# Patient Record
Sex: FEMALE | Race: WHITE | Employment: OTHER | URBAN - METROPOLITAN AREA
[De-identification: names, ages, dates, MRNs, and addresses within clinical notes are randomized per-mention and may not be internally consistent; named-entity substitution may affect disease eponyms.]

---

## 2016-03-04 LAB
ALT SERPL-CCNC: 32 U/L (ref 8–45)
AST SERPL-CCNC: 25 U/L (ref 5–41)
CHOLEST SERPL-MCNC: 167 MG/DL (ref 0–200)
CREAT SERPL-MCNC: 0.8 MG/DL (ref 0.57–1.11)
GLUCOSE SERPL-MCNC: 143 MG/DL (ref 70–100)
HBA1C MFR BLD: 7 % (ref 4–6)
HDLC SERPL-MCNC: 49 MG/DL
LDLC SERPL CALC-MCNC: 90 MG/DL (ref 0–159)
POTASSIUM SERPL-SCNC: 4.5 MMOL/L (ref 3.5–5.1)
TRIGL SERPL-MCNC: 142 MG/DL (ref 30–150)

## 2016-07-12 LAB
ALT SERPL-CCNC: 21 U/L (ref 8–45)
AST SERPL-CCNC: 22 U/L (ref 5–41)
CREAT SERPL-MCNC: 0.84 MG/DL (ref 0.57–1.11)
GLUCOSE SERPL-MCNC: 105 MG/DL (ref 70–100)
HBA1C MFR BLD: 6.2 % (ref 4–6)
POTASSIUM SERPL-SCNC: 4 MMOL/L (ref 3.5–5.1)
TSH SERPL-ACNC: 1.55 UIU/ML (ref 0.47–5)

## 2017-01-12 ENCOUNTER — OFFICE VISIT (OUTPATIENT)
Dept: INTERNAL MEDICINE | Facility: CLINIC | Age: 67
End: 2017-01-12
Payer: MEDICARE

## 2017-01-12 VITALS
WEIGHT: 208.2 LBS | RESPIRATION RATE: 16 BRPM | SYSTOLIC BLOOD PRESSURE: 128 MMHG | OXYGEN SATURATION: 98 % | HEART RATE: 96 BPM | TEMPERATURE: 98.1 F | DIASTOLIC BLOOD PRESSURE: 62 MMHG

## 2017-01-12 DIAGNOSIS — I10 BENIGN ESSENTIAL HYPERTENSION: ICD-10-CM

## 2017-01-12 DIAGNOSIS — G89.29 CHRONIC BILATERAL LOW BACK PAIN WITHOUT SCIATICA: Primary | ICD-10-CM

## 2017-01-12 DIAGNOSIS — M54.50 CHRONIC BILATERAL LOW BACK PAIN WITHOUT SCIATICA: Primary | ICD-10-CM

## 2017-01-12 DIAGNOSIS — E11.42 DIABETIC POLYNEUROPATHY ASSOCIATED WITH TYPE 2 DIABETES MELLITUS (H): ICD-10-CM

## 2017-01-12 DIAGNOSIS — G89.4 CHRONIC PAIN SYNDROME: ICD-10-CM

## 2017-01-12 DIAGNOSIS — E11.40 TYPE 2 DIABETES MELLITUS WITH DIABETIC NEUROPATHY, WITHOUT LONG-TERM CURRENT USE OF INSULIN (H): ICD-10-CM

## 2017-01-12 DIAGNOSIS — M79.7 FIBROMYALGIA: ICD-10-CM

## 2017-01-12 DIAGNOSIS — Z79.899 CONTROLLED SUBSTANCE AGREEMENT SIGNED: ICD-10-CM

## 2017-01-12 PROCEDURE — 99204 OFFICE O/P NEW MOD 45 MIN: CPT | Performed by: INTERNAL MEDICINE

## 2017-01-12 RX ORDER — TRAZODONE HYDROCHLORIDE 100 MG/1
100 TABLET ORAL 2 TIMES DAILY
COMMUNITY
End: 2017-05-09

## 2017-01-12 RX ORDER — TRAMADOL HYDROCHLORIDE 50 MG/1
50 TABLET ORAL 2 TIMES DAILY
Qty: 90 TABLET | Refills: 1 | Status: SHIPPED | OUTPATIENT
Start: 2017-01-12 | End: 2017-01-12

## 2017-01-12 RX ORDER — DULOXETIN HYDROCHLORIDE 60 MG/1
60 CAPSULE, DELAYED RELEASE ORAL DAILY
COMMUNITY
End: 2017-07-10

## 2017-01-12 RX ORDER — VERAPAMIL HYDROCHLORIDE 180 MG/1
180 TABLET, EXTENDED RELEASE ORAL AT BEDTIME
COMMUNITY
End: 2017-05-04

## 2017-01-12 RX ORDER — LISINOPRIL 40 MG/1
40 TABLET ORAL DAILY
COMMUNITY
End: 2017-05-04

## 2017-01-12 RX ORDER — CYCLOBENZAPRINE HCL 10 MG
10 TABLET ORAL 2 TIMES DAILY PRN
COMMUNITY
End: 2017-07-11

## 2017-01-12 RX ORDER — TRAMADOL HYDROCHLORIDE 50 MG/1
50 TABLET ORAL 3 TIMES DAILY
Qty: 90 TABLET | Refills: 1 | Status: SHIPPED | OUTPATIENT
Start: 2017-01-12 | End: 2017-03-08

## 2017-01-12 RX ORDER — PREGABALIN 150 MG/1
150 CAPSULE ORAL 2 TIMES DAILY
COMMUNITY
End: 2017-05-04

## 2017-01-12 RX ORDER — CETIRIZINE HYDROCHLORIDE 10 MG/1
10 TABLET ORAL DAILY
COMMUNITY
End: 2017-05-04

## 2017-01-12 RX ORDER — TRAMADOL HYDROCHLORIDE 50 MG/1
50 TABLET ORAL 2 TIMES DAILY
COMMUNITY
End: 2017-01-12

## 2017-01-12 RX ORDER — ATORVASTATIN CALCIUM 40 MG/1
40 TABLET, FILM COATED ORAL DAILY
COMMUNITY
End: 2017-05-04

## 2017-01-12 NOTE — Clinical Note
Please abstract the following data from this visit with this patient into the appropriate field in Epic:  Colonoscopy done on this date: 2015 (approximately), by this group: [facility], results were Negative.  Mammogram done on this date: 09/2016 (approximately), by this group: [facility], results were Negative.   SOUMYA signed at this OV- pending records.

## 2017-01-12 NOTE — MR AVS SNAPSHOT
"              After Visit Summary   2017    Vijaya Manjarrez    MRN: [id number]           Patient Information     Date Of Birth          1950        Visit Information        Provider Department      2017 4:40 PM Disha Van MD [facility]        Today's Diagnoses     HCD (health care directive)    -  1     Chronic bilateral low back pain without sciatica           Care Instructions    Clotrimazole (lotrimin) cream for the rash.         Follow-ups after your visit        Who to contact     If you have questions or need follow up information about today's clinic visit or your schedule please contact [facility] directly at [phone].  Normal or non-critical lab and imaging results will be communicated to you by MyChart, letter or phone within 4 business days after the clinic has received the results. If you do not hear from us within 7 days, please contact the clinic through MyChart or phone. If you have a critical or abnormal lab result, we will notify you by phone as soon as possible.  Submit refill requests through [employer] or call your pharmacy and they will forward the refill request to us. Please allow 3 business days for your refill to be completed.          Additional Information About Your Visit        MyChart Information     [employer] lets you send messages to your doctor, view your test results, renew your prescriptions, schedule appointments and more. To sign up, go to [email or web address] . Click on \"Log in\" on the left side of the screen, which will take you to the Welcome page. Then click on \"Sign up Now\" on the right side of the page.     You will be asked to enter the access code listed below, as well as some personal information. Please follow the directions to create your username and password.     Your access code is: [email or web address]  Expires: 2017  5:40 PM     Your access code will  in 90 days. If you need help or a new code, please " call your [facility] or [phone].        Care EveryWhere ID     This is your Care EveryWhere ID. This could be used by other organizations to access your [facility] medical records  [id number]        Your Vitals Were     Pulse Temperature Respirations Pulse Oximetry          96 98.1  F (36.7  C) (Oral) 16 98%         Blood Pressure from Last 3 Encounters:   01/12/17 128/62    Weight from Last 3 Encounters:   01/12/17 208 lb 3.2 oz (94.439 kg)              Today, you had the following     No orders found for display         Today's Medication Changes          These changes are accurate as of: 1/12/17  5:40 PM.  If you have any questions, ask your nurse or doctor.               Start taking these medicines.        Dose/Directions    traMADol 50 MG tablet   Commonly known as:  ULTRAM   Used for:  Chronic bilateral low back pain without sciatica   Started by:  Disha Van MD        Dose:  50 mg   Take 1 tablet (50 mg) by mouth 3 times daily   Quantity:  90 tablet   Refills:  1            Where to get your medicines      Some of these will need a paper prescription and others can be bought over the counter.  Ask your nurse if you have questions.     Bring a paper prescription for each of these medications    - traMADol 50 MG tablet             Primary Care Provider Office Phone # Fax #    Disha Van [phone]       [facility] [address]  [facility] [address]        Thank you!     Thank you for choosing [facility]  for your care. Our goal is always to provide you with excellent care. Hearing back from our patients is one way we can continue to improve our services. Please take a few minutes to complete the written survey that you may receive in the mail after your visit with us. Thank you!             Your Updated Medication List - Protect others around you: Learn how to safely use, store and throw away your medicines at www.disposemymeds.org.           This list is accurate as of: 1/12/17  5:40 PM.  Always use your most recent med list.                   Brand Name Dispense Instructions for use    atorvastatin 40 MG tablet    LIPITOR     Take 40 mg by mouth daily       cetirizine 10 MG tablet    zyrTEC     Take 10 mg by mouth daily       cyclobenzaprine 10 MG tablet    FLEXERIL     Take 10 mg by mouth 2 times daily as needed for muscle spasms       DULoxetine 60 MG EC capsule    CYMBALTA     Take 60 mg by mouth daily       HYDROCHLOROTHIAZIDE PO      Take 25 mg by mouth daily       lisinopril 40 MG tablet    PRINIVIL/ZESTRIL     Take 40 mg by mouth daily       LYRICA 150 MG capsule   Generic drug:  pregabalin      Take 150 mg by mouth 2 times daily       metFORMIN 500 MG tablet    GLUCOPHAGE     Take 500 mg by mouth 2 times daily (with meals)       omeprazole 20 MG CR capsule    priLOSEC     Take 20 mg by mouth daily       traMADol 50 MG tablet    ULTRAM    90 tablet    Take 1 tablet (50 mg) by mouth 3 times daily       traZODone 100 MG tablet    DESYREL     Take 100 mg by mouth 2 times daily       verapamil 180 MG CR tablet    CALAN-SR     Take 180 mg by mouth At Bedtime

## 2017-01-12 NOTE — Clinical Note
[facility]    01/12/2017    Patient: Vijaya Manjarrez  YOB: 1950  Medical Record Number: [id number]    Controlled Substance Agreement  I understand that my care provider has prescribed controlled substances (narcotics, tranquilizers, and/or stimulants) to help manage my condition(s).  I am taking this medicine to help me function or work.  I know that this is strong medicine.  It could have serious side effects and even cause a dependency on the drug.  If I stop these medicines suddenly, I could have severe withdrawal symptoms.    The risks, benefits, and side effects of these medication(s) were explained to me.  I agree that:  1. I will take part in other treatments as advised by my provider.  This may be psychiatry or counseling, physical therapy, behavioral therapy, group treatment, or a referral to a pain clinic.  I will reduce or stop my medicine when my provider tells me to do so.   2. I will take my medicines as prescribed.  I will not change the dose or schedule unless my provider tells me to.  There will be no refills if I  run out early.   I may be contacted at any time without warning and asked to complete a drug test or pill count.   3. I will keep all my appointments at the clinic.  If I miss appointments or fail to follow instructions, my provider may stop my medicine.  4. I will not ask other providers to prescribe controlled substances. And I will not accept controlled substances from other people. If I need another prescribed controlled substance for a new reason, I will notify my provider within one business day.  5. If I enroll in the [place] Medical Marijuana program, I will tell my provider.  I will also sign an agreement to share my medical records with my provider.  6. I will use one pharmacy to fill all of my controlled substance prescriptions.  If my prescription is mailed to my pharmacy, it may take 5 to 7 days for my medicine to be ready.  7. I  understand that my provider, clinic care team, and pharmacy can track controlled substance prescriptions from other providers through a central database (prescription monitoring program).  8. I will bring in my list of medications (or my medicine bottles) each time I come to the clinic.  Page 1 of 2      [facility]    01/12/2017    Patient: Vijaya Manjarrez  YOB: 1950  Medical Record Number: [id number]    9. Refills of controlled substances will be made only during office hours.  It is up to me to make sure that I do not run out of my medicines on weekends or holidays.    10. I am responsible for my prescriptions.  If the medicine is lost or stolen, it will not be replaced.   I also agree not to share these medicines with anyone.  11. I agree to not use ANY illegal or recreational drugs.  This includes marijuana, cocaine, bath salts or other drugs.  I agree not to use alcohol unless my provider says I may.  I agree to give urine samples whenever asked.  If I fail to give a urine sample, the provider may stop my medicine.     12. I will tell my nurse or provider right away if I become pregnant or have a new medical problem treated outside of [facility].  13. I understand that this medicine can affect my thinking and judgment.  It may be unsafe for me to drive, use machinery and do dangerous tasks.  I will not do any of these things until I know how the medicine affects me.  If my dose changes, I will wait to see how it affects me.  I will contact my provider if I have concerns about medicine side effects.  I understand that if I do not follow any of the conditions above, my prescriptions or treatment may be stopped.    I agree that my provider, clinic care team, and pharmacy may work with any city, state or federal law enforcement agency that investigates the misuse, sale, or other diversion of my controlled medicine. I will allow my provider to discuss my care with or share a  copy of this agreement with any other treating provider, pharmacy or emergency room where I receive care.  I agree to give up (waive) any right of privacy or confidentiality with respect to these authorizations.   I have read this agreement and have asked questions about anything I did not understand.   ___________________________________    ___________________________  Patient Signature                                                             Date and Time  ___________________________________     ____________________________  Witness                                                                              Date and Time  ___________________________________  Disha Van MD  Page 2 of 2  Opioid Pain Medicines (also known as Narcotics)  What You Need to Know      What are opioids?   Opioids are pain medicines that must be prescribed by a doctor. Examples are:     morphine (MS Contin, Tere)    oxycodone (Oxycontin)    oxycodone and acetaminophen (Percocet)    hydrocodone and acetaminophen (Vicodin, Norco)     fentanyl patch (Duragesic)     hydromorphone (Dilaudid)     methadone     What do opioids do well?   Opioids are best for short-term pain after a surgery or injury. They also work well for cancer pain. Unlike other pain medicines, they do not cause liver or kidney failure or ulcers. They may help some people with long-lasting (chronic) pain.     What do opioids NOT do well?   Opioids never get rid of pain entirely, and they do not work well for most patients with chronic pain. Opioids do not reduce swelling, one of the causes of pain. They also don t work well for nerve pain.     Side effects  Talk to your doctor before you start or decide to keep taking one of these medicines. Side effects include:    Lowers your breathing rate enough that it could cause death    Death due to taking more than the prescribed dose    Serious lifelong opioid use      Dependence is not the same as addiction. Addiction is  when people keep using a substance that harms their body, their mind or their relations with others. If you have a history of drug or alcohol abuse, taking opioids can cause a relapse.  Over time, opioids don t work as well. Most people will need higher and higher doses. The higher the dose, the more serious the side effects. We don t know the long-term effects of opioids.   People who have used opioids for a long time have a lower quality of life, worse depression, higher levels of pain and more visits to doctors.  Overdose from prescription drugs is the second leading cause of death in the [place] The risk of overdose rises when opioids are taken with other drugs such as:    Medicines used for anxiety and panic attacks (such as lorazepam, alprazolam, clonazepam    Other sedatives    Alcohol    Illegal drugs such as heroin  Never share your opioids with others. Be sure to store opioids in a secure place, locked if possible.Young children can easily swallow them and overdose.     Are there other ways to manage pain?  Ways to help reduce pain:    Exercise every day.    Treat health problems that may be causing pain.    Treat mental health problems like depression and anxiety.     Worse depression symptoms; Less pleasure in things you usually enjoy    Feeling tired or sluggish    Slower thoughts or cloudy thinking    Being more sensitive to pain over time; Pain is harder to control.    Trouble sleeping or restless sleep    Changes in hormone levels (for example, less testosterone).     Changes in sex drive or ability to have sex    Long lasting nausea and constipation    Trouble breathing while asleep; This is worse with lung problems like COPD or sleep apnea.    Unsafe driving    Getting sick more often    Itching    Feeling dizzy    Dry mouth    Sweating    Trouble emptying the bladder (peeing). This is worse if you have an enlarged prostate or get urinary tract infections (UTIs).    What else should I know about  opioids?  When someone takes opioids for too long or too often, they become dependent. This means that if you stop or reduce the medicine too quickly, you will have withdrawal symptoms.          Practice good sleep habits.  Try to go to bed and get up at the same time every day.    Stop smoking.  Tobacco use can make pain worse.    Do things that you enjoy.    Find a way to work through pain without drugs.  Try deep breathing, meditation, visual imagery and aromatherapy.    Ask your doctor to help you create a plan to manage your pain.

## 2017-01-12 NOTE — NURSING NOTE
Chief Complaint   Patient presents with     Establish Care     Transfer care from outside clinic in [facility].      Musculoskeletal Problem     Pt has hx of fibromylagia, chronic fatigue, and bad back-        Initial /62 mmHg  Pulse 96  Temp(Src) 98.1  F (36.7  C) (Oral)  Resp 16  Wt 208 lb 3.2 oz (94.439 kg)  SpO2 98% There is no height on file to calculate BMI.  BP completed using cuff size: kajal Shen CMA    Discussed Health Maintenance:  Patient agreed to schedule DEXA. Order have been place and information given to patient.     Please abstract the following data from this visit with this patient into the appropriate field in Epic:    Colonoscopy done on this date: 2015 (approximately[facility]   Mammogram done on this date: 09/2016 (approximately), [facility] were [facility].     SOUMYA signed at this OV- pending records.
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
## 2017-01-12 NOTE — Clinical Note
My Migraine Action Plan      Date: 1/20/2017     My Name: Vijaya Manjarrez   YOB: 1950  My Pharmacy: [facility] [address] [place] [address] [address] [place] [address]       My (Preventative) Control Medicine:         My Rescue Medicine:    My Doctor: Disha Van     My Clinic: [place] [address]  [facility] [address]  [phone]        GREEN ZONE = Good Control    My headache plan is working.   I can do what I need to do.           I WILL:     ? Keep managing my triggers.  ? Write in my migraine diary each time I have a headache.  ? Keep taking my preventive (controller) medicine daily.  ? Take my relief and rescue medicine as needed.             YELLOW ZONE = Not Enough Control    My headache plan isn t always working.   My headaches keep me from doing   some of the things I need to do.       I WILL:     ? Set goals to control my triggers and act on them.  ? Write in my migraine diary each time I have a headache and review it for                      patterns or new triggers.  ? Keep taking my preventive (controller) medicine daily.  ? Take my relief and rescue medicine as needed.  ? Call my doctor or clinic at if I stay in the Yellow Zone.             RED ZONE = Poor or No Control    My headache plan has  failed. I can t do anything  when I have one. My  medicines aren t working.           I WILL:   ? Set goals to control my triggers and act on them.  ? Write in my migraine diary each time I have a headache and review it for                      patterns or new triggers.  ? Keep taking my preventive (controller) medicine daily.  ? Take my relief and rescue medicine as needed.  ? Call my doctor or clinic or go to urgent care or an ER if I m having the worst                  headache of my life.  ? Call my doctor or clinic or go to urgent care or an ER if my medicine doesn t work.  ? Let my doctor or clinic know within 2 weeks if I have gone to an urgent care  or             emergency department.          Provider specific instructions:

## 2017-01-20 PROBLEM — G89.4 CHRONIC PAIN SYNDROME: Status: ACTIVE | Noted: 2017-01-20

## 2017-01-20 PROBLEM — E78.5 HYPERLIPIDEMIA LDL GOAL <100: Status: ACTIVE | Noted: 2017-01-20

## 2017-01-20 PROBLEM — Z79.899 CONTROLLED SUBSTANCE AGREEMENT SIGNED: Status: ACTIVE | Noted: 2017-01-12

## 2017-01-20 PROBLEM — M79.7 FIBROMYALGIA: Status: ACTIVE | Noted: 2017-01-20

## 2017-01-20 PROBLEM — Z79.899 CONTROLLED SUBSTANCE AGREEMENT SIGNED: Status: ACTIVE | Noted: 2017-01-20

## 2017-01-20 PROBLEM — I10 BENIGN ESSENTIAL HYPERTENSION: Status: ACTIVE | Noted: 2017-01-20

## 2017-02-13 ENCOUNTER — OFFICE VISIT (OUTPATIENT)
Dept: INTERNAL MEDICINE | Facility: CLINIC | Age: 67
End: 2017-02-13
Payer: MEDICARE

## 2017-02-13 VITALS
SYSTOLIC BLOOD PRESSURE: 108 MMHG | OXYGEN SATURATION: 95 % | HEART RATE: 68 BPM | TEMPERATURE: 98.3 F | WEIGHT: 209.1 LBS | RESPIRATION RATE: 16 BRPM | DIASTOLIC BLOOD PRESSURE: 68 MMHG

## 2017-02-13 DIAGNOSIS — R10.30 LOWER ABDOMINAL PAIN: Primary | ICD-10-CM

## 2017-02-13 PROCEDURE — 99214 OFFICE O/P EST MOD 30 MIN: CPT | Performed by: INTERNAL MEDICINE

## 2017-02-13 ASSESSMENT — ANXIETY QUESTIONNAIRES
7. FEELING AFRAID AS IF SOMETHING AWFUL MIGHT HAPPEN: NOT AT ALL
GAD7 TOTAL SCORE: 3
6. BECOMING EASILY ANNOYED OR IRRITABLE: NOT AT ALL
2. NOT BEING ABLE TO STOP OR CONTROL WORRYING: SEVERAL DAYS
3. WORRYING TOO MUCH ABOUT DIFFERENT THINGS: NOT AT ALL
5. BEING SO RESTLESS THAT IT IS HARD TO SIT STILL: NOT AT ALL
1. FEELING NERVOUS, ANXIOUS, OR ON EDGE: SEVERAL DAYS
IF YOU CHECKED OFF ANY PROBLEMS ON THIS QUESTIONNAIRE, HOW DIFFICULT HAVE THESE PROBLEMS MADE IT FOR YOU TO DO YOUR WORK, TAKE CARE OF THINGS AT HOME, OR GET ALONG WITH OTHER PEOPLE: SOMEWHAT DIFFICULT

## 2017-02-13 ASSESSMENT — PATIENT HEALTH QUESTIONNAIRE - PHQ9: 5. POOR APPETITE OR OVEREATING: SEVERAL DAYS

## 2017-02-13 NOTE — NURSING NOTE
Chief Complaint   Patient presents with     Back Pain     Low pelvic pain and low back pain sx for 10 days. No urinary sx-        Initial /68 (BP Location: Right arm, Patient Position: Chair, Cuff Size: Adult Large)  Pulse 68  Temp 98.3  F (36.8  C) (Oral)  Resp 16  Wt 209 lb 1.6 oz (94.8 kg)  SpO2 95% There is no height or weight on file to calculate BMI.  Medication Reconciliation: complete      Mary Shen, CMA

## 2017-02-13 NOTE — MR AVS SNAPSHOT
After Visit Summary   2/13/2017    Vijaya Manjarrez    MRN: [id number]           Patient Information     Date Of Birth          1950        Visit Information        Provider Department      2/13/2017 1:20 PM Disha Van MD [facility]        Today's Diagnoses     Lower abdominal pain    -  1       Follow-ups after your visit        Your next 10 appointments already scheduled     Feb 20, 2017  2:00 PM CST   LAB with [facility] LAB   [facility] ([facility])    [address]  [facility] [address]   [phone]           Patient must bring picture ID.  Patient should be prepared to give a urine specimen  Please do not eat 10-12 hours before your appointment if you are coming in fasting for labs on lipids, cholesterol, or glucose (sugar).  Pregnant women should follow their Care Team instructions. Water with medications is okay. Do not drink coffee or other fluids.   If you have concerns about taking  your medications, please ask at office or if scheduling via [employer], send a message by clicking on Secure Messaging, Message Your Care Team.              Who to contact     If you have questions or need follow up information about today's clinic visit or your schedule please contact [facility] directly at [phone].  Normal or non-critical lab and imaging results will be communicated to you by MyChart, letter or phone within 4 business days after the clinic has received the results. If you do not hear from us within 7 days, please contact the clinic through "[employer]hart or phone. If you have a critical or abnormal lab result, we will notify you by phone as soon as possible.  Submit refill requests through [employer] or call your pharmacy and they will forward the refill request to us. Please allow 3 business days for your refill to be completed.          Additional Information About Your Visit        MyChart Information     [employer]  "lets you send messages to your doctor, view your test results, renew your prescriptions, schedule appointments and more. To sign up, go to [email or web address] . Click on \"Log in\" on the left side of the screen, which will take you to the Welcome page. Then click on \"Sign up Now\" on the right side of the page.     You will be asked to enter the access code listed below, as well as some personal information. Please follow the directions to create your username and password.     Your access code is: [email or web address]  Expires: 2017  5:40 PM     Your access code will  in 90 days. If you need help or a new code, please call your [place] clinic or [phone].        Care EveryWhere ID     This is your Care EveryWhere ID. This could be used by other organizations to access your [place] medical records  [id number]        Your Vitals Were     Pulse Temperature Respirations Pulse Oximetry          68 98.3  F (36.8  C) (Oral) 16 95%         Blood Pressure from Last 3 Encounters:   17 108/68   17 128/62    Weight from Last 3 Encounters:   17 209 lb 1.6 oz (94.8 kg)   17 208 lb 3.2 oz (94.4 kg)               Primary Care Provider Office Phone # Fax #    Disha Van [phone]       [facility] [address]  [facility] [address]        Thank you!     Thank you for choosing [facility]  for your care. Our goal is always to provide you with excellent care. Hearing back from our patients is one way we can continue to improve our services. Please take a few minutes to complete the written survey that you may receive in the mail after your visit with us. Thank you!             Your Updated Medication List - Protect others around you: Learn how to safely use, store and throw away your medicines at www.disposemymeds.org.          This list is accurate as of: 17 11:59 PM.  Always use your most recent med list.                   Brand Name " Dispense Instructions for use    atorvastatin 40 MG tablet    LIPITOR     Take 40 mg by mouth daily       cetirizine 10 MG tablet    zyrTEC     Take 10 mg by mouth daily       cyclobenzaprine 10 MG tablet    FLEXERIL     Take 10 mg by mouth 2 times daily as needed for muscle spasms       DULoxetine 60 MG EC capsule    CYMBALTA     Take 60 mg by mouth daily       HYDROCHLOROTHIAZIDE PO      Take 25 mg by mouth daily       lisinopril 40 MG tablet    PRINIVIL/ZESTRIL     Take 40 mg by mouth daily       LYRICA 150 MG capsule   Generic drug:  pregabalin      Take 150 mg by mouth 2 times daily       metFORMIN 500 MG tablet    GLUCOPHAGE     Take 500 mg by mouth 2 times daily (with meals)       omeprazole 20 MG CR capsule    priLOSEC     Take 20 mg by mouth daily       traMADol 50 MG tablet    ULTRAM    90 tablet    Take 1 tablet (50 mg) by mouth 3 times daily       traZODone 100 MG tablet    DESYREL     Take 100 mg by mouth 2 times daily       verapamil 180 MG CR tablet    CALAN-SR     Take 180 mg by mouth At Bedtime

## 2017-02-13 NOTE — PROGRESS NOTES
SUBJECTIVE:                                                    Vijaya Manjarrez is a 66 year old female who presents to clinic today for the following health issues:    Back and abdominal pain: Her symptoms began about 10 days ago with a constant pain across the mid to lower abdomen. It is fairly sharp, worse with movement. It is not related to urination, moving bowels, eating. She's been having some soreness in her flanks and lumbar back. This is also fairly constant. This is different from other back pain. She's had a fever up to 100 on and off. She's had mild nausea. She has tried her tramadol but that hasn't helped. She has not taken any other medication for it.      Patient Active Problem List   Diagnosis     HCD (health care directive)     Type 2 diabetes mellitus with diabetic neuropathy, without long-term current use of insulin (H)     COPD (chronic obstructive pulmonary disease) (H)     Cervical spine degeneration     Facet arthropathy, lumbar (H)     Lumbar degenerative disc disease     Migraine headache     Diabetic neuropathy (H)     Anxiety     GERD (gastroesophageal reflux disease)     Hyperlipidemia LDL goal <100     Fibromyalgia     Benign essential hypertension     Controlled substance agreement signed     Chronic pain syndrome     Current Outpatient Prescriptions   Medication Sig Dispense Refill     pregabalin (LYRICA) 150 MG capsule Take 150 mg by mouth 2 times daily       metFORMIN (GLUCOPHAGE) 500 MG tablet Take 500 mg by mouth 2 times daily (with meals)       omeprazole (PRILOSEC) 20 MG CR capsule Take 20 mg by mouth daily       DULoxetine (CYMBALTA) 60 MG EC capsule Take 60 mg by mouth daily       cyclobenzaprine (FLEXERIL) 10 MG tablet Take 10 mg by mouth 2 times daily as needed for muscle spasms       verapamil (CALAN-SR) 180 MG CR tablet Take 180 mg by mouth At Bedtime       atorvastatin (LIPITOR) 40 MG tablet Take 40 mg by mouth daily       traZODone (DESYREL) 100 MG tablet Take 100  mg by mouth 2 times daily       lisinopril (PRINIVIL/ZESTRIL) 40 MG tablet Take 40 mg by mouth daily       HYDROCHLOROTHIAZIDE PO Take 25 mg by mouth daily       cetirizine (ZYRTEC) 10 MG tablet Take 10 mg by mouth daily       traMADol (ULTRAM) 50 MG tablet Take 1 tablet (50 mg) by mouth 3 times daily 90 tablet 1      Social History   Substance Use Topics     Smoking status: Current Every Day Smoker     Types: Cigarettes     Smokeless tobacco: Not on file     Alcohol use No      Past Surgical History   Procedure Laterality Date     Cholecystectomy, laporoscopic       Cholecystectomy, Laparoscopic      ROS:  Positive episodic fever, no chills, no vomiting, no diarrhea, constipation, she has a small amount of blood per rectum she thinks is related to hemorrhoids. No dysuria, frequency, urgency, isolated flank pain, hematuria    OBJECTIVE:                                                    /68 (BP Location: Right arm, Patient Position: Chair, Cuff Size: Adult Large)  Pulse 68  Temp 98.3  F (36.8  C) (Oral)  Resp 16  Wt 209 lb 1.6 oz (94.8 kg)  SpO2 95%  There is no height or weight on file to calculate BMI.    Abdomen: Bowel sounds present, soft, there is moderate tenderness left abdomen, mostly left lwer quadrant, some across the lower abdomen towards the right. No rebound or guarding, no masses  Back: There is tenderness of the far lateral lumbar muscles.       ASSESSMENT/PLAN:                                                            1. Lower abdominal pain  Etiologies unclear, given the fever and tenderness will perform CT scan to evaluate for possible diverticulitis and other options could be kidney stone, she is very worried to rule out uterine cancer but advised that probably is not due to that. It is possibly muscular.  - CT Abdomen Pelvis w Contrast; Future  We will call her after the CT, advised ER for severe pain.      Disha Van MD  [facility]

## 2017-02-14 ENCOUNTER — TELEPHONE (OUTPATIENT)
Dept: INTERNAL MEDICINE | Facility: CLINIC | Age: 67
End: 2017-02-14

## 2017-02-14 ENCOUNTER — HOSPITAL ENCOUNTER (OUTPATIENT)
Dept: CT IMAGING | Facility: CLINIC | Age: 67
Discharge: HOME | End: 2017-02-14
Attending: INTERNAL MEDICINE | Admitting: INTERNAL MEDICINE
Payer: MEDICARE

## 2017-02-14 DIAGNOSIS — R10.30 LOWER ABDOMINAL PAIN: ICD-10-CM

## 2017-02-14 LAB
CREAT BLD-MCNC: 0.8 MG/DL (ref 0.52–1.04)
GFR SERPL CREATININE-BSD FRML MDRD: 72 ML/MIN/1.7M2

## 2017-02-14 PROCEDURE — 25000128 H RX IP 250 OP 636: Performed by: RADIOLOGY

## 2017-02-14 PROCEDURE — 74177 CT ABD & PELVIS W/CONTRAST: CPT

## 2017-02-14 PROCEDURE — 82565 ASSAY OF CREATININE: CPT

## 2017-02-14 PROCEDURE — 25500064 ZZH RX 255 OP 636: Performed by: RADIOLOGY

## 2017-02-14 RX ORDER — IOPAMIDOL 755 MG/ML
500 INJECTION, SOLUTION INTRAVASCULAR ONCE
Status: COMPLETED | OUTPATIENT
Start: 2017-02-14 | End: 2017-02-14

## 2017-02-14 RX ADMIN — IOPAMIDOL 94 ML: 755 INJECTION, SOLUTION INTRAVENOUS at 07:20

## 2017-02-14 RX ADMIN — SODIUM CHLORIDE 65 ML: 9 INJECTION, SOLUTION INTRAVENOUS at 07:20

## 2017-02-14 ASSESSMENT — PATIENT HEALTH QUESTIONNAIRE - PHQ9: SUM OF ALL RESPONSES TO PHQ QUESTIONS 1-9: 10

## 2017-02-14 ASSESSMENT — ANXIETY QUESTIONNAIRES: GAD7 TOTAL SCORE: 3

## 2017-02-14 NOTE — LETTER
[facility]  [address], [address]  [place], [place]  [address]                                            TEL:[phone]  FAX:[phone]      Dear Vijaya    These are the directions that Dr Van wrote in your chart    Some of her pain may be some irritable bowel, recommend start stool softener and fiber. Some may be muscular and the back pain is probably mostly muscular. She can use heat and should start some back stretches by bending back until feels pull and hold count of 15, then pelvic tilt; like trying to flatten the curve of the back a count of 15. Repeat both and try to do 4-5 times a day. She can use some aspercream with lidocaine to the area. Could refer for PT if not improving.       Romana Reveles RN

## 2017-02-14 NOTE — TELEPHONE ENCOUNTER
Preliminary CT report shows no evidence of diverticulitis or any acute findings in abdomen/pelvis.  Pl advise pt.

## 2017-02-15 NOTE — TELEPHONE ENCOUNTER
Notified pt of results, she is still having pain in stomach and low back-she wants to know what she should do next. She's ok waiting for PCP if need be. Please Advise

## 2017-02-16 ENCOUNTER — TELEPHONE (OUTPATIENT)
Dept: INTERNAL MEDICINE | Facility: CLINIC | Age: 67
End: 2017-02-16

## 2017-02-16 DIAGNOSIS — K62.5 RECTAL BLEEDING: Primary | ICD-10-CM

## 2017-02-16 NOTE — TELEPHONE ENCOUNTER
I ordered a hgb, she needs all her diabetic labs so she should do fasting lab and do all at the same time.

## 2017-02-16 NOTE — TELEPHONE ENCOUNTER
1. Sx include tiredness, barely able to get up and move around. Hardly able to use any muscle strength for daily activities. Sx ongoing for 2 weeks now.    2. No black stool, no palpitations, no dyspnea.    3. Pt noticed bright red blood in stools in the last few weeks. Pt stated that she talked to Dr Van about it during OV on Monday.

## 2017-02-16 NOTE — TELEPHONE ENCOUNTER
Get more information. What does she mean by weak? Fatigue, faintness, dizziness, loss of muscle strength? When did that start, she did not really complain of this on Monday. Any other symptoms like black stool, palpitations, dyspnea? What blood loss is she talking about?  It is hard to order tests without understanding symptoms more.

## 2017-02-16 NOTE — TELEPHONE ENCOUNTER
Some of her pain may be some irritable bowel, recommend start stool softener and fiber. Some may be muscular and the back pain is probably mostly muscular. She can use heat and should start some back stretches by bending back until feels pull and hold count of 15, then pelvic tilt; like trying to flatten the curve of the back a count of 15. Repeat both and try to do 4-5 times a day. She can use some aspercream with lidocaine to the area. Could refer for PT if not improving.

## 2017-02-16 NOTE — TELEPHONE ENCOUNTER
Pt calling in and requesting a blood test because she so weak. She is unsure if she is anemic or something else with all the blood that she lost. Pt states ok to leave detailed message.    Provider please review and advise.

## 2017-02-20 DIAGNOSIS — E11.40 TYPE 2 DIABETES MELLITUS WITH DIABETIC NEUROPATHY, WITHOUT LONG-TERM CURRENT USE OF INSULIN (H): ICD-10-CM

## 2017-02-20 DIAGNOSIS — G89.29 CHRONIC BILATERAL LOW BACK PAIN WITHOUT SCIATICA: ICD-10-CM

## 2017-02-20 DIAGNOSIS — K62.5 RECTAL BLEEDING: ICD-10-CM

## 2017-02-20 DIAGNOSIS — G89.4 CHRONIC PAIN SYNDROME: ICD-10-CM

## 2017-02-20 DIAGNOSIS — M54.50 CHRONIC BILATERAL LOW BACK PAIN WITHOUT SCIATICA: ICD-10-CM

## 2017-02-20 LAB
AMPHETAMINES UR QL: ABNORMAL
BARBITURATES UR QL: ABNORMAL
BENZODIAZ UR QL: ABNORMAL
CANNABINOIDS UR QL: ABNORMAL
COCAINE UR QL: ABNORMAL
HBA1C MFR BLD: 6.7 % (ref 4.3–6)
HGB BLD-MCNC: 13.4 G/DL (ref 11.7–15.7)
MDA UR QL SCN: ABNORMAL
METHADONE UR QL SCN: ABNORMAL
METHAMPHET UR QL SCN: ABNORMAL
OPIATES UR QL SCN: ABNORMAL
OXYCODONE UR QL: ABNORMAL
PCP UR QL SCN: ABNORMAL
TRICYCLICS UR QL SCN: ABNORMAL

## 2017-02-20 PROCEDURE — 82043 UR ALBUMIN QUANTITATIVE: CPT | Performed by: INTERNAL MEDICINE

## 2017-02-20 PROCEDURE — 83036 HEMOGLOBIN GLYCOSYLATED A1C: CPT | Performed by: INTERNAL MEDICINE

## 2017-02-20 PROCEDURE — 36415 COLL VENOUS BLD VENIPUNCTURE: CPT | Performed by: INTERNAL MEDICINE

## 2017-02-20 PROCEDURE — 85018 HEMOGLOBIN: CPT | Performed by: INTERNAL MEDICINE

## 2017-02-20 PROCEDURE — 80053 COMPREHEN METABOLIC PANEL: CPT | Performed by: INTERNAL MEDICINE

## 2017-02-20 PROCEDURE — 80061 LIPID PANEL: CPT | Performed by: INTERNAL MEDICINE

## 2017-02-20 PROCEDURE — 80305 DRUG TEST PRSMV DIR OPT OBS: CPT | Performed by: INTERNAL MEDICINE

## 2017-02-20 PROCEDURE — 84443 ASSAY THYROID STIM HORMONE: CPT | Performed by: INTERNAL MEDICINE

## 2017-02-21 LAB
ALBUMIN SERPL-MCNC: 3.9 G/DL (ref 3.4–5)
ALP SERPL-CCNC: 82 U/L (ref 40–150)
ALT SERPL W P-5'-P-CCNC: 26 U/L (ref 0–50)
ANION GAP SERPL CALCULATED.3IONS-SCNC: 12 MMOL/L (ref 3–14)
AST SERPL W P-5'-P-CCNC: 18 U/L (ref 0–45)
BILIRUB SERPL-MCNC: 0.4 MG/DL (ref 0.2–1.3)
BUN SERPL-MCNC: 17 MG/DL (ref 7–30)
CALCIUM SERPL-MCNC: 9.1 MG/DL (ref 8.5–10.1)
CHLORIDE SERPL-SCNC: 106 MMOL/L (ref 94–109)
CHOLEST SERPL-MCNC: 147 MG/DL
CO2 SERPL-SCNC: 26 MMOL/L (ref 20–32)
CREAT SERPL-MCNC: 0.73 MG/DL (ref 0.52–1.04)
CREAT UR-MCNC: 252 MG/DL
GFR SERPL CREATININE-BSD FRML MDRD: 79 ML/MIN/1.7M2
GLUCOSE SERPL-MCNC: 118 MG/DL (ref 70–99)
HDLC SERPL-MCNC: 36 MG/DL
LDLC SERPL CALC-MCNC: 58 MG/DL
MICROALBUMIN UR-MCNC: 42 MG/L
MICROALBUMIN/CREAT UR: 16.67 MG/G CR (ref 0–25)
NONHDLC SERPL-MCNC: 111 MG/DL
POTASSIUM SERPL-SCNC: 4.4 MMOL/L (ref 3.4–5.3)
PROT SERPL-MCNC: 7.4 G/DL (ref 6.8–8.8)
SODIUM SERPL-SCNC: 144 MMOL/L (ref 133–144)
TRIGL SERPL-MCNC: 267 MG/DL
TSH SERPL DL<=0.005 MIU/L-ACNC: 1.05 MU/L (ref 0.4–4)

## 2017-03-08 DIAGNOSIS — G89.29 CHRONIC BILATERAL LOW BACK PAIN WITHOUT SCIATICA: ICD-10-CM

## 2017-03-08 DIAGNOSIS — M54.50 CHRONIC BILATERAL LOW BACK PAIN WITHOUT SCIATICA: ICD-10-CM

## 2017-03-08 NOTE — TELEPHONE ENCOUNTER
Pt is calling for refill on Tramadol.    Pt states that last appt with Dr. Van at 1/12/17, that  Told her to increase the amount of Tramadol by taking 5 times daily (and not the 3 times daily on the script for that same day.    Tramadol      Last Written Prescription Date:  1/12/2017  Last Fill Quantity: 90,   # refills: 1  Last Office Visit with [facility], [facility] or [facility] prescribing provider: 2/13/17  Future Office visit:       Routing refill request to provider for review/approval because:  Drug not on the [facility], [facility] or  [facility] refill protocol or controlled substance    Please advise  Mazin ROQUE RN

## 2017-03-09 ENCOUNTER — TELEPHONE (OUTPATIENT)
Dept: PALLIATIVE MEDICINE | Facility: CLINIC | Age: 67
End: 2017-03-09

## 2017-03-09 RX ORDER — TRAMADOL HYDROCHLORIDE 50 MG/1
50-100 TABLET ORAL 3 TIMES DAILY
Qty: 150 TABLET | Refills: 1 | Status: SHIPPED | OUTPATIENT
Start: 2017-03-09 | End: 2017-05-17

## 2017-03-09 NOTE — TELEPHONE ENCOUNTER
Part of the CSA includes cooperating with treatment plans in order to continue receiving pain medication. I want her to see pain clinic for assessment about causes and any other recommendations since I have no records. If they feel there is no other recommendations she does not need to continue seeing them.

## 2017-03-09 NOTE — TELEPHONE ENCOUNTER
There was not very much helpful information in her previous records from [facility]  about her back. If she has seen any specialist, she should get those records sent but I would recommend referral to pain clinic. Refill done.

## 2017-03-09 NOTE — TELEPHONE ENCOUNTER
Spoke to patient- pt had been to a pain clinic in the past, did not recall when and who, stated it did not help. Pt decline a referral to pain clinic. Pt had seen ortho specialist in the past also does not recall who and when either.    Rx brought down to [facility]- pt advised to call pharmacy to make sure it has been process and ready for  before coming in.

## 2017-03-09 NOTE — TELEPHONE ENCOUNTER
Left voicemail for patient to schedule new evaluation.     Marcia FELDMAN    [facility]

## 2017-03-11 ENCOUNTER — TELEPHONE (OUTPATIENT)
Dept: INTERNAL MEDICINE | Facility: CLINIC | Age: 67
End: 2017-03-11

## 2017-03-12 NOTE — TELEPHONE ENCOUNTER
Please call and advise her urine drug test showed positive for tricyclic antidepressant. We don't have any such medication on her list. Some examples are doxepin, elavil or amitriptyline, nortriptyline. Ask is she is taking any of these, if so, what is the dose, what is she taking it for and who prescribed it.

## 2017-03-13 NOTE — TELEPHONE ENCOUNTER
Pt advised of PCP's message below.    States she is concerned however, about paying for pain clinic but given phone number to pain clinic.

## 2017-03-13 NOTE — TELEPHONE ENCOUNTER
Pt calling back.  States the meds she has on her current med list are the only meds she is taking (verified the med list with her.)    Next step?    Please advise, thanks.

## 2017-03-17 DIAGNOSIS — E11.40 TYPE 2 DIABETES MELLITUS WITH DIABETIC NEUROPATHY, WITHOUT LONG-TERM CURRENT USE OF INSULIN (H): Primary | ICD-10-CM

## 2017-03-17 NOTE — TELEPHONE ENCOUNTER
Metformin         Last Written Prescription Date: unknown  Last Fill Quantity: unknown, # refills: unknown  Last Office Visit with [facility], [facility] or [facility] prescribing provider:  02/13/17        BP Readings from Last 3 Encounters:   02/13/17 108/68   01/12/17 128/62     Lab Results   Component Value Date    MICROL 42 02/20/2017     No results found for: MICROALBUMIN  Creatinine   Date Value Ref Range Status   02/20/2017 0.73 0.52 - 1.04 mg/dL Final   ]  GFR Estimate   Date Value Ref Range Status   02/20/2017 79 >60 mL/min/1.7m2 Final     Comment:     Non  GFR Calc   02/14/2017 72 >60 mL/min/1.7m2 Final     GFR Estimate If Black   Date Value Ref Range Status   02/20/2017 >90   GFR Calc   >60 mL/min/1.7m2 Final   02/14/2017 87 >60 mL/min/1.7m2 Final     Lab Results   Component Value Date    CHOL 147 02/20/2017     Lab Results   Component Value Date    HDL 36 02/20/2017     Lab Results   Component Value Date    LDL 58 02/20/2017     Lab Results   Component Value Date    TRIG 267 02/20/2017     No results found for: CHOLHDLRATIO  Lab Results   Component Value Date    AST 18 02/20/2017     Lab Results   Component Value Date    ALT 26 02/20/2017     Lab Results   Component Value Date    A1C 6.7 02/20/2017    A1C 6.2 07/12/2016    A1C 7.0 03/04/2016     Potassium   Date Value Ref Range Status   02/20/2017 4.4 3.4 - 5.3 mmol/L Final       Labs showing if normal/abnormal  Lab Results   Component Value Date    UCRR 252 02/20/2017    MICROL 42 02/20/2017     Lab Results   Component Value Date    CHOL 147 02/20/2017    TRIG 267 (H) 02/20/2017    HDL 36 (L) 02/20/2017    LDL 58 02/20/2017     Lab Results   Component Value Date    A1C 6.7 (H) 02/20/2017    A1C 6.2 (A) 07/12/2016    A1C 7.0 (A) 03/04/2016

## 2017-03-20 NOTE — TELEPHONE ENCOUNTER
Rehan has not filled rx yet-Pt new to [facility]      Lab Results   Component Value Date    A1C 6.7 02/20/2017    A1C 6.2 07/12/2016    A1C 7.0 03/04/2016

## 2017-04-27 ENCOUNTER — TELEPHONE (OUTPATIENT)
Dept: INTERNAL MEDICINE | Facility: CLINIC | Age: 67
End: 2017-04-27

## 2017-04-27 NOTE — TELEPHONE ENCOUNTER
Fax received from [facility] for review and signature.  Put in Dr. Van's in basket.

## 2017-05-04 DIAGNOSIS — M47.812 SPONDYLOSIS OF CERVICAL REGION WITHOUT MYELOPATHY OR RADICULOPATHY: ICD-10-CM

## 2017-05-04 DIAGNOSIS — I10 BENIGN ESSENTIAL HYPERTENSION: ICD-10-CM

## 2017-05-04 DIAGNOSIS — M47.816 FACET ARTHROPATHY, LUMBAR: ICD-10-CM

## 2017-05-04 DIAGNOSIS — E78.5 HYPERLIPIDEMIA LDL GOAL <100: Primary | ICD-10-CM

## 2017-05-04 DIAGNOSIS — J30.9 ALLERGIC RHINITIS, UNSPECIFIED ALLERGIC RHINITIS TRIGGER, UNSPECIFIED RHINITIS SEASONALITY: ICD-10-CM

## 2017-05-05 RX ORDER — VERAPAMIL HYDROCHLORIDE 180 MG/1
180 TABLET, EXTENDED RELEASE ORAL AT BEDTIME
Qty: 90 TABLET | Refills: 1 | Status: ON HOLD | OUTPATIENT
Start: 2017-05-05 | End: 2017-07-03

## 2017-05-05 RX ORDER — LISINOPRIL 40 MG/1
40 TABLET ORAL DAILY
Qty: 90 TABLET | Refills: 1 | Status: SHIPPED | OUTPATIENT
Start: 2017-05-05 | End: 2017-06-08

## 2017-05-05 RX ORDER — ATORVASTATIN CALCIUM 40 MG/1
40 TABLET, FILM COATED ORAL DAILY
Qty: 90 TABLET | Refills: 1 | Status: SHIPPED | OUTPATIENT
Start: 2017-05-05 | End: 2017-11-03

## 2017-05-05 RX ORDER — CETIRIZINE HYDROCHLORIDE 10 MG/1
10 TABLET ORAL DAILY
Qty: 90 TABLET | Refills: 3 | Status: SHIPPED | OUTPATIENT
Start: 2017-05-05 | End: 2018-04-20

## 2017-05-05 RX ORDER — PREGABALIN 150 MG/1
150 CAPSULE ORAL 2 TIMES DAILY
Qty: 180 CAPSULE | Refills: 1 | Status: SHIPPED | OUTPATIENT
Start: 2017-05-05 | End: 2017-06-08

## 2017-05-05 RX ORDER — HYDROCHLOROTHIAZIDE 25 MG/1
25 TABLET ORAL DAILY
Qty: 90 TABLET | Refills: 1 | Status: SHIPPED | OUTPATIENT
Start: 2017-05-05 | End: 2017-05-24 | Stop reason: ALTCHOICE

## 2017-05-05 NOTE — TELEPHONE ENCOUNTER
Provider approval needed.       Potassium   Date Value Ref Range Status   02/20/2017 4.4 3.4 - 5.3 mmol/L Final   ]  Creatinine   Date Value Ref Range Status   02/20/2017 0.73 0.52 - 1.04 mg/dL Final   ]    Recent Labs   Lab Test  02/20/17   1347 03/04/16   CHOL  147  167   HDL  36*  49   LDL  58  90   TRIG  267*  142

## 2017-05-09 DIAGNOSIS — G47.00 INSOMNIA, UNSPECIFIED TYPE: Primary | ICD-10-CM

## 2017-05-09 RX ORDER — TRAZODONE HYDROCHLORIDE 100 MG/1
200 TABLET ORAL AT BEDTIME
Qty: 60 TABLET | Refills: 5 | Status: SHIPPED | OUTPATIENT
Start: 2017-05-09 | End: 2017-09-14

## 2017-05-09 NOTE — TELEPHONE ENCOUNTER
Refill request from pharmacy for Trazodone 200 mg at bedtime. This is pt reported and exceeds recommended dose.     Last OV 2/13/17.

## 2017-05-17 DIAGNOSIS — M54.50 CHRONIC BILATERAL LOW BACK PAIN WITHOUT SCIATICA: ICD-10-CM

## 2017-05-17 DIAGNOSIS — G89.29 CHRONIC BILATERAL LOW BACK PAIN WITHOUT SCIATICA: ICD-10-CM

## 2017-05-17 RX ORDER — TRAMADOL HYDROCHLORIDE 50 MG/1
50-100 TABLET ORAL 3 TIMES DAILY
Qty: 150 TABLET | Refills: 1 | Status: SHIPPED | OUTPATIENT
Start: 2017-05-17 | End: 2017-06-08

## 2017-05-17 NOTE — TELEPHONE ENCOUNTER
Tramadol 50mg      Last Written Prescription Date:  3/9/17  Last Fill Quantity: 150,   # refills: 1  Last Office Visit with [facility], [facility] or [facility] prescribing provider: 2/13/17  Future Office visit:       Routing refill request to provider for review/approval because:  Drug not on the [facility], [facility] or [facility] refill protocol or controlled substance      Giovanni Carreon CPhT  [facility]    On behalf of [facility]

## 2017-05-18 ENCOUNTER — TELEPHONE (OUTPATIENT)
Dept: INTERNAL MEDICINE | Facility: CLINIC | Age: 67
End: 2017-05-18

## 2017-05-18 ENCOUNTER — OFFICE VISIT (OUTPATIENT)
Dept: INTERNAL MEDICINE | Facility: CLINIC | Age: 67
End: 2017-05-18
Payer: MEDICARE

## 2017-05-18 VITALS
DIASTOLIC BLOOD PRESSURE: 60 MMHG | TEMPERATURE: 98.3 F | WEIGHT: 210 LBS | RESPIRATION RATE: 16 BRPM | SYSTOLIC BLOOD PRESSURE: 100 MMHG | HEART RATE: 72 BPM | BODY MASS INDEX: 30.06 KG/M2 | HEIGHT: 70 IN

## 2017-05-18 DIAGNOSIS — J40 BRONCHITIS: Primary | ICD-10-CM

## 2017-05-18 DIAGNOSIS — J44.0 CHRONIC OBSTRUCTIVE PULMONARY DISEASE WITH ACUTE LOWER RESPIRATORY INFECTION (H): ICD-10-CM

## 2017-05-18 DIAGNOSIS — R05.9 COUGH: Primary | ICD-10-CM

## 2017-05-18 PROCEDURE — 99213 OFFICE O/P EST LOW 20 MIN: CPT | Performed by: NURSE PRACTITIONER

## 2017-05-18 RX ORDER — ALBUTEROL SULFATE 90 UG/1
2 AEROSOL, METERED RESPIRATORY (INHALATION) EVERY 6 HOURS PRN
Qty: 1 INHALER | Refills: 0 | Status: ON HOLD | OUTPATIENT
Start: 2017-05-18 | End: 2017-06-18

## 2017-05-18 RX ORDER — METHYLPREDNISOLONE 4 MG
TABLET, DOSE PACK ORAL
Qty: 21 TABLET | Refills: 0 | Status: SHIPPED | OUTPATIENT
Start: 2017-05-18 | End: 2017-05-24

## 2017-05-18 RX ORDER — AZITHROMYCIN 250 MG/1
TABLET, FILM COATED ORAL
Qty: 6 TABLET | Refills: 0 | Status: SHIPPED | OUTPATIENT
Start: 2017-05-18 | End: 2017-05-24

## 2017-05-18 RX ORDER — CODEINE PHOSPHATE AND GUAIFENESIN 10; 100 MG/5ML; MG/5ML
1 SOLUTION ORAL EVERY 4 HOURS PRN
Qty: 240 ML | Refills: 1 | Status: SHIPPED | OUTPATIENT
Start: 2017-05-18 | End: 2017-06-08

## 2017-05-18 NOTE — MR AVS SNAPSHOT
"              After Visit Summary   2017    Vijaya Manjarrez    MRN: [id number]           Patient Information     Date Of Birth          1950        Visit Information        Provider Department      2017 2:00 PM Jenny Barker NP [facility]        Today's Diagnoses     Bronchitis    -  1    Chronic obstructive pulmonary disease with acute lower respiratory infection (H)           Follow-ups after your visit        Who to contact     If you have questions or need follow up information about today's clinic visit or your schedule please contact [facility] directly at [phone].  Normal or non-critical lab and imaging results will be communicated to you by Acronishart, letter or phone within 4 business days after the clinic has received the results. If you do not hear from us within 7 days, please contact the clinic through Acronishart or phone. If you have a critical or abnormal lab result, we will notify you by phone as soon as possible.  Submit refill requests through ALOSKO or call your pharmacy and they will forward the refill request to us. Please allow 3 business days for your refill to be completed.          Additional Information About Your Visit        MyChart Information     ALOSKO lets you send messages to your doctor, view your test results, renew your prescriptions, schedule appointments and more. To sign up, go to [email or web address] . Click on \"Log in\" on the left side of the screen, which will take you to the Welcome page. Then click on \"Sign up Now\" on the right side of the page.     You will be asked to enter the access code listed below, as well as some personal information. Please follow the directions to create your username and password.     Your access code is: [email or web address]  Expires: 2017  2:59 PM     Your access code will  in 90 days. If you need help or a new code, please call your [facility] or [phone].      " "  Care EveryWhere ID     This is your Care EveryWhere ID. This could be used by other organizations to access your [place] [facility] medical records  [id number]        Your Vitals Were     Pulse Temperature Respirations Height BMI (Body Mass Index)       72 98.3  F (36.8  C) (Oral) 16 5' 9.5\" (1.765 m) 30.57 kg/m2        Blood Pressure from Last 3 Encounters:   05/18/17 100/60   02/13/17 108/68   01/12/17 128/62    Weight from Last 3 Encounters:   05/18/17 210 lb (95.3 kg)   02/13/17 209 lb 1.6 oz (94.8 kg)   01/12/17 208 lb 3.2 oz (94.4 kg)              Today, you had the following     No orders found for display         Today's Medication Changes          These changes are accurate as of: 5/18/17  2:59 PM.  If you have any questions, ask your nurse or doctor.               Start taking these medicines.        Dose/Directions    albuterol 108 (90 BASE) MCG/ACT Inhaler   Commonly known as:  PROAIR HFA/PROVENTIL HFA/VENTOLIN HFA   Used for:  Bronchitis   Started by:  Jenny Barker NP        Dose:  2 puff   Inhale 2 puffs into the lungs every 6 hours as needed for shortness of breath / dyspnea or wheezing   Quantity:  1 Inhaler   Refills:  0       azithromycin 250 MG tablet   Commonly known as:  ZITHROMAX   Used for:  Bronchitis, Chronic obstructive pulmonary disease with acute lower respiratory infection (H)   Started by:  Jenny Barker NP        Two tablets first day, then one tablet daily for four days.   Quantity:  6 tablet   Refills:  0       methylPREDNISolone 4 MG tablet   Commonly known as:  MEDROL DOSEPAK   Used for:  Bronchitis   Started by:  Jenny Barker NP        Follow package instructions   Quantity:  21 tablet   Refills:  0            Where to get your medicines      These medications were sent to [facility] [place], [place] - [facility] [address]  [address] [facility] [address]     Phone:  [phone]     albuterol 108 (90 BASE) MCG/ACT Inhaler    " azithromycin 250 MG tablet    methylPREDNISolone 4 MG tablet                Primary Care Provider Office Phone # Fax #    Disha Van [phone]       [facility] [address] [facility] [address]  [facility] [address]        Thank you!     Thank you for choosing [facility]  for your care. Our goal is always to provide you with excellent care. Hearing back from our patients is one way we can continue to improve our services. Please take a few minutes to complete the written survey that you may receive in the mail after your visit with us. Thank you!             Your Updated Medication List - Protect others around you: Learn how to safely use, store and throw away your medicines at www.disposemymeds.org.          This list is accurate as of: 5/18/17  2:59 PM.  Always use your most recent med list.                   Brand Name Dispense Instructions for use    albuterol 108 (90 BASE) MCG/ACT Inhaler    PROAIR HFA/PROVENTIL HFA/VENTOLIN HFA    1 Inhaler    Inhale 2 puffs into the lungs every 6 hours as needed for shortness of breath / dyspnea or wheezing       atorvastatin 40 MG tablet    LIPITOR    90 tablet    Take 1 tablet (40 mg) by mouth daily       azithromycin 250 MG tablet    ZITHROMAX    6 tablet    Two tablets first day, then one tablet daily for four days.       cetirizine 10 MG tablet    zyrTEC    90 tablet    Take 1 tablet (10 mg) by mouth daily       cyclobenzaprine 10 MG tablet    FLEXERIL     Take 10 mg by mouth 2 times daily as needed for muscle spasms       DULoxetine 60 MG EC capsule    CYMBALTA     Take 60 mg by mouth daily       hydrochlorothiazide 25 MG tablet    HYDRODIURIL    90 tablet    Take 1 tablet (25 mg) by mouth daily       lisinopril 40 MG tablet    PRINIVIL/ZESTRIL    90 tablet    Take 1 tablet (40 mg) by mouth daily       metFORMIN 500 MG tablet    GLUCOPHAGE    180 tablet    Take 1 tablet (500 mg) by mouth 2 times daily (with meals)        methylPREDNISolone 4 MG tablet    MEDROL DOSEPAK    21 tablet    Follow package instructions       omeprazole 20 MG CR capsule    priLOSEC     Take 20 mg by mouth daily       pregabalin 150 MG capsule    LYRICA    180 capsule    Take 1 capsule (150 mg) by mouth 2 times daily       traMADol 50 MG tablet    ULTRAM    150 tablet    Take 1-2 tablets ( mg) by mouth 3 times daily May take 5 per day       traZODone 100 MG tablet    DESYREL    60 tablet    Take 2 tablets (200 mg) by mouth At Bedtime       verapamil 180 MG CR tablet    CALAN-SR    90 tablet    Take 1 tablet (180 mg) by mouth At Bedtime

## 2017-05-18 NOTE — PROGRESS NOTES
SUBJECTIVE:                                                    Vijaya Manjarrez is a 67 year old female who presents to clinic today for the following health issues:    Chief Complaint   Patient presents with     Cough     Cough and SOB. Sx started 10 days ago but getting worse.      RESPIRATORY SYMPTOMS      Duration: 10 days    Description  nasal congestion, rhinorrhea, sore throat, cough and hoarse voice    Severity: moderate    Accompanying signs and symptoms: None    History (predisposing factors):  tobacco abuse, reports quit smoking 3 weeks ago    Precipitating or alleviating factors: None    Therapies tried and outcome:  none         Patient Active Problem List   Diagnosis     HCD (health care directive)     Type 2 diabetes mellitus with diabetic neuropathy, without long-term current use of insulin (H)     COPD (chronic obstructive pulmonary disease) (H)     Cervical spine degeneration     Facet arthropathy, lumbar (H)     Lumbar degenerative disc disease     Migraine headache     Diabetic neuropathy (H)     Anxiety     GERD (gastroesophageal reflux disease)     Hyperlipidemia LDL goal <100     Fibromyalgia     Benign essential hypertension     Controlled substance agreement signed     Chronic pain syndrome     Past Surgical History:   Procedure Laterality Date     CHOLECYSTECTOMY, LAPOROSCOPIC      Cholecystectomy, Laparoscopic       Social History   Substance Use Topics     Smoking status: Current Every Day Smoker     Types: Cigarettes     Smokeless tobacco: Not on file     Alcohol use No     Family History   Problem Relation Age of Onset     CANCER Mother      CANCER Father      Lung cancer     CANCER Maternal Grandmother      Breast cancer         Current Outpatient Prescriptions   Medication Sig Dispense Refill     traMADol (ULTRAM) 50 MG tablet Take 1-2 tablets ( mg) by mouth 3 times daily May take 5 per day 150 tablet 1     traZODone (DESYREL) 100 MG tablet Take 2 tablets (200 mg) by mouth  "At Bedtime 60 tablet 5     cetirizine (ZYRTEC) 10 MG tablet Take 1 tablet (10 mg) by mouth daily 90 tablet 3     pregabalin (LYRICA) 150 MG capsule Take 1 capsule (150 mg) by mouth 2 times daily 180 capsule 1     verapamil (CALAN-SR) 180 MG CR tablet Take 1 tablet (180 mg) by mouth At Bedtime 90 tablet 1     lisinopril (PRINIVIL/ZESTRIL) 40 MG tablet Take 1 tablet (40 mg) by mouth daily 90 tablet 1     atorvastatin (LIPITOR) 40 MG tablet Take 1 tablet (40 mg) by mouth daily 90 tablet 1     hydrochlorothiazide (HYDRODIURIL) 25 MG tablet Take 1 tablet (25 mg) by mouth daily 90 tablet 1     metFORMIN (GLUCOPHAGE) 500 MG tablet Take 1 tablet (500 mg) by mouth 2 times daily (with meals) 180 tablet 1     omeprazole (PRILOSEC) 20 MG CR capsule Take 20 mg by mouth daily       DULoxetine (CYMBALTA) 60 MG EC capsule Take 60 mg by mouth daily       cyclobenzaprine (FLEXERIL) 10 MG tablet Take 10 mg by mouth 2 times daily as needed for muscle spasms       BP Readings from Last 3 Encounters:   05/18/17 100/60   02/13/17 108/68   01/12/17 128/62    Wt Readings from Last 3 Encounters:   05/18/17 210 lb (95.3 kg)   02/13/17 209 lb 1.6 oz (94.8 kg)   01/12/17 208 lb 3.2 oz (94.4 kg)                    Reviewed and updated as needed this visit by clinical staff  Tobacco  Allergies  Meds  Soc Hx      Reviewed and updated as needed this visit by Provider         ROS:  C: NEGATIVE for fever, chills, change in weight  RESP:NEGATIVE for SOB/dyspnea and wheezing  CV: NEGATIVE for chest pain, palpitations or peripheral edema    OBJECTIVE:                                                    /60  Pulse 72  Temp 98.3  F (36.8  C) (Oral)  Resp 16  Ht 5' 9.5\" (1.765 m)  Wt 210 lb (95.3 kg)  BMI 30.57 kg/m2  Body mass index is 30.57 kg/(m^2).  GENERAL: alert, no distress and obese  HENT: ear canals and TM's normal, nose and mouth without ulcers or lesions  NECK: no adenopathy, no asymmetry, masses, or scars and thyroid normal to " palpation  RESP: rhonchi scattered  CV: regular rate and rhythm, normal S1 S2, no S3 or S4, no murmur, click or rub, no peripheral edema and peripheral pulses strong         ASSESSMENT/PLAN:                                                              ICD-10-CM    1. Bronchitis J40 azithromycin (ZITHROMAX) 250 MG tablet     albuterol (PROAIR HFA/PROVENTIL HFA/VENTOLIN HFA) 108 (90 BASE) MCG/ACT Inhaler     methylPREDNISolone (MEDROL DOSEPAK) 4 MG tablet   2. Chronic obstructive pulmonary disease with acute lower respiratory infection (H) J44.0 azithromycin (ZITHROMAX) 250 MG tablet       meds as directed      Jenny Barker, RASHID  [facility]

## 2017-05-18 NOTE — NURSING NOTE
"Chief Complaint   Patient presents with     Cough     Cough and SOB. Sx started 10 days ago but getting worse.        Initial /60  Pulse 72  Temp 98.3  F (36.8  C) (Oral)  Resp 16  Ht 5' 9.5\" (1.765 m)  Wt 210 lb (95.3 kg)  BMI 30.57 kg/m2 Estimated body mass index is 30.57 kg/(m^2) as calculated from the following:    Height as of this encounter: 5' 9.5\" (1.765 m).    Weight as of this encounter: 210 lb (95.3 kg).  Medication Reconciliation: complete     JAKY Magana      "

## 2017-05-24 ENCOUNTER — OFFICE VISIT (OUTPATIENT)
Dept: INTERNAL MEDICINE | Facility: CLINIC | Age: 67
End: 2017-05-24
Payer: MEDICARE

## 2017-05-24 VITALS
WEIGHT: 206 LBS | HEIGHT: 70 IN | HEART RATE: 94 BPM | SYSTOLIC BLOOD PRESSURE: 88 MMHG | OXYGEN SATURATION: 97 % | BODY MASS INDEX: 29.49 KG/M2 | TEMPERATURE: 98.5 F | DIASTOLIC BLOOD PRESSURE: 56 MMHG

## 2017-05-24 DIAGNOSIS — J40 BRONCHITIS: Primary | ICD-10-CM
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
## 2017-05-24 DIAGNOSIS — I95.2 HYPOTENSION DUE TO DRUGS: ICD-10-CM

## 2017-05-24 DIAGNOSIS — J44.9 CHRONIC OBSTRUCTIVE PULMONARY DISEASE, UNSPECIFIED COPD TYPE (H): ICD-10-CM

## 2017-05-24 DIAGNOSIS — I10 BENIGN ESSENTIAL HYPERTENSION: ICD-10-CM

## 2017-05-24 PROCEDURE — 93000 ELECTROCARDIOGRAM COMPLETE: CPT | Performed by: FAMILY MEDICINE

## 2017-05-24 PROCEDURE — 99214 OFFICE O/P EST MOD 30 MIN: CPT | Performed by: FAMILY MEDICINE

## 2017-05-24 RX ORDER — LISINOPRIL 40 MG/1
20 TABLET ORAL DAILY
Qty: 90 TABLET | Refills: 1 | COMMUNITY
Start: 2017-05-24 | End: 2017-06-08 | Stop reason: DRUGHIGH

## 2017-05-24 RX ORDER — CEFUROXIME AXETIL 500 MG/1
500 TABLET ORAL 2 TIMES DAILY
Qty: 20 TABLET | Refills: 0 | Status: SHIPPED | OUTPATIENT
Start: 2017-05-24 | End: 2017-06-08

## 2017-05-24 NOTE — PROGRESS NOTES
.  SUBJECTIVE:                                                    Vijaya Manjarrez is a 67 year old female who presents to clinic today for the following health issues:    Cough  Falls      HISTORY    She was seen for bronchitis 5-18. She received Z Adolfo and Medrol Dosepak. She has persistent dry cough. She has some soreness in throat and feels hoarse.    She says that she has started having falls about 3 days ago. She basically falls without warning. Spells are not associated with cough.    She stopped smoking 2 weeks ago.    Patient Active Problem List   Diagnosis     HCD (health care directive)     Type 2 diabetes mellitus with diabetic neuropathy, without long-term current use of insulin (H)     COPD (chronic obstructive pulmonary disease) (H)     Cervical spine degeneration     Facet arthropathy, lumbar (H)     Lumbar degenerative disc disease     Migraine headache     Diabetic neuropathy (H)     Anxiety     GERD (gastroesophageal reflux disease)     Hyperlipidemia LDL goal <100     Fibromyalgia     Benign essential hypertension     Controlled substance agreement signed     Chronic pain syndrome     Current Outpatient Prescriptions   Medication Sig Dispense Refill     albuterol (PROAIR HFA/PROVENTIL HFA/VENTOLIN HFA) 108 (90 BASE) MCG/ACT Inhaler Inhale 2 puffs into the lungs every 6 hours as needed for shortness of breath / dyspnea or wheezing 1 Inhaler 0     guaiFENesin-codeine (ROBITUSSIN AC) 100-10 MG/5ML SOLN solution Take 5 mLs by mouth every 4 hours as needed for cough 240 mL 1     traMADol (ULTRAM) 50 MG tablet Take 1-2 tablets ( mg) by mouth 3 times daily May take 5 per day 150 tablet 1     traZODone (DESYREL) 100 MG tablet Take 2 tablets (200 mg) by mouth At Bedtime 60 tablet 5     cetirizine (ZYRTEC) 10 MG tablet Take 1 tablet (10 mg) by mouth daily 90 tablet 3     pregabalin (LYRICA) 150 MG capsule Take 1 capsule (150 mg) by mouth 2 times daily 180 capsule 1     verapamil (CALAN-SR) 180 MG  "CR tablet Take 1 tablet (180 mg) by mouth At Bedtime 90 tablet 1     lisinopril (PRINIVIL/ZESTRIL) 40 MG tablet Take 1 tablet (40 mg) by mouth daily 90 tablet 1     atorvastatin (LIPITOR) 40 MG tablet Take 1 tablet (40 mg) by mouth daily 90 tablet 1     hydrochlorothiazide (HYDRODIURIL) 25 MG tablet Take 1 tablet (25 mg) by mouth daily 90 tablet 1     metFORMIN (GLUCOPHAGE) 500 MG tablet Take 1 tablet (500 mg) by mouth 2 times daily (with meals) 180 tablet 1     omeprazole (PRILOSEC) 20 MG CR capsule Take 20 mg by mouth daily       DULoxetine (CYMBALTA) 60 MG EC capsule Take 60 mg by mouth daily       cyclobenzaprine (FLEXERIL) 10 MG tablet Take 10 mg by mouth 2 times daily as needed for muscle spasms         REVIEW OF SYSTEMS    No fever  No CP  No palpitations  No V or D  No abd pain  No edema          Past Medical History:   Diagnosis Date     Anxiety      Cervical spine degeneration 2016    spinal stenosis,      COPD (chronic obstructive pulmonary disease) (H) 2012    mild     Diabetes mellitus, type 2 (H)      Diabetic neuropathy (H)      Facet arthropathy, lumbar (H)      GERD (gastroesophageal reflux disease)      HTN (hypertension)      Hyperlipidemia      Lumbar degenerative disc disease      Migraine headache        EXAM  BP (!) 88/56 (BP Location: Left arm, Patient Position: Chair, Cuff Size: Adult Large)  Pulse 94  Temp 98.5  F (36.9  C) (Oral)  Ht 5' 9.5\" (1.765 m)  Wt 206 lb (93.4 kg)  SpO2 97%  BMI 29.98 kg/m2    BP:  Lying     103/70  Sitting     95/64  Standing 90/53    EKG: NSR, rate 75, axis normal, ST segments normal, T waves normal, no ectopy, no scar. Tracing WNL.    Alert, NAD  Normocephalic  Neck: no mass or thyromegaly  Chest: clear  CV: RSR, no murmur  Motor and gait normal      (J40) Bronchitis  (primary encounter diagnosis)  Comment:   Plan: cefuroxime (CEFTIN) 500 MG tablet            (J44.9) Chronic obstructive pulmonary disease, unspecified COPD type (H)  Comment:   Plan: "     (W19.XXXA) Fall, initial encounter  Comment:   Plan: EKG 12-lead complete w/read - Clinics            (I95.2) Hypotension due to drugs  Comment:   Plan:     (I10) Benign essential hypertension  Comment:   Plan: lisinopril (PRINIVIL/ZESTRIL) 40 MG tablet            Stop Hydrochlorothiazide.    Change Lisinopril 40 mg to 1/2 tab daily.    Take antibiotic for cough.     Continue the inhaler.    I you have another blackout spell, go to the E R.    Re check in clinic in 1 week.

## 2017-05-24 NOTE — PATIENT INSTRUCTIONS
Stop Hydrochlorothiazide.    Change Lisinopril 40 mg to 1/2 tab daily.    Take antibiotic for cough.     Continue the inhaler.    I you have another blackout spell, go to the E R.    Re check in clinic in 1 week.

## 2017-05-24 NOTE — MR AVS SNAPSHOT
"              After Visit Summary   5/24/2017    Vijaya Manjarrez    MRN: [id number]           Patient Information     Date Of Birth          1950        Visit Information        Provider Department      5/24/2017 1:40 PM Seymour Garcia MD [facility]        Today's Diagnoses     Bronchitis    -  1    Chronic obstructive pulmonary disease, unspecified COPD type (H)        Fall, initial encounter        Hypotension due to drugs        Benign essential hypertension          Care Instructions      Stop Hydrochlorothiazide.    Change Lisinopril 40 mg to 1/2 tab daily.    Take antibiotic for cough.     Continue the inhaler.    I you have another blackout spell, go to the E R.    Re check in clinic in 1 week.          Follow-ups after your visit        Who to contact     If you have questions or need follow up information about today's clinic visit or your schedule please contact [facility] directly at [phone].  Normal or non-critical lab and imaging results will be communicated to you by MyChart, letter or phone within 4 business days after the clinic has received the results. If you do not hear from us within 7 days, please contact the clinic through [email or web address] or phone. If you have a critical or abnormal lab result, we will notify you by phone as soon as possible.  Submit refill requests through Incentivyze or call your pharmacy and they will forward the refill request to us. Please allow 3 business days for your refill to be completed.          Additional Information About Your Visit        MyChart Information     Incentivyze lets you send messages to your doctor, view your test results, renew your prescriptions, schedule appointments and more. To sign up, go to [email or web address] . Click on \"Log in\" on the left side of the screen, which will take you to the Welcome page. Then click on \"Sign up Now\" on the right side of the page.     You will be asked to enter the access " "code listed below, as well as some personal information. Please follow the directions to create your username and password.     Your access code is: [email or web address]  Expires: 2017  2:59 PM     Your access code will  in 90 days. If you need help or a new code, please call your [place] clinic or [phone].        Care EveryWhere ID     This is your Care EveryWhere ID. This could be used by other organizations to access your [place] medical records  [id number]        Your Vitals Were     Pulse Temperature Height Pulse Oximetry BMI (Body Mass Index)       94 98.5  F (36.9  C) (Oral) 5' 9.5\" (1.765 m) 97% 29.98 kg/m2        Blood Pressure from Last 3 Encounters:   17 (!) 88/56   17 100/60   17 108/68    Weight from Last 3 Encounters:   17 206 lb (93.4 kg)   17 210 lb (95.3 kg)   17 209 lb 1.6 oz (94.8 kg)              We Performed the Following     EKG 12-lead complete w/read - Clinics          Today's Medication Changes          These changes are accurate as of: 17  2:43 PM.  If you have any questions, ask your nurse or doctor.               Start taking these medicines.        Dose/Directions    cefuroxime 500 MG tablet   Commonly known as:  CEFTIN   Used for:  Bronchitis   Started by:  Seymour Garcia MD        Dose:  500 mg   Take 1 tablet (500 mg) by mouth 2 times daily   Quantity:  20 tablet   Refills:  0         These medicines have changed or have updated prescriptions.        Dose/Directions    lisinopril 40 MG tablet   Commonly known as:  PRINIVIL/ZESTRIL   This may have changed:  how much to take   Used for:  Benign essential hypertension   Changed by:  Seymour Garcia MD        Dose:  20 mg   Take 0.5 tablets (20 mg) by mouth daily   Quantity:  90 tablet   Refills:  1         Stop taking these medicines if you haven't already. Please contact your care team if you have questions.     hydrochlorothiazide 25 MG tablet   Commonly known as:  HYDRODIURIL "   Stopped by:  Seymour Garcia MD                Where to get your medicines      These medications were sent to [facility] [place], [place] - [address]  [address] [facility] [address]     Phone:  [phone]     cefuroxime 500 MG tablet                Primary Care Provider Office Phone # Fax #    Disha Van [phone]       [facility] [address]  [facility] [address]        Thank you!     Thank you for choosing [facility]  for your care. Our goal is always to provide you with excellent care. Hearing back from our patients is one way we can continue to improve our services. Please take a few minutes to complete the written survey that you may receive in the mail after your visit with us. Thank you!             Your Updated Medication List - Protect others around you: Learn how to safely use, store and throw away your medicines at www.disposemymeds.org.          This list is accurate as of: 5/24/17  2:43 PM.  Always use your most recent med list.                   Brand Name Dispense Instructions for use    albuterol 108 (90 BASE) MCG/ACT Inhaler    PROAIR HFA/PROVENTIL HFA/VENTOLIN HFA    1 Inhaler    Inhale 2 puffs into the lungs every 6 hours as needed for shortness of breath / dyspnea or wheezing       atorvastatin 40 MG tablet    LIPITOR    90 tablet    Take 1 tablet (40 mg) by mouth daily       cefuroxime 500 MG tablet    CEFTIN    20 tablet    Take 1 tablet (500 mg) by mouth 2 times daily       cetirizine 10 MG tablet    zyrTEC    90 tablet    Take 1 tablet (10 mg) by mouth daily       cyclobenzaprine 10 MG tablet    FLEXERIL     Take 10 mg by mouth 2 times daily as needed for muscle spasms       DULoxetine 60 MG EC capsule    CYMBALTA     Take 60 mg by mouth daily       guaiFENesin-codeine 100-10 MG/5ML Soln solution    ROBITUSSIN AC    240 mL    Take 5 mLs by mouth every 4 hours as needed for cough        lisinopril 40 MG tablet    PRINIVIL/ZESTRIL    90 tablet    Take 0.5 tablets (20 mg) by mouth daily       metFORMIN 500 MG tablet    GLUCOPHAGE    180 tablet    Take 1 tablet (500 mg) by mouth 2 times daily (with meals)       omeprazole 20 MG CR capsule    priLOSEC     Take 20 mg by mouth daily       pregabalin 150 MG capsule    LYRICA    180 capsule    Take 1 capsule (150 mg) by mouth 2 times daily       traMADol 50 MG tablet    ULTRAM    150 tablet    Take 1-2 tablets ( mg) by mouth 3 times daily May take 5 per day       traZODone 100 MG tablet    DESYREL    60 tablet    Take 2 tablets (200 mg) by mouth At Bedtime       verapamil 180 MG CR tablet    CALAN-SR    90 tablet    Take 1 tablet (180 mg) by mouth At Bedtime

## 2017-05-24 NOTE — NURSING NOTE
"Chief Complaint   Patient presents with     Fall     pt states has had 5 falls in the last 3 days. pt states she did not have any injuries.     RECHECK     pt was diagnosed with Bronchitis and has finished antibiotics. pt states still coughing and has a fever at noc.       Initial BP (!) 88/56 (BP Location: Left arm, Patient Position: Chair, Cuff Size: Adult Large)  Pulse 94  Temp 98.5  F (36.9  C) (Oral)  Ht 5' 9.5\" (1.765 m)  Wt 206 lb (93.4 kg)  SpO2 97%  BMI 29.98 kg/m2 Estimated body mass index is 29.98 kg/(m^2) as calculated from the following:    Height as of this encounter: 5' 9.5\" (1.765 m).    Weight as of this encounter: 206 lb (93.4 kg).  Medication Reconciliation: complete    "

## 2017-06-08 ENCOUNTER — OFFICE VISIT (OUTPATIENT)
Dept: INTERNAL MEDICINE | Facility: CLINIC | Age: 67
End: 2017-06-08
Payer: MEDICARE

## 2017-06-08 VITALS
TEMPERATURE: 99.1 F | HEIGHT: 69 IN | OXYGEN SATURATION: 93 % | HEART RATE: 87 BPM | SYSTOLIC BLOOD PRESSURE: 138 MMHG | WEIGHT: 211.8 LBS | BODY MASS INDEX: 31.37 KG/M2 | DIASTOLIC BLOOD PRESSURE: 76 MMHG

## 2017-06-08 DIAGNOSIS — G89.29 CHRONIC BILATERAL LOW BACK PAIN WITHOUT SCIATICA: ICD-10-CM

## 2017-06-08 DIAGNOSIS — M47.812 FACET ARTHROPATHY, CERVICAL: Primary | ICD-10-CM

## 2017-06-08 DIAGNOSIS — M25.512 BILATERAL SHOULDER PAIN, UNSPECIFIED CHRONICITY: ICD-10-CM

## 2017-06-08 DIAGNOSIS — J44.9 CHRONIC OBSTRUCTIVE PULMONARY DISEASE, UNSPECIFIED COPD TYPE (H): ICD-10-CM

## 2017-06-08 DIAGNOSIS — M79.7 FIBROMYALGIA: ICD-10-CM

## 2017-06-08 DIAGNOSIS — M47.816 FACET ARTHROPATHY, LUMBAR: ICD-10-CM

## 2017-06-08 DIAGNOSIS — M25.511 BILATERAL SHOULDER PAIN, UNSPECIFIED CHRONICITY: ICD-10-CM

## 2017-06-08 DIAGNOSIS — M54.50 CHRONIC BILATERAL LOW BACK PAIN WITHOUT SCIATICA: ICD-10-CM

## 2017-06-08 PROCEDURE — 99214 OFFICE O/P EST MOD 30 MIN: CPT | Performed by: INTERNAL MEDICINE

## 2017-06-08 RX ORDER — PREGABALIN 150 MG/1
150 CAPSULE ORAL 3 TIMES DAILY
Qty: 270 CAPSULE | Refills: 1 | Status: SHIPPED | OUTPATIENT
Start: 2017-06-08 | End: 2017-06-08

## 2017-06-08 RX ORDER — TRAMADOL HYDROCHLORIDE 50 MG/1
100 TABLET ORAL 3 TIMES DAILY
Qty: 180 TABLET | Refills: 1 | Status: SHIPPED | OUTPATIENT
Start: 2017-06-08 | End: 2017-08-23

## 2017-06-08 RX ORDER — PREGABALIN 150 MG/1
150 CAPSULE ORAL 3 TIMES DAILY
Qty: 270 CAPSULE | Refills: 1 | Status: SHIPPED | OUTPATIENT
Start: 2017-06-08 | End: 2018-02-05

## 2017-06-08 RX ORDER — LISINOPRIL 40 MG/1
40 TABLET ORAL DAILY
Qty: 90 TABLET | Refills: 1 | Status: ON HOLD | COMMUNITY
Start: 2017-06-08 | End: 2017-07-03

## 2017-06-08 RX ORDER — TRAMADOL HYDROCHLORIDE 50 MG/1
100 TABLET ORAL 3 TIMES DAILY
Qty: 180 TABLET | Refills: 1 | Status: SHIPPED | OUTPATIENT
Start: 2017-06-08 | End: 2017-06-08

## 2017-06-08 NOTE — MR AVS SNAPSHOT
After Visit Summary   6/8/2017    Vijaya Manjarrez    MRN: [id number]           Patient Information     Date Of Birth          1950        Visit Information        Provider Department      6/8/2017 2:40 PM Disha Van MD [facility]        Today's Diagnoses     Chronic bilateral low back pain without sciatica        Facet arthropathy, lumbar (H)        Bilateral shoulder pain, unspecified chronicity        Facet arthropathy, cervical (H)        Fibromyalgia          Care Instructions    Increase lyrica to 1 three times a day.   Increase tramadol to 2 three times a day          Follow-ups after your visit        Additional Services     ORTHO  REFERRAL       [facility] is referring you to the Orthopedic  Services at [facility].       The  Representative will assist you in the coordination of your Orthopedic and Musculoskeletal Care as prescribed by your physician.    The  Representative will call you within 1 business day to help schedule your appointment, or you may contact the  Representative at:    All areas ~ [phone]     Type of Referral : Non Surgical       Timeframe requested: 3 - 5 days    Coverage of these services is subject to the terms and limitations of your health insurance plan.  Please call member services at your health plan with any benefit or coverage questions.      If X-rays, CT or MRI's have been performed, please contact the facility where they were done to arrange for , prior to your scheduled appointment.  Please bring this referral request to your appointment and present it to your specialist.            PAIN [facility]NAGEMENT CENTER ([place]) REFERRAL       Your provider has referred you to the [facility].    Reason for Referral: Comprehensive Evaluation and Management    Please complete the following questions:    What is your diagnosis for  the patient's pain? Fibromyalgia, degenerative disease cervical and lumbar spine    Do you have any specific questions for the pain specialist? No    Are there any red flags that may impact the assessment or management of the patient? None    **ANY DIAGNOSTIC TESTS THAT ARE NOT IN EPIC SHOULD BE SENT TO THE PAIN CENTER**    Please note the Pre-Op Pain Consult must be scheduled 2-3 weeks prior to the patient's surgery.  Patient's trying to schedule within 2 weeks of surgery may not be accommodated.     Pre-Op Pain Consults are only good for 30 days.    REGARDING OPIOID MEDICATIONS:  We will always address appropriateness of opioid pain medications, but we generally will not automatically take on a prescribing role. When we do take on prescribing of opioids for chronic pain, it is in collaboration with the referring physician for an intermediate period of time (months), with an expectation that the primary physician or provider will assume the prescribing role if medications are effective at stable doses with demonstrated compliance.  Therefore, please do not assume that your prescribing responsibilities end on the day of pain clinic consultation.  Is this agreeable to you? YES    For any questions, contact the [facility] at [phone].    Please be aware that coverage of these services is subject to the terms and limitations of your health insurance plan.  Call member services at your health plan with any benefit or coverage questions.      Please bring the following with you to your appointment:    (1) Any X-Rays, CTs or MRIs which have been performed.  Contact the facility where they were done to arrange for  prior to your scheduled appointment.    (2) List of current medications   (3) This referral request   (4) Any documents/labs given to you for this referral                  Who to contact     If you have questions or need follow up information about today's clinic visit or your  "schedule please contact [facility] directly at [phone].  Normal or non-critical lab and imaging results will be communicated to you by MyChart, letter or phone within 4 business days after the clinic has received the results. If you do not hear from us within 7 days, please contact the clinic through MyChart or phone. If you have a critical or abnormal lab result, we will notify you by phone as soon as possible.  Submit refill requests through [employer] or call your pharmacy and they will forward the refill request to us. Please allow 3 business days for your refill to be completed.          Additional Information About Your Visit        [employer]in[employer]inny Information     [employer] lets you send messages to your doctor, view your test results, renew your prescriptions, schedule appointments and more. To sign up, go to [email or web address] . Click on \"Log in\" on the left side of the screen, which will take you to the Welcome page. Then click on \"Sign up Now\" on the right side of the page.     You will be asked to enter the access code listed below, as well as some personal information. Please follow the directions to create your username and password.     Your access code is: [email or web address]  Expires: 2017  2:59 PM     Your access code will  in 90 days. If you need help or a new code, please call your [facility] clinic or [phone].        Care EveryWhere ID     This is your Care EveryWhere ID. This could be used by other organizations to access your [facility] medical records  [id number]        Your Vitals Were     Pulse Temperature Height Pulse Oximetry BMI (Body Mass Index)       87 99.1  F (37.3  C) (Oral) 5' 9\" (1.753 m) 93% 31.28 kg/m2        Blood Pressure from Last 3 Encounters:   17 138/76   17 (!) 88/56   17 100/60    Weight from Last 3 Encounters:   17 211 lb 12.8 oz (96.1 kg)   17 206 lb (93.4 kg)   17 210 lb (95.3 kg)              We Performed the " Following     ORTHO [facility]     PAIN MANAGEMENT CENTER ([place]) REFERRAL          Today's Medication Changes          These changes are accurate as of: 6/8/17  3:19 PM.  If you have any questions, ask your nurse or doctor.               These medicines have changed or have updated prescriptions.        Dose/Directions    lisinopril 40 MG tablet   Commonly known as:  PRINIVIL/ZESTRIL   This may have changed:  Another medication with the same name was removed. Continue taking this medication, and follow the directions you see here.   Changed by:  Disha Van MD        Dose:  40 mg   Take 1 tablet (40 mg) by mouth daily   Quantity:  90 tablet   Refills:  1         Stop taking these medicines if you haven't already. Please contact your care team if you have questions.     cefuroxime 500 MG tablet   Commonly known as:  CEFTIN   Stopped by:  Disha Van MD           guaiFENesin-codeine 100-10 MG/5ML Soln solution   Commonly known as:  ROBITUSSIN AC   Stopped by:  Disha Van MD                    Primary Care Provider Office Phone # Fax #    Disha Van [phone]       [facility] [address] [place] [address]        Thank you!     Thank you for choosing [facility]  for your care. Our goal is always to provide you with excellent care. Hearing back from our patients is one way we can continue to improve our services. Please take a few minutes to complete the written survey that you may receive in the mail after your visit with us. Thank you!             Your Updated Medication List - Protect others around you: Learn how to safely use, store and throw away your medicines at www.disposemymeds.org.          This list is accurate as of: 6/8/17  3:19 PM.  Always use your most recent med list.                   Brand Name Dispense Instructions for use    albuterol 108 (90 BASE) MCG/ACT Inhaler    PROAIR HFA/PROVENTIL HFA/VENTOLIN HFA    1 Inhaler     Inhale 2 puffs into the lungs every 6 hours as needed for shortness of breath / dyspnea or wheezing       atorvastatin 40 MG tablet    LIPITOR    90 tablet    Take 1 tablet (40 mg) by mouth daily       cetirizine 10 MG tablet    zyrTEC    90 tablet    Take 1 tablet (10 mg) by mouth daily       cyclobenzaprine 10 MG tablet    FLEXERIL     Take 10 mg by mouth 2 times daily as needed for muscle spasms       DULoxetine 60 MG EC capsule    CYMBALTA     Take 60 mg by mouth daily       lisinopril 40 MG tablet    PRINIVIL/ZESTRIL    90 tablet    Take 1 tablet (40 mg) by mouth daily       metFORMIN 500 MG tablet    GLUCOPHAGE    180 tablet    Take 1 tablet (500 mg) by mouth 2 times daily (with meals)       omeprazole 20 MG CR capsule    priLOSEC     Take 20 mg by mouth daily       pregabalin 150 MG capsule    LYRICA    180 capsule    Take 1 capsule (150 mg) by mouth 2 times daily       traMADol 50 MG tablet    ULTRAM    150 tablet    Take 1-2 tablets ( mg) by mouth 3 times daily May take 5 per day       traZODone 100 MG tablet    DESYREL    60 tablet    Take 2 tablets (200 mg) by mouth At Bedtime       verapamil 180 MG CR tablet    CALAN-SR    90 tablet    Take 1 tablet (180 mg) by mouth At Bedtime

## 2017-06-08 NOTE — PROGRESS NOTES
SUBJECTIVE:                                                    Vijaya Manjarrez is a 67 year old female who presents to clinic today for the following health issues:      Follow up COPD exacerbation: she is improving, feels about at baseline. Cough is clearing.    Pain issues:   She complains of worsening pain, at first says it is fibromyalgia but with questioning she has some various local pains that are bothering her.     1.  Shoulder pain: both shoulders hurt a lot with movements especially lifting them. This has bothered her for years. She has report of previous left shoulder xrays showing minimal OA, calcific tendonitis from 3/16. She does not think she had injections. She has associated pain in her arms, can shoot down to the lower arms.     2.. Upper back pain: she has a lot of pain across her upper back, neck. PT has not helped in past. She had CT of her neck last year showing: There is straightening of the cervical spine, with 3 mm of anterolisthesis of C4 on C5. There are no acute cervical spine fractures. There is multilevel degenerative disc disease, severe at the C5-6 and C6-7 levels. There is also marked multilevel facet arthropathy, with fusion of the left C2-3 facets and right C2-4 facets. There is also multilevel uncovertebral hypertrophy, causing multilevel neural foraminal narrowing, most severe at the right C3-4 and bilateral C4-5 and C5-6 levels. There is also at least moderate spinal canal stenosis at the C5-6 level.    3. Chronic low back pain: not really changed. Her previous MRI shows: 1. Multilevel degenerative disc disease and spondylosis without high-grade spinal stenosis.   2. Moderate-to-severe neural foraminal narrowing is seen at L5-S1. Lesser degrees of neural foraminal narrowing are seen at other levels. No definite exiting nerve root compression.   3. Multilevel facet arthropathy and hypertrophy, seen throughout the lumbar spine.   4. No evidence of focal disc extrusion,  conus compression, or acute osseous injury.       She also has more diffuse pain, hurts everywhere consistent with her fibromyalgia. She is asking if rheumatology referral would be appropriate.     She has been taking tramadol 2 in am, 1 midday and 2 at night, not helping much.     Patient Active Problem List   Diagnosis     HCD (health care directive)     Type 2 diabetes mellitus with diabetic neuropathy, without long-term current use of insulin (H)     COPD (chronic obstructive pulmonary disease) (H)     Cervical spine degeneration     Facet arthropathy, lumbar (H)     Lumbar degenerative disc disease     Migraine headache     Diabetic neuropathy (H)     Anxiety     GERD (gastroesophageal reflux disease)     Hyperlipidemia LDL goal <100     Fibromyalgia     Benign essential hypertension     Controlled substance agreement signed     Chronic pain syndrome     Current Outpatient Prescriptions   Medication Sig Dispense Refill     lisinopril (PRINIVIL/ZESTRIL) 40 MG tablet Take 1 tablet (40 mg) by mouth daily 90 tablet 1     albuterol (PROAIR HFA/PROVENTIL HFA/VENTOLIN HFA) 108 (90 BASE) MCG/ACT Inhaler Inhale 2 puffs into the lungs every 6 hours as needed for shortness of breath / dyspnea or wheezing 1 Inhaler 0     traMADol (ULTRAM) 50 MG tablet Take 1-2 tablets ( mg) by mouth 3 times daily May take 5 per day 150 tablet 1     traZODone (DESYREL) 100 MG tablet Take 2 tablets (200 mg) by mouth At Bedtime 60 tablet 5     cetirizine (ZYRTEC) 10 MG tablet Take 1 tablet (10 mg) by mouth daily 90 tablet 3     pregabalin (LYRICA) 150 MG capsule Take 1 capsule (150 mg) by mouth 2 times daily 180 capsule 1     verapamil (CALAN-SR) 180 MG CR tablet Take 1 tablet (180 mg) by mouth At Bedtime 90 tablet 1     atorvastatin (LIPITOR) 40 MG tablet Take 1 tablet (40 mg) by mouth daily 90 tablet 1     metFORMIN (GLUCOPHAGE) 500 MG tablet Take 1 tablet (500 mg) by mouth 2 times daily (with meals) 180 tablet 1     omeprazole  "(PRILOSEC) 20 MG CR capsule Take 20 mg by mouth daily       DULoxetine (CYMBALTA) 60 MG EC capsule Take 60 mg by mouth daily       cyclobenzaprine (FLEXERIL) 10 MG tablet Take 10 mg by mouth 2 times daily as needed for muscle spasms        Social History   Substance Use Topics     Smoking status: Former Smoker     Types: Cigarettes     Smokeless tobacco: Not on file     Alcohol use No      Reviewed and updated as needed this visit by clinical staff  Tobacco  Allergies  Meds  Med Hx  Surg Hx  Fam Hx  Soc Hx      Reviewed and updated as needed this visit by Provider         ROS:  No fever, chills, weakness, no loss of control of bowel or bladder, no gait changes, no abdominal pain, no hematuria.  Some episodic tingling of hands, told CTS.     OBJECTIVE:                                                    /76  Pulse 87  Temp 99.1  F (37.3  C) (Oral)  Ht 5' 9\" (1.753 m)  Wt 211 lb 12.8 oz (96.1 kg)  SpO2 93%  BMI 31.28 kg/m2  Body mass index is 31.28 kg/(m^2).    Lungs: moderate decreased breath sounds, soft wheezes  Neck; tender at spine.   Marked tenderness and knots of the trapezius muscles, scapular muscles, paraspinal muscles  Shoulders are tender diffusely. Pain with ROM all directions.   Tender biceps tendons   Tender lumbar spine       ASSESSMENT/PLAN:                                                            1. Facet arthropathy, cervical (H)  Refer pain management to consider injections, tehrapy  - [facility] ([place]) REFERRAL    2. Facet arthropathy, lumbar (H)  Refer as above  - [facility] ([place]) REFERRAL    3. Chronic obstructive pulmonary disease, unspecified COPD type (H)  Improved, probably at baseline, continue inhalers    4. Fibromyalgia  Increase Lyrica to tid, will probably need to do a PA, consider increase cymbalta later  - [facility] ([place]) REFERRAL    5. Bilateral shoulder pain, unspecified chronicity  Chronic tendinitis, refer " ortho  - ORTHO  REFERRAL      6. Chronic bilateral low back pain without sciatica    - [facility]NA[facility]EMENT [facility] ([place]) REFERRAL  - traMADol (ULTRAM) 50 MG tablet; Take 2 tablets (100 mg) by mouth 3 times daily May take 5 per day  Dispense: 180 tablet; Refill: 1        Disha Van MD  [facility]

## 2017-06-08 NOTE — NURSING NOTE
"Chief Complaint   Patient presents with     RECHECK     pt would like to talk about her fibromyalgia        Initial /76  Pulse 87  Temp 99.1  F (37.3  C) (Oral)  Ht 5' 9\" (1.753 m)  Wt 211 lb 12.8 oz (96.1 kg)  SpO2 93%  BMI 31.28 kg/m2 Estimated body mass index is 31.28 kg/(m^2) as calculated from the following:    Height as of this encounter: 5' 9\" (1.753 m).    Weight as of this encounter: 211 lb 12.8 oz (96.1 kg).  Medication Reconciliation: complete   Salo Vazquez MA      "

## 2017-06-13 ENCOUNTER — RADIANT APPOINTMENT (OUTPATIENT)
Dept: GENERAL RADIOLOGY | Facility: CLINIC | Age: 67
End: 2017-06-13
Attending: FAMILY MEDICINE
Payer: MEDICARE

## 2017-06-13 ENCOUNTER — OFFICE VISIT (OUTPATIENT)
Dept: ORTHOPEDICS | Facility: CLINIC | Age: 67
End: 2017-06-13
Payer: MEDICARE

## 2017-06-13 VITALS
DIASTOLIC BLOOD PRESSURE: 78 MMHG | SYSTOLIC BLOOD PRESSURE: 126 MMHG | HEIGHT: 69 IN | BODY MASS INDEX: 31.25 KG/M2 | WEIGHT: 211 LBS

## 2017-06-13 DIAGNOSIS — M54.2 CERVICALGIA: ICD-10-CM

## 2017-06-13 DIAGNOSIS — M54.2 CERVICALGIA: Primary | ICD-10-CM

## 2017-06-13 PROCEDURE — 72040 X-RAY EXAM NECK SPINE 2-3 VW: CPT

## 2017-06-13 PROCEDURE — 99204 OFFICE O/P NEW MOD 45 MIN: CPT | Performed by: FAMILY MEDICINE

## 2017-06-13 NOTE — PATIENT INSTRUCTIONS
Thank you for allowing us to participate in your care today.  Please find below your visit diagnosis and the plan going forward.    1. Cervicalgia      Activity modification as discussed  MRI of your neck has been ordered. Schedule with [facility] ([phone]).   Other specific instructions:  - referral to pain management for comprehensive Evaluation and Management  - no medications prescribed today  Follow up with Pain Management. Call direct clinic number [[phone]] at any time with questions or concerns.    Erika Dominguez DO CAQSM  [facility]  Website: [email or web address]  Twitter: [email or web address]
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
## 2017-06-13 NOTE — MR AVS SNAPSHOT
After Visit Summary   6/13/2017    Vijaya Manjarrez    MRN: [id number]           Patient Information     Date Of Birth          1950        Visit Information        Provider Department      6/13/2017 3:20 PM Erika Dominguez DO [facility]        Today's Diagnoses     Cervicalgia    -  1      Care Instructions    Thank you for allowing us to participate in your care today.  Please find below your visit diagnosis and the plan going forward.    1. Cervicalgia      Activity modification as discussed  MRI of your neck has been ordered. Schedule with [facility] ([phone]).   Other specific instructions:  - referral to pain management for comprehensive Evaluation and Management  - no medications prescribed today  Follow up with Pain Management. Call direct clinic number [[phone]] at any time with questions or concerns.    Erika Dominguez DO CAQSM  [facility]  Website: [email or web address]  Twitter: [email or web address]            Follow-ups after your visit        Who to contact     If you have questions or need follow up information about today's clinic visit or your schedule please contact [facility] directly at [phone].  Normal or non-critical lab and imaging results will be communicated to you by MyChart, letter or phone within 4 business days after the clinic has received the results. If you do not hear from us within 7 days, please contact the clinic through MyChart or phone. If you have a critical or abnormal lab result, we will notify you by phone as soon as possible.  Submit refill requests through [employer] or call your pharmacy and they will forward the refill request to us. Please allow 3 business days for your refill to be completed.          Additional Information About Your Visit        MyChart Information     [facility] lets you send messages to your doctor, view your test results, renew your prescriptions,  "schedule appointments and more. To sign up, go to [email or web address] . Click on \"Log in\" on the left side of the screen, which will take you to the Welcome page. Then click on \"Sign up Now\" on the right side of the page.     You will be asked to enter the access code listed below, as well as some personal information. Please follow the directions to create your username and password.     Your access code is: [email or web address]  Expires: 2017  2:59 PM     Your access code will  in 90 days. If you need help or a new code, please call your [place] clinic or [phone].        Care EveryWhere ID     This is your Care EveryWhere ID. This could be used by other organizations to access your [place] medical records  [id number]        Your Vitals Were     Height BMI (Body Mass Index)                5' 9\" (1.753 m) 31.16 kg/m2           Blood Pressure from Last 3 Encounters:   17 126/78   17 138/76   17 (!) 88/56    Weight from Last 3 Encounters:   17 211 lb (95.7 kg)   17 211 lb 12.8 oz (96.1 kg)   17 206 lb (93.4 kg)               Primary Care Provider Office Phone # Fax #    Disha Van [phone]       [facility] [address]  [facility] [address]        Thank you!     Thank you for choosing [facility]  for your care. Our goal is always to provide you with excellent care. Hearing back from our patients is one way we can continue to improve our services. Please take a few minutes to complete the written survey that you may receive in the mail after your visit with us. Thank you!             Your Updated Medication List - Protect others around you: Learn how to safely use, store and throw away your medicines at www.disposemymeds.org.          This list is accurate as of: 17  4:17 PM.  Always use your most recent med list.                   Brand Name Dispense Instructions for use    albuterol 108 (90 BASE) " MCG/ACT Inhaler    PROAIR HFA/PROVENTIL HFA/VENTOLIN HFA    1 Inhaler    Inhale 2 puffs into the lungs every 6 hours as needed for shortness of breath / dyspnea or wheezing       atorvastatin 40 MG tablet    LIPITOR    90 tablet    Take 1 tablet (40 mg) by mouth daily       cetirizine 10 MG tablet    zyrTEC    90 tablet    Take 1 tablet (10 mg) by mouth daily       cyclobenzaprine 10 MG tablet    FLEXERIL     Take 10 mg by mouth 2 times daily as needed for muscle spasms       DULoxetine 60 MG EC capsule    CYMBALTA     Take 60 mg by mouth daily       lisinopril 40 MG tablet    PRINIVIL/ZESTRIL    90 tablet    Take 1 tablet (40 mg) by mouth daily       metFORMIN 500 MG tablet    GLUCOPHAGE    180 tablet    Take 1 tablet (500 mg) by mouth 2 times daily (with meals)       omeprazole 20 MG CR capsule    priLOSEC     Take 20 mg by mouth daily       pregabalin 150 MG capsule    LYRICA    270 capsule    Take 1 capsule (150 mg) by mouth 3 times daily       traMADol 50 MG tablet    ULTRAM    180 tablet    Take 2 tablets (100 mg) by mouth 3 times daily May take 5 per day       traZODone 100 MG tablet    DESYREL    60 tablet    Take 2 tablets (200 mg) by mouth At Bedtime       verapamil 180 MG CR tablet    CALAN-SR    90 tablet    Take 1 tablet (180 mg) by mouth At Bedtime

## 2017-06-13 NOTE — NURSING NOTE
"Chief Complaint   Patient presents with     Musculoskeletal Problem       Initial /78  Ht 5' 9\" (1.753 m)  Wt 211 lb (95.7 kg)  BMI 31.16 kg/m2 Estimated body mass index is 31.16 kg/(m^2) as calculated from the following:    Height as of this encounter: 5' 9\" (1.753 m).    Weight as of this encounter: 211 lb (95.7 kg).  Medication Reconciliation: complete     Lexx Feng ATC    "

## 2017-06-13 NOTE — Clinical Note
Vijaya Ricketts Dr. saw me at [facility] on Jun 13, 2017.  Please refer to the visit note at your convenience and feel free to contact me should you have any questions.  Thank you for the consult. Sincerely,  Erika Dominguez DO, [facility]

## 2017-06-16 ENCOUNTER — TELEPHONE (OUTPATIENT)
Dept: INTERNAL MEDICINE | Facility: CLINIC | Age: 67
End: 2017-06-16

## 2017-06-16 ENCOUNTER — APPOINTMENT (OUTPATIENT)
Dept: CT IMAGING | Facility: CLINIC | Age: 67
DRG: 190 | End: 2017-06-16
Attending: EMERGENCY MEDICINE
Payer: MEDICARE

## 2017-06-16 ENCOUNTER — HOSPITAL ENCOUNTER (INPATIENT)
Facility: CLINIC | Age: 67
LOS: 2 days | Discharge: HOME HEALTH | DRG: 190 | End: 2017-06-18
Attending: EMERGENCY MEDICINE | Admitting: INTERNAL MEDICINE
Payer: MEDICARE

## 2017-06-16 ENCOUNTER — APPOINTMENT (OUTPATIENT)
Dept: CT IMAGING | Facility: CLINIC | Age: 67
DRG: 190 | End: 2017-06-16
Attending: INTERNAL MEDICINE
Payer: MEDICARE

## 2017-06-16 DIAGNOSIS — F17.200 TOBACCO USE DISORDER: ICD-10-CM

## 2017-06-16 DIAGNOSIS — J44.1 COPD EXACERBATION (H): ICD-10-CM

## 2017-06-16 DIAGNOSIS — R29.6 FALLS FREQUENTLY: Primary | ICD-10-CM

## 2017-06-16 DIAGNOSIS — R09.02 HYPOXIA: ICD-10-CM

## 2017-06-16 DIAGNOSIS — E87.6 HYPOKALEMIA: ICD-10-CM

## 2017-06-16 DIAGNOSIS — R07.9 CHEST PAIN, UNSPECIFIED TYPE: ICD-10-CM

## 2017-06-16 DIAGNOSIS — R55 SYNCOPE, UNSPECIFIED SYNCOPE TYPE: ICD-10-CM

## 2017-06-16 DIAGNOSIS — J40 BRONCHITIS: ICD-10-CM

## 2017-06-16 LAB
ALBUMIN SERPL-MCNC: 3.8 G/DL (ref 3.4–5)
ALBUMIN UR-MCNC: NEGATIVE MG/DL
ALP SERPL-CCNC: 101 U/L (ref 40–150)
ALT SERPL W P-5'-P-CCNC: 31 U/L (ref 0–50)
ANION GAP SERPL CALCULATED.3IONS-SCNC: 4 MMOL/L (ref 3–14)
APPEARANCE UR: CLEAR
AST SERPL W P-5'-P-CCNC: 19 U/L (ref 0–45)
BASOPHILS # BLD AUTO: 0.1 10E9/L (ref 0–0.2)
BASOPHILS NFR BLD AUTO: 1 %
BILIRUB SERPL-MCNC: 0.4 MG/DL (ref 0.2–1.3)
BILIRUB UR QL STRIP: NEGATIVE
BUN SERPL-MCNC: 23 MG/DL (ref 7–30)
CALCIUM SERPL-MCNC: 9 MG/DL (ref 8.5–10.1)
CHLORIDE SERPL-SCNC: 102 MMOL/L (ref 94–109)
CK SERPL-CCNC: 56 U/L (ref 30–225)
CO2 SERPL-SCNC: 32 MMOL/L (ref 20–32)
COLOR UR AUTO: YELLOW
CREAT SERPL-MCNC: 0.76 MG/DL (ref 0.52–1.04)
D DIMER PPP FEU-MCNC: 0.6 UG/ML FEU (ref 0–0.5)
DIFFERENTIAL METHOD BLD: NORMAL
EOSINOPHIL # BLD AUTO: 0.2 10E9/L (ref 0–0.7)
EOSINOPHIL NFR BLD AUTO: 2.1 %
ERYTHROCYTE [DISTWIDTH] IN BLOOD BY AUTOMATED COUNT: 13.1 % (ref 10–15)
ETHANOL SERPL-MCNC: <0.01 G/DL
GFR SERPL CREATININE-BSD FRML MDRD: 76 ML/MIN/1.7M2
GLUCOSE BLDC GLUCOMTR-MCNC: 292 MG/DL (ref 70–99)
GLUCOSE SERPL-MCNC: 122 MG/DL (ref 70–99)
GLUCOSE UR STRIP-MCNC: NEGATIVE MG/DL
HCT VFR BLD AUTO: 41.6 % (ref 35–47)
HGB BLD-MCNC: 13.4 G/DL (ref 11.7–15.7)
HGB UR QL STRIP: NEGATIVE
IMM GRANULOCYTES # BLD: 0 10E9/L (ref 0–0.4)
IMM GRANULOCYTES NFR BLD: 0.3 %
INTERPRETATION ECG - MUSE: NORMAL
KETONES UR STRIP-MCNC: NEGATIVE MG/DL
LACTATE BLD-SCNC: 2 MMOL/L (ref 0.7–2.1)
LEUKOCYTE ESTERASE UR QL STRIP: ABNORMAL
LYMPHOCYTES # BLD AUTO: 2.8 10E9/L (ref 0.8–5.3)
LYMPHOCYTES NFR BLD AUTO: 38.2 %
MCH RBC QN AUTO: 29.3 PG (ref 26.5–33)
MCHC RBC AUTO-ENTMCNC: 32.2 G/DL (ref 31.5–36.5)
MCV RBC AUTO: 91 FL (ref 78–100)
MONOCYTES # BLD AUTO: 0.6 10E9/L (ref 0–1.3)
MONOCYTES NFR BLD AUTO: 7.6 %
NEUTROPHILS # BLD AUTO: 3.7 10E9/L (ref 1.6–8.3)
NEUTROPHILS NFR BLD AUTO: 50.8 %
NITRATE UR QL: NEGATIVE
NRBC # BLD AUTO: 0 10*3/UL
NRBC BLD AUTO-RTO: 0 /100
PH UR STRIP: 6 PH (ref 5–7)
PLATELET # BLD AUTO: 280 10E9/L (ref 150–450)
POTASSIUM SERPL-SCNC: 3.3 MMOL/L (ref 3.4–5.3)
PROT SERPL-MCNC: 7.7 G/DL (ref 6.8–8.8)
RBC # BLD AUTO: 4.58 10E12/L (ref 3.8–5.2)
RBC #/AREA URNS AUTO: 1 /HPF (ref 0–2)
SODIUM SERPL-SCNC: 138 MMOL/L (ref 133–144)
SP GR UR STRIP: 1.01 (ref 1–1.03)
SQUAMOUS #/AREA URNS AUTO: 1 /HPF (ref 0–1)
TROPONIN I SERPL-MCNC: NORMAL UG/L (ref 0–0.04)
TROPONIN I SERPL-MCNC: NORMAL UG/L (ref 0–0.04)
URN SPEC COLLECT METH UR: ABNORMAL
UROBILINOGEN UR STRIP-MCNC: 0 MG/DL (ref 0–2)
WBC # BLD AUTO: 7.3 10E9/L (ref 4–11)
WBC #/AREA URNS AUTO: 2 /HPF (ref 0–2)

## 2017-06-16 PROCEDURE — 87040 BLOOD CULTURE FOR BACTERIA: CPT | Performed by: EMERGENCY MEDICINE

## 2017-06-16 PROCEDURE — 25000131 ZZH RX MED GY IP 250 OP 636 PS 637: Mod: GY | Performed by: INTERNAL MEDICINE

## 2017-06-16 PROCEDURE — 12000007 ZZH R&B INTERMEDIATE

## 2017-06-16 PROCEDURE — 82550 ASSAY OF CK (CPK): CPT | Performed by: EMERGENCY MEDICINE

## 2017-06-16 PROCEDURE — 85379 FIBRIN DEGRADATION QUANT: CPT | Performed by: EMERGENCY MEDICINE

## 2017-06-16 PROCEDURE — 71260 CT THORAX DX C+: CPT

## 2017-06-16 PROCEDURE — 25000132 ZZH RX MED GY IP 250 OP 250 PS 637: Mod: GY | Performed by: INTERNAL MEDICINE

## 2017-06-16 PROCEDURE — 94640 AIRWAY INHALATION TREATMENT: CPT

## 2017-06-16 PROCEDURE — 25000125 ZZHC RX 250: Performed by: INTERNAL MEDICINE

## 2017-06-16 PROCEDURE — 25000128 H RX IP 250 OP 636: Performed by: EMERGENCY MEDICINE

## 2017-06-16 PROCEDURE — 96374 THER/PROPH/DIAG INJ IV PUSH: CPT

## 2017-06-16 PROCEDURE — 80053 COMPREHEN METABOLIC PANEL: CPT | Performed by: EMERGENCY MEDICINE

## 2017-06-16 PROCEDURE — 83605 ASSAY OF LACTIC ACID: CPT | Performed by: EMERGENCY MEDICINE

## 2017-06-16 PROCEDURE — 40000274 ZZH STATISTIC RCP CONSULT EA 30 MIN

## 2017-06-16 PROCEDURE — 99285 EMERGENCY DEPT VISIT HI MDM: CPT | Mod: 25

## 2017-06-16 PROCEDURE — 80320 DRUG SCREEN QUANTALCOHOLS: CPT | Performed by: EMERGENCY MEDICINE

## 2017-06-16 PROCEDURE — 84484 ASSAY OF TROPONIN QUANT: CPT | Performed by: EMERGENCY MEDICINE

## 2017-06-16 PROCEDURE — 84484 ASSAY OF TROPONIN QUANT: CPT | Performed by: INTERNAL MEDICINE

## 2017-06-16 PROCEDURE — 70450 CT HEAD/BRAIN W/O DYE: CPT

## 2017-06-16 PROCEDURE — 93005 ELECTROCARDIOGRAM TRACING: CPT

## 2017-06-16 PROCEDURE — A9270 NON-COVERED ITEM OR SERVICE: HCPCS | Mod: GY | Performed by: EMERGENCY MEDICINE

## 2017-06-16 PROCEDURE — 81001 URINALYSIS AUTO W/SCOPE: CPT | Performed by: EMERGENCY MEDICINE

## 2017-06-16 PROCEDURE — 25000125 ZZHC RX 250: Performed by: EMERGENCY MEDICINE

## 2017-06-16 PROCEDURE — 36415 COLL VENOUS BLD VENIPUNCTURE: CPT | Performed by: INTERNAL MEDICINE

## 2017-06-16 PROCEDURE — 40000275 ZZH STATISTIC RCP TIME EA 10 MIN

## 2017-06-16 PROCEDURE — 96361 HYDRATE IV INFUSION ADD-ON: CPT

## 2017-06-16 PROCEDURE — 25000132 ZZH RX MED GY IP 250 OP 250 PS 637: Mod: GY | Performed by: EMERGENCY MEDICINE

## 2017-06-16 PROCEDURE — 00000146 ZZHCL STATISTIC GLUCOSE BY METER IP

## 2017-06-16 PROCEDURE — 94640 AIRWAY INHALATION TREATMENT: CPT | Mod: 76

## 2017-06-16 PROCEDURE — 25000128 H RX IP 250 OP 636: Performed by: INTERNAL MEDICINE

## 2017-06-16 PROCEDURE — 36415 COLL VENOUS BLD VENIPUNCTURE: CPT | Performed by: EMERGENCY MEDICINE

## 2017-06-16 PROCEDURE — A9270 NON-COVERED ITEM OR SERVICE: HCPCS | Mod: GY | Performed by: INTERNAL MEDICINE

## 2017-06-16 PROCEDURE — 85025 COMPLETE CBC W/AUTO DIFF WBC: CPT | Performed by: EMERGENCY MEDICINE

## 2017-06-16 PROCEDURE — 99223 1ST HOSP IP/OBS HIGH 75: CPT | Mod: AI | Performed by: INTERNAL MEDICINE

## 2017-06-16 RX ORDER — LIDOCAINE 40 MG/G
CREAM TOPICAL
Status: DISCONTINUED | OUTPATIENT
Start: 2017-06-16 | End: 2017-06-16

## 2017-06-16 RX ORDER — ONDANSETRON 4 MG/1
4 TABLET, ORALLY DISINTEGRATING ORAL EVERY 6 HOURS PRN
Status: DISCONTINUED | OUTPATIENT
Start: 2017-06-16 | End: 2017-06-18 | Stop reason: HOSPADM

## 2017-06-16 RX ORDER — VERAPAMIL HYDROCHLORIDE 180 MG/1
180 TABLET, EXTENDED RELEASE ORAL EVERY MORNING
Status: DISCONTINUED | OUTPATIENT
Start: 2017-06-17 | End: 2017-06-18 | Stop reason: HOSPADM

## 2017-06-16 RX ORDER — LISINOPRIL 40 MG/1
40 TABLET ORAL DAILY
Status: DISCONTINUED | OUTPATIENT
Start: 2017-06-17 | End: 2017-06-18 | Stop reason: HOSPADM

## 2017-06-16 RX ORDER — SODIUM CHLORIDE 9 MG/ML
1000 INJECTION, SOLUTION INTRAVENOUS CONTINUOUS
Status: DISCONTINUED | OUTPATIENT
Start: 2017-06-16 | End: 2017-06-16

## 2017-06-16 RX ORDER — IPRATROPIUM BROMIDE AND ALBUTEROL SULFATE 2.5; .5 MG/3ML; MG/3ML
3 SOLUTION RESPIRATORY (INHALATION)
Status: DISCONTINUED | OUTPATIENT
Start: 2017-06-16 | End: 2017-06-18 | Stop reason: HOSPADM

## 2017-06-16 RX ORDER — NALOXONE HYDROCHLORIDE 0.4 MG/ML
.1-.4 INJECTION, SOLUTION INTRAMUSCULAR; INTRAVENOUS; SUBCUTANEOUS
Status: DISCONTINUED | OUTPATIENT
Start: 2017-06-16 | End: 2017-06-18 | Stop reason: HOSPADM

## 2017-06-16 RX ORDER — METHYLPREDNISOLONE SODIUM SUCCINATE 125 MG/2ML
125 INJECTION, POWDER, LYOPHILIZED, FOR SOLUTION INTRAMUSCULAR; INTRAVENOUS ONCE
Status: COMPLETED | OUTPATIENT
Start: 2017-06-16 | End: 2017-06-16

## 2017-06-16 RX ORDER — POTASSIUM CHLORIDE 29.8 MG/ML
20 INJECTION INTRAVENOUS
Status: DISCONTINUED | OUTPATIENT
Start: 2017-06-16 | End: 2017-06-18 | Stop reason: HOSPADM

## 2017-06-16 RX ORDER — POTASSIUM CHLORIDE 1.5 G/1.58G
20-40 POWDER, FOR SOLUTION ORAL
Status: DISCONTINUED | OUTPATIENT
Start: 2017-06-16 | End: 2017-06-18 | Stop reason: HOSPADM

## 2017-06-16 RX ORDER — PREGABALIN 75 MG/1
150 CAPSULE ORAL 3 TIMES DAILY
Status: DISCONTINUED | OUTPATIENT
Start: 2017-06-16 | End: 2017-06-18 | Stop reason: HOSPADM

## 2017-06-16 RX ORDER — POTASSIUM CHLORIDE 1500 MG/1
20-40 TABLET, EXTENDED RELEASE ORAL
Status: DISCONTINUED | OUTPATIENT
Start: 2017-06-16 | End: 2017-06-18 | Stop reason: HOSPADM

## 2017-06-16 RX ORDER — POTASSIUM CHLORIDE 7.45 MG/ML
10 INJECTION INTRAVENOUS
Status: DISCONTINUED | OUTPATIENT
Start: 2017-06-16 | End: 2017-06-18 | Stop reason: HOSPADM

## 2017-06-16 RX ORDER — POLYETHYLENE GLYCOL 3350 17 G/17G
17 POWDER, FOR SOLUTION ORAL DAILY PRN
Status: DISCONTINUED | OUTPATIENT
Start: 2017-06-16 | End: 2017-06-18 | Stop reason: HOSPADM

## 2017-06-16 RX ORDER — TRAMADOL HYDROCHLORIDE 50 MG/1
50-100 TABLET ORAL EVERY 6 HOURS PRN
Status: DISCONTINUED | OUTPATIENT
Start: 2017-06-16 | End: 2017-06-18 | Stop reason: HOSPADM

## 2017-06-16 RX ORDER — ALBUTEROL SULFATE 0.83 MG/ML
2.5 SOLUTION RESPIRATORY (INHALATION)
Status: DISCONTINUED | OUTPATIENT
Start: 2017-06-16 | End: 2017-06-16

## 2017-06-16 RX ORDER — POTASSIUM CHLORIDE 1500 MG/1
20 TABLET, EXTENDED RELEASE ORAL ONCE
Status: COMPLETED | OUTPATIENT
Start: 2017-06-16 | End: 2017-06-16

## 2017-06-16 RX ORDER — ATORVASTATIN CALCIUM 40 MG/1
40 TABLET, FILM COATED ORAL DAILY
Status: DISCONTINUED | OUTPATIENT
Start: 2017-06-17 | End: 2017-06-18 | Stop reason: HOSPADM

## 2017-06-16 RX ORDER — IPRATROPIUM BROMIDE AND ALBUTEROL SULFATE 2.5; .5 MG/3ML; MG/3ML
6 SOLUTION RESPIRATORY (INHALATION) ONCE
Status: COMPLETED | OUTPATIENT
Start: 2017-06-16 | End: 2017-06-16

## 2017-06-16 RX ORDER — METHYLPREDNISOLONE SODIUM SUCCINATE 40 MG/ML
40 INJECTION, POWDER, LYOPHILIZED, FOR SOLUTION INTRAMUSCULAR; INTRAVENOUS 2 TIMES DAILY
Status: DISCONTINUED | OUTPATIENT
Start: 2017-06-17 | End: 2017-06-18 | Stop reason: HOSPADM

## 2017-06-16 RX ORDER — POTASSIUM CL/LIDO/0.9 % NACL 10MEQ/0.1L
10 INTRAVENOUS SOLUTION, PIGGYBACK (ML) INTRAVENOUS
Status: DISCONTINUED | OUTPATIENT
Start: 2017-06-16 | End: 2017-06-18 | Stop reason: HOSPADM

## 2017-06-16 RX ORDER — TRAZODONE HYDROCHLORIDE 100 MG/1
100 TABLET ORAL AT BEDTIME
Status: DISCONTINUED | OUTPATIENT
Start: 2017-06-16 | End: 2017-06-18 | Stop reason: HOSPADM

## 2017-06-16 RX ORDER — ONDANSETRON 2 MG/ML
4 INJECTION INTRAMUSCULAR; INTRAVENOUS EVERY 6 HOURS PRN
Status: DISCONTINUED | OUTPATIENT
Start: 2017-06-16 | End: 2017-06-18 | Stop reason: HOSPADM

## 2017-06-16 RX ORDER — DEXTROSE MONOHYDRATE 25 G/50ML
25-50 INJECTION, SOLUTION INTRAVENOUS
Status: DISCONTINUED | OUTPATIENT
Start: 2017-06-16 | End: 2017-06-18 | Stop reason: HOSPADM

## 2017-06-16 RX ORDER — ALBUTEROL SULFATE 0.83 MG/ML
2.5 SOLUTION RESPIRATORY (INHALATION)
Status: DISCONTINUED | OUTPATIENT
Start: 2017-06-16 | End: 2017-06-18 | Stop reason: HOSPADM

## 2017-06-16 RX ORDER — LANOLIN ALCOHOL/MO/W.PET/CERES
3 CREAM (GRAM) TOPICAL
Status: DISCONTINUED | OUTPATIENT
Start: 2017-06-16 | End: 2017-06-18 | Stop reason: HOSPADM

## 2017-06-16 RX ORDER — ASPIRIN 81 MG/1
324 TABLET, CHEWABLE ORAL ONCE
Status: COMPLETED | OUTPATIENT
Start: 2017-06-16 | End: 2017-06-16

## 2017-06-16 RX ORDER — AMOXICILLIN 250 MG
1-2 CAPSULE ORAL 2 TIMES DAILY PRN
Status: DISCONTINUED | OUTPATIENT
Start: 2017-06-16 | End: 2017-06-18 | Stop reason: HOSPADM

## 2017-06-16 RX ORDER — IOPAMIDOL 755 MG/ML
500 INJECTION, SOLUTION INTRAVASCULAR ONCE
Status: COMPLETED | OUTPATIENT
Start: 2017-06-16 | End: 2017-06-16

## 2017-06-16 RX ORDER — NICOTINE POLACRILEX 4 MG
15-30 LOZENGE BUCCAL
Status: DISCONTINUED | OUTPATIENT
Start: 2017-06-16 | End: 2017-06-18 | Stop reason: HOSPADM

## 2017-06-16 RX ORDER — SODIUM CHLORIDE 9 MG/ML
INJECTION, SOLUTION INTRAVENOUS CONTINUOUS
Status: ACTIVE | OUTPATIENT
Start: 2017-06-16 | End: 2017-06-17

## 2017-06-16 RX ORDER — DULOXETIN HYDROCHLORIDE 30 MG/1
60 CAPSULE, DELAYED RELEASE ORAL DAILY
Status: DISCONTINUED | OUTPATIENT
Start: 2017-06-16 | End: 2017-06-18 | Stop reason: HOSPADM

## 2017-06-16 RX ORDER — LIDOCAINE 40 MG/G
CREAM TOPICAL
Status: DISCONTINUED | OUTPATIENT
Start: 2017-06-16 | End: 2017-06-18 | Stop reason: HOSPADM

## 2017-06-16 RX ADMIN — POTASSIUM CHLORIDE 40 MEQ: 1500 TABLET, EXTENDED RELEASE ORAL at 23:11

## 2017-06-16 RX ADMIN — SODIUM CHLORIDE: 9 INJECTION, SOLUTION INTRAVENOUS at 21:35

## 2017-06-16 RX ADMIN — ASPIRIN 81 MG 324 MG: 81 TABLET ORAL at 16:38

## 2017-06-16 RX ADMIN — PREGABALIN 150 MG: 75 CAPSULE ORAL at 21:35

## 2017-06-16 RX ADMIN — SODIUM CHLORIDE 1000 ML: 9 INJECTION, SOLUTION INTRAVENOUS at 15:43

## 2017-06-16 RX ADMIN — IPRATROPIUM BROMIDE AND ALBUTEROL SULFATE 6 ML: .5; 3 SOLUTION RESPIRATORY (INHALATION) at 14:40

## 2017-06-16 RX ADMIN — ALBUTEROL SULFATE 2.5 MG: 2.5 SOLUTION RESPIRATORY (INHALATION) at 16:33

## 2017-06-16 RX ADMIN — IOPAMIDOL 81 ML: 755 INJECTION, SOLUTION INTRAVENOUS at 17:09

## 2017-06-16 RX ADMIN — OMEPRAZOLE 20 MG: 20 CAPSULE, DELAYED RELEASE ORAL at 21:36

## 2017-06-16 RX ADMIN — METHYLPREDNISOLONE SODIUM SUCCINATE 125 MG: 125 INJECTION, POWDER, FOR SOLUTION INTRAMUSCULAR; INTRAVENOUS at 14:41

## 2017-06-16 RX ADMIN — SODIUM CHLORIDE 98 ML: 9 INJECTION, SOLUTION INTRAVENOUS at 17:10

## 2017-06-16 RX ADMIN — SODIUM CHLORIDE 1000 ML: 9 INJECTION, SOLUTION INTRAVENOUS at 15:42

## 2017-06-16 RX ADMIN — SODIUM CHLORIDE 1000 ML: 9 INJECTION, SOLUTION INTRAVENOUS at 14:40

## 2017-06-16 RX ADMIN — DULOXETINE 60 MG: 30 CAPSULE, DELAYED RELEASE ORAL at 21:35

## 2017-06-16 RX ADMIN — TRAZODONE HYDROCHLORIDE 100 MG: 100 TABLET ORAL at 21:36

## 2017-06-16 RX ADMIN — IPRATROPIUM BROMIDE AND ALBUTEROL SULFATE 3 ML: .5; 3 SOLUTION RESPIRATORY (INHALATION) at 20:13

## 2017-06-16 RX ADMIN — INSULIN ASPART 2 UNITS: 100 INJECTION, SOLUTION INTRAVENOUS; SUBCUTANEOUS at 22:35

## 2017-06-16 RX ADMIN — POTASSIUM CHLORIDE 20 MEQ: 1500 TABLET, EXTENDED RELEASE ORAL at 18:10

## 2017-06-16 ASSESSMENT — ENCOUNTER SYMPTOMS
FATIGUE: 1
FEVER: 1
BLOOD IN STOOL: 0
DIAPHORESIS: 1
COUGH: 0

## 2017-06-16 NOTE — LETTER
Transition Communication Hand-off for Care Transitions to Next Level of Care Provider    Name: Vijaya Manjarrez  MRN #: [id number]  Primary Care Provider: Disha Van  Primary Care MD Name: Disha Van   Primary Clinic: [facility] [address] [facility] [address]  [facility] [address]  Primary Care Clinic Name: [facility] [phone]  Reason for Hospitalization:  Hypokalemia [E87.6]  Hypoxia [R09.02]  COPD exacerbation (H) [J44.1]  Syncope, unspecified syncope type [R55]  Chest pain, unspecified type [R07.9]  Admit Date/Time: 6/16/2017  1:26 PM  Discharge Date: ***  Payor Source: Payor: MEDICARE / Plan: MEDICARE / Product Type: Medicare /            Reason for Communication Hand-off Referral: Admission diagnoses: COPD    Discharge Plan: Home with a quit plan and resources       Concern for non-adherence with plan of care:    NONE  Discharge Needs Assessment:  Needs       Most Recent Value    Equipment Currently Used at Home bath bench, grab bar, raised toilet, walker, rolling    Transportation Available family or friend will provide    # of Referrals Placed by CTS Scheduled Follow-up appointments          Already enrolled in Tele-monitoring program and name of program:  NO  Follow-up specialty is recommended: No    Follow-up plan:  Future Appointments  Date Time Provider Department Center   6/26/2017 1:20 PM Disha Van MD [facility]       Any outstanding tests or procedures:        Referrals     Future Labs/Procedures    Home Care PT Referral for Hospital Discharge     Comments:    PT to eval and treat    Your provider has ordered home care - physical therapy. If you have not been contacted within 2 days of your discharge please call the department phone number listed on the top of this document.            Key Recommendations:      Emily Wharton    AVS/Discharge Summary is the source of truth; this is a helpful guide for improved communication of patient story

## 2017-06-16 NOTE — TELEPHONE ENCOUNTER
Vijaya Manjarrez is a 67 year old female who calls with c/o passing out and states she doesn't know how long she passed. Patient states a friend came to her appartent and found her unconscious last night. Patient denies seeking emergency care. Patient unsure how many time this has happened or when this started. Advised patient to seek emergency care immediately or call 911.    Last exam/Treatment:  6/8/17  Allergies:   Allergies   Allergen Reactions     Penicillins Hives       NURSING PLAN: Nursing advice to patient seek emergency care immediately.    RECOMMENDED DISPOSITION:  Call 911 - patient states she will find someone to drive her to the emergency department at [facility].  Will comply with recommendation: No- Barriers to comply with plan of care patient states she will call someone to drive her to emergency department..  If further questions/concerns or if symptoms do not improve, worsen or new symptoms develop, call your PCP or [facility] as soon as possible.      Guideline used:  Telephone Triage Protocols for Nurses, Fourth Edition, Reyna Anthony RN

## 2017-06-16 NOTE — ED NOTES
[facility]  ED Nurse Handoff Report    Vijaya Manjarrez is a 67 year old female   ED Chief complaint: Loss of Consciousness  . ED Diagnosis:   Final diagnoses:   COPD exacerbation (H)   Hypoxia   Chest pain, unspecified type   Syncope, unspecified syncope type   Hypokalemia     Allergies:   Allergies   Allergen Reactions     Penicillins Hives       Code Status: Full Code  Activity level - Baseline/Home:  Independent. Activity Level - Current:   Independent. Lift room needed: No. Bariatric: No   Needed: No   Isolation: No. Infection: Not Applicable.     Vital Signs:   Vitals:    06/16/17 1730 06/16/17 1745 06/16/17 1800 06/16/17 1815   BP: 111/72 123/72 121/72 127/69   Pulse:       Resp:       Temp:       TempSrc:       SpO2: 99% 98% 95% 97%   Weight:       Height:           Cardiac Rhythm:  ,   Cardiac  Cardiac Rhythm: Normal sinus rhythm  Pain level: 0-10 Pain Scale: 0  Patient confused: No. Patient Falls Risk: Yes.   Elimination Status: Has voided   Patient Report - Initial Complaint: Patient arrived complaining of multiple near syncopal and syncopal episodes in the past month.  States she also has been having generalized weakness and in the past week has complained of weakness off and on in the left hand. Focused Assessment: bilateral expiratory wheezing, no weakness noted, no bruising   Tests Performed: EKG, labs, xray, CT. Abnormal Results: D-dimer 0.6, potassium 3.3.   Treatments provided: albuterol nebs x 2, solumedrol 125mg IVP, potassium 20meq PO, aspirin 324 PO, 1L NS Bolus  Family Comments: no family present  OBS brochure/video discussed/provided to patient:  N/A  ED Medications:   Medications   lidocaine 1 % 1 mL (not administered)   lidocaine (LMX4) kit (not administered)   sodium chloride (PF) 0.9% PF flush 3 mL (not administered)   sodium chloride (PF) 0.9% PF flush 3 mL (not administered)   albuterol neb solution 2.5 mg (2.5 mg Nebulization Given 6/16/17 2843)   0.9%  sodium chloride BOLUS (0 mLs Intravenous Stopped 6/16/17 1541)   methylPREDNISolone sodium succinate (solu-MEDROL) injection 125 mg (125 mg Intravenous Given 6/16/17 1441)   ipratropium - albuterol 0.5 mg/2.5 mg/3 mL (DUONEB) neb solution 6 mL (6 mLs Nebulization Given 6/16/17 1440)   aspirin chewable tablet 324 mg (324 mg Oral Given 6/16/17 1638)   iopamidol (ISOVUE-370) solution 500 mL (81 mLs Intravenous Given 6/16/17 1709)   0.9% sodium chloride BOLUS (0 mLs Intravenous Stopped 6/16/17 1711)   potassium chloride SA (K-DUR/KLOR-CON M) CR tablet 20 mEq (20 mEq Oral Given 6/16/17 1810)     Drips infusing:  No         ED Nurse Name/Phone Number: May Fleming,   6:26 PM    RECEIVING UNIT ED HANDOFF REVIEW    Above ED Nurse Handoff Report was reviewed: Yes  Reviewed by: Sonia Francisco on June 16, 2017 at 6:41 PM

## 2017-06-16 NOTE — IP AVS SNAPSHOT
[place] [address] [facility]    [address]    [facility] [address]    Phone:  [phone]    Fax:  [phone]                                       After Visit Summary   6/16/2017    Vijaya Manjarrez    MRN: [id number]           After Visit Summary Signature Page     I have received my discharge instructions, and my questions have been answered. I have discussed any challenges I see with this plan with the nurse or doctor.    ..........................................................................................................................................  Patient/Patient Representative Signature      ..........................................................................................................................................  Patient Representative Print Name and Relationship to Patient    ..................................................               ................................................  Date                                            Time    ..........................................................................................................................................  Reviewed by Signature/Title    ...................................................              ..............................................  Date                                                            Time

## 2017-06-16 NOTE — LETTER
Transition Communication Hand-off for Care Transitions to Next Level of Care Provider    Name: Vijaya Manjarrez  MRN #: [id number]  Primary Care Provider: Disha Van  Primary Care MD Name: Disha Van   Primary Clinic: [facility] [address] [facility] [address]  [facility] [address]  Primary Care Clinic Name: [facility] [phone]  Reason for Hospitalization:  Hypokalemia [E87.6]  Hypoxia [R09.02]  COPD exacerbation (H) [J44.1]  Syncope, unspecified syncope type [R55]  Chest pain, unspecified type [R07.9]  Admit Date/Time: 6/16/2017  1:26 PM  Discharge Date: 6/18/2017  Payor Source: Payor: MEDICARE / Plan: MEDICARE / Product Type: Medicare /     Readmission Assessment Measure (KWAKU) Risk Score/category: Average           Reason for Communication Hand-off Referral: Admission diagnoses: COPD    Discharge Plan: Home with Home care, and new medications.    Review of your medicines         START taking         Dose / Directions     nicotine 10 MG Inhaler   Commonly known as: NICOTROL   Used for: Tobacco use disorder        Dose: 2 cartridge   Inhale 2 cartridge into the lungs daily as needed for smoking cessation   Quantity: 30 each   Refills: 0        predniSONE 20 MG tablet   Commonly known as: DELTASONE   Used for: COPD exacerbation (H)        Take 1.5 tabs (30 mg) daily x 3 days, 1 tab (20 mg) daily x 3 days, then 1/2 tab (10 mg) x 3 days.   Quantity: 10 tablet   Refills: 0           Concern for non-adherence with plan of care:   Y/N-she is new to a quit plan and management of her new dx of COPD, this may be a hard dx to digest support with quit plan and encouragement with education on how to living with COPD could be helpful.    Discharge Needs Assessment:  Needs       Most Recent Value    Equipment Currently Used at Home bath bench, grab bar, raised toilet, walker, rolling    Transportation Available car, family or friend will provide    # of Referrals Placed by CTS Scheduled Follow-up  appointments          Follow-up specialty is recommended: Yes    Follow-up plan:  Future Appointments  Date Time Provider Department Center   6/18/2017 6:00 AM Cynthia Adan, PT [facility]   6/26/2017 1:20 PM Disha Van MD [facility]       Any outstanding tests or procedures:  NO            Key Recommendations:  Pt states she has never been told that she has COPD until this hospital stay. She was just started on an inhaler within that last couple months. Pt does receive meals through [employer] and was working on getting some housekeeping services through [facility]. COPD action plan was reviewed and given to the pt. PT was recommending home care PT vs OP PT.      Chani Murphy    AVS/Discharge Summary is the source of truth; this is a helpful guide for improved communication of patient story

## 2017-06-16 NOTE — ED PROVIDER NOTES
History     Chief Complaint:  Loss of Consciousness      HPI   Vijaya Manjarrez is a 67 year old female with a history of COPD, diabetes, hypertension, hyperlipidemia, and fibromyalgia who presents with syncope. The patient reports that she fainted yesterday afternoon and woke up some time between 6 pm and 3 am during which time she walked to her chair. The last thing the patient remembers is getting ready to go to a neighbor's house. She had missed calls during the 5 o'clock hour, so she thinks she passed out slightly before this time. Upon waking, she noted a pain in her chest that continued to today, but is not as severe upon presentation. The patient reports that she had collapsed two times previously. The most recent collapse was approximately three weeks ago where she was walking and suddenly her legs gave out. She has memory of these collapses, unlike her episode yesterday, and recalls staying on the floor until she regained enough strength to walk to a chair. Her recent symptoms include pain in her shoulders, neck, and back. The patient reports that she had a slight fever. She states that her stools are a darker brown than normal, but there has not been any bright red blood in her stools. Additionally, she has felt generally weak the last few days. The patient denies any cough.     Cardiac Risk Factors   Sex: Female   Tobacco: Positive   Hypertension: Positive  Diabetes: Positive  Hyperlipidemia: Positive  Family History: Negative     Allergies:  Penicillins     Medications:    Lisinopril  Tramadol  Lyrica  Albuterol  Trazodone  Zyrtec  Verapamil  Lipitor  Metformin  Prilosec  Cymbalta  Flexeril    Past Medical History:    Anxiety  Cervical spine degeneration  COPD  Diabetes  Diabetic neuropathy  Facet anthropathy  Fibromyalgia  GERD  Hypertension  Hyperlipidemia  Lumbar degenerative disc disease  Migraine    Past Surgical History:    Cholecystectomy    Family History:    Cancer--Mother  Lung  "cancer--father    Social History:  Marital Status: single  Presents to the ED alone  Tobacco Use: Current daily smoker  Alcohol Use: no  PCP: Disha Van     Review of Systems   Constitutional: Positive for diaphoresis, fatigue and fever.        Positive for generalized weakness.    Respiratory: Negative for cough.    Cardiovascular: Positive for chest pain.   Gastrointestinal: Negative for blood in stool.   Neurological: Positive for syncope.   All other systems reviewed and are negative.    Physical Exam   First Vitals:  BP: (!) 131/94  Pulse: 87  Heart Rate: 83  Temp: 97.7  F (36.5  C)  Resp: 20  Height: 175.3 cm (5' 9\")  Weight: 95.3 kg (210 lb)  SpO2: 95 %    Physical Exam  Constitutional:  Appears well-developed and well-nourished. Alert. Conversant. Non toxic.  HENT:   Head: Atraumatic. No depressed skull fracture, Racoon Eyes, Estrada's sign, or hemotympanum. Face normal.  Nose: Nose normal.  Mouth/Throat: Oral mucosa is clear and moist. no trismus. Pharynx normal. Tonsils symmetric. No tonsillar enlargement, erythema, or exudate.  Eyes: Conjunctivae normal. EOM normal. Pupils equal, round, and reactive to light. No scleral icterus.   Neck: Normal range of motion. Neck supple. No tracheal deviation present. No JVD  Cardiovascular: Normal rate, regular rhythm. No gallop. No friction rub. No murmur heard. Symmetric radial artery pulses   Pulmonary/Chest: Effort normal. No stridor. No respiratory distress. Diffuse bilateral wheezes. No rales. No rhonchi . No tenderness.   Abdominal: Soft. Bowel sounds normal. No distension. No mass. No tenderness. No rebound. No guarding.   Musculoskeletal: Normal range of motion. No edema. No tenderness. No deformity   Lymph: No cervical adenopathy.   Neurological: Alert and oriented to person, place, and time. Normal strength. CN II-VII intact. No sensory deficit. GCS eye subscore is 4. GCS verbal subscore is 5. GCS motor subscore is 6. Normal coordination   Skin: Skin is " warm and dry. No rash noted. No pallor. Normal capillary refill.  Psychiatric:  Normal mood. Normal affect.     Emergency Department Course   ECG:  @ 1330  Indication: loss of consciousness  Vent. Rate 88 bpm. TN interval 172 ms. QRS duration 90 ms. QT/QTc 364/440 ms. P-R-T axis 61 64 49.   Normal sinus rhythm. Normal ECG.  No significant change when compared to previous ECG from 05/24/2017   Read @ 1409 by Dr. Chang.    Imaging:  CT Chest Pulmonary Embolism with contrast;   No evidence of pulmonary embolus or other findings to  suggest an etiology of the patient's symptoms.  Report per radiology.   Radiographic findings were communicated with the patient who voiced understanding of the findings.    Laboratory:  UA: Clear yellow urine, Leukocyte esterase: small, otherwise WNL    Lactic acid: 2.0  CBC:  WBC 7.3, HGB 13.4, , otherwise WNL  CMP: Potassium 3.3 (L), glucose 122 (H),  otherwise WNL (Creatinine 0.76)  1335 Troponin <0.015  1335 D dimer 0.6 (L)  Alcohol <0.01  Blood Culture: pending x 2    Interventions:  (1440) Normal Saline, 1 liter, IV bolus   (1440) DuoNeb 6 mL nebulizer   (1441) solu-medrol  mg  Rechecked.  Lung sounds improved, but still wheezy.  Feels perhaps slightly better.  (1543) sodium chloride, 1000 mLs, IV infusion   (1633) albuterol neb solution, 2.5 mg, nebulization  (1638) aspirin, 324 mg, PO  (1810) potassium chloride SA, 20 mEq,  PO     Emergency Department Course:  Nursing notes and vitals reviewed.  I performed an exam of the patient as documented above. GCS 15  A peripheral IV was established. Blood was drawn from the patient. This was sent for laboratory testing, findings above.   EKG was done, interpretation as above.  The patient was sent for a chest CT  while in the emergency department, findings above.  (1622) I rechecked with the patient and went over results.    Findings and plan explained to the patient who consents to admission.   (1823) I discussed the patient  with Dr. Hamilton of the hospitalist service, who will admit the patient to a cardiac telemetry bed for further monitoring, evaluation, and treatment.    Impression & Plan    Medical Decision Making:  Vijaya Mnajarrez is a 67 year old female with a complex presentation. She reports three episodes of syncope over the last few weeks with vague description of each event. The most recent episode was yesterday evening, or possibly last night, with an unknown down time. Unknown whether this was a syncope from hypoxia due to COPD (favored at this time) vs. seizure vs. other cardiac dysrhythmia.     She also has been endorsing chest pain that started some time last night or this morning. In terms of that her ECG is non-ischemic and troponin is negative so far.     The patient's lung sounds were very tight and wheezy upon presentation. She was also hypoxic, requiring oxygen via nasal cannula to keep her saturation in the 90s. After corticosteroids and serial bronchodilators she is feeling somewhat better, although still some wheezing on exam.     CT PA looking for PE is negative. Also neg for pneumonia, pulmonary edema, pleural effusion, rib fracture, other cause for difficulty breathing or chest tightness. Other lab workup is reassuring save for mild hypokalemia (supplemented in the ER).    We will admit to the hospitalist service for ongoing treatment of her COPD, cardiac monitoring with regard to her syncope, and oxygen supplementation.     Diagnosis:    ICD-10-CM    1. COPD exacerbation (H) J44.1    2. Hypoxia R09.02    3. Chest pain, unspecified type R07.9    4. Syncope, unspecified syncope type R55    5. Hypokalemia E87.6        Disposition:  Admitted to the hospitalist         I, Jules Rolle, am serving as a scribe on 6/16/2017 at 2:07 PM to personally document services performed by Dr. Chang based on my observations and the provider's statements to me.     6/16/2017   [facility] EMERGENCY  DEPARTMENT       Cheo Chang MD  06/17/17 0014

## 2017-06-16 NOTE — ED NOTES
Patient states she has been feeling weak and having syncopal episodes at home for the past month.  PMD told her she needed to come in.  Has not been seen for this.      ABCs intact.  Alert and oriented x 3.

## 2017-06-16 NOTE — PHARMACY-ADMISSION MEDICATION HISTORY
Admission medication history interview status for this patient is complete. See [facility] admission navigator for allergy information, prior to admission medications and immunization status.     Medication history interview source(s):Patient  Medication history resources (including written lists, pill bottles, clinic record):[facility]  Primary pharmacy:[facility]    Changes made to [facility] medication list:  Added: none  Deleted: none  Changed: none    Actions taken by pharmacist (provider contacted, etc):None     Additional medication history information:None    Medication reconciliation/reorder completed by provider prior to medication history? No    Do you take OTC medications (eg tylenol, ibuprofen, fish oil, eye/ear drops, etc)? no    For patients on insulin therapy: NO   Lantus/levemir/NPH/Mix 70/30 dose:   (Y/N) (see below)   Sliding scale Novolog Y/N  If Yes, do you have a baseline novolog pre-meal dose:  units with meals   Patients eat three meals a day:   Y/N     Any Barriers to therapy:  cost of medications/comfortable with giving injections (if applicable)/ comfortable and confident with current diabetes regimen:       Prior to Admission medications    Medication Sig Last Dose Taking? Auth Provider   lisinopril (PRINIVIL/ZESTRIL) 40 MG tablet Take 1 tablet (40 mg) by mouth daily 6/16/2017 at am Yes Disha Van MD   traMADol (ULTRAM) 50 MG tablet Take 2 tablets (100 mg) by mouth 3 times daily May take 5 per day 6/16/2017 at 1 dose Yes Disha Van MD   pregabalin (LYRICA) 150 MG capsule Take 1 capsule (150 mg) by mouth 3 times daily 6/16/2017 at 1 dose Yes Disha Van MD   traZODone (DESYREL) 100 MG tablet Take 2 tablets (200 mg) by mouth At Bedtime 6/15/2017 at hs Yes Disha Van MD   cetirizine (ZYRTEC) 10 MG tablet Take 1 tablet (10 mg) by mouth daily 6/15/2017 at pm Yes Disha Van MD   verapamil (CALAN-SR) 180 MG CR tablet Take 1 tablet (180 mg) by mouth At Bedtime 6/16/2017 at am Yes Disha Van  MD   atorvastatin (LIPITOR) 40 MG tablet Take 1 tablet (40 mg) by mouth daily 6/15/2017 at Unknown time Yes Disha Van MD   metFORMIN (GLUCOPHAGE) 500 MG tablet Take 1 tablet (500 mg) by mouth 2 times daily (with meals) 6/15/2017 at am Yes Disha Van MD   omeprazole (PRILOSEC) 20 MG CR capsule Take 20 mg by mouth daily 6/15/2017 at pm Yes Reported, Patient   DULoxetine (CYMBALTA) 60 MG EC capsule Take 60 mg by mouth daily 6/15/2017 at pm Yes Reported, Patient   cyclobenzaprine (FLEXERIL) 10 MG tablet Take 10 mg by mouth 2 times daily as needed for muscle spasms 6/15/2017 at am Yes Reported, Patient   albuterol (PROAIR HFA/PROVENTIL HFA/VENTOLIN HFA) 108 (90 BASE) MCG/ACT Inhaler Inhale 2 puffs into the lungs every 6 hours as needed for shortness of breath / dyspnea or wheezing 6/14/2017  Jenny Barker, NP

## 2017-06-16 NOTE — IP AVS SNAPSHOT
MRN:[id number]                      After Visit Summary   6/16/2017    Vijaya Manjarrez    MRN: [id number]           Thank you!     Thank you for choosing [facility] for your care. Our goal is always to provide you with excellent care. Hearing back from our patients is one way we can continue to improve our services. Please take a few minutes to complete the written survey that you may receive in the mail after you visit. If you would like to speak to someone directly about your visit please contact Patient Relations at [phone]. Thank you!          Patient Information     Date Of Birth          1950        Designated Caregiver       Most Recent Value    Caregiver    Will someone help with your care after discharge? no      About your hospital stay     You were admitted on:  June 16, 2017 You last received care in the:  [place] [address] [facility]    You were discharged on:  June 18, 2017        Reason for your hospital stay       COPD exacerbation                  Who to Call     For medical emergencies, please call 911.  For non-urgent questions about your medical care, please call your primary care provider or clinic, [phone]          Attending Provider     Provider Specialty    Cheo Chang MD Emergency Medicine    Alfonso, Soledad Bingham MD Internal Medicine       Primary Care Provider Office Phone # Fax #    Disha Van [phone]      After Care Instructions     Activity       Your activity upon discharge: activity as tolerated            Diet       Follow this diet upon discharge: Orders Placed This Encounter      Combination Diet Regular Diet Adult; 6599-9177 Calories: Moderate Consistent CHO (4-6 CHO units/meal)                  Follow-up Appointments     Follow-up and recommended labs and tests        Follow up with primary care provider, Disha Van, within 7 days                  Your next 10 appointments already  "scheduled     Jun 26, 2017  1:20 PM CDT   [facility] Visit [facility] D[facility] MD   [facility]    [address]  [facility] [id number] [phone]           Bring a current list of meds and any records pertaining to this visit.  For Physicals, please bring immunization records and any forms needing to be filled out.  Please arrive 10 minutes early to complete paperwork.              Additional Services     Home Care PT Referral for Hospital Discharge       PT to eval and treat    Your provider has ordered home care - physical therapy. If you have not been contacted within 2 days of your discharge please call the department phone number listed on the top of this document.                  Further instructions from your care team       Your hospital follow up appointment has been scheduled for you at [facility] with Dr. Van for Monday 6/26/17 at 1:20pm. Please call the clinic at [phone] if you need to reschedule.     Pending Results     Date and Time Order Name Status Description    6/16/2017 1430 Blood culture Preliminary     6/16/2017 1427 Blood culture Preliminary             Statement of Approval     Ordered          06/18/17 0847  I have reviewed and agree with all the recommendations and orders detailed in this document.  EFFECTIVE NOW     Approved and electronically signed by:  Gonzalo Rojo MD             Admission Information     Date & Time Provider Department Dept. Phone    6/16/2017 Soledad Hamilton MD [place] [address] [facility] [phone]      Your Vitals Were     Blood Pressure Pulse Temperature Respirations Height Weight    148/77 92 98  F (36.7  C) (Oral) 18 1.753 m (5' 9\") 92.6 kg (204 lb 3.2 oz)    Pulse Oximetry BMI (Body Mass Index)                91% 30.16 kg/m2          MyChart Information     [facility] lets you send messages to your doctor, view your test results, renew your prescriptions, " "schedule appointments and more. To sign up, go to [email or web address] . Click on \"Log in\" on the left side of the screen, which will take you to the Welcome page. Then click on \"Sign up Now\" on the right side of the page.     You will be asked to enter the access code listed below, as well as some personal information. Please follow the directions to create your username and password.     Your access code is: [email or web address]  Expires: 2017  2:59 PM     Your access code will  in 90 days. If you need help or a new code, please call your [place] clinic or [phone].        Care EveryWhere ID     This is your Care EveryWhere ID. This could be used by other organizations to access your [place] medical records  [id number]           Review of your medicines      START taking        Dose / Directions    nicotine 10 MG Inhaler   Commonly known as:  NICOTROL   Used for:  Tobacco use disorder        Dose:  2 cartridge   Inhale 2 cartridge into the lungs daily as needed for smoking cessation   Quantity:  30 each   Refills:  0       predniSONE 20 MG tablet   Commonly known as:  DELTASONE        Take 1.5 tabs (30 mg) daily x 3 days, 1 tab (20 mg) daily x 3 days, then 1/2 tab (10 mg) x 3 days.   Quantity:  10 tablet   Refills:  0         CONTINUE these medicines which may have CHANGED, or have new prescriptions. If we are uncertain of the size of tablets/capsules you have at home, strength may be listed as something that might have changed.        Dose / Directions    albuterol 108 (90 BASE) MCG/ACT Inhaler   Commonly known as:  PROAIR HFA/PROVENTIL HFA/VENTOLIN HFA   This may have changed:  when to take this   Used for:  Bronchitis        Dose:  2 puff   Inhale 2 puffs into the lungs every 4 hours as needed for shortness of breath / dyspnea or wheezing   Quantity:  1 Inhaler   Refills:  0       verapamil 180 MG CR tablet   Commonly known as:  CALAN-SR   This may have changed:  when to take this   Used for:  " Benign essential hypertension        Dose:  180 mg   Take 1 tablet (180 mg) by mouth At Bedtime   Quantity:  90 tablet   Refills:  1         CONTINUE these medicines which have NOT CHANGED        Dose / Directions    atorvastatin 40 MG tablet   Commonly known as:  LIPITOR   Used for:  Hyperlipidemia LDL goal <100        Dose:  40 mg   Take 1 tablet (40 mg) by mouth daily   Quantity:  90 tablet   Refills:  1       cetirizine 10 MG tablet   Commonly known as:  zyrTEC   Used for:  Allergic rhinitis, unspecified allergic rhinitis trigger, unspecified rhinitis seasonality        Dose:  10 mg   Take 1 tablet (10 mg) by mouth daily   Quantity:  90 tablet   Refills:  3       cyclobenzaprine 10 MG tablet   Commonly known as:  FLEXERIL        Dose:  10 mg   Take 10 mg by mouth 2 times daily as needed for muscle spasms   Refills:  0       DULoxetine 60 MG EC capsule   Commonly known as:  CYMBALTA        Dose:  60 mg   Take 60 mg by mouth daily   Refills:  0       lisinopril 40 MG tablet   Commonly known as:  PRINIVIL/ZESTRIL        Dose:  40 mg   Take 1 tablet (40 mg) by mouth daily   Quantity:  90 tablet   Refills:  1       metFORMIN 500 MG tablet   Commonly known as:  GLUCOPHAGE   Used for:  Type 2 diabetes mellitus with diabetic neuropathy, without long-term current use of insulin (H)        Dose:  500 mg   Take 1 tablet (500 mg) by mouth 2 times daily (with meals)   Quantity:  180 tablet   Refills:  1       omeprazole 20 MG CR capsule   Commonly known as:  priLOSEC        Dose:  20 mg   Take 20 mg by mouth daily   Refills:  0       pregabalin 150 MG capsule   Commonly known as:  LYRICA   Used for:  Fibromyalgia, Chronic bilateral low back pain without sciatica        Dose:  150 mg   Take 1 capsule (150 mg) by mouth 3 times daily   Quantity:  270 capsule   Refills:  1       traMADol 50 MG tablet   Commonly known as:  ULTRAM   Used for:  Facet arthropathy, cervical (H), Facet arthropathy, lumbar (H), Fibromyalgia, Bilateral  shoulder pain, unspecified chronicity        Dose:  100 mg   Take 2 tablets (100 mg) by mouth 3 times daily May take 5 per day   Quantity:  180 tablet   Refills:  1       traZODone 100 MG tablet   Commonly known as:  DESYREL   Used for:  Insomnia, unspecified type        Dose:  200 mg   Take 2 tablets (200 mg) by mouth At Bedtime   Quantity:  60 tablet   Refills:  5            Where to get your medicines      These medications were sent to [place], [place] - [address] [facility]  [address] [facility] [address]     Phone:  [phone]     albuterol 108 (90 BASE) MCG/ACT Inhaler    nicotine 10 MG Inhaler    predniSONE 20 MG tablet                Protect others around you: Learn how to safely use, store and throw away your medicines at www.disposemymeds.org.             Medication List: This is a list of all your medications and when to take them. Check marks below indicate your daily home schedule. Keep this list as a reference.      Medications           Morning Afternoon Evening Bedtime As Needed    albuterol 108 (90 BASE) MCG/ACT Inhaler   Commonly known as:  PROAIR HFA/PROVENTIL HFA/VENTOLIN HFA   Inhale 2 puffs into the lungs every 4 hours as needed for shortness of breath / dyspnea or wheezing                                atorvastatin 40 MG tablet   Commonly known as:  LIPITOR   Take 1 tablet (40 mg) by mouth daily   Last time this was given:  40 mg on 6/18/2017  8:21 AM                                cetirizine 10 MG tablet   Commonly known as:  zyrTEC   Take 1 tablet (10 mg) by mouth daily                                cyclobenzaprine 10 MG tablet   Commonly known as:  FLEXERIL   Take 10 mg by mouth 2 times daily as needed for muscle spasms                                DULoxetine 60 MG EC capsule   Commonly known as:  CYMBALTA   Take 60 mg by mouth daily   Last time this was given:  60 mg on 6/17/2017  8:12 PM                                lisinopril 40 MG  tablet   Commonly known as:  PRINIVIL/ZESTRIL   Take 1 tablet (40 mg) by mouth daily   Last time this was given:  40 mg on 6/18/2017  8:21 AM                                metFORMIN 500 MG tablet   Commonly known as:  GLUCOPHAGE   Take 1 tablet (500 mg) by mouth 2 times daily (with meals)                                nicotine 10 MG Inhaler   Commonly known as:  NICOTROL   Inhale 2 cartridge into the lungs daily as needed for smoking cessation                                omeprazole 20 MG CR capsule   Commonly known as:  priLOSEC   Take 20 mg by mouth daily   Last time this was given:  20 mg on 6/17/2017  8:11 PM                                predniSONE 20 MG tablet   Commonly known as:  DELTASONE   Take 1.5 tabs (30 mg) daily x 3 days, 1 tab (20 mg) daily x 3 days, then 1/2 tab (10 mg) x 3 days.                                pregabalin 150 MG capsule   Commonly known as:  LYRICA   Take 1 capsule (150 mg) by mouth 3 times daily   Last time this was given:  150 mg on 6/18/2017  8:21 AM                                traMADol 50 MG tablet   Commonly known as:  ULTRAM   Take 2 tablets (100 mg) by mouth 3 times daily May take 5 per day   Last time this was given:  100 mg on 6/18/2017  8:21 AM                                traZODone 100 MG tablet   Commonly known as:  DESYREL   Take 2 tablets (200 mg) by mouth At Bedtime   Last time this was given:  100 mg on 6/17/2017  8:12 PM                                verapamil 180 MG CR tablet   Commonly known as:  CALAN-SR   Take 1 tablet (180 mg) by mouth At Bedtime   Last time this was given:  180 mg on 6/18/2017  8:21 AM

## 2017-06-17 ENCOUNTER — APPOINTMENT (OUTPATIENT)
Dept: PHYSICAL THERAPY | Facility: CLINIC | Age: 67
DRG: 190 | End: 2017-06-17
Attending: INTERNAL MEDICINE
Payer: MEDICARE

## 2017-06-17 ENCOUNTER — APPOINTMENT (OUTPATIENT)
Dept: CARDIOLOGY | Facility: CLINIC | Age: 67
DRG: 190 | End: 2017-06-17
Attending: INTERNAL MEDICINE
Payer: MEDICARE

## 2017-06-17 LAB
ANION GAP SERPL CALCULATED.3IONS-SCNC: 4 MMOL/L (ref 3–14)
BASOPHILS # BLD AUTO: 0 10E9/L (ref 0–0.2)
BASOPHILS NFR BLD AUTO: 0.1 %
BUN SERPL-MCNC: 13 MG/DL (ref 7–30)
CALCIUM SERPL-MCNC: 9.2 MG/DL (ref 8.5–10.1)
CHLORIDE SERPL-SCNC: 109 MMOL/L (ref 94–109)
CO2 SERPL-SCNC: 28 MMOL/L (ref 20–32)
CREAT SERPL-MCNC: 0.5 MG/DL (ref 0.52–1.04)
DIFFERENTIAL METHOD BLD: NORMAL
EOSINOPHIL # BLD AUTO: 0 10E9/L (ref 0–0.7)
EOSINOPHIL NFR BLD AUTO: 0 %
ERYTHROCYTE [DISTWIDTH] IN BLOOD BY AUTOMATED COUNT: 12.8 % (ref 10–15)
GFR SERPL CREATININE-BSD FRML MDRD: ABNORMAL ML/MIN/1.7M2
GLUCOSE BLDC GLUCOMTR-MCNC: 165 MG/DL (ref 70–99)
GLUCOSE BLDC GLUCOMTR-MCNC: 187 MG/DL (ref 70–99)
GLUCOSE BLDC GLUCOMTR-MCNC: 244 MG/DL (ref 70–99)
GLUCOSE BLDC GLUCOMTR-MCNC: 257 MG/DL (ref 70–99)
GLUCOSE BLDC GLUCOMTR-MCNC: 288 MG/DL (ref 70–99)
GLUCOSE BLDC GLUCOMTR-MCNC: 296 MG/DL (ref 70–99)
GLUCOSE SERPL-MCNC: 162 MG/DL (ref 70–99)
HBA1C MFR BLD: 6.8 % (ref 4.3–6)
HCT VFR BLD AUTO: 38.1 % (ref 35–47)
HGB BLD-MCNC: 12.6 G/DL (ref 11.7–15.7)
IMM GRANULOCYTES # BLD: 0.1 10E9/L (ref 0–0.4)
IMM GRANULOCYTES NFR BLD: 0.7 %
LYMPHOCYTES # BLD AUTO: 1.7 10E9/L (ref 0.8–5.3)
LYMPHOCYTES NFR BLD AUTO: 17 %
MAGNESIUM SERPL-MCNC: 1.8 MG/DL (ref 1.6–2.3)
MCH RBC QN AUTO: 29.3 PG (ref 26.5–33)
MCHC RBC AUTO-ENTMCNC: 33.1 G/DL (ref 31.5–36.5)
MCV RBC AUTO: 89 FL (ref 78–100)
MONOCYTES # BLD AUTO: 0.4 10E9/L (ref 0–1.3)
MONOCYTES NFR BLD AUTO: 4.5 %
NEUTROPHILS # BLD AUTO: 7.6 10E9/L (ref 1.6–8.3)
NEUTROPHILS NFR BLD AUTO: 77.7 %
NRBC # BLD AUTO: 0 10*3/UL
NRBC BLD AUTO-RTO: 0 /100
PLATELET # BLD AUTO: 243 10E9/L (ref 150–450)
POTASSIUM SERPL-SCNC: 4.9 MMOL/L (ref 3.4–5.3)
RBC # BLD AUTO: 4.3 10E12/L (ref 3.8–5.2)
SODIUM SERPL-SCNC: 141 MMOL/L (ref 133–144)
TROPONIN I SERPL-MCNC: NORMAL UG/L (ref 0–0.04)
WBC # BLD AUTO: 9.8 10E9/L (ref 4–11)

## 2017-06-17 PROCEDURE — 25000132 ZZH RX MED GY IP 250 OP 250 PS 637: Mod: GY | Performed by: INTERNAL MEDICINE

## 2017-06-17 PROCEDURE — 12000007 ZZH R&B INTERMEDIATE

## 2017-06-17 PROCEDURE — 97110 THERAPEUTIC EXERCISES: CPT | Mod: GP

## 2017-06-17 PROCEDURE — 93306 TTE W/DOPPLER COMPLETE: CPT

## 2017-06-17 PROCEDURE — 85025 COMPLETE CBC W/AUTO DIFF WBC: CPT | Performed by: INTERNAL MEDICINE

## 2017-06-17 PROCEDURE — 80048 BASIC METABOLIC PNL TOTAL CA: CPT | Performed by: INTERNAL MEDICINE

## 2017-06-17 PROCEDURE — 97161 PT EVAL LOW COMPLEX 20 MIN: CPT | Mod: GP

## 2017-06-17 PROCEDURE — 25000125 ZZHC RX 250: Performed by: INTERNAL MEDICINE

## 2017-06-17 PROCEDURE — 40000193 ZZH STATISTIC PT WARD VISIT

## 2017-06-17 PROCEDURE — 25000128 H RX IP 250 OP 636: Performed by: INTERNAL MEDICINE

## 2017-06-17 PROCEDURE — 99233 SBSQ HOSP IP/OBS HIGH 50: CPT | Performed by: INTERNAL MEDICINE

## 2017-06-17 PROCEDURE — A9270 NON-COVERED ITEM OR SERVICE: HCPCS | Mod: GY | Performed by: INTERNAL MEDICINE

## 2017-06-17 PROCEDURE — 83036 HEMOGLOBIN GLYCOSYLATED A1C: CPT | Performed by: INTERNAL MEDICINE

## 2017-06-17 PROCEDURE — 40000275 ZZH STATISTIC RCP TIME EA 10 MIN

## 2017-06-17 PROCEDURE — 94640 AIRWAY INHALATION TREATMENT: CPT | Mod: 76

## 2017-06-17 PROCEDURE — 94640 AIRWAY INHALATION TREATMENT: CPT

## 2017-06-17 PROCEDURE — 93306 TTE W/DOPPLER COMPLETE: CPT | Mod: 26 | Performed by: INTERNAL MEDICINE

## 2017-06-17 PROCEDURE — 97116 GAIT TRAINING THERAPY: CPT | Mod: GP

## 2017-06-17 PROCEDURE — 00000146 ZZHCL STATISTIC GLUCOSE BY METER IP

## 2017-06-17 PROCEDURE — 36415 COLL VENOUS BLD VENIPUNCTURE: CPT | Performed by: INTERNAL MEDICINE

## 2017-06-17 PROCEDURE — 83735 ASSAY OF MAGNESIUM: CPT | Performed by: INTERNAL MEDICINE

## 2017-06-17 RX ORDER — MICONAZOLE NITRATE 20 MG/G
CREAM TOPICAL 2 TIMES DAILY
Status: DISCONTINUED | OUTPATIENT
Start: 2017-06-17 | End: 2017-06-18 | Stop reason: HOSPADM

## 2017-06-17 RX ORDER — CALCIUM CARBONATE 500 MG/1
500 TABLET, CHEWABLE ORAL 3 TIMES DAILY PRN
Status: DISCONTINUED | OUTPATIENT
Start: 2017-06-17 | End: 2017-06-18 | Stop reason: HOSPADM

## 2017-06-17 RX ADMIN — IPRATROPIUM BROMIDE AND ALBUTEROL SULFATE 3 ML: .5; 3 SOLUTION RESPIRATORY (INHALATION) at 11:23

## 2017-06-17 RX ADMIN — IPRATROPIUM BROMIDE AND ALBUTEROL SULFATE 3 ML: .5; 3 SOLUTION RESPIRATORY (INHALATION) at 19:48

## 2017-06-17 RX ADMIN — TRAMADOL HYDROCHLORIDE 100 MG: 50 TABLET, COATED ORAL at 20:21

## 2017-06-17 RX ADMIN — DULOXETINE 60 MG: 30 CAPSULE, DELAYED RELEASE ORAL at 20:12

## 2017-06-17 RX ADMIN — VERAPAMIL HYDROCHLORIDE 180 MG: 180 TABLET, FILM COATED, EXTENDED RELEASE ORAL at 08:09

## 2017-06-17 RX ADMIN — TRAMADOL HYDROCHLORIDE 100 MG: 50 TABLET, COATED ORAL at 15:37

## 2017-06-17 RX ADMIN — METHYLPREDNISOLONE SODIUM SUCCINATE 40 MG: 40 INJECTION, POWDER, FOR SOLUTION INTRAMUSCULAR; INTRAVENOUS at 20:11

## 2017-06-17 RX ADMIN — INSULIN ASPART 2 UNITS: 100 INJECTION, SOLUTION INTRAVENOUS; SUBCUTANEOUS at 21:20

## 2017-06-17 RX ADMIN — OMEPRAZOLE 20 MG: 20 CAPSULE, DELAYED RELEASE ORAL at 20:11

## 2017-06-17 RX ADMIN — IPRATROPIUM BROMIDE AND ALBUTEROL SULFATE 3 ML: .5; 3 SOLUTION RESPIRATORY (INHALATION) at 15:18

## 2017-06-17 RX ADMIN — ATORVASTATIN CALCIUM 40 MG: 40 TABLET, FILM COATED ORAL at 08:09

## 2017-06-17 RX ADMIN — METHYLPREDNISOLONE SODIUM SUCCINATE 40 MG: 40 INJECTION, POWDER, FOR SOLUTION INTRAMUSCULAR; INTRAVENOUS at 08:09

## 2017-06-17 RX ADMIN — CALCIUM CARBONATE (ANTACID) CHEW TAB 500 MG 500 MG: 500 CHEW TAB at 02:56

## 2017-06-17 RX ADMIN — IPRATROPIUM BROMIDE AND ALBUTEROL SULFATE 3 ML: .5; 3 SOLUTION RESPIRATORY (INHALATION) at 07:12

## 2017-06-17 RX ADMIN — TRAMADOL HYDROCHLORIDE 50 MG: 50 TABLET, COATED ORAL at 08:09

## 2017-06-17 RX ADMIN — PREGABALIN 150 MG: 75 CAPSULE ORAL at 20:11

## 2017-06-17 RX ADMIN — PREGABALIN 150 MG: 75 CAPSULE ORAL at 15:37

## 2017-06-17 RX ADMIN — POTASSIUM CHLORIDE 20 MEQ: 1500 TABLET, EXTENDED RELEASE ORAL at 01:57

## 2017-06-17 RX ADMIN — TRAZODONE HYDROCHLORIDE 100 MG: 100 TABLET ORAL at 20:12

## 2017-06-17 RX ADMIN — PREGABALIN 150 MG: 75 CAPSULE ORAL at 08:09

## 2017-06-17 RX ADMIN — LISINOPRIL 40 MG: 40 TABLET ORAL at 08:09

## 2017-06-17 NOTE — DISCHARGE INSTRUCTIONS
Your hospital follow up appointment has been scheduled for you at [facility] with Dr. Van for Monday 6/26/17 at 1:20pm. Please call the clinic at [phone] if you need to reschedule.

## 2017-06-17 NOTE — CONSULTS
Care Transition Initial Assessment -   Reason For Consult: discharge planning  Met with: PATIENT and her cousin  Active Problems:    COPD exacerbation (H)         DATA  Lives With: alone  Living Arrangements: apartment     Who is your support system?: Other (specify): Cousin   Support Assessment: Adequate family and caregiver support.   Identified issues/concerns regarding health management: Pt will need PT at d/c.      Quality Of Family Relationships: supportive, helpful, involved  Transportation Available: car, family or friend will provide      ASSESSMENT  Pt has alert and oriented. Pr had a female cousin guest, Dai. There was also a young child sitting in the bed reading. SW explained the d/c plan. Pt prefers to have in home PT rather than outpatient PT, as she was unsteady during the past PT session.  Pt's PCP is through [place] and pt is agreeable to [facility]. Pt has a walker and a shower chair at home.   Pt will arrange her transportation at d/c.      PLAN  Patient anticipates discharging to:  Home with [facility].   Discharge Planner        Entered by: Eden Padgett 06/17/2017 2:08 PM

## 2017-06-17 NOTE — PLAN OF CARE
Problem: Individualization  Goal: Patient Preferences  Outcome: Improving  Denies shortness of breath. Lungs diminished. Up with stand by assist. Patient states after working with PT her legs felt weak. Tele SR with 1st degree AVB.

## 2017-06-17 NOTE — PROGRESS NOTES
"[facility] RCAT     Date:6/16/2017  Admission Dx:COPD Exac  Pulmonary History:COPD  Home Nebulizer/MDI Use:Alb mdi prn  Home Oxygen:No  Acuity Level (RCAT flow sheet):3  Aerosol Therapy initiated:Douneb QID, Alb Q2 prn      Pulmonary Hygiene initiated:Deep breath and coughing techniques      Volume Expansion initiated:NA      Current Oxygen Requirements:RA currently, Goes on/off 1L  Current SpO2:97%    Re-evaluation date:6/19/2017    Patient Education:Education performed with patient in regards to indications/benefits of bronchodilators. Will continue to educate with patient as needed.         See \"RT Assessments\" flow sheet for patient assessment scoring and Acuity Level Details.             "

## 2017-06-17 NOTE — PLAN OF CARE
Problem: Goal Outcome Summary  Goal: Goal Outcome Summary  Outcome: No Change  Pt. A&O. VSS, afebrile, O2 on at 1 lpm. Transfers with SBA, denied dizziness during transfer. . Complaint of heartburn, PRN tums administered x1 with effective results. Skin assessment complete; moist, reddened area under bilateral breast noted, Micantin BID ordered. Receiving IV solu-medrol. NS infusing at 75 ml/hr. Potassium replaced, re-check in AM with labs. Plan for ECHO today.

## 2017-06-17 NOTE — H&P
[facility]    History and Physical  Hospitalist    Name: Vijaya Manjarrez    MRN: [id number]  YOB: 1950    Age: 67 year old  Primary care provider: Disha Van       Date of Admission:  6/16/2017  Date of Service (when I saw the patient): 06/16/17    Assessment & Plan   Vijaya Manjarrez is a 67 year old female who presents with episode of syncope. She reports similar episodes of passing out in recent past. Also found to be in COPD exacerbation on arrival to ER and being admitted for further evaluation.     1. Acute COPD exacerbation - patient was noted to be wheezy and hypoxic on arrival to the ER.  She was recently treated for COPD flare up as an outpatient.  At this time I will continue her on IV Solu-Medrol 40 mg b.i.d., scheduled DuoNeb nebulizers.  No sign of infection at this time.  Her lungs sound better on my exam and anticipate the steroids can be quickly tapered down in coming days.    2.  Syncope.  Patient is a poor historian and somewhat unclear as to exact sequence of events.  Obvious concern would be for hypoxemia due to COPD causing her to pass out.  Extensive evaluation otherwise has been negative so far.  CT chest is negative for PE.  EKG does not show any acute ischemic changes.  Initial troponin is negative.  No signs of hypoglycemia.  She was not orthostatic in the ER.    -Monitor on telemetry to evaluate for arrhythmia.  She is on verapamil and we will watch her for bradycardia.  -Obtain CT head without contrast  -Serial troponin.  Echocardiogram tomorrow.   -One other concern would be for polypharmacy contributing to this.  She is on a number of pain medications including Lyrica, Flexeril, tramadol, Cymbalta.  However she states that she has tolerated his medications fine for a long time.  She doesn't appear to be oversedated.  For now I will resume most of her medications at baseline dose and watch her mentation, hold the Flexeril and antihistamine for now.     -check CK levels.  Gentle hydration    3.  Recurrent falls.  The one episode from yesterday sounds like syncope although the previous episodes sound more like mechanical falls when her knee give out.  Obtain PT evaluation    4.  Chronic pain and fibromyalgia.  Management as discussed above.  She is following with sports medicine.    5.  Hypertension.  Resume her lisinopril and verapamil with hold parameters.    6.  Type 2 diabetes mellitus.  Recent A1c of below 7.  Hold metformin due to contrast exposure.  Sliding-scale insulin.    7.  Hypokalemia.  Replace potassium per protocol.    DVT Prophylaxis: Pneumatic Compression Devices  Code Status: Full Code    Disposition: Expected discharge in 2 days once breathing improved, no more hypoxemia, workup of syncope completed.  PT evaluation done.    Plan of care was discussed with the patient in great detail. All of the questions were answered extensively. Patient is comfortable with the plan and agrees with it.     Soledad Hamilton    Chief Complaint   Episode of passing out    History is obtained from the patient. Case also discussed with ER provider Dr. Chang.     History of Present Illness   Vijaya Manjarrez is a 67 year old female who presents with concerns for syncope.  The patient is a very vague historian and unable to give accurate timeline of events.  From what she describes this happened at some point yesterday afternoon, when she passed out.  She does not member this very well but doesn't recall having any trauma or hitting her head or having any chest pain associated with that.  She states that she woke up in her chair at about 3 AM today morning.  She is not sure if she crawled onto the chair some point in the evening or exactly how she got there.  She denies taking any extra medications.  She has been feeling some tightness in her chest since last night.  No active chest pain at this time.  Similar episodes have happened in the recent past,  however in those episodes she would remember when she collapses.  This seems to be her third episode of fall.  She has pain in her back, shoulders consistent with her chronic pain.  No reports of any nausea, vomiting. No cough, she thinks she might have had a fever. She has just been weak overall recently.    On arrival to the ER patient was noticed to be hypoxic per ER provider's report.  She was placed on supplemental oxygen.  She was also wheezy with tight lung sounds and was given nebs and steroids.  On my exam she is doing better now.  No other complaints are voiced.    Past Medical History    I have reviewed this patient's medical history and updated it with pertinent information if needed.   Past Medical History:   Diagnosis Date     Anxiety      Cervical spine degeneration 2016    spinal stenosis,      COPD (chronic obstructive pulmonary disease) (H) 2012    mild     Diabetes mellitus, type 2 (H)      Diabetic neuropathy (H)      Facet arthropathy, lumbar (H)      GERD (gastroesophageal reflux disease)      HTN (hypertension)      Hyperlipidemia      Lumbar degenerative disc disease      Migraine headache        Past Surgical History   I have reviewed this patient's surgical history and updated it with pertinent information if needed.  Past Surgical History:   Procedure Laterality Date     CHOLECYSTECTOMY, LAPOROSCOPIC      Cholecystectomy, Laparoscopic       Prior to Admission Medications   Prior to Admission Medications   Prescriptions Last Dose Informant Patient Reported? Taking?   DULoxetine (CYMBALTA) 60 MG EC capsule 6/15/2017 at pm  Yes Yes   Sig: Take 60 mg by mouth daily   albuterol (PROAIR HFA/PROVENTIL HFA/VENTOLIN HFA) 108 (90 BASE) MCG/ACT Inhaler 6/14/2017  No No   Sig: Inhale 2 puffs into the lungs every 6 hours as needed for shortness of breath / dyspnea or wheezing   atorvastatin (LIPITOR) 40 MG tablet 6/15/2017 at Unknown time  No Yes   Sig: Take 1 tablet (40 mg) by mouth daily   cetirizine  (ZYRTEC) 10 MG tablet 6/15/2017 at pm  No Yes   Sig: Take 1 tablet (10 mg) by mouth daily   cyclobenzaprine (FLEXERIL) 10 MG tablet 6/15/2017 at am  Yes Yes   Sig: Take 10 mg by mouth 2 times daily as needed for muscle spasms   lisinopril (PRINIVIL/ZESTRIL) 40 MG tablet 6/16/2017 at am  Yes Yes   Sig: Take 1 tablet (40 mg) by mouth daily   metFORMIN (GLUCOPHAGE) 500 MG tablet 6/15/2017 at am  No Yes   Sig: Take 1 tablet (500 mg) by mouth 2 times daily (with meals)   omeprazole (PRILOSEC) 20 MG CR capsule 6/15/2017 at pm  Yes Yes   Sig: Take 20 mg by mouth daily   pregabalin (LYRICA) 150 MG capsule 6/16/2017 at 1 dose  No Yes   Sig: Take 1 capsule (150 mg) by mouth 3 times daily   traMADol (ULTRAM) 50 MG tablet 6/16/2017 at 1 dose  No Yes   Sig: Take 2 tablets (100 mg) by mouth 3 times daily May take 5 per day   traZODone (DESYREL) 100 MG tablet 6/15/2017 at hs  No Yes   Sig: Take 2 tablets (200 mg) by mouth At Bedtime   verapamil (CALAN-SR) 180 MG CR tablet 6/16/2017 at am  No Yes   Sig: Take 1 tablet (180 mg) by mouth At Bedtime      Facility-Administered Medications: None     Allergies   Allergies   Allergen Reactions     Penicillins Hives       Social History   I have reviewed this patient's social history and updated it with pertinent information if needed. Vijaya CARLY Manjarrez  reports that she has quit smoking. Her smoking use included Cigarettes. She does not have any smokeless tobacco history on file. She reports that she does not drink alcohol or use illicit drugs.    Family History   I have reviewed this patient's family history and updated it with pertinent information if needed.   Family History   Problem Relation Age of Onset     CANCER Mother      CANCER Father      Lung cancer     CANCER Maternal Grandmother      Breast cancer       Review of Systems   The 10 point Review of Systems is negative other than noted in the HPI or here.     Physical Exam   Temp: 97.7  F (36.5  C) Temp src: Temporal BP:  117/83 Pulse: 90 Heart Rate: 78 Resp: 20 SpO2: 98 % O2 Device: None (Room air)    Vital Signs with Ranges  Temp:  [97.7  F (36.5  C)] 97.7  F (36.5  C)  Pulse:  [87-90] 90  Heart Rate:  [72-83] 78  Resp:  [20] 20  BP: (106-137)/(62-94) 117/83  SpO2:  [91 %-100 %] 98 %  210 lbs 0 oz    GENERAL:  Awake and alert, Comfortable appearing, No acute distress.  PSYCH: Pleasant, Cooperative, no hallucinations   EYES: EOMI, conjunctiva clear  HEENT:  Head is normocephalic, atraumatic, Neck is Supple, trachea is midline   CARDIOVASCULAR: Regular rate and rhythm, Normal S1, S2, no loud murmurs, no rubs or gallops.   PULMONARY:  Decreased air entry bilaterally, no marked wheezing or crackles noted   CHEST: Good inspiratory effort bilaterally   GI: Abdomen is soft, non tender, non-distended, no masses palpated, normal bowel sounds. No rebound or guarding   SKIN:  Dry, warm to touch. No obvious exanthems on exposed areas  EXTREMITIES:  Good capillary refill with signs of adequate peripheral perfusion. No peripheral edema   Neuro: Awake and oriented x 3. No focal weakness or numbness is noted. Moving all extremities with good strength, Grossly non-focal.   MSK: Good range of motion in all major joints of upper and lower extremity, No acute joint synovitis of the major joints is noted.     Data   Data reviewed today:  I personally reviewed the EKG tracing showing Normal sinus rythm, no acute ischemic changes.    All lab data and imaging results from today have been reviewed.       Recent Labs  Lab 06/16/17  1335   WBC 7.3   HGB 13.4   MCV 91         POTASSIUM 3.3*   CHLORIDE 102   CO2 32   BUN 23   CR 0.76   ANIONGAP 4   RADHA 9.0   *   ALBUMIN 3.8   PROTTOTAL 7.7   BILITOTAL 0.4   ALKPHOS 101   ALT 31   AST 19   TROPI <0.015The 99th percentile for upper reference range is 0.045 ug/L.  Troponin values in the range of 0.045 - 0.120 ug/L may be associated with risks of adverse clinical events.       Recent Results  (from the past 24 hour(s))   CT Chest Pulmonary Embolism w Contrast    Narrative    Exam: CT CHEST PULMONARY EMBOLISM W CONTRAST 6/16/2017 5:20 PM    Comparison: None     Clinical History: Chest pain, dyspnea and syncope.    Technique: Volumetric helical acquisition of the chest were obtained  following pulmonary embolism protocol. Three-dimensional (3D)  post-processed angiographic images were reconstructed, archived to  PACS and used in interpretation of this study. Radiation dose for this  scan was reduced using automated exposure control, adjustment of the  mA and/or kV according to patient size, or iterative reconstruction  technique.    Contrast: 81mL Isovue-370    Findings:  Contrast bolus timing is adequate for evaluation for pulmonary emboli.  There is no evidence of pulmonary emboli.  There is no evidence of  right heart strain.     The visualized portion of the lungs are clear.  No evidence of pleural  effusion is noted.    The heart size and great vessels are normal in caliber. No evidence of  axillary, mediastinal or hilar lymphadenopathy is seen.     Upper abdomen: Evaluation of the upper abdomen is limited by contrast  bolus timing and coverage. Gallbladder is surgically absent.    Bones: No concerning lytic or sclerotic lesions.      Impression    Impression: No evidence of pulmonary embolus or other findings to  suggest an etiology of the patient's symptoms.         KATINA SANTOS

## 2017-06-17 NOTE — PLAN OF CARE
Problem: Goal Outcome Summary  Goal: Goal Outcome Summary  PT orders received and eval complete. Pt presented to ED after sycnope event resulting in a fall and admitted with COPD exacerbation for further work-up.      Discharge Planner PT   Patient plan for discharge: home  Current status: SBA for bed mobility and transfers, CGA with FWW for gait d/t mild instability - limited to ~100ft d/t fatigue  Barriers to return to prior living situation: none  Recommendations for discharge: home with home PT vs outpatient PT  Rationale for recommendations: Pt scoring at falls risk on TUG - recommend use of 4WW for all mobility; pt in agreement. Pt is safe for return home with use 4WW; recommend home vs OP PT for strength, gait and balance training to optimize safety and independence with mobility.        Entered by: Cynthia Adan 06/17/2017 12:08 PM

## 2017-06-17 NOTE — PROGRESS NOTES
[facility]    Hospitalist Progress Note    Date of Service (when I saw the patient): 06/17/2017    Assessment & Plan   Vijaya Manjarrez is a 67 year old female who presents with episode of syncope. She reports similar episodes of passing out in recent past. Also found to be in COPD exacerbation on arrival to ER and being admitted for further evaluation.      1. Acute COPD exacerbation - symptoms improving.   --Will continue  IV Solu-Medrol 40 mg b.i.d., scheduled DuoNeb nebulizers.  No sign of infection at this time.     2.  Syncope. Patient is a poor historian and somewhat unclear as to exact sequence of events. Possible hypoxia versus medications. Denies recent medication change.  --CT head without contrast negative for acute abnormality. Telemetry didn't show evidence of arrhythmias.   -Echo showed hyperdynamic left ventricular function. The visual ejection fraction is estimated at >70%.  --Will continue to monitor on telemetry     3.  Recurrent falls.  Obtain PT evaluation     4.  Chronic pain and fibromyalgia. She is following with sports medicine.     5.  Hypertension. Will continue  lisinopril and verapamil with hold parameters.     6.  Type 2 diabetes mellitus.  Recent A1c of below 7.  Hold metformin due to contrast exposure.  Sliding-scale insulin.     7.  Hypokalemia. Replace potassium per protocol.     DVT Prophylaxis: Pneumatic Compression Devices  Code Status: Full Code     Disposition:anticipate d/c in 1-2 days if her breathing improves and vitals stable.    Code Status: Full Code      Gonzalo Rojo MD    Interval History   Patient claims that her breathing is getting better. She has no nausea or vomiting. She has no fever    -Data reviewed today: I reviewed all new labs and imaging results over the last 24 hours. I personally reviewed the head CT image(s) showing no acute abnormality.    Physical Exam   Temp: 97.7  F (36.5  C) Temp src: Oral BP: 148/78 Pulse: 90 Heart Rate: 95  Resp: 18 SpO2: 94 % O2 Device: None (Room air) Oxygen Delivery: 1 LPM  Vitals:    06/16/17 1332 06/16/17 1911   Weight: 95.3 kg (210 lb) 93 kg (205 lb)     Vital Signs with Ranges  Temp:  [97.4  F (36.3  C)-98.9  F (37.2  C)] 97.7  F (36.5  C)  Pulse:  [87-90] 90  Heart Rate:  [64-95] 95  Resp:  [16-20] 18  BP: (106-166)/(62-94) 148/78  SpO2:  [91 %-100 %] 94 %     GEN:  Alert, oriented x 3, appears comfortable, NAD.  HEENT:  Normocephalic/atraumatic, no scleral icterus, no nasal discharge, mouth moist.  CV:  Regular rate and rhythm, no murmur or JVD.  S1 + S2 noted, no S3 or S4.  LUNGS:  Clear to auscultation bilaterally without rales/rhonchi/wheezing/retractions.  Symmetric chest rise on inhalation noted.  ABD:  Active bowel sounds, soft, non-tender/non-distended.  No rebound/guarding/rigidity.  EXT:  No edema or cyanosis.  Hands/feet warm to touch with good signs of peripheral perfusion.  No joint synovitis noted.  SKIN:  Dry to touch, no exanthems noted in the visualized areas.  NEURO:  Symmetric muscle strength, sensation to touch grossly intact.  No new focal deficits appreciated.    Medications        miconazole   Topical BID     sodium chloride (PF)  3 mL Intracatheter Q8H     ipratropium - albuterol 0.5 mg/2.5 mg/3 mL  3 mL Nebulization 4x daily     methylPREDNISolone  40 mg Intravenous BID     insulin aspart  1-7 Units Subcutaneous TID AC     insulin aspart  1-5 Units Subcutaneous At Bedtime     atorvastatin  40 mg Oral Daily     DULoxetine  60 mg Oral Daily     lisinopril  40 mg Oral Daily     omeprazole  20 mg Oral Daily     pregabalin  150 mg Oral TID     traZODone  100 mg Oral At Bedtime     verapamil  180 mg Oral QAM       Data     Recent Labs  Lab 06/17/17  0657 06/16/17  2340 06/16/17  2125 06/16/17  1335   WBC 9.8  --   --  7.3   HGB 12.6  --   --  13.4   MCV 89  --   --  91     --   --  280     --   --  138   POTASSIUM 4.9  --   --  3.3*   CHLORIDE 109  --   --  102   CO2 28  --   --   32   BUN 13  --   --  23   CR 0.50*  --   --  0.76   ANIONGAP 4  --   --  4   RADHA 9.2  --   --  9.0   *  --   --  122*   ALBUMIN  --   --   --  3.8   PROTTOTAL  --   --   --  7.7   BILITOTAL  --   --   --  0.4   ALKPHOS  --   --   --  101   ALT  --   --   --  31   AST  --   --   --  19   TROPI  --  <0.015The 99th percentile for upper reference range is 0.045 ug/L.  Troponin values in the range of 0.045 - 0.120 ug/L may be associated with risks of adverse clinical events. <0.015The 99th percentile for upper reference range is 0.045 ug/L.  Troponin values in the range of 0.045 - 0.120 ug/L may be associated with risks of adverse clinical events. <0.015The 99th percentile for upper reference range is 0.045 ug/L.  Troponin values in the range of 0.045 - 0.120 ug/L may be associated with risks of adverse clinical events.       Recent Results (from the past 24 hour(s))   CT Chest Pulmonary Embolism w Contrast    Narrative    Exam: CT CHEST PULMONARY EMBOLISM W CONTRAST 6/16/2017 5:20 PM    Comparison: None     Clinical History: Chest pain, dyspnea and syncope.    Technique: Volumetric helical acquisition of the chest were obtained  following pulmonary embolism protocol. Three-dimensional (3D)  post-processed angiographic images were reconstructed, archived to  PACS and used in interpretation of this study. Radiation dose for this  scan was reduced using automated exposure control, adjustment of the  mA and/or kV according to patient size, or iterative reconstruction  technique.    Contrast: 81mL Isovue-370    Findings:  Contrast bolus timing is adequate for evaluation for pulmonary emboli.  There is no evidence of pulmonary emboli.  There is no evidence of  right heart strain.     The visualized portion of the lungs are clear.  No evidence of pleural  effusion is noted.    The heart size and great vessels are normal in caliber. No evidence of  axillary, mediastinal or hilar lymphadenopathy is seen.     Upper  abdomen: Evaluation of the upper abdomen is limited by contrast  bolus timing and coverage. Gallbladder is surgically absent.    Bones: No concerning lytic or sclerotic lesions.      Impression    Impression: No evidence of pulmonary embolus or other findings to  suggest an etiology of the patient's symptoms.         KATINA TOM   CT Head w/o Contrast    Narrative    CT OF THE HEAD WITHOUT CONTRAST  6/16/2017  7:47 PM     COMPARISON: None.    HISTORY: Syncope.    TECHNIQUE: Axial CT images of the head from the skull base to the  vertex were acquired without IV contrast.    FINDINGS: The ventricles and basal cisterns are within normal limits  in configuration. There is no midline shift. There are no extra-axial  fluid collections. Gray-white differentiation is well maintained.     No intracranial hemorrhage, mass or recent infarct.    The visualized paranasal sinuses are well-aerated. There is no  mastoiditis. There are no fractures of the visualized bones.       Impression    IMPRESSION: Normal head CT.       Radiation dose for this scan was reduced using automated exposure  control, adjustment of the mA and/or kV according to patient size, or  iterative reconstruction technique    MONE JACOBS MD

## 2017-06-17 NOTE — PLAN OF CARE
Problem: COPD, Chronic Bronchitis/Emphysema (Adult)  Goal: Signs and Symptoms of Listed Potential Problems Will be Absent or Manageable (COPD, Chronic Bronchitis/Emphysema)  Signs and symptoms of listed potential problems will be absent or manageable by discharge/transition of care (reference COPD, Chronic Bronchitis/Emphysema (Adult) CPG).  Outcome: No Change    06/16/17 2319   COPD, Chronic Bronchitis/Emphysema   Problems Assessed (COPD, Chronic Bronchitis/Emphysema) all   Problems Present (COPD, Chronic Bronchitis/Emphysema) hypoxia/hypoxemia;functional decline/self-care deficit   [id number]: ED to inpatient hospital admission. Room orientation completed, profile done, CT done, results pending, pt has been on phone frequently since admission to floor. Continue w/current POC.  Agatha Greenberg, RN, BSN  Medical/Telemetry - 3

## 2017-06-18 VITALS
SYSTOLIC BLOOD PRESSURE: 148 MMHG | HEIGHT: 69 IN | RESPIRATION RATE: 18 BRPM | HEART RATE: 92 BPM | TEMPERATURE: 98 F | WEIGHT: 204.2 LBS | BODY MASS INDEX: 30.24 KG/M2 | DIASTOLIC BLOOD PRESSURE: 77 MMHG | OXYGEN SATURATION: 91 %

## 2017-06-18 LAB
ANION GAP SERPL CALCULATED.3IONS-SCNC: 5 MMOL/L (ref 3–14)
BASOPHILS # BLD AUTO: 0 10E9/L (ref 0–0.2)
BASOPHILS NFR BLD AUTO: 0.1 %
BUN SERPL-MCNC: 15 MG/DL (ref 7–30)
CALCIUM SERPL-MCNC: 9 MG/DL (ref 8.5–10.1)
CHLORIDE SERPL-SCNC: 109 MMOL/L (ref 94–109)
CO2 SERPL-SCNC: 27 MMOL/L (ref 20–32)
CREAT SERPL-MCNC: 0.54 MG/DL (ref 0.52–1.04)
DIFFERENTIAL METHOD BLD: ABNORMAL
EOSINOPHIL # BLD AUTO: 0 10E9/L (ref 0–0.7)
EOSINOPHIL NFR BLD AUTO: 0 %
ERYTHROCYTE [DISTWIDTH] IN BLOOD BY AUTOMATED COUNT: 13.2 % (ref 10–15)
GFR SERPL CREATININE-BSD FRML MDRD: ABNORMAL ML/MIN/1.7M2
GLUCOSE BLDC GLUCOMTR-MCNC: 222 MG/DL (ref 70–99)
GLUCOSE BLDC GLUCOMTR-MCNC: 263 MG/DL (ref 70–99)
GLUCOSE SERPL-MCNC: 176 MG/DL (ref 70–99)
HCT VFR BLD AUTO: 36.3 % (ref 35–47)
HGB BLD-MCNC: 12.3 G/DL (ref 11.7–15.7)
IMM GRANULOCYTES # BLD: 0.1 10E9/L (ref 0–0.4)
IMM GRANULOCYTES NFR BLD: 0.7 %
LYMPHOCYTES # BLD AUTO: 2.3 10E9/L (ref 0.8–5.3)
LYMPHOCYTES NFR BLD AUTO: 14.4 %
MCH RBC QN AUTO: 29.9 PG (ref 26.5–33)
MCHC RBC AUTO-ENTMCNC: 33.9 G/DL (ref 31.5–36.5)
MCV RBC AUTO: 88 FL (ref 78–100)
MONOCYTES # BLD AUTO: 0.8 10E9/L (ref 0–1.3)
MONOCYTES NFR BLD AUTO: 5 %
NEUTROPHILS # BLD AUTO: 12.6 10E9/L (ref 1.6–8.3)
NEUTROPHILS NFR BLD AUTO: 79.8 %
NRBC # BLD AUTO: 0 10*3/UL
NRBC BLD AUTO-RTO: 0 /100
PLATELET # BLD AUTO: 251 10E9/L (ref 150–450)
POTASSIUM SERPL-SCNC: 4.3 MMOL/L (ref 3.4–5.3)
RBC # BLD AUTO: 4.12 10E12/L (ref 3.8–5.2)
SODIUM SERPL-SCNC: 141 MMOL/L (ref 133–144)
WBC # BLD AUTO: 15.8 10E9/L (ref 4–11)

## 2017-06-18 PROCEDURE — 94640 AIRWAY INHALATION TREATMENT: CPT

## 2017-06-18 PROCEDURE — 25000125 ZZHC RX 250: Performed by: INTERNAL MEDICINE

## 2017-06-18 PROCEDURE — 25000128 H RX IP 250 OP 636: Performed by: INTERNAL MEDICINE

## 2017-06-18 PROCEDURE — 40000275 ZZH STATISTIC RCP TIME EA 10 MIN

## 2017-06-18 PROCEDURE — 00000146 ZZHCL STATISTIC GLUCOSE BY METER IP

## 2017-06-18 PROCEDURE — 36415 COLL VENOUS BLD VENIPUNCTURE: CPT | Performed by: INTERNAL MEDICINE

## 2017-06-18 PROCEDURE — 99239 HOSP IP/OBS DSCHRG MGMT >30: CPT | Performed by: INTERNAL MEDICINE

## 2017-06-18 PROCEDURE — A9270 NON-COVERED ITEM OR SERVICE: HCPCS | Mod: GY | Performed by: INTERNAL MEDICINE

## 2017-06-18 PROCEDURE — 25000132 ZZH RX MED GY IP 250 OP 250 PS 637: Mod: GY | Performed by: INTERNAL MEDICINE

## 2017-06-18 PROCEDURE — 85025 COMPLETE CBC W/AUTO DIFF WBC: CPT | Performed by: INTERNAL MEDICINE

## 2017-06-18 PROCEDURE — 80048 BASIC METABOLIC PNL TOTAL CA: CPT | Performed by: INTERNAL MEDICINE

## 2017-06-18 RX ORDER — ALBUTEROL SULFATE 90 UG/1
2 AEROSOL, METERED RESPIRATORY (INHALATION) EVERY 4 HOURS PRN
Qty: 1 INHALER | Refills: 0 | Status: SHIPPED | OUTPATIENT
Start: 2017-06-18 | End: 2019-11-13 | Stop reason: DRUGHIGH

## 2017-06-18 RX ORDER — PREDNISONE 20 MG/1
TABLET ORAL
Qty: 10 TABLET | Refills: 0 | Status: ON HOLD | OUTPATIENT
Start: 2017-06-18 | End: 2017-07-03

## 2017-06-18 RX ADMIN — METHYLPREDNISOLONE SODIUM SUCCINATE 40 MG: 40 INJECTION, POWDER, FOR SOLUTION INTRAMUSCULAR; INTRAVENOUS at 08:21

## 2017-06-18 RX ADMIN — LISINOPRIL 40 MG: 40 TABLET ORAL at 08:21

## 2017-06-18 RX ADMIN — PREGABALIN 150 MG: 75 CAPSULE ORAL at 08:21

## 2017-06-18 RX ADMIN — VERAPAMIL HYDROCHLORIDE 180 MG: 180 TABLET, FILM COATED, EXTENDED RELEASE ORAL at 08:21

## 2017-06-18 RX ADMIN — IPRATROPIUM BROMIDE AND ALBUTEROL SULFATE 3 ML: .5; 3 SOLUTION RESPIRATORY (INHALATION) at 07:18

## 2017-06-18 RX ADMIN — ATORVASTATIN CALCIUM 40 MG: 40 TABLET, FILM COATED ORAL at 08:21

## 2017-06-18 RX ADMIN — TRAMADOL HYDROCHLORIDE 100 MG: 50 TABLET, COATED ORAL at 08:21

## 2017-06-18 NOTE — PLAN OF CARE
Problem: Goal Outcome Summary  Goal: Goal Outcome Summary     PT:  Noted plan for d/c home today with home PT orders.     Physical Therapy Discharge Summary     Reason for therapy discharge:    Discharged to home with home therapy.     Progress towards therapy goal(s). See goals on Care Plan in [facility] electronic health record for goal details.  Goals partially met.  Barriers to achieving goals:   discharge from facility.  Per PT notes on 6/17:  Pt scoring at falls risk on TUG - recommend use of 4WW for all mobility; pt in agreement. Pt is safe for return home with use 4WW.     Therapy recommendation(s):    Continued therapy is recommended.  Rationale/Recommendations:  Home vs OP PT to improve strength, gait stability, and balance training.  Per notes, pt prefers home PT..     **Pt not seen by discharging therapist on this date, note written based on previous treating therapist's notes and recommendations.

## 2017-06-18 NOTE — PLAN OF CARE
Problem: COPD, Chronic Bronchitis/Emphysema (Adult)  Goal: Signs and Symptoms of Listed Potential Problems Will be Absent or Manageable (COPD, Chronic Bronchitis/Emphysema)  Signs and symptoms of listed potential problems will be absent or manageable by discharge/transition of care (reference COPD, Chronic Bronchitis/Emphysema (Adult) CPG).   Outcome: Improving  VSS, afeb., denies pain. LS are dim, 95% on RA. Pt up w/ sba x1 in room. Ambulated w/ PT in halls, and c/o sob.  Cont on IV Solumedrol and nebs. BG's were 244 and 296. Tele is SR.

## 2017-06-18 NOTE — PROGRESS NOTES
Reviewed discharge instructions with patient, patient verbalized understanding. Patient understands discharge plan of home PT. Medications filled and with patient at discharge. Demonstrated to patient how to set up nictrol inhaler. Patient is being wheeled down to lobby for discharge. Belongings of clothes, cell phone and cell phone charge are with patient.

## 2017-06-18 NOTE — PLAN OF CARE
Problem: Goal Outcome Summary  Goal: Goal Outcome Summary  Outcome: No Change  Pt. A&O. VSS, afebrile, remains on RA. Transfers with SBA, denied dizziness during transfer. SOB during activity improving. . No complaint of heartburn. Moist, reddened area under bilateral breast noted, Micantin BID. Receiving IV solu-medrol. Slept well throughout night. Plan for d/c 1-2 days.

## 2017-06-18 NOTE — PROGRESS NOTES
D: SWS has been following to coordinate d/c. Pt has a d/c order.   A: SW sent the HC referral to [facility].   P: Pt will d/c home today with [facility].     LISANDRA Davidson  Casual DOLORES [phone]

## 2017-06-18 NOTE — DISCHARGE SUMMARY
[facility]    Discharge Summary  Hospitalist    Date of Admission:  6/16/2017  Date of Discharge:  6/18/2017  1:08 PM  Discharging Provider: Gonzalo Rojo MD  Date of Service (when I saw the patient): 06/18/17    Discharge Diagnoses       1. Acute COPD exacerbation       2.  Syncope. Possible hypoxia versus medications.       3.  Recurrent falls      4.  Chronic pain and fibromyalgia      5.  Hypertension      6.  Type 2 diabetes mellitus      7.  Hypokalemia    History of Present Illness   Vijaya Manjarrez is an 67 year old female who presented with syncopal episode, shortness of breath.     Hospital Course   Vijaya Manjarrez is a 67 year old female who presents with episode of syncope. She reports similar episodes of passing out in recent past. Also found to be in COPD exacerbation on arrival to ER and being admitted for further evaluation.       1. Acute COPD exacerbation : She was put on  IV Solu-Medrol 40 mg b.i.d., scheduled DuoNeb nebulizers.  No sign of infection duri ng this admission.       2.  Syncope. Patient is a poor historian and somewhat unclear as to exact sequence of events. Cause of syncope is likely due to Acute hypoxic Respiratory Failure due to COPD exacerbation vs possible Acute Toxic Encephalopathy due to medications. Denies recent medication change. CT head without contrast was done and it was negative for acute abnormality. Telemetry didn't show evidence of arrhythmias. Echo showed hyperdynamic left ventricular function. The visual ejection fraction is estimated at >70%.      3.  Recurrent falls. She had  PT evaluation during this admission. She was recommended home       4.  Chronic pain and fibromyalgia. She is following with sports medicine.      5.  Hypertension. She was continued on  lisinopril and verapamil with hold parameters.      6.  Type 2 diabetes mellitus.  Recent A1c of below 7.  metformin was initially held due to contrast exposure.  Sliding-scale insulin. Metformin was resumed on discharge. She was advised to follow up with PCP as outpatient.       7.  Hypokalemia. Replaced potassium per protocol.    Patient remained stable during hospital course. She was discharged in a stbable condition.     Significant Results and Procedures   Results for orders placed or performed during the hospital encounter of 06/16/17   CT Chest Pulmonary Embolism w Contrast    Narrative    Exam: CT CHEST PULMONARY EMBOLISM W CONTRAST 6/16/2017 5:20 PM    Comparison: None     Clinical History: Chest pain, dyspnea and syncope.    Technique: Volumetric helical acquisition of the chest were obtained  following pulmonary embolism protocol. Three-dimensional (3D)  post-processed angiographic images were reconstructed, archived to  PACS and used in interpretation of this study. Radiation dose for this  scan was reduced using automated exposure control, adjustment of the  mA and/or kV according to patient size, or iterative reconstruction  technique.    Contrast: 81mL Isovue-370    Findings:  Contrast bolus timing is adequate for evaluation for pulmonary emboli.  There is no evidence of pulmonary emboli.  There is no evidence of  right heart strain.     The visualized portion of the lungs are clear.  No evidence of pleural  effusion is noted.    The heart size and great vessels are normal in caliber. No evidence of  axillary, mediastinal or hilar lymphadenopathy is seen.     Upper abdomen: Evaluation of the upper abdomen is limited by contrast  bolus timing and coverage. Gallbladder is surgically absent.    Bones: No concerning lytic or sclerotic lesions.      Impression    Impression: No evidence of pulmonary embolus or other findings to  suggest an etiology of the patient's symptoms.         KATINA SANTOS   CT Head w/o Contrast    Narrative    CT OF THE HEAD WITHOUT CONTRAST  6/16/2017  7:47 PM     COMPARISON: None.    HISTORY: Syncope.    TECHNIQUE: Axial CT images of the  head from the skull base to the  vertex were acquired without IV contrast.    FINDINGS: The ventricles and basal cisterns are within normal limits  in configuration. There is no midline shift. There are no extra-axial  fluid collections. Gray-white differentiation is well maintained.     No intracranial hemorrhage, mass or recent infarct.    The visualized paranasal sinuses are well-aerated. There is no  mastoiditis. There are no fractures of the visualized bones.       Impression    IMPRESSION: Normal head CT.       Radiation dose for this scan was reduced using automated exposure  control, adjustment of the mA and/or kV according to patient size, or  iterative reconstruction technique    MONE JACOBS MD         Pending Results   None    Code Status   Full Code       Primary Care Physician   Disha Van          Discharge Disposition   Discharged to home  Condition at discharge: Stable    Consultations This Hospital Stay   SOCIAL WORK IP CONSULT  PHYSICAL THERAPY ADULT IP CONSULT  CARE COORDINATOR IP CONSULT    Time Spent on this Encounter   IGonzalo MD, personally saw the patient today and spent greater than 30 minutes discharging this patient.    Discharge Orders     Home Care PT Referral for Hospital Discharge     Reason for your hospital stay   COPD exacerbation     Follow-up and recommended labs and tests    Follow up with primary care provider, Disha Van, within 7 days     Activity   Your activity upon discharge: activity as tolerated     MD face to face encounter   Documentation of Face to Face and Certification for Home Health Services    I certify that patient: Vijaya Manjarrez is under my care and that I, or a nurse practitioner or physician's assistant working with me, had a face-to-face encounter that meets the physician face-to-face encounter requirements with this patient on: 6/18/2017.    This encounter with the patient was in whole, or in part, for the following medical  condition, which is the primary reason for home health care: frequent fall.    I certify that, based on my findings, the following services are medically necessary home health services: Physical Therapy.    My clinical findings support the need for the above services because: Physical Therapy Services are needed to assess and treat the following functional impairments: Frequent falls.    Further, I certify that my clinical findings support that this patient is homebound (i.e. absences from home require considerable and taxing effort and are for medical reasons or Yazdan[employer] services or infrequently or of short duration when for other reasons) because: Patient has frequent falls and needs ongoing evauation.    Based on the above findings. I certify that this patient is confined to the home and needs intermittent skilled nursing care, physical therapy and/or speech therapy.  The patient is under my care, and I have initiated the establishment of the plan of care.  This patient will be followed by a physician who will periodically review the plan of care.  Physician/Provider to provide follow up care: Disha Van    Attending hospital physician (the Medicare certified [place] provider): Gonzalo Rojo MD  Physician Signature: See electronic signature associated with these discharge orders.  Date: 6/18/2017     Diet   Follow this diet upon discharge: Orders Placed This Encounter     Combination Diet Regular Diet Adult; 6112-8598 Calories: Moderate Consistent CHO (4-6 CHO units/meal)       Discharge Medications   Discharge Medication List as of 6/18/2017 12:31 PM      START taking these medications    Details   predniSONE (DELTASONE) 20 MG tablet Take 1.5 tabs (30 mg) daily x 3 days, 1 tab (20 mg) daily x 3 days, then 1/2 tab (10 mg) x 3 days., Disp-10 tablet, R-0, E-Prescribe      nicotine (NICOTROL) 10 MG Inhaler Inhale 2 cartridge into the lungs daily as needed for smoking cessation, Disp-30 each, R-0,  E-Prescribe         CONTINUE these medications which have CHANGED    Details   albuterol (PROAIR HFA/PROVENTIL HFA/VENTOLIN HFA) 108 (90 BASE) MCG/ACT Inhaler Inhale 2 puffs into the lungs every 4 hours as needed for shortness of breath / dyspnea or wheezing, Disp-1 Inhaler, R-0, E-Prescribe         CONTINUE these medications which have NOT CHANGED    Details   lisinopril (PRINIVIL/ZESTRIL) 40 MG tablet Take 1 tablet (40 mg) by mouth daily, Disp-90 tablet, R-1, Historical      traMADol (ULTRAM) 50 MG tablet Take 2 tablets (100 mg) by mouth 3 times daily May take 5 per day, Disp-180 tablet, R-1, Local Print      pregabalin (LYRICA) 150 MG capsule Take 1 capsule (150 mg) by mouth 3 times daily, Disp-270 capsule, R-1, Local Print      traZODone (DESYREL) 100 MG tablet Take 2 tablets (200 mg) by mouth At Bedtime, Disp-60 tablet, R-5, E-Prescribe      cetirizine (ZYRTEC) 10 MG tablet Take 1 tablet (10 mg) by mouth daily, Disp-90 tablet, R-3, E-Prescribe      verapamil (CALAN-SR) 180 MG CR tablet Take 1 tablet (180 mg) by mouth At Bedtime, Disp-90 tablet, R-1, E-Prescribe      atorvastatin (LIPITOR) 40 MG tablet Take 1 tablet (40 mg) by mouth daily, Disp-90 tablet, R-1, E-Prescribe      metFORMIN (GLUCOPHAGE) 500 MG tablet Take 1 tablet (500 mg) by mouth 2 times daily (with meals), Disp-180 tablet, R-1, E-Prescribe      omeprazole (PRILOSEC) 20 MG CR capsule Take 20 mg by mouth daily, Historical      DULoxetine (CYMBALTA) 60 MG EC capsule Take 60 mg by mouth daily, Historical      cyclobenzaprine (FLEXERIL) 10 MG tablet Take 10 mg by mouth 2 times daily as needed for muscle spasms, Historical           Allergies   Allergies   Allergen Reactions     Penicillins Hives     Data   Most Recent 3 CBC's:  Recent Labs   Lab Test  06/18/17   0708  06/17/17   0657  06/16/17   1335   WBC  15.8*  9.8  7.3   HGB  12.3  12.6  13.4   MCV  88  89  91   PLT  251  243  280      Most Recent 3 BMP's:  Recent Labs   Lab Test  06/18/17   0708   06/17/17   0657  06/16/17   1335   NA  141  141  138   POTASSIUM  4.3  4.9  3.3*   CHLORIDE  109  109  102   CO2  27  28  32   BUN  15  13  23   CR  0.54  0.50*  0.76   ANIONGAP  5  4  4   RADHA  9.0  9.2  9.0   GLC  176*  162*  122*     Most Recent 2 LFT's:  Recent Labs   Lab Test  06/16/17   1335  02/20/17   1347   AST  19  18   ALT  31  26   ALKPHOS  101  82   BILITOTAL  0.4  0.4     Most Recent INR's and Anticoagulation Dosing History:  Anticoagulation Dose History     There is no flowsheet data to display.        Most Recent 3 Troponin's:  Recent Labs   Lab Test  06/16/17   2340  06/16/17   2125  06/16/17   1335   TROPI  <0.015  The 99th percentile for upper reference range is 0.045 ug/L.  Troponin values in   the range of 0.045 - 0.120 ug/L may be associated with risks of adverse   clinical events.    <0.015  The 99th percentile for upper reference range is 0.045 ug/L.  Troponin values in   the range of 0.045 - 0.120 ug/L may be associated with risks of adverse   clinical events.    <0.015  The 99th percentile for upper reference range is 0.045 ug/L.  Troponin values in   the range of 0.045 - 0.120 ug/L may be associated with risks of adverse   clinical events.       Most Recent Cholesterol Panel:  Recent Labs   Lab Test  02/20/17   1347   CHOL  147   LDL  58   HDL  36*   TRIG  267*     Most Recent 6 Bacteria Isolates From Any Culture (See EPIC Reports for Culture Details):  Recent Labs   Lab Test  06/16/17   1509  06/16/17   1500   CULT  No growth after 2 days  No growth after 2 days     Most Recent TSH, T4 and A1c Labs:  Recent Labs   Lab Test  06/17/17   0657  02/20/17   1347   TSH   --   1.05   A1C  6.8*  6.7*

## 2017-06-18 NOTE — PROGRESS NOTES
Transition Communication Hand-off for Care Transitions to Next Level of Care Provider    Name: Vijaya Manjarrez  MRN #: [id number]  Primary Care Provider: Disha Van  Primary Care MD Name: Disha Van   Primary Clinic: [facility] [address] [facility] [address]  [facility] [address]  Primary Care Clinic Name: [facility] [phone]  Reason for Hospitalization:  Hypokalemia [E87.6]  Hypoxia [R09.02]  COPD exacerbation (H) [J44.1]  Syncope, unspecified syncope type [R55]  Chest pain, unspecified type [R07.9]  Admit Date/Time: 6/16/2017  1:26 PM  Discharge Date: 6/18/2017  Payor Source: Payor: MEDICARE / Plan: MEDICARE / Product Type: Medicare /     Readmission Assessment Measure (KWAKU) Risk Score/category: Average           Reason for Communication Hand-off Referral: Admission diagnoses: COPD    Discharge Plan: Home with Home care, and new medications.    Review of your medicines         START taking         Dose / Directions     nicotine 10 MG Inhaler   Commonly known as: NICOTROL   Used for: Tobacco use disorder        Dose: 2 cartridge   Inhale 2 cartridge into the lungs daily as needed for smoking cessation   Quantity: 30 each   Refills: 0        predniSONE 20 MG tablet   Commonly known as: DELTASONE   Used for: COPD exacerbation (H)        Take 1.5 tabs (30 mg) daily x 3 days, 1 tab (20 mg) daily x 3 days, then 1/2 tab (10 mg) x 3 days.   Quantity: 10 tablet   Refills: 0           Concern for non-adherence with plan of care:   Y/N-she is new to a quit plan and management of her new dx of COPD, this may be a hard dx to digest support with quit plan and encouragement with education on how to living with COPD could be helpful.    Discharge Needs Assessment:  Needs       Most Recent Value    Equipment Currently Used at Home bath bench, grab bar, raised toilet, walker, rolling    Transportation Available car, family or friend will provide    # of Referrals Placed by CTS Scheduled Follow-up  appointments          Follow-up specialty is recommended: Yes    Follow-up plan:  Future Appointments  Date Time Provider Department Center   6/18/2017 6:00 AM Cynthia Adan, PT [facility]   6/26/2017 1:20 PM Disha Van MD [facility]       Any outstanding tests or procedures:  NO            Key Recommendations:  Pt states she has never been told that she has COPD until this hospital stay. She was just started on an inhaler within that last couple months. Pt does receive meals through [employer] and was working on getting some housekeeping services through [facility]. COPD action plan was reviewed and given to the pt. PT was recommending home care PT vs OP PT.      Chani Murphy    AVS/Discharge Summary is the source of truth; this is a helpful guide for improved communication of patient story

## 2017-06-19 ENCOUNTER — CARE COORDINATION (OUTPATIENT)
Dept: CARE COORDINATION | Facility: CLINIC | Age: 67
End: 2017-06-19

## 2017-06-19 ENCOUNTER — TELEPHONE (OUTPATIENT)
Dept: INTERNAL MEDICINE | Facility: CLINIC | Age: 67
End: 2017-06-19

## 2017-06-19 NOTE — TELEPHONE ENCOUNTER
IP F/U    Date: 06/18/17  Diagnosis: Copd Exacerbation, Falls Frequently  Is patient active in care coordination? No  Was patient in TCU? No    Next 5 appointments (look out 90 days)     Jun 26, 2017  1:20 PM CDT   [facility] with Disha Van MD   [facility]    [address]  [facility] [address]   [phone]

## 2017-06-19 NOTE — LETTER
[facility]  Complex Care Plan  About Me  Patient Name:  Vijaya Braga    YOB: 1950  Age:   67 year old   [place] MRN: [id number] Telephone Information:     Home Phone [phone]   Mobile [phone]       Address:    [address]   [facility] [place] [address] Email address:  [email or web address]      Emergency Contact(s)  Name Relationship Lgl Grd Work Phone Home Phone Mobile Phone   1. RACHEL BRAGA Son  none [phone]           Primary language:  English     needed? No   [place] [employer]:  [phone] op. 1  Other communication barriers: No  Preferred Method of Communication:  Phone  Current living arrangement: I live alone  Mobility Status/ Medical Equipment: Independent  Other information to know about me:    Health Maintenance  Health Maintenance Reviewed: Not assessed    My Access Plan  Medical Emergency 911   Primary Clinic Line [facility]- [phone]   24 Hour Appointment Line [phone] or  [phone] ([phone]) (toll-free)   24 Hour Nurse Line [phone] (toll-free)   Preferred Urgent Care     Preferred Hospital [facility]  [phone]   Preferred Pharmacy [id number] - [place], [place] - [address] [facility] Crisis Line [facility], [phone] or 911     My Care Team Members  Patient Care Team       Relationship Specialty Notifications Start End    Disha Van MD PCP - General Internal Medicine  1/12/17     Phone: [phone] Fax: [phone]         [facility] [address]  [facility] [address]         My Care Plans  Self Management and Treatment Plan  Goals and (Comments)  Goal #1: I am ready to quit smking      20% of goal reached    Goal #2: I should check my blood sugars      0% of goal reached    Action Plans on File: Migraine  Advance Care Plans/Directives Type:        My Medical and Care  Information  Problem List   Patient Active Problem List   Diagnosis     HCD (health care directive)     Type 2 diabetes mellitus with diabetic neuropathy, without long-term current use of insulin (H)     COPD (chronic obstructive pulmonary disease) (H)     Cervical spine degeneration     Facet arthropathy, lumbar (H)     Lumbar degenerative disc disease     Migraine headache     Diabetic neuropathy (H)     Anxiety     GERD (gastroesophageal reflux disease)     Hyperlipidemia LDL goal <100     Fibromyalgia     Benign essential hypertension     Controlled substance agreement signed     Chronic pain syndrome     COPD exacerbation (H)      Current Medications and Allergies:  See printed Medication Report.    Care Coordination Start Date: 06/20/17   Frequency of Care Coordination: Monthly   Form Last Updated: 06/20/2017

## 2017-06-19 NOTE — PROGRESS NOTES
Clinic Care Coordination Contact  [facility]/Voicemail    Referral Source: CTS    Clinical Data: Care Coordinator Outreach    Patient presented to [facility] ED on 6/16/17 with an episode of syncope.  Patient was found to have COPD exacerbation. CT of head was negative. Telemetry showed no evidence of arrhythmias. Echo showed hyperdynamic left ventricular function with an ejection fraction > 70%.    Patient was started on new medications and had a home care referral for PT on discharge.      Outreach to patient attempted x 1.  Left message on voicemail with call back information and requested return call.    Plan:  Care Coordinator will try to reach patient again in 1-2 business days. Patient has a PCP appointment scheduled for 6/26/17.    Jackeline Summers RN, CCM - Care Coordinator     6/19/2017    9:57 AM  [phone]

## 2017-06-20 ENCOUNTER — TELEPHONE (OUTPATIENT)
Dept: INTERNAL MEDICINE | Facility: CLINIC | Age: 67
End: 2017-06-20

## 2017-06-20 NOTE — PROGRESS NOTES
"Clinic Care Coordination Contact  OUTREACH    Referral Information:  Referral Source: CTS  Reason for Contact: Post hospital follow up  Care Conference: No     Universal Utilization:   ED Visits in last year: 1  Hospital visits in last year: 1  Last PCP appointment: 06/08/17  Missed Appointments: 0          Clinical Concerns:    Current Medical Concerns: Patient states she is doing better. She states she is still feeling a little \"tired and worn out.\"  She is looking forward to having some PT. The home care PT is coming this morning.      Denies any episodes of feeling faint or syncope.     Current Behavioral Concerns: Denies concerns      Clinical Pathway Name: COPD    Clinical Pathway: Clinic Care Coordination COPD Assessment    Discharge:      Day of hospital discharge: 6/18/17  What recommendations were made for follow up after your recent hospitalization? Follow up with PCP and home care for PT.  Have the follow up appointments been scheduled? Yes  If not, can I help you set up theses appointments? N/A  Transportation concerns? No  Is there a referral for Pulmonary Rehab? No.   New diagnosis for COPD    Symptom Review:    How have you been feeling now that you are home? Better  Are you having any increased shortness of breath? No  When you cough, are you coughing up anything? Not coughing much and it is not productive at this time.    Do you have a COPD Action Plan?  Yes **send pt a copy  Is the COPD Action Plan on refrigerator or available: No  It is in her discharge information  What COPD Zone currently in:Green  What number would you call if you were in the YELLOW zones: [phone]      Medications:    Were you started on any new medications?  Yes, prednisone and nicotine inhaler   Were any of your previous medications changed? Yes, Proair inhaler  Do you have all of your medications? Yes,   Have you had trouble filling your prescriptions? No  Are you medications effective in controlling your symptoms? " Yes,   Are you currently on Prednisone (* does pt understand the tapering instructions)?  Yes    For patients with DM:     Are you monitoring your blood sugars, if so how are your blood sugars? No. Patient does not have a glucometer  Did you start on insulin in the hospital or has your Insulin dose changed? Patient had insulin in the hospital but did not discharge with insulin.  Medication reconciliation completed? Yes  Was MTM or Diabetic Education referral placed (*Make sure to put transitions as reason for referral)? No    Oxygen/DME: N/A    Review with patient how to use/maintain nebulizers and inhalers: yes    Activity:    How much activity can you do before you are SOB? My normal activities  Have you had to reduce your activities because of dyspnea or other symptoms? No     Diet:    Do you weigh yourself daily? No    Has there been a recent change in your weight? No  Are there any current diet restrictions or changes per discharge instructions? No    Emotions/Lifestyle:    Do you smoke? Yes  If so, how many cigarettes a day are you smoking? I was smoking a pack a day  Would you like to try to quit smoking? I am working on it. I have tried several times before, but feel I can do it this time.       Medication Management:  Patient is independent in medication administration     Functional Status:  Mobility Status: Independent  Equipment Currently Used at Home: bath bench, grab bar, raised toilet, walker, rolling  Transportation: Drives      Psychosocial:  Current living arrangement:: I live alone       Resources and Interventions:  Current Resources: Home Care;          Advanced Care Plans/Directives on file:: No        Goals:   Goal 1 Statement: I am ready to quit smking  Goal 1 Progression Percent: 20%  Goal 1 Progression Date: 06/20/17    Goal 2 Statement: I should check my blood sugars  Goal 2 Progression Percent: 0%  Goal 2 Progression Date: 06/20/17      Barriers: Life style  Strengths: Patient appears  motivated    Patient/Caregiver understanding: Expresses understanding    Frequency of Care Coordination: Monthly    Upcoming appointment: 06/26/17     Plan: Clinic care coordinator will continue to follow.    Jackeline Summers RN, CCM - Care Coordinator     6/20/2017    11:11 AM  [phone]

## 2017-06-20 NOTE — TELEPHONE ENCOUNTER
FYI: [facility] calls for orders and also that she is going to fax over pts current med list for Dr Van. She states pt takes some supplements and also aspirin, potassium, advil, aleve, that aren't on her current med list.     Orders for:   Home care PT   2 x a week for 2 weeks  Skilled nurse visits for med assessment  SW visit for adv directive and community resources.     Verbal order given to approve visits until certification received for MD signature.     Ph #[phone]

## 2017-06-22 ENCOUNTER — TELEPHONE (OUTPATIENT)
Dept: INTERNAL MEDICINE | Facility: CLINIC | Age: 67
End: 2017-06-22

## 2017-06-22 LAB
BACTERIA SPEC CULT: NO GROWTH
BACTERIA SPEC CULT: NO GROWTH
Lab: NORMAL
Lab: NORMAL
MICRO REPORT STATUS: NORMAL
MICRO REPORT STATUS: NORMAL
SPECIMEN SOURCE: NORMAL
SPECIMEN SOURCE: NORMAL

## 2017-06-22 NOTE — TELEPHONE ENCOUNTER
[facility] ( homecare) called. Requesting orders      Skilled nursing-Med management and diabetes teaching   1x a week x1wk  2x a week x3wks  1x a week x3wks      Gave verbal ok

## 2017-06-27 ENCOUNTER — TELEPHONE (OUTPATIENT)
Dept: INTERNAL MEDICINE | Facility: CLINIC | Age: 67
End: 2017-06-27

## 2017-06-27 ENCOUNTER — HOSPITAL ENCOUNTER (OUTPATIENT)
Dept: MRI IMAGING | Facility: CLINIC | Age: 67
Discharge: HOME | End: 2017-06-27
Attending: FAMILY MEDICINE | Admitting: FAMILY MEDICINE
Payer: MEDICARE

## 2017-06-27 DIAGNOSIS — M54.2 CERVICALGIA: ICD-10-CM

## 2017-06-27 PROCEDURE — 72141 MRI NECK SPINE W/O DYE: CPT

## 2017-06-27 NOTE — TELEPHONE ENCOUNTER
Fax received from [facility] for review and signature.  Put in Dr. Van's in basket.

## 2017-06-28 ENCOUNTER — CARE COORDINATION (OUTPATIENT)
Dept: CARE COORDINATION | Facility: CLINIC | Age: 67
End: 2017-06-28

## 2017-06-29 ENCOUNTER — TELEPHONE (OUTPATIENT)
Dept: INTERNAL MEDICINE | Facility: CLINIC | Age: 67
End: 2017-06-29

## 2017-06-29 ENCOUNTER — DOCUMENTATION ONLY (OUTPATIENT)
Dept: CARE COORDINATION | Facility: CLINIC | Age: 67
End: 2017-06-29

## 2017-06-29 NOTE — TELEPHONE ENCOUNTER
Call to Juli and advised. She agrees.     Also needs BP cuff Rx and Glucometer Rx. She has appt on Monday.

## 2017-06-29 NOTE — PROGRESS NOTES
[facility] now requests orders and shares plan of care/discharge summaries for some patients through [employer].  Please REPLY TO THIS MESSAGE in order to give authorization for orders when needed.  This is considered a verbal order, you will still receive a faxed copy of orders for signature.  Thank you for your assistance in improving collaboration for our patients.    ORDER  PT 2M1    MD SUMMARY/PLAN OF CARE  Patient has been seen for 4 PT visits for strength/flexibility, gait, activity tolerance and balance.  She is progressing towards goals but has not met yet.  Patient would benefit from 2 additional PT visits in July for continued progression

## 2017-06-29 NOTE — TELEPHONE ENCOUNTER
Juli, HCN calls stating starting yesterday pt is lightheaded all the time, not just with activity and she is also fatigued.     No other symptoms, feels fine otherwise.     Yesterday BP 90/55 with Physical Therapy and 92/60 with RN today.     Working on quitting smoking.  Using Nicotrol inhaler     #[phone]     Pt takes Lisinopril and Verapamil.     BP Readings from Last 3 Encounters:   06/18/17 148/77   06/13/17 126/78   06/08/17 138/76

## 2017-07-02 ENCOUNTER — APPOINTMENT (OUTPATIENT)
Dept: CT IMAGING | Facility: CLINIC | Age: 67
End: 2017-07-02
Attending: EMERGENCY MEDICINE
Payer: COMMERCIAL

## 2017-07-02 ENCOUNTER — HOSPITAL ENCOUNTER (OUTPATIENT)
Facility: CLINIC | Age: 67
Setting detail: OBSERVATION
Discharge: HOME | End: 2017-07-03
Attending: EMERGENCY MEDICINE | Admitting: INTERNAL MEDICINE
Payer: COMMERCIAL

## 2017-07-02 ENCOUNTER — APPOINTMENT (OUTPATIENT)
Dept: GENERAL RADIOLOGY | Facility: CLINIC | Age: 67
End: 2017-07-02
Attending: EMERGENCY MEDICINE
Payer: COMMERCIAL

## 2017-07-02 DIAGNOSIS — I10 BENIGN ESSENTIAL HYPERTENSION: ICD-10-CM

## 2017-07-02 DIAGNOSIS — M62.81 GENERALIZED MUSCLE WEAKNESS: ICD-10-CM

## 2017-07-02 DIAGNOSIS — I95.1 ORTHOSTASIS: ICD-10-CM

## 2017-07-02 DIAGNOSIS — E11.40 TYPE 2 DIABETES MELLITUS WITH DIABETIC NEUROPATHY, WITHOUT LONG-TERM CURRENT USE OF INSULIN (H): ICD-10-CM

## 2017-07-02 DIAGNOSIS — I95.9 HYPOTENSION, UNSPECIFIED HYPOTENSION TYPE: Primary | ICD-10-CM

## 2017-07-02 DIAGNOSIS — N17.9 ACUTE KIDNEY INJURY (H): ICD-10-CM
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
## 2017-07-02 PROBLEM — E86.0 DEHYDRATION: Status: ACTIVE | Noted: 2017-07-02

## 2017-07-02 LAB
ALBUMIN SERPL-MCNC: 3.4 G/DL (ref 3.4–5)
ALBUMIN UR-MCNC: 30 MG/DL
ALP SERPL-CCNC: 83 U/L (ref 40–150)
ALT SERPL W P-5'-P-CCNC: 22 U/L (ref 0–50)
ANION GAP SERPL CALCULATED.3IONS-SCNC: 6 MMOL/L (ref 3–14)
APPEARANCE UR: CLEAR
AST SERPL W P-5'-P-CCNC: 16 U/L (ref 0–45)
BASOPHILS # BLD AUTO: 0.1 10E9/L (ref 0–0.2)
BASOPHILS NFR BLD AUTO: 0.6 %
BILIRUB DIRECT SERPL-MCNC: <0.1 MG/DL (ref 0–0.2)
BILIRUB SERPL-MCNC: 0.3 MG/DL (ref 0.2–1.3)
BILIRUB UR QL STRIP: NEGATIVE
BUN SERPL-MCNC: 21 MG/DL (ref 7–30)
CALCIUM SERPL-MCNC: 8.3 MG/DL (ref 8.5–10.1)
CHLORIDE SERPL-SCNC: 106 MMOL/L (ref 94–109)
CO2 SERPL-SCNC: 30 MMOL/L (ref 20–32)
COLOR UR AUTO: YELLOW
CREAT SERPL-MCNC: 1.67 MG/DL (ref 0.52–1.04)
DIFFERENTIAL METHOD BLD: ABNORMAL
EOSINOPHIL # BLD AUTO: 0.2 10E9/L (ref 0–0.7)
EOSINOPHIL NFR BLD AUTO: 1.3 %
ERYTHROCYTE [DISTWIDTH] IN BLOOD BY AUTOMATED COUNT: 13.5 % (ref 10–15)
GFR SERPL CREATININE-BSD FRML MDRD: 31 ML/MIN/1.7M2
GLUCOSE SERPL-MCNC: 142 MG/DL (ref 70–99)
GLUCOSE UR STRIP-MCNC: NEGATIVE MG/DL
HCT VFR BLD AUTO: 37.8 % (ref 35–47)
HGB BLD-MCNC: 12.3 G/DL (ref 11.7–15.7)
HGB UR QL STRIP: NEGATIVE
HYALINE CASTS #/AREA URNS LPF: 4 /LPF (ref 0–2)
IMM GRANULOCYTES # BLD: 0.1 10E9/L (ref 0–0.4)
IMM GRANULOCYTES NFR BLD: 0.4 %
KETONES UR STRIP-MCNC: NEGATIVE MG/DL
LACTATE BLD-SCNC: 1.5 MMOL/L (ref 0.7–2.1)
LACTATE BLD-SCNC: 2.2 MMOL/L (ref 0.7–2.1)
LEUKOCYTE ESTERASE UR QL STRIP: NEGATIVE
LYMPHOCYTES # BLD AUTO: 4.2 10E9/L (ref 0.8–5.3)
LYMPHOCYTES NFR BLD AUTO: 34.8 %
MCH RBC QN AUTO: 29.9 PG (ref 26.5–33)
MCHC RBC AUTO-ENTMCNC: 32.5 G/DL (ref 31.5–36.5)
MCV RBC AUTO: 92 FL (ref 78–100)
MONOCYTES # BLD AUTO: 0.8 10E9/L (ref 0–1.3)
MONOCYTES NFR BLD AUTO: 6.8 %
MUCOUS THREADS #/AREA URNS LPF: PRESENT /LPF
NEUTROPHILS # BLD AUTO: 6.8 10E9/L (ref 1.6–8.3)
NEUTROPHILS NFR BLD AUTO: 56.1 %
NITRATE UR QL: NEGATIVE
NRBC # BLD AUTO: 0 10*3/UL
NRBC BLD AUTO-RTO: 0 /100
PH UR STRIP: 5 PH (ref 5–7)
PLATELET # BLD AUTO: 265 10E9/L (ref 150–450)
POTASSIUM SERPL-SCNC: 3.6 MMOL/L (ref 3.4–5.3)
PROT SERPL-MCNC: 6.9 G/DL (ref 6.8–8.8)
RBC # BLD AUTO: 4.11 10E12/L (ref 3.8–5.2)
RBC #/AREA URNS AUTO: 1 /HPF (ref 0–2)
SODIUM SERPL-SCNC: 142 MMOL/L (ref 133–144)
SP GR UR STRIP: 1.01 (ref 1–1.03)
SQUAMOUS #/AREA URNS AUTO: <1 /HPF (ref 0–1)
TROPONIN I SERPL-MCNC: NORMAL UG/L (ref 0–0.04)
URN SPEC COLLECT METH UR: ABNORMAL
UROBILINOGEN UR STRIP-MCNC: 0 MG/DL (ref 0–2)
WBC # BLD AUTO: 12.2 10E9/L (ref 4–11)
WBC #/AREA URNS AUTO: 1 /HPF (ref 0–2)

## 2017-07-02 PROCEDURE — 25000128 H RX IP 250 OP 636: Performed by: EMERGENCY MEDICINE

## 2017-07-02 PROCEDURE — 71020 XR CHEST 2 VW: CPT

## 2017-07-02 PROCEDURE — 99220 ZZC INITIAL OBSERVATION CARE,LEVL III: CPT | Performed by: INTERNAL MEDICINE

## 2017-07-02 PROCEDURE — 25000125 ZZHC RX 250: Performed by: EMERGENCY MEDICINE

## 2017-07-02 PROCEDURE — 93005 ELECTROCARDIOGRAM TRACING: CPT

## 2017-07-02 PROCEDURE — 81001 URINALYSIS AUTO W/SCOPE: CPT | Performed by: EMERGENCY MEDICINE

## 2017-07-02 PROCEDURE — 84484 ASSAY OF TROPONIN QUANT: CPT | Performed by: EMERGENCY MEDICINE

## 2017-07-02 PROCEDURE — 96374 THER/PROPH/DIAG INJ IV PUSH: CPT

## 2017-07-02 PROCEDURE — 80076 HEPATIC FUNCTION PANEL: CPT | Performed by: EMERGENCY MEDICINE

## 2017-07-02 PROCEDURE — 40000275 ZZH STATISTIC RCP TIME EA 10 MIN

## 2017-07-02 PROCEDURE — 80048 BASIC METABOLIC PNL TOTAL CA: CPT | Performed by: EMERGENCY MEDICINE

## 2017-07-02 PROCEDURE — 85025 COMPLETE CBC W/AUTO DIFF WBC: CPT | Performed by: EMERGENCY MEDICINE

## 2017-07-02 PROCEDURE — 94640 AIRWAY INHALATION TREATMENT: CPT

## 2017-07-02 PROCEDURE — 96361 HYDRATE IV INFUSION ADD-ON: CPT

## 2017-07-02 PROCEDURE — 83605 ASSAY OF LACTIC ACID: CPT | Mod: 91 | Performed by: EMERGENCY MEDICINE

## 2017-07-02 PROCEDURE — 87040 BLOOD CULTURE FOR BACTERIA: CPT | Performed by: EMERGENCY MEDICINE

## 2017-07-02 PROCEDURE — 99285 EMERGENCY DEPT VISIT HI MDM: CPT | Mod: 25

## 2017-07-02 PROCEDURE — 70450 CT HEAD/BRAIN W/O DYE: CPT

## 2017-07-02 PROCEDURE — 36415 COLL VENOUS BLD VENIPUNCTURE: CPT | Performed by: EMERGENCY MEDICINE

## 2017-07-02 RX ORDER — METHYLPREDNISOLONE SODIUM SUCCINATE 125 MG/2ML
125 INJECTION, POWDER, LYOPHILIZED, FOR SOLUTION INTRAMUSCULAR; INTRAVENOUS ONCE
Status: COMPLETED | OUTPATIENT
Start: 2017-07-02 | End: 2017-07-02

## 2017-07-02 RX ORDER — AMOXICILLIN 250 MG
1-2 CAPSULE ORAL 2 TIMES DAILY PRN
Status: DISCONTINUED | OUTPATIENT
Start: 2017-07-02 | End: 2017-07-03 | Stop reason: HOSPADM

## 2017-07-02 RX ORDER — PROCHLORPERAZINE 25 MG
12.5 SUPPOSITORY, RECTAL RECTAL EVERY 12 HOURS PRN
Status: DISCONTINUED | OUTPATIENT
Start: 2017-07-02 | End: 2017-07-03 | Stop reason: HOSPADM

## 2017-07-02 RX ORDER — ACETAMINOPHEN 325 MG/1
650 TABLET ORAL EVERY 4 HOURS PRN
Status: DISCONTINUED | OUTPATIENT
Start: 2017-07-02 | End: 2017-07-03 | Stop reason: HOSPADM

## 2017-07-02 RX ORDER — DEXTROSE MONOHYDRATE 25 G/50ML
25-50 INJECTION, SOLUTION INTRAVENOUS
Status: DISCONTINUED | OUTPATIENT
Start: 2017-07-02 | End: 2017-07-03 | Stop reason: HOSPADM

## 2017-07-02 RX ORDER — TRAZODONE HYDROCHLORIDE 100 MG/1
200 TABLET ORAL AT BEDTIME
Status: DISCONTINUED | OUTPATIENT
Start: 2017-07-02 | End: 2017-07-03 | Stop reason: HOSPADM

## 2017-07-02 RX ORDER — ONDANSETRON 4 MG/1
4 TABLET, ORALLY DISINTEGRATING ORAL EVERY 6 HOURS PRN
Status: DISCONTINUED | OUTPATIENT
Start: 2017-07-02 | End: 2017-07-03 | Stop reason: HOSPADM

## 2017-07-02 RX ORDER — ONDANSETRON 2 MG/ML
4 INJECTION INTRAMUSCULAR; INTRAVENOUS EVERY 6 HOURS PRN
Status: DISCONTINUED | OUTPATIENT
Start: 2017-07-02 | End: 2017-07-03 | Stop reason: HOSPADM

## 2017-07-02 RX ORDER — NALOXONE HYDROCHLORIDE 0.4 MG/ML
.1-.4 INJECTION, SOLUTION INTRAMUSCULAR; INTRAVENOUS; SUBCUTANEOUS
Status: DISCONTINUED | OUTPATIENT
Start: 2017-07-02 | End: 2017-07-03 | Stop reason: HOSPADM

## 2017-07-02 RX ORDER — BISACODYL 10 MG
10 SUPPOSITORY, RECTAL RECTAL DAILY PRN
Status: DISCONTINUED | OUTPATIENT
Start: 2017-07-02 | End: 2017-07-03 | Stop reason: HOSPADM

## 2017-07-02 RX ORDER — IPRATROPIUM BROMIDE AND ALBUTEROL SULFATE 2.5; .5 MG/3ML; MG/3ML
3 SOLUTION RESPIRATORY (INHALATION) ONCE
Status: COMPLETED | OUTPATIENT
Start: 2017-07-02 | End: 2017-07-02

## 2017-07-02 RX ORDER — ATORVASTATIN CALCIUM 40 MG/1
40 TABLET, FILM COATED ORAL DAILY
Status: DISCONTINUED | OUTPATIENT
Start: 2017-07-03 | End: 2017-07-03 | Stop reason: HOSPADM

## 2017-07-02 RX ORDER — NICOTINE POLACRILEX 4 MG
15-30 LOZENGE BUCCAL
Status: DISCONTINUED | OUTPATIENT
Start: 2017-07-02 | End: 2017-07-03 | Stop reason: HOSPADM

## 2017-07-02 RX ORDER — TRAMADOL HYDROCHLORIDE 50 MG/1
100 TABLET ORAL 3 TIMES DAILY
Status: DISCONTINUED | OUTPATIENT
Start: 2017-07-03 | End: 2017-07-03 | Stop reason: HOSPADM

## 2017-07-02 RX ORDER — PROCHLORPERAZINE MALEATE 5 MG
5 TABLET ORAL EVERY 6 HOURS PRN
Status: DISCONTINUED | OUTPATIENT
Start: 2017-07-02 | End: 2017-07-03 | Stop reason: HOSPADM

## 2017-07-02 RX ORDER — PREGABALIN 75 MG/1
150 CAPSULE ORAL 3 TIMES DAILY
Status: DISCONTINUED | OUTPATIENT
Start: 2017-07-03 | End: 2017-07-03 | Stop reason: HOSPADM

## 2017-07-02 RX ORDER — CETIRIZINE HYDROCHLORIDE 10 MG/1
10 TABLET ORAL DAILY
Status: DISCONTINUED | OUTPATIENT
Start: 2017-07-03 | End: 2017-07-03 | Stop reason: HOSPADM

## 2017-07-02 RX ORDER — ALBUTEROL SULFATE 90 UG/1
2 AEROSOL, METERED RESPIRATORY (INHALATION) EVERY 4 HOURS PRN
Status: DISCONTINUED | OUTPATIENT
Start: 2017-07-02 | End: 2017-07-03 | Stop reason: HOSPADM

## 2017-07-02 RX ORDER — DULOXETIN HYDROCHLORIDE 60 MG/1
60 CAPSULE, DELAYED RELEASE ORAL DAILY
Status: DISCONTINUED | OUTPATIENT
Start: 2017-07-03 | End: 2017-07-03 | Stop reason: HOSPADM

## 2017-07-02 RX ORDER — SODIUM CHLORIDE AND POTASSIUM CHLORIDE 150; 900 MG/100ML; MG/100ML
INJECTION, SOLUTION INTRAVENOUS CONTINUOUS
Status: DISCONTINUED | OUTPATIENT
Start: 2017-07-02 | End: 2017-07-03

## 2017-07-02 RX ADMIN — SODIUM CHLORIDE 500 ML: 9 INJECTION, SOLUTION INTRAVENOUS at 20:37

## 2017-07-02 RX ADMIN — IPRATROPIUM BROMIDE AND ALBUTEROL SULFATE 3 ML: .5; 3 SOLUTION RESPIRATORY (INHALATION) at 20:40

## 2017-07-02 RX ADMIN — METHYLPREDNISOLONE SODIUM SUCCINATE 125 MG: 125 INJECTION, POWDER, FOR SOLUTION INTRAMUSCULAR; INTRAVENOUS at 20:52

## 2017-07-02 RX ADMIN — SODIUM CHLORIDE 1000 ML: 9 INJECTION, SOLUTION INTRAVENOUS at 21:59

## 2017-07-02 ASSESSMENT — ENCOUNTER SYMPTOMS
DYSURIA: 0
BACK PAIN: 0
WEAKNESS: 0
NAUSEA: 0
HEADACHES: 1
DIARRHEA: 0
NECK PAIN: 0
VOMITING: 0
ABDOMINAL PAIN: 0
NUMBNESS: 0
COUGH: 1

## 2017-07-02 NOTE — IP AVS SNAPSHOT
[facility] Observation Department    [address]    [facility] [address]    Phone:  [phone]                                       After Visit Summary   7/2/2017    Vijaya Manjarrez    MRN: [id number]           After Visit Summary Signature Page     I have received my discharge instructions, and my questions have been answered. I have discussed any challenges I see with this plan with the nurse or doctor.    ..........................................................................................................................................  Patient/Patient Representative Signature      ..........................................................................................................................................  Patient Representative Print Name and Relationship to Patient    ..................................................               ................................................  Date                                            Time    ..........................................................................................................................................  Reviewed by Signature/Title    ...................................................              ..............................................  Date                                                            Time

## 2017-07-02 NOTE — IP AVS SNAPSHOT
MRN:[id number]                      After Visit Summary   7/2/2017    Vijaya Manjarrez    MRN: [id number]           Thank you!     Thank you for choosing [facility] for your care. Our goal is always to provide you with excellent care. Hearing back from our patients is one way we can continue to improve our services. Please take a few minutes to complete the written survey that you may receive in the mail after you visit. If you would like to speak to someone directly about your visit please contact Patient Relations at [phone]. Thank you!          Patient Information     Date Of Birth          1950        About your hospital stay     You were admitted on:  July 2, 2017 You last received care in the:  [facility] Observation Department    You were discharged on:  July 3, 2017        Reason for your hospital stay       Recurrent falls and hypotension. Work up in ED was consistent with dehydration as well. You were rehydrated with IV fluids and labs improved. Blood pressure medications were held overnight and lisinopril was restarted in the morning at a reduced dose. You ambulated in the hallway safely and blood pressures were stable. Continue home care services at home.                  Who to Call     For medical emergencies, please call 911.  For non-urgent questions about your medical care, please call your primary care provider or clinic, [phone]          Attending Provider     Provider Specialty    Amelia Gomez MD Emergency Medicine    RewSalo farris MD Internal Medicine       Primary Care Provider Office Phone # Fax #    Disha Van [phone]       When to contact your care team       Call your primary doctor if you have any of the following: temperature greater than 101,  increased shortness of breath, continued recurrent falls, intolerance to oral intake or persistent low blood pressure, top number <100.                   After Care Instructions     Activity       Your activity upon discharge: activity as tolerated            Diet       Follow this diet upon discharge: Regular diet. Encourage oral hydration                  Follow-up Appointments     Follow-up and recommended labs and tests        Follow up with primary care provider, Disha Van, within 7 days for hospital follow- up.  The following labs/tests are recommended: BMP.  Recommend checking blood pressure once daily and again if feeling lightheaded or if you fall.   Continue home care services                  Your next 10 appointments already scheduled     Jul 10, 2017  3:00 PM CDT   [facility] MD   [facility]    [address]  [facility] [address] [phone]              Additional Services     Diabetes Educator Referral       DIABETES SELF MANAGEMENT TRAINING (DSMT)      Your provider has referred you to Diabetes Education: [facility]: Diabetes Education - All [facility] [phone]   [email or web address]    Type of training and number of hours: Previous Diagnosis: Follow-up DSMT - 2 hours.    Medicare covers: 10 hours of initial DSMT in 12 month period from the time of first visit, plus 2 hours of follow-up DSMT annually, and additional hours as requested for insulin training.    Diabetes Type: Type 2 - On Insulin             Diabetes Co-Morbidities: none               A1C Goal:  6.0       A1C is: Lab Results       Component                Value               Date                       A1C                      6.8                 06/17/2017              If an urgent visit is needed or A1C is above 12, Care Team to call the Diabetes Education Team at [phone] or send an In Basket message to the Diabetes Education Pool (P DIAB ED-PATIENT CARE).    Diabetes Education Topics: Comprehensive Knowledge Assessment and Instruction    Special  Educational Needs Requiring Individual DSMT: Additional Insulin Training       MEDICAL NUTRITION THERAPY (MNT) for Diabetes    Medical Nutrition Therapy with a Registered Dietitian can be provided in coordination with Diabetes Self-Management Training to assist in achieving optimal diabetes management.    MNT Type and Hours: Previous diagnosis: Annual follow-up MNT - 2 hours                       Medicare will cover: 3 hours initial MNT in 12 month period after first visit, plus 2 hours of follow-up MNT annually    Please be aware that coverage of these services is subject to the terms and limitations of your health insurance plan.  Call member services at your health plan to determine Diabetes Self-Management Training benefits and ask which blood glucose monitor brands are covered by your plan.      Please bring the following with you to your appointment:    (1)  List of current medications   (2)  List of Blood Glucose Monitor brands that are covered by your insurance plan  (3)  Blood Glucose Monitor and log book  (4)   Food records for the 3 days prior to your visit    The Certified Diabetes Educator may make diabetes medication adjustments per the CDE Protocol and Collaborative Practice Agreement.            Home care nursing referral       RN skilled nursing visit. RN to assess vital signs and weight, patients ability to take and record daily blood pressure, temp and weight, hydration, nutrition and bowel status and home safety.  RN to teach blood pressure and glucose checks.     Please see on Wednesday 7/5/17    Your provider has ordered home care nursing services. If you have not been contacted within 2 days of your discharge please call the inpatient department phone number at [phone] .                             Pending Results     Date and Time Order Name Status Description    7/2/2017 2034 Blood culture Preliminary     7/2/2017 2034 Blood culture Preliminary             Statement of Approval      "Ordered          17 1202  I have reviewed and agree with all the recommendations and orders detailed in this document.  EFFECTIVE NOW     Approved and electronically signed by:  Amelia Laguerre PA-C             Admission Information     Date & Time Provider Department Dept. Phone    2017 Salo Candelaria MD [facility] Observation Department [phone]      Your Vitals Were     Blood Pressure Pulse Temperature Respirations Height Weight    131/66 88 97.9  F (36.6  C) (Oral) 16 1.765 m (5' 9.5\") 96.8 kg (213 lb 8 oz)    Pulse Oximetry BMI (Body Mass Index)                91% 31.08 kg/m2          MyChart Information     [employer] lets you send messages to your doctor, view your test results, renew your prescriptions, schedule appointments and more. To sign up, go to [email or web address] . Click on \"Log in\" on the left side of the screen, which will take you to the Welcome page. Then click on \"Sign up Now\" on the right side of the page.     You will be asked to enter the access code listed below, as well as some personal information. Please follow the directions to create your username and password.     Your access code is: [email or web address]  Expires: 2017  2:59 PM     Your access code will  in 90 days. If you need help or a new code, please call your [place] clinic or [phone].        Care EveryWhere ID     This is your Care EveryWhere ID. This could be used by other organizations to access your [place] medical records  [id number]        Equal Access to Services     [facility]: Hadii joceline tony Solisandra, waaxda luqadaha, qaybta kaalmaerinn michaud, delia hein . So [facility] [phone].    ATENCIÓN: Si habla español, tiene a carrion disposición servicios gratuitos de asistencia lingüística. Llame al [phone].    We comply with applicable federal civil rights laws and [place] laws. We do not discriminate on the basis of race, color, " national origin, age, disability sex, sexual orientation or gender identity.               Review of your medicines      CONTINUE these medicines which may have CHANGED, or have new prescriptions. If we are uncertain of the size of tablets/capsules you have at home, strength may be listed as something that might have changed.        Dose / Directions    lisinopril 40 MG tablet   Commonly known as:  PRINIVIL/ZESTRIL   This may have changed:  how much to take   Used for:  Benign essential hypertension        Dose:  20 mg   Take 0.5 tablets (20 mg) by mouth daily   Quantity:  90 tablet   Refills:  1         CONTINUE these medicines which have NOT CHANGED        Dose / Directions    albuterol 108 (90 BASE) MCG/ACT Inhaler   Commonly known as:  PROAIR HFA/PROVENTIL HFA/VENTOLIN HFA   Used for:  Bronchitis        Dose:  2 puff   Inhale 2 puffs into the lungs every 4 hours as needed for shortness of breath / dyspnea or wheezing   Quantity:  1 Inhaler   Refills:  0       atorvastatin 40 MG tablet   Commonly known as:  LIPITOR   Used for:  Hyperlipidemia LDL goal <100        Dose:  40 mg   Take 1 tablet (40 mg) by mouth daily   Quantity:  90 tablet   Refills:  1       cetirizine 10 MG tablet   Commonly known as:  zyrTEC   Used for:  Allergic rhinitis, unspecified allergic rhinitis trigger, unspecified rhinitis seasonality        Dose:  10 mg   Take 1 tablet (10 mg) by mouth daily   Quantity:  90 tablet   Refills:  3       cyclobenzaprine 10 MG tablet   Commonly known as:  FLEXERIL        Dose:  10 mg   Take 10 mg by mouth 2 times daily as needed for muscle spasms   Refills:  0       DULoxetine 60 MG EC capsule   Commonly known as:  CYMBALTA        Dose:  60 mg   Take 60 mg by mouth daily   Refills:  0       metFORMIN 500 MG tablet   Commonly known as:  GLUCOPHAGE   Used for:  Type 2 diabetes mellitus with diabetic neuropathy, without long-term current use of insulin (H)        Dose:  500 mg   Take 1 tablet (500 mg) by mouth  2 times daily (with meals)   Quantity:  180 tablet   Refills:  1       nicotine 10 MG Inhaler   Commonly known as:  NICOTROL   Used for:  Tobacco use disorder        Dose:  2 cartridge   Inhale 2 cartridge into the lungs daily as needed for smoking cessation   Quantity:  30 each   Refills:  0       omeprazole 20 MG CR capsule   Commonly known as:  priLOSEC        Dose:  20 mg   Take 20 mg by mouth daily   Refills:  0       pregabalin 150 MG capsule   Commonly known as:  LYRICA   Used for:  Fibromyalgia, Chronic bilateral low back pain without sciatica        Dose:  150 mg   Take 1 capsule (150 mg) by mouth 3 times daily   Quantity:  270 capsule   Refills:  1       traMADol 50 MG tablet   Commonly known as:  ULTRAM   Used for:  Facet arthropathy, cervical, Facet arthropathy, lumbar, Fibromyalgia, Bilateral shoulder pain, unspecified chronicity        Dose:  100 mg   Take 2 tablets (100 mg) by mouth 3 times daily May take 5 per day   Quantity:  180 tablet   Refills:  1       traZODone 100 MG tablet   Commonly known as:  DESYREL   Used for:  Insomnia, unspecified type        Dose:  200 mg   Take 2 tablets (200 mg) by mouth At Bedtime   Quantity:  60 tablet   Refills:  5         STOP taking     verapamil 180 MG CR tablet   Commonly known as:  CALAN-SR                    Protect others around you: Learn how to safely use, store and throw away your medicines at www.disposemymeds.org.             Medication List: This is a list of all your medications and when to take them. Check marks below indicate your daily home schedule. Keep this list as a reference.      Medications           Morning Afternoon Evening Bedtime As Needed    albuterol 108 (90 BASE) MCG/ACT Inhaler   Commonly known as:  PROAIR HFA/PROVENTIL HFA/VENTOLIN HFA   Inhale 2 puffs into the lungs every 4 hours as needed for shortness of breath / dyspnea or wheezing                                atorvastatin 40 MG tablet   Commonly known as:  LIPITOR   Take 1  tablet (40 mg) by mouth daily   Last time this was given:  40 mg on 7/3/2017  9:39 AM                                cetirizine 10 MG tablet   Commonly known as:  zyrTEC   Take 1 tablet (10 mg) by mouth daily   Last time this was given:  10 mg on 7/3/2017 10:57 AM                                cyclobenzaprine 10 MG tablet   Commonly known as:  FLEXERIL   Take 10 mg by mouth 2 times daily as needed for muscle spasms                                DULoxetine 60 MG EC capsule   Commonly known as:  CYMBALTA   Take 60 mg by mouth daily   Last time this was given:  60 mg on 7/3/2017 10:57 AM                                lisinopril 40 MG tablet   Commonly known as:  PRINIVIL/ZESTRIL   Take 0.5 tablets (20 mg) by mouth daily   Last time this was given:  20 mg on 7/3/2017  9:39 AM                                metFORMIN 500 MG tablet   Commonly known as:  GLUCOPHAGE   Take 1 tablet (500 mg) by mouth 2 times daily (with meals)   Last time this was given:  500 mg on 7/3/2017  9:39 AM                                nicotine 10 MG Inhaler   Commonly known as:  NICOTROL   Inhale 2 cartridge into the lungs daily as needed for smoking cessation                                omeprazole 20 MG CR capsule   Commonly known as:  priLOSEC   Take 20 mg by mouth daily   Last time this was given:  20 mg on 7/3/2017  9:39 AM                                pregabalin 150 MG capsule   Commonly known as:  LYRICA   Take 1 capsule (150 mg) by mouth 3 times daily   Last time this was given:  150 mg on 7/3/2017  9:39 AM                                traMADol 50 MG tablet   Commonly known as:  ULTRAM   Take 2 tablets (100 mg) by mouth 3 times daily May take 5 per day   Last time this was given:  100 mg on 7/3/2017  9:40 AM                                traZODone 100 MG tablet   Commonly known as:  DESYREL   Take 2 tablets (200 mg) by mouth At Bedtime

## 2017-07-03 VITALS
SYSTOLIC BLOOD PRESSURE: 131 MMHG | HEIGHT: 70 IN | TEMPERATURE: 97.9 F | HEART RATE: 88 BPM | BODY MASS INDEX: 30.56 KG/M2 | WEIGHT: 213.5 LBS | DIASTOLIC BLOOD PRESSURE: 66 MMHG | RESPIRATION RATE: 16 BRPM | OXYGEN SATURATION: 91 %

## 2017-07-03 LAB
ANION GAP SERPL CALCULATED.3IONS-SCNC: 9 MMOL/L (ref 3–14)
BUN SERPL-MCNC: 14 MG/DL (ref 7–30)
CALCIUM SERPL-MCNC: 8.3 MG/DL (ref 8.5–10.1)
CHLORIDE SERPL-SCNC: 108 MMOL/L (ref 94–109)
CO2 SERPL-SCNC: 25 MMOL/L (ref 20–32)
CREAT SERPL-MCNC: 0.8 MG/DL (ref 0.52–1.04)
GFR SERPL CREATININE-BSD FRML MDRD: 71 ML/MIN/1.7M2
GLUCOSE BLDC GLUCOMTR-MCNC: 201 MG/DL (ref 70–99)
GLUCOSE BLDC GLUCOMTR-MCNC: 253 MG/DL (ref 70–99)
GLUCOSE BLDC GLUCOMTR-MCNC: 255 MG/DL (ref 70–99)
GLUCOSE BLDC GLUCOMTR-MCNC: 290 MG/DL (ref 70–99)
GLUCOSE SERPL-MCNC: 214 MG/DL (ref 70–99)
INTERPRETATION ECG - MUSE: NORMAL
POTASSIUM SERPL-SCNC: 4.2 MMOL/L (ref 3.4–5.3)
SODIUM SERPL-SCNC: 142 MMOL/L (ref 133–144)

## 2017-07-03 PROCEDURE — 99217 ZZC OBSERVATION CARE DISCHARGE: CPT | Performed by: PHYSICIAN ASSISTANT

## 2017-07-03 PROCEDURE — 25000125 ZZHC RX 250: Performed by: INTERNAL MEDICINE

## 2017-07-03 PROCEDURE — A9270 NON-COVERED ITEM OR SERVICE: HCPCS | Mod: GY | Performed by: PHYSICIAN ASSISTANT

## 2017-07-03 PROCEDURE — A9270 NON-COVERED ITEM OR SERVICE: HCPCS | Mod: GY | Performed by: INTERNAL MEDICINE

## 2017-07-03 PROCEDURE — G0378 HOSPITAL OBSERVATION PER HR: HCPCS

## 2017-07-03 PROCEDURE — 80048 BASIC METABOLIC PNL TOTAL CA: CPT | Performed by: INTERNAL MEDICINE

## 2017-07-03 PROCEDURE — 00000146 ZZHCL STATISTIC GLUCOSE BY METER IP

## 2017-07-03 PROCEDURE — 25000132 ZZH RX MED GY IP 250 OP 250 PS 637: Mod: GY | Performed by: INTERNAL MEDICINE

## 2017-07-03 PROCEDURE — 96361 HYDRATE IV INFUSION ADD-ON: CPT

## 2017-07-03 PROCEDURE — 25000132 ZZH RX MED GY IP 250 OP 250 PS 637: Mod: GY | Performed by: PHYSICIAN ASSISTANT

## 2017-07-03 PROCEDURE — 36415 COLL VENOUS BLD VENIPUNCTURE: CPT | Performed by: INTERNAL MEDICINE

## 2017-07-03 RX ORDER — LISINOPRIL 20 MG/1
20 TABLET ORAL DAILY
Status: DISCONTINUED | OUTPATIENT
Start: 2017-07-03 | End: 2017-07-03 | Stop reason: HOSPADM

## 2017-07-03 RX ORDER — LISINOPRIL 40 MG/1
20 TABLET ORAL DAILY
Qty: 90 TABLET | Refills: 1 | COMMUNITY
Start: 2017-07-03 | End: 2017-12-04 | Stop reason: DRUGHIGH

## 2017-07-03 RX ADMIN — TRAMADOL HYDROCHLORIDE 100 MG: 50 TABLET, COATED ORAL at 09:40

## 2017-07-03 RX ADMIN — LISINOPRIL 20 MG: 20 TABLET ORAL at 09:39

## 2017-07-03 RX ADMIN — OMEPRAZOLE 20 MG: 20 CAPSULE, DELAYED RELEASE ORAL at 09:39

## 2017-07-03 RX ADMIN — ACETAMINOPHEN 650 MG: 325 TABLET, FILM COATED ORAL at 05:37

## 2017-07-03 RX ADMIN — METFORMIN HYDROCHLORIDE 500 MG: 500 TABLET ORAL at 09:39

## 2017-07-03 RX ADMIN — ATORVASTATIN CALCIUM 40 MG: 40 TABLET, FILM COATED ORAL at 09:39

## 2017-07-03 RX ADMIN — PREGABALIN 150 MG: 75 CAPSULE ORAL at 09:39

## 2017-07-03 RX ADMIN — CETIRIZINE HYDROCHLORIDE 10 MG: 10 TABLET, FILM COATED ORAL at 10:57

## 2017-07-03 RX ADMIN — POTASSIUM CHLORIDE AND SODIUM CHLORIDE: 900; 150 INJECTION, SOLUTION INTRAVENOUS at 00:59

## 2017-07-03 RX ADMIN — DULOXETINE HYDROCHLORIDE 60 MG: 60 CAPSULE, DELAYED RELEASE ORAL at 10:57

## 2017-07-03 NOTE — ED NOTES
"[facility] [place]  ED Nurse Handoff Report    Vijaya Manjarrez is a 67 year old female   ED Chief complaint: Fall and Hypotension  . ED Diagnosis:   Final diagnoses:   Orthostasis   Acute kidney injury (H)   Generalized muscle weakness     Allergies:   Allergies   Allergen Reactions     Penicillins Hives       Code Status: Full Code  Activity level - Baseline/Home:  INDEPENDENT. Activity Level - Current:   Stand with Assist of 2. Lift room needed: Yes. Bariatric: No   Needed: No   Isolation: No. Infection: Not Applicable.     Vital Signs:   Vitals:    07/02/17 2026 07/02/17 2030 07/02/17 2040 07/02/17 2149   BP:  98/50  92/59   Pulse:       Resp:       Temp:       TempSrc:       SpO2: 93% 91% 93% 93%   Weight:       Height:           Cardiac Rhythm:  ,      Pain level: 0-10 Pain Scale: 8  Patient confused: No. Patient Falls Risk: Yes.   Elimination Status: Unable to void, QUICK IN AND OUT CATH FOR SPECIMEN.   Patient Report - Initial Complaint:Vijaya Manjarrez is a 67 year old female with a history of hypotension and COPD who presents to the emergency department today via EMS for evaluation of injuries sustained in a fall. The patient was recently admitted from 6/16 to 6/18 for evaluation of an acute COPD exacerbation, syncope, and recurrent falls and states she was told to return if she continued to fall. The patient reports that over the past few days she has fallen multiple times and that it has gotten more frequent recently. She reports that she is not tripping over anything and that it feels like \"her feet give out on her\". She states that in the last two days she has fallen five times. The patient reports that she hit her head last night and currently has a headache but did not lose consciousness. She also states that her chronic cough has become productive in the last three or four days, and she says she has been coughing up what looks like green mucous. The patient reports a " subjective fever of 99.7. The patient also reports that she has been shaky recently. She denies numbness, tingling, weakness, nausea, vomiting, neck pain, back pain, abdominal pain, diarrhea, or pain with urination. Of note, the patient lives by herself. Focused Assessment: Pt appears weak. +orthostatics. LS diminished, requiring 1-2L Oxygen.   Tests Performed: Orthostatics, blood and urine analysis, Head CT, chest XR Abnormal Results: Lactic 2.2, WBC 12.2. Head CT and chest XR negative.   Treatments provided: 2L NS bolus, 1 duoneb, IV solumedrol, supplemental oxygen.   Family Comments: Pt lives alone, neighbors/friends in the apartment complex at the bedside until 2200.   OBS brochure/video discussed/provided to patient:  N/A  ED Medications:   Medications   0.9% sodium chloride BOLUS (1,000 mLs Intravenous New Bag 7/2/17 2159)   ipratropium - albuterol 0.5 mg/2.5 mg/3 mL (DUONEB) neb solution 3 mL (3 mLs Nebulization Given 7/2/17 2040)   methylPREDNISolone sodium succinate (solu-MEDROL) injection 125 mg (125 mg Intravenous Given 7/2/17 2052)   0.9% sodium chloride BOLUS (0 mLs Intravenous Stopped 7/2/17 2159)     Drips infusing:  Yes         ED Nurse Name/Phone Number: Kenisha Dunaway,   10:33 PM    RECEIVING UNIT ED HANDOFF REVIEW    Above ED Nurse Handoff Report was reviewed: Yes  Reviewed by: Nereida Marcelo on July 2, 2017 at 11:14 PM

## 2017-07-03 NOTE — ED NOTES
Observation Brochure and Video   Patient informed of observation status based on provider's order. Observation Brochure was given and video watched.  Kenisha Dunaway RN

## 2017-07-03 NOTE — PROGRESS NOTES
ROOM # 221    Living Situation (if not independent, order SW consult): ind in apt  Facility name: n/a  : Luis (son) [phone]    Activity level at baseline: ind, lately has been using walker  Activity level on admit: Ax1-2 with walker      Patient registered to observation; given Patient Bill of Rights; given the opportunity to ask questions about observation status and their plan of care.  Patient has been oriented to the observation room, bathroom and call light is in place.    Discussed discharge goals and expectations with patient/family.

## 2017-07-03 NOTE — PROGRESS NOTES
"Pt up for walk in hallway with staff, SBA w/walker. Tolerated well but Pt did report feeling weak and \"not myself\"  "

## 2017-07-03 NOTE — PROGRESS NOTES
Patient ambulated in ochoa with stand by assist of staff and use of a walker. Patient reports that she feels weakness on her left side and this is how she feels before she has fallen.  Patient denies pain or dizziness while walking. PATONEY in room and aware.

## 2017-07-03 NOTE — CONSULTS
.Care Transition Initial Assessment - SW     Met with: Patient    Active Problems:    Hypotension, unspecified hypotension type    Fall    Acute kidney failure (H)    Dehydration    Hypotension         DATA  Lives With: alone  Living Arrangements: apartment  Description of Support System: Involved  Who is your support system?: Children, [facility]-RN services    Identified issues/concerns regarding health management: will need to monitor her blood pressure at home       Resources List: Home Care, DME     Quality Of Family Relationships: involved  Transportation Available: family or friend will provide      ASSESSMENT  Cognitive Status:  alert and oriented  Concerns to be addressed: Contact was made by SW to [facility] and [facility] for arranging of the monitor and cuff, no staff available after multiple contacts. [facility] was contacted, per agency representative pt's Medicare does not cover blood pressure machine, pt's MA would cover a manual blood pressure machine.    Met with pt, discussed above.. she informs that she does not want to accept a manual monitor at this time. SW informed her that she could obtain the manual monitor based on the coverage her insurance would have on this, and then the home care RN could provide additional support for her regarding the education on the use of the machine. It was agreed that SW would make the referral to [facility] ( machine would be available after 1400 today) and that if pt decided that's he would want to accept this machine she could pick it up; or, the other option ( as per SW discussion and support of MD/Amelia AYALA) to have the home care RN discuss and assist pt with obtaining machine. Pt indicated that's he was comfortable with plan to have home RN assist with obtaining the machine, DOLORES has contacted [facility] which system pt is currently in and asked for home RN visit tomorrow ( July 4th or July 5th) with assist for pt to obtain machine  as noted. Pt was provided with contact information to [facility].        PLAN  Financial costs for the patient includes: blood pressure machine if pt wishes an automatic.   Patient given options and choices for discharge: Yes  Patient/family is agreeable to the plan?  Yes  Patient Goals and Preferences: return home   Patient anticipates discharging to: home

## 2017-07-03 NOTE — ED NOTES
BIB ems for frequent falls. Pt states she has had 5 falls in the last 2 days. Hst of hypotension.   Pt states one of her blood pressure medications was discontinued 2 weeks ago but can not remember which one. ABCD's intact.

## 2017-07-03 NOTE — PROGRESS NOTES
DOLORES asked CM to reschedule follow up appointment with PCP and initiate DM education. F/U appt make for next Monday, 3pm with Dr. Van. DM Education referral entered. SW to f/u on BP cuff for home. No other needs at this time.     CM will continue to follow patient until discharge for any additional needs.     Betsy Wood RN, BSN, CTS  [facility]  [phone]

## 2017-07-03 NOTE — DISCHARGE SUMMARY
Critical access hospital Outpatient / Observation Unit  Discharge Summary        Vijaya Manjarrez MRN# [id number]   YOB: 1950 Age: 67 year old     Date of Admission:  7/2/2017  Date of Discharge:  7/3/2017  Admitting Physician:  Salo Candelaria MD  Discharge Physician: Amelia Laguerre PA-C  Discharging Service: Hospitalist      Primary Provider: Disha Van  Primary Care Physician Phone Number: [phone]         Primary Discharge Diagnoses:    Vijaya Manjarrez was admitted on 7/2/2017 for recurrent falls and hypotension.     1. Hypotension - likely due to dehydration from poor PO intake and possibly over medicated with antihypertensives. Pt had recent hospitalization for COPD exacerbation, HCTZ was discontinued at that time due to soft BPs. Low BPs noted with home care services. Verapamil and Lisinopril held initially due to this and GAVIN. Will discontinue Verapamil and restart Lisinopril at reduced dose. BPs have been stable. Pt will need to check blood pressures at home and have recommended that she get a home BP monitor. Pt was instructed about this and understands.   2. AGVIN - due to dehydration. Cr 1.67 on arrival, returned to normal after IVFs. Lisinopril and metformin initially held but can now resume. Encourage oral hydration at home,  3. Recurrent falls - likely due to low blood pressures. Ambulated safely while here. Pt has home care services, will need to continue this to ensure stability improves with improvement of BP.  4. DM - resume metformin. Recheck BMP with PCP        Secondary Discharge Diagnoses:     Past Medical History:   Diagnosis Date     Anxiety      Cervical spine degeneration 2016    spinal stenosis,      COPD (chronic obstructive pulmonary disease) (H) 2012    mild     Diabetes mellitus, type 2 (H)      Diabetic neuropathy (H)      Facet arthropathy, lumbar      GERD (gastroesophageal reflux disease)      HTN (hypertension)      Hyperlipidemia      Lumbar degenerative disc  disease      Migraine headache                 Code Status:      Full Code        Brief Hospital Summary:        Reason for your hospital stay       Recurrent falls and hypotension. Work up in ED was consistent with   dehydration as well. You were rehydrated with IV fluids and labs improved.   Blood pressure medications were held overnight and lisinopril was   restarted in the morning at a reduced dose. You ambulated in the hallway   safely and blood pressures were stable. Continue home care services at   home.                    Please refer to initial admission history and physical for further details.   Briefly, Vijaya Manjarrez was admitted on 7/2/2017 for recurrent falls and hypotension.  Initial work up in the ED did not reveal evidence of significant cardiac arrhthymias or neurologic abnormalities. Labs were unremarkable for acute infectious process. Pt was registered to the Observation Unit for further evaluation.      Pt was started on IVF hydration and blood pressure meds were held. Labs reviewed and significant results addressed. On the day of discharge, blood pressure was stable. Adjustments to home meds were made and GAVIN resolved. Vitals were stable and pt was deemed safe for discharge. Medications were reviewed and adjustments made as necessary. Pt is instructed to follow up as below.           Significant Lab During Hospitalization:        Recent Labs  Lab 07/02/17 2023   WBC 12.2*   HGB 12.3   HCT 37.8   MCV 92          Recent Labs  Lab 07/03/17  0635 07/02/17 2023    142   POTASSIUM 4.2 3.6   CHLORIDE 108 106   CO2 25 30   ANIONGAP 9 6   * 142*   BUN 14 21   CR 0.80 1.67*   GFRESTIMATED 71 31*   GFRESTBLACK 86 37*   RADHA 8.3* 8.3*   PROTTOTAL  --  6.9   ALBUMIN  --  3.4   BILITOTAL  --  0.3   ALKPHOS  --  83   AST  --  16   ALT  --  22       Recent Labs  Lab 07/02/17  2248 07/02/17 2023   LACT 1.5 2.2*       Recent Labs  Lab 07/02/17 2023   TROPI <0.015The 99th percentile  for upper reference range is 0.045 ug/L.  Troponin values in the range of 0.045 - 0.120 ug/L may be associated with risks of adverse clinical events.       Recent Labs  Lab 07/02/17  2242   COLOR Yellow   APPEARANCE Clear   URINEGLC Negative   URINEBILI Negative   URINEKETONE Negative   SG 1.014   UBLD Negative   URINEPH 5.0   PROTEIN 30*   NITRITE Negative   LEUKEST Negative   RBCU 1   WBCU 1                Significant Imaging During Hospitalization:      Recent Results (from the past 48 hour(s))   Head CT w/o contrast    Narrative    CT SCAN OF THE HEAD WITHOUT CONTRAST   7/2/2017 9:13 PM     HISTORY: Trauma    TECHNIQUE:  Axial images of the head and coronal reformations without  IV contrast material.  Radiation dose for this scan was reduced using  automated exposure control, adjustment of the mA and/or kV according  to patient size, or iterative reconstruction technique.    COMPARISON: 6/16/2017    FINDINGS:  The ventricles are normal in size, shape and configuration.   The brain parenchyma and subarachnoid spaces are normal. There is no  evidence of intracranial hemorrhage, mass, acute infarct or anomaly.     The visualized portions of the sinuses and mastoids appear normal.  There is no evidence of trauma.      Impression    IMPRESSION: Normal CT scan of the head.      ELIJAH SORTO MD   Chest XR,  PA & LAT    Narrative    CHEST TWO VIEW   7/2/2017 9:24 PM     HISTORY: Cough, wheezing.    COMPARISON: None.      Impression    IMPRESSION: Negative exam.    ELIJAH SORTO MD              Pending Results:        Unresulted Labs Ordered in the Past 30 Days of this Admission     Date and Time Order Name Status Description    7/2/2017 2034 Blood culture Preliminary     7/2/2017 2034 Blood culture Preliminary               Consultations This Hospital Stay:      No consultations were requested during this admission         Discharge Instructions and Follow-Up:      Follow-up Appointments     Follow-up and recommended  labs and tests        Follow up with primary care provider, Disha Van, within 7 days for   hospital follow- up.  The following labs/tests are recommended: BMP.  Recommend checking blood pressure once daily and again if feeling   lightheaded or if you fall.   Continue home care services                  Pt instructed to follow up with PCP in 7 days  Follow-up Labs BMP        Discharge Disposition:      Discharged to home         Discharge Medications:        Current Discharge Medication List      CONTINUE these medications which have CHANGED    Details   lisinopril (PRINIVIL/ZESTRIL) 40 MG tablet Take 0.5 tablets (20 mg) by mouth daily  Qty: 90 tablet, Refills: 1    Associated Diagnoses: Hypotension, unspecified hypotension type; Benign essential hypertension         CONTINUE these medications which have NOT CHANGED    Details   albuterol (PROAIR HFA/PROVENTIL HFA/VENTOLIN HFA) 108 (90 BASE) MCG/ACT Inhaler Inhale 2 puffs into the lungs every 4 hours as needed for shortness of breath / dyspnea or wheezing  Qty: 1 Inhaler, Refills: 0    Associated Diagnoses: Bronchitis      nicotine (NICOTROL) 10 MG Inhaler Inhale 2 cartridge into the lungs daily as needed for smoking cessation  Qty: 30 each, Refills: 0    Associated Diagnoses: Tobacco use disorder      traMADol (ULTRAM) 50 MG tablet Take 2 tablets (100 mg) by mouth 3 times daily May take 5 per day  Qty: 180 tablet, Refills: 1    Associated Diagnoses: Facet arthropathy, cervical; Facet arthropathy, lumbar; Fibromyalgia; Bilateral shoulder pain, unspecified chronicity      pregabalin (LYRICA) 150 MG capsule Take 1 capsule (150 mg) by mouth 3 times daily  Qty: 270 capsule, Refills: 1    Associated Diagnoses: Fibromyalgia; Chronic bilateral low back pain without sciatica      traZODone (DESYREL) 100 MG tablet Take 2 tablets (200 mg) by mouth At Bedtime  Qty: 60 tablet, Refills: 5    Associated Diagnoses: Insomnia, unspecified type      cetirizine (ZYRTEC) 10 MG tablet  "Take 1 tablet (10 mg) by mouth daily  Qty: 90 tablet, Refills: 3    Associated Diagnoses: Allergic rhinitis, unspecified allergic rhinitis trigger, unspecified rhinitis seasonality      atorvastatin (LIPITOR) 40 MG tablet Take 1 tablet (40 mg) by mouth daily  Qty: 90 tablet, Refills: 1    Associated Diagnoses: Hyperlipidemia LDL goal <100      metFORMIN (GLUCOPHAGE) 500 MG tablet Take 1 tablet (500 mg) by mouth 2 times daily (with meals)  Qty: 180 tablet, Refills: 1    Associated Diagnoses: Type 2 diabetes mellitus with diabetic neuropathy, without long-term current use of insulin (H)      omeprazole (PRILOSEC) 20 MG CR capsule Take 20 mg by mouth daily      DULoxetine (CYMBALTA) 60 MG EC capsule Take 60 mg by mouth daily      cyclobenzaprine (FLEXERIL) 10 MG tablet Take 10 mg by mouth 2 times daily as needed for muscle spasms         STOP taking these medications       verapamil (CALAN-SR) 180 MG CR tablet Comments:   Reason for Stopping:                   Allergies:         Allergies   Allergen Reactions     Penicillins Hives           Condition and Physical on Discharge:      Discharge condition: Stable   Vitals: Blood pressure 131/66, pulse 88, temperature 97.9  F (36.6  C), temperature source Oral, resp. rate 16, height 1.765 m (5' 9.5\"), weight 96.8 kg (213 lb 8 oz), SpO2 91 %.  213 lbs 8 oz      GENERAL:  Comfortable.  PSYCH: pleasant, oriented, No acute distress.  HEART:  Normal S1, S2 with no murmur, no pericardial rub, gallops or S3 or S4.  LUNGS:  Clear to auscultation, normal Respiratory effort. No wheezing, rales or ronchi.   EXTREMITIES:  Able to ambulate independently with walker  SKIN:  Dry to touch, No rash, wound or ulcerations.  NEUROLOGIC:  Grossly intact    Amelia Laguerre PA-C  "

## 2017-07-03 NOTE — PROGRESS NOTES
OBSERVATION patient END time: 2209    Administratively closed by Health Information Management, 10/5/2017, marty

## 2017-07-03 NOTE — PLAN OF CARE
Problem: Discharge Planning  Goal: Discharge Planning (Adult, OB, Behavioral, Peds)  PRIMARY DIAGNOSIS: SYNCOPE/TIA  OUTPATIENT/OBSERVATION GOALS TO BE MET BEFORE DISCHARGE:  1. Orthostatic performed: Yes:  Done in ER        Lying Orthostatic BP: 91/47         Sitting Orthostatic BP: 56/35                2. Diagnostic testing complete & at baseline neurologic testing: Yes     3. Cleared by consultants (if involved): No-Pt consult      4. Interpretation of cardiac rhythm per telemetry tech: N/A     5. Tolerating adequate PO diet and medications: Yes-regular diet     6. Return to near baseline physical activity or neurologic status: Yes- SBA. No dizziness reported     Discharge Planner Nurse   Safe discharge environment identified: Yes  Barriers to discharge: No       Entered by: Miya Viera 07/03/2017 1:53 AM     Please review provider order for any additional goals.   Nurse to notify provider when observation goals have been met and patient is ready for discharge.     Orthos done in ER. Will recheck next patient gets up. Moved as SBA to bathroom- no dizziness reported. PT to see. Fluids started. Patient on 4 times a day blood sugar, does not check at home. Insulin ordered- does not take at home. On metformin. Blood sugar checked 255 at 0100. Drank apple juice and OJ with pudding prior. Not going to crack expensive insulin pen. On regular diet. On strict I and O. Patient is smoker-nicotine inhaler given.

## 2017-07-03 NOTE — PLAN OF CARE
Problem: Discharge Planning  Goal: Discharge Planning (Adult, OB, Behavioral, Peds)  PRIMARY DIAGNOSIS: SYNCOPE/TIA  OUTPATIENT/OBSERVATION GOALS TO BE MET BEFORE DISCHARGE:  1. Orthostatic performed: Yes:  Done in ER        Lying Orthostatic BP: 91/47         Sitting Orthostatic BP: 56/35           2. Diagnostic testing complete & at baseline neurologic testing: Yes      3. Cleared by consultants (if involved): No-Pt consult       4. Interpretation of cardiac rhythm per telemetry tech: N/A      5. Tolerating adequate PO diet and medications: Yes-regular diet      6. Return to near baseline physical activity or neurologic status: Yes- SBA. No dizziness reported      Discharge Planner Nurse   Safe discharge environment identified: Yes  Barriers to discharge: No       Entered by: Miya Viera 07/03/2017 1:53 AM     Please review provider order for any additional goals.   Nurse to notify provider when observation goals have been met and patient is ready for discharge.      Orthos redone. See chart. Patient ambulating as SBA to bathroom. Reports a little dizziness when ambulating. PT to see. Regular diet. Fluids running.

## 2017-07-03 NOTE — ED PROVIDER NOTES
"  History     Chief Complaint:  Fall and Hypotension    HPI   Vijaya Manjarrez is a 67 year old female with a history of fibromyalgia, HTN, HLD, chronic pain, and COPD who presents to the emergency department today via EMS for evaluation of multiple falls. The patient was recently admitted from 6/16 to 6/18 for evaluation of an acute COPD exacerbation, syncope, and recurrent falls and states she was told to return if she continued to fall. The patient reports that over the past few days she has fallen multiple times and that it has gotten more frequent recently. She reports that she is not tripping over anything and that it feels like \"her feet give out on her\". She states that in the last two days she has fallen five times. The patient reports that she hit her head last night and currently has a headache but did not lose consciousness. She also states that her chronic cough has become productive in the last three or four days, and she says she has been coughing up what looks like green mucous. The patient denies objective fever. The patient also reports that she has been shaky recently. She denies numbness, tingling, focal weakness, nausea, vomiting, neck pain, back pain, abdominal pain, diarrhea, or pain with urination. Of note, the patient lives by herself.    Allergies:  Penicillins    Medications:    Albuterol  Deltasone  Nicotrol  Lisinopril  Ultram  Lyrica  Desyrel  Zyrtec  Verapamil  Lipitor  Glucophage  Prilosec  Cymbalta  Flexeril    Past Medical History:    Anxiety   Cervical spine degeneration   COPD (chronic obstructive pulmonary disease)   Diabetes mellitus, type 2   Diabetic neuropathy   Facet arthropathy, lumbar   GERD (gastroesophageal reflux disease)   HTN (hypertension)   Hyperlipidemia   Lumbar degenerative disc disease   Migraine headache     Past Surgical History:    Laparoscopic cholecystectomy     Family History:    Mother: Cancer  Father: Lung Cancer  Maternal Grandmother: Breast Cancer " "    Social History:  Smoking Status: Former Smoker -- Cigarettes  Alcohol Use: Negative   Marital Status:  Single [1]     Review of Systems   Respiratory: Positive for cough (productive).    Gastrointestinal: Negative for abdominal pain, diarrhea, nausea and vomiting.   Genitourinary: Negative for dysuria.   Musculoskeletal: Negative for back pain and neck pain.   Neurological: Positive for headaches. Negative for weakness and numbness.        Positive for shakiness.  Negative for loss of consciousness.   All other systems reviewed and are negative.    Physical Exam   Vitals:  Patient Vitals for the past 24 hrs:   BP Temp Temp src Pulse Heart Rate Resp SpO2 Height Weight   07/02/17 2239 131/65 - - - 85 - - - -   07/02/17 2236 119/66 - - - 79 - - - -   07/02/17 2233 111/64 - - - 79 - - - -   07/02/17 2149 92/59 - - - 73 - 93 % - -   07/02/17 2040 - - - - - - 93 % - -   07/02/17 2030 98/50 - - - 89 - 91 % - -   07/02/17 2026 - - - - 86 - 93 % - -   07/02/17 2025 101/50 - - - 86 - - - -   07/02/17 2013 99/52 99.7  F (37.6  C) Oral 89 89 16 92 % 1.753 m (5' 9\") 93 kg (205 lb)      Physical Exam  Constitutional: The patient is oriented to person, place, and time. Alert and cooperative.  HENT:   Head: atraumatic.   Right Ear: External ear normal. TM normal appearing.   Left Ear: External ear normal. TM normal appearing.   Nose: Nose normal.   Mouth/Throat: Uvula is midline, oropharynx is clear and moist and mucous membranes are normal. No posterior oropharyngeal edema or erythema.   Eyes: Conjunctivae, EOM and lids are normal. Pupils are equal, round, and reactive to light.   Neck: Trachea normal. Normal range of motion. Neck supple.   Cardiovascular: Normal rate, regular rhythm, normal heart sounds, and intact distal pulses.    Pulmonary/Chest: Decreased breath sounds bilaterally. No wheezes, rales, or rhonchi.  Abdominal: Soft. No tenderness. No rebound and no guarding.   Musculoskeletal: Normal range of motion.  No " extremity tenderness or edema. No midline tenderness, step-offs, or deformities in the C/T/L spine.  Neuro : Alert and oriented x 3. Cranial nerves 2-12 grossly intact. No facial asymmetry, dysarthria, or aphasia. Strength 5/5 in upper and lower extremities bilaterally. Sensation intact to light touch throughout.   Skin: Skin is dry. No rash noted.          Emergency Department Course     ECG:  ECG taken at 2012, ECG read at 2040  Sinus rhythm with occasional premature ventricular complexes  Low voltage QRS  Inferior infarct, age undetermined  Abnormal ECG  Rate 86 bpm. NY interval 172 ms. QRS duration 92 ms. QT/QTc 368/440 ms. P-R-T axes 60 51 51.    Imaging:  Radiology findings were communicated with the patient who voiced understanding of the findings.    Chest XR,  PA & LAT  Negative exam.  ELIJAH SORTO MD  Reading per radiology    Head CT w/o contrast  Normal CT scan of the head.  ELIJAH SORTO MD  Reading per radiology    Laboratory:  Laboratory findings were communicated with the patient who voiced understanding of the findings.    Lactic Acid Whole Blood: 1.5  UA: Protein Albumin: 30 (A), Mucous: Present (A), Hyaline Casts: 4 (H)  Blood Culture: Pending   CBC: WBC 12.2 (H), HGB 12.3,   BMP: Glucose 142 (H), Creatinine 1.67 (H), GFR Estimate 31 (L), Calcium 8.3 (L)   Troponin I (Collected 2023): <0.015  Lactic Acid Whole Blood: 2.2 (H)  Blood Culture: Pending   Hepatic Panel: Normal    Interventions:  2040 Duoneb 3 ml Nebulization  2052 Methylprednisolone 125 mg IV  2159 NS 1000 ml IV   2037  ml IV    Emergency Department Course:  Nursing notes and vitals reviewed.  I performed an exam of the patient as documented above.   The patient was sent for a Head CT w/o contrast and a Chest XR,  PA & LAT while in the emergency department, results above.   IV was inserted and blood was drawn for laboratory testing, results above.  The patient provided a urine sample here in the emergency department. This  was sent for laboratory testing, findings above.  I discussed the treatment plan with the patient. They expressed understanding of this plan and consented to admission. I discussed the patient with Dr. Salo Candelaria, who will admit the patient to a monitored bed for further evaluation and treatment.  I personally reviewed the laboratory and imaging results with the patient and answered all related questions prior to admission.  Impression & Plan      Medical Decision Making:  Vijaya Manjarrez is a 67 year old female with a history of diabetes, hyperlipidemia, hypertension, COPD, fibromyalgia, and chronic pain who presents to the emergency department today for evaluation of generalized weakness and frequent falls. Upon presentation in the emergency department, the patient is non toxic appearing. Vitals are within normal limits and stable. On exam she is well appearing. She is alert, oriented, and neurologic exam is non focal. Cardiopulmonary exam is unremarkable. Abdomen is soft and non tender throughout. Head is atraumatic and without signs of basilar skull fracture. She has no midline tenderness, step offs, or deformities in the C/T/L-spine. The rest of her exam is as mentioned above. EKG was obtained and demonstrates sinus rhythm. There are no concerning acute ischemic changes. Orthostatics were obtained and upon sitting the patient's systolic blood pressure did decrease into the 50's and she endorsed feeling very lightheaded. The patient was then laid down. Given these findings, I am concerned for orthostasis/dehydration. Therefore 2 liters of IV fluid was given. Labs were obtained and are as mentioned above. Notably, she does have a mild leukocytosis of 12.2 and lactate is very mildly elevated at 2.2. She does have evidence of an acute kidney injury with a creatinine of 1.67. Blood cultures are in process. Given the patient's history and presentation, I am suspicious for orthostasis and dehydration. She  denies any true syncopal events. CXR demonstrates no evidence of infiltrate, thus I feel that pneumonia is less likely. Urinalysis is not concerning for an infectious process. She denies any abdominal pain, thus an acute intra-abdominal process is unlikely. Although she has a mild leukocytosis and lactate is mildly elevated, she is afebrile and there is no clear source of an infectious process, thus I do not feel that antibiotics are indicated at this time. I do feel that her elevated lactate is likely secondary to her dehydration. Given that the patient does endorse generalized weakness with evidence of orthostasis and an acute kidney injury, I do feel that admission is indicated at this time. She was discussed with the hospitalist and he agreed to accept this patient. She was stable/improved at the time of admission.     Diagnosis:    ICD-10-CM    1. Orthostasis I95.1 UA with Microscopic     Lactic acid whole blood   2. Acute kidney injury (H) N17.9    3. Generalized muscle weakness M62.81      Disposition:   The patient is admitted into the care of Dr. Salo Candelaria.    Scribe Disclosure:  Alfredo GODDARD, am serving as a scribe at 8:45 PM on 7/2/2017 to document services personally performed by Amelia Gomez MD based on my observations and the provider's statements to me.    [facility] EMERGENCY DEPARTMENT       Amelia Gomez MD  07/03/17 0117

## 2017-07-03 NOTE — PLAN OF CARE
Problem: Discharge Planning  Goal: Discharge Planning (Adult, OB, Behavioral, Peds)  Outcome: Adequate for Discharge Date Met:  07/03/17  PRIMARY DIAGNOSIS: SYNCOPE  OUTPATIENT/OBSERVATION GOALS TO BE MET BEFORE DISCHARGE:  1. Orthostatic performed: Yes-negative this am.        Lying Orthostatic BP: 126/65, pulse 85        Sitting Orthostatic BP: 139/64, pulse 89        Standing Orthostatic BP:  134/71, pulse 98          2. Diagnostic testing complete & at baseline neurologic testing: Yes      3. Cleared by consultants (if involved): NA-PT consult canceled, pt up with SBA and walker in [place]      4. Interpretation of cardiac rhythm per telemetry tech: N/A      5. Tolerating adequate PO diet and medications: Yes-regular diet      6. Return to near baseline physical activity or neurologic status: Yes      Discharge Planner Nurse   Safe discharge environment identified: Yes  Barriers to discharge: No             Please review provider order for any additional goals.   Nurse to notify provider when observation goals have been met and patient is ready for discharge.      A & 0 x 4, orthos negative this morning. Denies nausea, does have chronic pain from fibromyalgia-given Tramadol.   Up to bathroom/in [place] with SBA and walker.  Reporting mild weakness but denied dizziness with last ambulation in [place].  Tolerating regular diet, IVFs d/c'd.  DOLORES re-scheduled MD appt pt missed today, made diabetes referral and now working on home blood pressure cuff.  Continue to monitor and offer supportive cares, plan is to d/c later this afternoon.

## 2017-07-03 NOTE — PHARMACY-ADMISSION MEDICATION HISTORY
Admission medication history interview status for this patient is complete. See [facility] admission navigator for allergy information, prior to admission medications and immunization status.     Medication history interview source(s):Patient  Medication history resources (including written lists, pill bottles, clinic record): Discharge summary from 6.18.17.  Primary [facility]    Changes made to PTA medication list:  Added:  none  Deleted: prednisone (completed the course)  Changed: none    Actions taken by pharmacist (provider contacted, etc):None     Additional medication history information:None    Medication reconciliation/reorder completed by provider prior to medication history? Yes    For patients on insulin therapy: No        Prior to Admission medications    Medication Sig Last Dose Taking? Auth Provider   albuterol (PROAIR HFA/PROVENTIL HFA/VENTOLIN HFA) 108 (90 BASE) MCG/ACT Inhaler Inhale 2 puffs into the lungs every 4 hours as needed for shortness of breath / dyspnea or wheezing prn Yes Gonzalo Rojo MD   nicotine (NICOTROL) 10 MG Inhaler Inhale 2 cartridge into the lungs daily as needed for smoking cessation prn Yes Gonzalo Rojo MD   lisinopril (PRINIVIL/ZESTRIL) 40 MG tablet Take 1 tablet (40 mg) by mouth daily 7/2/2017 at am Yes Disha Van MD   traMADol (ULTRAM) 50 MG tablet Take 2 tablets (100 mg) by mouth 3 times daily May take 5 per day 7/2/2017 at Unknown time Yes Disha Van MD   pregabalin (LYRICA) 150 MG capsule Take 1 capsule (150 mg) by mouth 3 times daily 7/2/2017 at Unknown time Yes Disha Van MD   traZODone (DESYREL) 100 MG tablet Take 2 tablets (200 mg) by mouth At Bedtime 7/1/2017 at hs Yes Disha Van MD   cetirizine (ZYRTEC) 10 MG tablet Take 1 tablet (10 mg) by mouth daily 7/2/2017 at Unknown time Yes Disha Van MD   verapamil (CALAN-SR) 180 MG CR tablet Take 1 tablet (180 mg) by mouth At Bedtime  Patient taking differently: Take 180 mg by  mouth every morning  7/1/2017 at pm Yes Disha Van MD   atorvastatin (LIPITOR) 40 MG tablet Take 1 tablet (40 mg) by mouth daily 7/2/2017 at Unknown time Yes Disha Van MD   metFORMIN (GLUCOPHAGE) 500 MG tablet Take 1 tablet (500 mg) by mouth 2 times daily (with meals) 7/2/2017 at am Yes Disha Van MD   omeprazole (PRILOSEC) 20 MG CR capsule Take 20 mg by mouth daily 7/2/2017 at Unknown time Yes Reported, Patient   DULoxetine (CYMBALTA) 60 MG EC capsule Take 60 mg by mouth daily 7/2/2017 at Unknown time Yes Reported, Patient   cyclobenzaprine (FLEXERIL) 10 MG tablet Take 10 mg by mouth 2 times daily as needed for muscle spasms 7/2/2017 at am Yes Reported, Patient

## 2017-07-03 NOTE — PROGRESS NOTES
Patient's After Visit Summary was reviewed with patient   Patient verbalized understanding of After Visit Summary, recommended follow up and was given an opportunity to ask questions.   Discharge medications sent home with patient/family: n/a   Discharged with self-left unit via wheelchair, friend picking up at main entrance

## 2017-07-03 NOTE — H&P
[facility]  History and Physical   Hospitalist Service    Salo Candelaria MD    Vijaya Manjarrez MRN# [id number]   YOB: 1950 Age: 67 year old      Date of Admission:  7/2/2017           Assessment and Plan:   Patient is a 67-year-old female with history of hypertension, hypercholesterolemia, migraines, lumbar spine disease, GERD, type 2 diabetes, COPD, spinal stenosis, cervical spine disease, anxiety, and laparoscopic  cholecystectomy.  She was recently hospitalized here last month with acute COPD exacerbation. During that admission blood pressure was low so hydrochlorothiazide was stopped. Lisinopril and Verapamil were continued. She presented to the emergency department today for evaluation of lightheadedness, 5 falls in the last 2 days, and cough productive of green sputum.  Emergency department workup suggested dehydration and showed hypotension, acute kidney insufficiency, and mild lactic acidosis.    Problem list:    1.  Falls recently, likely due to dehydration and hypotension.  Vijaya received 2 L of normal saline in the emergency department.  Continue normal saline at 100 cc per hour.  Hold lisinopril and verapamil.  Consult physical therapy.    2. Hypotension.  Suspect in part due to dehydration.  I suspect also that she might not need so much blood pressure medicine.  I will hold lisinopril and verapamil.  If renal function normalizes I would prescribe lisinopril 20 mg daily at discharge but no verapamil.    3.  Acute kidney insufficiency.  This is likely due to dehydration and exacerbated by lisinopril.  Hold lisinopril and Glucophage.  Hydrate.  Repeat basic metabolic panel in the morning.    4.  Type 2 diabetes.  NovoLog insulins sliding scale will be used while Glucophage is on hold.    5.  Mild lactic acidosis, due to dehydration, resolved.    6.  Possible viral syndrome with productive cough and low-grade temperature.  This may have played some role in  dehydration.    7.  COPD without acute exacerbation.    Full code  Ambulate for DVT prophylaxis  Disposition: Admit under observation status           Code Status:   Full Code         Primary Care Physician:   Disha Van [phone]         Chief Complaint:   Fall, lightheadedness, and hypotension    History is obtained from Dr. Jason Lilly, and the medical record         History of Present Illness:   Vijaya Manjarrez is a 67-year-old female with history of hypertension, hypercholesterolemia, migraines, lumbar spine disease, GERD, type 2 diabetes, COPD, spinal stenosis, cervical spine disease, anxiety, and laparoscopic  cholecystectomy.  She was recently hospitalized here last month with acute COPD exacerbation. During that admission blood pressure was low so hydrochlorothiazide was stopped. Lisinopril and Verapamil were continued. She presented to the emergency department today for evaluation of lightheadedness, 5 falls in the last 2 days, and cough productive of green sputum.  Emergency department workup showed systolic blood pressure in the 90s.  She had orthostatic hypotension.  Physical exam suggested dehydration.  Labs showed acute kidney insufficiency, and mild lactic acidosis.           Past Medical History:     Patient Active Problem List   Diagnosis     HCD (health care directive)     Type 2 diabetes mellitus with diabetic neuropathy, without long-term current use of insulin (H)     COPD (chronic obstructive pulmonary disease) (H)     Cervical spine degeneration     Facet arthropathy, lumbar     Lumbar degenerative disc disease     Migraine headache     Diabetic neuropathy (H)     Anxiety     GERD (gastroesophageal reflux disease)     Hyperlipidemia LDL goal <100     Fibromyalgia     Benign essential hypertension     Controlled substance agreement signed     Chronic pain syndrome     COPD exacerbation (H)     Hypotension, unspecified hypotension type     Fall     Acute kidney failure (H)      Dehydration     Hypotension      Past Medical History:   Diagnosis Date     Anxiety      Cervical spine degeneration 2016    spinal stenosis,      COPD (chronic obstructive pulmonary disease) (H) 2012    mild     Diabetes mellitus, type 2 (H)      Diabetic neuropathy (H)      Facet arthropathy, lumbar      GERD (gastroesophageal reflux disease)      HTN (hypertension)      Hyperlipidemia      Lumbar degenerative disc disease      Migraine headache              Past Surgical History:     Past Surgical History:   Procedure Laterality Date     CHOLECYSTECTOMY, LAPOROSCOPIC      Cholecystectomy, Laparoscopic            Home Medications:     Prior to Admission medications    Medication Sig Last Dose Taking? Auth Provider   albuterol (PROAIR HFA/PROVENTIL HFA/VENTOLIN HFA) 108 (90 BASE) MCG/ACT Inhaler Inhale 2 puffs into the lungs every 4 hours as needed for shortness of breath / dyspnea or wheezing   Gonzalo Rojo MD   predniSONE (DELTASONE) 20 MG tablet Take 1.5 tabs (30 mg) daily x 3 days, 1 tab (20 mg) daily x 3 days, then 1/2 tab (10 mg) x 3 days.   Gonzalo Rojo MD   nicotine (NICOTROL) 10 MG Inhaler Inhale 2 cartridge into the lungs daily as needed for smoking cessation   Gonzalo Rojo MD   lisinopril (PRINIVIL/ZESTRIL) 40 MG tablet Take 1 tablet (40 mg) by mouth daily   Disha Van MD   traMADol (ULTRAM) 50 MG tablet Take 2 tablets (100 mg) by mouth 3 times daily May take 5 per day   Disha Van MD   pregabalin (LYRICA) 150 MG capsule Take 1 capsule (150 mg) by mouth 3 times daily   Disha Van MD   traZODone (DESYREL) 100 MG tablet Take 2 tablets (200 mg) by mouth At Bedtime   Disha Van MD   cetirizine (ZYRTEC) 10 MG tablet Take 1 tablet (10 mg) by mouth daily   Disha Van MD   verapamil (CALAN-SR) 180 MG CR tablet Take 1 tablet (180 mg) by mouth At Bedtime  Patient taking differently: Take 180 mg by mouth every morning    Disha Van MD   atorvastatin (LIPITOR) 40  "MG tablet Take 1 tablet (40 mg) by mouth daily   Disha Van MD   metFORMIN (GLUCOPHAGE) 500 MG tablet Take 1 tablet (500 mg) by mouth 2 times daily (with meals)   Disha Van MD   omeprazole (PRILOSEC) 20 MG CR capsule Take 20 mg by mouth daily   Reported, Patient   DULoxetine (CYMBALTA) 60 MG EC capsule Take 60 mg by mouth daily   Reported, Patient   cyclobenzaprine (FLEXERIL) 10 MG tablet Take 10 mg by mouth 2 times daily as needed for muscle spasms   Reported, Patient            Allergies:     Allergies   Allergen Reactions     Penicillins Hives            Social History:     Social History   Substance Use Topics     Smoking status: Former Smoker     Types: Cigarettes     Smokeless tobacco: Not on file     Alcohol use No             Family History:     Family History   Problem Relation Age of Onset     CANCER Mother      CANCER Father      Lung cancer     CANCER Maternal Grandmother      Breast cancer              Review of Systems:   The 10 point Review of Systems is negative other than as noted in the HPI.           Physical Exam:   Blood pressure 128/65, pulse 89, temperature 97.8  F (36.6  C), temperature source Oral, resp. rate 20, height 1.765 m (5' 9.5\"), weight 96.8 kg (213 lb 8 oz), SpO2 94 %.  213 lbs 8 oz      GENERAL: Pleasant and cooperative. No acute distress.  EYES: Pupils equal and round. No scleral erythema or icterus.  ENT: External ears are normal without deformity. Posterior oropharynx is without erythem, swelling, or exudate.  NECK: Supple. No masses or swelling. No tenderness. Thyroid is normal without mass or tenderness.  CHEST: Clear to auscultation. Normal breath sounds. No retractions.   CV: Regular rate and rhythm. No JVD. Pulses normal.  ABDOMEN: Bowel sounds present. No tenderness. No masses or hernia.  EXTREMETIES: No clubbing, cyanosis, or ischemia.  SKIN: Warm and dry to touch. No wounds or rashes.  NEUROLOGIC: Strength and sensation are normal. Deep tendon reflexes are " normal. Cranial nerves are normal.             Data:   All new lab and imaging data was reviewed.     Results for orders placed or performed during the hospital encounter of 07/02/17 (from the past 24 hour(s))   EKG 12 lead   Result Value Ref Range    Interpretation ECG Click View Image link to view waveform and result    CBC + differential   Result Value Ref Range    WBC 12.2 (H) 4.0 - 11.0 10e9/L    RBC Count 4.11 3.8 - 5.2 10e12/L    Hemoglobin 12.3 11.7 - 15.7 g/dL    Hematocrit 37.8 35.0 - 47.0 %    MCV 92 78 - 100 fl    MCH 29.9 26.5 - 33.0 pg    MCHC 32.5 31.5 - 36.5 g/dL    RDW 13.5 10.0 - 15.0 %    Platelet Count 265 150 - 450 10e9/L    Diff Method Automated Method     % Neutrophils 56.1 %    % Lymphocytes 34.8 %    % Monocytes 6.8 %    % Eosinophils 1.3 %    % Basophils 0.6 %    % Immature Granulocytes 0.4 %    Nucleated RBCs 0 0 /100    Absolute Neutrophil 6.8 1.6 - 8.3 10e9/L    Absolute Lymphocytes 4.2 0.8 - 5.3 10e9/L    Absolute Monocytes 0.8 0.0 - 1.3 10e9/L    Absolute Eosinophils 0.2 0.0 - 0.7 10e9/L    Absolute Basophils 0.1 0.0 - 0.2 10e9/L    Abs Immature Granulocytes 0.1 0 - 0.4 10e9/L    Absolute Nucleated RBC 0.0    Basic metabolic panel (BMP)   Result Value Ref Range    Sodium 142 133 - 144 mmol/L    Potassium 3.6 3.4 - 5.3 mmol/L    Chloride 106 94 - 109 mmol/L    Carbon Dioxide 30 20 - 32 mmol/L    Anion Gap 6 3 - 14 mmol/L    Glucose 142 (H) 70 - 99 mg/dL    Urea Nitrogen 21 7 - 30 mg/dL    Creatinine 1.67 (H) 0.52 - 1.04 mg/dL    GFR Estimate 31 (L) >60 mL/min/1.7m2    GFR Estimate If Black 37 (L) >60 mL/min/1.7m2    Calcium 8.3 (L) 8.5 - 10.1 mg/dL   Troponin I   Result Value Ref Range    Troponin I ES  0.000 - 0.045 ug/L     <0.015  The 99th percentile for upper reference range is 0.045 ug/L.  Troponin values in   the range of 0.045 - 0.120 ug/L may be associated with risks of adverse   clinical events.     Lactic acid whole blood   Result Value Ref Range    Lactic Acid 2.2 (H) 0.7 -  2.1 mmol/L   Hepatic panel   Result Value Ref Range    Bilirubin Direct <0.1 0.0 - 0.2 mg/dL    Bilirubin Total 0.3 0.2 - 1.3 mg/dL    Albumin 3.4 3.4 - 5.0 g/dL    Protein Total 6.9 6.8 - 8.8 g/dL    Alkaline Phosphatase 83 40 - 150 U/L    ALT 22 0 - 50 U/L    AST 16 0 - 45 U/L   Blood culture   Result Value Ref Range    Specimen Description Blood Left Arm     Special Requests Aerobic and anaerobic bottles received     Culture Micro Pending     Micro Report Status Pending    Head CT w/o contrast    Narrative    CT SCAN OF THE HEAD WITHOUT CONTRAST   7/2/2017 9:13 PM     HISTORY: Trauma    TECHNIQUE:  Axial images of the head and coronal reformations without  IV contrast material.  Radiation dose for this scan was reduced using  automated exposure control, adjustment of the mA and/or kV according  to patient size, or iterative reconstruction technique.    COMPARISON: 6/16/2017    FINDINGS:  The ventricles are normal in size, shape and configuration.   The brain parenchyma and subarachnoid spaces are normal. There is no  evidence of intracranial hemorrhage, mass, acute infarct or anomaly.     The visualized portions of the sinuses and mastoids appear normal.  There is no evidence of trauma.      Impression    IMPRESSION: Normal CT scan of the head.      ELIJAH SORTO MD   Chest XR,  PA & LAT    Narrative    CHEST TWO VIEW   7/2/2017 9:24 PM     HISTORY: Cough, wheezing.    COMPARISON: None.      Impression    IMPRESSION: Negative exam.    ELIJAH SORTO MD   UA with Microscopic   Result Value Ref Range    Color Urine Yellow     Appearance Urine Clear     Glucose Urine Negative NEG mg/dL    Bilirubin Urine Negative NEG    Ketones Urine Negative NEG mg/dL    Specific Gravity Urine 1.014 1.003 - 1.035    Blood Urine Negative NEG    pH Urine 5.0 5.0 - 7.0 pH    Protein Albumin Urine 30 (A) NEG mg/dL    Urobilinogen mg/dL 0.0 0.0 - 2.0 mg/dL    Nitrite Urine Negative NEG    Leukocyte Esterase Urine Negative NEG    Source  Midstream Urine     WBC Urine 1 0 - 2 /HPF    RBC Urine 1 0 - 2 /HPF    Squamous Epithelial /HPF Urine <1 0 - 1 /HPF    Mucous Urine Present (A) NEG /LPF    Hyaline Casts 4 (H) 0 - 2 /LPF   Lactic acid whole blood   Result Value Ref Range    Lactic Acid 1.5 0.7 - 2.1 mmol/L

## 2017-07-03 NOTE — PROGRESS NOTES
I saw Vijaya Manjarrez on 7/3/17 due to recurrent falls due to hypotension. Her blood pressure medications have been adjusted and she will need to continue to monitor her blood pressures at home. The patient will need a blood pressure cuff and monitor to check her blood pressure daily and record. I have discussed these recommendations with the patient and she understands the instructions.     Amelia Laguerre PA-C

## 2017-07-03 NOTE — PLAN OF CARE
Problem: Discharge Planning  Goal: Discharge Planning (Adult, OB, Behavioral, Peds)  Outcome: Improving  PRIMARY DIAGNOSIS: SYNCOPE  OUTPATIENT/OBSERVATION GOALS TO BE MET BEFORE DISCHARGE:  1. Orthostatic performed: Yes-negative this am.        Lying Orthostatic BP: 126/65, pulse 85        Sitting Orthostatic BP: 139/64, pulse 89        Standing Orthostatic BP:  134/71, pulse 98          2. Diagnostic testing complete & at baseline neurologic testing: Yes      3. Cleared by consultants (if involved): NA-PT consult canceled, pt up with SBA and walker in ochoa      4. Interpretation of cardiac rhythm per telemetry tech: N/A      5. Tolerating adequate PO diet and medications: Yes-regular diet      6. Return to near baseline physical activity or neurologic status: No-mild dizziness and weakness reported with ambulation      Discharge Planner Nurse   Safe discharge environment identified: Yes  Barriers to discharge: No       Entered by: Miya Viera 07/03/2017 1:53 AM      Please review provider order for any additional goals.   Nurse to notify provider when observation goals have been met and patient is ready for discharge.      A & 0 x 4, orthos negative this morning. Denies nausea, does have chronic pain from fibromyalgia-awaiting verification of home meds.  Up to bathroom/in ochoa with SBA and walker, reports dizziness and gen weakness with ambulation- PT consult cancelled.  Tolerating regular diet, IVFs d/c'd.  Continue to monitor and offer supportive cares.

## 2017-07-05 ENCOUNTER — TELEPHONE (OUTPATIENT)
Dept: INTERNAL MEDICINE | Facility: CLINIC | Age: 67
End: 2017-07-05

## 2017-07-05 ENCOUNTER — NURSE TRIAGE (OUTPATIENT)
Dept: NURSING | Facility: CLINIC | Age: 67
End: 2017-07-05

## 2017-07-05 ENCOUNTER — CARE COORDINATION (OUTPATIENT)
Dept: CARE COORDINATION | Facility: CLINIC | Age: 67
End: 2017-07-05

## 2017-07-05 NOTE — PROGRESS NOTES
"Clinic Care Coordination Contact  [facility]/Voicemail    Referral Source: ED Follow-Up    Clinical Data: Care Coordinator Outreach    Patient was in observation at [facility] from 7/2/17 to 7/3/17 for recurrent falls and hypotension. Verapamil was discontinued. Lisinopril was decreased in dosage. Patient is to check her blood pressures at home.      Outreach attempted.  Left message on voicemail with call back information and requested return call.    Plan: Care Coordinator will try to reach patient again in 3-5 business days.    Jackeline Summers RN, CCM - Care Coordinator     7/5/2017    2:20 PM  [phone]      ___________________________________________________________    Clinic Care Coordination Contact  OUTREACH    Referral Information:  Referral Source: ED Follow-Up  Reason for Contact: Post hospital follow up / patient returned call   Care Conference: No     Universal Utilization:   ED Visits in last year: 2  Hospital visits in last year: 2  Last PCP appointment: 06/26/17  Missed Appointments: 0          Clinical Concerns:    Current Medical Concerns: Patient was seen in [facility] observation unit from 7/2/17 to 7/3/17 due to a fall and hypotension.    Patient states she \"is tired weak and sore.\" She states she feels bruised from her frequent falls. She denies any falls since returning to home. She states she is tired. She is checking her blood pressure using a friends monitor. She states yesterday it was 142/74 and today it is 169/91. She has not felt light headed.     Patient states she is drinking well. She drinks 5-6 16 oz bottles of water a day. Patient does not have a glucometer or her own blood pressure monitor. We discussed to needs to ask for these when she sees PCP on 7/10/17. She agrees.    Patient lives alone but she states she had people coming and going all the time, there is always someone checking on her.        Current Behavioral Concerns: Denies concerns      Education Provided to patient: " Glucometer and blood pressure records     Clinical Pathway Name: COPD - denies short of breath and she does not sound short of breath during our conversation.     Medication Management:  Home care is assisting, but she is able to manage meds. She is aware of discontinuing Verapamil and the dosage change in her lisinopril.      Functional Status:  Mobility Status: Independent  Equipment Currently Used at Home: bath bench, grab bar, raised toilet, walker, rolling  Transportation: Friends      Psychosocial:  Current living arrangement:: I live alone    Patient states she has many friends in her building     Resources and Interventions:  Current Resources: Home Care;          Advanced Care Plans/Directives on file:: No        Goals:   Goal 1 Statement: I am ready to quit smking    Goal 2 Statement: I should check my blood sugars  Goal 2 Progression Percent: 10%  Goal 2 Progression Date: 07/05/17     Barriers: Needs necessary tools and education  Strengths: Support system    Patient/Caregiver understanding: Expresses understanding  Frequency of Care Coordination: Monthly  Upcoming appointment: 07/10/17     Plan: Clinic care coordinator will continue to follow.    Jackeline Summers RN, CCM - Care Coordinator     7/5/2017    3:02 PM  [phone]

## 2017-07-05 NOTE — TELEPHONE ENCOUNTER
Fax received from [facility] for review and signature.  Put in Dr. Van's in basket.

## 2017-07-05 NOTE — TELEPHONE ENCOUNTER
IP F/U    Date: 7/4/17  Diagnosis: Orthostasis, Hypotension, Unspecified Hypotension Type  Is patient active in care coordination? Yes - Route to Care Coordination (P [id number])  Was patient in TCU? No    Next 5 appointments (look out 90 days)     Jul 10, 2017  3:00 PM CDT   SHORT with Disha Van MD   [facility] ([facility]    [address]  [facility] [id number] [phone]

## 2017-07-05 NOTE — TELEPHONE ENCOUNTER
Fax received from [facility] for review and signature.  Put in Dr. Van's in basket.

## 2017-07-05 NOTE — TELEPHONE ENCOUNTER
Fax received from [facility] for review and signature.  Put in Dr. Van's in basket.

## 2017-07-06 ENCOUNTER — TELEPHONE (OUTPATIENT)
Dept: INTERNAL MEDICINE | Facility: CLINIC | Age: 67
End: 2017-07-06

## 2017-07-06 DIAGNOSIS — S49.90XD SHOULDER INJURY, UNSPECIFIED LATERALITY, SUBSEQUENT ENCOUNTER: Primary | ICD-10-CM

## 2017-07-06 PROCEDURE — G0180 MD CERTIFICATION HHA PATIENT: HCPCS | Performed by: INTERNAL MEDICINE

## 2017-07-06 NOTE — TELEPHONE ENCOUNTER
"Home care nurse TREVON Bustos ( [phone]) called to update that patient is still having pain to right shoulder post ED discharge. Had a fall on 7/2/17 which required the ED visit. Patient states she is having pain \"11\"/10. Stated that Tramadol is not helping with pain management. Please advise. Patient has a hospital follow up  appointment on 7/10/17.     TREVON Franco     "

## 2017-07-07 ENCOUNTER — TELEPHONE (OUTPATIENT)
Dept: ORTHOPEDICS | Facility: CLINIC | Age: 67
End: 2017-07-07

## 2017-07-07 NOTE — TELEPHONE ENCOUNTER
Per Dr. Dominguez, patient to follow up with pain management for MRI results and treatment or may return in the office to go over MRI. However, patient would still be referred to pain management for treatment.   She is with the physical therapist right now. She states she will call back to schedule.     JOHN Weiner RN

## 2017-07-07 NOTE — TELEPHONE ENCOUNTER
Patient left voicemail stating she saw Dr. Dominguez last month and and MRI was done. She has not heard back what the results were.     Last office visit 6/13/17 plan:   History, exam and xray suggestive of cervical radic - acute/chronic  MRI of your neck has been ordered. Schedule with [facility] ([phone]).   Given chronicity and degree of degenerative change on xray referral to pain management for comprehensive  No medications prescribed today     Follow up with Pain Management. Call direct clinic number [[phone]] at any time with questions or concerns.     In reviewing chart, referral was placed to Pain Management, but no call has been made to patient from them to schedule. MRI done 6/27/17. Please advise if patient is to have the pain clinic go over MRI results. Otherwise, patient has appointment 7/10/17 with PCP.    JOHN Weiner RN

## 2017-07-10 ENCOUNTER — OFFICE VISIT (OUTPATIENT)
Dept: INTERNAL MEDICINE | Facility: CLINIC | Age: 67
End: 2017-07-10
Payer: COMMERCIAL

## 2017-07-10 VITALS
HEART RATE: 80 BPM | OXYGEN SATURATION: 96 % | HEIGHT: 70 IN | RESPIRATION RATE: 16 BRPM | DIASTOLIC BLOOD PRESSURE: 52 MMHG | WEIGHT: 203.3 LBS | SYSTOLIC BLOOD PRESSURE: 76 MMHG | BODY MASS INDEX: 29.11 KG/M2 | TEMPERATURE: 99.1 F

## 2017-07-10 DIAGNOSIS — K21.9 GASTROESOPHAGEAL REFLUX DISEASE WITHOUT ESOPHAGITIS: ICD-10-CM

## 2017-07-10 DIAGNOSIS — R09.89 LABILE HYPERTENSION: Primary | ICD-10-CM

## 2017-07-10 DIAGNOSIS — J30.1 SEASONAL ALLERGIC RHINITIS DUE TO POLLEN, UNSPECIFIED CHRONICITY: ICD-10-CM

## 2017-07-10 DIAGNOSIS — E11.40 TYPE 2 DIABETES MELLITUS WITH DIABETIC NEUROPATHY, WITHOUT LONG-TERM CURRENT USE OF INSULIN (H): ICD-10-CM

## 2017-07-10 DIAGNOSIS — M51.369 LUMBAR DEGENERATIVE DISC DISEASE: ICD-10-CM

## 2017-07-10 PROCEDURE — 99496 TRANSJ CARE MGMT HIGH F2F 7D: CPT | Performed by: INTERNAL MEDICINE

## 2017-07-10 RX ORDER — DULOXETIN HYDROCHLORIDE 60 MG/1
60 CAPSULE, DELAYED RELEASE ORAL DAILY
Qty: 90 CAPSULE | Refills: 3 | Status: SHIPPED | OUTPATIENT
Start: 2017-07-10 | End: 2018-07-20

## 2017-07-10 RX ORDER — FLUTICASONE PROPIONATE 50 MCG
2 SPRAY, SUSPENSION (ML) NASAL DAILY
Qty: 1 BOTTLE | Refills: 11 | Status: SHIPPED | OUTPATIENT
Start: 2017-07-10 | End: 2019-06-25

## 2017-07-10 RX ORDER — MONTELUKAST SODIUM 10 MG/1
TABLET ORAL
Qty: 30 TABLET | COMMUNITY
Start: 2017-07-10 | End: 2017-08-08

## 2017-07-10 RX ORDER — HYDROCHLOROTHIAZIDE 25 MG/1
TABLET ORAL
COMMUNITY
Start: 2016-05-04 | End: 2017-08-08

## 2017-07-10 RX ORDER — MONTELUKAST SODIUM 10 MG/1
TABLET ORAL
COMMUNITY
Start: 2016-05-04 | End: 2017-07-10

## 2017-07-10 NOTE — NURSING NOTE
"Chief Complaint   Patient presents with     Hospital F/U     discuss bp pressure medications- allergy meds not effective. Need refills on cymbalta and omeprazole       Initial BP (!) 76/52  Pulse 80  Temp 99.1  F (37.3  C) (Oral)  Resp 16  Ht 5' 9.5\" (1.765 m)  Wt 203 lb 4.8 oz (92.2 kg)  SpO2 96%  BMI 29.59 kg/m2 Estimated body mass index is 29.59 kg/(m^2) as calculated from the following:    Height as of this encounter: 5' 9.5\" (1.765 m).    Weight as of this encounter: 203 lb 4.8 oz (92.2 kg).  Medication Reconciliation: complete      Mary Shen CMA    '  "

## 2017-07-10 NOTE — MR AVS SNAPSHOT
After Visit Summary   7/10/2017    Vijaya Manjarrez    MRN: [id number]           Patient Information     Date Of Birth          1950        Visit Information        Provider Department      7/10/2017 3:00 PM Disha Van MD [facility]        Today's Diagnoses     Gastroesophageal reflux disease without esophagitis    -  1    Lumbar degenerative disc disease        Seasonal allergic rhinitis due to pollen, unspecified chronicity        Type 2 diabetes mellitus with diabetic neuropathy, without long-term current use of insulin (H)        Labile hypertension          Care Instructions    Stop the Lisinopril and take the HCTZ in am.   The nurse will call you on Thursday.           Follow-ups after your visit        Your next 10 appointments already scheduled     Jul 11, 2017  4:00 PM CDT   New Visit with Erika Dominguez DO   [facility] ([facility] [place])    [address] [facility]  [address] [place] [address]   [phone]              Who to contact     If you have questions or need follow up information about today's clinic visit or your schedule please contact [facility] directly at [phone].  Normal or non-critical lab and imaging results will be communicated to you by MIOTtechhart, letter or phone within 4 business days after the clinic has received the results. If you do not hear from us within 7 days, please contact the clinic through MIOTtechhart or phone. If you have a critical or abnormal lab result, we will notify you by phone as soon as possible.  Submit refill requests through Infochimps or call your pharmacy and they will forward the refill request to us. Please allow 3 business days for your refill to be completed.          Additional Information About Your Visit        MIOTtechhart Information     Infochimps lets you send messages to your doctor, view your test results, renew your prescriptions, schedule appointments and  "more. To sign up, go to [email or web address] . Click on \"Log in\" on the left side of the screen, which will take you to the Welcome page. Then click on \"Sign up Now\" on the right side of the page.     You will be asked to enter the access code listed below, as well as some personal information. Please follow the directions to create your username and password.     Your access code is: [email or web address]  Expires: 2017  2:59 PM     Your access code will  in 90 days. If you need help or a new code, please call your [place] clinic or [phone].        Care EveryWhere ID     This is your Care EveryWhere ID. This could be used by other organizations to access your [place] medical records  [id number]        Your Vitals Were     Pulse Temperature Respirations Height Pulse Oximetry BMI (Body Mass Index)    80 99.1  F (37.3  C) (Oral) 16 5' 9.5\" (1.765 m) 96% 29.59 kg/m2       Blood Pressure from Last 3 Encounters:   07/10/17 (!) 76/52   17 131/66   17 148/77    Weight from Last 3 Encounters:   07/10/17 203 lb 4.8 oz (92.2 kg)   17 213 lb 8 oz (96.8 kg)   17 204 lb 3.2 oz (92.6 kg)              Today, you had the following     No orders found for display         Today's Medication Changes          These changes are accurate as of: 7/10/17  4:03 PM.  If you have any questions, ask your nurse or doctor.               Start taking these medicines.        Dose/Directions    blood glucose monitoring meter device kit   Commonly known as:  no brand specified   Used for:  Type 2 diabetes mellitus with diabetic neuropathy, without long-term current use of insulin (H)   Started by:  Disha Van MD        Use to test blood sugar 1 times daily or as directed. Given kit, lancets and strips with refills on supplies for a year.   Quantity:  1 kit   Refills:  0       fluticasone 50 MCG/ACT spray   Commonly known as:  FLONASE   Used for:  Seasonal allergic rhinitis due to pollen, unspecified " chronicity   Started by:  Disha Van MD        Dose:  2 spray   Spray 2 sprays into both nostrils daily   Quantity:  1 Bottle   Refills:  11       order for DME   Used for:  Labile hypertension   Started by:  Disha Van MD        Home BP cuff   Quantity:  1 Device   Refills:  0            Where to get your medicines      These medications were sent to [facility] [place], [place] - [address] [facility].  [facility] [address] [facility] [facility] [address]     Phone:  [phone]     blood glucose monitoring meter device kit    DULoxetine 60 MG EC capsule    fluticasone 50 MCG/ACT spray    omeprazole 20 MG CR capsule         Some of these will need a paper prescription and others can be bought over the counter.  Ask your nurse if you have questions.     Bring a paper prescription for each of these medications     order for DME                Primary Care Provider Office Phone # Fax #    Disha Van [phone]       [facility] [address]   [facility] [address]        Equal Access to Services     ISIDRO GO AH: Hadii aad ku hadasho Soomaali, waaxda luqadaha, qaybta kaalmada adeegyada, waxay idiin hayaan stephane hein . So [facility] [phone].    ATENCIÓN: Si habla español, tiene a carrion disposición servicios gratuitos de asistencia lingüística. [facility] [phone].    We comply with applicable federal civil rights laws and [place] laws. We do not discriminate on the basis of race, color, national origin, age, disability sex, sexual orientation or gender identity.            Thank you!     Thank you for choosing [facility]  for your care. Our goal is always to provide you with excellent care. Hearing back from our patients is one way we can continue to improve our services. Please take a few minutes to complete the written survey that you may receive in the mail after your visit with us. Thank you!             Your Updated Medication List -  Protect others around you: Learn how to safely use, store and throw away your medicines at www.disposemymeds.org.          This list is accurate as of: 7/10/17  4:03 PM.  Always use your most recent med list.                   Brand Name Dispense Instructions for use Diagnosis    albuterol 108 (90 BASE) MCG/ACT Inhaler    PROAIR HFA/PROVENTIL HFA/VENTOLIN HFA    1 Inhaler    Inhale 2 puffs into the lungs every 4 hours as needed for shortness of breath / dyspnea or wheezing    Bronchitis       atorvastatin 40 MG tablet    LIPITOR    90 tablet    Take 1 tablet (40 mg) by mouth daily    Hyperlipidemia LDL goal <100       blood glucose monitoring meter device kit    no brand specified    1 kit    Use to test blood sugar 1 times daily or as directed. Given kit, lancets and strips with refills on supplies for a year.    Type 2 diabetes mellitus with diabetic neuropathy, without long-term current use of insulin (H)       cetirizine 10 MG tablet    zyrTEC    90 tablet    Take 1 tablet (10 mg) by mouth daily    Allergic rhinitis, unspecified allergic rhinitis trigger, unspecified rhinitis seasonality       cyclobenzaprine 10 MG tablet    FLEXERIL     Take 10 mg by mouth 2 times daily as needed for muscle spasms        DULoxetine 60 MG EC capsule    CYMBALTA    90 capsule    Take 1 capsule (60 mg) by mouth daily    Lumbar degenerative disc disease       fluticasone 50 MCG/ACT spray    FLONASE    1 Bottle    Spray 2 sprays into both nostrils daily    Seasonal allergic rhinitis due to pollen, unspecified chronicity       hydrochlorothiazide 25 MG tablet    HYDRODIURIL          lisinopril 40 MG tablet    PRINIVIL/ZESTRIL    90 tablet    Take 0.5 tablets (20 mg) by mouth daily    Hypotension, unspecified hypotension type, Benign essential hypertension       metFORMIN 500 MG tablet    GLUCOPHAGE    180 tablet    Take 1 tablet (500 mg) by mouth 2 times daily (with meals)    Type 2 diabetes mellitus with diabetic neuropathy, without  long-term current use of insulin (H)       montelukast 10 MG tablet    SINGULAIR    30 tablet         nicotine 10 MG Inhaler    NICOTROL    30 each    Inhale 2 cartridge into the lungs daily as needed for smoking cessation    Tobacco use disorder       omeprazole 20 MG CR capsule    priLOSEC    90 capsule    Take 1 capsule (20 mg) by mouth daily    Gastroesophageal reflux disease without esophagitis       order for DME     1 Device    Home BP cuff    Labile hypertension       pregabalin 150 MG capsule    LYRICA    270 capsule    Take 1 capsule (150 mg) by mouth 3 times daily    Fibromyalgia, Chronic bilateral low back pain without sciatica       traMADol 50 MG tablet    ULTRAM    180 tablet    Take 2 tablets (100 mg) by mouth 3 times daily May take 5 per day    Facet arthropathy, cervical, Facet arthropathy, lumbar, Fibromyalgia, Bilateral shoulder pain, unspecified chronicity       traZODone 100 MG tablet    DESYREL    60 tablet    Take 2 tablets (200 mg) by mouth At Bedtime    Insomnia, unspecified type

## 2017-07-10 NOTE — PROGRESS NOTES
SUBJECTIVE:                                                    Vijaya Manjarrez is a 67 year old female who presents to clinic today for the following health issues:          Hospital Follow-up Visit:    Hospital/Nursing Home/IP Rehab Facility: [facility]  Date of Admission: 07/02/2017  Date of Discharge: 07/03/2017  Reason(s) for Admission: Hypotension, increased creatinine            Problems taking medications regularly:  None       Medication changes since discharge: none       Problems adhering to non-medication therapy:  None    Summary of hospitalization:  [facility] discharge summary reviewed  Diagnostic Tests/Treatments reviewed.  Follow up needed: none  Other Healthcare Providers Involved in Patient s Care:         None  Update since discharge: improved.   Her blood pressure is varying a lot though, it is higher in am and lower at night, sometimes still down below 100 but am can be up to 150 systolic. She has some lightheadedness at times.   She is still taking lisinopril and is taking her HCTZ at home though discharge showed it has been stopped. She takes lisinopril in am and has been taking Verapamil at night.   She has not had any edema.     She complains of some allergy like symptoms, sneezing, some rhinorrhea, dry throat.     She is on her metformin.       Patient Active Problem List   Diagnosis     HCD (health care directive)     Type 2 diabetes mellitus with diabetic neuropathy, without long-term current use of insulin (H)     COPD (chronic obstructive pulmonary disease) (H)     Cervical spine degeneration     Facet arthropathy, lumbar     Lumbar degenerative disc disease     Migraine headache     Diabetic neuropathy (H)     Anxiety     GERD (gastroesophageal reflux disease)     Hyperlipidemia LDL goal <100     Fibromyalgia     Benign essential hypertension     Controlled substance agreement signed     Chronic pain syndrome     COPD exacerbation (H)     Hypotension,  unspecified hypotension type     Fall     Acute kidney failure (H)     Dehydration     Hypotension     Health Care Home     Current Outpatient Prescriptions   Medication Sig Dispense Refill     hydrochlorothiazide (HYDRODIURIL) 25 MG tablet        DULoxetine (CYMBALTA) 60 MG EC capsule Take 1 capsule (60 mg) by mouth daily 90 capsule 3     omeprazole (PRILOSEC) 20 MG CR capsule Take 1 capsule (20 mg) by mouth daily 90 capsule 3     montelukast (SINGULAIR) 10 MG tablet  30 tablet      lisinopril (PRINIVIL/ZESTRIL) 40 MG tablet Take 0.5 tablets (20 mg) by mouth daily 90 tablet 1     albuterol (PROAIR HFA/PROVENTIL HFA/VENTOLIN HFA) 108 (90 BASE) MCG/ACT Inhaler Inhale 2 puffs into the lungs every 4 hours as needed for shortness of breath / dyspnea or wheezing 1 Inhaler 0     nicotine (NICOTROL) 10 MG Inhaler Inhale 2 cartridge into the lungs daily as needed for smoking cessation 30 each 0     traMADol (ULTRAM) 50 MG tablet Take 2 tablets (100 mg) by mouth 3 times daily May take 5 per day 180 tablet 1     pregabalin (LYRICA) 150 MG capsule Take 1 capsule (150 mg) by mouth 3 times daily 270 capsule 1     traZODone (DESYREL) 100 MG tablet Take 2 tablets (200 mg) by mouth At Bedtime 60 tablet 5     cetirizine (ZYRTEC) 10 MG tablet Take 1 tablet (10 mg) by mouth daily 90 tablet 3     atorvastatin (LIPITOR) 40 MG tablet Take 1 tablet (40 mg) by mouth daily 90 tablet 1     metFORMIN (GLUCOPHAGE) 500 MG tablet Take 1 tablet (500 mg) by mouth 2 times daily (with meals) 180 tablet 1     cyclobenzaprine (FLEXERIL) 10 MG tablet Take 1 tablet (10 mg) by mouth 2 times daily 180 tablet 3      Social History   Substance Use Topics     Smoking status: Former Smoker     Types: Cigarettes     Smokeless tobacco: Not on file     Alcohol use No      ROS:   No fever, chills, no falls, still lightheaded at times, no syncope, no chest pain or palpitations, stable dyspnea    Objective:  Patient alert in NAD  BP (!) 76/52  Pulse 80  Temp 99.1  " F (37.3  C) (Oral)  Resp 16  Ht 5' 9.5\" (1.765 m)  Wt 203 lb 4.8 oz (92.2 kg)  SpO2 96%  BMI 29.59 kg/m2       Not examined.     Lab Results   Component Value Date    CR 0.80 07/03/2017    CR 1.67 07/02/2017    CR 0.54 06/18/2017    CR 0.50 06/17/2017     Lab Results   Component Value Date    A1C 6.8 06/17/2017    A1C 6.7 02/20/2017    A1C 6.2 07/12/2016    A1C 7.0 03/04/2016          ASSESSMENT:   (I10) Labile hypertension  (primary encounter diagnosis)  Comment: recommend she stop the lisinopril but continue HCTZ in the am, will try to get BP cuff for her  Plan: order for DME        We will call her in 3 days to recheck how she is feeling, consider restarting medication  No need to repeat labs.     (J30.1) Seasonal allergic rhinitis due to pollen, unspecified chronicity  Comment: recommend add flonase  Plan: fluticasone (FLONASE) 50 MCG/ACT spray            (M51.36) Lumbar degenerative disc disease  Comment:   Plan: DULoxetine (CYMBALTA) 60 MG EC capsule            (E11.40) Type 2 diabetes mellitus with diabetic neuropathy, without long-term current use of insulin (H)  Comment:   Plan: blood glucose monitoring (NO BRAND SPECIFIED)         meter device kit            (K21.9) Gastroesophageal reflux disease without esophagitis  Comment:   Plan: omeprazole (PRILOSEC) 20 MG CR capsule               Post Discharge Medication Reconciliation: discharge medications reconciled and changed, per note/orders (see AVS).  Plan of care communicated with patient     Coding guidelines for this visit:  Type of Medical   Decision Making Face-to-Face Visit       within 7 Days of discharge Face-to-Face Visit        within 14 days of discharge   Moderate Complexity [id number]   High Complexity 26162 [id number]          Disha Van MD  [facility]   "

## 2017-07-11 ENCOUNTER — RADIANT APPOINTMENT (OUTPATIENT)
Dept: GENERAL RADIOLOGY | Facility: CLINIC | Age: 67
End: 2017-07-11
Attending: FAMILY MEDICINE
Payer: COMMERCIAL

## 2017-07-11 ENCOUNTER — OFFICE VISIT (OUTPATIENT)
Dept: ORTHOPEDICS | Facility: CLINIC | Age: 67
End: 2017-07-11
Payer: COMMERCIAL

## 2017-07-11 VITALS
HEIGHT: 70 IN | DIASTOLIC BLOOD PRESSURE: 66 MMHG | SYSTOLIC BLOOD PRESSURE: 110 MMHG | WEIGHT: 203 LBS | BODY MASS INDEX: 29.06 KG/M2

## 2017-07-11 DIAGNOSIS — M43.12 SPONDYLOLISTHESIS OF CERVICAL REGION: ICD-10-CM

## 2017-07-11 DIAGNOSIS — M48.02 FORAMINAL STENOSIS OF CERVICAL REGION: ICD-10-CM

## 2017-07-11 DIAGNOSIS — M62.838 MUSCLE SPASM: Primary | ICD-10-CM

## 2017-07-11 DIAGNOSIS — M25.511 RIGHT SHOULDER PAIN, UNSPECIFIED CHRONICITY: ICD-10-CM

## 2017-07-11 DIAGNOSIS — M50.20 CERVICAL DISC HERNIATION: ICD-10-CM

## 2017-07-11 DIAGNOSIS — M54.2 CERVICALGIA: Primary | ICD-10-CM

## 2017-07-11 DIAGNOSIS — M48.00 CENTRAL SPINAL STENOSIS: ICD-10-CM

## 2017-07-11 PROCEDURE — 73030 X-RAY EXAM OF SHOULDER: CPT | Mod: RT

## 2017-07-11 PROCEDURE — 99214 OFFICE O/P EST MOD 30 MIN: CPT | Performed by: FAMILY MEDICINE

## 2017-07-11 NOTE — PROGRESS NOTES
ASSESSMENT & PLAN    ICD-10-CM    1. Cervicalgia M54.2    2. Right shoulder pain, unspecified chronicity M25.511 XR Shoulder Right G/E 3 Views   3. Spondylolisthesis of cervical region M43.12    4. Cervical disc herniation M50.20    5. Central spinal stenosis M48.00    6. Foraminal stenosis of cervical region M99.81    Review MRI - multi-level herniated disc with central / foraminal stenosis  Given difficulty with blood pressure would not recommend surgical interventions at this time  Referral to Pain Management - [facility] given that [facility] is no longer taking new patients in [place]  Referral for cervical injection  Reviewed shoulder xrays - no fracture. Recommend addressing the neck first as some of this shoulder pain could be more related to your neck.  You're already using Tramadol therefore would not recommend any other medications/narcotics.  Not hypotensive with BP check today    Follow up as needed. Call direct clinic number [[phone]] at any time with questions or concerns.  Instructed to call the office if the condition evolves or worsens.    -----    SUBJECTIVE:  Vijaya Manjarrez is a 67 year old female who is seen in follow-up for cervicalgia. They were last seen 6/13/2017.     Since their last visit reports worsening pain. She has more pain travelling down the arms. They indicate that their pain level is 10/10. They have tried ice, heat and physical therapy (home therapy).  She is here for review of her MRI. She is also here for right shoulder pain. She reports that she has been falling over the last month or so secondary to blood pressure issues. She reports posterior shoulder pain with pain moving down the arm. She has increased pain in the shoulder with most motions. She has been icing and taking tylenol. She has also had some in-home PT to help address her shoulder pain.     Patient's past medical, surgical, social, and family histories were reviewed today and no changes  "are noted.    REVIEW OF SYSTEMS:  Constitutional: NEGATIVE for fever, chills, change in weight  Skin: NEGATIVE for worrisome rashes, moles or lesions  GI/: NEGATIVE for bowel or bladder changes  Neuro: NEGATIVE for weakness, dizziness or paresthesias    OBJECTIVE:  /66  Ht 5' 10\" (1.778 m)  Wt 203 lb (92.1 kg)  BMI 29.13 kg/m2   General: healthy, alert and in no distress  HEENT: no scleral icterus or conjunctival erythema  Skin: no suspicious lesions or rash. No jaundice.  CV: regular rhythm by palpation  Resp: normal respiratory effort without conversational dyspnea   Psych: normal mood and affect  Gait: normal steady gait with appropriate coordination and balance  Neuro: normal light touch sensory exam of the extremities.    MSK:  RIGHT SHOULDER  Inspection:    no atrophy  Palpation:    Tender about the AC joint, anterior capsule, proximal biceps tendon, supraspinatus insertion and infraspinatus insertion. Remainder of bony and tendinous landmarks are nontender.    Crepitus is Absent  Active Range of Motion:     Abduction 1050 / FF 1050 /  / IR very limited.   Strength:    Global cuff weakness   Special Tests:    Positive: [place]'    Independent visualization of the below image:  MR CERVICAL SPINE WITHOUT CONTRAST June 27, 2017 1:27 PM      HISTORY: History of bilateral cervical radiculopathy. Cervicalgia.    IMPRESSION: Multilevel degenerative disc and facet disease with  moderate central canal stenoses and cord deformity at C4-C5, C5-C6,  C6-C7.     ELIJAH SORTO MD    Patient's conditions were thoroughly discussed during today's visit with greater than 50% of the visit spent counseling the patient with total time spent face-to-face with the patient being 20 minutes.    Erika Dominguez, DO [facility]  "

## 2017-07-11 NOTE — NURSING NOTE
"Chief Complaint   Patient presents with     RECHECK       Initial /66  Ht 5' 10\" (1.778 m)  Wt 203 lb (92.1 kg)  BMI 29.13 kg/m2 Estimated body mass index is 29.13 kg/(m^2) as calculated from the following:    Height as of this encounter: 5' 10\" (1.778 m).    Weight as of this encounter: 203 lb (92.1 kg).  Medication Reconciliation: complete     Lexx Feng ATC    "

## 2017-07-11 NOTE — MR AVS SNAPSHOT
After Visit Summary   7/11/2017    Vijaya Manjarrez    MRN: [id number]           Patient Information     Date Of Birth          1950        Visit Information        Provider Department      7/11/2017 4:00 PM Erika Dominguez DO [facility]        Today's Diagnoses     Cervicalgia    -  1    Right shoulder pain, unspecified chronicity        Spondylolisthesis of cervical region        Cervical disc herniation        Central spinal stenosis        Foraminal stenosis of cervical region          Care Instructions    Thank you for allowing us to participate in your care today.  Please find below your visit diagnosis and the plan going forward.    1. Cervicalgia    2. Right shoulder pain, unspecified chronicity    3. Spondylolisthesis of cervical region    4. Cervical disc herniation    5. Central spinal stenosis    6. Foraminal stenosis of cervical region      Review cervical MRI - multi-level herniated disc with central stenosis  Given difficulty with blood pressure would not recommend surgical interventions at this time  Referral to Pain Management - [facility] given that [facility] is no longer taking new patients in [place]  Referral for cervical injection to see if that helps with some of your pain  Review shoulder xrays - no fracture. Recommend addressing the neck first as some of this shoulder pain could be more related to your neck.  You're already using Tramadol therefore would not recommend any other medications/narcotics.    Follow up as needed. Call direct clinic number [[phone]] at any time with questions or concerns.    Erika Dominguez DO CAQSM  [facility]  Website: [email or web address]  Twitter: [email or web address]            Follow-ups after your visit        Additional Services     PAIN MANAGEMENT CENTER ([place]) REFERRAL       [facility] Referral    Please be aware that coverage of these  services is subject to the terms and limitations of your health insurance plan.  Call member services at your health plan with any benefit or coverage questions.      Please bring the following to your appointment:  Any x-rays, CTs or MRIs which have been performed.  Contact the facility where they were done to arrange for  prior to your scheduled appointment.  Any new CT, MRI or other procedures ordered by your specialist must be performed at a [place] facility or coordinated by your clinic's referral office.    List of current medications   This referral request  Any documents/labs given to you for this referral      Reason for Consult:  Procedure or injection only - patient will be contacted within 24 hrs to schedule: Epidural Steroid (interlaminar approach): Cervical C7-T1    Please answer the following questions:    What is your diagnosis for this patient's pain?  B/l radic, R > L, moderate - severe central stenosis    Do you have any specific questions for the pain specialist? No    Are there any red flags that may impact the assessment or management of this patient?    None    ANY DIAGNOSTIC TESTS THAT ARE NOT IN EPIC SHOULD BE SENT TO THE PAIN CENTER    Please note the pre op pain consult must be scheduled 2-3 weeks prior to the patient's surgery. Patient's trying to schedule within 2 weeks of surgery may not be accommodated.    Pre Op Pain Consults are only good for 30 days.    REGARDING OPIOID MEDICATIONS:  We will always address appropriateness of opioid pain medications, but we generally will not automatically take on a prescribing role. When we do take on prescribing of opioids for chronic pain, it is in collaboration with the referring physician for an intermediate period of time (months), with an expectation that the primary physician or provider will assume the prescribing role if medications are effective at stable doses with demonstrated compliance.  Therefore, please do not assume that  your prescribing responsibilities end on the day of pain clinic consultation.  Is this agreeable to you? YES    For any questions, contact the [facility] at [phone]            PAIN MANAGEMENT EXTERNAL REFERRAL       Your provider has referred you to: [facility]: [facility] Ted Parisa [phone]   [email or web address]  Prefer Dr. Mcnamara    Please be aware that coverage of these services is subject to the terms and limitations of your health insurance plan.  Call member services at your health plan with any benefit or coverage questions.      Please bring the following with you to your appointment:    (1) Any X-Rays, CTs or MRIs which have been performed.  Contact the facility where they were done to arrange for  prior to your scheduled appointment.  Any new CT, MRI or other procedures ordered by your specialist must be performed at a [facility] facility or coordinated by your clinic's referral office.    (2) List of current medications   (3) This referral request   (4) Any documents/labs given to you for this referral                  Who to contact     If you have questions or need follow up information about today's clinic visit or your schedule please contact [facility] directly at [phone].  Normal or non-critical lab and imaging results will be communicated to you by MyChart, letter or phone within 4 business days after the clinic has received the results. If you do not hear from us within 7 days, please contact the clinic through [employer] or phone. If you have a critical or abnormal lab result, we will notify you by phone as soon as possible.  Submit refill requests through [employer] or call your pharmacy and they will forward the refill request to us. Please allow 3 business days for your refill to be completed.          Additional Information About Your Visit        [employer] Information     [employer] lets you send messages to your doctor,  "view your test results, renew your prescriptions, schedule appointments and more. To sign up, go to [email or web address] . Click on \"Log in\" on the left side of the screen, which will take you to the Welcome page. Then click on \"Sign up Now\" on the right side of the page.     You will be asked to enter the access code listed below, as well as some personal information. Please follow the directions to create your username and password.     Your access code is: [email or web address]  Expires: 2017  2:59 PM     Your access code will  in 90 days. If you need help or a new code, please call your [place] clinic or [phone].        Care EveryWhere ID     This is your Care EveryWhere ID. This could be used by other organizations to access your [place] medical records  [id number]        Your Vitals Were     Height BMI (Body Mass Index)                5' 10\" (1.778 m) 29.13 kg/m2           Blood Pressure from Last 3 Encounters:   17 110/66   07/10/17 (!) 76/52   17 131/66    Weight from Last 3 Encounters:   17 203 lb (92.1 kg)   07/10/17 203 lb 4.8 oz (92.2 kg)   17 213 lb 8 oz (96.8 kg)              We Performed the Following     PAIN MANAGEMENT CENTER ([place]) REFERRAL     PAIN MANAGEMENT EXTERNAL REFERRAL        Primary Care Provider Office Phone # Fax #    Disha Van [phone]       [facility] [address]  [facility] [address]        Equal Access to Services     [facility] HospitalALFONSO : Hadii joceline mayo hadashquentin Solisandra, waaxda luqadaha, qaybta kaalmada jaswant, delia lentz. So [facility] [phone].    ATENCIÓN: Si habla español, tiene a carrion disposición servicios gratuitos de asistencia lingüística. Llame al [phone].    We comply with applicable federal civil rights laws and [place] laws. We do not discriminate on the basis of race, color, national origin, age, disability sex, sexual orientation or gender " identity.            Thank you!     Thank you for choosing [facility]  for your care. Our goal is always to provide you with excellent care. Hearing back from our patients is one way we can continue to improve our services. Please take a few minutes to complete the written survey that you may receive in the mail after your visit with us. Thank you!             Your Updated Medication List - Protect others around you: Learn how to safely use, store and throw away your medicines at www.disposemymeds.org.          This list is accurate as of: 7/11/17  4:38 PM.  Always use your most recent med list.                   Brand Name Dispense Instructions for use Diagnosis    albuterol 108 (90 BASE) MCG/ACT Inhaler    PROAIR HFA/PROVENTIL HFA/VENTOLIN HFA    1 Inhaler    Inhale 2 puffs into the lungs every 4 hours as needed for shortness of breath / dyspnea or wheezing    Bronchitis       atorvastatin 40 MG tablet    LIPITOR    90 tablet    Take 1 tablet (40 mg) by mouth daily    Hyperlipidemia LDL goal <100       blood glucose monitoring meter device kit    no brand specified    1 kit    Use to test blood sugar 1 times daily or as directed. Given kit, lancets and strips with refills on supplies for a year.    Type 2 diabetes mellitus with diabetic neuropathy, without long-term current use of insulin (H)       cetirizine 10 MG tablet    zyrTEC    90 tablet    Take 1 tablet (10 mg) by mouth daily    Allergic rhinitis, unspecified allergic rhinitis trigger, unspecified rhinitis seasonality       cyclobenzaprine 10 MG tablet    FLEXERIL     Take 10 mg by mouth 2 times daily as needed for muscle spasms        DULoxetine 60 MG EC capsule    CYMBALTA    90 capsule    Take 1 capsule (60 mg) by mouth daily    Lumbar degenerative disc disease       fluticasone 50 MCG/ACT spray    FLONASE    1 Bottle    Spray 2 sprays into both nostrils daily    Seasonal allergic rhinitis due to pollen, unspecified chronicity        hydrochlorothiazide 25 MG tablet    HYDRODIURIL          lisinopril 40 MG tablet    PRINIVIL/ZESTRIL    90 tablet    Take 0.5 tablets (20 mg) by mouth daily    Hypotension, unspecified hypotension type, Benign essential hypertension       metFORMIN 500 MG tablet    GLUCOPHAGE    180 tablet    Take 1 tablet (500 mg) by mouth 2 times daily (with meals)    Type 2 diabetes mellitus with diabetic neuropathy, without long-term current use of insulin (H)       montelukast 10 MG tablet    SINGULAIR    30 tablet         nicotine 10 MG Inhaler    NICOTROL    30 each    Inhale 2 cartridge into the lungs daily as needed for smoking cessation    Tobacco use disorder       omeprazole 20 MG CR capsule    priLOSEC    90 capsule    Take 1 capsule (20 mg) by mouth daily    Gastroesophageal reflux disease without esophagitis       order for DME     1 Device    Home BP cuff    Labile hypertension       pregabalin 150 MG capsule    LYRICA    270 capsule    Take 1 capsule (150 mg) by mouth 3 times daily    Fibromyalgia, Chronic bilateral low back pain without sciatica       traMADol 50 MG tablet    ULTRAM    180 tablet    Take 2 tablets (100 mg) by mouth 3 times daily May take 5 per day    Facet arthropathy, cervical, Facet arthropathy, lumbar, Fibromyalgia, Bilateral shoulder pain, unspecified chronicity       traZODone 100 MG tablet    DESYREL    60 tablet    Take 2 tablets (200 mg) by mouth At Bedtime    Insomnia, unspecified type

## 2017-07-11 NOTE — PATIENT INSTRUCTIONS
Thank you for allowing us to participate in your care today.  Please find below your visit diagnosis and the plan going forward.    1. Cervicalgia    2. Right shoulder pain, unspecified chronicity    3. Spondylolisthesis of cervical region    4. Cervical disc herniation    5. Central spinal stenosis    6. Foraminal stenosis of cervical region      Review cervical MRI - multi-level herniated disc with central stenosis  Given difficulty with blood pressure would not recommend surgical interventions at this time  Referral to Pain Management - [facility] given that [facility] is no longer taking new patients in [place]  Referral for cervical injection to see if that helps with some of your pain  Review shoulder xrays - no fracture. Recommend addressing the neck first as some of this shoulder pain could be more related to your neck.  You're already using Tramadol therefore would not recommend any other medications/narcotics.    Follow up as needed. Call direct clinic number [[phone]] at any time with questions or concerns.    Erika Dominguez DO CAQSM  [facility]  Website: [email or web address]  Twitter: [email or web address]

## 2017-07-12 NOTE — TELEPHONE ENCOUNTER
Flexeril. This is pt reported.       Last Office Visit with [facility] or [email or web address] prescribing provider: 7/10/17    Future Office visit:       Routing refill request to provider for review/approval because:  Drug not on the [facility] or [facility] refill protocol or controlled substance

## 2017-07-13 ENCOUNTER — TELEPHONE (OUTPATIENT)
Dept: INTERNAL MEDICINE | Facility: CLINIC | Age: 67
End: 2017-07-13

## 2017-07-13 RX ORDER — CYCLOBENZAPRINE HCL 10 MG
10 TABLET ORAL 2 TIMES DAILY
Qty: 180 TABLET | Refills: 3 | Status: SHIPPED | OUTPATIENT
Start: 2017-07-13 | End: 2018-07-20

## 2017-07-13 NOTE — TELEPHONE ENCOUNTER
I was going to have the nurse call today to see if she was feeling better with holding BP meds. I don't want lab yet until I decide what I am doing with her meds. I think her low BP and these other issues are causing her fatigue. The thyroid was normal in February so not likely to be abnormal now.

## 2017-07-13 NOTE — TELEPHONE ENCOUNTER
Call to pt and advised.   She states she is more fatigued than not. It is up and down. Not sleeping well due to pain in back.  Tramadol not touching the pain.   She states the fatigue is probably not due to the low BP.     Can she go up in Tramadol MG? 5 tablets total a day now.

## 2017-07-13 NOTE — TELEPHONE ENCOUNTER
Pt calling.  States PCP wanted her to come in today for labs.    No orders in chart.    Also, would like to have thyroid checked.  Forgot to discuss @ OV 7-10-17--c/o being fatigued x 2 wks.    Please place orders.    Thanks.

## 2017-07-13 NOTE — TELEPHONE ENCOUNTER
If she is not feeling any improvement in weakness, lightheadedness, I would like her to get a BP check done; it could be at pharmacy or a home machine or drugstore machine if that is easier. She can take the tramadol 2 three times a day for now. If that is not helping the pain, I may refer her to our pain clinic.

## 2017-07-14 ENCOUNTER — TELEPHONE (OUTPATIENT)
Dept: INTERNAL MEDICINE | Facility: CLINIC | Age: 67
End: 2017-07-14

## 2017-07-14 NOTE — TELEPHONE ENCOUNTER
LATONYA, Nurse from [facility] left voice mail on [facility] triage VM at 1424: reports patient is having lots of pain despite Tramadol 100 mg TID.; reports not sleeping due to pain. Nurse states it is evident she is not sleeping.  FYI to provider.  Request call back at [phone].  Attempted to contact patient, no answer, left voice message to call back.

## 2017-07-14 NOTE — TELEPHONE ENCOUNTER
Called  LEONILA Kemp, spine mid to upper back. Informed nurse that she is now getting Flexeril 10 mg PO 2 times daily. Home Care nurse did not know patient was prescribed Flexeril and states the patient probably has not picked this up from the pharmacy yet. Viridiana states she will follow up next week with patient and call with update if pain is still increased.  Forwarded to provider.
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
## 2017-07-15 PROBLEM — E86.0 DEHYDRATION: Status: RESOLVED | Noted: 2017-07-02 | Resolved: 2017-07-15

## 2017-07-15 PROBLEM — N17.9 ACUTE KIDNEY FAILURE (H): Status: RESOLVED | Noted: 2017-07-02 | Resolved: 2017-07-15

## 2017-07-15 PROBLEM — I95.9 HYPOTENSION, UNSPECIFIED HYPOTENSION TYPE: Status: RESOLVED | Noted: 2017-07-02 | Resolved: 2017-07-15

## 2017-07-15 PROBLEM — J44.1 COPD EXACERBATION (H): Status: RESOLVED | Noted: 2017-06-16 | Resolved: 2017-07-15

## 2017-07-15 PROBLEM — I95.9 HYPOTENSION: Status: RESOLVED | Noted: 2017-07-02 | Resolved: 2017-07-15

## 2017-07-17 ENCOUNTER — CARE COORDINATION (OUTPATIENT)
Dept: CARE COORDINATION | Facility: CLINIC | Age: 67
End: 2017-07-17

## 2017-07-17 NOTE — PROGRESS NOTES
Clinic Care Coordination Contact  [facility] Care Coordination Outreach    Patient was enrolled in [facility] upon  Discharge from the hospital.      Clinical Data:   Patient has only taken the initial survey with Ivan.     Action:  Patient is currently stable with COPD. She has chronic pain. Has been seen by Ortho and PCP. PCP plans to refer to pain clinic in the future.                 Plan: Patient has completed 30 transition program with no participation. Patient is followed by [facility]  for ongoing case management.     Jackeline Summers RN, CCM - Care Coordinator     7/17/2017    3:58 PM  [phone]

## 2017-07-20 ENCOUNTER — TELEPHONE (OUTPATIENT)
Dept: INTERNAL MEDICINE | Facility: CLINIC | Age: 67
End: 2017-07-20

## 2017-07-20 NOTE — TELEPHONE ENCOUNTER
ELIZABET Bustos, HC nurse calls with update. /68.     Her pain level is 6/10. This is improved.     Pain is in her neck and shoulders.     She wakes up every 2-3 hours, uses Aspercreme then falls back to sleep. This is better than it was.     They are still working on getting her into pain clinic.

## 2017-07-21 ENCOUNTER — CARE COORDINATION (OUTPATIENT)
Dept: GERIATRIC MEDICINE | Facility: CLINIC | Age: 67
End: 2017-07-21

## 2017-07-21 NOTE — PROGRESS NOTES
Client is new enrollee to [facility] effective 7/1/17 with [facility]. Client transferred from [facility]. Called client to introduce self and schedule a home visit. VM was left requesting a return call.     Vanesa Gtz RN  [facility]  [phone]  Fax: [phone]

## 2017-07-24 ENCOUNTER — TELEPHONE (OUTPATIENT)
Dept: INTERNAL MEDICINE | Facility: CLINIC | Age: 67
End: 2017-07-24

## 2017-07-24 NOTE — TELEPHONE ENCOUNTER
Jessica calling from [facility] needing clarification on BP monitor, need chart notes supporting the order. Need documentation stating patient needs BP cuff due to some reason. Advised [facility] found documentation from 7/10/17 office visit for labile HTN, will send office visit notes as requested.   Fax [phone] attn: mona.   Sent as requested.

## 2017-07-25 ENCOUNTER — CARE COORDINATION (OUTPATIENT)
Dept: GERIATRIC MEDICINE | Facility: CLINIC | Age: 67
End: 2017-07-25

## 2017-07-25 NOTE — PROGRESS NOTES
Second call to client to introduce self and schedule home visit.  was left requesting a return call.     Vanesa Gtz RN  [facility]  [phone]  Fax: [phone]

## 2017-07-26 ENCOUNTER — CARE COORDINATION (OUTPATIENT)
Dept: GERIATRIC MEDICINE | Facility: CLINIC | Age: 67
End: 2017-07-26

## 2017-07-26 NOTE — PROGRESS NOTES
Return call from client. Home visit has been scheduled for 7/27 at 2PM.    Vanesa Gtz RN  [facility]  [phone]  Fax: [phone]

## 2017-07-28 ENCOUNTER — CARE COORDINATION (OUTPATIENT)
Dept: GERIATRIC MEDICINE | Facility: CLINIC | Age: 67
End: 2017-07-28

## 2017-07-28 PROBLEM — Z71.89 ADVANCED DIRECTIVES, COUNSELING/DISCUSSION: Status: ACTIVE | Noted: 2017-07-28

## 2017-07-28 RX ORDER — SENNOSIDES 8.6 MG
650 CAPSULE ORAL 2 TIMES DAILY PRN
COMMUNITY

## 2017-07-28 NOTE — PROGRESS NOTES
Home visit/Leon[place] Risk Assessment/EW screening completed on: 7/27/17 with client and this CM.   Member resides: Client lives in a one bedroom apartment in a [place]. Client states that she loves where she lives. Client moved from [place] to be closer to her cousin. She had been on the [facility] wait list for several years and was able to move in last December. Apartment is cluttered and has papers stacked throughout the living room. CM assisted to go through some papers from [facility] and [facility] pertaining to her insurance change.   Member currently receiving the following services: Client is currently receiving [employer] 7 meals/week and weekly homemaking. Client states that she hasn't had a homemaker in several weeks and she isn't sure why. Client unsure if she would like to continue with any services due to her waiver obligation.   See EMR for a list of client's diagnoses and medications.   Medication management: Client sets up weekly med boxes for herself. She has a good understanding of what the medications are for. Client reports that they have been changing her BP medications recently and this had been a little confusing for her.   Falls: Client has had several falls recently and she believes they have all been related to her BP. Medication changes have been made and she has not had a fall since.  ADL's/IADL's:  Client is independent with all ADLs. Client requires assistance for homemaking and meal prep. Client would benefit from assistance with shopping as she says it takes her days to recover.  Member Mood/behavior-PHQ9 score:  PHQ 9 score of 11. Client shares that she is bothered by anxiety at times and hasn't found anything that helps relive it. CM encouraged participation in the activities offered in the building and to socialize with others. Client stated she has been interested in taking an art class, but hasn't done so yet.   [facility] Plan sponsored benefits: Shared information re: Silver  Sneakers/gym memberships, ASA, Calcium +D. CM recommended Silver Sneakers and will send information.   Plan of Care Is: Client is unsure if she would like to continue with formal services due to her waiver obligation. CM to call [facility] financial worker and ask if she would have a spend down if not open to EW. CM would recommend weekly homemaking, [employer], MOW, and Lif[employer]. Client declines a Lifeline as she always has her cell phone with her. Client would like to see a dentist and get dentures- CM provided number for [facility]. Client also shares she would like an eye exam and needs new glasses. CM to send info for [facility] covered providers and eyewear.   Release of Information: Reviewed and release signed.   Follow-Up Plan: Member informed of future contact, plan to f/u with member with a 6 month telephone assessment.  Contact information shared with member and family, encouraged member to call with any questions or concerns prior to this. CM to call with an update from the [facility] financial worker to determine if she wants to continue with waiver services.     See [facility] for further detailed information    Vanesa Gtz RN  [facility]  [phone]  Fax: [phone]

## 2017-07-29 ASSESSMENT — PATIENT HEALTH QUESTIONNAIRE - PHQ9: SUM OF ALL RESPONSES TO PHQ QUESTIONS 1-9: 11

## 2017-07-31 ENCOUNTER — CARE COORDINATION (OUTPATIENT)
Dept: GERIATRIC MEDICINE | Facility: CLINIC | Age: 67
End: 2017-07-31

## 2017-07-31 NOTE — PROGRESS NOTES
Client is concerned with obligation amount of $213/month- CM shared that she may still have a spend down amount if not open to the waiver. CM called county worker [place] to discuss. Rubén shared that if client was not open to the waiver she would have a spend down amount of $399/month. Rubén stated remaining open to the waiver with the $213 obligation is client's best option financially.     Call to client to discuss above information. Client would like to continue with EW and her current services- homemaking 3 hours/week and MOW 7 meals a week. Client would like to try a new homemaking agency. CM will start making calls. Discussed [employer] and [employer] and client declined these services at this time.     Vanesa Gtz RN  [facility]  [phone]  Fax: [phone]

## 2017-07-31 NOTE — PROGRESS NOTES
Received after visit chart from care coordinator.  Completed following tasks: Mailed copy of care plan to client  Emailed CPS to Ivon for [employer]  Entered MMIS  Chart was returned to CC.       Karla Wharton  Case Management Specialist  [facility]   [phone]

## 2017-08-01 ENCOUNTER — TELEPHONE (OUTPATIENT)
Dept: INTERNAL MEDICINE | Facility: CLINIC | Age: 67
End: 2017-08-01

## 2017-08-01 NOTE — TELEPHONE ENCOUNTER
Form faxed from [facility] needs to be filled out by provider for BP cuff and also needs chart notes supporting this order.     Fax put in Dr Van's basket. See page 3 on the form.    Copy of last OV notes attached.

## 2017-08-03 ENCOUNTER — CARE COORDINATION (OUTPATIENT)
Dept: GERIATRIC MEDICINE | Facility: CLINIC | Age: 67
End: 2017-08-03

## 2017-08-08 ENCOUNTER — OFFICE VISIT (OUTPATIENT)
Dept: INTERNAL MEDICINE | Facility: CLINIC | Age: 67
End: 2017-08-08
Payer: COMMERCIAL

## 2017-08-08 VITALS
OXYGEN SATURATION: 94 % | DIASTOLIC BLOOD PRESSURE: 72 MMHG | WEIGHT: 204 LBS | HEART RATE: 68 BPM | BODY MASS INDEX: 29.2 KG/M2 | HEIGHT: 70 IN | SYSTOLIC BLOOD PRESSURE: 116 MMHG | TEMPERATURE: 99.2 F

## 2017-08-08 DIAGNOSIS — J44.9 CHRONIC OBSTRUCTIVE PULMONARY DISEASE, UNSPECIFIED COPD TYPE (H): ICD-10-CM

## 2017-08-08 DIAGNOSIS — I10 BENIGN ESSENTIAL HYPERTENSION: Primary | ICD-10-CM

## 2017-08-08 PROCEDURE — 99213 OFFICE O/P EST LOW 20 MIN: CPT | Performed by: INTERNAL MEDICINE

## 2017-08-08 PROCEDURE — 36415 COLL VENOUS BLD VENIPUNCTURE: CPT | Performed by: INTERNAL MEDICINE

## 2017-08-08 PROCEDURE — 82565 ASSAY OF CREATININE: CPT | Performed by: INTERNAL MEDICINE

## 2017-08-08 RX ORDER — MONTELUKAST SODIUM 10 MG/1
10 TABLET ORAL AT BEDTIME
Qty: 90 TABLET | Refills: 1 | Status: SHIPPED | OUTPATIENT
Start: 2017-08-08 | End: 2018-01-30

## 2017-08-08 RX ORDER — HYDROCHLOROTHIAZIDE 25 MG/1
25 TABLET ORAL DAILY
Qty: 90 TABLET | Refills: 1 | Status: SHIPPED | OUTPATIENT
Start: 2017-08-08 | End: 2018-01-30

## 2017-08-08 NOTE — MR AVS SNAPSHOT
"              After Visit Summary   8/8/2017    Vijaya Manjarrez    MRN: [id number]           Patient Information     Date Of Birth          1950        Visit Information        Provider Department      8/8/2017 3:00 PM Disha Van MD [facility]        Today's Diagnoses     Benign essential hypertension    -  1    Chronic obstructive pulmonary disease, unspecified COPD type (H)          Care Instructions    If you do not hear from [facility] about the BP monitor in 7-10 days, call us.   If you do receive it, start to check the BP at different times of day 3-4 per week. Do a nurse BP check here in 2 weeks and bring your home machine with you.           Follow-ups after your visit        Who to contact     If you have questions or need follow up information about today's clinic visit or your schedule please contact [facility] directly at [phone].  Normal or non-critical lab and imaging results will be communicated to you by Endrahart, letter or phone within 4 business days after the clinic has received the results. If you do not hear from us within 7 days, please contact the clinic through Endrahart or phone. If you have a critical or abnormal lab result, we will notify you by phone as soon as possible.  Submit refill requests through WTFast or call your pharmacy and they will forward the refill request to us. Please allow 3 business days for your refill to be completed.          Additional Information About Your Visit        MyChart Information     WTFast lets you send messages to your doctor, view your test results, renew your prescriptions, schedule appointments and more. To sign up, go to [email or web address] . Click on \"Log in\" on the left side of the screen, which will take you to the Welcome page. Then click on \"Sign up Now\" on the right side of the page.     You will be asked to enter the access code listed below, as well as some personal information. " "Please follow the directions to create your username and password.     Your access code is: [email or web address]  Expires: 2017  2:59 PM     Your access code will  in 90 days. If you need help or a new code, please call your [facility] clinic or [phone].        Care EveryWhere ID     This is your Care EveryWhere ID. This could be used by other organizations to access your [facility] medical records  [id number]        Your Vitals Were     Pulse Temperature Height Pulse Oximetry BMI (Body Mass Index)       68 99.2  F (37.3  C) (Oral) 5' 9.5\" (1.765 m) 94% 29.69 kg/m2        Blood Pressure from Last 3 Encounters:   17 116/72   17 110/66   07/10/17 (!) 76/52    Weight from Last 3 Encounters:   17 204 lb (92.5 kg)   17 203 lb (92.1 kg)   07/10/17 203 lb 4.8 oz (92.2 kg)              Today, you had the following     No orders found for display         Today's Medication Changes          These changes are accurate as of: 17  3:33 PM.  If you have any questions, ask your nurse or doctor.               These medicines have changed or have updated prescriptions.        Dose/Directions    hydrochlorothiazide 25 MG tablet   Commonly known as:  HYDRODIURIL   This may have changed:    - how much to take  - when to take this   Used for:  Benign essential hypertension   Changed by:  Disha Van MD        Dose:  25 mg   Take 1 tablet (25 mg) by mouth daily   Quantity:  90 tablet   Refills:  1       montelukast 10 MG tablet   Commonly known as:  SINGULAIR   This may have changed:    - how much to take  - when to take this   Used for:  Chronic obstructive pulmonary disease, unspecified COPD type (H)   Changed by:  Disha Van MD        Dose:  10 mg   Take 1 tablet (10 mg) by mouth At Bedtime   Quantity:  90 tablet   Refills:  1            Where to get your medicines      These medications were sent to [facility] [place], [place] - [address]  [address] " [facility] [address]     Phone:  [phone]     hydrochlorothiazide 25 MG tablet    montelukast 10 MG tablet                Primary Care Provider Office Phone # Fax #    Disha Van [phone]       [facility] [address]  [facility] [address]        Equal Access to Services     ISIDRO GO : Hadii aad ku hadasho Soomaali, waaxda luqadaha, qaybta kaalmada adeegyada, waxay idiin hayaan adeeg kharash la'aan . So [facility] [phone].    ATENCIÓN: Si habla español, tiene a carrion disposición servicios gratuitos de asistencia lingüística. Mily al [phone].    We comply with applicable federal civil rights laws and [place] laws. We do not discriminate on the basis of race, color, national origin, age, disability sex, sexual orientation or gender identity.            Thank you!     Thank you for choosing [facility]  for your care. Our goal is always to provide you with excellent care. Hearing back from our patients is one way we can continue to improve our services. Please take a few minutes to complete the written survey that you may receive in the mail after your visit with us. Thank you!             Your Updated Medication List - Protect others around you: Learn how to safely use, store and throw away your medicines at www.disposemymeds.org.          This list is accurate as of: 8/8/17  3:33 PM.  Always use your most recent med list.                   Brand Name Dispense Instructions for use Diagnosis    albuterol 108 (90 BASE) MCG/ACT Inhaler    PROAIR HFA/PROVENTIL HFA/VENTOLIN HFA    1 Inhaler    Inhale 2 puffs into the lungs every 4 hours as needed for shortness of breath / dyspnea or wheezing    Bronchitis       atorvastatin 40 MG tablet    LIPITOR    90 tablet    Take 1 tablet (40 mg) by mouth daily    Hyperlipidemia LDL goal <100       blood glucose monitoring meter device kit    no brand specified    1 kit    Use to test blood sugar 1 times daily or  as directed. Given kit, lancets and strips with refills on supplies for a year.    Type 2 diabetes mellitus with diabetic neuropathy, without long-term current use of insulin (H)       cetirizine 10 MG tablet    zyrTEC    90 tablet    Take 1 tablet (10 mg) by mouth daily    Allergic rhinitis, unspecified allergic rhinitis trigger, unspecified rhinitis seasonality       cyclobenzaprine 10 MG tablet    FLEXERIL    180 tablet    Take 1 tablet (10 mg) by mouth 2 times daily    Muscle spasm       DULoxetine 60 MG EC capsule    CYMBALTA    90 capsule    Take 1 capsule (60 mg) by mouth daily    Lumbar degenerative disc disease       fluticasone 50 MCG/ACT spray    FLONASE    1 Bottle    Spray 2 sprays into both nostrils daily    Seasonal allergic rhinitis due to pollen, unspecified chronicity       hydrochlorothiazide 25 MG tablet    HYDRODIURIL    90 tablet    Take 1 tablet (25 mg) by mouth daily    Benign essential hypertension       lisinopril 40 MG tablet    PRINIVIL/ZESTRIL    90 tablet    Take 0.5 tablets (20 mg) by mouth daily    Hypotension, unspecified hypotension type, Benign essential hypertension       metFORMIN 500 MG tablet    GLUCOPHAGE    180 tablet    Take 1 tablet (500 mg) by mouth 2 times daily (with meals)    Type 2 diabetes mellitus with diabetic neuropathy, without long-term current use of insulin (H)       montelukast 10 MG tablet    SINGULAIR    90 tablet    Take 1 tablet (10 mg) by mouth At Bedtime    Chronic obstructive pulmonary disease, unspecified COPD type (H)       nicotine 10 MG Inhaler    NICOTROL    30 each    Inhale 2 cartridge into the lungs daily as needed for smoking cessation    Tobacco use disorder       omeprazole 20 MG CR capsule    priLOSEC    90 capsule    Take 1 capsule (20 mg) by mouth daily    Gastroesophageal reflux disease without esophagitis       order for DME     1 Device    Home BP cuff    Labile hypertension       pregabalin 150 MG capsule    LYRICA    270 capsule     Take 1 capsule (150 mg) by mouth 3 times daily    Fibromyalgia, Chronic bilateral low back pain without sciatica       traMADol 50 MG tablet    ULTRAM    180 tablet    Take 2 tablets (100 mg) by mouth 3 times daily May take 5 per day    Facet arthropathy, cervical, Facet arthropathy, lumbar, Fibromyalgia, Bilateral shoulder pain, unspecified chronicity       traZODone 100 MG tablet    DESYREL    60 tablet    Take 2 tablets (200 mg) by mouth At Bedtime    Insomnia, unspecified type       TYLENOL ARTHRITIS PAIN 650 MG CR tablet   Generic drug:  acetaminophen      Take 650 mg by mouth 2 times daily

## 2017-08-08 NOTE — NURSING NOTE
"Chief Complaint   Patient presents with     Hypertension       Initial /72  Pulse 68  Temp 99.2  F (37.3  C) (Oral)  Ht 5' 9.5\" (1.765 m)  Wt 204 lb (92.5 kg)  SpO2 94%  BMI 29.69 kg/m2 Estimated body mass index is 29.69 kg/(m^2) as calculated from the following:    Height as of this encounter: 5' 9.5\" (1.765 m).    Weight as of this encounter: 204 lb (92.5 kg).  Medication Reconciliation: complete    "

## 2017-08-08 NOTE — PROGRESS NOTES
SUBJECTIVE:                                                    Vijaya Manjarrez is a 67 year old female who presents to clinic today for the following health issues:    She had been having labile HTN. She had a BP reading at home of 160/120 earlier this week. Other readings have been 120-130/70-80. She was having very low readings last month. She is still waiting to hear if she can get the home blood pressure monitor, hopes to hear within a few days.      Hypertension Follow-up      Outpatient blood pressures sometimes  Low Salt Diet: occasionally       Amount of exercise or physical activity: 6-7 days/week for an average of 15-30 minutes    Problems taking medications regularly: No    Medication side effects: none  Diet: regular (no restrictions)      Patient Active Problem List   Diagnosis     HCD (health care directive)     Type 2 diabetes mellitus with diabetic neuropathy, without long-term current use of insulin (H)     COPD (chronic obstructive pulmonary disease) (H)     Cervical spine degeneration     Facet arthropathy, lumbar     Lumbar degenerative disc disease     Migraine headache     Diabetic neuropathy (H)     Anxiety     GERD (gastroesophageal reflux disease)     Hyperlipidemia LDL goal <100     Fibromyalgia     Benign essential hypertension     Controlled substance agreement signed     Chronic pain syndrome     Health Care Home     Advanced directives, counseling/discussion     Current Outpatient Prescriptions   Medication Sig Dispense Refill     acetaminophen (TYLENOL ARTHRITIS PAIN) 650 MG CR tablet Take 650 mg by mouth 2 times daily       cyclobenzaprine (FLEXERIL) 10 MG tablet Take 1 tablet (10 mg) by mouth 2 times daily 180 tablet 3     hydrochlorothiazide (HYDRODIURIL) 25 MG tablet        DULoxetine (CYMBALTA) 60 MG EC capsule Take 1 capsule (60 mg) by mouth daily 90 capsule 3     omeprazole (PRILOSEC) 20 MG CR capsule Take 1 capsule (20 mg) by mouth daily 90 capsule 3     montelukast  "(SINGULAIR) 10 MG tablet  30 tablet      fluticasone (FLONASE) 50 MCG/ACT spray Spray 2 sprays into both nostrils daily 1 Bottle 11     traMADol (ULTRAM) 50 MG tablet Take 2 tablets (100 mg) by mouth 3 times daily May take 5 per day 180 tablet 1     pregabalin (LYRICA) 150 MG capsule Take 1 capsule (150 mg) by mouth 3 times daily 270 capsule 1     atorvastatin (LIPITOR) 40 MG tablet Take 1 tablet (40 mg) by mouth daily 90 tablet 1     metFORMIN (GLUCOPHAGE) 500 MG tablet Take 1 tablet (500 mg) by mouth 2 times daily (with meals) 180 tablet 1     blood glucose monitoring (NO BRAND SPECIFIED) meter device kit Use to test blood sugar 1 times daily or as directed. Given kit, lancets and strips with refills on supplies for a year. 1 kit 0     order for DME Home BP cuff 1 Device 0     lisinopril (PRINIVIL/ZESTRIL) 40 MG tablet Take 0.5 tablets (20 mg) by mouth daily 90 tablet 1     albuterol (PROAIR HFA/PROVENTIL HFA/VENTOLIN HFA) 108 (90 BASE) MCG/ACT Inhaler Inhale 2 puffs into the lungs every 4 hours as needed for shortness of breath / dyspnea or wheezing 1 Inhaler 0     nicotine (NICOTROL) 10 MG Inhaler Inhale 2 cartridge into the lungs daily as needed for smoking cessation 30 each 0     traZODone (DESYREL) 100 MG tablet Take 2 tablets (200 mg) by mouth At Bedtime 60 tablet 5     cetirizine (ZYRTEC) 10 MG tablet Take 1 tablet (10 mg) by mouth daily 90 tablet 3        Reviewed and updated as needed this visit by clinical staffTobacco  Allergies  Meds  Problems  Med Hx  Surg Hx  Fam Hx  Soc Hx        Reviewed and updated as needed this visit by Provider             OBJECTIVE:     /72  Pulse 68  Temp 99.2  F (37.3  C) (Oral)  Ht 5' 9.5\" (1.765 m)  Wt 204 lb (92.5 kg)  SpO2 94%  BMI 29.69 kg/m2  Body mass index is 29.69 kg/(m^2).      BP Readings from Last 6 Encounters:   08/08/17 116/72   07/11/17 110/66   07/10/17 (!) 76/52   07/03/17 131/66   06/18/17 148/77   06/13/17 126/78       ASSESSMENT/PLAN: "             1. Benign essential hypertension  Advised reading today is very good. Recommend continue the HCTZ, would not restart lisinopril at this time. Recheck creatinine today.  Recommend she call [facility] about her home blood pressure monitor. If she does receive it start to check 3-4 per week and come in 2 weeks for nurse Blood pressure check and bring in her home monitor to compare for accuracy.  She does not receive the monitor, she may want to come in in 10-14 days.    - hydrochlorothiazide (HYDRODIURIL) 25 MG tablet; Take 1 tablet (25 mg) by mouth daily  Dispense: 90 tablet; Refill: 1  - Creatinine    2. Chronic obstructive pulmonary disease, unspecified COPD type (H)  Stable, refill med  - montelukast (SINGULAIR) 10 MG tablet; Take 1 tablet (10 mg) by mouth At Bedtime  Dispense: 90 tablet; Refill: 1        Disha Van MD  [facility]

## 2017-08-08 NOTE — PATIENT INSTRUCTIONS
If you do not hear from [facility] about the BP monitor in 7-10 days, call us.   If you do receive it, start to check the BP at different times of day 3-4 per week. Do a nurse BP check here in 2 weeks and bring your home machine with you.

## 2017-08-09 LAB
CREAT SERPL-MCNC: 0.73 MG/DL (ref 0.52–1.04)
GFR SERPL CREATININE-BSD FRML MDRD: 80 ML/MIN/1.7M2

## 2017-08-14 ENCOUNTER — CARE COORDINATION (OUTPATIENT)
Dept: GERIATRIC MEDICINE | Facility: CLINIC | Age: 67
End: 2017-08-14

## 2017-08-14 NOTE — PROGRESS NOTES
VM received from client stating that a homemaker she previously had is returning to [facility]. Client is unsure if she can stay with them, or if she should switch to [facility]. Return call and VM was left sharing that the decision was hers to make. Client is welcome to stay with current provider if she has the opportunity to work with a previous homemaker, or we can continue moving forward with [facility]. Encouraged client to think about it and let CM know how she would like to proceed.     8/16 VM received from client who shares that she would like to stay with [facility] as she was happy with the previous homemaker who will be returning. CM sent email to [facility] informing them of this decision.     Vanesa Gtz RN  [facility]  [phone]  Fax: [phone]

## 2017-08-23 DIAGNOSIS — M47.812 FACET ARTHROPATHY, CERVICAL: ICD-10-CM

## 2017-08-23 DIAGNOSIS — M79.7 FIBROMYALGIA: ICD-10-CM

## 2017-08-23 DIAGNOSIS — M47.816 FACET ARTHROPATHY, LUMBAR: ICD-10-CM

## 2017-08-23 DIAGNOSIS — M25.512 BILATERAL SHOULDER PAIN, UNSPECIFIED CHRONICITY: ICD-10-CM

## 2017-08-23 DIAGNOSIS — M25.511 BILATERAL SHOULDER PAIN, UNSPECIFIED CHRONICITY: ICD-10-CM

## 2017-08-23 RX ORDER — TRAMADOL HYDROCHLORIDE 50 MG/1
100 TABLET ORAL 3 TIMES DAILY
Qty: 180 TABLET | Refills: 1 | Status: SHIPPED | OUTPATIENT
Start: 2017-08-23 | End: 2017-11-08

## 2017-08-23 NOTE — TELEPHONE ENCOUNTER
Tramadol 50mg      Last Written Prescription Date: 06/08/2017  Last Fill Quantity: 180,  # refills: 1  Last Office Visit with [facility], [facility] or [facility] prescribing provider: 08/08/2017    Alia Pranke, PharmacyTechnician   [facility]

## 2017-08-29 ENCOUNTER — CARE COORDINATION (OUTPATIENT)
Dept: GERIATRIC MEDICINE | Facility: CLINIC | Age: 67
End: 2017-08-29

## 2017-08-29 NOTE — PROGRESS NOTES
Call from client who shares that she cannot afford her waiver obligation amount and is asking if she can stop her waiver services. CM reminded client that if we close her waiver she will have a spend down amount that is actually higher than the obligation. Client was encouraged to call her financial worker and review all income/deductions with him to ensure he has all the correct information. Client was told that we could decrease her waiver services and then she would owe less each month. Contact information was provider for the [facility] financial worker. Client will call back after she talks to the financial worker if she wishes to make any changes.     Vanesa Gtz RN  [facility]  [phone]  Fax: [phone]

## 2017-09-13 ENCOUNTER — TELEPHONE (OUTPATIENT)
Dept: INTERNAL MEDICINE | Facility: CLINIC | Age: 67
End: 2017-09-13

## 2017-09-13 NOTE — TELEPHONE ENCOUNTER
"Patient schedule to see Dr Van on 09/14 for a BP check. Per Dr Van's instructions from last OV 08/008/2017-  \"Do a nurse BP check here in 2 weeks and bring your home machine with you\".    Call pt and left message for her to call back. Reschedule appt as nurse only unless pt has some other concerns to address.   "

## 2017-09-14 ENCOUNTER — OFFICE VISIT (OUTPATIENT)
Dept: INTERNAL MEDICINE | Facility: CLINIC | Age: 67
End: 2017-09-14
Payer: COMMERCIAL

## 2017-09-14 VITALS
HEIGHT: 70 IN | DIASTOLIC BLOOD PRESSURE: 70 MMHG | HEART RATE: 78 BPM | TEMPERATURE: 99.2 F | OXYGEN SATURATION: 96 % | SYSTOLIC BLOOD PRESSURE: 110 MMHG | WEIGHT: 199.3 LBS | BODY MASS INDEX: 28.53 KG/M2

## 2017-09-14 DIAGNOSIS — G47.00 INSOMNIA, UNSPECIFIED TYPE: ICD-10-CM

## 2017-09-14 DIAGNOSIS — R09.89 LABILE BLOOD PRESSURE: Primary | ICD-10-CM

## 2017-09-14 DIAGNOSIS — I10 BENIGN ESSENTIAL HYPERTENSION: ICD-10-CM

## 2017-09-14 DIAGNOSIS — E11.40 TYPE 2 DIABETES MELLITUS WITH DIABETIC NEUROPATHY, WITHOUT LONG-TERM CURRENT USE OF INSULIN (H): ICD-10-CM

## 2017-09-14 PROCEDURE — 99213 OFFICE O/P EST LOW 20 MIN: CPT | Performed by: INTERNAL MEDICINE

## 2017-09-14 RX ORDER — TRAZODONE HYDROCHLORIDE 100 MG/1
200 TABLET ORAL AT BEDTIME
Qty: 180 TABLET | Refills: 3 | Status: SHIPPED | OUTPATIENT
Start: 2017-09-14 | End: 2018-09-05

## 2017-09-14 RX ORDER — LISINOPRIL 20 MG/1
20 TABLET ORAL DAILY
Qty: 90 TABLET | Refills: 1 | Status: SHIPPED | OUTPATIENT
Start: 2017-09-14 | End: 2018-03-15

## 2017-09-14 NOTE — MR AVS SNAPSHOT
"              After Visit Summary   9/14/2017    Vijaya Manjarrez    MRN: [id number]           Patient Information     Date Of Birth          1950        Visit Information        Provider Department      9/14/2017 4:00 PM Disha Van MD [facility]        Today's Diagnoses     Benign essential hypertension    -  1    Type 2 diabetes mellitus with diabetic neuropathy, without long-term current use of insulin (H)        Insomnia, unspecified type          Care Instructions    Take the lisinopril in evening or bedtime. Call some blood pressures in 2 weeks.           Follow-ups after your visit        Who to contact     If you have questions or need follow up information about today's clinic visit or your schedule please contact [facility] directly at [phone].  Normal or non-critical lab and imaging results will be communicated to you by [employer], letter or phone within 4 business days after the clinic has received the results. If you do not hear from us within 7 days, please contact the clinic through [employer] or phone. If you have a critical or abnormal lab result, we will notify you by phone as soon as possible.  Submit refill requests through [employer]) or call your pharmacy and they will forward the refill request to us. Please allow 3 business days for your refill to be completed.          Additional Information About Your Visit        [employer]Lattice[employer]MediaPhy Information     [employer] lets you send messages to your doctor, view your test results, renew your prescriptions, schedule appointments and more. To sign up, go to [email or web address]) . Click on \"Log in\" on the left side of the screen, which will take you to the Welcome page. Then click on \"Sign up Now\" on the right side of the page.     You will be asked to enter the access code listed below, as well as some personal information. Please follow the directions to create your username and password.     Your access code is: " "[email or web address]  Expires: 2017  4:38 PM     Your access code will  in 90 days. If you need help or a new code, please call your [facility] clinic or [phone].        Care EveryWhere ID     This is your Care EveryWhere ID. This could be used by other organizations to access your [facility] medical records  [id number]        Your Vitals Were     Pulse Temperature Height Pulse Oximetry Breastfeeding? BMI (Body Mass Index)    78 99.2  F (37.3  C) (Oral) 5' 9.5\" (1.765 m) 96% No 29.01 kg/m2       Blood Pressure from Last 3 Encounters:   17 110/70   17 116/72   17 110/66    Weight from Last 3 Encounters:   17 199 lb 4.8 oz (90.4 kg)   17 204 lb (92.5 kg)   17 203 lb (92.1 kg)              Today, you had the following     No orders found for display         Today's Medication Changes          These changes are accurate as of: 17  4:38 PM.  If you have any questions, ask your nurse or doctor.               Start taking these medicines.        Dose/Directions    blood glucose monitoring test strip   Commonly known as:  no brand specified   Used for:  Type 2 diabetes mellitus with diabetic neuropathy, without long-term current use of insulin (H)   Started by:  Disha Van MD        Use to test blood sugars 1 times daily or as directed, use brand same as previous   Quantity:  100 strip   Refills:  3         These medicines have changed or have updated prescriptions.        Dose/Directions    * lisinopril 40 MG tablet   Commonly known as:  PRINIVIL/ZESTRIL   This may have changed:  Another medication with the same name was added. Make sure you understand how and when to take each.   Used for:  Hypotension, unspecified hypotension type, Benign essential hypertension        Dose:  20 mg   Take 0.5 tablets (20 mg) by mouth daily   Quantity:  90 tablet   Refills:  1       * lisinopril 20 MG tablet   Commonly known as:  PRINIVIL/ZESTRIL   This may have changed:  You were already " taking a medication with the same name, and this prescription was added. Make sure you understand how and when to take each.   Used for:  Benign essential hypertension   Changed by:  Disha Van MD        Dose:  20 mg   Take 1 tablet (20 mg) by mouth daily   Quantity:  90 tablet   Refills:  1       * Notice:  This list has 2 medication(s) that are the same as other medications prescribed for you. Read the directions carefully, and ask your doctor or other care provider to review them with you.         Where to get your medicines      These medications were sent to [facility] [place], [place] - [address]  [address] [facility] [address]     Phone:  [phone]     blood glucose monitoring test strip    lisinopril 20 MG tablet    traZODone 100 MG tablet                Primary Care Provider Office Phone # Fax #    Disha Van [phone]       [address]  [facility] [address]        Equal Access to Services     [facility]SO : Hadii joceline ku hadasho Soomaali, waaxda luqadaha, qaybta kaalmada adeegyada, waxay idiin haytangn stephane hein . So [facility] [phone].    ATENCIÓN: Si habla español, tiene a carrion disposición servicios gratuitos de asistencia lingüística. Llame al [phone].    We comply with applicable federal civil rights laws and [place] laws. We do not discriminate on the basis of race, color, national origin, age, disability sex, sexual orientation or gender identity.            Thank you!     Thank you for choosing [facility]  for your care. Our goal is always to provide you with excellent care. Hearing back from our patients is one way we can continue to improve our services. Please take a few minutes to complete the written survey that you may receive in the mail after your visit with us. Thank you!             Your Updated Medication List - Protect others around you: Learn how to safely use, store and throw  away your medicines at www.disposemymeds.org.          This list is accurate as of: 9/14/17  4:38 PM.  Always use your most recent med list.                   Brand Name Dispense Instructions for use Diagnosis    albuterol 108 (90 BASE) MCG/ACT Inhaler    PROAIR HFA/PROVENTIL HFA/VENTOLIN HFA    1 Inhaler    Inhale 2 puffs into the lungs every 4 hours as needed for shortness of breath / dyspnea or wheezing    Bronchitis       atorvastatin 40 MG tablet    LIPITOR    90 tablet    Take 1 tablet (40 mg) by mouth daily    Hyperlipidemia LDL goal <100       blood glucose monitoring meter device kit    no brand specified    1 kit    Use to test blood sugar 1 times daily or as directed. Given kit, lancets and strips with refills on supplies for a year.    Type 2 diabetes mellitus with diabetic neuropathy, without long-term current use of insulin (H)       blood glucose monitoring test strip    no brand specified    100 strip    Use to test blood sugars 1 times daily or as directed, use brand same as previous    Type 2 diabetes mellitus with diabetic neuropathy, without long-term current use of insulin (H)       cetirizine 10 MG tablet    zyrTEC    90 tablet    Take 1 tablet (10 mg) by mouth daily    Allergic rhinitis, unspecified allergic rhinitis trigger, unspecified rhinitis seasonality       cyclobenzaprine 10 MG tablet    FLEXERIL    180 tablet    Take 1 tablet (10 mg) by mouth 2 times daily    Muscle spasm       DULoxetine 60 MG EC capsule    CYMBALTA    90 capsule    Take 1 capsule (60 mg) by mouth daily    Lumbar degenerative disc disease       fluticasone 50 MCG/ACT spray    FLONASE    1 Bottle    Spray 2 sprays into both nostrils daily    Seasonal allergic rhinitis due to pollen, unspecified chronicity       hydrochlorothiazide 25 MG tablet    HYDRODIURIL    90 tablet    Take 1 tablet (25 mg) by mouth daily    Benign essential hypertension       * lisinopril 40 MG tablet    PRINIVIL/ZESTRIL    90 tablet    Take 0.5  tablets (20 mg) by mouth daily    Hypotension, unspecified hypotension type, Benign essential hypertension       * lisinopril 20 MG tablet    PRINIVIL/ZESTRIL    90 tablet    Take 1 tablet (20 mg) by mouth daily    Benign essential hypertension       metFORMIN 500 MG tablet    GLUCOPHAGE    180 tablet    Take 1 tablet (500 mg) by mouth 2 times daily (with meals)    Type 2 diabetes mellitus with diabetic neuropathy, without long-term current use of insulin (H)       montelukast 10 MG tablet    SINGULAIR    90 tablet    Take 1 tablet (10 mg) by mouth At Bedtime    Chronic obstructive pulmonary disease, unspecified COPD type (H)       nicotine 10 MG Inhaler    NICOTROL    30 each    Inhale 2 cartridge into the lungs daily as needed for smoking cessation    Tobacco use disorder       omeprazole 20 MG CR capsule    priLOSEC    90 capsule    Take 1 capsule (20 mg) by mouth daily    Gastroesophageal reflux disease without esophagitis       order for DME     1 Device    Home BP cuff    Labile hypertension       pregabalin 150 MG capsule    LYRICA    270 capsule    Take 1 capsule (150 mg) by mouth 3 times daily    Fibromyalgia, Chronic bilateral low back pain without sciatica       traMADol 50 MG tablet    ULTRAM    180 tablet    Take 2 tablets (100 mg) by mouth 3 times daily May take 5 per day    Facet arthropathy, cervical, Facet arthropathy, lumbar, Fibromyalgia, Bilateral shoulder pain, unspecified chronicity       traZODone 100 MG tablet    DESYREL    180 tablet    Take 2 tablets (200 mg) by mouth At Bedtime    Insomnia, unspecified type       TYLENOL ARTHRITIS PAIN 650 MG CR tablet   Generic drug:  acetaminophen      Take 650 mg by mouth 2 times daily        * Notice:  This list has 2 medication(s) that are the same as other medications prescribed for you. Read the directions carefully, and ask your doctor or other care provider to review them with you.

## 2017-10-10 DIAGNOSIS — E11.40 TYPE 2 DIABETES MELLITUS WITH DIABETIC NEUROPATHY, WITHOUT LONG-TERM CURRENT USE OF INSULIN (H): ICD-10-CM

## 2017-10-10 NOTE — TELEPHONE ENCOUNTER
metFORMIN (GLUCOPHAGE) 500 MG tablet        PATIENT IS OUT OF MEDS AS OF TODAY, PLS FILL TODAY IF POSSIBLE    Last Written Prescription Date: 03/20/17  Last Fill Quantity: 180, # refills: 1  Last Office Visit with [facility], P or [facility] prescribing provider:  09/14/17        BP Readings from Last 3 Encounters:   09/14/17 110/70   08/08/17 116/72   07/11/17 110/66     Lab Results   Component Value Date    MICROL 42 02/20/2017     Lab Results   Component Value Date    UMALCR 16.67 02/20/2017     Creatinine   Date Value Ref Range Status   08/08/2017 0.73 0.52 - 1.04 mg/dL Final   ]  GFR Estimate   Date Value Ref Range Status   08/08/2017 80 >60 mL/min/1.7m2 Final     Comment:     Non  GFR Calc   07/03/2017 71 >60 mL/min/1.7m2 Final     Comment:     Non  GFR Calc   07/02/2017 31 (L) >60 mL/min/1.7m2 Final     Comment:     Non  GFR Calc     GFR Estimate If Black   Date Value Ref Range Status   08/08/2017 >90   GFR Calc   >60 mL/min/1.7m2 Final   07/03/2017 86 >60 mL/min/1.7m2 Final     Comment:      GFR Calc   07/02/2017 37 (L) >60 mL/min/1.7m2 Final     Comment:      GFR Calc     Lab Results   Component Value Date    CHOL 147 02/20/2017     Lab Results   Component Value Date    HDL 36 02/20/2017     Lab Results   Component Value Date    LDL 58 02/20/2017     Lab Results   Component Value Date    TRIG 267 02/20/2017     No results found for: CHOLHDLRATIO  Lab Results   Component Value Date    AST 16 07/02/2017     Lab Results   Component Value Date    ALT 22 07/02/2017     Lab Results   Component Value Date    A1C 6.8 06/17/2017    A1C 6.7 02/20/2017    A1C 6.2 07/12/2016    A1C 7.0 03/04/2016     Potassium   Date Value Ref Range Status   07/03/2017 4.2 3.4 - 5.3 mmol/L Final

## 2017-10-11 ENCOUNTER — TELEPHONE (OUTPATIENT)
Dept: INTERNAL MEDICINE | Facility: CLINIC | Age: 67
End: 2017-10-11

## 2017-10-11 DIAGNOSIS — M79.7 FIBROMYALGIA: ICD-10-CM

## 2017-10-11 DIAGNOSIS — G89.29 CHRONIC BILATERAL LOW BACK PAIN WITHOUT SCIATICA: ICD-10-CM

## 2017-10-11 DIAGNOSIS — M54.50 CHRONIC BILATERAL LOW BACK PAIN WITHOUT SCIATICA: ICD-10-CM

## 2017-10-11 RX ORDER — PREGABALIN 150 MG/1
150 CAPSULE ORAL 3 TIMES DAILY
Qty: 270 CAPSULE | Refills: 1 | Status: CANCELLED | OUTPATIENT
Start: 2017-10-11

## 2017-10-11 NOTE — TELEPHONE ENCOUNTER
Her new Insurance plan requires a prior auth for this medication. Insurance ph:[phone] id:[id number] Deyvi.. Thanks

## 2017-10-12 NOTE — TELEPHONE ENCOUNTER
Need to call to find out if she has taken gabapentin before. If so, what issues did she have? Has she ever taken amitriptyline (elavil), nortriptyline, doxepin? The insurance may require trials of these meds. She can call insurance to find out what is covered if she has not tried any of these.

## 2017-10-26 NOTE — TELEPHONE ENCOUNTER
Pt called back. Relayed below. Pt stated she has tried both Gabapentin and Amitriptyline, but it was years ago and she cant remember what exactly why she stopped the meds, but she thinks it was due to them not working. Cant remember having any side effects.

## 2017-11-03 DIAGNOSIS — E78.5 HYPERLIPIDEMIA LDL GOAL <100: ICD-10-CM

## 2017-11-03 NOTE — TELEPHONE ENCOUNTER
lipitor 40mg       Last Written Prescription Date:05/05/17  Last Fill Quantity: 90, # refills: 1    Last Office Visit with G, P or [facility] prescribing provider:  09/14/17   Future Office Visit:      Cholesterol   Date Value Ref Range Status   02/20/2017 147 <200 mg/dL Final     HDL Cholesterol   Date Value Ref Range Status   02/20/2017 36 (L) >49 mg/dL Final     LDL Cholesterol Calculated   Date Value Ref Range Status   02/20/2017 58 <100 mg/dL Final     Comment:     Desirable:       <100 mg/dl     Triglycerides   Date Value Ref Range Status   02/20/2017 267 (H) <150 mg/dL Final     Comment:     Borderline high:  150-199 mg/dl   High:             200-499 mg/dl   Very high:       >499 mg/dl   Fasting specimen       ALT   Date Value Ref Range Status   07/02/2017 22 0 - 50 U/L Final

## 2017-11-06 RX ORDER — ATORVASTATIN CALCIUM 40 MG/1
40 TABLET, FILM COATED ORAL DAILY
Qty: 90 TABLET | Refills: 0 | Status: SHIPPED | OUTPATIENT
Start: 2017-11-06 | End: 2018-01-30

## 2017-11-08 DIAGNOSIS — M47.812 FACET ARTHROPATHY, CERVICAL: ICD-10-CM

## 2017-11-08 DIAGNOSIS — M25.512 BILATERAL SHOULDER PAIN, UNSPECIFIED CHRONICITY: ICD-10-CM

## 2017-11-08 DIAGNOSIS — M25.511 BILATERAL SHOULDER PAIN, UNSPECIFIED CHRONICITY: ICD-10-CM

## 2017-11-08 DIAGNOSIS — M79.7 FIBROMYALGIA: ICD-10-CM

## 2017-11-08 DIAGNOSIS — M47.816 FACET ARTHROPATHY, LUMBAR: ICD-10-CM

## 2017-11-08 RX ORDER — TRAMADOL HYDROCHLORIDE 50 MG/1
100 TABLET ORAL 3 TIMES DAILY
Qty: 180 TABLET | Refills: 1 | Status: SHIPPED | OUTPATIENT
Start: 2017-11-08 | End: 2018-01-19

## 2017-11-20 ENCOUNTER — CARE COORDINATION (OUTPATIENT)
Dept: GERIATRIC MEDICINE | Facility: CLINIC | Age: 67
End: 2017-11-20

## 2017-11-20 NOTE — PROGRESS NOTES
VM received from client who is looking for a document showing her waiver obligation amount for her rent re-certification. Client would also like dental and eye providers she can see. Return call to client and provided the number for [facility]. Requested that CMS send a list of [facility] eye providers in client's area. JOSEFINA left for [facility] financial worker, Rubén, requesting that he send client documentation showing her obligation amount.     Vanesa Gtz RN  [facility]  [phone]  Fax: [phone]

## 2017-11-22 ENCOUNTER — CARE COORDINATION (OUTPATIENT)
Dept: GERIATRIC MEDICINE | Facility: CLINIC | Age: 67
End: 2017-11-22

## 2017-11-22 NOTE — PROGRESS NOTES
VM received from Dieog with [employer] stating that client has not been paying her waiver obligation amount and has an outstanding balance of almost $600. Diego spoke with client last week who shared she would be sending a money order for $200, but Diego has not received this.     KAUSHIK called client and she didn't answer. JOSEFINA was left reminding client of payment due. Asked client to call CM back if she would like to discuss any changes to her current services. Return call to Diego and JOSEFINA was left with update.     Vanesa Gtz RN  [facility]  [phone]  Fax: [phone]

## 2017-12-04 ENCOUNTER — OFFICE VISIT (OUTPATIENT)
Dept: INTERNAL MEDICINE | Facility: CLINIC | Age: 67
End: 2017-12-04
Payer: COMMERCIAL

## 2017-12-04 VITALS
WEIGHT: 200 LBS | HEIGHT: 70 IN | BODY MASS INDEX: 28.63 KG/M2 | OXYGEN SATURATION: 95 % | SYSTOLIC BLOOD PRESSURE: 98 MMHG | DIASTOLIC BLOOD PRESSURE: 62 MMHG | HEART RATE: 60 BPM | TEMPERATURE: 98.5 F

## 2017-12-04 DIAGNOSIS — J44.9 CHRONIC OBSTRUCTIVE PULMONARY DISEASE, UNSPECIFIED COPD TYPE (H): Primary | ICD-10-CM

## 2017-12-04 DIAGNOSIS — B37.2 YEAST INFECTION OF THE SKIN: ICD-10-CM

## 2017-12-04 DIAGNOSIS — I10 BENIGN ESSENTIAL HYPERTENSION: ICD-10-CM

## 2017-12-04 DIAGNOSIS — E53.8 VITAMIN B12 DEFICIENCY (NON ANEMIC): ICD-10-CM

## 2017-12-04 DIAGNOSIS — Z78.0 ASYMPTOMATIC POSTMENOPAUSAL STATUS: ICD-10-CM

## 2017-12-04 DIAGNOSIS — E55.9 VITAMIN D DEFICIENCY: ICD-10-CM

## 2017-12-04 LAB
FEF 25/75: 1.6
FEV-1: 2.06
FEV1/FVC: NORMAL
FVC: 2.94

## 2017-12-04 PROCEDURE — 82306 VITAMIN D 25 HYDROXY: CPT | Performed by: INTERNAL MEDICINE

## 2017-12-04 PROCEDURE — 36415 COLL VENOUS BLD VENIPUNCTURE: CPT | Performed by: INTERNAL MEDICINE

## 2017-12-04 PROCEDURE — 94010 BREATHING CAPACITY TEST: CPT | Performed by: INTERNAL MEDICINE

## 2017-12-04 PROCEDURE — 82607 VITAMIN B-12: CPT | Performed by: INTERNAL MEDICINE

## 2017-12-04 PROCEDURE — 99214 OFFICE O/P EST MOD 30 MIN: CPT | Mod: 25 | Performed by: INTERNAL MEDICINE

## 2017-12-04 RX ORDER — MICONAZOLE NITRATE 20 MG/G
CREAM TOPICAL 2 TIMES DAILY
Qty: 57 G | Refills: 5 | Status: SHIPPED | OUTPATIENT
Start: 2017-12-04 | End: 2020-08-08

## 2017-12-04 NOTE — PROGRESS NOTES
SUBJECTIVE:   Vijaya Manjarrez is a 67 year old female who presents to clinic today for the following health issues:    1. COPD: overall stable, she needs a letter showing she is disabled, this is related to her subsidized housing. She has been on SSI since 1992 for her COPD, fibromyalgia and disk disease.     2. HTN:   her blood pressures have been extremely variable.  They mostly run 140-150 systolic but it can range up to 170, also can drop to 86.  She has fallen a couple times in the past few weeks because of lightheadedness she thinks is due to the low blood pressure.    3.  She complains of some slowing of her bladder emptying.  This is not new.  She is not having any times where her bladder stops completely.  No associated dysuria, frequency or urgency.    4.  Ongoing yeast infection under the breasts, groin questions about    5.  Vitamin D and B12 levels that have been low in the past    Patient Active Problem List   Diagnosis     HCD (health care directive)     Type 2 diabetes mellitus with diabetic neuropathy, without long-term current use of insulin (H)     COPD (chronic obstructive pulmonary disease) (H)     Cervical spine degeneration     Facet arthropathy, lumbar     Lumbar degenerative disc disease     Migraine headache     Diabetic neuropathy (H)     Anxiety     GERD (gastroesophageal reflux disease)     Hyperlipidemia LDL goal <100     Fibromyalgia     Benign essential hypertension     Controlled substance agreement signed     Chronic pain syndrome     Health Care Home     Advanced directives, counseling/discussion     Current Outpatient Prescriptions   Medication Sig Dispense Refill     miconazole (MICATIN) 2 % cream Apply topically 2 times daily 57 g 5     fluticasone-salmeterol (ADVAIR) 250-50 MCG/DOSE diskus inhaler Inhale 1 puff into the lungs 2 times daily 1 Inhaler 5     traMADol (ULTRAM) 50 MG tablet Take 2 tablets (100 mg) by mouth 3 times daily May take 5 per day 180 tablet 1      atorvastatin (LIPITOR) 40 MG tablet Take 1 tablet (40 mg) by mouth daily 90 tablet 0     metFORMIN (GLUCOPHAGE) 500 MG tablet Take 1 tablet (500 mg) by mouth 2 times daily (with meals) 180 tablet 0     lisinopril (PRINIVIL/ZESTRIL) 20 MG tablet Take 1 tablet (20 mg) by mouth daily 90 tablet 1     blood glucose monitoring (NO BRAND SPECIFIED) test strip Use to test blood sugars 1 times daily or as directed, use brand same as previous 100 strip 3     traZODone (DESYREL) 100 MG tablet Take 2 tablets (200 mg) by mouth At Bedtime 180 tablet 3     montelukast (SINGULAIR) 10 MG tablet Take 1 tablet (10 mg) by mouth At Bedtime 90 tablet 1     hydrochlorothiazide (HYDRODIURIL) 25 MG tablet Take 1 tablet (25 mg) by mouth daily 90 tablet 1     acetaminophen (TYLENOL ARTHRITIS PAIN) 650 MG CR tablet Take 650 mg by mouth 2 times daily       cyclobenzaprine (FLEXERIL) 10 MG tablet Take 1 tablet (10 mg) by mouth 2 times daily 180 tablet 3     DULoxetine (CYMBALTA) 60 MG EC capsule Take 1 capsule (60 mg) by mouth daily 90 capsule 3     omeprazole (PRILOSEC) 20 MG CR capsule Take 1 capsule (20 mg) by mouth daily 90 capsule 3     fluticasone (FLONASE) 50 MCG/ACT spray Spray 2 sprays into both nostrils daily 1 Bottle 11     albuterol (PROAIR HFA/PROVENTIL HFA/VENTOLIN HFA) 108 (90 BASE) MCG/ACT Inhaler Inhale 2 puffs into the lungs every 4 hours as needed for shortness of breath / dyspnea or wheezing 1 Inhaler 0     pregabalin (LYRICA) 150 MG capsule Take 1 capsule (150 mg) by mouth 3 times daily 270 capsule 1     cetirizine (ZYRTEC) 10 MG tablet Take 1 tablet (10 mg) by mouth daily 90 tablet 3      Social History   Substance Use Topics     Smoking status: Former Smoker     Packs/day: 1.00     Years: 40.00     Types: Cigarettes     Quit date: 6/1/2017     Smokeless tobacco: Never Used     Alcohol use No      Reviewed and updated as needed this visit by clinical staff  Tobacco  Allergies  Problems  Med Hx  Surg Hx  Fam Hx  Soc Hx  "     Reviewed and updated as needed this visit by Provider         ROS:  No fever, chills, mild cough, stable dyspnea on exertion, no chest pain, no palpitations, no significant headache, no vertigo, imbalance, ataxia    OBJECTIVE:     BP 98/62 (BP Location: Left arm, Patient Position: Sitting, Cuff Size: Adult Large)  Pulse 60  Temp 98.5  F (36.9  C) (Oral)  Ht 5' 9.5\" (1.765 m)  Wt 200 lb (90.7 kg)  SpO2 95%  BMI 29.11 kg/m2  Body mass index is 29.11 kg/(m^2).    Lungs: Decreased breath sounds throughout  CV: normal S1, S2 without murmur, S3 or S4.     Spirometry: 1.6/2.39    ASSESSMENT/PLAN:       1. Chronic obstructive pulmonary disease, unspecified COPD type (H)  Overall stable, refill meds, letter done  - fluticasone-salmeterol (ADVAIR) 250-50 MCG/DOSE diskus inhaler; Inhale 1 puff into the lungs 2 times daily  Dispense: 1 Inhaler; Refill: 5  - Spirometry, Breathing Capacity: Normal Order, Clinic Performed    2. Benign essential hypertension  Variable blood pressures, continue lisinopril at night that may reduce the dizziness.  If continued episodes of falling, dizziness , may need to decrease the dose    3. Vitamin D deficiency  Check lab  - Vitamin D Deficiency    4. Vitamin B12 deficiency (non anemic)  Check lab  - Vitamin B12    5. Yeast infection of the skin  Ordered cream  - miconazole (MICATIN) 2 % cream; Apply topically 2 times daily  Dispense: 57 g; Refill: 5    6. Asymptomatic postmenopausal status  due  - DX Hip/Pelvis/Spine; Future        Disha Van MD  [facility]    "

## 2017-12-04 NOTE — MR AVS SNAPSHOT
After Visit Summary   12/4/2017    Vijaya Manjarrez    MRN: [id number]           Patient Information     Date Of Birth          1950        Visit Information        Provider Department      12/4/2017 4:40 PM Disha Van MD [facility]        Today's Diagnoses     Chronic obstructive pulmonary disease, unspecified COPD type (H)    -  1    Benign essential hypertension        Vitamin D deficiency        Vitamin B12 deficiency (non anemic)        Yeast infection of the skin        Asymptomatic postmenopausal status           Follow-ups after your visit        Your next 10 appointments already scheduled     Dec 29, 2017  3:00 PM CST   DX HIP/PELVIS/SPINE with RIDX1   [facility] ([facility]    [address]  [address]  [facility] [address]              Please do not take any of the following 24 hours prior to the day of your exam: vitamins, calcium tablets, antacids.  If possible, please wear clothes without metal (snaps, zippers). A sweatsuit works well.              Who to contact     If you have questions or need follow up information about today's clinic visit or your schedule please contact [facility] directly at [phone].  Normal or non-critical lab and imaging results will be communicated to you by MyChart, letter or phone within 4 business days after the clinic has received the results. If you do not hear from us within 7 days, please contact the clinic through [employer] or phone. If you have a critical or abnormal lab result, we will notify you by phone as soon as possible.  Submit refill requests through [email or web address] or call your pharmacy and they will forward the refill request to us. Please allow 3 business days for your refill to be completed.          Additional Information About Your Visit        MyChart Information     [email or web address] lets you send messages to your doctor, view your test results, renew your  "prescriptions, schedule appointments and more. To sign up, go to [email or web address] . Click on \"Log in\" on the left side of the screen, which will take you to the Welcome page. Then click on \"Sign up Now\" on the right side of the page.     You will be asked to enter the access code listed below, as well as some personal information. Please follow the directions to create your username and password.     Your access code is: [email or web address]  Expires: 2017  3:38 PM     Your access code will  in 90 days. If you need help or a new code, please call your [place] clinic or [phone].        Care EveryWhere ID     This is your Care EveryWhere ID. This could be used by other organizations to access your [place] medical records  [id number]        Your Vitals Were     Pulse Temperature Height Pulse Oximetry BMI (Body Mass Index)       60 98.5  F (36.9  C) (Oral) 5' 9.5\" (1.765 m) 95% 29.11 kg/m2        Blood Pressure from Last 3 Encounters:   17 98/62   17 110/70   17 116/72    Weight from Last 3 Encounters:   17 200 lb (90.7 kg)   17 199 lb 4.8 oz (90.4 kg)   17 204 lb (92.5 kg)              We Performed the Following     Spirometry, Breathing Capacity: Normal Order, Clinic Performed     Vitamin B12     Vitamin D Deficiency          Today's Medication Changes          These changes are accurate as of: 17 11:59 PM.  If you have any questions, ask your nurse or doctor.               Start taking these medicines.        Dose/Directions    fluticasone-salmeterol 250-50 MCG/DOSE diskus inhaler   Commonly known as:  ADVAIR   Used for:  Chronic obstructive pulmonary disease, unspecified COPD type (H)   Started by:  Disha Van MD        Dose:  1 puff   Inhale 1 puff into the lungs 2 times daily   Quantity:  1 Inhaler   Refills:  5       miconazole 2 % cream   Commonly known as:  MICATIN   Used for:  Yeast infection of the skin   Started by:  Disha Van MD        " Apply topically 2 times daily   Quantity:  57 g   Refills:  5         These medicines have changed or have updated prescriptions.        Dose/Directions    lisinopril 20 MG tablet   Commonly known as:  PRINIVIL/ZESTRIL   This may have changed:  Another medication with the same name was removed. Continue taking this medication, and follow the directions you see here.   Used for:  Benign essential hypertension   Changed by:  Disha Van MD        Dose:  20 mg   Take 1 tablet (20 mg) by mouth daily   Quantity:  90 tablet   Refills:  1         Stop taking these medicines if you haven't already. Please contact your care team if you have questions.     blood glucose monitoring meter device kit   Commonly known as:  no brand specified   Stopped by:  Disha Van MD           order for DME   Stopped by:  Disha Van MD                Where to get your medicines      These medications were sent to [facility] [place], [place] - [address]  [address] [facility] [address]     Phone:  [phone]     fluticasone-salmeterol 250-50 MCG/DOSE diskus inhaler    miconazole 2 % cream                Primary Care Provider Office Phone # Fax #    Disha Van [phone]       [address]  [facility] [address]        Equal Access to Services     ISIDRO GO AH: Hadii jocelien ku hadasho Soomaali, waaxda luqadaha, qaybta kaalmada adeegyada, waxay idiin haytangn stephane lentz. So [facility] [phone].    ATENCIÓN: Si habla español, tiene a carrion disposición servicios gratuitos de asistencia lingüística. Llame al [phone].    We comply with applicable federal civil rights laws and [place] laws. We do not discriminate on the basis of race, color, national origin, age, disability, sex, sexual orientation, or gender identity.            Thank you!     Thank you for choosing [facility]  for your care. Our goal is always to provide you with excellent care.  Hearing back from our patients is one way we can continue to improve our services. Please take a few minutes to complete the written survey that you may receive in the mail after your visit with us. Thank you!             Your Updated Medication List - Protect others around you: Learn how to safely use, store and throw away your medicines at www.disposemymeds.org.          This list is accurate as of: 12/4/17 11:59 PM.  Always use your most recent med list.                   Brand Name Dispense Instructions for use Diagnosis    albuterol 108 (90 BASE) MCG/ACT Inhaler    PROAIR HFA/PROVENTIL HFA/VENTOLIN HFA    1 Inhaler    Inhale 2 puffs into the lungs every 4 hours as needed for shortness of breath / dyspnea or wheezing    Bronchitis       atorvastatin 40 MG tablet    LIPITOR    90 tablet    Take 1 tablet (40 mg) by mouth daily    Hyperlipidemia LDL goal <100       blood glucose monitoring test strip    no brand specified    100 strip    Use to test blood sugars 1 times daily or as directed, use brand same as previous    Type 2 diabetes mellitus with diabetic neuropathy, without long-term current use of insulin (H)       cetirizine 10 MG tablet    zyrTEC    90 tablet    Take 1 tablet (10 mg) by mouth daily    Allergic rhinitis, unspecified allergic rhinitis trigger, unspecified rhinitis seasonality       cyclobenzaprine 10 MG tablet    FLEXERIL    180 tablet    Take 1 tablet (10 mg) by mouth 2 times daily    Muscle spasm       DULoxetine 60 MG EC capsule    CYMBALTA    90 capsule    Take 1 capsule (60 mg) by mouth daily    Lumbar degenerative disc disease       fluticasone 50 MCG/ACT spray    FLONASE    1 Bottle    Spray 2 sprays into both nostrils daily    Seasonal allergic rhinitis due to pollen, unspecified chronicity       fluticasone-salmeterol 250-50 MCG/DOSE diskus inhaler    ADVAIR    1 Inhaler    Inhale 1 puff into the lungs 2 times daily    Chronic obstructive pulmonary disease, unspecified COPD type (H)        hydrochlorothiazide 25 MG tablet    HYDRODIURIL    90 tablet    Take 1 tablet (25 mg) by mouth daily    Benign essential hypertension       lisinopril 20 MG tablet    PRINIVIL/ZESTRIL    90 tablet    Take 1 tablet (20 mg) by mouth daily    Benign essential hypertension       metFORMIN 500 MG tablet    GLUCOPHAGE    180 tablet    Take 1 tablet (500 mg) by mouth 2 times daily (with meals)    Type 2 diabetes mellitus with diabetic neuropathy, without long-term current use of insulin (H)       miconazole 2 % cream    MICATIN    57 g    Apply topically 2 times daily    Yeast infection of the skin       montelukast 10 MG tablet    SINGULAIR    90 tablet    Take 1 tablet (10 mg) by mouth At Bedtime    Chronic obstructive pulmonary disease, unspecified COPD type (H)       omeprazole 20 MG CR capsule    priLOSEC    90 capsule    Take 1 capsule (20 mg) by mouth daily    Gastroesophageal reflux disease without esophagitis       pregabalin 150 MG capsule    LYRICA    270 capsule    Take 1 capsule (150 mg) by mouth 3 times daily    Fibromyalgia, Chronic bilateral low back pain without sciatica       traMADol 50 MG tablet    ULTRAM    180 tablet    Take 2 tablets (100 mg) by mouth 3 times daily May take 5 per day    Facet arthropathy, cervical, Facet arthropathy, lumbar, Fibromyalgia, Bilateral shoulder pain, unspecified chronicity       traZODone 100 MG tablet    DESYREL    180 tablet    Take 2 tablets (200 mg) by mouth At Bedtime    Insomnia, unspecified type       TYLENOL ARTHRITIS PAIN 650 MG CR tablet   Generic drug:  acetaminophen      Take 650 mg by mouth 2 times daily

## 2017-12-04 NOTE — LETTER
[facility]  [address], [address]  [place], [place]  [address]                                            TEL:[phone]  FAX:[phone]    12/4/2017     Vijaya Manjarrez  1950       To Whom it May Concern:     Ms. Manjarrez is under my care and is receiving treatment for her medical conditions. She is considered medically disabled because of Fibromyalgia, Degenerative disk disease of the spine, neuropathy, chronic pain syndrome.           Sincerely,         Disha Van MD

## 2017-12-04 NOTE — NURSING NOTE
"Chief Complaint   Patient presents with     Letter Request     proof of disability/she been receiving treatment       Initial BP 98/62 (BP Location: Left arm, Patient Position: Sitting, Cuff Size: Adult Large)  Pulse 60  Temp 98.5  F (36.9  C) (Oral)  Ht 5' 9.5\" (1.765 m)  Wt 200 lb (90.7 kg)  SpO2 95%  BMI 29.11 kg/m2 Estimated body mass index is 29.11 kg/(m^2) as calculated from the following:    Height as of this encounter: 5' 9.5\" (1.765 m).    Weight as of this encounter: 200 lb (90.7 kg).  Medication Reconciliation: complete    "

## 2017-12-05 LAB
DEPRECATED CALCIDIOL+CALCIFEROL SERPL-MC: 58 UG/L (ref 20–75)
VIT B12 SERPL-MCNC: 802 PG/ML (ref 193–986)

## 2017-12-13 ENCOUNTER — HOSPITAL ENCOUNTER (EMERGENCY)
Facility: CLINIC | Age: 67
Discharge: HOME | End: 2017-12-13
Attending: EMERGENCY MEDICINE | Admitting: EMERGENCY MEDICINE
Payer: COMMERCIAL

## 2017-12-13 ENCOUNTER — APPOINTMENT (OUTPATIENT)
Dept: CT IMAGING | Facility: CLINIC | Age: 67
End: 2017-12-13
Attending: EMERGENCY MEDICINE
Payer: COMMERCIAL

## 2017-12-13 ENCOUNTER — TELEPHONE (OUTPATIENT)
Dept: INTERNAL MEDICINE | Facility: CLINIC | Age: 67
End: 2017-12-13

## 2017-12-13 ENCOUNTER — APPOINTMENT (OUTPATIENT)
Dept: GENERAL RADIOLOGY | Facility: CLINIC | Age: 67
End: 2017-12-13
Attending: EMERGENCY MEDICINE
Payer: COMMERCIAL

## 2017-12-13 VITALS
RESPIRATION RATE: 20 BRPM | SYSTOLIC BLOOD PRESSURE: 144 MMHG | DIASTOLIC BLOOD PRESSURE: 78 MMHG | TEMPERATURE: 97.7 F | BODY MASS INDEX: 29.62 KG/M2 | WEIGHT: 200 LBS | OXYGEN SATURATION: 93 % | HEIGHT: 69 IN

## 2017-12-13 DIAGNOSIS — M47.812 SPONDYLOSIS OF CERVICAL REGION WITHOUT MYELOPATHY OR RADICULOPATHY: Primary | ICD-10-CM

## 2017-12-13 DIAGNOSIS — R10.32 ABDOMINAL PAIN, LEFT LOWER QUADRANT: ICD-10-CM

## 2017-12-13 LAB
ALBUMIN SERPL-MCNC: 3.4 G/DL (ref 3.4–5)
ALBUMIN UR-MCNC: NEGATIVE MG/DL
ALP SERPL-CCNC: 95 U/L (ref 40–150)
ALT SERPL W P-5'-P-CCNC: 22 U/L (ref 0–50)
ANION GAP SERPL CALCULATED.3IONS-SCNC: 9 MMOL/L (ref 3–14)
APPEARANCE UR: CLEAR
AST SERPL W P-5'-P-CCNC: 28 U/L (ref 0–45)
BASOPHILS # BLD AUTO: 0.1 10E9/L (ref 0–0.2)
BASOPHILS NFR BLD AUTO: 1.1 %
BILIRUB SERPL-MCNC: 0.3 MG/DL (ref 0.2–1.3)
BILIRUB UR QL STRIP: NEGATIVE
BUN SERPL-MCNC: 15 MG/DL (ref 7–30)
CALCIUM SERPL-MCNC: 8.6 MG/DL (ref 8.5–10.1)
CHLORIDE SERPL-SCNC: 104 MMOL/L (ref 94–109)
CO2 SERPL-SCNC: 26 MMOL/L (ref 20–32)
COLOR UR AUTO: YELLOW
CREAT SERPL-MCNC: 0.66 MG/DL (ref 0.52–1.04)
DIFFERENTIAL METHOD BLD: NORMAL
EOSINOPHIL # BLD AUTO: 0.2 10E9/L (ref 0–0.7)
EOSINOPHIL NFR BLD AUTO: 2.3 %
ERYTHROCYTE [DISTWIDTH] IN BLOOD BY AUTOMATED COUNT: 13.3 % (ref 10–15)
GFR SERPL CREATININE-BSD FRML MDRD: >90 ML/MIN/1.7M2
GLUCOSE SERPL-MCNC: 136 MG/DL (ref 70–99)
GLUCOSE UR STRIP-MCNC: NEGATIVE MG/DL
HCT VFR BLD AUTO: 37.5 % (ref 35–47)
HGB BLD-MCNC: 12.6 G/DL (ref 11.7–15.7)
HGB UR QL STRIP: NEGATIVE
IMM GRANULOCYTES # BLD: 0 10E9/L (ref 0–0.4)
IMM GRANULOCYTES NFR BLD: 0.3 %
KETONES UR STRIP-MCNC: NEGATIVE MG/DL
LEUKOCYTE ESTERASE UR QL STRIP: ABNORMAL
LIPASE SERPL-CCNC: 248 U/L (ref 73–393)
LYMPHOCYTES # BLD AUTO: 3.3 10E9/L (ref 0.8–5.3)
LYMPHOCYTES NFR BLD AUTO: 44.5 %
MCH RBC QN AUTO: 29.9 PG (ref 26.5–33)
MCHC RBC AUTO-ENTMCNC: 33.6 G/DL (ref 31.5–36.5)
MCV RBC AUTO: 89 FL (ref 78–100)
MONOCYTES # BLD AUTO: 0.4 10E9/L (ref 0–1.3)
MONOCYTES NFR BLD AUTO: 5.9 %
MUCOUS THREADS #/AREA URNS LPF: PRESENT /LPF
NEUTROPHILS # BLD AUTO: 3.4 10E9/L (ref 1.6–8.3)
NEUTROPHILS NFR BLD AUTO: 45.9 %
NITRATE UR QL: NEGATIVE
NRBC # BLD AUTO: 0 10*3/UL
NRBC BLD AUTO-RTO: 0 /100
PH UR STRIP: 5 PH (ref 5–7)
PLATELET # BLD AUTO: 276 10E9/L (ref 150–450)
POTASSIUM SERPL-SCNC: 4.2 MMOL/L (ref 3.4–5.3)
PROT SERPL-MCNC: 7.2 G/DL (ref 6.8–8.8)
RBC # BLD AUTO: 4.21 10E12/L (ref 3.8–5.2)
RBC #/AREA URNS AUTO: 3 /HPF (ref 0–2)
SODIUM SERPL-SCNC: 139 MMOL/L (ref 133–144)
SOURCE: ABNORMAL
SP GR UR STRIP: 1.03 (ref 1–1.03)
SQUAMOUS #/AREA URNS AUTO: 2 /HPF (ref 0–1)
TROPONIN I SERPL-MCNC: <0.015 UG/L (ref 0–0.04)
UROBILINOGEN UR STRIP-MCNC: 2 MG/DL (ref 0–2)
WBC # BLD AUTO: 7.3 10E9/L (ref 4–11)
WBC #/AREA URNS AUTO: 4 /HPF (ref 0–2)

## 2017-12-13 PROCEDURE — 25000132 ZZH RX MED GY IP 250 OP 250 PS 637: Performed by: EMERGENCY MEDICINE

## 2017-12-13 PROCEDURE — 93005 ELECTROCARDIOGRAM TRACING: CPT

## 2017-12-13 PROCEDURE — 99285 EMERGENCY DEPT VISIT HI MDM: CPT | Mod: 25

## 2017-12-13 PROCEDURE — 71020 XR CHEST 2 VW: CPT

## 2017-12-13 PROCEDURE — 25000128 H RX IP 250 OP 636: Performed by: EMERGENCY MEDICINE

## 2017-12-13 PROCEDURE — 85025 COMPLETE CBC W/AUTO DIFF WBC: CPT | Performed by: EMERGENCY MEDICINE

## 2017-12-13 PROCEDURE — 74177 CT ABD & PELVIS W/CONTRAST: CPT

## 2017-12-13 PROCEDURE — 84484 ASSAY OF TROPONIN QUANT: CPT | Performed by: EMERGENCY MEDICINE

## 2017-12-13 PROCEDURE — 25000125 ZZHC RX 250: Performed by: EMERGENCY MEDICINE

## 2017-12-13 PROCEDURE — 83690 ASSAY OF LIPASE: CPT | Performed by: EMERGENCY MEDICINE

## 2017-12-13 PROCEDURE — 81001 URINALYSIS AUTO W/SCOPE: CPT | Performed by: EMERGENCY MEDICINE

## 2017-12-13 PROCEDURE — 80053 COMPREHEN METABOLIC PANEL: CPT | Performed by: EMERGENCY MEDICINE

## 2017-12-13 RX ORDER — OXYCODONE AND ACETAMINOPHEN 5; 325 MG/1; MG/1
1 TABLET ORAL ONCE
Status: COMPLETED | OUTPATIENT
Start: 2017-12-13 | End: 2017-12-13

## 2017-12-13 RX ORDER — IOPAMIDOL 755 MG/ML
500 INJECTION, SOLUTION INTRAVASCULAR ONCE
Status: COMPLETED | OUTPATIENT
Start: 2017-12-13 | End: 2017-12-13

## 2017-12-13 RX ORDER — ONDANSETRON 4 MG/1
4 TABLET, ORALLY DISINTEGRATING ORAL ONCE
Status: COMPLETED | OUTPATIENT
Start: 2017-12-13 | End: 2017-12-13

## 2017-12-13 RX ORDER — ONDANSETRON 4 MG/1
4 TABLET, ORALLY DISINTEGRATING ORAL EVERY 8 HOURS PRN
Qty: 10 TABLET | Refills: 0 | Status: SHIPPED | OUTPATIENT
Start: 2017-12-13 | End: 2018-03-06

## 2017-12-13 RX ADMIN — SODIUM CHLORIDE 65 ML: 9 INJECTION, SOLUTION INTRAVENOUS at 20:39

## 2017-12-13 RX ADMIN — OXYCODONE HYDROCHLORIDE AND ACETAMINOPHEN 1 TABLET: 5; 325 TABLET ORAL at 22:20

## 2017-12-13 RX ADMIN — ONDANSETRON 4 MG: 4 TABLET, ORALLY DISINTEGRATING ORAL at 22:20

## 2017-12-13 RX ADMIN — IOPAMIDOL 100 ML: 755 INJECTION, SOLUTION INTRAVENOUS at 20:39

## 2017-12-13 ASSESSMENT — ENCOUNTER SYMPTOMS
NAUSEA: 1
CONSTIPATION: 0
DIARRHEA: 0
VOMITING: 1
DYSURIA: 0
FEVER: 1
ABDOMINAL PAIN: 1

## 2017-12-13 NOTE — TELEPHONE ENCOUNTER
Pt states she had OV with Dr Van on 12/4/17. She was supposed to get Prevnar and Flu shot, but this was forgotten due to everything else that was discussed.     Please advise if OK.     Also she was asking for [facility] referral for Bilateral shoulder pain and low back pain.     Moreover, she states her Tramadol directions state she can take 6 a day, but then it states no more than 5 a day. So this is confusing and limited. Please advise.

## 2017-12-13 NOTE — ED AVS SNAPSHOT
[facility]    [address]    [facility] [address]    Phone:  [phone]    Fax:  [phone]                                       Vijaya Manjarrez   MRN: [id number]    Department:  [facility]   Date of Visit:  12/13/2017           After Visit Summary Signature Page     I have received my discharge instructions, and my questions have been answered. I have discussed any challenges I see with this plan with the nurse or doctor.    ..........................................................................................................................................  Patient/Patient Representative Signature      ..........................................................................................................................................  Patient Representative Print Name and Relationship to Patient    ..................................................               ................................................  Date                                            Time    ..........................................................................................................................................  Reviewed by Signature/Title    ...................................................              ..............................................  Date                                                            Time

## 2017-12-13 NOTE — ED AVS SNAPSHOT
[facility] Emergency Department    [address]    [facility] [address]    Phone:  [phone]    Fax:  [phone]                                       Vijaya Manjarrez   MRN: [id number]    Department:  [facility] Emergency Department   Date of Visit:  12/13/2017           Patient Information     Date Of Birth          1950        Your diagnoses for this visit were:     Abdominal pain, left lower quadrant        You were seen by Femi Treviño MD.      Follow-up Information     Follow up with [facility] Emergency Department.    Specialty:  EMERGENCY MEDICINE    Why:  If symptoms worsen    Contact information:    [address]  [facility] [address] [phone]        Schedule an appointment as soon as possible for a visit with Disha Van MD.    Specialty:  Internal Medicine    Contact information:    [address]  [facility] [address]  [phone]          Discharge Instructions       Discharge Instructions  Abdominal Pain    Abdominal pain (belly pain) can be caused by many things. Your evaluation today does not show the exact cause for your pain. Your provider today has decided that it is unlikely your pain is due to a life threatening problem, or a problem requiring surgery or hospital admission. Sometimes those problems cannot be found right away, so it is very important that you follow up as directed.  Sometimes only the changes which occur over time allow the cause of your pain to be found.    Generally, every Emergency Department visit should have a follow-up clinic visit with either a primary or a specialty clinic/provider. Please follow-up as instructed by your emergency provider today. With abdominal pain, we often recommend very close follow-up, such as the following day.    ADULTS:  Return to the Emergency Department right away if:      You get an oral temperature above 102oF or as directed by your  provider.    You have blood in your stools. This may be bright red or appear as black, tarry stools.      You keep vomiting (throwing up) or cannot drink liquids.    You see blood when you vomit.     You cannot have a bowel movement or you cannot pass gas.    Your stomach gets bloated or bigger.    Your skin or the whites of your eyes look yellow.    You faint.    You have bloody, frequent or painful urination (peeing).    You have new symptoms or anything that worries you.    CHILDREN:  Return to the Emergency Department right away if your child has any of the above-listed symptoms or the following:      Pushes your hand away or screams/cries when his/her belly is touched.    You notice your child is very fussy or weak.    Your child is very tired and is too tired to eat or drink.    Your child is dehydrated.  Signs of dehydration can be:  o Significant change in the amount of wet diapers/urine.  o Your infant or child starts to have dry mouth and lips, or no saliva (spit) or tears.    PREGNANT WOMEN:  Return to the Emergency Department right away if you have any of the above-listed symptoms or the following:      You have bleeding, leaking fluid or passing tissue from the vagina.    You have worse pain or cramping, or pain in your shoulder or back.    You have vomiting that will not stop.    You have a temperature of 100oF or more.    Your baby is not moving as much as usual.    You faint.    You get a bad headache with or without eye problems and abdominal pain.    You have a seizure.    You have unusual discharge from your vagina and abdominal pain.    Abdominal pain is pretty common during pregnancy.  Your pain may or may not be related to your pregnancy. You should follow-up closely with your OB provider so they can evaluate you and your baby.  Until you follow-up with your regular provider, do the following:       Avoid sex and do not put anything in your vagina.    Drink clear fluids.    Only take  "medications approved by your provider.    MORE INFORMATION:    Appendicitis:  A possible cause of abdominal pain in any person who still has their appendix is acute appendicitis. Appendicitis is often hard to diagnose.  Testing does not always rule out early appendicitis or other causes of abdominal pain. Close follow-up with your provider and re-evaluations may be needed to figure out the reason for your abdominal pain.    Follow-up:  It is very important that you make an appointment with your clinic and go to the appointment.  If you do not follow-up with your primary provider, it may result in missing an important development which could result in permanent injury or disability and/or lasting pain.  If there is any problem keeping your appointment, call your provider or return to the Emergency Department.    Medications:  Take your medications as directed by your provider today.  Before using over-the-counter medications, ask your provider and make sure to take the medications as directed.  If you have any questions about medications, ask your provider.    Diet:  Resume your normal diet as much as possible, but do not eat fried, fatty or spicy foods while you have pain.  Do not drink alcohol or have caffeine.  Do not smoke tobacco.    Probiotics: If you have been given an antibiotic, you may want to also take a probiotic pill or eat yogurt with live cultures. Probiotics have \"good bacteria\" to help your intestines stay healthy. Studies have shown that probiotics help prevent diarrhea (loose stools) and other intestine problems (including C. diff infection) when you take antibiotics. You can buy these without a prescription in the pharmacy section of the store.     If you were given a prescription for medicine here today, be sure to read all of the information (including the package insert) that comes with your prescription.  This will include important information about the medicine, its side effects, and any " warnings that you need to know about.  The pharmacist who fills the prescription can provide more information and answer questions you may have about the medicine.  If you have questions or concerns that the pharmacist cannot address, please call or return to the Emergency Department.       Remember that you can always come back to the Emergency Department if you are not able to see your regular provider in the amount of time listed above, if you get any new symptoms, or if there is anything that worries you.      Future Appointments        Provider Department Dept Phone Center    12/29/2017 3:00 PM [facility] BONE DENSITY ROOM 1 [facility]      24 Hour Appointment Hotline       To make an appointment at any [facility], call [phone] ([phone]). If you don't have a family doctor or clinic, we will help you find one. [facility] are conveniently located to serve the needs of you and your family.             Review of your medicines      START taking        Dose / Directions Last dose taken    ondansetron 4 MG ODT tab   Commonly known as:  ZOFRAN ODT   Dose:  4 mg   Quantity:  10 tablet        Take 1 tablet (4 mg) by mouth every 8 hours as needed for nausea   Refills:  0          Our records show that you are taking the medicines listed below. If these are incorrect, please call your family doctor or clinic.        Dose / Directions Last dose taken    albuterol 108 (90 BASE) MCG/ACT Inhaler   Commonly known as:  PROAIR HFA/PROVENTIL HFA/VENTOLIN HFA   Dose:  2 puff   Quantity:  1 Inhaler        Inhale 2 puffs into the lungs every 4 hours as needed for shortness of breath / dyspnea or wheezing   Refills:  0        atorvastatin 40 MG tablet   Commonly known as:  LIPITOR   Dose:  40 mg   Quantity:  90 tablet        Take 1 tablet (40 mg) by mouth daily   Refills:  0        blood glucose monitoring test strip   Commonly known as:  no brand specified   Quantity:  100 strip         Use to test blood sugars 1 times daily or as directed, use brand same as previous   Refills:  3        cetirizine 10 MG tablet   Commonly known as:  zyrTEC   Dose:  10 mg   Quantity:  90 tablet        Take 1 tablet (10 mg) by mouth daily   Refills:  3        cyclobenzaprine 10 MG tablet   Commonly known as:  FLEXERIL   Dose:  10 mg   Quantity:  180 tablet        Take 1 tablet (10 mg) by mouth 2 times daily   Refills:  3        DULoxetine 60 MG EC capsule   Commonly known as:  CYMBALTA   Dose:  60 mg   Quantity:  90 capsule        Take 1 capsule (60 mg) by mouth daily   Refills:  3        fluticasone 50 MCG/ACT spray   Commonly known as:  FLONASE   Dose:  2 spray   Quantity:  1 Bottle        Spray 2 sprays into both nostrils daily   Refills:  11        fluticasone-salmeterol 250-50 MCG/DOSE diskus inhaler   Commonly known as:  ADVAIR   Dose:  1 puff   Quantity:  1 Inhaler        Inhale 1 puff into the lungs 2 times daily   Refills:  5        hydrochlorothiazide 25 MG tablet   Commonly known as:  HYDRODIURIL   Dose:  25 mg   Quantity:  90 tablet        Take 1 tablet (25 mg) by mouth daily   Refills:  1        lisinopril 20 MG tablet   Commonly known as:  PRINIVIL/ZESTRIL   Dose:  20 mg   Quantity:  90 tablet        Take 1 tablet (20 mg) by mouth daily   Refills:  1        metFORMIN 500 MG tablet   Commonly known as:  GLUCOPHAGE   Dose:  500 mg   Quantity:  180 tablet        Take 1 tablet (500 mg) by mouth 2 times daily (with meals)   Refills:  0        miconazole 2 % cream   Commonly known as:  MICATIN   Quantity:  57 g        Apply topically 2 times daily   Refills:  5        montelukast 10 MG tablet   Commonly known as:  SINGULAIR   Dose:  10 mg   Quantity:  90 tablet        Take 1 tablet (10 mg) by mouth At Bedtime   Refills:  1        omeprazole 20 MG CR capsule   Commonly known as:  priLOSEC   Dose:  20 mg   Quantity:  90 capsule        Take 1 capsule (20 mg) by mouth daily   Refills:  3        pregabalin  150 MG capsule   Commonly known as:  LYRICA   Dose:  150 mg   Quantity:  270 capsule        Take 1 capsule (150 mg) by mouth 3 times daily   Refills:  1        traMADol 50 MG tablet   Commonly known as:  ULTRAM   Dose:  100 mg   Quantity:  180 tablet        Take 2 tablets (100 mg) by mouth 3 times daily May take 5 per day   Refills:  1        traZODone 100 MG tablet   Commonly known as:  DESYREL   Dose:  200 mg   Quantity:  180 tablet        Take 2 tablets (200 mg) by mouth At Bedtime   Refills:  3        TYLENOL ARTHRITIS PAIN 650 MG CR tablet   Dose:  650 mg   Generic drug:  acetaminophen        Take 650 mg by mouth 2 times daily   Refills:  0                Prescriptions were sent or printed at these locations (1 Prescription)                   Other Prescriptions                Printed at Department/Unit printer (1 of 1)         ondansetron (ZOFRAN ODT) 4 MG ODT tab                Procedures and tests performed during your visit     CBC with platelets differential    CT Abdomen Pelvis w Contrast    Comprehensive metabolic panel    EKG 12-lead, tracing only    Lipase    Troponin I    UA with Microscopic    XR Chest 2 Views      Orders Needing Specimen Collection     None      Pending Results     No orders found from 12/11/2017 to 12/14/2017.            Pending Culture Results     No orders found from 12/11/2017 to 12/14/2017.            Pending Results Instructions     If you had any lab results that were not finalized at the time of your Discharge, you can call the ED Lab Result RN at [phone]. You will be contacted by this team for any positive Lab results or changes in treatment. The nurses are available 7 days a week from 10A to 6:30P.  You can leave a message 24 hours per day and they will return your call.        Test Results From Your Hospital Stay        12/13/2017  8:24 PM      Component Results     Component Value Ref Range & Units Status    Color Urine Yellow  Final    Appearance Urine Clear  Final     Glucose Urine Negative NEG^Negative mg/dL Final    Bilirubin Urine Negative NEG^Negative Final    Ketones Urine Negative NEG^Negative mg/dL Final    Specific Gravity Urine 1.030 1.003 - 1.035 Final    Blood Urine Negative NEG^Negative Final    pH Urine 5.0 5.0 - 7.0 pH Final    Protein Albumin Urine Negative NEG^Negative mg/dL Final    Urobilinogen mg/dL 2.0 0.0 - 2.0 mg/dL Final    Nitrite Urine Negative NEG^Negative Final    Leukocyte Esterase Urine Trace (A) NEG^Negative Final    Source Midstream Urine  Final    WBC Urine 4 (H) 0 - 2 /HPF Final    RBC Urine 3 (H) 0 - 2 /HPF Final    Squamous Epithelial /HPF Urine 2 (H) 0 - 1 /HPF Final    Mucous Urine Present (A) NEG^Negative /LPF Final         12/13/2017  8:14 PM      Component Results     Component Value Ref Range & Units Status    Sodium 139 133 - 144 mmol/L Final    Potassium 4.2 3.4 - 5.3 mmol/L Final    Specimen slightly hemolyzed, potassium may be falsely elevated    Chloride 104 94 - 109 mmol/L Final    Carbon Dioxide 26 20 - 32 mmol/L Final    Anion Gap 9 3 - 14 mmol/L Final    Glucose 136 (H) 70 - 99 mg/dL Final    Urea Nitrogen 15 7 - 30 mg/dL Final    Creatinine 0.66 0.52 - 1.04 mg/dL Final    GFR Estimate >90 >60 mL/min/1.7m2 Final    Non  GFR Calc    GFR Estimate If Black >90 >60 mL/min/1.7m2 Final    African American GFR Calc    Calcium 8.6 8.5 - 10.1 mg/dL Final    Bilirubin Total 0.3 0.2 - 1.3 mg/dL Final    Albumin 3.4 3.4 - 5.0 g/dL Final    Protein Total 7.2 6.8 - 8.8 g/dL Final    Alkaline Phosphatase 95 40 - 150 U/L Final    ALT 22 0 - 50 U/L Final    AST 28 0 - 45 U/L Final    Specimen is hemolyzed which can falsely elevate AST. Analysis of a   non-hemolyzed specimen may result in a lower value.           12/13/2017  8:14 PM      Component Results     Component Value Ref Range & Units Status    Lipase 248 73 - 393 U/L Final         12/13/2017  7:52 PM      Component Results     Component Value Ref Range & Units Status     WBC 7.3 4.0 - 11.0 10e9/L Final    RBC Count 4.21 3.8 - 5.2 10e12/L Final    Hemoglobin 12.6 11.7 - 15.7 g/dL Final    Hematocrit 37.5 35.0 - 47.0 % Final    MCV 89 78 - 100 fl Final    MCH 29.9 26.5 - 33.0 pg Final    MCHC 33.6 31.5 - 36.5 g/dL Final    RDW 13.3 10.0 - 15.0 % Final    Platelet Count 276 150 - 450 10e9/L Final    Diff Method Automated Method  Final    % Neutrophils 45.9 % Final    % Lymphocytes 44.5 % Final    % Monocytes 5.9 % Final    % Eosinophils 2.3 % Final    % Basophils 1.1 % Final    % Immature Granulocytes 0.3 % Final    Nucleated RBCs 0 0 /100 Final    Absolute Neutrophil 3.4 1.6 - 8.3 10e9/L Final    Absolute Lymphocytes 3.3 0.8 - 5.3 10e9/L Final    Absolute Monocytes 0.4 0.0 - 1.3 10e9/L Final    Absolute Eosinophils 0.2 0.0 - 0.7 10e9/L Final    Absolute Basophils 0.1 0.0 - 0.2 10e9/L Final    Abs Immature Granulocytes 0.0 0 - 0.4 10e9/L Final    Absolute Nucleated RBC 0.0  Final         12/13/2017  9:49 PM      Narrative     CT ABDOMEN AND PELVIS WITH CONTRAST   12/13/2017 8:45 PM      HISTORY: Left lower quadrant pain and guarding.    TECHNIQUE: CT abdomen and pelvis with intravenous contrast. Radiation  dose for this scan was reduced using automated exposure control,  adjustment of the mA and/or kV according to patient size, or iterative  reconstruction technique. 100mL Isovue-370.    COMPARISON: 2/14/2017.    FINDINGS:  Abdomen: There is mild atelectasis and scarring at the lung bases.  Multiple coronary artery atherosclerotic calcifications. There is mild  fatty infiltration of the liver. The gallbladder is absent. The  spleen, pancreas and adrenal glands are normal in appearance. There  are a few small renal cortical cysts bilaterally which are not  significantly changed. There is a retroaortic left renal vein. There  is no abdominal or pelvic lymph node enlargement. There is  atherosclerotic calcification of the aorta and its branches. No  aneurysm.    Pelvis: There is a 2 cm  left adnexal cyst similar in appearance to  previous exam. Uterus and right adnexa appear normal. There is no  bowel obstruction or inflammation. The appendix is normal. Moderate  amount of stool throughout the colon. No free intraperitoneal gas or  fluid. Few pelvic phleboliths. Degenerative disease at the lumbosacral  junction. Degenerative disease in the right hip.        Impression     IMPRESSION:  1. No acute abnormality. No bowel obstruction or inflammation.  Moderate amount of stool throughout the colon.  2. 2 cm left ovarian cyst.  3. Fatty infiltration of the liver.    FAITH NORTON MD         12/13/2017  8:14 PM      Component Results     Component Value Ref Range & Units Status    Troponin I ES <0.015 0.000 - 0.045 ug/L Final    The 99th percentile for upper reference range is 0.045 ug/L.  Troponin values   in the range of 0.045 - 0.120 ug/L may be associated with risks of adverse   clinical events.           12/13/2017  9:12 PM      Narrative     CHEST TWO VIEWS    12/13/2017 9:06 PM     HISTORY: Chest pain.     COMPARISON: 7/2/2017.    FINDINGS: Heart size normal. Lungs clear. No interval change.        Impression     IMPRESSION: Negative.    RICK KASPER MD                Clinical Quality Measure: Blood Pressure Screening     Your blood pressure was checked while you were in the emergency department today. The last reading we obtained was  BP: 144/78 . Please read the guidelines below about what these numbers mean and what you should do about them.  If your systolic blood pressure (the top number) is less than 120 and your diastolic blood pressure (the bottom number) is less than 80, then your blood pressure is normal. There is nothing more that you need to do about it.  If your systolic blood pressure (the top number) is 120-139 or your diastolic blood pressure (the bottom number) is 80-89, your blood pressure may be higher than it should be. You should have your blood pressure rechecked within  "a year by a primary care provider.  If your systolic blood pressure (the top number) is 140 or greater or your diastolic blood pressure (the bottom number) is 90 or greater, you may have high blood pressure. High blood pressure is treatable, but if left untreated over time it can put you at risk for heart attack, stroke, or kidney failure. You should have your blood pressure rechecked by a primary care provider within the next 4 weeks.  If your provider in the emergency department today gave you specific instructions to follow-up with your doctor or provider even sooner than that, you should follow that instruction and not wait for up to 4 weeks for your follow-up visit.        Thank you for choosing [facility]       Thank you for choosing [facility] for your care. Our goal is always to provide you with excellent care. Hearing back from our patients is one way we can continue to improve our services. Please take a few minutes to complete the written survey that you may receive in the mail after you visit with us. Thank you!        [employer] Information     [employer] lets you send messages to your doctor, view your test results, renew your prescriptions, schedule appointments and more. To sign up, go to [email or web address] . Click on \"Log in\" on the left side of the screen, which will take you to the Welcome page. Then click on \"Sign up Now\" on the right side of the page.     You will be asked to enter the access code listed below, as well as some personal information. Please follow the directions to create your username and password.     Your access code is: [email or web address]  Expires: 3/13/2018 10:34 PM     Your access code will  in 90 days. If you need help or a new code, please call your [facility] clinic or [phone].        Care EveryWhere ID     This is your Care EveryWhere ID. This could be used by other organizations to access your [facility] medical records  [id number]        Equal Access to Services     " ISIDRO GO : Hadii joceline Pryor, waaxda luqadaha, qaybta kaalmada jaswant, delia lentz. So [facility] [phone].    ATENCIÓN: Si habla español, tiene a carrion disposición servicios gratuitos de asistencia lingüística. Llame al [phone].    We comply with applicable federal civil rights laws and [place] laws. We do not discriminate on the basis of race, color, national origin, age, disability, sex, sexual orientation, or gender identity.            After Visit Summary       This is your record. Keep this with you and show to your community pharmacist(s) and doctor(s) at your next visit.

## 2017-12-14 ENCOUNTER — CARE COORDINATION (OUTPATIENT)
Dept: GERIATRIC MEDICINE | Facility: CLINIC | Age: 67
End: 2017-12-14

## 2017-12-14 LAB — INTERPRETATION ECG - MUSE: NORMAL

## 2017-12-14 NOTE — ED PROVIDER NOTES
History     Chief Complaint:  Abdominal Pain      HPI   Vijaya Manjarrez is a 67 year old female who presents to the ED for evaluation of abdominal pain.  On 12/11, the patient states that pain started in her LLQ and has worsened today, prompting her visit to the ED.  She also has had a subjective fever, nausea, one episode of vomiting, and feels clammy. She denies having dysuria, constipation, diarrhea, vaginal bleeding, vaginal discharge, and a history of diverticulitis. She had a previous episode of kidney stones and has had a cholecystectomy.  The patient has taken Tramadol with no relief.  Of note, the patient's chest pain has not changed from its baseline.       Allergies:  Penicillins      Medications:    ondansetron (ZOFRAN ODT) 4 MG ODT tab  fluticasone-salmeterol (ADVAIR) 250-50 MCG/DOSE diskus inhaler  traMADol (ULTRAM) 50 MG tablet  atorvastatin (LIPITOR) 40 MG tablet  metFORMIN (GLUCOPHAGE) 500 MG tablet  lisinopril (PRINIVIL/ZESTRIL) 20 MG tablet  traZODone (DESYREL) 100 MG tablet  hydrochlorothiazide (HYDRODIURIL) 25 MG tablet  acetaminophen (TYLENOL ARTHRITIS PAIN) 650 MG CR tablet  cyclobenzaprine (FLEXERIL) 10 MG tablet  DULoxetine (CYMBALTA) 60 MG EC capsule  omeprazole (PRILOSEC) 20 MG CR capsule  fluticasone (FLONASE) 50 MCG/ACT spray  pregabalin (LYRICA) 150 MG capsule  cetirizine (ZYRTEC) 10 MG tablet  miconazole (MICATIN) 2 % cream  blood glucose monitoring (NO BRAND SPECIFIED) test strip  montelukast (SINGULAIR) 10 MG tablet  albuterol (PROAIR HFA/PROVENTIL HFA/VENTOLIN HFA) 108 (90 BASE) MCG/ACT Inhaler    Past Medical History:    Anxiety   Cervical spine degeneration  COPD  DM  Diabetic neuropathy  Facet arthropathy  GERD  HTN  Hyperlipidemia  Lumbar degenerative disc disease  Migraine    Past Surgical History:    Cholecystectomy    Family History:    Cancer    Social History:  Presented alone  Former smoker  Marital Status:  Single [1]     Review of Systems   Constitutional:  "Positive for fever.   Gastrointestinal: Positive for abdominal pain, nausea and vomiting. Negative for constipation and diarrhea.   Genitourinary: Negative for dysuria, vaginal bleeding and vaginal discharge.   All other systems reviewed and are negative.    Physical Exam   First Vitals:  BP: (!) 185/109  Heart Rate: 87  Temp: 97.7  F (36.5  C)  Resp: 20  Height: 175.3 cm (5' 9\")  Weight: 90.7 kg (200 lb)  SpO2: 98 %    Physical Exam  General: Alert, appears well-developed and well-nourished. Cooperative.     In mild distress  HEENT:  Head:  Atraumatic  Ears:  External ears are normal  Mouth/Throat:  Oropharynx is without erythema or exudate and mucous membranes are moist.   Eyes:   Conjunctivae normal and EOM are normal. No scleral icterus.    Pupils are equal, round, and reactive to light.   CV:  Normal rate, regular rhythm, normal heart sounds and radial and dorsalis pedis pulses are 2+ and symmetric.  No murmur.  Resp:  Breath sounds are clear bilaterally    Non-labored, no retractions or accessory muscle use  GI:  Abdomen is soft, no distension, LLQ abdominal tenderness. No rebound or guarding.  No groin tenderness.  No CVA tenderness bilaterally.  MS:  Normal range of motion. No edema.    Normal strength in all 4 extremities.     Back atraumatic.  Skin:  Warm and dry.  No rash or lesions noted.  Neuro: Alert. Normal strength.  Sensation intact in all 4 extremities. GCS: 15  Psych:  Normal mood and affect.    Emergency Department Course   ECG:  Indication: Abdominal pain  Time: 1925  Vent. Rate 70 bpm. TN interval 184. QRS duration 92. QT/QTc 376/406. P-R-T axis 23 50 33. Normal sinus rhythm. Normal ECG.  Read time: 1929.       Imaging:  Radiographic findings were communicated with the patient who voiced understanding of the findings.    XR Chest 2 views:   Negative. As per radiology.       CT Abdomen pelvis with contrast:   1. No acute abnormality. No bowel obstruction or inflammation.  Moderate amount of " stool throughout the colon.  2. 2 cm left ovarian cyst.  3. Fatty infiltration of the liver. As per radiology.    Laboratory:  UA: clear, yellow urine, leukocyte esterase trace (A), WBC/HPF 4 (H), RBC/HPF 3 (H), squamous epithelial/HPF 2 (H), mucous present (A),  o/w negative  CMP: Glucose 136 (H), o/w WNL (Creatinine: 0.66)  Lipase: 248  CBC: WBC: 7.3, HGB: 12.6, PLT: 276  1928 Troponin: <0.015    Interventions:  2220 Zofran 4 mg oral  2220 Oxycodone 1 tablet oral    Emergency Department Course:  Nursing notes and vitals reviewed. 1913 I performed an exam of the patient as documented above. EKG obtained in the ED, see results above.     Blood drawn. This was sent to the lab for further testing, results above. The patient provided a urine sample here in the emergency department. This was sent for laboratory testing, findings above.     The patient was sent for a CT Abdomen Pelvis and Chest XR while in the emergency department, findings above.     2230 I rechecked the patient and discussed the results of her workup thus far.     Findings and plan explained to the Patient. Patient discharged home with instructions regarding supportive care, medications, and reasons to return. The importance of close follow-up was reviewed. The patient was prescribed Zofran.    I personally reviewed the laboratory results with the Patient and answered all related questions prior to discharge.     Impression & Plan    Medical Decision Making:  Vijaya Manjarrez is a 67 year old female who presents with UQ abdominal pain. Patient with no active vomiting, some mild nausea, much more concerned because she is traveling in the next one week and wants to be sure that she is not having any kind of surgical abnormalities associated with her LLQ pain. Patient denies dysuria, flank pain, has no vaginal bleeding or discharge. Patient with no RUQ or RLQ abdominal tenderness. I did obtain a CT scan out of a concern of a possible diverticulitis  with some LLQ abdominal pain on my exam. CT showed no acute intraabdominal abnormalities. There was incidental 2 cm left ovarian cyst seen on her CT. Low concern that this is responsible for her pain tonight. UA shows no evidence of UTI. Patient is asymptomatic. No concern for cystitis at this time. Patient did describe to me some chronic chest pain that is unchanged from all of her prior presentations with chest pain. I have very low concern for ACS, but due to the fact that she was discussing this with me, I did obtain an EKG which appears nonischemic and unchanged from prior EKGs. Troponin was negative. I chose to stop at that point for evaluation of ACS as this was not the main reason for bringing her to the ED this evening, but she did state she was having discomfort consistent with her chronic chest pain. Patient had a formal echo in June of 2017, which showed normal ejection fraction greater than 70%.     Much more concern that the abdominal pain is what brought her in this evening and no determined etiology for this pain today. Labs are otherwise unremarkable with normal CMP, negative lipase and CBC with no concerning findings. Patient understood return precautions. Reassured that the CT was negative. I discharged her home with Zofran and follow-up with PCP in the next 48 hours if no improvement or worsening symptoms. She understood to return to the ED if she develops fever or inability to tolerate oral intake. Discharged home after all questions answered.    Diagnosis:    ICD-10-CM   1. Abdominal pain, left lower quadrant R10.32     Disposition:  discharged to home    Discharge Medications:  Discharge Medication List as of 12/13/2017 10:34 PM      START taking these medications    Details   ondansetron (ZOFRAN ODT) 4 MG ODT tab Take 1 tablet (4 mg) by mouth every 8 hours as needed for nausea, Disp-10 tablet, R-0, Local Print           IKenzie, am serving as a scribe on 12/13/2017 at 7:13 PM to  personally document services performed by Femi Treviño MD based on my observations and the provider's statements to me.     Kenzie Ham  12/13/2017   [facility] EMERGENCY DEPARTMENT       Femi Treviño MD  12/14/17 0030

## 2017-12-14 NOTE — ED NOTES
Pt requesting something for nausea and pain. Explained to pt that I would let MD know of request. Notifying MD.

## 2017-12-14 NOTE — PROGRESS NOTES
CM notified via EMR that client was seen in the ED on 12/13 for abdominal pain. Call to client to check in and VM was left. Reviewed discharge medications and instructions. Requested a return call to discuss.     Vanesa Gtz RN  [facility]  [phone]  Fax: [phone]

## 2017-12-14 NOTE — ED NOTES
Pt has left lower abdominal pain for past 2 days with nausea.  She vomited one time.  She has no pain with urination.

## 2017-12-15 NOTE — TELEPHONE ENCOUNTER
The tramadol instructions should be 1-2 tablets up to 3 times a day, maximum 5 per day. I am not sure why it got changed, it may have been when we had an upgrade and it changed how we put in the meds.   I need more information about the back and shoulders: she was seeing Dr. Dominguez who referred her to pain management for her neck to get injections. She thought the pain in the shoulders was due to the neck. Did she ever schedule with pain clinic?   What is going on with the low back now?  What is she hoping to have done for it? She has known significant degenerative disk disease, may need injection so may be better at pain clinic or spine clinic.   She can schedule the shots with nurse.

## 2017-12-19 ENCOUNTER — CARE COORDINATION (OUTPATIENT)
Dept: GERIATRIC MEDICINE | Facility: CLINIC | Age: 67
End: 2017-12-19

## 2017-12-19 NOTE — TELEPHONE ENCOUNTER
"Pt calls back. She states no one from the pain clinic called her. The referral is for [facility].   She is asking for Pain clinic in [facility].     Per the OV note from 6/8/17, pt was taking 5 tramadol daily and this wasn't working.   ''She has been taking tramadol 2 in am, 1 midday and 2 at night, not helping much.\"     She states \"nevermind\" on the low back. She will deal with that on her own.     She got the shots already done.     Please advise for pain clinic referral and the tramadol amount.      "

## 2017-12-19 NOTE — PROGRESS NOTES
VM from client regarding her [employer] bill. They reported to her that she owes more than $213 a month, and client is unsure why as her obligation amount is $213. Return call to client and shared that she had not paid for several months, and owes them close to $600, therefore they likely need her to pay above the $213 to pay down the outstanding amount. Client expressed understanding. She is going to call [employer] to discuss. Client also asked about a bill from the [facility] for $150. Client could not explain further details about the bill, and is not currently home. Client will call CM back once she is back in town to review.     Vanesa Gtz RN  [facility]  [phone]  Fax: [phone]

## 2017-12-19 NOTE — TELEPHONE ENCOUNTER
I did referral to pain clinic; I think they are accepting patients at [place] again but they will be able to tell her that.  What pain is the worst pain, neck, fibromyalgia? Was 6 tramadol working better? Does she take tylenol with the tramadol?

## 2018-01-04 ENCOUNTER — CARE COORDINATION (OUTPATIENT)
Dept: GERIATRIC MEDICINE | Facility: CLINIC | Age: 68
End: 2018-01-04

## 2018-01-04 PROBLEM — Z76.89 HEALTH CARE HOME: Status: ACTIVE | Noted: 2017-07-10

## 2018-01-04 NOTE — PROGRESS NOTES
Call from client who shares that her car broke down and she has no way to get to the bank and pay her bills. Client asking if CM can authorize her homemaker to take her out. Call to [facility] [employer] and VM was left stating CM will authorize a 1x RT ride. Request sent to CMS for auth.     Vanesa Gtz RN  [facility]  [phone]  Fax: [phone]

## 2018-01-10 DIAGNOSIS — E11.40 TYPE 2 DIABETES MELLITUS WITH DIABETIC NEUROPATHY, WITHOUT LONG-TERM CURRENT USE OF INSULIN (H): ICD-10-CM

## 2018-01-11 ENCOUNTER — TELEPHONE (OUTPATIENT)
Dept: BONE DENSITY | Facility: CLINIC | Age: 68
End: 2018-01-11

## 2018-01-15 ENCOUNTER — CARE COORDINATION (OUTPATIENT)
Dept: GERIATRIC MEDICINE | Facility: CLINIC | Age: 68
End: 2018-01-15

## 2018-01-15 NOTE — TELEPHONE ENCOUNTER
"Requested Prescriptions   Pending Prescriptions Disp Refills     metFORMIN (GLUCOPHAGE) 500 MG tablet [Pharmacy Med Name: METFORMIN HCL 500MG TABS] 180 tablet 0     Sig: TAKE ONE TABLET BY MOUTH TWICE A DAY WITH A MEAL    Biguanide Agents Failed    1/10/2018 10:32 PM       Failed - Patient's BP is less than 140/90    BP Readings from Last 3 Encounters:   12/13/17 144/78   12/04/17 98/62   09/14/17 110/70                Failed - Patient has documented A1c within the specified period of time.    Recent Labs   Lab Test  06/17/17   0657   A1C  6.8*            Passed - Patient has documented LDL within the past 12 mos.    Recent Labs   Lab Test  02/20/17   1347   LDL  58            Passed - Patient has had a Microalbumin in the past 12 mos.    Recent Labs   Lab Test  02/20/17   1348   MICROL  42   UMALCR  16.67            Passed - Patient is age 10 or older       Passed - Patient's CR is NOT>1.4 OR Patient's EGFR is NOT<45 within past 12 mos.    Recent Labs   Lab Test  12/13/17   1928   GFRESTIMATED  >90   GFRESTBLACK  >90       Recent Labs   Lab Test  12/13/17   1928   CR  0.66            Passed - Patient does NOT have a diagnosis of CHF.       Passed - Patient is not pregnant       Passed - Patient has not had a positive pregnancy test within the past 12 mos.        Passed - Recent (6 mos) or future visit with authorizing provider's specialty    Patient had office visit in the last 6 months or has a visit in the next 30 days with authorizing provider.  See \"Patient Info\" tab in inbasket, or \"Choose Columns\" in Meds & Orders section of the refill encounter.             Routing refill request to provider for review/approval because:  Recent bp outside SO parameters.    Please advise, thanks.        "

## 2018-01-15 NOTE — PROGRESS NOTES
[facility] Six-Month Telephone Assessment    6 month telephone assessment completed on 1/15/18. Reviewed [facility] PAR service and number was provided. Encouraged client to use for medical appointments. Client asking for 2 additional rides to be authorized with her homemaker so that she can get out and take care of some things. Auth will be sent to [employer].    ER visits: Yes -  [facility]  Hospitalizations: No  TCU stays: No  Significant health status changes: Client reports that she has had a cold for 4 weeks and still doesn't feel well. Has not seen her PCP.   Falls/Injuries: Yes: Client is not sure when her last fall was, but guesses she has had one in the last six months. Client shares her BP has been more stable.   ADL/IADL changes: No  Changes in services: No    Goals: See POC in chart for goal progress documentation.  Reviewed goals and none have been met at this time. Provided resources as appropriate to follow through with goals.     Will see client in 6 months for an annual health risk assessment.   Encouraged client to call CM with any questions or concerns in the meantime.     Vanesa Gtz RN  [facility]  [phone]  Fax: [phone]

## 2018-01-16 ENCOUNTER — TELEPHONE (OUTPATIENT)
Dept: INTERNAL MEDICINE | Facility: CLINIC | Age: 68
End: 2018-01-16

## 2018-01-16 NOTE — TELEPHONE ENCOUNTER
"Pt informed of message below from provider.  Patient states \"i dont have a ride\", advised ambulance if necessary. Patient verbalizes understanding and has no further questions.    "

## 2018-01-16 NOTE — TELEPHONE ENCOUNTER
Vijaya Manjarrez is a 67 year old female who calls with 3+ week history of URI.  Requests a cough med and an antibiotic.  Has no transportation, relies on others for rides so states she cannot come in to clinic.      NURSING ASSESSMENT:  Description:  Complains of productive cough of green phlegm, SOB both at rest and with activity and mid chest pain that is constant and radiates outward.  CP is constant for 3 wks, even at rest.  Denies pain in arms, neck, back or jaw.  Onset/duration:  3+ weeks ago  Precip. factors:  none  Associated symptoms:  Highest temp has been 99.9 but states she has sweats and chills.  Denies wheezing.  Improves/worsens symptoms:  Had old rx for cough med which she said helped but ran out.  Pain scale (0-10)   7/10 chest pain  LMP/preg/breast feeding:  NA  Last exam/Treatment:  12/4/17  Allergies:   Allergies   Allergen Reactions     Penicillins Hives       MEDICATIONS:   Taking medication(s) as prescribed? Yes  Taking over the counter medication(s?) No  Any medication side effects? No significant side effects    Any barriers to taking medication(s) as prescribed?  No  Medication(s) improving/managing symptoms?  No  Medication reconciliation completed: No      NURSING PLAN: Routed to provider Yes and Nursing advice to patient advised pt she should be seen in ER with CP and SOB but pt refuses.    RECOMMENDED DISPOSITION:  ER now, by ambulance if necessary.  Pt refusing to be seen in ER or UC.  Wants message sent to PCP.  Will comply with recommendation: No- Barriers to comply with plan of care as above.  If further questions/concerns or if symptoms do not improve, worsen or new symptoms develop, call your PCP or [facility] as soon as possible.      Guideline used:  Telephone Triage Protocols for Nurses, Third Edition, Reyna Farooq RN

## 2018-01-19 DIAGNOSIS — M25.512 BILATERAL SHOULDER PAIN, UNSPECIFIED CHRONICITY: ICD-10-CM

## 2018-01-19 DIAGNOSIS — M25.511 BILATERAL SHOULDER PAIN, UNSPECIFIED CHRONICITY: ICD-10-CM

## 2018-01-19 DIAGNOSIS — M79.7 FIBROMYALGIA: ICD-10-CM

## 2018-01-19 DIAGNOSIS — M47.812 FACET ARTHROPATHY, CERVICAL: ICD-10-CM

## 2018-01-19 DIAGNOSIS — M47.816 FACET ARTHROPATHY, LUMBAR: ICD-10-CM

## 2018-01-24 RX ORDER — TRAMADOL HYDROCHLORIDE 50 MG/1
100 TABLET ORAL 3 TIMES DAILY
Qty: 180 TABLET | Refills: 1 | Status: SHIPPED | OUTPATIENT
Start: 2018-01-24 | End: 2018-03-06

## 2018-01-24 NOTE — TELEPHONE ENCOUNTER
[facility].  Signed CSA form in chart.      Tramadol      Last Written Prescription Date:  11/8/17  Last Fill Quantity: 180,   # refills: 1  Last Office Visit: 12/4/17  Future Office visit:       Routing refill request to provider for review/approval because:  Drug not on the [facility], [facility] or [facility] refill protocol or controlled substance

## 2018-01-25 ENCOUNTER — TELEPHONE (OUTPATIENT)
Dept: BONE DENSITY | Facility: CLINIC | Age: 68
End: 2018-01-25

## 2018-01-30 DIAGNOSIS — I10 BENIGN ESSENTIAL HYPERTENSION: ICD-10-CM

## 2018-01-30 DIAGNOSIS — E78.5 HYPERLIPIDEMIA LDL GOAL <100: ICD-10-CM

## 2018-01-30 DIAGNOSIS — J44.9 CHRONIC OBSTRUCTIVE PULMONARY DISEASE, UNSPECIFIED COPD TYPE (H): ICD-10-CM

## 2018-01-30 DIAGNOSIS — E11.40 TYPE 2 DIABETES MELLITUS WITH DIABETIC NEUROPATHY, WITHOUT LONG-TERM CURRENT USE OF INSULIN (H): Primary | ICD-10-CM

## 2018-01-30 DIAGNOSIS — Z79.899 CONTROLLED SUBSTANCE AGREEMENT SIGNED: ICD-10-CM

## 2018-02-01 ENCOUNTER — TELEPHONE (OUTPATIENT)
Dept: INTERNAL MEDICINE | Facility: CLINIC | Age: 68
End: 2018-02-01

## 2018-02-01 DIAGNOSIS — E11.40 TYPE 2 DIABETES MELLITUS WITH DIABETIC NEUROPATHY, WITHOUT LONG-TERM CURRENT USE OF INSULIN (H): Primary | ICD-10-CM

## 2018-02-01 DIAGNOSIS — E78.5 HYPERLIPIDEMIA LDL GOAL <100: ICD-10-CM

## 2018-02-01 NOTE — TELEPHONE ENCOUNTER
Pt calling.    1.  C/o nasal noemi, prod cough (green phlegm), and chest heaviness x 6 wks.  Denies chest pain, SOB, or radiating pain down arm/neck/back.  Advised if develops chest pain or her symptoms change, go to ER for eval.  Appt scheduled 2-2-18.    2.  Needs orders for lipid panel in chart.  Lab appt scheduled and pt informed needs to be fasting.  Orders placed in chart.  Also due for A1C.

## 2018-02-02 RX ORDER — HYDROCHLOROTHIAZIDE 25 MG/1
TABLET ORAL
Qty: 90 TABLET | Refills: 0 | Status: SHIPPED | OUTPATIENT
Start: 2018-02-02 | End: 2018-03-06

## 2018-02-02 RX ORDER — MONTELUKAST SODIUM 10 MG/1
TABLET ORAL
Qty: 30 TABLET | Refills: 0 | Status: SHIPPED | OUTPATIENT
Start: 2018-02-02 | End: 2018-03-06

## 2018-02-02 RX ORDER — ATORVASTATIN CALCIUM 40 MG/1
40 TABLET, FILM COATED ORAL DAILY
Qty: 90 TABLET | Refills: 0 | Status: SHIPPED | OUTPATIENT
Start: 2018-02-02 | End: 2018-05-14

## 2018-02-02 NOTE — TELEPHONE ENCOUNTER
"Requested Prescriptions   Pending Prescriptions Disp Refills     montelukast (SINGULAIR) 10 MG tablet [Pharmacy Med Name: MONTELUKAST SODIUM 10MG TABS] 90 tablet 1     Sig: TAKE ONE TABLET BY MOUTH AT BEDTIME    Leukotriene Inhibitors Protocol Passed    1/30/2018  7:52 PM       Passed - Patient is age 12 or older    If patient is under 16, ok to refill using age based dosing.        Passed - Recent or future visit with authorizing provider's specialty    Patient had office visit in the last year or has a visit in the next 30 days with authorizing provider.  See \"Patient Info\" tab in inbasket, or \"Choose Columns\" in Meds & Orders section of the refill encounter.   Last OV: 12/04/17        atorvastatin (LIPITOR) 40 MG tablet [Pharmacy Med Name: ATORVASTATIN CALCIUM 40MG TABS] 90 tablet 0     Sig: TAKE ONE TABLET BY MOUTH EVERY DAY.  DUE FOR FASTING LABS IN FEB. PLEASE CALL CLINIC    Statins Protocol Passed    1/30/2018  7:52 PM       Passed - LDL on file in past 12 months    Recent Labs   Lab Test  02/20/17   1347   LDL  58          Passed - No abnormal creatine kinase in past 12 months    No lab results found.       Passed - Recent or future visit with authorizing provider    Patient had office visit in the last year or has a visit in the next 30 days with authorizing provider.  See \"Patient Info\" tab in inbasket, or \"Choose Columns\" in Meds & Orders section of the refill encounter.        Passed - Patient is age 18 or older       Passed - No active pregnancy on record       Passed - No positive pregnancy test in past 12 months        hydrochlorothiazide (HYDRODIURIL) 25 MG tablet [Pharmacy Med Name: HYDROCHLOROTHIAZIDE 25MG TABS] 90 tablet 1     Sig: TAKE ONE TABLET BY MOUTH DAILY    Diuretics (Including Combos) Protocol Failed    1/30/2018  7:52 PM       Failed - Blood pressure under 140/90    BP Readings from Last 3 Encounters:   12/13/17 144/78   12/04/17 98/62   09/14/17 110/70          Passed - Recent or future " "visit with authorizing provider's specialty    Patient had office visit in the last year or has a visit in the next 30 days with authorizing provider.  See \"Patient Info\" tab in inbasket, or \"Choose Columns\" in Meds & Orders section of the refill encounter.        Passed - Patient is age 18 or older       Passed - No active pregancy on record       Passed - Normal serum creatinine on file in past 12 months    Recent Labs   Lab Test  12/13/17 1928   CR  0.66          Passed - Normal serum potassium on file in past 12 months    Recent Labs   Lab Test  12/13/17 1928   POTASSIUM  4.2          Passed - Normal serum sodium on file in past 12 months    Recent Labs   Lab Test  12/13/17 1928   NA  139          Passed - No positive pregnancy test in past 12 months        Routing refill request to provider for review/approval because:  BP out of SO parameters  Per 02/01 telephone encounter lipid panel ordered and pt aware. Per epic schedule pt cancelled lab only appt that was scheduled to get lipids drawn.  Pt due for DM appt    Singulair medication is being filled for 1 time refill only due to:  due for appt    Please advise, thanks.  "

## 2018-02-02 NOTE — TELEPHONE ENCOUNTER
Call and advise she is due for her diabetes labs, she should schedule her lab soon and see me a week later.

## 2018-02-05 DIAGNOSIS — M79.7 FIBROMYALGIA: ICD-10-CM

## 2018-02-05 DIAGNOSIS — G89.29 CHRONIC BILATERAL LOW BACK PAIN WITHOUT SCIATICA: ICD-10-CM

## 2018-02-05 DIAGNOSIS — M54.50 CHRONIC BILATERAL LOW BACK PAIN WITHOUT SCIATICA: ICD-10-CM

## 2018-02-08 RX ORDER — PREGABALIN 150 MG/1
150 CAPSULE ORAL 3 TIMES DAILY
Qty: 270 CAPSULE | Refills: 1 | Status: SHIPPED | OUTPATIENT
Start: 2018-02-08 | End: 2018-09-10

## 2018-02-08 NOTE — TELEPHONE ENCOUNTER
Lyrica      Last Written Prescription Date:  6-8-17  Last Fill Quantity: 270,   # refills: 1  Last Office Visit: 12-4-17  Future Office visit:       Routing refill request to provider for review/approval because:  Drug not on the [facility], [facility] or [facility] refill protocol or controlled substance    Signed CSA form in chart.    [place] reviewed: access not granted by provider.    [place]  [email or web address]

## 2018-02-08 NOTE — TELEPHONE ENCOUNTER
Rx brought down to  pharmacy- pt advised to call pharmacy to make sure it has been process and ready for  before coming in.

## 2018-02-12 ENCOUNTER — TELEPHONE (OUTPATIENT)
Dept: BONE DENSITY | Facility: CLINIC | Age: 68
End: 2018-02-12

## 2018-03-03 DIAGNOSIS — E11.40 TYPE 2 DIABETES MELLITUS WITH DIABETIC NEUROPATHY, WITHOUT LONG-TERM CURRENT USE OF INSULIN (H): ICD-10-CM

## 2018-03-03 DIAGNOSIS — Z79.899 CONTROLLED SUBSTANCE AGREEMENT SIGNED: ICD-10-CM

## 2018-03-03 DIAGNOSIS — E78.5 HYPERLIPIDEMIA LDL GOAL <100: ICD-10-CM

## 2018-03-03 LAB
AMPHETAMINES UR QL: NOT DETECTED NG/ML
ANION GAP SERPL CALCULATED.3IONS-SCNC: 8 MMOL/L (ref 3–14)
BARBITURATES UR QL SCN: NOT DETECTED NG/ML
BENZODIAZ UR QL SCN: NOT DETECTED NG/ML
BUN SERPL-MCNC: 18 MG/DL (ref 7–30)
BUPRENORPHINE UR QL: NOT DETECTED NG/ML
CALCIUM SERPL-MCNC: 9.2 MG/DL (ref 8.5–10.1)
CANNABINOIDS UR QL: NOT DETECTED NG/ML
CHLORIDE SERPL-SCNC: 104 MMOL/L (ref 94–109)
CHOLEST SERPL-MCNC: 152 MG/DL
CO2 SERPL-SCNC: 28 MMOL/L (ref 20–32)
COCAINE UR QL SCN: NOT DETECTED NG/ML
CREAT SERPL-MCNC: 0.75 MG/DL (ref 0.52–1.04)
CREAT UR-MCNC: 150 MG/DL
D-METHAMPHET UR QL: NOT DETECTED NG/ML
GFR SERPL CREATININE-BSD FRML MDRD: 77 ML/MIN/1.7M2
GLUCOSE SERPL-MCNC: 125 MG/DL (ref 70–99)
HBA1C MFR BLD: 6.9 % (ref 4.3–6)
HDLC SERPL-MCNC: 53 MG/DL
LDLC SERPL CALC-MCNC: 56 MG/DL
METHADONE UR QL SCN: NOT DETECTED NG/ML
MICROALBUMIN UR-MCNC: 18 MG/L
MICROALBUMIN/CREAT UR: 11.87 MG/G CR (ref 0–25)
NONHDLC SERPL-MCNC: 99 MG/DL
OPIATES UR QL SCN: NOT DETECTED NG/ML
OXYCODONE UR QL SCN: NOT DETECTED NG/ML
PCP UR QL SCN: NOT DETECTED NG/ML
POTASSIUM SERPL-SCNC: 3.9 MMOL/L (ref 3.4–5.3)
PROPOXYPH UR QL: NOT DETECTED NG/ML
SODIUM SERPL-SCNC: 140 MMOL/L (ref 133–144)
TRICYCLICS UR QL SCN: ABNORMAL NG/ML
TRIGL SERPL-MCNC: 213 MG/DL

## 2018-03-03 PROCEDURE — 80048 BASIC METABOLIC PNL TOTAL CA: CPT | Performed by: INTERNAL MEDICINE

## 2018-03-03 PROCEDURE — 36415 COLL VENOUS BLD VENIPUNCTURE: CPT | Performed by: INTERNAL MEDICINE

## 2018-03-03 PROCEDURE — 80306 DRUG TEST PRSMV INSTRMNT: CPT | Performed by: INTERNAL MEDICINE

## 2018-03-03 PROCEDURE — 83036 HEMOGLOBIN GLYCOSYLATED A1C: CPT | Performed by: INTERNAL MEDICINE

## 2018-03-03 PROCEDURE — 82043 UR ALBUMIN QUANTITATIVE: CPT | Performed by: INTERNAL MEDICINE

## 2018-03-03 PROCEDURE — 80061 LIPID PANEL: CPT | Performed by: INTERNAL MEDICINE

## 2018-03-06 ENCOUNTER — OFFICE VISIT (OUTPATIENT)
Dept: INTERNAL MEDICINE | Facility: CLINIC | Age: 68
End: 2018-03-06
Payer: COMMERCIAL

## 2018-03-06 ENCOUNTER — TELEPHONE (OUTPATIENT)
Dept: INTERNAL MEDICINE | Facility: CLINIC | Age: 68
End: 2018-03-06

## 2018-03-06 VITALS
OXYGEN SATURATION: 93 % | SYSTOLIC BLOOD PRESSURE: 98 MMHG | DIASTOLIC BLOOD PRESSURE: 58 MMHG | RESPIRATION RATE: 16 BRPM | WEIGHT: 200 LBS | HEART RATE: 84 BPM | BODY MASS INDEX: 29.53 KG/M2 | TEMPERATURE: 98.2 F

## 2018-03-06 DIAGNOSIS — R55 SYNCOPE, UNSPECIFIED SYNCOPE TYPE: ICD-10-CM

## 2018-03-06 DIAGNOSIS — R10.32 LLQ ABDOMINAL PAIN: ICD-10-CM

## 2018-03-06 DIAGNOSIS — E11.40 TYPE 2 DIABETES MELLITUS WITH DIABETIC NEUROPATHY, WITHOUT LONG-TERM CURRENT USE OF INSULIN (H): Primary | ICD-10-CM

## 2018-03-06 DIAGNOSIS — R11.0 NAUSEA: ICD-10-CM

## 2018-03-06 DIAGNOSIS — E78.5 HYPERLIPIDEMIA LDL GOAL <100: ICD-10-CM

## 2018-03-06 DIAGNOSIS — I10 BENIGN ESSENTIAL HYPERTENSION: ICD-10-CM

## 2018-03-06 DIAGNOSIS — M47.812 FACET ARTHROPATHY, CERVICAL: ICD-10-CM

## 2018-03-06 DIAGNOSIS — J44.9 CHRONIC OBSTRUCTIVE PULMONARY DISEASE, UNSPECIFIED COPD TYPE (H): ICD-10-CM

## 2018-03-06 DIAGNOSIS — M47.816 FACET ARTHROPATHY, LUMBAR: ICD-10-CM

## 2018-03-06 DIAGNOSIS — F11.20 NARCOTIC DEPENDENCE (H): ICD-10-CM

## 2018-03-06 DIAGNOSIS — M79.7 FIBROMYALGIA: ICD-10-CM

## 2018-03-06 DIAGNOSIS — Z11.59 SCREENING FOR VIRAL DISEASE: ICD-10-CM

## 2018-03-06 DIAGNOSIS — E11.42 DIABETIC POLYNEUROPATHY ASSOCIATED WITH TYPE 2 DIABETES MELLITUS (H): ICD-10-CM

## 2018-03-06 DIAGNOSIS — F33.0 MILD EPISODE OF RECURRENT MAJOR DEPRESSIVE DISORDER (H): ICD-10-CM

## 2018-03-06 PROCEDURE — 99215 OFFICE O/P EST HI 40 MIN: CPT | Performed by: INTERNAL MEDICINE

## 2018-03-06 PROCEDURE — 99207 C FOOT EXAM  NO CHARGE: CPT | Performed by: INTERNAL MEDICINE

## 2018-03-06 RX ORDER — MONTELUKAST SODIUM 10 MG/1
1 TABLET ORAL AT BEDTIME
Qty: 90 TABLET | Refills: 3 | Status: SHIPPED | OUTPATIENT
Start: 2018-03-06 | End: 2019-02-20

## 2018-03-06 RX ORDER — TRAMADOL HYDROCHLORIDE 50 MG/1
100 TABLET ORAL 3 TIMES DAILY
Qty: 180 TABLET | Refills: 2 | Status: SHIPPED | OUTPATIENT
Start: 2018-03-06 | End: 2018-08-06

## 2018-03-06 NOTE — MR AVS SNAPSHOT
After Visit Summary   3/6/2018    Vijaya Manjarrez    MRN: [id number]           Patient Information     Date Of Birth          1950        Visit Information        Provider Department      3/6/2018 4:00 PM Disha Van MD [facility]        Today's Diagnoses     Facet arthropathy, cervical        Facet arthropathy, lumbar        Fibromyalgia        Chronic obstructive pulmonary disease, unspecified COPD type (H)          Care Instructions    Stop the Hydrochlorthiazide.   If still having some faintness, falling after a week, call and I can lower the lisinopril.     Try decreasing Lyrica to twice a day to see if that helps the nausea. If no change by a week, you could decrease to bedtime only or we could try a lower dose like 75 mg or 100 mg.     When pain left side, try ibuprofen 3 tablets of 200 mg three times a day with food for 3-4 days. Stop if any stomach upset.           Follow-ups after your visit        Your next 10 appointments already scheduled     Mar 15, 2018  2:00 PM CDT   DX HIP/PELVIS/SPINE with RIDX1   [facility] ([facility])    [address]  [address]  [facility] [address]              Please do not take any of the following 24 hours prior to the day of your exam: vitamins, calcium tablets, antacids.  If possible, please wear clothes without metal (snaps, zippers). A sweatsuit works well.              Who to contact     If you have questions or need follow up information about today's clinic visit or your schedule please contact [facility] directly at [phone].  Normal or non-critical lab and imaging results will be communicated to you by MyChart, letter or phone within 4 business days after the clinic has received the results. If you do not hear from us within 7 days, please contact the clinic through MyChart or phone. If you have a critical or abnormal lab result, we will notify you by  "phone as soon as possible.  Submit refill requests through [employer] or call your pharmacy and they will forward the refill request to us. Please allow 3 business days for your refill to be completed.          Additional Information About Your Visit        [employer] Information     [employer] lets you send messages to your doctor, view your test results, renew your prescriptions, schedule appointments and more. To sign up, go to [email or web address]/[employer] . Click on \"Log in\" on the left side of the screen, which will take you to the Welcome page. Then click on \"Sign up Now\" on the right side of the page.     You will be asked to enter the access code listed below, as well as some personal information. Please follow the directions to create your username and password.     Your access code is: [email or web address]  Expires: 3/13/2018 10:34 PM     Your access code will  in 90 days. If you need help or a new code, please call your [facility] clinic or [phone].        Care EveryWhere ID     This is your Care EveryWhere ID. This could be used by other organizations to access your [facility] medical records  [id number]        Your Vitals Were     Pulse Temperature Respirations Pulse Oximetry BMI (Body Mass Index)       84 98.2  F (36.8  C) (Oral) 16 93% 29.53 kg/m2        Blood Pressure from Last 3 Encounters:   18 98/58   17 144/78   17 98/62    Weight from Last 3 Encounters:   18 200 lb (90.7 kg)   17 200 lb (90.7 kg)   17 200 lb (90.7 kg)              Today, you had the following     No orders found for display         Today's Medication Changes          These changes are accurate as of 3/6/18  4:59 PM.  If you have any questions, ask your nurse or doctor.               These medicines have changed or have updated prescriptions.        Dose/Directions    montelukast 10 MG tablet   Commonly known as:  SINGULAIR   This may have changed:  See the new instructions.   Used for:  Chronic obstructive " pulmonary disease, unspecified COPD type (H)   Changed by:  Disha Van MD        Dose:  1 tablet   Take 1 tablet (10 mg) by mouth At Bedtime   Quantity:  90 tablet   Refills:  3       traMADol 50 MG tablet   Commonly known as:  ULTRAM   This may have changed:  additional instructions   Used for:  Facet arthropathy, cervical, Facet arthropathy, lumbar, Fibromyalgia   Changed by:  Disha Van MD        Dose:  100 mg   Take 2 tablets (100 mg) by mouth 3 times daily   Quantity:  180 tablet   Refills:  2         Stop taking these medicines if you haven't already. Please contact your care team if you have questions.     ondansetron 4 MG ODT tab   Commonly known as:  ZOFRAN ODT   Stopped by:  Disha Van MD                Where to get your medicines      These medications were sent to [facility] [place], [place] - [address]  [facility] [address] [id number]     Phone:  [phone]     montelukast 10 MG tablet         Some of these will need a paper prescription and others can be bought over the counter.  Ask your nurse if you have questions.     Bring a paper prescription for each of these medications     traMADol 50 MG tablet                Primary Care Provider Office Phone # Fax #    Disha Van [phone]       [address] [place], [place] [id number]        Equal Access to Services     SYLVIA GO AH: Hadii joceline mayo hadasho Soomaali, waaxda luqadaha, qaybta kaalmada adeegyada, waxay jer haypa hein . So [facility] [phone].    ATENCIÓN: Si habla español, tiene a carrion disposición servicios gratuitos de asistencia lingüística. Llame al [phone].    We comply with applicable federal civil rights laws and [place] laws. We do not discriminate on the basis of race, color, national origin, age, disability, sex, sexual orientation, or gender identity.            Thank you!     Thank you for choosing [facility]  for your  care. Our goal is always to provide you with excellent care. Hearing back from our patients is one way we can continue to improve our services. Please take a few minutes to complete the written survey that you may receive in the mail after your visit with us. Thank you!             Your Updated Medication List - Protect others around you: Learn how to safely use, store and throw away your medicines at www.disposemymeds.org.          This list is accurate as of 3/6/18  4:59 PM.  Always use your most recent med list.                   Brand Name Dispense Instructions for use Diagnosis    albuterol 108 (90 BASE) MCG/ACT Inhaler    PROAIR HFA/PROVENTIL HFA/VENTOLIN HFA    1 Inhaler    Inhale 2 puffs into the lungs every 4 hours as needed for shortness of breath / dyspnea or wheezing    Bronchitis       atorvastatin 40 MG tablet    LIPITOR    90 tablet    Take 1 tablet (40 mg) by mouth daily    Hyperlipidemia LDL goal <100       blood glucose monitoring test strip    no brand specified    100 strip    Use to test blood sugars 1 times daily or as directed, use brand same as previous    Type 2 diabetes mellitus with diabetic neuropathy, without long-term current use of insulin (H)       cetirizine 10 MG tablet    zyrTEC    90 tablet    Take 1 tablet (10 mg) by mouth daily    Allergic rhinitis, unspecified allergic rhinitis trigger, unspecified rhinitis seasonality       cyclobenzaprine 10 MG tablet    FLEXERIL    180 tablet    Take 1 tablet (10 mg) by mouth 2 times daily    Muscle spasm       DULoxetine 60 MG EC capsule    CYMBALTA    90 capsule    Take 1 capsule (60 mg) by mouth daily    Lumbar degenerative disc disease       fluticasone 50 MCG/ACT spray    FLONASE    1 Bottle    Spray 2 sprays into both nostrils daily    Seasonal allergic rhinitis due to pollen, unspecified chronicity       fluticasone-salmeterol 250-50 MCG/DOSE diskus inhaler    ADVAIR    1 Inhaler    Inhale 1 puff into the lungs 2 times daily     Chronic obstructive pulmonary disease, unspecified COPD type (H)       lisinopril 20 MG tablet    PRINIVIL/ZESTRIL    90 tablet    Take 1 tablet (20 mg) by mouth daily    Benign essential hypertension       metFORMIN 500 MG tablet    GLUCOPHAGE    180 tablet    TAKE ONE TABLET BY MOUTH TWICE A DAY WITH A MEAL    Type 2 diabetes mellitus with diabetic neuropathy, without long-term current use of insulin (H)       miconazole 2 % cream    MICATIN    57 g    Apply topically 2 times daily    Yeast infection of the skin       montelukast 10 MG tablet    SINGULAIR    90 tablet    Take 1 tablet (10 mg) by mouth At Bedtime    Chronic obstructive pulmonary disease, unspecified COPD type (H)       omeprazole 20 MG CR capsule    priLOSEC    90 capsule    Take 1 capsule (20 mg) by mouth daily    Gastroesophageal reflux disease without esophagitis       pregabalin 150 MG capsule    LYRICA    270 capsule    Take 1 capsule (150 mg) by mouth 3 times daily    Fibromyalgia, Chronic bilateral low back pain without sciatica       traMADol 50 MG tablet    ULTRAM    180 tablet    Take 2 tablets (100 mg) by mouth 3 times daily    Facet arthropathy, cervical, Facet arthropathy, lumbar, Fibromyalgia       traZODone 100 MG tablet    DESYREL    180 tablet    Take 2 tablets (200 mg) by mouth At Bedtime    Insomnia, unspecified type       TYLENOL ARTHRITIS PAIN 650 MG CR tablet   Generic drug:  acetaminophen      Take 650 mg by mouth 2 times daily

## 2018-03-06 NOTE — NURSING NOTE
"Chief Complaint   Patient presents with     RECHECK     LOV 12/04/2017: Labs completed DM, LDL, HTN     Fall     frequent falls since last June     Pelvic Pain     f/u cyst on left side- causes pain in the pelvic area.        Initial BP 98/58  Pulse 84  Temp 98.2  F (36.8  C) (Oral)  Resp 16  Wt 200 lb (90.7 kg)  SpO2 93%  BMI 29.53 kg/m2 Estimated body mass index is 29.53 kg/(m^2) as calculated from the following:    Height as of 12/13/17: 5' 9\" (1.753 m).    Weight as of this encounter: 200 lb (90.7 kg).  Medication Reconciliation: complete      Mary Shen CMA      "

## 2018-03-06 NOTE — PROGRESS NOTES
SUBJECTIVE:   Vijaya Manjarrez is a 67 year old female who presents to clinic today for the following health issues:      Diabetes Follow-up      Patient is checking blood sugars: sometimes    Diabetic concerns: None     Symptoms of hypoglycemia (low blood sugar): nausea and fainting but thinks it is related to lyrica.     Paresthesias (numbness or burning in feet) or sores: Yes      Date of last diabetic eye exam: does not recall    Hyperlipidemia Follow-Up      Rate your low fat/cholesterol diet?: fair    Taking statin?  Yes, muscle aches, possibly from other cause    Other lipid medications/supplements?:  none    Hypertension Follow-up      Outpatient blood pressures are being checked at home 3-4 times weekly. 120/60    Low Salt Diet: not monitoring salt    BP Readings from Last 2 Encounters:   12/13/17 144/78   12/04/17 98/62     Hemoglobin A1C (%)   Date Value   03/03/2018 6.9 (H)   06/17/2017 6.8 (H)     LDL Cholesterol Calculated (mg/dL)   Date Value   03/03/2018 56   02/20/2017 58     Depression and Anxiety Follow-Up    Status since last visit: Worsened constant pain, weak, tire, and worn out she feels the pain has a lot to do with her depression and anxiety.. Some of her other symptoms lately may also be contributing.    Other associated symptoms:    Complicating factors:     Significant life event: No     Current substance abuse: None    PHQ-9 2/13/2017 7/28/2017   Total Score 10 11   Q9: Suicide Ideation Not at all Not at all     BRADY-7 SCORE 2/13/2017   Total Score 3       PHQ-9  English  PHQ-9   Any Language  BRADY-7  Suicide Assessment Five-step Evaluation and Treatment (SAFE-T)    Amount of exercise or physical activity: exercise programs twice weekly-    Problems taking medications regularly: No    Medication side effects: none    Diet: regular (no restrictions)      Other problems:   Chronic pain/narcotic dependence: Pain is stable, taking the tramadol chronically.  COPD: Overall breathing is  stable.  Diabetic neuropathy: Stable symptoms.      Current concerns:   1.  Possible syncope: Few weeks she has been feeling lightheaded which is a faintish feeling.  She has fallen a couple times which sounds like she may have actually had some presyncope.  No palpitations, she is always aware of the lightheadedness before she falls and usually feels herself falling but cannot stop it.  No vertigo.  No other focal neurologic symptoms.    2.  She has been having some left lower quadrant abdominal pain.  This is intermittent and can be intense.  It will go away completely for a long time.  It has been happening pretty much daily for about 5 days, can last an hour or 2.  She has not taken anything for it.  She has not had fever, chills, diarrhea, constipation.      3.  She has been having some intermittent nausea for some time, not really sure.  Been increased recently.  She thinks it may be due to the Lyrica.  No vomiting, no epigastric pain.      Patient Active Problem List   Diagnosis     HCD (health care directive)     Type 2 diabetes mellitus with diabetic neuropathy, without long-term current use of insulin (H)     COPD (chronic obstructive pulmonary disease) (H)     Cervical spine degeneration     Facet arthropathy, lumbar     Lumbar degenerative disc disease     Migraine headache     Diabetic neuropathy (H)     Anxiety     GERD (gastroesophageal reflux disease)     Hyperlipidemia LDL goal <100     Fibromyalgia     Benign essential hypertension     Controlled substance agreement signed     Chronic pain syndrome     Health Care Home     Advanced directives, counseling/discussion       Current Outpatient Prescriptions   Medication Sig Dispense Refill     traMADol (ULTRAM) 50 MG tablet Take 2 tablets (100 mg) by mouth 3 times daily 180 tablet 2     montelukast (SINGULAIR) 10 MG tablet Take 1 tablet (10 mg) by mouth At Bedtime 90 tablet 3     pregabalin (LYRICA) 150 MG capsule Take 1 capsule (150 mg) by mouth 3  times daily 270 capsule 1     atorvastatin (LIPITOR) 40 MG tablet Take 1 tablet (40 mg) by mouth daily 90 tablet 0     metFORMIN (GLUCOPHAGE) 500 MG tablet TAKE ONE TABLET BY MOUTH TWICE A DAY WITH A MEAL 180 tablet 1     miconazole (MICATIN) 2 % cream Apply topically 2 times daily 57 g 5     fluticasone-salmeterol (ADVAIR) 250-50 MCG/DOSE diskus inhaler Inhale 1 puff into the lungs 2 times daily 1 Inhaler 5     lisinopril (PRINIVIL/ZESTRIL) 20 MG tablet Take 1 tablet (20 mg) by mouth daily 90 tablet 1     traZODone (DESYREL) 100 MG tablet Take 2 tablets (200 mg) by mouth At Bedtime 180 tablet 3     acetaminophen (TYLENOL ARTHRITIS PAIN) 650 MG CR tablet Take 650 mg by mouth 2 times daily       cyclobenzaprine (FLEXERIL) 10 MG tablet Take 1 tablet (10 mg) by mouth 2 times daily 180 tablet 3     DULoxetine (CYMBALTA) 60 MG EC capsule Take 1 capsule (60 mg) by mouth daily 90 capsule 3     omeprazole (PRILOSEC) 20 MG CR capsule Take 1 capsule (20 mg) by mouth daily 90 capsule 3     fluticasone (FLONASE) 50 MCG/ACT spray Spray 2 sprays into both nostrils daily 1 Bottle 11     cetirizine (ZYRTEC) 10 MG tablet Take 1 tablet (10 mg) by mouth daily 90 tablet 3     blood glucose monitoring (NO BRAND SPECIFIED) test strip Use to test blood sugars 1 times daily or as directed, use brand same as previous (Patient not taking: Reported on 3/6/2018) 100 strip 3     albuterol (PROAIR HFA/PROVENTIL HFA/VENTOLIN HFA) 108 (90 BASE) MCG/ACT Inhaler Inhale 2 puffs into the lungs every 4 hours as needed for shortness of breath / dyspnea or wheezing 1 Inhaler 0       Social History   Substance Use Topics     Smoking status: Former Smoker     Packs/day: 1.00     Years: 40.00     Types: Cigarettes     Quit date: 6/1/2017     Smokeless tobacco: Never Used     Alcohol use No        ROS:  General: no fever, chills  Weight: stable  Vision:negative. Last eye exam few years.  ENT: negative  Respiratory stable.  Cardiac: no chest pain or  pressure  Abdominal: no nausea, vomiting, abdominal pain, bowel changes  Vascular no complaints of claudication  Neurologic:stable complaints of neuropathy  Feet no lesions, in grown nails, edema   : no polyuria, hematuria, dysuria    Objective:  Patient alert in NAD  BP 98/58  Pulse 84  Temp 98.2  F (36.8  C) (Oral)  Resp 16  Wt 200 lb (90.7 kg)  SpO2 93%  BMI 29.53 kg/m2       Wt Readings from Last 4 Encounters:   03/06/18 200 lb (90.7 kg)   12/13/17 200 lb (90.7 kg)   12/04/17 200 lb (90.7 kg)   09/14/17 199 lb 4.8 oz (90.4 kg)       CV: CV: normal S1, S2 without murmur, S3 or S4.  Carotid pulses: full  LUNGS: clear  Feet: pulses full, normal capillary refill  There is some calluses present but no skin breakdown  Nails normal  Sensation able to feel fine filament on tops of feet but not very well on the plantar surface her toes.     Lab Results   Component Value Date    A1C 6.9 03/03/2018    A1C 6.8 06/17/2017    A1C 6.7 02/20/2017    A1C 6.2 07/12/2016    A1C 7.0 03/04/2016       Lab Results   Component Value Date    CHOL 152 03/03/2018    HDL 53 03/03/2018    LDL 56 03/03/2018    TRIG 213 03/03/2018               ASSESSMENT/PLAN:             1. Type 2 diabetes mellitus with diabetic neuropathy, without long-term current use of insulin (H)  Well controlled, continue plan    2. Mild episode of recurrent major depressive disorder (H)  Some increase in symptoms, however given her nausea issues and other symptoms do not wish to change her medication at this time but may consider increase later    3. Diabetic polyneuropathy associated with type 2 diabetes mellitus (H)  Overall stable    4. Narcotic dependence (H)  Overall stable    5. Chronic obstructive pulmonary disease, unspecified COPD type (H)  stable  - montelukast (SINGULAIR) 10 MG tablet; Take 1 tablet (10 mg) by mouth At Bedtime  Dispense: 90 tablet; Refill: 3    6. Facet arthropathy, cervical  stable  - traMADol (ULTRAM) 50 MG tablet; Take 2 tablets  (100 mg) by mouth 3 times daily  Dispense: 180 tablet; Refill: 2    7. Facet arthropathy, lumbar  stable  - traMADol (ULTRAM) 50 MG tablet; Take 2 tablets (100 mg) by mouth 3 times daily  Dispense: 180 tablet; Refill: 2    8. Fibromyalgia  stable  - traMADol (ULTRAM) 50 MG tablet; Take 2 tablets (100 mg) by mouth 3 times daily  Dispense: 180 tablet; Refill: 2    9. Hyperlipidemia LDL goal <100  controlled    10. Benign essential hypertension  Blood pressure is low, may be having symptoms.  recommend discontinue the hydrochlorothiazide for now and monitor.  Call if still having symptoms, blood pressure remains low    11. Syncope, unspecified syncope type  As above    12. LLQ abdominal pain  Etiology unclear but she has had previous ovarian cyst.  Since the pain is intermittent advised it is not as likely to be diverticulitis.  She does not seem to be constipated.  Will check an ultrasound, possibly referred to gynecology follow-up.    13. Nausea  Possibly due to medication though unclear, she can try to decrease lyrica to bid          Dsiha Van MD  [facility]

## 2018-03-06 NOTE — LETTER
[place] [address]  [facility] [address]  [phone]        October 15, 2018    Vijaya Manjarrez  [address]  [facility] [address]              Dear Vijaya Manjarrez    This is to remind you that your NON-FASTING LAB is due.    You may call our office at [phone] to schedule an appointment.    Please disregard this notice if you have already had your labs drawn or made an appointment.        Sincerely,        Disha Van MD

## 2018-03-06 NOTE — TELEPHONE ENCOUNTER
Fax received from [facility] for review and signature.  Put in Dr. Van's in basket.

## 2018-03-06 NOTE — PATIENT INSTRUCTIONS
Stop the Hydrochlorthiazide.   If still having some faintness, falling after a week, call and I can lower the lisinopril.     Try decreasing Lyrica to twice a day to see if that helps the nausea. If no change by a week, you could decrease to bedtime only or we could try a lower dose like 75 mg or 100 mg.     When pain left side, try ibuprofen 3 tablets of 200 mg three times a day with food for 3-4 days. Stop if any stomach upset.

## 2018-03-07 ENCOUNTER — CARE COORDINATION (OUTPATIENT)
Dept: GERIATRIC MEDICINE | Facility: CLINIC | Age: 68
End: 2018-03-07

## 2018-03-07 DIAGNOSIS — Z76.89 HEALTH CARE HOME: ICD-10-CM

## 2018-03-07 NOTE — PROGRESS NOTES
VM received from client looking for information on getting vitamins covered by insurance and wondering if she can get a new shower chair. Client reports that she received a chair, but it is too big and she would like a new one.     Return call to client and VM was left. It was explained that PCP should write a prescription for the vitamins/OTC medications and then insurance could cover the cost. Client was encouraged to call the company who provided the shower chair (CM did not help obtain) and discuss their return/exchange policy. Reviewed that insurance will only pay for the same equipment once every 5 years. Client was encouraged to call CM back after she talks with the DME provider and we can discuss alternative options if she can't return it.    Vanesa Gtz RN  [facility]  [phone]  Fax: [phone]

## 2018-03-08 NOTE — PROGRESS NOTES
Return call from client who shares that the shower chair was obtained by the [facility] who originally came out to assess her. Client shared with that CM that it was too big, but she had been encouraged to keep trying. Client shares that the chair is so large it barley fits in her shower and is very hard to work with. CM shared that if the  budget will allow, we could order a new chair as insurance won't pay again. CM emailed client different options of chairs/benches and client will decide what she would like.    Vanesa Gtz RN  [facility]  [phone]  Fax: [phone]

## 2018-03-08 NOTE — TELEPHONE ENCOUNTER
Pt's  calling.  The dx on the form is not a qualifying diagnosis for her to get her supplies.  She has checked and the diagnosis Type 2 diabetes with neuropathy (E11.40) would be a qualifying diagnosis.  Form place in MD's in basket so this can be changed and initialed.  Soledad Pan RN

## 2018-03-09 ENCOUNTER — TELEPHONE (OUTPATIENT)
Dept: INTERNAL MEDICINE | Facility: CLINIC | Age: 68
End: 2018-03-09

## 2018-03-09 DIAGNOSIS — N83.202 LEFT OVARIAN CYST: Primary | ICD-10-CM

## 2018-03-10 PROBLEM — F11.20 NARCOTIC DEPENDENCE (H): Status: ACTIVE | Noted: 2018-03-10

## 2018-03-10 ASSESSMENT — ANXIETY QUESTIONNAIRES
3. WORRYING TOO MUCH ABOUT DIFFERENT THINGS: SEVERAL DAYS
5. BEING SO RESTLESS THAT IT IS HARD TO SIT STILL: SEVERAL DAYS
1. FEELING NERVOUS, ANXIOUS, OR ON EDGE: MORE THAN HALF THE DAYS
2. NOT BEING ABLE TO STOP OR CONTROL WORRYING: SEVERAL DAYS
GAD7 TOTAL SCORE: 6
7. FEELING AFRAID AS IF SOMETHING AWFUL MIGHT HAPPEN: NOT AT ALL
6. BECOMING EASILY ANNOYED OR IRRITABLE: NOT AT ALL

## 2018-03-10 ASSESSMENT — PATIENT HEALTH QUESTIONNAIRE - PHQ9: 5. POOR APPETITE OR OVEREATING: SEVERAL DAYS

## 2018-03-11 ASSESSMENT — PATIENT HEALTH QUESTIONNAIRE - PHQ9: SUM OF ALL RESPONSES TO PHQ QUESTIONS 1-9: 14

## 2018-03-11 ASSESSMENT — ANXIETY QUESTIONNAIRES: GAD7 TOTAL SCORE: 6

## 2018-03-12 PROBLEM — F33.0 MILD EPISODE OF RECURRENT MAJOR DEPRESSIVE DISORDER (H): Status: ACTIVE | Noted: 2018-03-12

## 2018-03-15 DIAGNOSIS — I10 BENIGN ESSENTIAL HYPERTENSION: ICD-10-CM

## 2018-03-16 ENCOUNTER — TELEPHONE (OUTPATIENT)
Dept: INTERNAL MEDICINE | Facility: CLINIC | Age: 68
End: 2018-03-16

## 2018-03-16 ENCOUNTER — MEDICAL CORRESPONDENCE (OUTPATIENT)
Dept: HEALTH INFORMATION MANAGEMENT | Facility: CLINIC | Age: 68
End: 2018-03-16

## 2018-03-16 NOTE — TELEPHONE ENCOUNTER
Fax received from [facility] for review and signature.  Put in Dr. Van's in basket.

## 2018-03-19 RX ORDER — LISINOPRIL 20 MG/1
TABLET ORAL
Qty: 90 TABLET | Refills: 3 | Status: SHIPPED | OUTPATIENT
Start: 2018-03-19 | End: 2018-05-23

## 2018-03-19 NOTE — TELEPHONE ENCOUNTER
"Requested Prescriptions   Pending Prescriptions Disp Refills     lisinopril (PRINIVIL/ZESTRIL) 20 MG tablet [Pharmacy Med Name: LISINOPRIL 20MG TABS] 90 tablet 1     Sig: TAKE ONE TABLET BY MOUTH EVERY DAY    ACE Inhibitors (Including Combos) Protocol Passed    3/15/2018  1:03 PM       Passed - Blood pressure under 140/90 in past 12 months    BP Readings from Last 3 Encounters:   03/06/18 98/58   12/13/17 144/78   12/04/17 98/62                Passed - Recent (12 mo) or future (30 days) visit within the authorizing provider's specialty    Patient had office visit in the last 12 months or has a visit in the next 30 days with authorizing provider or within the authorizing provider's specialty.  See \"Patient Info\" tab in inbasket, or \"Choose Columns\" in Meds & Orders section of the refill encounter.           Passed - Patient is age 18 or older       Passed - No active pregnancy on record       Passed - Normal serum creatinine on file in past 12 months    Recent Labs   Lab Test  03/03/18   1023   CR  0.75            Passed - Normal serum potassium on file in past 12 months    Recent Labs   Lab Test  03/03/18   1023   POTASSIUM  3.9            Passed - No positive pregnancy test in past 12 months        Prescription approved per [facility] Refill Protocol.    "

## 2018-03-23 ENCOUNTER — RADIANT APPOINTMENT (OUTPATIENT)
Dept: BONE DENSITY | Facility: CLINIC | Age: 68
End: 2018-03-23
Attending: INTERNAL MEDICINE
Payer: COMMERCIAL

## 2018-03-23 DIAGNOSIS — Z78.0 ASYMPTOMATIC POSTMENOPAUSAL STATUS: ICD-10-CM

## 2018-03-23 PROCEDURE — 77080 DXA BONE DENSITY AXIAL: CPT | Performed by: INTERNAL MEDICINE

## 2018-03-30 ENCOUNTER — TELEPHONE (OUTPATIENT)
Dept: NURSING | Facility: CLINIC | Age: 68
End: 2018-03-30

## 2018-03-30 ENCOUNTER — NURSE TRIAGE (OUTPATIENT)
Dept: NURSING | Facility: CLINIC | Age: 68
End: 2018-03-30

## 2018-03-30 NOTE — TELEPHONE ENCOUNTER
Epic encounter sent to the clinic for them to call [facility].  Alysia Milner RN-[facility]

## 2018-03-30 NOTE — TELEPHONE ENCOUNTER
[facility] [employer] needs the diagnosis code or send a copy of the client's problem list. They need that to set up the incontinence supplies. It wasn't with the original order so they still need it.  Please call [facility].  Routine labs ordered; CBC, comprehensive metabolic panel, fasting lipid panel, urinalysis.

## 2018-03-30 NOTE — TELEPHONE ENCOUNTER
They had sent a request for it and what I put on there did not qualify for coverage so tell them she does not have any other underlying medical condition causing her incontinence. It is just stress incontinence.  It is not due to neuropathy. See message from 3/6/18.  Also, there was not reason to make this an urgent message.

## 2018-04-02 NOTE — TELEPHONE ENCOUNTER
Can you please pull this form and see if it was for the Shower seat?  Patient is saying we should have received it two weeks ago and so I am guessing this is it.  [facility] never received the return fax so if this is the shower seat please refax.  Otherwise we will need to reach out to [facility] for them to send us another copy.

## 2018-04-03 ENCOUNTER — PATIENT OUTREACH (OUTPATIENT)
Dept: GERIATRIC MEDICINE | Facility: CLINIC | Age: 68
End: 2018-04-03

## 2018-04-03 ENCOUNTER — RADIANT APPOINTMENT (OUTPATIENT)
Dept: ULTRASOUND IMAGING | Facility: CLINIC | Age: 68
End: 2018-04-03
Attending: INTERNAL MEDICINE
Payer: COMMERCIAL

## 2018-04-03 DIAGNOSIS — N83.202 LEFT OVARIAN CYST: ICD-10-CM

## 2018-04-03 PROCEDURE — 76830 TRANSVAGINAL US NON-OB: CPT | Performed by: FAMILY MEDICINE

## 2018-04-03 PROCEDURE — 76856 US EXAM PELVIC COMPLETE: CPT | Performed by: FAMILY MEDICINE

## 2018-04-03 NOTE — PROGRESS NOTES
VM from client wanting to discuss her [employer] bill and on-going transportation as she thinks her car is beyond repair.     Return call to client and VM was left. Client was encouraged to discuss her bill with [employer] as writer cannot answer those questions. Shared that if client is going to want on-going transportation we will need to look into options such as MM or a bus pass. CM stated that the homemaker can be used on occasion for transportation, but due to the cost it couldn't be used weekly.     CM requested a return call to further discuss.     Vanesa Gtz RN  [facility]  [phone]  Fax: [phone]

## 2018-04-05 NOTE — PROGRESS NOTES
VM from Reyna with [employer] who is calling to request an auth for transportation as client would like to go out with her homemaker. Return call and VM was left sharing with Reyna that auth will be submitted for today, but that rides from the homemaker can't be done on a regular basis due to the expense.     Spoke with client and reiterated that weekly rides from the homemaker won't fit in her budget, and if she would like on-going transportation we can look into MM or a bus pass. Client shared that her son is trying to help her get her car fixed. She will call if she needs assistance with alternative transportation options. Informed client auth was sent for today.     Will request CMS submit auth for 1 RT ride.     Vanesa Gtz RN  [facility]  [phone]  Fax: [phone]

## 2018-04-20 DIAGNOSIS — J30.9 ALLERGIC RHINITIS: Primary | ICD-10-CM

## 2018-04-23 RX ORDER — CETIRIZINE HYDROCHLORIDE 10 MG/1
10 TABLET ORAL DAILY
Qty: 90 TABLET | Refills: 1 | Status: SHIPPED | OUTPATIENT
Start: 2018-04-23 | End: 2018-10-13

## 2018-04-24 ENCOUNTER — PATIENT OUTREACH (OUTPATIENT)
Dept: GERIATRIC MEDICINE | Facility: CLINIC | Age: 68
End: 2018-04-24

## 2018-04-25 NOTE — PROGRESS NOTES
"[facility] Care Coordination Contact  Change in Services    Call from client who shares that she would like to stop her home delivered meals, \"I am not getting the benefit\" and she reports having difficulty meeting her waiver obligation. CM shared that since client has other waiver services (homemaking) she will still be responsible for paying her obligation amount. Client shares that she has already discussed with [employer] decreasing her homemaking services to every other week. Client shares that this will end up costing approx $100/month and her current obligation is $213/month. CM will contact [employer] to discontinue services. Client also shared that she received a letter from [facility] stating that they have not received adequate documentation from her PCP for the shower chair, and she may be billed for it if they don't receive the necessary documents. CM informed client she should not pay for shower chair- CM can see signed documents in Epic. Client was unsure when the letter was from. CM encouraged client to call back if there are any further issues regarding the shower chair.     Vanesa Gtz RN  [facility]  [phone]  Fax: [phone]  "

## 2018-04-26 NOTE — PROGRESS NOTES
Call to [employer] and spoke with Trinity. Requested to cancel client's home delivered meals per her choice. [employer] shared that they delivered seven meals yesterday, so the end date will be 5/1/18.    Call to [employer] to ensure they are aware client would like to decrease services to e/o week.  was left for Reyna and requested a return call with any questions, or concerns.     CM will process DTR.    Vanesa Gtz RN  [facility]  [phone]  Fax: [phone]

## 2018-04-27 ENCOUNTER — PATIENT OUTREACH (OUTPATIENT)
Dept: GERIATRIC MEDICINE | Facility: CLINIC | Age: 68
End: 2018-04-27

## 2018-04-27 NOTE — PROGRESS NOTES
[facility] Care Coordination Contact  Member Contact- Services     Call to client to discuss change in POC- client requested that her budget sheet be sent and she knows she will need to sign and return accompanying letter. Client was asked if she would like the CP to be shared with the provider and she selected that [employer] receive a summary letter.     Vanesa Gtz RN  [facility]  [phone]  Fax: [phone]

## 2018-04-27 NOTE — PROGRESS NOTES
[facility] Care Coordination Contact    Dr. Van-    I am the [facility] care coordinator for Vijaya Manjarrez, and I am writing to notify you of a change in services.   Homemaking services have been reduced to every other week and home delivered meals have been terminated.    This change has occurred per client's choice due to the waiver obligation amount she is paying each month. She feels she is not getting the benefit from the meals, and would like to see if she can manage with homemaking less frequently.      I am required by the health plan to notify you of this change. No action is required on your part. Please do not hesitate to contact me with any questions or concerns. Thank you.    Vanesa Gtz RN  [facility]  [phone]  Fax: [phone]

## 2018-04-30 ENCOUNTER — PATIENT OUTREACH (OUTPATIENT)
Dept: GERIATRIC MEDICINE | Facility: CLINIC | Age: 68
End: 2018-04-30

## 2018-04-30 NOTE — PROGRESS NOTES
[facility] Care Coordination Contact  Received a request to submit a DTR for the reduction of Homemaking and the termination of delivered meals. Documentation completed and faxed to the health plan.  aware.    Betsy Beckham RN  Utilization   [facility]  [phone]

## 2018-05-04 ENCOUNTER — TELEPHONE (OUTPATIENT)
Dept: OBGYN | Facility: CLINIC | Age: 68
End: 2018-05-04

## 2018-05-04 NOTE — TELEPHONE ENCOUNTER
Pt has an appt to see Dr. Figueroa 5/16/18.  Vijaya may not realize that Dr. Figueroa will no longer be with [place] and this appointment will most likely need continued follow up appointments or even procedure.  I would like us to help her reschedule to see another doctor within the [facility] group.  If there are not openings soon in [place] there may be in [facility] or [place]    I LMOVM for pt to call back  Callie Muhammad CMA

## 2018-05-04 NOTE — TELEPHONE ENCOUNTER
Dr. Galan now has a 30min opening at  on 5/25.    [facility] for pt in case she can reschedule : this appointment will give the provider more time with her she may do an endometrial biopsy at the time of visit.  I saved this appt time by making the appointment for Vijaya.  She is not obligated to keep it.  Callie Muhammad CMA

## 2018-05-14 DIAGNOSIS — E78.5 HYPERLIPIDEMIA LDL GOAL <100: ICD-10-CM

## 2018-05-15 RX ORDER — ATORVASTATIN CALCIUM 40 MG/1
TABLET, FILM COATED ORAL
Qty: 90 TABLET | Refills: 1 | Status: SHIPPED | OUTPATIENT
Start: 2018-05-15 | End: 2018-10-01

## 2018-05-16 ENCOUNTER — OFFICE VISIT (OUTPATIENT)
Dept: INTERNAL MEDICINE | Facility: CLINIC | Age: 68
End: 2018-05-16
Payer: COMMERCIAL

## 2018-05-16 VITALS
BODY MASS INDEX: 30.47 KG/M2 | SYSTOLIC BLOOD PRESSURE: 150 MMHG | OXYGEN SATURATION: 95 % | DIASTOLIC BLOOD PRESSURE: 90 MMHG | TEMPERATURE: 98.6 F | HEART RATE: 105 BPM | WEIGHT: 205.7 LBS | HEIGHT: 69 IN | RESPIRATION RATE: 14 BRPM

## 2018-05-16 DIAGNOSIS — E11.40 TYPE 2 DIABETES MELLITUS WITH DIABETIC NEUROPATHY, WITHOUT LONG-TERM CURRENT USE OF INSULIN (H): ICD-10-CM

## 2018-05-16 DIAGNOSIS — J44.9 CHRONIC OBSTRUCTIVE PULMONARY DISEASE, UNSPECIFIED COPD TYPE (H): ICD-10-CM

## 2018-05-16 DIAGNOSIS — R30.0 DYSURIA: ICD-10-CM

## 2018-05-16 DIAGNOSIS — I10 BENIGN ESSENTIAL HYPERTENSION: ICD-10-CM

## 2018-05-16 DIAGNOSIS — M79.7 FIBROMYALGIA: Primary | ICD-10-CM

## 2018-05-16 LAB
ALBUMIN UR-MCNC: 100 MG/DL
APPEARANCE UR: CLEAR
BACTERIA #/AREA URNS HPF: ABNORMAL /HPF
BILIRUB UR QL STRIP: ABNORMAL
COLOR UR AUTO: YELLOW
GLUCOSE UR STRIP-MCNC: NEGATIVE MG/DL
HGB UR QL STRIP: ABNORMAL
KETONES UR STRIP-MCNC: ABNORMAL MG/DL
LEUKOCYTE ESTERASE UR QL STRIP: ABNORMAL
NITRATE UR QL: NEGATIVE
NON-SQ EPI CELLS #/AREA URNS LPF: ABNORMAL /LPF
PH UR STRIP: 5.5 PH (ref 5–7)
RBC #/AREA URNS AUTO: ABNORMAL /HPF
SOURCE: ABNORMAL
SP GR UR STRIP: >1.03 (ref 1–1.03)
UROBILINOGEN UR STRIP-ACNC: 1 EU/DL (ref 0.2–1)
WBC #/AREA URNS AUTO: ABNORMAL /HPF

## 2018-05-16 PROCEDURE — 99214 OFFICE O/P EST MOD 30 MIN: CPT | Performed by: INTERNAL MEDICINE

## 2018-05-16 PROCEDURE — 81001 URINALYSIS AUTO W/SCOPE: CPT | Performed by: INTERNAL MEDICINE

## 2018-05-16 RX ORDER — LIDOCAINE 50 MG/G
PATCH TOPICAL
Qty: 30 PATCH | Refills: 0 | Status: SHIPPED | OUTPATIENT
Start: 2018-05-16 | End: 2019-02-07

## 2018-05-16 NOTE — PROGRESS NOTES
SUBJECTIVE:   Vijaya Manjarrez is a 68 year old female who presents to clinic today for the following health issues:      Hypertension Follow-up      Outpatient blood pressures are being checked at home.  Results are 120/85 - 140/85.    Low Salt Diet: low salt    She has burning sensation when urinate for a month now.      Amount of exercise or physical activity: None    Problems taking medications regularly: No    Medication side effects: none    Diet: low salt    Her HCTZ was stopped last time due to low pressures in the 90s.  She is monitoring BPs at home and most are in 120-140 range. She continues on lisinopril 20mg      Diabetes Follow-up    Was seen last month for this 3/2018. Well controlled, last HgbA1c 6.9%.  Regimen includes metformin 500mg BID. LDL at goal     Fibromyalgia    Is on lyrica, tramadol.  Also doing cymbalta.       Patient presented last time LLQ, had a CT abd/pelvis showing incidental left ovarian cyst.  Pelvic US, showing prominent endometrium, has appt with OB-GYN    40PPD smoking    Patient also notes dysuria, no hematuria.  No flank pain.       BP Readings from Last 2 Encounters:   05/16/18 150/90   03/06/18 98/58     Hemoglobin A1C (%)   Date Value   03/03/2018 6.9 (H)   06/17/2017 6.8 (H)     LDL Cholesterol Calculated (mg/dL)   Date Value   03/03/2018 56   02/20/2017 58         Problem list and histories reviewed & adjusted, as indicated.  Additional history: as documented    Patient Active Problem List   Diagnosis     HCD (health care directive)     Type 2 diabetes mellitus with diabetic neuropathy, without long-term current use of insulin (H)     COPD (chronic obstructive pulmonary disease) (H)     Cervical spine degeneration     Facet arthropathy, lumbar     Lumbar degenerative disc disease     Migraine headache     Diabetic neuropathy (H)     Anxiety     GERD (gastroesophageal reflux disease)     Hyperlipidemia LDL goal <100     Fibromyalgia     Benign essential hypertension  "    Controlled substance agreement signed     Chronic pain syndrome     Health Care Home     Advanced directives, counseling/discussion     Narcotic dependence (H)     Mild episode of recurrent major depressive disorder (H)     Past Surgical History:   Procedure Laterality Date     CHOLECYSTECTOMY, LAPOROSCOPIC      Cholecystectomy, Laparoscopic       Social History   Substance Use Topics     Smoking status: Former Smoker     Packs/day: 1.00     Years: 40.00     Types: Cigarettes     Quit date: 6/1/2017     Smokeless tobacco: Never Used     Alcohol use No     Family History   Problem Relation Age of Onset     CANCER Mother      CANCER Father      Lung cancer     CANCER Maternal Grandmother      Breast cancer           Reviewed and updated as needed this visit by clinical staff  Tobacco  Allergies  Med Hx  Surg Hx  Fam Hx  Soc Hx      Reviewed and updated as needed this visit by Provider         ROS:  Constitutional, HEENT, cardiovascular, pulmonary, gi and gu systems are negative, except as otherwise noted.    OBJECTIVE:     /90 (BP Location: Right arm, Patient Position: Sitting, Cuff Size: Adult Regular)  Pulse 105  Temp 98.6  F (37  C) (Oral)  Resp 14  Ht 5' 9\" (1.753 m)  Wt 205 lb 11.2 oz (93.3 kg)  SpO2 95%  BMI 30.38 kg/m2  Body mass index is 30.38 kg/(m^2).  GENERAL: healthy, alert and no distress  NECK: no adenopathy, no asymmetry, masses, or scars and thyroid normal to palpation  RESP: lungs clear to auscultation - no rales, rhonchi or wheezes  CV: regular rate and rhythm, normal S1 S2, no S3 or S4, no murmur, click or rub, no peripheral edema and peripheral pulses strong  MS: no gross musculoskeletal defects noted, no edema  +multiple trigger points, elbows, shoulders      Diagnostic Test Results:  none     ASSESSMENT/PLAN:       (M79.7) Fibromyalgia  (primary encounter diagnosis)  Comment:   Multiple triggers points noted one exam consistent with diagnosis    She is on lyrica, tramadol, " cymbalta. Will also write a lidoderm patch    Plan: lidocaine (LIDODERM) 5 % Patch            (J44.9) Chronic obstructive pulmonary disease, unspecified COPD type (H)  Comment: FEV1/FVC 70%. 40 PPD.   Plan: Advair and singulair     (E11.40) Type 2 diabetes mellitus with diabetic neuropathy, without long-term current use of insulin (H)  Comment:   HgbA1c at 6.9%, at goal    Microalbumunuria: negative, on ACEI  Foot exam: done last visit, consistent with neurology    Prevention:  Lipitor, baby ASA daily    Plan: Continue current regimen    (I10) Benign essential hypertension  Comment: BP at home normally rune 120-130s. Today elevated at 150.   Plan: Instructed patient to take BPs at home, and call back with results in 2 weeks  For now continue lisinopril 20mg    (R30.0) Dysuria  Comment: Patient reports dysuria, previous UA all negative.  Recently had pelvic US, unclear if instrumentation contributing.   Plan: *UA reflex to Microscopic and Culture ([place]         [facility] (except [place] and         [place]), Urine Microscopic                Health Maintenance  Mammo 10/2016. Mammo 10/2018 due  Colonoscopy was normal 2014.  Pneumonia 23 and prevnar 13 given  Tetanus given 2015, nl   Zoster vaccine 2015   DEXA done 3/2018 showing normal bone density      Yayo Mendoza MD  [facility]

## 2018-05-16 NOTE — MR AVS SNAPSHOT
After Visit Summary   5/16/2018    Vijaya Manjarrez    MRN: [id number]           Patient Information     Date Of Birth          1950        Visit Information        Provider Department      5/16/2018 10:15 AM Yayo Mendoza MD [facility]        Today's Diagnoses     Fibromyalgia    -  1    Chronic obstructive pulmonary disease, unspecified COPD type (H)        Type 2 diabetes mellitus with diabetic neuropathy, without long-term current use of insulin (H)        Benign essential hypertension        Dysuria           Follow-ups after your visit        Your next 10 appointments already scheduled     May 25, 2018  2:00 PM CDT   [facility]    [id number] [place] [id number] [phone]           Bring a current list of meds and any records pertaining to this visit. For Physicals, please bring immunization records and any forms needing to be filled out. Please arrive 10 minutes early to complete paperwork.            Jun 05, 2018  2:45 PM CDT   SHORT with Milla Galan MD   [facility] ([facility])    [address] [facility] [id number] [phone]              Who to contact     If you have questions or need follow up information about today's clinic visit or your schedule please contact [facility] directly at [phone].  Normal or non-critical lab and imaging results will be communicated to you by MyChart, letter or phone within 4 business days after the clinic has received the results. If you do not hear from us within 7 days, please contact the clinic through MyChart or phone. If you have a critical or abnormal lab result, we will notify you by phone as soon as possible.  Submit refill requests through [employer] or call your pharmacy and they will forward the refill request to us. Please allow 3  "business days for your refill to be completed.          Additional Information About Your Visit        MyChart Information     [employer] lets you send messages to your doctor, view your test results, renew your prescriptions, schedule appointments and more. To sign up, go to [email or web address] . Click on \"Log in\" on the left side of the screen, which will take you to the Welcome page. Then click on \"Sign up Now\" on the right side of the page.     You will be asked to enter the access code listed below, as well as some personal information. Please follow the directions to create your username and password.     Your access code is: [email or web address]  Expires: 2018 11:13 AM     Your access code will  in 90 days. If you need help or a new code, please call your [facility] clinic or [phone].        Care EveryWhere ID     This is your [facility] EveryWhere ID. This could be used by other organizations to access your [facility] medical records  [id number]        Your Vitals Were     Pulse Temperature Respirations Height Pulse Oximetry BMI (Body Mass Index)    105 98.6  F (37  C) (Oral) 14 5' 9\" (1.753 m) 95% 30.38 kg/m2       Blood Pressure from Last 3 Encounters:   18 150/90   18 98/58   17 144/78    Weight from Last 3 Encounters:   18 205 lb 11.2 oz (93.3 kg)   18 200 lb (90.7 kg)   17 200 lb (90.7 kg)              We Performed the Following     *UA reflex to Microscopic and Culture ([place] and [facility] (except [place] and [place])     Urine Microscopic          Today's Medication Changes          These changes are accurate as of 18 11:13 AM.  If you have any questions, ask your nurse or doctor.               Start taking these medicines.        Dose/Directions    lidocaine 5 % Patch   Commonly known as:  LIDODERM   Used for:  Fibromyalgia   Started by:  Yayo Mendoza MD        Apply up to 3 patches to painful area at once for up to 12 h within a 24 h period.  " Remove after 12 hours.   Quantity:  30 patch   Refills:  0            Where to get your medicines      These medications were sent to [facility] [place], [place] - [address]  [address] [facility] [address]     Phone:  [phone]     lidocaine 5 % Patch                Primary Care Provider Office Phone # Fax #    Disha Van [phone]       [address]  [facility] [address]        Equal Access to Services     SYLVIA GO : Hadii aad ku hadasho Soomaali, waaxda luqadaha, qaybta kaalmada adeegyada, waxay idiin hayaan adeeg kharash angel luis . So [facility] [phone].    ATENCIÓN: Si habla español, tiene a carrion disposición servicios gratuitos de asistencia lingüística. [facility] [phone].    We comply with applicable federal civil rights laws and [place] laws. We do not discriminate on the basis of race, color, national origin, age, disability, sex, sexual orientation, or gender identity.            Thank you!     Thank you for choosing [facility]  for your care. Our goal is always to provide you with excellent care. Hearing back from our patients is one way we can continue to improve our services. Please take a few minutes to complete the written survey that you may receive in the mail after your visit with us. Thank you!             Your Updated Medication List - Protect others around you: Learn how to safely use, store and throw away your medicines at www.disposemymeds.org.          This list is accurate as of 5/16/18 11:13 AM.  Always use your most recent med list.                   Brand Name Dispense Instructions for use Diagnosis    albuterol 108 (90 Base) MCG/ACT Inhaler    PROAIR HFA/PROVENTIL HFA/VENTOLIN HFA    1 Inhaler    Inhale 2 puffs into the lungs every 4 hours as needed for shortness of breath / dyspnea or wheezing    Bronchitis       atorvastatin 40 MG tablet    LIPITOR    90 tablet    TAKE ONE TABLET BY MOUTH  EVERY DAY    Hyperlipidemia LDL goal <100       blood glucose monitoring test strip    no brand specified    100 strip    Use to test blood sugars 1 times daily or as directed, use brand same as previous    Type 2 diabetes mellitus with diabetic neuropathy, without long-term current use of insulin (H)       cetirizine 10 MG tablet    zyrTEC    90 tablet    Take 1 tablet (10 mg) by mouth daily    Allergic rhinitis       cyclobenzaprine 10 MG tablet    FLEXERIL    180 tablet    Take 1 tablet (10 mg) by mouth 2 times daily    Muscle spasm       DULoxetine 60 MG EC capsule    CYMBALTA    90 capsule    Take 1 capsule (60 mg) by mouth daily    Lumbar degenerative disc disease       fluticasone 50 MCG/ACT spray    FLONASE    1 Bottle    Spray 2 sprays into both nostrils daily    Seasonal allergic rhinitis due to pollen, unspecified chronicity       fluticasone-salmeterol 250-50 MCG/DOSE diskus inhaler    ADVAIR    1 Inhaler    Inhale 1 puff into the lungs 2 times daily    Chronic obstructive pulmonary disease, unspecified COPD type (H)       lidocaine 5 % Patch    LIDODERM    30 patch    Apply up to 3 patches to painful area at once for up to 12 h within a 24 h period.  Remove after 12 hours.    Fibromyalgia       lisinopril 20 MG tablet    PRINIVIL/ZESTRIL    90 tablet    TAKE ONE TABLET BY MOUTH EVERY DAY    Benign essential hypertension       metFORMIN 500 MG tablet    GLUCOPHAGE    180 tablet    TAKE ONE TABLET BY MOUTH TWICE A DAY WITH A MEAL    Type 2 diabetes mellitus with diabetic neuropathy, without long-term current use of insulin (H)       miconazole 2 % cream    MICATIN    57 g    Apply topically 2 times daily    Yeast infection of the skin       montelukast 10 MG tablet    SINGULAIR    90 tablet    Take 1 tablet (10 mg) by mouth At Bedtime    Chronic obstructive pulmonary disease, unspecified COPD type (H)       omeprazole 20 MG CR capsule    priLOSEC    90 capsule    Take 1 capsule (20 mg) by mouth daily     Gastroesophageal reflux disease without esophagitis       pregabalin 150 MG capsule    LYRICA    270 capsule    Take 1 capsule (150 mg) by mouth 3 times daily    Fibromyalgia, Chronic bilateral low back pain without sciatica       traMADol 50 MG tablet    ULTRAM    180 tablet    Take 2 tablets (100 mg) by mouth 3 times daily    Facet arthropathy, cervical, Facet arthropathy, lumbar, Fibromyalgia       traZODone 100 MG tablet    DESYREL    180 tablet    Take 2 tablets (200 mg) by mouth At Bedtime    Insomnia, unspecified type       TYLENOL ARTHRITIS PAIN 650 MG CR tablet   Generic drug:  acetaminophen      Take 650 mg by mouth 2 times daily

## 2018-05-18 ENCOUNTER — TELEPHONE (OUTPATIENT)
Dept: INTERNAL MEDICINE | Facility: CLINIC | Age: 68
End: 2018-05-18

## 2018-05-18 NOTE — TELEPHONE ENCOUNTER
Fax received from [facility] for review and signature.  Put in Dr. Van's in basket.

## 2018-05-23 ENCOUNTER — TELEPHONE (OUTPATIENT)
Dept: INTERNAL MEDICINE | Facility: CLINIC | Age: 68
End: 2018-05-23

## 2018-05-23 DIAGNOSIS — I10 BENIGN ESSENTIAL HYPERTENSION: ICD-10-CM

## 2018-05-23 RX ORDER — LISINOPRIL 20 MG/1
20 TABLET ORAL 2 TIMES DAILY
Qty: 90 TABLET | Refills: 3 | Status: SHIPPED | OUTPATIENT
Start: 2018-05-23 | End: 2019-05-22

## 2018-05-23 NOTE — TELEPHONE ENCOUNTER
Will increase lisinopril to 20mg BID (from previous daily)     Please let patient know    Will send in a new rx incase she runs out early!    Thanks

## 2018-05-23 NOTE — TELEPHONE ENCOUNTER
Patient calling in BP readings:  Patient reports taking her BP medication about 10 pm Lisiniopril 20 mg daily:  Sunday 5/20/18 BP: 171/103, 195/109, 192/114, 169/107. (3 hours between each check)   Monday 5/21/18: /113 1 pm, 9 pm 179/110  Tuesday 5/22/18: /107 11 am, 175/124 9 pm  Wednesday 5/23/18: 10 am 168/122    Provider please review and advise. Thank you.

## 2018-05-23 NOTE — TELEPHONE ENCOUNTER
Attempted to contact patient, no answer, left detailed voice message as instructed by patient with provider message from below and informed to call back with questions.

## 2018-05-24 ENCOUNTER — TELEPHONE (OUTPATIENT)
Dept: INTERNAL MEDICINE | Facility: CLINIC | Age: 68
End: 2018-05-24

## 2018-05-24 NOTE — TELEPHONE ENCOUNTER
Hollie in [facility] was notified that Dr. Mendoza wants the Lisinopril quantity to be #180 instead of #90.

## 2018-05-24 NOTE — TELEPHONE ENCOUNTER
Hollie Wolf from [facility] called regarding patient's prescription that was sent down yesterday. You prescribed Lisinopril twice a day with #90. Hollie wants to know if quantity should be #180 because patient is taking it 2 times daily. Please advise. Thanks!

## 2018-05-30 ENCOUNTER — PATIENT OUTREACH (OUTPATIENT)
Dept: GERIATRIC MEDICINE | Facility: CLINIC | Age: 68
End: 2018-05-30

## 2018-05-30 NOTE — PROGRESS NOTES
[facility] Care Coordination Contact  Annual Assessment Coordination    Call to client to schedule annual assessment.  left requesting a return call.     Vanesa Gtz RN  [facility]  [phone]  Fax: [phone]

## 2018-06-01 NOTE — PROGRESS NOTES
[facility] Care Coordination Contact  Annual Assessment Scheduling    Call to client to schedule her annual assessment. Visit scheduled for 6/14/18 at 1PM.     Vanesa Gtz RN  [facility]  [phone]  Fax: [phone]

## 2018-06-05 ENCOUNTER — OFFICE VISIT (OUTPATIENT)
Dept: OBGYN | Facility: CLINIC | Age: 68
End: 2018-06-05
Payer: COMMERCIAL

## 2018-06-05 VITALS
WEIGHT: 204 LBS | SYSTOLIC BLOOD PRESSURE: 140 MMHG | BODY MASS INDEX: 30.21 KG/M2 | HEIGHT: 69 IN | HEART RATE: 92 BPM | DIASTOLIC BLOOD PRESSURE: 70 MMHG

## 2018-06-05 DIAGNOSIS — B37.2 SKIN YEAST INFECTION: ICD-10-CM

## 2018-06-05 DIAGNOSIS — R93.89 THICKENED ENDOMETRIUM: ICD-10-CM

## 2018-06-05 DIAGNOSIS — R10.32 LEFT LOWER QUADRANT PAIN: Primary | ICD-10-CM

## 2018-06-05 PROCEDURE — 99204 OFFICE O/P NEW MOD 45 MIN: CPT | Performed by: OBSTETRICS & GYNECOLOGY

## 2018-06-05 RX ORDER — NYSTATIN 100000 [USP'U]/G
POWDER TOPICAL 3 TIMES DAILY PRN
Qty: 60 G | Refills: 1 | Status: SHIPPED | OUTPATIENT
Start: 2018-06-05 | End: 2018-09-05

## 2018-06-05 NOTE — NURSING NOTE
"Chief Complaint   Patient presents with     Consult     L sided pelvic pain x 2 months. US shows cysts.       Initial /70  Pulse 92  Ht 5' 9\" (1.753 m)  Wt 204 lb (92.5 kg)  Breastfeeding? No  BMI 30.13 kg/m2 Estimated body mass index is 30.13 kg/(m^2) as calculated from the following:    Height as of this encounter: 5' 9\" (1.753 m).    Weight as of this encounter: 204 lb (92.5 kg).  BP completed using cuff size: gloria Dickey LPN               "

## 2018-06-05 NOTE — MR AVS SNAPSHOT
"              After Visit Summary   6/5/2018    Vijaya Manjarrez    MRN: [id number]           Patient Information     Date Of Birth          1950        Visit Information        Provider Department      6/5/2018 2:45 PM Milla Galan MD [facility]        Today's Diagnoses     Left lower quadrant pain    -  1    Thickened endometrium        Skin yeast infection           Follow-ups after your visit        Your next 10 appointments already scheduled     Jul 02, 2018  2:15 PM CDT   Pre-Op physical with Yayo Mendoza MD   [facility]    [address]  [facility] [id number] [phone]            Jul 24, 2018  9:15 AM CDT   SHORT with Milla Galan MD   [facility]    [address]  [facility] [id number] [phone]              Who to contact     If you have questions or need follow up information about today's clinic visit or your schedule please contact [facility] directly at [phone].  Normal or non-critical lab and imaging results will be communicated to you by [employer], letter or phone within 4 business days after the clinic has received the results. If you do not hear from us within 7 days, please contact the clinic through MediaPh[employer] or phone. If you have a critical or abnormal lab result, we will notify you by phone as soon as possible.  Submit refill requests through [facility] or call your pharmacy and they will forward the refill request to us. Please allow 3 business days for your refill to be completed.          Additional Information About Your Visit        Ret[employer] Information     [facility] lets you send messages to your doctor, view your test results, renew your prescriptions, schedule appointments and more. To sign up, go to [email or web address] . Click on \"Log in\" on the left side of the screen, which will take you to the Welcome page. " "Then click on \"Sign up Now\" on the right side of the page.     You will be asked to enter the access code listed below, as well as some personal information. Please follow the directions to create your username and password.     Your access code is: [email or web address]  Expires: 2018 11:13 AM     Your access code will  in 90 days. If you need help or a new code, please call your [facility] clinic or [phone].        Care EveryWhere ID     This is your Care EveryWhere ID. This could be used by other organizations to access your [facility] medical records  [id number]        Your Vitals Were     Pulse Height Breastfeeding? BMI (Body Mass Index)          92 5' 9\" (1.753 m) No 30.13 kg/m2         Blood Pressure from Last 3 Encounters:   18 140/70   18 150/90   18 98/58    Weight from Last 3 Encounters:   18 204 lb (92.5 kg)   18 205 lb 11.2 oz (93.3 kg)   18 200 lb (90.7 kg)              We Performed the Following     Janeth-Operative Worksheet          Today's Medication Changes          These changes are accurate as of 18 11:59 PM.  If you have any questions, ask your nurse or doctor.               Start taking these medicines.        Dose/Directions    nystatin 022984 UNIT/GM Powd   Commonly known as:  MYCOSTATIN   Used for:  Skin yeast infection   Started by:  Milla Galan MD        Apply topically 3 times daily as needed   Quantity:  60 g   Refills:  1            Where to get your medicines      These medications were sent to [facility] [place], [place] - [address]  [address] [facility] [address]     Phone:  [phone]     nystatin 458743 UNIT/GM Powd                Primary Care Provider Office Phone # Fax #    Disha Van [phone]       [address]  [facility] [address]        Equal Access to Services     [facility]: Hadii joceline Pryor, kvng adhikari, qaybta kasmooth michaud, " delia randle stephane rondon'aan ah. So [facility] [phone].    ATENCIÓN: Si yenny crandall, tiene a carrion disposición servicios gratuitos de asistencia lingüística. Mily locke [phone].    We comply with applicable federal civil rights laws and [place] laws. We do not discriminate on the basis of race, color, national origin, age, disability, sex, sexual orientation, or gender identity.            Thank you!     Thank you for choosing [facility]  for your care. Our goal is always to provide you with excellent care. Hearing back from our patients is one way we can continue to improve our services. Please take a few minutes to complete the written survey that you may receive in the mail after your visit with us. Thank you!             Your Updated Medication List - Protect others around you: Learn how to safely use, store and throw away your medicines at www.disposemymeds.org.          This list is accurate as of 6/5/18 11:59 PM.  Always use your most recent med list.                   Brand Name Dispense Instructions for use Diagnosis    albuterol 108 (90 Base) MCG/ACT Inhaler    PROAIR HFA/PROVENTIL HFA/VENTOLIN HFA    1 Inhaler    Inhale 2 puffs into the lungs every 4 hours as needed for shortness of breath / dyspnea or wheezing    Bronchitis       atorvastatin 40 MG tablet    LIPITOR    90 tablet    TAKE ONE TABLET BY MOUTH EVERY DAY    Hyperlipidemia LDL goal <100       blood glucose monitoring test strip    no brand specified    100 strip    Use to test blood sugars 1 times daily or as directed, use brand same as previous    Type 2 diabetes mellitus with diabetic neuropathy, without long-term current use of insulin (H)       cetirizine 10 MG tablet    zyrTEC    90 tablet    Take 1 tablet (10 mg) by mouth daily    Allergic rhinitis       cyclobenzaprine 10 MG tablet    FLEXERIL    180 tablet    Take 1 tablet (10 mg) by mouth 2 times daily    Muscle spasm       DULoxetine 60 MG EC capsule     CYMBALTA    90 capsule    Take 1 capsule (60 mg) by mouth daily    Lumbar degenerative disc disease       fluticasone 50 MCG/ACT spray    FLONASE    1 Bottle    Spray 2 sprays into both nostrils daily    Seasonal allergic rhinitis due to pollen, unspecified chronicity       fluticasone-salmeterol 250-50 MCG/DOSE diskus inhaler    ADVAIR    1 Inhaler    Inhale 1 puff into the lungs 2 times daily    Chronic obstructive pulmonary disease, unspecified COPD type (H)       lidocaine 5 % Patch    LIDODERM    30 patch    Apply up to 3 patches to painful area at once for up to 12 h within a 24 h period.  Remove after 12 hours.    Fibromyalgia       lisinopril 20 MG tablet    PRINIVIL/ZESTRIL    90 tablet    Take 1 tablet (20 mg) by mouth 2 times daily    Benign essential hypertension       metFORMIN 500 MG tablet    GLUCOPHAGE    180 tablet    TAKE ONE TABLET BY MOUTH TWICE A DAY WITH A MEAL    Type 2 diabetes mellitus with diabetic neuropathy, without long-term current use of insulin (H)       miconazole 2 % cream    MICATIN    57 g    Apply topically 2 times daily    Yeast infection of the skin       montelukast 10 MG tablet    SINGULAIR    90 tablet    Take 1 tablet (10 mg) by mouth At Bedtime    Chronic obstructive pulmonary disease, unspecified COPD type (H)       nystatin 067051 UNIT/GM Powd    MYCOSTATIN    60 g    Apply topically 3 times daily as needed    Skin yeast infection       omeprazole 20 MG CR capsule    priLOSEC    90 capsule    Take 1 capsule (20 mg) by mouth daily    Gastroesophageal reflux disease without esophagitis       pregabalin 150 MG capsule    LYRICA    270 capsule    Take 1 capsule (150 mg) by mouth 3 times daily    Fibromyalgia, Chronic bilateral low back pain without sciatica       traMADol 50 MG tablet    ULTRAM    180 tablet    Take 2 tablets (100 mg) by mouth 3 times daily    Facet arthropathy, cervical, Facet arthropathy, lumbar, Fibromyalgia       traZODone 100 MG tablet    DESYREL    180  tablet    Take 2 tablets (200 mg) by mouth At Bedtime    Insomnia, unspecified type       TYLENOL ARTHRITIS PAIN 650 MG CR tablet   Generic drug:  acetaminophen      Take 650 mg by mouth 2 times daily

## 2018-06-05 NOTE — PROGRESS NOTES
SUBJECTIVE:                                                   I was asked to see this patient today for a consult by Dr. Van for an opinion regarding ovarian cyst and thickened endometrial lining on US.    Vijaya Manjarrez is a 68 year old female  who presents to clinic today for the above. She developed LLQ pain, had CT scan showing unchanged 2 cm left adnexal lesion (present as far back as 2017, though her pain has only been present for 1 month.) Subsequent US showed a simple 1.3 cm cystic area on the left side but also an 18mm endometrial stripe.  She at first denied any postmenopausal bleeding, but then stated she has had some staining or brownish discharge     Her pelvic pain is: sharp, can be hours long or just a short burst. No fever or chills. Can go for 1/2 day without any pain at all. Chronic constipation. Started a month ago.    Exacerbating factors: none identified    Relieving factors: Other medications: tramadol    Treatments tried: tramadol        Problem list and histories reviewed & adjusted, as indicated.  Additional history: as documented.    Patient Active Problem List   Diagnosis     HCD (health care directive)     Type 2 diabetes mellitus with diabetic neuropathy, without long-term current use of insulin (H)     COPD (chronic obstructive pulmonary disease) (H)     Cervical spine degeneration     Facet arthropathy, lumbar     Lumbar degenerative disc disease     Migraine headache     Diabetic neuropathy (H)     Anxiety     GERD (gastroesophageal reflux disease)     Hyperlipidemia LDL goal <100     Fibromyalgia     Benign essential hypertension     Controlled substance agreement signed     Chronic pain syndrome     Health Care Home     Advanced directives, counseling/discussion     Narcotic dependence (H)     Mild episode of recurrent major depressive disorder (H)     Past Surgical History:   Procedure Laterality Date     CHOLECYSTECTOMY, LAPOROSCOPIC      Cholecystectomy,  Laparoscopic      Social History   Substance Use Topics     Smoking status: Former Smoker     Packs/day: 1.00     Years: 40.00     Types: Cigarettes     Quit date: 6/1/2017     Smokeless tobacco: Never Used     Alcohol use No      Problem (# of Occurrences) Relation (Name,Age of Onset)    CANCER (3) Mother, Father: Lung cancer, Maternal Grandmother: Breast cancer              Current Outpatient Prescriptions on File Prior to Visit:  acetaminophen (TYLENOL ARTHRITIS PAIN) 650 MG CR tablet Take 650 mg by mouth 2 times daily   albuterol (PROAIR HFA/PROVENTIL HFA/VENTOLIN HFA) 108 (90 BASE) MCG/ACT Inhaler Inhale 2 puffs into the lungs every 4 hours as needed for shortness of breath / dyspnea or wheezing   atorvastatin (LIPITOR) 40 MG tablet TAKE ONE TABLET BY MOUTH EVERY DAY   blood glucose monitoring (NO BRAND SPECIFIED) test strip Use to test blood sugars 1 times daily or as directed, use brand same as previous   cetirizine (ZYRTEC) 10 MG tablet Take 1 tablet (10 mg) by mouth daily   cyclobenzaprine (FLEXERIL) 10 MG tablet Take 1 tablet (10 mg) by mouth 2 times daily   DULoxetine (CYMBALTA) 60 MG EC capsule Take 1 capsule (60 mg) by mouth daily   fluticasone (FLONASE) 50 MCG/ACT spray Spray 2 sprays into both nostrils daily   fluticasone-salmeterol (ADVAIR) 250-50 MCG/DOSE diskus inhaler Inhale 1 puff into the lungs 2 times daily   lidocaine (LIDODERM) 5 % Patch Apply up to 3 patches to painful area at once for up to 12 h within a 24 h period.  Remove after 12 hours.   lisinopril (PRINIVIL/ZESTRIL) 20 MG tablet Take 1 tablet (20 mg) by mouth 2 times daily   metFORMIN (GLUCOPHAGE) 500 MG tablet TAKE ONE TABLET BY MOUTH TWICE A DAY WITH A MEAL   miconazole (MICATIN) 2 % cream Apply topically 2 times daily   montelukast (SINGULAIR) 10 MG tablet Take 1 tablet (10 mg) by mouth At Bedtime   omeprazole (PRILOSEC) 20 MG CR capsule Take 1 capsule (20 mg) by mouth daily   pregabalin (LYRICA) 150 MG capsule Take 1 capsule (150  mg) by mouth 3 times daily   traMADol (ULTRAM) 50 MG tablet Take 2 tablets (100 mg) by mouth 3 times daily   traZODone (DESYREL) 100 MG tablet Take 2 tablets (200 mg) by mouth At Bedtime     No current facility-administered medications on file prior to visit.   Allergies   Allergen Reactions     Penicillins Hives       ROS:  General: No change in weight, sleep or appetite.  Normal energy.  No fever or chills  Resp: cough-non productive on occasion, not new.  CV: No chest pains or palpitations  GI: POSITIVE for:, constipation, not new.  : No urinary frequency or dysuria, bladder or kidney problems  Neurologic: POSITIVE for:, dysesthesias, hands and feet  Psychiatric: POSITIVE for:, anxiety  Heme/immune/allergy: No history of bleeding or clotting problems or anemia.  No allergies or immune system problems  Endocrine: POSITIVE for:, diabetes mellitus  Skin: POSITIVE for:, rash under the breasts bilateral (cream used, doesn't work)      OBJECTIVE:     Exam:  Constitutional:  Appearance: Well nourished, well developed alert, in no acute distress  Neck:  Lymph Nodes:  No lymphadenopathy present; Thyroid:  Gland size normal, nontender, no nodules or masses present on palpation  Chest:  Respiratory Effort:  Breathing unlabored  Cardiovascular: no edema.  Breasts:  Inspection of Breasts:  No lymphadenopathy present; Palpation of Breasts and Axillae:  No masses present on palpation, no breast tenderness Axillary Lymph Nodes:  No lymphadenopathy present. There is no rash present currently.  Gastrointestinal:  Abdominal Examination:  Abdomen nontender to palpation, tone normal without rigidity or guarding, no masses present, umbilicus without lesions; Liver/Spleen:  No hepatomegaly present, liver nontender to palpation; Hernias:  No hernias present  Lymphatic: no inguinal lymphadenopathy present  Skin:General Inspection:  No rashes present, no lesions present, no areas of discoloration  Neurologic/Psychiatric:  Mental Status:   Oriented X3   Pelvic Exam:  External Genitalia:     Normal appearance for age, no discharge present, no tenderness present, no inflammatory lesions present, color normal  Vagina:     Normal vaginal vault without central or paravaginal defects, ATROPHIC  Bladder:     Nontender to palpation  Urethra:   Urethral Body:  Urethra palpation normal, urethra structural support normal   Urethral Meatus:  No erythema or lesions present  Cervix:     Appearance healthy, no lesions present, nontender to palpation, no bleeding present  Uterus:     Nontender to palpation, no masses present, position anteflexed, mobility: normal  Adnexa:     No adnexal tenderness present, no adnexal masses present  Perineum:     Perineum within normal limits, no evidence of trauma, no rashes or skin lesions present  Inguinal Lymph Nodes:     No lymphadenopathy present        In-Clinic Test Results:  No results found for this or any previous visit (from the past 24 hour(s)).    ASSESSMENT/PLAN:                                                        ICD-10-CM    1. Left lower quadrant pain R10.32    2. Thickened endometrium R93.8          I do not think the small 1.3 cm ovarian cyst is the cause of her LLQ pain, as it predates her pain according to prior CT scans and is very small. Small simple cysts have an extraordinarily small risk of ovarian cancer.    More concerning to me, however, is the prominent endometrial lining in the setting of this patient with obesity and type 2 diabetes, both of which are risk factors for endometrial cancer or hyperplasia.  She did not initially report postmenopausal bleeding, but now says she has had intermittent episodes of brown discharge over the last year.    Risks, benefits, and alternatives to hysteroscopy, dilation and curettage, and possible polypectomy were discussed.  She would like to proceed.  We will get this scheduled soon.    Milla Galan MD  [facility]

## 2018-06-06 ENCOUNTER — TELEPHONE (OUTPATIENT)
Dept: OBGYN | Facility: CLINIC | Age: 68
End: 2018-06-06

## 2018-06-06 NOTE — TELEPHONE ENCOUNTER
Procedure name(s) - multi select hysteroscopy, myosure, dilation and curettage       Reason for procedure postmenopausal thickened endometrial lining      Surgeon: Adama      Is this a multi surgeon case? No      Laterality N/A      Request for additional equipment Other (see comments)  fluid managment      Hysteroscopic Morcellator (Myosure)      Anesthesia Choice      Initiate Pre-op orders for above procedure: Yes, as ordered in Epic Additional orders noted there also     Location of Case: [facility]      Urgency of Surgery: Routine      Surgeon Procedure Time (incision to closure) in minutes (per procedure as applicable) 45      Note:  Surgical Case Time Needed (in minutes)     Patient Class (for admit prior to surgery, specify number of days in comments): Same day (surgery center outpatient)      H&P To Be Completed By: PCP      Post-Op Appointment 2 weeks      Office visit with surgeon prior to surgery: no      Vendor Needed? No

## 2018-06-06 NOTE — TELEPHONE ENCOUNTER
Type of surgery: hysteroscopy, myosure, dilation and curettage  Location of surgery: Other: [facility]  Date and time of surgery: 7/11/18 @ 11:00  Surgeon: Dr. Galan  Pre-Op Appt Date: 7/2/18  Post-Op Appt Date: 7/24/18   Packet sent out: Yes  Pre-cert/Authorization completed:  No  Date:

## 2018-06-11 NOTE — PROGRESS NOTES
[facility] Care Coordination Contact  Annual Assessment    Call from client who is asking to reschedule her annual assessment. Visit changed to 6/19 at 1PM.    Vanesa Gtz RN  [facility]  [phone]  Fax: [phone]

## 2018-06-19 ENCOUNTER — PATIENT OUTREACH (OUTPATIENT)
Dept: GERIATRIC MEDICINE | Facility: CLINIC | Age: 68
End: 2018-06-19

## 2018-06-19 ASSESSMENT — ACTIVITIES OF DAILY LIVING (ADL): DEPENDENT_IADLS:: TRANSPORTATION;CLEANING

## 2018-06-20 NOTE — PROGRESS NOTES
[facility] Care Coordination Contact    [facility] Home Visit Assessment     Home visit for Health Risk Assessment with Vijaya Manjarrez completed on June 19, 2018.    Type of residence: Apartment- [place] building  Current living arrangement: I live alone     Assessment completed with: Patient    Current Care Plan  Member currently receiving the following home care services:  None.   Member currently receiving the following community resources: Housekeeping/Chore Agency.    Medication Review  Medication reconciliation completed in Epic: Yes  Medication set-up & administration: Independent and sets up on own weekly.  Self-administers medications.  Medication understanding concerns (by member, family or CC): No  Medication adherence concerns (by member, family or CC): No    Mental/Behavioral Health   Depression Screening: See PHQ assessment flowsheet.   Mental health DX: Yes- anxiety. Mental health DX how managed: Medication  No current MH services.    Falls Assessment:   Fallen 2 or more times in the past year?: Yes (No falls in the last 6 months)   Any fall with injury in the past year?: No    ADL/IADL Dependencies:   Dependent ADLs:: Independent  Dependent IADLs:: Transportation, Cleaning    [facility] Health Plan sponsored benefits: Shared information re: Silver Sneakers/gym memberships, ASA, Calcium +D.    PCA Assessment completed at visit: No     Elderly Waiver Eligibility: Yes-will continue on EW    Care Plan & Recommendations: Continue with e/o week homemaking. Will consider increasing back to weekly once outstanding bills are paid. Will consider adding home delivered meals again in the future.     See CC for detailed assessment information.    Follow-Up Plan: Member informed of future contact, plan to f/u with member with a 6 month telephone assessment.  Contact information shared with member and family, encouraged member to call with any questions or concerns at any time.    [facility]  continuum providers: Please refer to Health Care Home on the Epic Problem List to view this patient's [facility] Care Plan Summary.    Vanesa Gtz RN  [facility]  [phone]  Fax: [phone]

## 2018-06-21 ASSESSMENT — PATIENT HEALTH QUESTIONNAIRE - PHQ9: SUM OF ALL RESPONSES TO PHQ QUESTIONS 1-9: 11

## 2018-06-22 ENCOUNTER — PATIENT OUTREACH (OUTPATIENT)
Dept: GERIATRIC MEDICINE | Facility: CLINIC | Age: 68
End: 2018-06-22

## 2018-06-22 NOTE — LETTER
June 22, 2018    VIJAYA BRAGA  [address] [place]  [place] [address]    Dear Vijaya,     At [facility], we re dedicated to improving the health and well-being of our members.  Enclosed you will find the Comprehensive Care Plan that was developed with you on 06/19/2018. Please review the Care Plan carefully.    As a reminder, some of the things we discussed at your visit include:    Ways to improve or maintain your physical health such as walking 20 minutes a day.     Ways to reduce the risk of falls.     Health care needs you may have.     Don t forget to contact your care coordinator if you:    Have been hospitalized or plan to be hospitalized.     Have experienced a fall.      Have experienced a change in physical health, which may include bladder control and pain issues.      Are experiencing emotional problems.     If you do not agree with your Care Plan, have questions about it, or have experienced a change in your needs, please contact me, your care coordinator, at [phone]. If you are hearing impaired, please call the [place] Relay at 445 or [phone] (kmfmth-nz-spxgub relay service).    Sincerely,      Vanesa Gtz RN  [facility]     [facility] is a health plan that contracts with both Medicare and the [place] Medical Assistance (Medicaid) program to provide benefits of both programs to enrollees. Enrollment in [facility] depends on contract renewal.    MSC+ [id number] IA ([id number])                                                  (12/16)

## 2018-06-22 NOTE — PROGRESS NOTES
[facility] Care Coordination Contact  Received after visit chart from care coordinator.  Completed following tasks: Mailed copy of care plan to client along with resources attached by CC, Updated services in access, Submitted referrals/auths for Homemaking, Entered MMIS and changed PCP in Epic and Access per CC's request.  Chart was returned to CC.     Provider Signature - No POC Shared:  Member indicates that they do not want their POC shared with any EW providers.    Karla Wharton  Case Management Specialist  [facility]   [phone]

## 2018-07-02 ENCOUNTER — OFFICE VISIT (OUTPATIENT)
Dept: INTERNAL MEDICINE | Facility: CLINIC | Age: 68
End: 2018-07-02
Payer: COMMERCIAL

## 2018-07-02 VITALS
HEIGHT: 69 IN | SYSTOLIC BLOOD PRESSURE: 148 MMHG | DIASTOLIC BLOOD PRESSURE: 90 MMHG | OXYGEN SATURATION: 96 % | BODY MASS INDEX: 30.21 KG/M2 | HEART RATE: 98 BPM | WEIGHT: 204 LBS | RESPIRATION RATE: 14 BRPM | TEMPERATURE: 98.7 F

## 2018-07-02 DIAGNOSIS — Z01.818 PREOP GENERAL PHYSICAL EXAM: Primary | ICD-10-CM

## 2018-07-02 DIAGNOSIS — I10 BENIGN ESSENTIAL HYPERTENSION: ICD-10-CM

## 2018-07-02 DIAGNOSIS — M79.7 FIBROMYALGIA: ICD-10-CM

## 2018-07-02 DIAGNOSIS — E11.40 TYPE 2 DIABETES MELLITUS WITH DIABETIC NEUROPATHY, WITHOUT LONG-TERM CURRENT USE OF INSULIN (H): ICD-10-CM

## 2018-07-02 PROCEDURE — 99214 OFFICE O/P EST MOD 30 MIN: CPT | Performed by: INTERNAL MEDICINE

## 2018-07-02 PROCEDURE — 99207 ZZC RSCC CODE FOR CODING REVIEW: CPT | Performed by: INTERNAL MEDICINE

## 2018-07-02 RX ORDER — AMLODIPINE BESYLATE 5 MG/1
5 TABLET ORAL DAILY
Qty: 90 TABLET | Refills: 1 | Status: SHIPPED | OUTPATIENT
Start: 2018-07-02 | End: 2018-10-01

## 2018-07-02 RX ORDER — LIDOCAINE 50 MG/G
PATCH TOPICAL
Qty: 30 PATCH | Refills: 0 | Status: SHIPPED | OUTPATIENT
Start: 2018-07-02 | End: 2018-09-10

## 2018-07-02 NOTE — PROGRESS NOTES
[facility]  [address]  [facility] [address]  [phone]  Dept: [phone]    PRE-OP EVALUATION:  Today's date: 2018    Vijaya Manjarrez (: 1950) presents for pre-operative evaluation assessment as requested by Dr. Galan.  She requires evaluation and anesthesia risk assessment prior to undergoing surgery/procedure for treatment of D & C .    Proposed Surgery/ Procedure: D & C  Date of Surgery/ Procedure: 18  Time of Surgery/ Procedure: 9:30 AM  Hospital/Surgical Facility: [facility]  Fax number for surgical facility:   Primary Physician: Yayo Mendoza  Type of Anesthesia Anticipated: to be determined    Patient has a Health Care Directive or Living Will:  NO    1. NO - Do you have a history of heart attack, stroke, stent, bypass or surgery on an artery in the head, neck, heart or legs?  2. NO - Do you ever have any pain or discomfort in your chest?  3. NO - Do you have a history of  Heart Failure?  4. NO- Are you troubled by shortness of breath when: walking on the level, up a slight hill or at night?  5. NO - Do you currently have a cold, bronchitis or other respiratory infection?  6. YES - Do you have a cough, shortness of breath or wheezing?  7. NO - Do you sometimes get pains in the calves of your legs when you walk?  8. NO - Do you or anyone in your family have previous history of blood clots?  9. NO - Do you or does anyone in your family have a serious bleeding problem such as prolonged bleeding following surgeries or cuts?  10. NO - Have you ever had problems with anemia or been told to take iron pills?  11. NO - Have you had any abnormal blood loss such as black, tarry or bloody stools, or abnormal vaginal bleeding?  12. NO - Have you ever had a blood transfusion?  13. NO - Have you or any of your relatives ever had problems with anesthesia?  14. NO - Do you have sleep apnea, excessive snoring or daytime drowsiness?  15. NO - Do you  have any prosthetic heart valves?  16. NO - Do you have prosthetic joints?  17. NO - Is there any chance that you may be pregnant?      HPI:     HPI related to upcoming procedure: Prominent endometrial lining found, biopsy done      HYPERTENSION - Patient has longstanding history of HTN , currently denies any symptoms referable to elevated blood pressure. Specifically denies chest pain, palpitations, dyspnea, orthopnea, PND or peripheral edema. Blood pressure readings have not been in normal range. Current medication regimen is as listed below. Patient denies any side effects of medication.                                                                                                                                                                                              .    MEDICAL HISTORY:     Patient Active Problem List    Diagnosis Date Noted     Mild episode of recurrent major depressive disorder (H) 2018     Priority: Medium     Narcotic dependence (H) 03/10/2018     Priority: Medium     Advanced directives, counseling/discussion 2017     Priority: Medium     Advance Care Planning 2017: ACP Review of Chart / Resources Provided:  Reviewed chart for advance care plan.  Vijaya K Noblejonny has been provided information and resources to begin or update their advance care plan.  Added by Vanesa Gtz           [facility] 07/10/2017     Priority: Medium     [facility]   Vanesa Gtz RN  [phone]    [facility] CARE PLAN SUMMARY    Client Name:  Vijaya CARLY Manjarrez  Address:  [address] [place] [address] DR DAVIS [address] [place] [address] Phone: [phone]    :  1950 Date of Assessment: 18   Health Plan:  [facility]  Health Plan Number: [id number] Medical Assistance Number: [id number]  Financial Worker:  Rubén [phone]  Case #:  [id number]   [facility] :  Vanesa Gtz RN  Phone:  [phone]  CM Fax:  [phone]   [facility] Enrollment Date:  "7/01/2017 Case Mix:  B  Rate Cell:  B  Waiver Type:  EW   Primary Emergency Contact: Luis Manjarrez  Mobile Phone: [phone]  Relation: Son Language:  English  :  No   Health Care Agent/POA:  None Advanced Directives/Living Will:  None   Primary Care Clinic/Phone/Fax:  [facility]/(p) [phone], (f) [phone] Primary Dx:  COPD [J44.9]  Secondary Dx: Chronic Pain Syndrome [G89.4]   Primary Physician:  Dr. Yayo Mendoza   Height:  5' 9\"  Weight:  205 lbs   Specialty Physician:   Dr. Galan OB/GYN Audiologist:  Declined   Eye Care Provider:  Will establish Dental Care Provider:    [facility]: [facility] [phone] or [phone]   Other:        [facility] CURRENT SERVICES SUMMARY  Equipment owned/DME history: Glasses, walker, shower chair  SERVICE TYPE/PROVIDER NAME/PHONE AUTH DATE FREQUENCY Units OR $ Amt DESCRIPTION   Medical Transportation: [employer] [facility] [phone]  Fax:  7/1/18-  6/30/19 as needed  Medical rides as needed   Homemaking: [employer] [place] [phone]  Fax:  7/1/18-  6/30/19 E/o week $4.61/unit=  $18.44/hour 3 hours every other week     Supplies: [employer] [phone]  Fax:  7/1/18-  6/30/19 monthly  Incontinent products monthly                      Hyperlipidemia LDL goal <100 01/20/2017     Priority: Medium     Fibromyalgia 01/20/2017     Priority: Medium     Benign essential hypertension 01/20/2017     Priority: Medium     Chronic pain syndrome 01/20/2017     Priority: Medium     Patient is followed by Disha Van MD for ongoing prescription of pain medication.  All refills should only be approved by this provider, or covering partner.    Medication(s): Tramadol.   Maximum quantity per month: 90  Clinic visit frequency required: Q 6  months     Controlled substance agreement:  Encounter-Level CSA:     There are no encounter-level csa.        Pain Clinic evaluation in the past: Yes       Date/Location:      DIRE " Total Score(s):  No flowsheet data found.    [facility] website verification:  none   [email or web address]       Type 2 diabetes mellitus with diabetic neuropathy, without long-term current use of insulin (H)      Priority: Medium     Cervical spine degeneration      Priority: Medium     spinal stenosis,        Facet arthropathy, lumbar      Priority: Medium     Lumbar degenerative disc disease      Priority: Medium     Migraine headache      Priority: Medium     Diabetic neuropathy (H)      Priority: Medium     Anxiety      Priority: Medium     GERD (gastroesophageal reflux disease)      Priority: Medium     HCD (health care directive) 01/12/2017     Priority: Medium     Pt received HCD and will return when complete.         Controlled substance agreement signed 01/12/2017     Priority: Medium     Patient is followed by Disha Van MD for ongoing prescription of pain medication.  All refills should only be approved by this provider, or covering partner.    Medication(s): Tramadol.   Maximum quantity per month: 90  Clinic visit frequency required: Q 6  months     Controlled substance agreement:  Encounter-Level CSA:     There are no encounter-level csa.        Pain Clinic evaluation in the past: Yes       Date/Location:      DIRE Total Score(s):  No flowsheet data found.    [facility] website verification:  none   [email or web address]         COPD (chronic obstructive pulmonary disease) (H) 10/18/2012     Priority: Medium     mild        Past Medical History:   Diagnosis Date     Anxiety      Cervical spine degeneration 2016    spinal stenosis,      COPD (chronic obstructive pulmonary disease) (H) 2012    mild     Diabetes mellitus, type 2 (H)      Diabetic neuropathy (H)      Facet arthropathy, lumbar      GERD (gastroesophageal reflux disease)      HTN (hypertension)      Hyperlipidemia      Lumbar degenerative disc disease      Migraine headache      Past Surgical History:   Procedure Laterality  Date     CHOLECYSTECTOMY, LAPOROSCOPIC      Cholecystectomy, Laparoscopic     Current Outpatient Prescriptions   Medication Sig Dispense Refill     acetaminophen (TYLENOL ARTHRITIS PAIN) 650 MG CR tablet Take 650 mg by mouth 2 times daily       albuterol (PROAIR HFA/PROVENTIL HFA/VENTOLIN HFA) 108 (90 BASE) MCG/ACT Inhaler Inhale 2 puffs into the lungs every 4 hours as needed for shortness of breath / dyspnea or wheezing 1 Inhaler 0     atorvastatin (LIPITOR) 40 MG tablet TAKE ONE TABLET BY MOUTH EVERY DAY 90 tablet 1     blood glucose monitoring (NO BRAND SPECIFIED) test strip Use to test blood sugars 1 times daily or as directed, use brand same as previous 100 strip 3     cetirizine (ZYRTEC) 10 MG tablet Take 1 tablet (10 mg) by mouth daily 90 tablet 1     cyclobenzaprine (FLEXERIL) 10 MG tablet Take 1 tablet (10 mg) by mouth 2 times daily 180 tablet 3     DULoxetine (CYMBALTA) 60 MG EC capsule Take 1 capsule (60 mg) by mouth daily 90 capsule 3     fluticasone (FLONASE) 50 MCG/ACT spray Spray 2 sprays into both nostrils daily 1 Bottle 11     fluticasone-salmeterol (ADVAIR) 250-50 MCG/DOSE diskus inhaler Inhale 1 puff into the lungs 2 times daily 1 Inhaler 5     lidocaine (LIDODERM) 5 % Patch Apply up to 3 patches to painful area at once for up to 12 h within a 24 h period.  Remove after 12 hours. 30 patch 0     lisinopril (PRINIVIL/ZESTRIL) 20 MG tablet Take 1 tablet (20 mg) by mouth 2 times daily 90 tablet 3     metFORMIN (GLUCOPHAGE) 500 MG tablet TAKE ONE TABLET BY MOUTH TWICE A DAY WITH A MEAL 180 tablet 1     miconazole (MICATIN) 2 % cream Apply topically 2 times daily 57 g 5     montelukast (SINGULAIR) 10 MG tablet Take 1 tablet (10 mg) by mouth At Bedtime 90 tablet 3     nystatin (MYCOSTATIN) 061751 UNIT/GM POWD Apply topically 3 times daily as needed 60 g 1     omeprazole (PRILOSEC) 20 MG CR capsule Take 1 capsule (20 mg) by mouth daily 90 capsule 3     pregabalin (LYRICA) 150 MG capsule Take 1 capsule  "(150 mg) by mouth 3 times daily 270 capsule 1     traMADol (ULTRAM) 50 MG tablet Take 2 tablets (100 mg) by mouth 3 times daily 180 tablet 2     traZODone (DESYREL) 100 MG tablet Take 2 tablets (200 mg) by mouth At Bedtime 180 tablet 3     OTC products: None, except as noted above    Allergies   Allergen Reactions     Penicillins Hives      Latex Allergy: NO    Social History   Substance Use Topics     Smoking status: Former Smoker     Packs/day: 1.00     Years: 40.00     Types: Cigarettes     Quit date: 6/1/2017     Smokeless tobacco: Never Used     Alcohol use No     History   Drug Use No       REVIEW OF SYSTEMS:   CONSTITUTIONAL: NEGATIVE for fever, chills, change in weight  ENT/MOUTH: NEGATIVE for ear, mouth and throat problems  RESP: NEGATIVE for significant cough or SOB  CV: NEGATIVE for chest pain, palpitations or peripheral edema    EXAM:   /90 (BP Location: Right arm, Patient Position: Left side, Cuff Size: Adult Regular)  Pulse 98  Temp 98.7  F (37.1  C) (Oral)  Resp 14  Ht 5' 9\" (1.753 m)  Wt 204 lb (92.5 kg)  SpO2 96%  BMI 30.13 kg/m2  GENERAL APPEARANCE: healthy, alert and no distress  HENT: ear canals and TM's normal and nose and mouth without ulcers or lesions  RESP: lungs clear to auscultation - no rales, rhonchi or wheezes  CV: regular rate and rhythm, normal S1 S2, no S3 or S4 and no murmur, click or rub   ABDOMEN: soft, nontender, no HSM or masses and bowel sounds normal  NEURO: Normal strength and tone, sensory exam grossly normal, mentation intact and speech normal    DIAGNOSTICS:   No labs or EKG required for low risk surgery (cataract, skin procedure, breast biopsy, etc)    Recent Labs   Lab Test  03/03/18   1023  12/13/17   1928   07/02/17   2023   06/17/17   0657   HGB   --   12.6   --   12.3   < >  12.6   PLT   --   276   --   265   < >  243   NA  140  139   < >  142   < >  141   POTASSIUM  3.9  4.2   < >  3.6   < >  4.9   CR  0.75  0.66   < >  1.67*   < >  0.50*   A1C  6.9*   " --    --    --    --   6.8*    < > = values in this interval not displayed.        IMPRESSION:   Reason for surgery/procedure: D&C  Diagnosis/reason for consult: preop    The proposed surgical procedure is considered INTERMEDIATE risk.    REVISED CARDIAC RISK INDEX  The patient has the following serious cardiovascular risks for perioperative complications such as (MI, PE, VFib and 3  AV Block):  No serious cardiac risks  INTERPRETATION: 0 risks: Class I (very low risk - 0.4% complication rate)    The patient has the following additional risks for perioperative complications:  No identified additional risks      ICD-10-CM    1. Preop general physical exam Z01.818        RECOMMENDATIONS:         --Patient is to take all scheduled medications on the day of surgery EXCEPT for modifications listed below.    HTN  Will add amlodipine 5mg, patient should start this prior to procedure. She will call me with readings    APPROVAL GIVEN to proceed with proposed procedure, without further diagnostic evaluation       Signed Electronically by: Yayo Mendoza MD    Copy of this evaluation report is provided to requesting physician.    [facility] Preop Guidelines    Revised Cardiac Risk Index        7/10/2018   ADDENDUM: EKG was not done at initial preop. It was done today: \    EKG: NSR, normal, unchanged from previous.     Approved for surgery.     Disha Van MD  [facility]

## 2018-07-02 NOTE — MR AVS SNAPSHOT
After Visit Summary   7/2/2018    Vijaya Manjarrez    MRN: [id number]           Patient Information     Date Of Birth          1950        Visit Information        Provider Department      7/2/2018 2:15 PM Yayo Mendoza MD [facility]        Today's Diagnoses     Preop general physical exam    -  1    Fibromyalgia        Type 2 diabetes mellitus with diabetic neuropathy, without long-term current use of insulin (H)        Benign essential hypertension          Care Instructions      Before Your Surgery      Call your surgeon if there is any change in your health. This includes signs of a cold or flu (such as a sore throat, runny nose, cough, rash or fever).    Do not smoke, drink alcohol or take over the counter medicine (unless your surgeon or primary care doctor tells you to) for the 24 hours before and after surgery.    If you take prescribed drugs: Follow your doctor s orders about which medicines to take and which to stop until after surgery.    Eating and drinking prior to surgery: follow the instructions from your surgeon    Take a shower or bath the night before surgery. Use the soap your surgeon gave you to gently clean your skin. If you do not have soap from your surgeon, use your regular soap. Do not shave or scrub the surgery site.  Wear clean pajamas and have clean sheets on your bed.           Follow-ups after your visit        Your next 10 appointments already scheduled     Jul 24, 2018 10:00 AM CDT   SHORT with Milla Galan MD   [facility] ([facility])    [address]  [facility] [id number] [phone]              Who to contact     If you have questions or need follow up information about today's clinic visit or your schedule please contact [facility] directly at [phone].  Normal or non-critical lab and imaging results will be communicated to you by MyChart, letter or phone within 4  "business days after the clinic has received the results. If you do not hear from us within 7 days, please contact the clinic through [employer] or phone. If you have a critical or abnormal lab result, we will notify you by phone as soon as possible.  Submit refill requests through [employer] or call your pharmacy and they will forward the refill request to us. Please allow 3 business days for your refill to be completed.          Additional Information About Your Visit        [employer] Information     [employer] lets you send messages to your doctor, view your test results, renew your prescriptions, schedule appointments and more. To sign up, go to [email or web address] . Click on \"Log in\" on the left side of the screen, which will take you to the Welcome page. Then click on \"Sign up Now\" on the right side of the page.     You will be asked to enter the access code listed below, as well as some personal information. Please follow the directions to create your username and password.     Your access code is: [email or web address]  Expires: 2018  2:01 PM     Your access code will  in 90 days. If you need help or a new code, please call your [place] clinic or [phone].        Care EveryWhere ID     This is your Care EveryWhere ID. This could be used by other organizations to access your [place] medical records  [id number]        Your Vitals Were     Pulse Temperature Respirations Height Pulse Oximetry BMI (Body Mass Index)    98 98.7  F (37.1  C) (Oral) 14 5' 9\" (1.753 m) 96% 30.13 kg/m2       Blood Pressure from Last 3 Encounters:   18 148/90   18 140/70   18 150/90    Weight from Last 3 Encounters:   18 204 lb (92.5 kg)   18 204 lb (92.5 kg)   18 205 lb 11.2 oz (93.3 kg)              We Performed the Following     PAF COMPLETED          Today's Medication Changes          These changes are accurate as of 18  2:48 PM.  If you have any questions, ask your nurse or doctor.       "         Start taking these medicines.        Dose/Directions    amLODIPine 5 MG tablet   Commonly known as:  NORVASC   Used for:  Benign essential hypertension   Started by:  Yayo Mendoza MD        Dose:  5 mg   Take 1 tablet (5 mg) by mouth daily   Quantity:  90 tablet   Refills:  1         These medicines have changed or have updated prescriptions.        Dose/Directions    * lidocaine 5 % Patch   Commonly known as:  LIDODERM   This may have changed:  Another medication with the same name was added. Make sure you understand how and when to take each.   Used for:  Fibromyalgia   Changed by:  Yayo Mendoza MD        Apply up to 3 patches to painful area at once for up to 12 h within a 24 h period.  Remove after 12 hours.   Quantity:  30 patch   Refills:  0       * lidocaine 5 % Patch   Commonly known as:  LIDODERM   This may have changed:  You were already taking a medication with the same name, and this prescription was added. Make sure you understand how and when to take each.   Used for:  Fibromyalgia   Changed by:  Yayo Mendoza MD        Apply up to 3 patches to painful area at once for up to 12 h within a 24 h period.  Remove after 12 hours.   Quantity:  30 patch   Refills:  0       * Notice:  This list has 2 medication(s) that are the same as other medications prescribed for you. Read the directions carefully, and ask your doctor or other care provider to review them with you.         Where to get your medicines      These medications [facility] [place] [address] [address] [facility] [address] [facility] [address]     Phone:  [phone]     amLODIPine 5 MG tablet    lidocaine 5 % Patch                Primary Care Provider Office Phone # Fax #    Yayo Mendoza [phone]       [address]  [facility] [address]        Equal Access to Services     ISIDRO GO AH: kvng Juarez, thania michaud,  delia angeljolynn rondon'aan ah. So [facility] [phone].    ATENCIÓN: Si marielosla raz, tiene a carrion disposición servicios gratuitos de asistencia lingüística. Mily locke [phone].    We comply with applicable federal civil rights laws and [place] laws. We do not discriminate on the basis of race, color, national origin, age, disability, sex, sexual orientation, or gender identity.            Thank you!     Thank you for choosing [facility]  for your care. Our goal is always to provide you with excellent care. Hearing back from our patients is one way we can continue to improve our services. Please take a few minutes to complete the written survey that you may receive in the mail after your visit with us. Thank you!             Your Updated Medication List - Protect others around you: Learn how to safely use, store and throw away your medicines at www.disposemymeds.org.          This list is accurate as of 7/2/18  2:48 PM.  Always use your most recent med list.                   Brand Name Dispense Instructions for use Diagnosis    albuterol 108 (90 Base) MCG/ACT Inhaler    PROAIR HFA/PROVENTIL HFA/VENTOLIN HFA    1 Inhaler    Inhale 2 puffs into the lungs every 4 hours as needed for shortness of breath / dyspnea or wheezing    Bronchitis       amLODIPine 5 MG tablet    NORVASC    90 tablet    Take 1 tablet (5 mg) by mouth daily    Benign essential hypertension       atorvastatin 40 MG tablet    LIPITOR    90 tablet    TAKE ONE TABLET BY MOUTH EVERY DAY    Hyperlipidemia LDL goal <100       blood glucose monitoring test strip    no brand specified    100 strip    Use to test blood sugars 1 times daily or as directed, use brand same as previous    Type 2 diabetes mellitus with diabetic neuropathy, without long-term current use of insulin (H)       cetirizine 10 MG tablet    zyrTEC    90 tablet    Take 1 tablet (10 mg) by mouth daily    Allergic rhinitis       cyclobenzaprine 10 MG tablet     FLEXERIL    180 tablet    Take 1 tablet (10 mg) by mouth 2 times daily    Muscle spasm       DULoxetine 60 MG EC capsule    CYMBALTA    90 capsule    Take 1 capsule (60 mg) by mouth daily    Lumbar degenerative disc disease       fluticasone 50 MCG/ACT spray    FLONASE    1 Bottle    Spray 2 sprays into both nostrils daily    Seasonal allergic rhinitis due to pollen, unspecified chronicity       fluticasone-salmeterol 250-50 MCG/DOSE diskus inhaler    ADVAIR    1 Inhaler    Inhale 1 puff into the lungs 2 times daily    Chronic obstructive pulmonary disease, unspecified COPD type (H)       * lidocaine 5 % Patch    LIDODERM    30 patch    Apply up to 3 patches to painful area at once for up to 12 h within a 24 h period.  Remove after 12 hours.    Fibromyalgia       * lidocaine 5 % Patch    LIDODERM    30 patch    Apply up to 3 patches to painful area at once for up to 12 h within a 24 h period.  Remove after 12 hours.    Fibromyalgia       lisinopril 20 MG tablet    PRINIVIL/ZESTRIL    90 tablet    Take 1 tablet (20 mg) by mouth 2 times daily    Benign essential hypertension       metFORMIN 500 MG tablet    GLUCOPHAGE    180 tablet    TAKE ONE TABLET BY MOUTH TWICE A DAY WITH A MEAL    Type 2 diabetes mellitus with diabetic neuropathy, without long-term current use of insulin (H)       miconazole 2 % cream    MICATIN    57 g    Apply topically 2 times daily    Yeast infection of the skin       montelukast 10 MG tablet    SINGULAIR    90 tablet    Take 1 tablet (10 mg) by mouth At Bedtime    Chronic obstructive pulmonary disease, unspecified COPD type (H)       nystatin 199872 UNIT/GM Powd    MYCOSTATIN    60 g    Apply topically 3 times daily as needed    Skin yeast infection       omeprazole 20 MG CR capsule    priLOSEC    90 capsule    Take 1 capsule (20 mg) by mouth daily    Gastroesophageal reflux disease without esophagitis       pregabalin 150 MG capsule    LYRICA    270 capsule    Take 1 capsule (150 mg) by  mouth 3 times daily    Fibromyalgia, Chronic bilateral low back pain without sciatica       traMADol 50 MG tablet    ULTRAM    180 tablet    Take 2 tablets (100 mg) by mouth 3 times daily    Facet arthropathy, cervical, Facet arthropathy, lumbar, Fibromyalgia       traZODone 100 MG tablet    DESYREL    180 tablet    Take 2 tablets (200 mg) by mouth At Bedtime    Insomnia, unspecified type       TYLENOL ARTHRITIS PAIN 650 MG CR tablet   Generic drug:  acetaminophen      Take 650 mg by mouth 2 times daily        * Notice:  This list has 2 medication(s) that are the same as other medications prescribed for you. Read the directions carefully, and ask your doctor or other care provider to review them with you.

## 2018-07-02 NOTE — NURSING NOTE
"Vital signs:  Temp: 98.7  F (37.1  C) Temp src: Oral BP: 148/90 Pulse: 98   Resp: 14 SpO2: 96 %     Height: 5' 9\" (175.3 cm) Weight: 204 lb (92.5 kg)  Estimated body mass index is 30.13 kg/(m^2) as calculated from the following:    Height as of this encounter: 5' 9\" (1.753 m).    Weight as of this encounter: 204 lb (92.5 kg).        Pre-Op.  "

## 2018-07-09 ENCOUNTER — TELEPHONE (OUTPATIENT)
Dept: INTERNAL MEDICINE | Facility: CLINIC | Age: 68
End: 2018-07-09

## 2018-07-09 NOTE — TELEPHONE ENCOUNTER
Patient calling with update on her bp per primary care provider's recommendation.    States has been running 160/80-85.  Recently started Norvasc 5mg daily 7-2-18.    Having surgery 7-11-18.    Please advise, thanks.

## 2018-07-10 ENCOUNTER — ALLIED HEALTH/NURSE VISIT (OUTPATIENT)
Dept: NURSING | Facility: CLINIC | Age: 68
End: 2018-07-10
Payer: COMMERCIAL

## 2018-07-10 ENCOUNTER — TELEPHONE (OUTPATIENT)
Dept: INTERNAL MEDICINE | Facility: CLINIC | Age: 68
End: 2018-07-10
Payer: COMMERCIAL

## 2018-07-10 DIAGNOSIS — Z01.818 PRE-OP EXAM: Primary | ICD-10-CM

## 2018-07-10 PROCEDURE — 93000 ELECTROCARDIOGRAM COMPLETE: CPT | Performed by: INTERNAL MEDICINE

## 2018-07-10 NOTE — TELEPHONE ENCOUNTER
Patient called and stated she needs a EKG today as she is having [facility] tomorrow with Adama Zamora at 11am.   [facility], advised no order for EKG, spoke with nurseStephanie, anesthesiologist is requesting the EKG be done today, PCP should order.   No EKG done at Pre-Op 7/2/18.  Fax back once completed to Fax: [phone] tracing and report needed. Contact Stephanie for further details. [phone] direct line, secure voice mail.   Patient available this afternoon anytime to have EKG done.   Provider please review and advise. Thank you.

## 2018-07-11 ENCOUNTER — HOSPITAL PATHOLOGY (OUTPATIENT)
Dept: OTHER | Facility: CLINIC | Age: 68
End: 2018-07-11

## 2018-07-12 ENCOUNTER — TELEPHONE (OUTPATIENT)
Dept: OBGYN | Facility: CLINIC | Age: 68
End: 2018-07-12

## 2018-07-12 LAB — COPATH REPORT: NORMAL

## 2018-07-12 NOTE — TELEPHONE ENCOUNTER
Spoke to Dr Galan regarding this patient. She recommends alternating ibuprofen and tylenol, and to be seen in the ED with temp of 101. Patient notified.        Susan Kathleen RN

## 2018-07-12 NOTE — TELEPHONE ENCOUNTER
Patient calling following up from cyst removal yesterday. She says today she has had a fever as high as 100.5. Fever is now 100.4. She says she is not feeling well in general and has some abdominal pain and lower back pain. Please advise.    Text page sent to provider.      Susan Kathleen RN

## 2018-07-20 DIAGNOSIS — K21.9 GASTROESOPHAGEAL REFLUX DISEASE WITHOUT ESOPHAGITIS: ICD-10-CM

## 2018-07-20 DIAGNOSIS — E11.40 TYPE 2 DIABETES MELLITUS WITH DIABETIC NEUROPATHY, WITHOUT LONG-TERM CURRENT USE OF INSULIN (H): ICD-10-CM

## 2018-07-20 DIAGNOSIS — M51.369 LUMBAR DEGENERATIVE DISC DISEASE: ICD-10-CM

## 2018-07-20 DIAGNOSIS — M62.838 MUSCLE SPASM: ICD-10-CM

## 2018-07-20 NOTE — TELEPHONE ENCOUNTER
"Requested Prescriptions   Pending Prescriptions Disp Refills     omeprazole (PRILOSEC) 20 MG CR capsule [Pharmacy Med Name: OMEPRAZOLE 20MG CPDR] 90 capsule 3    Last Written Prescription Date:  07/10/2017  Last Fill Quantity: 90,  # refills: 3   Last office visit: 7/2/2018 with prescribing provider:     Future Office Visit:   Next 5 appointments (look out 90 days)     Jul 24, 2018 10:00 AM CDT   SHORT with Milla Galan MD   [facility]    [address]  [facility] [address]   [phone]                Sig: TAKE ONE CAPSULE BY MOUTH EVERY DAY    PPI Protocol Passed    7/20/2018  1:15 PM       Passed - Not on Clopidogrel (unless Pantoprazole ordered)       Passed - No diagnosis of osteoporosis on record       Passed - Recent (12 mo) or future (30 days) visit within the authorizing provider's specialty    Patient had office visit in the last 12 months or has a visit in the next 30 days with authorizing provider or within the authorizing provider's specialty.  See \"Patient Info\" tab in inbasket, or \"Choose Columns\" in Meds & Orders section of the refill encounter.           Passed - Patient is age 18 or older       Passed - No active pregnacy on record       Passed - No positive pregnancy test in past 12 months        metFORMIN (GLUCOPHAGE) 500 MG tablet [Pharmacy Med Name: METFORMIN HCL 500MG TABS] 180 tablet 1    Last Written Prescription Date:  01/15/2018  Last Fill Quantity: 180,  # refills: 1   Last office visit: 7/2/2018 with prescribing provider:     Future Office Visit:   Next 5 appointments (look out 90 days)     Jul 24, 2018 10:00 AM CDT   SHORT with Milla Galan MD   [facility]    [address]  [facility] [id number] [phone]                Sig: TAKE ONE TABLET BY MOUTH TWICE A DAY WITH A MEAL    Biguanide Agents Failed    7/20/2018  1:15 PM       Failed - Blood pressure less " "than 140/90 in past 6 months    BP Readings from Last 3 Encounters:   07/02/18 148/90   06/05/18 140/70   05/16/18 150/90                Passed - Patient has documented LDL within the past 12 mos.    Recent Labs   Lab Test  03/03/18   1044   LDL  56            Passed - Patient has had a Microalbumin in the past 12 mos.    Recent Labs   Lab Test  03/03/18   1045   MICROL  18   UMALCR  11.87            Passed - Patient is age 10 or older       Passed - Patient has documented A1c within the specified period of time.    If HgbA1C is 8 or greater, it needs to be on file within the past 3 months.  If less than 8, must be on file within the past 6 months.     Recent Labs   Lab Test  03/03/18   1023   A1C  6.9*            Passed - Patient's CR is NOT>1.4 OR Patient's EGFR is NOT<45 within past 12 mos.    Recent Labs   Lab Test  03/03/18   1023   GFRESTIMATED  77   GFRESTBLACK  >90       Recent Labs   Lab Test  03/03/18   1023   CR  0.75            Passed - Patient does NOT have a diagnosis of CHF.       Passed - Patient is not pregnant       Passed - Patient has not had a positive pregnancy test within the past 12 mos.        Passed - Recent (6 mo) or future (30 days) visit within the authorizing provider's specialty    Patient had office visit in the last 6 months or has a visit in the next 30 days with authorizing provider or within the authorizing provider's specialty.  See \"Patient Info\" tab in inbasket, or \"Choose Columns\" in Meds & Orders section of the refill encounter.            DULoxetine (CYMBALTA) 60 MG EC capsule [Pharmacy Med Name: DULOXETINE HCL 60MG CPEP] 90 capsule 3    Last Written Prescription Date:  07/10/2017  Last Fill Quantity: 90,  # refills: 3   Last office visit: 7/2/2018 with prescribing provider:     Future Office Visit:   Next 5 appointments (look out 90 days)     Jul 24, 2018 10:00 AM CDT   [facility]   [facility]    [address]  [facility] [address]   [phone]                Sig: TAKE ONE CAPSULE BY MOUTH EVERY DAY    Serotonin-Norepinephrine Reuptake Inhibitors  Failed    7/20/2018  1:15 PM       Failed - Blood pressure under 140/90 in past 12 months    BP Readings from Last 3 Encounters:   07/02/18 148/90   06/05/18 140/70   05/16/18 150/90                Passed - Recent (12 mo) or future (30 days) visit within the authorizing provider's specialty    Patient had office visit in the last 12 months or has a visit in the next 30 days with authorizing provider or within the authorizing provider's specialty.  See \"Patient Info\" tab in inbasket, or \"Choose Columns\" in Meds & Orders section of the refill encounter.           Passed - Patient is age 18 or older       Passed - No active pregnancy on record       Passed - No positive pregnancy test in past 12 months        cyclobenzaprine (FLEXERIL) 10 MG tablet [Pharmacy Med Name: CYCLOBENZAPRINE HCL 10MG TABS] 180 tablet 3    Last Written Prescription Date:  07/13/2017  Last Fill Quantity: 180,  # refills: 3   Last office visit: 7/2/2018 with prescribing provider:     Future Office Visit:   Next 5 appointments (look out 90 days)     Jul 24, 2018 10:00 AM CDT   SHORT with Milla Galan MD   [facility]    [address]  [facility] [address]   [phone]                Sig: TAKE ONE TABLET BY MOUTH TWICE A DAY    There is no refill protocol information for this order        "

## 2018-07-24 ENCOUNTER — TELEPHONE (OUTPATIENT)
Dept: INTERNAL MEDICINE | Facility: CLINIC | Age: 68
End: 2018-07-24

## 2018-07-24 ENCOUNTER — OFFICE VISIT (OUTPATIENT)
Dept: OBGYN | Facility: CLINIC | Age: 68
End: 2018-07-24
Payer: COMMERCIAL

## 2018-07-24 VITALS
WEIGHT: 204 LBS | BODY MASS INDEX: 30.13 KG/M2 | TEMPERATURE: 98.4 F | SYSTOLIC BLOOD PRESSURE: 140 MMHG | DIASTOLIC BLOOD PRESSURE: 70 MMHG | HEART RATE: 84 BPM

## 2018-07-24 DIAGNOSIS — M62.838 MUSCLE SPASM: ICD-10-CM

## 2018-07-24 DIAGNOSIS — R10.32 LEFT LOWER QUADRANT PAIN: Primary | ICD-10-CM

## 2018-07-24 DIAGNOSIS — Z98.890 POSTOPERATIVE STATE: ICD-10-CM

## 2018-07-24 PROCEDURE — 99213 OFFICE O/P EST LOW 20 MIN: CPT | Performed by: OBSTETRICS & GYNECOLOGY

## 2018-07-24 RX ORDER — DULOXETIN HYDROCHLORIDE 60 MG/1
CAPSULE, DELAYED RELEASE ORAL
Qty: 90 CAPSULE | Refills: 0 | Status: SHIPPED | OUTPATIENT
Start: 2018-07-24 | End: 2018-10-01

## 2018-07-24 RX ORDER — CYCLOBENZAPRINE HCL 10 MG
10 TABLET ORAL 2 TIMES DAILY
Qty: 180 TABLET | Refills: 0 | Status: CANCELLED | OUTPATIENT
Start: 2018-07-24

## 2018-07-24 RX ORDER — CYCLOBENZAPRINE HCL 10 MG
TABLET ORAL
Qty: 180 TABLET | Refills: 0 | Status: SHIPPED | OUTPATIENT
Start: 2018-07-24 | End: 2018-10-26

## 2018-07-24 NOTE — PROGRESS NOTES
"  SUBJECTIVE:                                                   Charles Braga is a 68 year old female who presents to clinic today for the following health issue(s):  Postoperative visit    HPI:  She is status post  Hysteroscopy and polypectomy  2 weeks ago. Reports no bleeding, no fevers, chills, or other concerns.     Pathology:   Copath Report   Date Value Ref Range Status   07/11/2018   Final    Patient Name: CHARLES BRAGA  MR#: [id number]  Specimen #: [id number]  Collected: 7/11/2018  Received: 7/11/2018  Reported: 7/12/2018 14:01  Ordering Phy(s): JAY SCANLON    For improved result formatting, select 'View Enhanced Report Format' under   Linked Documents section.    SPECIMEN(S):  Endometrial polyp    FINAL DIAGNOSIS:  Tissue as \"endometrial polyp\", morcellated.  - Multiple fragments of endometrial polyp with areas of cystic atrophy and   scant inactive appearing  non-polypoid endometrium without evidence of hyperplasia.  Negative for   malignancy.    Electronically signed out by:    Orlin Dalton M.D.    CLINICAL HISTORY:  Postmenopausal, thickened endometrial lining.    GROSS:  The specimen is received in formalin labeled with the patient's name,   identifying information and designated  \"endometrial polyp\".  It consists of a 3.5 x 2.5 x 1.5 cm aggregate of   pink, rubbery tissue fragments.  Submitted entirely in 2 blocks. (Dictated by: MEHNAZ Pulliam 7/11/2018   02:03 PM)    MICROSCOPIC:  There are multiple fragments of polyp present with areas of cystic   atrophy.  There is a scant amount of  non-polypoid appearing endometrium present without evidence of   hyperplasia.    CPT Codes:  A: [address]888-AD8    TESTING LAB LOCATION:  [place] [facility]  [address] [place]  [address] [phone]    COLLECTION SITE:  Client: [facility]  Location: [facility] (F)     ]        Problem list and histories reviewed & adjusted, as indicated.  Additional " history: as documented.    Patient Active Problem List   Diagnosis     HCD (health care directive)     Type 2 diabetes mellitus with diabetic neuropathy, without long-term current use of insulin (H)     COPD (chronic obstructive pulmonary disease) (H)     Cervical spine degeneration     Facet arthropathy, lumbar     Lumbar degenerative disc disease     Migraine headache     Diabetic neuropathy (H)     Anxiety     GERD (gastroesophageal reflux disease)     Hyperlipidemia LDL goal <100     Fibromyalgia     Benign essential hypertension     Controlled substance agreement signed     Chronic pain syndrome     Health Care Home     Advanced directives, counseling/discussion     Narcotic dependence (H)     Mild episode of recurrent major depressive disorder (H)     Past Surgical History:   Procedure Laterality Date     CHOLECYSTECTOMY, LAPOROSCOPIC      Cholecystectomy, Laparoscopic      Social History   Substance Use Topics     Smoking status: Former Smoker     Packs/day: 1.00     Years: 40.00     Types: Cigarettes     Quit date: 6/1/2017     Smokeless tobacco: Never Used     Alcohol use No      Problem (# of Occurrences) Relation (Name,Age of Onset)    Cancer (3) Mother, Father: Lung cancer, Maternal Grandmother: Breast cancer              Current Outpatient Prescriptions on File Prior to Visit:  acetaminophen (TYLENOL ARTHRITIS PAIN) 650 MG CR tablet Take 650 mg by mouth 2 times daily   albuterol (PROAIR HFA/PROVENTIL HFA/VENTOLIN HFA) 108 (90 BASE) MCG/ACT Inhaler Inhale 2 puffs into the lungs every 4 hours as needed for shortness of breath / dyspnea or wheezing   amLODIPine (NORVASC) 5 MG tablet Take 1 tablet (5 mg) by mouth daily   atorvastatin (LIPITOR) 40 MG tablet TAKE ONE TABLET BY MOUTH EVERY DAY   blood glucose monitoring (NO BRAND SPECIFIED) test strip Use to test blood sugars 1 times daily or as directed, use brand same as previous   cetirizine (ZYRTEC) 10 MG tablet Take 1 tablet (10 mg) by mouth daily    cyclobenzaprine (FLEXERIL) 10 MG tablet TAKE ONE TABLET BY MOUTH TWICE A DAY   DULoxetine (CYMBALTA) 60 MG EC capsule TAKE ONE CAPSULE BY MOUTH EVERY DAY   fluticasone (FLONASE) 50 MCG/ACT spray Spray 2 sprays into both nostrils daily   fluticasone-salmeterol (ADVAIR) 250-50 MCG/DOSE diskus inhaler Inhale 1 puff into the lungs 2 times daily   lidocaine (LIDODERM) 5 % Patch Apply up to 3 patches to painful area at once for up to 12 h within a 24 h period.  Remove after 12 hours.   lidocaine (LIDODERM) 5 % Patch Apply up to 3 patches to painful area at once for up to 12 h within a 24 h period.  Remove after 12 hours.   lisinopril (PRINIVIL/ZESTRIL) 20 MG tablet Take 1 tablet (20 mg) by mouth 2 times daily   metFORMIN (GLUCOPHAGE) 500 MG tablet TAKE ONE TABLET BY MOUTH TWICE A DAY WITH A MEAL   miconazole (MICATIN) 2 % cream Apply topically 2 times daily   montelukast (SINGULAIR) 10 MG tablet Take 1 tablet (10 mg) by mouth At Bedtime   nystatin (MYCOSTATIN) 302436 UNIT/GM POWD Apply topically 3 times daily as needed   omeprazole (PRILOSEC) 20 MG CR capsule TAKE ONE CAPSULE BY MOUTH EVERY DAY   pregabalin (LYRICA) 150 MG capsule Take 1 capsule (150 mg) by mouth 3 times daily   traMADol (ULTRAM) 50 MG tablet Take 2 tablets (100 mg) by mouth 3 times daily   traZODone (DESYREL) 100 MG tablet Take 2 tablets (200 mg) by mouth At Bedtime     No current facility-administered medications on file prior to visit.   Allergies   Allergen Reactions     Penicillins Hives         OBJECTIVE:     /70  Pulse 84  Temp 98.4  F (36.9  C) (Oral)  Wt 204 lb (92.5 kg)  Breastfeeding? No  BMI 30.13 kg/m2   BMI: Body mass index is 30.13 kg/(m^2).  General: Alert and oriented, no distress.  Psychiatric: Mood and affect within normal limits.  No other examination was necessary for today's appointment.    In-Clinic Test Results:  No results found for this or any previous visit (from the past 24 hour(s)).    ASSESSMENT/PLAN:                                                       Status post  Hysteroscopy and and polypectomy       Discussed pathology. No further follow up needed unless she has new postmenopausal bleeding. Discussed in detail.    Milla Galan MD  [facility]

## 2018-07-24 NOTE — NURSING NOTE
"Chief Complaint   Patient presents with     Surgical Followup     Post-op after D&C 18.       Initial /70  Pulse 84  Temp 98.4  F (36.9  C) (Oral)  Wt 204 lb (92.5 kg)  Breastfeeding? No  BMI 30.13 kg/m2 Estimated body mass index is 30.13 kg/(m^2) as calculated from the following:    Height as of 18: 5' 9\" (1.753 m).    Weight as of this encounter: 204 lb (92.5 kg).  BP completed using cuff size: regular        Zuleima Keli STARKEY               "

## 2018-07-24 NOTE — TELEPHONE ENCOUNTER
Prior Authorization Retail Medication Request    Medication/Dose: Cyclobenzaprine 10mg  ICD code (if different than what is on RX):      Previously Tried and Failed:    Rationale:     Insurance Name:  [facility] Dual part D  Insurance ID: [id number]      Pharmacy Information (if different than what is on RX)  Name:  [facility]  Phone:  [phone]    Thank you,  Pat Hurst [facility]  [phone]

## 2018-07-25 NOTE — TELEPHONE ENCOUNTER
Central Prior Authorization Team   Phone: [phone]      PA Initiation    Medication: Cyclobenzaprine 10mg-Initiated  Insurance Company: [employer] - Phone [phone] Fax [phone]  Pharmacy Filling the Rx: [facility] [place], [place] - [facility] [address]  Filling Pharmacy Phone: [phone]  Filling Pharmacy Fax:    Start Date: 7/25/2018

## 2018-07-25 NOTE — TELEPHONE ENCOUNTER
Prior Authorization Approval    Authorization Effective Date: 6/25/2018  Authorization Expiration Date: 7/25/2019  Medication: Cyclobenzaprine 10mg-APPROVED  Approved Dose/Quantity:   Reference #:     Insurance Company: [employer] - Phone [phone] Fax [phone]  Expected CoPay:       CoPay Card Available:      Foundation Assistance Needed:    Which Pharmacy is filling the prescription (Not needed for infusion/clinic administered): [facility] [place], [place] - [facility] [address]  Pharmacy Notified: Yes  Patient Notified: No    Pharmacy will notify patient when medication is ready.  Will update with approval fax once received.

## 2018-07-30 NOTE — PROGRESS NOTES
Change in homemaker letter sent to [employer] was never signed and received back to the [facility] staff. This request has exceeded the 60 day tracking period and will no longer be monitored.     Karla Wharton  Case Management Specialist  [facility]   [phone]

## 2018-08-06 DIAGNOSIS — M79.7 FIBROMYALGIA: ICD-10-CM

## 2018-08-06 DIAGNOSIS — M47.816 FACET ARTHROPATHY, LUMBAR: ICD-10-CM

## 2018-08-06 DIAGNOSIS — M47.812 FACET ARTHROPATHY, CERVICAL: ICD-10-CM

## 2018-08-06 NOTE — TELEPHONE ENCOUNTER
Last Written Prescription Date:  03/06/2018  Last Fill Quantity: 180,  # refills: 2   Last office visit: 7/2/2018 with prescribing provider:  Reji Garrett Office Visit:      Alia Pranke, PharmacyTechnician   [facility]

## 2018-08-07 RX ORDER — TRAMADOL HYDROCHLORIDE 50 MG/1
100 TABLET ORAL 3 TIMES DAILY
Qty: 180 TABLET | Refills: 0 | Status: SHIPPED | OUTPATIENT
Start: 2018-08-07 | End: 2018-09-10

## 2018-09-05 DIAGNOSIS — B37.2 SKIN YEAST INFECTION: ICD-10-CM

## 2018-09-05 DIAGNOSIS — G47.00 INSOMNIA, UNSPECIFIED TYPE: ICD-10-CM

## 2018-09-05 RX ORDER — NYSTATIN 100000 [USP'U]/G
POWDER TOPICAL
Qty: 60 G | Refills: 1 | Status: SHIPPED | OUTPATIENT
Start: 2018-09-05 | End: 2019-02-07

## 2018-09-05 NOTE — TELEPHONE ENCOUNTER
Prescription approved per [facility] Refill Protocol.  Francine Bowen RN

## 2018-09-07 RX ORDER — TRAZODONE HYDROCHLORIDE 100 MG/1
TABLET ORAL
Qty: 180 TABLET | Refills: 2 | Status: SHIPPED | OUTPATIENT
Start: 2018-09-07 | End: 2019-06-25

## 2018-09-07 NOTE — TELEPHONE ENCOUNTER
"Requested Prescriptions   Pending Prescriptions Disp Refills     traZODone (DESYREL) 100 MG tablet [Pharmacy Med Name: TRAZODONE HCL 100MG TABS] 180 tablet 3     Sig: TAKE TWO TABLETS BY MOUTH EVERY NIGHT AT BEDTIME    Serotonin Modulators Passed    9/5/2018  2:49 PM       Passed - Recent (12 mo) or future (30 days) visit within the authorizing provider's specialty    Patient had office visit in the last 12 months or has a visit in the next 30 days with authorizing provider or within the authorizing provider's specialty.  See \"Patient Info\" tab in inbasket, or \"Choose Columns\" in Meds & Orders section of the refill encounter.           Passed - Patient is age 18 or older       Passed - No active pregnancy on record       Passed - No positive pregnancy test in past 12 months        Last office visit 7/2/18.   Prescription approved per [facility] Refill Protocol.  Soledad Pan RN    "

## 2018-09-10 DIAGNOSIS — M79.7 FIBROMYALGIA: ICD-10-CM

## 2018-09-10 DIAGNOSIS — M47.812 FACET ARTHROPATHY, CERVICAL: ICD-10-CM

## 2018-09-10 DIAGNOSIS — M47.816 FACET ARTHROPATHY, LUMBAR: ICD-10-CM

## 2018-09-10 DIAGNOSIS — M54.50 CHRONIC BILATERAL LOW BACK PAIN WITHOUT SCIATICA: ICD-10-CM

## 2018-09-10 DIAGNOSIS — G89.29 CHRONIC BILATERAL LOW BACK PAIN WITHOUT SCIATICA: ICD-10-CM

## 2018-09-10 NOTE — TELEPHONE ENCOUNTER
Last Written Prescription Date:  02/08/2018  Last Fill Quantity: 270,  # refills: 1   Last office visit: 7/2/2018 with prescribing provider:  Reji Garrett Office Visit:      Alia Pranke, PharmacyTechnician   [facility] [place]

## 2018-09-10 NOTE — TELEPHONE ENCOUNTER
Last Written Prescription Date:  08/07/2018  Last Fill Quantity: 180,  # refills: 0   Last office visit: 7/2/2018 with prescribing provider:  Adama    Future Office Visit:      Alia Pranke, PharmacyTechnician   [facility]

## 2018-09-10 NOTE — TELEPHONE ENCOUNTER
Last Written Prescription Date:  07/02/2018  Last Fill Quantity: 30,  # refills: 0   Last office visit: 7/2/2018 with prescribing provider:  Adama   Future Office Visit:      Alia Pranke, PharmacyTechnician   [facility]

## 2018-09-12 RX ORDER — PREGABALIN 150 MG/1
150 CAPSULE ORAL 3 TIMES DAILY
Qty: 270 CAPSULE | Refills: 1 | Status: SHIPPED | OUTPATIENT
Start: 2018-09-12 | End: 2019-03-27

## 2018-09-12 RX ORDER — TRAMADOL HYDROCHLORIDE 50 MG/1
100 TABLET ORAL 3 TIMES DAILY
Qty: 180 TABLET | Refills: 0 | Status: SHIPPED | OUTPATIENT
Start: 2018-09-12 | End: 2018-10-11

## 2018-09-12 RX ORDER — LIDOCAINE 50 MG/G
PATCH TOPICAL
Qty: 30 PATCH | Refills: 10 | Status: SHIPPED | OUTPATIENT
Start: 2018-09-12 | End: 2019-09-26

## 2018-09-12 NOTE — TELEPHONE ENCOUNTER
"Requested Prescriptions   Pending Prescriptions Disp Refills     lidocaine (LIDODERM) 5 % Patch 30 patch 0     Sig: Apply up to 3 patches to painful area at once for up to 12 h within a 24 h period.  Remove after 12 hours.    Topical Anesthetic Agents Passed    9/10/2018  1:05 PM       Passed - Recent (12 mo) or future (30 days) visit within the authorizing provider's specialty    Patient had office visit in the last 12 months or has a visit in the next 30 days with authorizing provider or within the authorizing provider's specialty.  See \"Patient Info\" tab in inbasket, or \"Choose Columns\" in Meds & Orders section of the refill encounter.           Passed - Patient is age 2 or older      Prescription approved per [facility] Refill Protocol.   "

## 2018-10-01 ENCOUNTER — OFFICE VISIT (OUTPATIENT)
Dept: INTERNAL MEDICINE | Facility: CLINIC | Age: 68
End: 2018-10-01
Payer: COMMERCIAL

## 2018-10-01 ENCOUNTER — RADIANT APPOINTMENT (OUTPATIENT)
Dept: GENERAL RADIOLOGY | Facility: CLINIC | Age: 68
End: 2018-10-01
Payer: COMMERCIAL

## 2018-10-01 VITALS
SYSTOLIC BLOOD PRESSURE: 140 MMHG | DIASTOLIC BLOOD PRESSURE: 86 MMHG | WEIGHT: 210 LBS | RESPIRATION RATE: 14 BRPM | HEIGHT: 69 IN | HEART RATE: 113 BPM | OXYGEN SATURATION: 90 % | BODY MASS INDEX: 31.1 KG/M2 | TEMPERATURE: 98.1 F

## 2018-10-01 DIAGNOSIS — G43.719 INTRACTABLE CHRONIC MIGRAINE WITHOUT AURA AND WITHOUT STATUS MIGRAINOSUS: Primary | ICD-10-CM

## 2018-10-01 DIAGNOSIS — G89.4 CHRONIC PAIN SYNDROME: ICD-10-CM

## 2018-10-01 DIAGNOSIS — F33.0 MILD EPISODE OF RECURRENT MAJOR DEPRESSIVE DISORDER (H): ICD-10-CM

## 2018-10-01 DIAGNOSIS — M25.552 HIP PAIN, LEFT: ICD-10-CM

## 2018-10-01 DIAGNOSIS — Z12.31 ENCOUNTER FOR SCREENING MAMMOGRAM FOR BREAST CANCER: ICD-10-CM

## 2018-10-01 DIAGNOSIS — E11.40 TYPE 2 DIABETES MELLITUS WITH DIABETIC NEUROPATHY, WITHOUT LONG-TERM CURRENT USE OF INSULIN (H): ICD-10-CM

## 2018-10-01 DIAGNOSIS — E78.5 HYPERLIPIDEMIA LDL GOAL <100: ICD-10-CM

## 2018-10-01 DIAGNOSIS — M51.369 LUMBAR DEGENERATIVE DISC DISEASE: ICD-10-CM

## 2018-10-01 DIAGNOSIS — K21.9 GASTROESOPHAGEAL REFLUX DISEASE WITHOUT ESOPHAGITIS: ICD-10-CM

## 2018-10-01 DIAGNOSIS — Z23 NEED FOR VACCINATION: ICD-10-CM

## 2018-10-01 DIAGNOSIS — Z23 NEED FOR PROPHYLACTIC VACCINATION AND INOCULATION AGAINST INFLUENZA: ICD-10-CM

## 2018-10-01 DIAGNOSIS — I10 BENIGN ESSENTIAL HYPERTENSION: ICD-10-CM

## 2018-10-01 DIAGNOSIS — G56.03 BILATERAL CARPAL TUNNEL SYNDROME: ICD-10-CM

## 2018-10-01 PROCEDURE — 73502 X-RAY EXAM HIP UNI 2-3 VIEWS: CPT

## 2018-10-01 PROCEDURE — G0008 ADMIN INFLUENZA VIRUS VAC: HCPCS | Performed by: INTERNAL MEDICINE

## 2018-10-01 PROCEDURE — G0009 ADMIN PNEUMOCOCCAL VACCINE: HCPCS | Performed by: INTERNAL MEDICINE

## 2018-10-01 PROCEDURE — 90732 PPSV23 VACC 2 YRS+ SUBQ/IM: CPT | Performed by: INTERNAL MEDICINE

## 2018-10-01 PROCEDURE — 99214 OFFICE O/P EST MOD 30 MIN: CPT | Mod: 25 | Performed by: INTERNAL MEDICINE

## 2018-10-01 PROCEDURE — 90662 IIV NO PRSV INCREASED AG IM: CPT | Performed by: INTERNAL MEDICINE

## 2018-10-01 RX ORDER — AMLODIPINE BESYLATE 5 MG/1
5 TABLET ORAL DAILY
Qty: 90 TABLET | Refills: 1 | Status: SHIPPED | OUTPATIENT
Start: 2018-10-01 | End: 2019-06-25

## 2018-10-01 RX ORDER — DULOXETIN HYDROCHLORIDE 60 MG/1
60 CAPSULE, DELAYED RELEASE ORAL DAILY
Qty: 90 CAPSULE | Refills: 0 | Status: SHIPPED | OUTPATIENT
Start: 2018-10-01 | End: 2019-01-25

## 2018-10-01 RX ORDER — ATORVASTATIN CALCIUM 40 MG/1
40 TABLET, FILM COATED ORAL DAILY
Qty: 90 TABLET | Refills: 3 | Status: SHIPPED | OUTPATIENT
Start: 2018-10-01 | End: 2019-11-13 | Stop reason: DRUGHIGH

## 2018-10-01 NOTE — PROGRESS NOTES
ASSESSMENT/PLAN:         1. Intractable chronic migraine without aura and without status migrainosus    Patient reports headaches several times per month and are very classic for migraines.  They are debilitating.  I believe this may be medication overuse headaches given she is on tramadol 100mg TID which is higher daily dose, but not interested in weaning down at this time.    Will refer over to neurology to see if she would be candidate for botox or other therapies     - NEUROLOGY ADULT REFERRAL    2. Type 2 diabetes mellitus with diabetic neuropathy, without long-term current use of insulin (H)  Overall well controlled, refill given today.   - metFORMIN (GLUCOPHAGE) 500 MG tablet; TAKE ONE TABLET BY MOUTH TWICE A DAY WITH A MEAL    ### DO NOT FILL NOW.  Please update patient's profile to reflect additional refills.  ####  Dispense: 180 tablet; Refill: 0    3. Chronic pain syndrome  Will refer to neuro as above for migraines on pain meds  - NEUROLOGY ADULT REFERRAL    4. Mild episode of recurrent major depressive disorder (H)  Stable, continue cymbalta    5. Lumbar degenerative disc disease  Continue to use cymbalta as adjunct for pain control   - DULoxetine (CYMBALTA) 60 MG EC capsule; Take 1 capsule (60 mg) by mouth daily  Dispense: 90 capsule; Refill: 0  - NEUROLOGY ADULT REFERRAL    6. Gastroesophageal reflux disease without esophagitis  Prilosec for now  - omeprazole (PRILOSEC) 20 MG CR capsule; Take 1 capsule (20 mg) by mouth daily  Dispense: 90 capsule; Refill: 3    7. Hyperlipidemia LDL goal <100  LDL at goal   - atorvastatin (LIPITOR) 40 MG tablet; Take 1 tablet (40 mg) by mouth daily  Dispense: 90 tablet; Refill: 3    8. Benign essential hypertension    BP high at most office visits.  Have instructed her BP goal is <130, she verbalizes understanding. She will call back in 2 weeks with BP numbners    - amLODIPine (NORVASC) 5 MG tablet; Take 1 tablet (5 mg) by mouth daily  Dispense: 90 tablet; Refill:  1    9. Bilateral carpal tunnel syndrome  Thenar eminence is wasted bilaterally, patient also with symptoms. Will refer to Ortho hand  - ORTHO  REFERRAL    10. Hip pain, left  Given her pain is mostly in the left groin, suspect hip OA. Will assess with XR hip  - XR Hip Left 2-3 Views; Future      Health Maintenance  Mammo 10/2016. Mammo 10/2018 ordered  Colonoscopy was normal 2014.  Pneumonia 23 and prevnar 13 given, due for second pneumo 23 since its been 5 years  Tetanus given 2015  Zoster vaccine 2015   DEXA done 3/2018 showing normal bone density  Patient reports quitting smoking, she would be candidate to get screened for lung cancer.  She did have CT chest last year 2017 but no comment on nodules at the time      Yayo Mendoza MD  [facility]          SUBJECTIVE:   Vijaya Manjarrez is a 68 year old female who presents to clinic today for the following health issues:    Headaches    Duration: Last 6 weeks, 2-3 per week    Description  Location: bilateral in the frontal area, starts in the temple   Character: sharp pain  Frequency:  2-3 per week  Duration:  Longest lasted 3 days    Intensity:  moderate    Accompanying signs and symptoms:    Precipitating or Alleviating factors:  Nausea/vomiting: sometimes  Dizziness: no  Weakness or numbness: occasionally  Visual changes: none  Fever: no   Sinus or URI symptoms no     History  Head trauma: no  Family history of migraines: YES  Previous tests for headaches: YES  Neurologist evaluations: no  Able to do daily activities when headache present: no  Wake with headaches: no  Daily pain medication use: YES  Any changes in: no recent changes in stressors    Precipitating or Alleviating factors (light/sound/sleep/caffeine): No caffeine use    Therapies tried and outcome: Ibuprofen (Advil, Motrin), Naproxyn (Aleve) and Excedrin    Outcome - not effective  Frequent/daily pain medication use: YES    Depression:  Mood is down because of above, no  thoughts of SI.  No difficulty sleeping.      Has bilateral hand pain for sometime    She is trying to lose weight, difficult to control diet.   History of diabetes but is well controlled'    Continues to have left groin pain/left hip pain      Problem list and histories reviewed & adjusted, as indicated.  Additional history: as documented    Patient Active Problem List   Diagnosis     HCD (health care directive)     Type 2 diabetes mellitus with diabetic neuropathy, without long-term current use of insulin (H)     COPD (chronic obstructive pulmonary disease) (H)     Cervical spine degeneration     Facet arthropathy, lumbar     Lumbar degenerative disc disease     Migraine headache     Diabetic neuropathy (H)     Anxiety     GERD (gastroesophageal reflux disease)     Hyperlipidemia LDL goal <100     Fibromyalgia     Benign essential hypertension     Controlled substance agreement signed     Chronic pain syndrome     Health Care Home     Advanced directives, counseling/discussion     Narcotic dependence (H)     Mild episode of recurrent major depressive disorder (H)     Past Surgical History:   Procedure Laterality Date     CHOLECYSTECTOMY, LAPOROSCOPIC      Cholecystectomy, Laparoscopic       Social History   Substance Use Topics     Smoking status: Former Smoker     Packs/day: 1.00     Years: 40.00     Types: Cigarettes     Quit date: 6/1/2017     Smokeless tobacco: Never Used     Alcohol use No     Family History   Problem Relation Age of Onset     Cancer Mother      Cancer Father      Lung cancer     Cancer Maternal Grandmother      Breast cancer           Reviewed and updated as needed this visit by clinical staff  Tobacco  Allergies  Meds  Med Hx  Surg Hx  Fam Hx  Soc Hx      Reviewed and updated as needed this visit by Provider         ROS:  Constitutional, HEENT, cardiovascular, pulmonary, gi and gu systems are negative, except as otherwise noted.    OBJECTIVE:     /86 (BP Location: Right arm,  "Patient Position: Sitting, Cuff Size: Adult Regular)  Pulse 113  Temp 98.1  F (36.7  C) (Oral)  Resp 14  Ht 5' 9\" (1.753 m)  Wt 210 lb (95.3 kg)  SpO2 90%  BMI 31.01 kg/m2  Body mass index is 31.01 kg/(m^2).  GENERAL: healthy, alert and no distress  NECK: no adenopathy, no asymmetry, masses, or scars and thyroid normal to palpation  RESP: lungs clear to auscultation - no rales, rhonchi or wheezes  CV: regular rate and rhythm, normal S1 S2, no S3 or S4, no murmur, click or rub\  ABDOMEN: soft, nontender, no hepatosplenomegaly, no masses and bowel sounds normal  MS: no gross musculoskeletal defects noted, no edema  Bilateral thenar eminence wasting  SKIN: no suspicious lesions or rashes  Back: Positive straight leg test bilaterally  Neuro: Absent patellar reflexes bilaterally    Diagnostic Test Results:  none           "

## 2018-10-01 NOTE — NURSING NOTE
Screening Questionnaire for Adult Immunization    Are you sick today?   No   Do you have allergies to medications, food, a vaccine component or latex?   No   Have you ever had a serious reaction after receiving a vaccination?   No   Do you have a long-term health problem with heart disease, lung disease, asthma, kidney disease, metabolic disease (e.g. diabetes), anemia, or other blood disorder?   No   Do you have cancer, leukemia, HIV/AIDS, or any other immune system problem?   No   In the past 3 months, have you taken medications that affect  your immune system, such as prednisone, other steroids, or anticancer drugs; drugs for the treatment of rheumatoid arthritis, Crohn s disease, or psoriasis; or have you had radiation treatments?   No   Have you had a seizure, or a brain or other nervous system problem?   No   During the past year, have you received a transfusion of blood or blood     products, or been given immune (gamma) globulin or antiviral drug?   No   For women: Are you pregnant or is there a chance you could become        pregnant during the next month?   No   Have you received any vaccinations in the past 4 weeks?   No     Immunization questionnaire answers were all negative.        Per orders of Dr. Mendoza, injection of Pneumovax 23 given by Peggy Gottlieb. Patient instructed to remain in clinic for 15 minutes afterwards, and to report any adverse reaction to me immediately.       Screening performed by Peggy Gottlieb on 10/1/2018 at 2:28 PM.    Prior to injection verified patient identity using patient's name and date of birth.  Due to injection administration, patient instructed to remain in clinic for 15 minutes  afterwards, and to report any adverse reaction to me immediately.

## 2018-10-01 NOTE — PROGRESS NOTES

## 2018-10-01 NOTE — NURSING NOTE
"Vital signs:  Temp: 98.1  F (36.7  C) Temp src: Oral BP: 140/86 Pulse: 113   Resp: 14 SpO2: 90 %     Height: 5' 9\" (175.3 cm) Weight: 210 lb (95.3 kg)  Estimated body mass index is 31.01 kg/(m^2) as calculated from the following:    Height as of this encounter: 5' 9\" (1.753 m).    Weight as of this encounter: 210 lb (95.3 kg).        "

## 2018-10-01 NOTE — MR AVS SNAPSHOT
After Visit Summary   10/1/2018    Vijaya Manjarrez    MRN: [id number]           Patient Information     Date Of Birth          1950        Visit Information        Provider Department      10/1/2018 1:20 PM Yayo Mendoza MD [facility]        Today's Diagnoses     Chronic pain syndrome    -  1    Lumbar degenerative disc disease        Type 2 diabetes mellitus with diabetic neuropathy, without long-term current use of insulin (H)        Gastroesophageal reflux disease without esophagitis        Hyperlipidemia LDL goal <100        Benign essential hypertension        Bilateral carpal tunnel syndrome        Intractable chronic migraine without aura and without status migrainosus        Mild episode of recurrent major depressive disorder (H)           Follow-ups after your visit        Additional Services     NEUROLOGY ADULT REFERRAL       Your provider has referred you for the following:   Consult at [facility]: [facility] [phone]   [email or web address]    Patient with worsening migraines and leg pain    Please be aware that coverage of these services is subject to the terms and limitations of your health insurance plan.  Call member services at your health plan with any benefit or coverage questions.      Please bring the following with you to your appointment:    (1) Any X-Rays, CTs or MRIs which have been performed.  Contact the facility where they were done to arrange for  prior to your scheduled appointment.    (2) List of current medications  (3) This referral request   (4) Any documents/labs given to you for this referral            ORTHO  REFERRAL       [facility] is referring you to the Orthopedic  Services at [facility].       The  Representative will assist you in the coordination of your Orthopedic and Musculoskeletal Care as  "prescribed by your physician.    The  Representative will call you within 1 business day to help schedule your appointment, or you may contact the [facility] Representative at:    All areas ~ [phone]     Type of Referral : hand       Timeframe requested: Routine    Coverage of these services is subject to the terms and limitations of your health insurance plan.  Please call member services at your health plan with any benefit or coverage questions.      If X-rays, CT or MRI's have been performed, please contact the facility where they were done to arrange for , prior to your scheduled appointment.  Please bring this referral request to your appointment and present it to your specialist.                  Who to contact     If you have questions or need follow up information about today's clinic visit or your schedule please contact [facility] directly at [phone].  Normal or non-critical lab and imaging results will be communicated to you by [email or web address], letter or phone within 4 business days after the clinic has received the results. If you do not hear from us within 7 days, please contact the clinic through [email or web address] or phone. If you have a critical or abnormal lab result, we will notify you by phone as soon as possible.  Submit refill requests through Airphrame or call your pharmacy and they will forward the refill request to us. Please allow 3 business days for your refill to be completed.          Additional Information About Your Visit        Airphrame Information     Airphrame lets you send messages to your doctor, view your test results, renew your prescriptions, schedule appointments and more. To sign up, go to [email or web address] . Click on \"Log in\" on the left side of the screen, which will take you to the Welcome page. Then click on \"Sign up Now\" on the right side of the page.     You will be asked to enter the access code listed below, as well as some personal " "information. Please follow the directions to create your username and password.     Your access code is: [email or web address]  Expires: 2018 12:59 PM     Your access code will  in 90 days. If you need help or a new code, please call your [place] clinic or [phone].        Care EveryWhere ID     This is your Care EveryWhere ID. This could be used by other organizations to access your [place] medical records  [id number]        Your Vitals Were     Pulse Temperature Respirations Height Pulse Oximetry BMI (Body Mass Index)    113 98.1  F (36.7  C) (Oral) 14 5' 9\" (1.753 m) 90% 31.01 kg/m2       Blood Pressure from Last 3 Encounters:   10/01/18 140/86   18 140/70   18 148/90    Weight from Last 3 Encounters:   10/01/18 210 lb (95.3 kg)   18 204 lb (92.5 kg)   18 204 lb (92.5 kg)              We Performed the Following     NEUROLOGY ADULT REFERRAL     ORTHO  REFERRAL          Today's Medication Changes          These changes are accurate as of 10/1/18  2:08 PM.  If you have any questions, ask your nurse or doctor.               These medicines have changed or have updated prescriptions.        Dose/Directions    atorvastatin 40 MG tablet   Commonly known as:  LIPITOR   This may have changed:  See the new instructions.   Used for:  Hyperlipidemia LDL goal <100   Changed by:  Yayo Mendoza MD        Dose:  40 mg   Take 1 tablet (40 mg) by mouth daily   Quantity:  90 tablet   Refills:  3       DULoxetine 60 MG EC capsule   Commonly known as:  CYMBALTA   This may have changed:  See the new instructions.   Used for:  Lumbar degenerative disc disease   Changed by:  Yayo Mendoza MD        Dose:  60 mg   Take 1 capsule (60 mg) by mouth daily   Quantity:  90 capsule   Refills:  0       metFORMIN 500 MG tablet   Commonly known as:  GLUCOPHAGE   This may have changed:  See the new instructions.   Used for:  Type 2 diabetes mellitus with diabetic neuropathy, without long-term current use " of insulin (H)   Changed by:  Yayo Mendoza MD        TAKE ONE TABLET BY MOUTH TWICE A DAY WITH A MEAL  ### DO NOT FILL NOW.  Please update patient's profile to reflect additional refills.  ####   Quantity:  180 tablet   Refills:  0       omeprazole 20 MG CR capsule   Commonly known as:  priLOSEC   This may have changed:  See the new instructions.   Used for:  Gastroesophageal reflux disease without esophagitis   Changed by:  Yayo Mendoza MD        Dose:  20 mg   Take 1 capsule (20 mg) by mouth daily   Quantity:  90 capsule   Refills:  3            Where to get your medicines      These medications were sent to [facility] [place] [address]  [facility] [address] [facility] [address]     Phone:  [phone]     amLODIPine 5 MG tablet    atorvastatin 40 MG tablet    DULoxetine 60 MG EC capsule    omeprazole 20 MG CR capsule         Some of these will need a paper prescription and others can be bought over the counter.  Ask your nurse if you have questions.     Bring a paper prescription for each of these medications     metFORMIN 500 MG tablet                Primary Care Provider Office Phone # Fax #    Yayo Mendoza [phone]       [address]  [facility] [address]        Equal Access to Services     ISIDRO GO AH: Hadii joceline mayo hadasho Soomaali, waaxda luqadaha, qaybta kaalmada adeegyada, delia jimenesin haypa lentz. So [facility] [phone].    ATENCIÓN: Si habla español, tiene a carrion disposición servicios gratuitos de asistencia lingüística. Llame al [phone].    We comply with applicable federal civil rights laws and [place] laws. We do not discriminate on the basis of race, color, national origin, age, disability, sex, sexual orientation, or gender identity.            Thank you!     Thank you for choosing [facility]  for your care. Our goal is always to provide you with excellent care. Hearing back from our  patients is one way we can continue to improve our services. Please take a few minutes to complete the written survey that you may receive in the mail after your visit with us. Thank you!             Your Updated Medication List - Protect others around you: Learn how to safely use, store and throw away your medicines at www.disposemymeds.org.          This list is accurate as of 10/1/18  2:08 PM.  Always use your most recent med list.                   Brand Name Dispense Instructions for use Diagnosis    albuterol 108 (90 Base) MCG/ACT inhaler    PROAIR HFA/PROVENTIL HFA/VENTOLIN HFA    1 Inhaler    Inhale 2 puffs into the lungs every 4 hours as needed for shortness of breath / dyspnea or wheezing    Bronchitis       amLODIPine 5 MG tablet    NORVASC    90 tablet    Take 1 tablet (5 mg) by mouth daily    Benign essential hypertension       atorvastatin 40 MG tablet    LIPITOR    90 tablet    Take 1 tablet (40 mg) by mouth daily    Hyperlipidemia LDL goal <100       blood glucose monitoring test strip    no brand specified    100 strip    Use to test blood sugars 1 times daily or as directed, use brand same as previous    Type 2 diabetes mellitus with diabetic neuropathy, without long-term current use of insulin (H)       cetirizine 10 MG tablet    zyrTEC    90 tablet    Take 1 tablet (10 mg) by mouth daily    Allergic rhinitis       cyclobenzaprine 10 MG tablet    FLEXERIL    180 tablet    TAKE ONE TABLET BY MOUTH TWICE A DAY    Muscle spasm       DULoxetine 60 MG EC capsule    CYMBALTA    90 capsule    Take 1 capsule (60 mg) by mouth daily    Lumbar degenerative disc disease       fluticasone 50 MCG/ACT spray    FLONASE    1 Bottle    Spray 2 sprays into both nostrils daily    Seasonal allergic rhinitis due to pollen, unspecified chronicity       fluticasone-salmeterol 250-50 MCG/DOSE diskus inhaler    ADVAIR    1 Inhaler    Inhale 1 puff into the lungs 2 times daily    Chronic obstructive pulmonary disease,  unspecified COPD type (H)       * lidocaine 5 % Patch    LIDODERM    30 patch    Apply up to 3 patches to painful area at once for up to 12 h within a 24 h period.  Remove after 12 hours.    Fibromyalgia       * lidocaine 5 % Patch    LIDODERM    30 patch    Apply up to 3 patches to painful area at once for up to 12 h within a 24 h period.  Remove after 12 hours.    Fibromyalgia       lisinopril 20 MG tablet    PRINIVIL/ZESTRIL    90 tablet    Take 1 tablet (20 mg) by mouth 2 times daily    Benign essential hypertension       metFORMIN 500 MG tablet    GLUCOPHAGE    180 tablet    TAKE ONE TABLET BY MOUTH TWICE A DAY WITH A MEAL  ### DO NOT FILL NOW.  Please update patient's profile to reflect additional refills.  ####    Type 2 diabetes mellitus with diabetic neuropathy, without long-term current use of insulin (H)       miconazole 2 % cream    MICATIN    57 g    Apply topically 2 times daily    Yeast infection of the skin       montelukast 10 MG tablet    SINGULAIR    90 tablet    Take 1 tablet (10 mg) by mouth At Bedtime    Chronic obstructive pulmonary disease, unspecified COPD type (H)       NYAMYC 985143 UNIT/GM Powd   Generic drug:  nystatin     60 g    APPLY TOPICALLY THREE TIMES A DAY AS NEEDED    Skin yeast infection       omeprazole 20 MG CR capsule    priLOSEC    90 capsule    Take 1 capsule (20 mg) by mouth daily    Gastroesophageal reflux disease without esophagitis       pregabalin 150 MG capsule    LYRICA    270 capsule    Take 1 capsule (150 mg) by mouth 3 times daily    Fibromyalgia, Chronic bilateral low back pain without sciatica       traMADol 50 MG tablet    ULTRAM    180 tablet    Take 2 tablets (100 mg) by mouth 3 times daily    Facet arthropathy, cervical, Facet arthropathy, lumbar, Fibromyalgia       traZODone 100 MG tablet    DESYREL    180 tablet    TAKE TWO TABLETS BY MOUTH EVERY NIGHT AT BEDTIME    Insomnia, unspecified type       TYLENOL ARTHRITIS PAIN 650 MG CR tablet   Generic drug:   acetaminophen      Take 650 mg by mouth 2 times daily        * Notice:  This list has 2 medication(s) that are the same as other medications prescribed for you. Read the directions carefully, and ask your doctor or other care provider to review them with you.

## 2018-10-11 DIAGNOSIS — M47.816 FACET ARTHROPATHY, LUMBAR: ICD-10-CM

## 2018-10-11 DIAGNOSIS — M47.812 FACET ARTHROPATHY, CERVICAL: ICD-10-CM

## 2018-10-11 DIAGNOSIS — M79.7 FIBROMYALGIA: ICD-10-CM

## 2018-10-11 RX ORDER — TRAMADOL HYDROCHLORIDE 50 MG/1
100 TABLET ORAL 3 TIMES DAILY
Qty: 180 TABLET | Refills: 0 | Status: SHIPPED | OUTPATIENT
Start: 2018-10-11 | End: 2018-11-12

## 2018-10-17 ENCOUNTER — TELEPHONE (OUTPATIENT)
Dept: INTERNAL MEDICINE | Facility: CLINIC | Age: 68
End: 2018-10-17

## 2018-10-26 DIAGNOSIS — E11.40 TYPE 2 DIABETES MELLITUS WITH DIABETIC NEUROPATHY, WITHOUT LONG-TERM CURRENT USE OF INSULIN (H): ICD-10-CM

## 2018-10-26 DIAGNOSIS — M62.838 MUSCLE SPASM: ICD-10-CM

## 2018-10-26 NOTE — TELEPHONE ENCOUNTER
"Requested Prescriptions   Pending Prescriptions Disp Refills     metFORMIN (GLUCOPHAGE) 500 MG tablet [Pharmacy Med Name: METFORMIN HCL 500MG TABS]  Last Written Prescription Date:  10/1/2018  Last Fill Quantity: 180,  # refills: 0   Last office visit: 10/1/2018 with prescribing provider:     Future Office Visit:   180 tablet 0     Sig: TAKE ONE TABLET BY MOUTH TWICE A DAY WITH A MEAL    Biguanide Agents Failed    10/26/2018  3:07 PM       Failed - Blood pressure less than 140/90 in past 6 months    BP Readings from Last 3 Encounters:   10/01/18 140/86   07/24/18 140/70   07/02/18 148/90                Failed - Patient has documented A1c within the specified period of time.    If HgbA1C is 8 or greater, it needs to be on file within the past 3 months.  If less than 8, must be on file within the past 6 months.     Recent Labs   Lab Test  03/03/18   1023   A1C  6.9*            Passed - Patient has documented LDL within the past 12 mos.    Recent Labs   Lab Test  03/03/18   1044   LDL  56            Passed - Patient has had a Microalbumin in the past 15 mos.    Recent Labs   Lab Test  03/03/18   1045   MICROL  18   UMALCR  11.87            Passed - Patient is age 10 or older       Passed - Patient's CR is NOT>1.4 OR Patient's EGFR is NOT<45 within past 12 mos.    Recent Labs   Lab Test  03/03/18   1023   GFRESTIMATED  77   GFRESTBLACK  >90       Recent Labs   Lab Test  03/03/18   1023   CR  0.75            Passed - Patient does NOT have a diagnosis of CHF.       Passed - Patient is not pregnant       Passed - Patient has not had a positive pregnancy test within the past 12 mos.        Passed - Recent (6 mo) or future (30 days) visit within the authorizing provider's specialty    Patient had office visit in the last 6 months or has a visit in the next 30 days with authorizing provider or within the authorizing provider's specialty.  See \"Patient Info\" tab in inbasket, or \"Choose Columns\" in Meds & Orders section of the " refill encounter.            cyclobenzaprine (FLEXERIL) 10 MG tablet [Pharmacy Med Name: CYCLOBENZAPRINE HCL 10MG TABS]  Last Written Prescription Date:  7/24/2018  Last Fill Quantity: 180,  # refills: 0   Last office visit: 10/1/2018 with prescribing provider:     Future Office Visit:   180 tablet 0     Sig: TAKE ONE TABLET BY MOUTH TWICE A DAY    There is no refill protocol information for this order

## 2018-10-29 ENCOUNTER — PATIENT OUTREACH (OUTPATIENT)
Dept: GERIATRIC MEDICINE | Facility: CLINIC | Age: 68
End: 2018-10-29

## 2018-10-29 NOTE — PROGRESS NOTES
[facility] Care Coordination Contact  Service Provider Contact    Call from Reyna with [employer] who shares that client's homemaker has put in her two week notice. Since client is only receiving service e/o week, and they don't typically allow that with [facility] clients, they feel this is a good time to discontinue services. Client will have her last service provided on 11/8/18. Reyna will call client today to inform her of this. Writer will check in with client to see how she would like to handle this moving forward.     Vanesa Gtz RN  [facility]  [phone]  Fax: [phone]

## 2018-10-29 NOTE — TELEPHONE ENCOUNTER
Routing Flexeril and Metformin refills request to provider for review/approval because:  Flexeril drug not on the [facility] refill protocol   And most recent blood pressures ouside standing order parameters.    Please advise, thanks.

## 2018-10-30 RX ORDER — CYCLOBENZAPRINE HCL 10 MG
TABLET ORAL
Qty: 180 TABLET | Refills: 0 | Status: SHIPPED | OUTPATIENT
Start: 2018-10-30 | End: 2019-01-25

## 2018-10-31 NOTE — PROGRESS NOTES
[facility] Care Coordination Contact  Member Contact    Call to client to see how she would like to proceed with her homemaking services. VM was left requesting a return call.     Vanesa Gtz RN  [facility]  [phone]  Fax: [phone]

## 2018-11-05 ENCOUNTER — TELEPHONE (OUTPATIENT)
Dept: INTERNAL MEDICINE | Facility: CLINIC | Age: 68
End: 2018-11-05

## 2018-11-05 NOTE — TELEPHONE ENCOUNTER
Panel Management Review      Patient has the following on her problem list:     Diabetes    ASA: Failed    Last A1C  Lab Results   Component Value Date    A1C 6.9 03/03/2018    A1C 6.8 06/17/2017    A1C 6.7 02/20/2017    A1C 6.2 07/12/2016    A1C 7.0 03/04/2016     A1C tested: FAILED    Last LDL:    Lab Results   Component Value Date    CHOL 152 03/03/2018     Lab Results   Component Value Date    HDL 53 03/03/2018     Lab Results   Component Value Date    LDL 56 03/03/2018     Lab Results   Component Value Date    TRIG 213 03/03/2018     No results found for: CHOLHDLRATIO  Lab Results   Component Value Date    NHDL 99 03/03/2018       Is the patient on a Statin? YES             Is the patient on Aspirin? NO    Medications     HMG CoA Reductase Inhibitors    atorvastatin (LIPITOR) 40 MG tablet          Last three blood pressure readings:  BP Readings from Last 3 Encounters:   10/01/18 140/86   07/24/18 140/70   07/02/18 148/90       Date of last diabetes office visit: 10/1/18     Tobacco History:     History   Smoking Status     Former Smoker     Packs/day: 1.00     Years: 40.00     Types: Cigarettes     Quit date: 6/1/2017   Smokeless Tobacco     Never Used         Hypertension   Last three blood pressure readings:  BP Readings from Last 3 Encounters:   10/01/18 140/86   07/24/18 140/70   07/02/18 148/90     Blood pressure: Passed    HTN Guidelines:  Age 18-59 BP range:  Less than 140/90  Age 60-85 with Diabetes:  Less than 140/90  Age 60-85 without Diabetes:  less than 150/90      Composite cancer screening  Chart review shows that this patient is due/due soon for the following Mammogram  Summary:    Patient is due/failing the following:   Diabetes    Action needed:   No action needed. LOV was 10/1/18.    Type of outreach:NA    Questions for provider review:    None                                                                                                                                    Peggy Gottlieb  MA       Chart routed to closed .

## 2018-11-05 NOTE — PROGRESS NOTES
SUBJECTIVE:   Jason Suarez is a 41 year old male who presents to clinic today for the following health issues:      Concern - Cellulitis  Onset: Yesterday afternoon    Description: swelling, redness    Intensity: moderate    Progression of Symptoms:  More red today than last night when noticed    Accompanying Signs & Symptoms: Thursday evening felt feverish/chilled but not since, denies paresethesias    Previous history of similar problem:   Yes- in same exact area of LLE with same presenting symptoms. History of surgery on left ankle    Precipitating factors:   Worsened by: Touch-tenderness    Alleviating factors:  Improved by: Nothing    Therapies Tried and outcome: Nothing    Denies cramping in calves with ambulation. Denies SOB at rest and DUNCAN- just got back from  camp yesterday and had done a lot of walking. Denies sensations in chest. No history of DVT or PE.    Blood glucose levels: June too high 200s-300s; Last 2 weeks highest was 136. Has not noticed an increase since onset of symptoms. No CKD but does have non-alcoholic fatty liver disease.           Problem list and histories reviewed & adjusted, as indicated.  Additional history: as documented    Patient Active Problem List   Diagnosis     Medullary carcinoma of thyroid (H)     Cirrhosis, non-alcoholic (H)     Malignant neoplasm of thyroid gland (H)     Coronary artery abnormality     Dermatitis     Family history of diabetes mellitus     Family history of malignant neoplasm of thyroid     Fatty liver     Polyp of gallbladder     Genetic disorder     Esophageal reflux     Postoperative hypothyroidism     Lymphadenopathy     MEN 2A (multiple endocrine neoplasia, type 2A) (H)     Micronodular cirrhosis (H)     Obesity     Screening for lipid disorders     Type 2 diabetes mellitus without complication, without long-term current use of insulin (H)     Hypothyroidism     Routine general medical examination at a health care facility     Past Surgical  [facility] Care Coordination Contact    Service Coordination    Return call from client who would like to continue with homemaking services from another provider. She would like 3 hours every week. CC shared that referrals will be placed, but there will likely be a wait. Client understands that she will owe $213/month (her obligation amount) if we increase services back to weekly.     Referral sent to [facility].     Vanesa Gtz RN  [facility]  [phone]  Fax: [phone]   History:   Procedure Laterality Date     CLOSED REDUCTION ANKLE      ORIF lt ankle     LAPAROSCOPIC CHOLECYSTECTOMY      7/10/14,Cholecystectomy,Laparoscopic, liver biopsy, [facility] without mesh     OTHER SURGICAL HISTORY      206641,REMOVE,and removal of syndesmotic screw.     THYROIDECTOMY      2010       Social History   Substance Use Topics     Smoking status: Former Smoker     Packs/day: 1.00     Years: 12.00     Types: Cigarettes     Quit date: 6/1/2015     Smokeless tobacco: Never Used     Alcohol use No     Family History   Problem Relation Age of Onset     Cancer Father      Cancer,Thyroid cancer.         Current Outpatient Prescriptions   Medication Sig Dispense Refill     blood glucose monitoring (ONE TOUCH DELICA) lancets        cephALEXin (KEFLEX) 500 MG capsule Take 1 capsule (500 mg) by mouth 4 times daily 40 capsule 0     Cholecalciferol (VITAMIN D3) 1000 UNITS CAPS Take 1,000 Units by mouth daily       clindamycin (CLEOCIN) 300 MG capsule Take 300 mg by mouth       furosemide (LASIX) 20 MG tablet Take 20 mg by mouth daily       glimepiride (AMARYL) 1 MG tablet Take 1 tablet by mouth daily  3     Glucose Blood (BLOOD GLUCOSE TEST STRIPS) STRP Test two times a day       hydrOXYzine (VISTARIL) 25 MG capsule Take 1 capsule by mouth as needed for itching  3     levothyroxine (SYNTHROID/LEVOTHROID) 175 MCG tablet Take 175 mcg by mouth daily       spironolactone (ALDACTONE) 50 MG tablet Take 50 mg by mouth       vitamin E 400 UNIT capsule Take 800 Units by mouth daily       No Known Allergies  Recent Labs   Lab Test 05/07/18 01/24/18   2232  01/11/18   1725  06/26/17   1719   04/07/14   1102  02/22/13   0925   LDL   --    --    --   47   --   66  108   HDL   --    --    --   29   --   39*  36*   TRIG   --    --    --   162*   --   79  108   ALT  51*   --   43   --    --    --    --    CR  0.80  0.68*  0.64*  0.69*   < >  0.70*  0.77   GFRESTIMATED   --    --   >90   --    --    --    --    GFRESTBLACK   --    >60  >90  >60   < >  >60  >60   POTASSIUM  4.4  3.8  3.4  3.8   < >  4.1  4.6    < > = values in this interval not displayed.      BP Readings from Last 3 Encounters:   08/04/18 136/78   07/16/18 140/70   01/11/18 157/70    Wt Readings from Last 3 Encounters:   08/04/18 (!) 362 lb 9.6 oz (164.5 kg)   07/16/18 (!) 360 lb 6.4 oz (163.5 kg)   12/29/17 (!) 361 lb (163.7 kg)                    Reviewed and updated as needed this visit by clinical staff  Tobacco  Allergies  Meds  Med Hx  Surg Hx  Fam Hx  Soc Hx      Reviewed and updated as needed this visit by Provider         ROS:    Constitutional, HEENT, cardiovascular, pulmonary, gi and gu systems are negative, except as otherwise noted.    OBJECTIVE:     /78 (BP Location: Left arm, Patient Position: Sitting, Cuff Size: Adult Large)  Pulse 100  Temp 98.8  F (37.1  C) (Tympanic)  Wt (!) 362 lb 9.6 oz (164.5 kg)  BMI 49.11 kg/m2  Body mass index is 49.11 kg/(m^2).     GENERAL: healthy, alert and no distress  EYES: Eyes grossly normal to inspection,sclerae normal  HENT: normocephalic  RESP: lungs clear to auscultation - no rales, rhonchi or wheezes  CV: regular rate and rhythm, normal S1 S2, no S3 or S4, no murmur, click or rub, 2+ bilateral pedal and lower extremity edema up 3/4 way with L greater than right, left DP and PT pulses strong, adequate capillary refill to nailbeds on left foot  ABDOMEN: obese  MS: no gross musculoskeletal deformities, 2+ bilateral pedal and lower extremity edema up 3/4 way with L greater than right, left DP and PT pulses strong, adequate capillary refill to nailbeds on left foot  SKIN: warm, diffuse erythema around ankle/lower extremity with faint erythema extending proximally. Tenderness. No open areas, scabs, or lesions to LLE.  NEURO: Normal strength and tone, mentation intact and speech normal  PSYCH: mentation appears normal, affect normal/bright    Diagnostic Test Results:  none     ASSESSMENT/PLAN:     1. Cellulitis of  left lower extremity  Acute, symptomatic  Discussed differential of DVT versus cellulitis and symptoms and presentation not consistent with DVT. Education on s/s of DVT and need for evaluation if these present.  - Keflex four times daily for 10 days  - Take entire course of antibiotic even if you start to feel better.  - Antibiotics can cause stomach upset including nausea and diarrhea. Read your bottle or ask the pharmacist if antibiotic can be taken with food to help prevent nausea. If you have symptoms of diarrhea you can take an over-the-counter probiotic and/or increase foods with probiotics such as yogurt, Hong, sauerkraut.  - Discussed that given history of C-diff after antibiotic use I would recommend starting a probiotic while on antibiotic and for a week after.    - If worsening redness, increased pain, fever while taking antibiotics return for further evaluation.      Li Marcos, CNP  [facility] AND \[facility]\""

## 2018-11-12 DIAGNOSIS — M47.816 FACET ARTHROPATHY, LUMBAR: ICD-10-CM

## 2018-11-12 DIAGNOSIS — M47.812 FACET ARTHROPATHY, CERVICAL: ICD-10-CM

## 2018-11-12 DIAGNOSIS — M79.7 FIBROMYALGIA: ICD-10-CM

## 2018-11-16 NOTE — PROGRESS NOTES
[facility] Care Coordination Contact    Service Provider Contact    Return email from [facility]. Client has been placed on their wait list for homemaking. They are estimating about 3-4 weeks before services can begin. Client was aware there would be a wait.     Vanesa Gtz RN  [facility]  [phone]  Fax: [phone]

## 2018-11-19 RX ORDER — TRAMADOL HYDROCHLORIDE 50 MG/1
100 TABLET ORAL 3 TIMES DAILY
Qty: 180 TABLET | Refills: 0 | Status: SHIPPED | OUTPATIENT
Start: 2018-11-19 | End: 2018-12-27

## 2018-11-19 NOTE — TELEPHONE ENCOUNTER
Rx to go to [facility].  Patient is out of medication, wating for 1 week.      Tramadol 50 mg      Last Written Prescription Date:  10/11/18  Last Fill Quantity: 180,   # refills: 0  Last Office Visit: 10/1/18  Future Office visit:       Routing refill request to provider for review/approval because:  Drug not on the [facility], [facility] or  Health refill protocol or controlled substance  Signed CSA on file  RN unable to look at  as provider has not granted acess yet

## 2018-12-05 ENCOUNTER — TELEPHONE (OUTPATIENT)
Dept: INTERNAL MEDICINE | Facility: CLINIC | Age: 68
End: 2018-12-05

## 2018-12-05 NOTE — LETTER
[facility]  [address], [address]  [place] [address]  [phone]        January 2, 2019    Vijaya Manjarrez  [id number] [place] [address] [address]  [facility] [address]            Dear Vijaya,    We sent you a letter a couple of weeks ago informing you of health maintenance that is due. We hope that you received it. This letter is just a follow up to remind you to schedule an appointment.         Sincerely,        Your [facility]

## 2018-12-05 NOTE — TELEPHONE ENCOUNTER
Panel Management Review      Patient has the following on her problem list:     Diabetes    ASA: Failed    Last A1C  Lab Results   Component Value Date    A1C 6.9 03/03/2018    A1C 6.8 06/17/2017    A1C 6.7 02/20/2017    A1C 6.2 07/12/2016    A1C 7.0 03/04/2016     A1C tested: FAILED    Last LDL:    Lab Results   Component Value Date    CHOL 152 03/03/2018     Lab Results   Component Value Date    HDL 53 03/03/2018     Lab Results   Component Value Date    LDL 56 03/03/2018     Lab Results   Component Value Date    TRIG 213 03/03/2018     No results found for: CHOLHDLRATIO  Lab Results   Component Value Date    NHDL 99 03/03/2018       Is the patient on a Statin? YES             Is the patient on Aspirin? NO    Medications     HMG CoA Reductase Inhibitors    atorvastatin (LIPITOR) 40 MG tablet          Last three blood pressure readings:  BP Readings from Last 3 Encounters:   10/01/18 140/86   07/24/18 140/70   07/02/18 148/90       Date of last diabetes office visit: 7/2/18     Tobacco History:     History   Smoking Status     Former Smoker     Packs/day: 1.00     Years: 40.00     Types: Cigarettes     Quit date: 6/1/2017   Smokeless Tobacco     Never Used           Composite cancer screening  Chart review shows that this patient is due/due soon for the following Mammogram  Summary:    Patient is due/failing the following:   MAMMOGRAM    Action needed:   The order is already in. She just need to call and make an appointment and come in for the mammogram.    Type of outreach:    Sent letter.    Questions for provider review:    None                                                                                                                                    Peggy Gottlieb MA       Chart routed to none .

## 2018-12-05 NOTE — LETTER
[facility]  [address], [address]  [place] [address]  [phone]        December 5, 2018    Vijaya Manjarrez  [address]  [facility] [address]            Dear Vijaya,  In order to ensure we are providing the best quality care, we have reviewed your chart and see that you are due for a mammogram. We already put the order in for your mammogram.   Please call the clinic at your earliest convenience to schedule an appointment.   Thank you for trusting us with your health care.    Sincerely,        Dr. Yayo Mendoza

## 2018-12-20 ENCOUNTER — PATIENT OUTREACH (OUTPATIENT)
Dept: GERIATRIC MEDICINE | Facility: CLINIC | Age: 68
End: 2018-12-20

## 2018-12-20 DIAGNOSIS — J44.9 CHRONIC OBSTRUCTIVE PULMONARY DISEASE, UNSPECIFIED COPD TYPE (H): ICD-10-CM

## 2018-12-20 NOTE — TELEPHONE ENCOUNTER
"Requested Prescriptions   Pending Prescriptions Disp Refills     ADVAIR DISKUS 250-50 MCG/DOSE inhaler [Pharmacy Med Name: ADVAIR DISKUS 250-50MCG/DOSE AEPB]  Last Written Prescription Date: 12/04/2017  Last Fill Quantity: 1 inhaler, # Refills: 5  Last Office Visit: 10/01/2018 Reji  Future Office visit:       0     Sig: INHALE 1 PUFF INTO THE LUNGS TWO TIMES A DAY    Inhaled Steroids Protocol Passed - 12/20/2018 12:18 PM       Passed - Patient is age 12 or older       Passed - Recent (12 mo) or future (30 days) visit within the authorizing provider's specialty    Patient had office visit in the last 12 months or has a visit in the next 30 days with authorizing provider or within the authorizing provider's specialty.  See \"Patient Info\" tab in inbasket, or \"Choose Columns\" in Meds & Orders section of the refill encounter.                "

## 2018-12-20 NOTE — PROGRESS NOTES
[facility] Care Coordination Contact    Called member to complete six month assessment and left a message requesting a return call.    Vanesa Gtz RN  [facility]  [phone]  Fax: [phone]

## 2018-12-24 DIAGNOSIS — M47.812 FACET ARTHROPATHY, CERVICAL: ICD-10-CM

## 2018-12-24 DIAGNOSIS — M47.816 FACET ARTHROPATHY, LUMBAR: ICD-10-CM

## 2018-12-24 DIAGNOSIS — M79.7 FIBROMYALGIA: ICD-10-CM

## 2018-12-26 DIAGNOSIS — M54.50 CHRONIC BILATERAL LOW BACK PAIN WITHOUT SCIATICA: ICD-10-CM

## 2018-12-26 DIAGNOSIS — G89.29 CHRONIC BILATERAL LOW BACK PAIN WITHOUT SCIATICA: ICD-10-CM

## 2018-12-26 DIAGNOSIS — M79.7 FIBROMYALGIA: ICD-10-CM

## 2018-12-26 RX ORDER — PREGABALIN 150 MG/1
150 CAPSULE ORAL 3 TIMES DAILY
Qty: 270 CAPSULE | Refills: 1 | Status: CANCELLED | OUTPATIENT
Start: 2018-12-26

## 2018-12-26 NOTE — TELEPHONE ENCOUNTER
Reason for Call:  Medication or medication refill:    Do you use a [facility]?  Name of the pharmacy and phone number for the current request:  [facility] pharmacy    Name of the medication requested: lyrica    Other request: na    Can we leave a detailed message on this number? YES    Phone number patient can be reached at: Home number on file [phone] (home)    Best Time: any    Call taken on 12/26/2018 at 2:44 PM by Merary Sosa

## 2018-12-27 RX ORDER — TRAMADOL HYDROCHLORIDE 50 MG/1
100 TABLET ORAL 3 TIMES DAILY
Qty: 180 TABLET | Refills: 0 | Status: SHIPPED | OUTPATIENT
Start: 2018-12-27 | End: 2019-01-31

## 2018-12-27 NOTE — TELEPHONE ENCOUNTER
Pt calls for her Tramadol refill. Fax to [facility].     Last refill on 11/19/18 for #180    Last OV on 10/1/18    Routing refill request to provider for review/approval because:  Drug not on the [facility] refill protocol

## 2018-12-27 NOTE — PROGRESS NOTES
[facility] Care Coordination Contact    Second call to member to complete six month assessment and left a message requesting a return call.    Vanesa Gtz RN  [facility]  [phone]  Fax: [phone]

## 2018-12-27 NOTE — TELEPHONE ENCOUNTER
Lyrica refill request.     There is a refill on this. Call to [facility]. He states this is ready for her.

## 2018-12-28 NOTE — PROGRESS NOTES
[facility] Care Coordination Contact      [facility] Six-Month Telephone Assessment    6 month telephone assessment completed on 12/28/18. Client shares that she is doing okay. She is struggling to get her pain meds refilled- working with the clinic and pharmacy. She was able to spend time with her family over [place] and enjoyed that.     ER visits: No  Hospitalizations: No  TCU stays: No  Significant health status changes: No  Falls/Injuries: No  ADL/IADL changes: No  Changes in services: Yes: Was contacted by [facility] to arrange a new homemaker, but she can't find the number to call back. CC provided the number.     Goals: See POC in chart for goal progress documentation.      Will see member in 6 months for an annual health risk assessment.   Encouraged member to call CC with any questions or concerns in the meantime.     Vanesa Gtz RN  [facility]  [phone]  Fax: [phone]

## 2019-01-14 NOTE — PROGRESS NOTES
[facility] Care Coordination Contact    Service Provider Contact    Email from [facility] who shares that they completed intake for client on 1/7/19. New auth will be sent reflecting this start date. CP updated and sent to client- nothing sent to provider per client's choice.     Vanesa Gtz RN  [facility]  [phone]  Fax: [phone]

## 2019-01-23 DIAGNOSIS — E11.40 TYPE 2 DIABETES MELLITUS WITH DIABETIC NEUROPATHY, WITHOUT LONG-TERM CURRENT USE OF INSULIN (H): ICD-10-CM

## 2019-01-23 DIAGNOSIS — M51.369 LUMBAR DEGENERATIVE DISC DISEASE: ICD-10-CM

## 2019-01-23 DIAGNOSIS — M62.838 MUSCLE SPASM: ICD-10-CM

## 2019-01-25 RX ORDER — CYCLOBENZAPRINE HCL 10 MG
10 TABLET ORAL 2 TIMES DAILY
Qty: 180 TABLET | Refills: 1 | Status: SHIPPED | OUTPATIENT
Start: 2019-01-25 | End: 2019-08-08

## 2019-01-25 RX ORDER — DULOXETIN HYDROCHLORIDE 60 MG/1
60 CAPSULE, DELAYED RELEASE ORAL DAILY
Qty: 90 CAPSULE | Refills: 0 | Status: SHIPPED | OUTPATIENT
Start: 2019-01-25 | End: 2019-04-26

## 2019-01-25 NOTE — TELEPHONE ENCOUNTER
"Requested Prescriptions   Pending Prescriptions Disp Refills     cyclobenzaprine (FLEXERIL) 10 MG tablet 180 tablet 0     Sig: Take 1 tablet (10 mg) by mouth 2 times daily    There is no refill protocol information for this order        DULoxetine (CYMBALTA) 60 MG capsule 90 capsule 0     Sig: Take 1 capsule (60 mg) by mouth daily    Serotonin-Norepinephrine Reuptake Inhibitors  Failed - 1/23/2019  1:57 PM       Failed - Blood pressure under 140/90 in past 12 months    BP Readings from Last 3 Encounters:   10/01/18 140/86   07/24/18 140/70   07/02/18 148/90                Passed - Recent (12 mo) or future (30 days) visit within the authorizing provider's specialty    Patient had office visit in the last 12 months or has a visit in the next 30 days with authorizing provider or within the authorizing provider's specialty.  See \"Patient Info\" tab in inbasket, or \"Choose Columns\" in Meds & Orders section of the refill encounter.             Passed - Medication is active on med list       Passed - Patient is age 18 or older       Passed - No active pregnancy on record       Passed - No positive pregnancy test in past 12 months        metFORMIN (GLUCOPHAGE) 500 MG tablet 180 tablet 0    Biguanide Agents Failed - 1/23/2019  1:57 PM       Failed - Blood pressure less than 140/90 in past 6 months    BP Readings from Last 3 Encounters:   10/01/18 140/86   07/24/18 140/70   07/02/18 148/90                Failed - Patient has documented A1c within the specified period of time.    If HgbA1C is 8 or greater, it needs to be on file within the past 3 months.  If less than 8, must be on file within the past 6 months.     Recent Labs   Lab Test 03/03/18  1023   A1C 6.9*            Passed - Patient has documented LDL within the past 12 mos.    Recent Labs   Lab Test 03/03/18  1044   LDL 56            Passed - Patient has had a Microalbumin in the past 15 mos.    Recent Labs   Lab Test 03/03/18  1045   MICROL 18   UMALCR 11.87         " "   Passed - Patient is age 10 or older       Passed - Patient's CR is NOT>1.4 OR Patient's EGFR is NOT<45 within past 12 mos.    Recent Labs   Lab Test 03/03/18  1023   GFRESTIMATED 77   GFRESTBLACK >90       Recent Labs   Lab Test 03/03/18  1023   CR 0.75            Passed - Patient does NOT have a diagnosis of CHF.       Passed - Medication is active on med list       Passed - Patient is not pregnant       Passed - Patient has not had a positive pregnancy test within the past 12 mos.        Passed - Recent (6 mo) or future (30 days) visit within the authorizing provider's specialty    Patient had office visit in the last 6 months or has a visit in the next 30 days with authorizing provider or within the authorizing provider's specialty.  See \"Patient Info\" tab in inbasket, or \"Choose Columns\" in Meds & Orders section of the refill encounter.            Routing refill request to provider for review/approval because:  blood pressure out of range        "

## 2019-01-29 DIAGNOSIS — M79.7 FIBROMYALGIA: ICD-10-CM

## 2019-01-29 DIAGNOSIS — M47.812 FACET ARTHROPATHY, CERVICAL: ICD-10-CM

## 2019-01-29 DIAGNOSIS — M47.816 FACET ARTHROPATHY, LUMBAR: ICD-10-CM

## 2019-01-31 RX ORDER — TRAMADOL HYDROCHLORIDE 50 MG/1
100 TABLET ORAL 3 TIMES DAILY
Qty: 180 TABLET | Refills: 0 | Status: SHIPPED | OUTPATIENT
Start: 2019-01-31 | End: 2019-03-05

## 2019-01-31 NOTE — TELEPHONE ENCOUNTER
Pending Prescriptions:                       Disp   Refills    traMADol (ULTRAM) 50 MG tablet            180 ta*0            Sig: Take 2 tablets (100 mg) by mouth 3 times daily          Last Written Prescription Date:  12/27/18  Last Fill Quantity: 180,   # refills: 0  Last Office Visit: 10/01/18  Future Office visit:    Next 5 appointments (look out 90 days)    Feb 05, 2019  2:00 PM CST  SHORT with Yayo Mendoza MD  [facility] [address]  [facility] [id number] [phone]           Routing refill request to provider for review/approval because:  Drug not on the [facility], [facility] or  Health refill protocol or controlled substance

## 2019-02-05 ENCOUNTER — OFFICE VISIT (OUTPATIENT)
Dept: INTERNAL MEDICINE | Facility: CLINIC | Age: 69
End: 2019-02-05
Payer: COMMERCIAL

## 2019-02-05 ENCOUNTER — HOSPITAL ENCOUNTER (OUTPATIENT)
Dept: MAMMOGRAPHY | Facility: CLINIC | Age: 69
Discharge: HOME | End: 2019-02-05
Attending: INTERNAL MEDICINE | Admitting: INTERNAL MEDICINE
Payer: COMMERCIAL

## 2019-02-05 VITALS
OXYGEN SATURATION: 94 % | SYSTOLIC BLOOD PRESSURE: 102 MMHG | RESPIRATION RATE: 14 BRPM | HEIGHT: 69 IN | DIASTOLIC BLOOD PRESSURE: 69 MMHG | HEART RATE: 128 BPM | TEMPERATURE: 98.3 F | WEIGHT: 209.2 LBS | BODY MASS INDEX: 30.98 KG/M2

## 2019-02-05 DIAGNOSIS — Z79.899 CONTROLLED SUBSTANCE AGREEMENT SIGNED: ICD-10-CM

## 2019-02-05 DIAGNOSIS — Z12.31 ENCOUNTER FOR SCREENING MAMMOGRAM FOR BREAST CANCER: ICD-10-CM

## 2019-02-05 DIAGNOSIS — E11.40 TYPE 2 DIABETES MELLITUS WITH DIABETIC NEUROPATHY, WITHOUT LONG-TERM CURRENT USE OF INSULIN (H): ICD-10-CM

## 2019-02-05 DIAGNOSIS — I25.9 CHEST PAIN DUE TO MYOCARDIAL ISCHEMIA, UNSPECIFIED ISCHEMIC CHEST PAIN TYPE: Primary | ICD-10-CM

## 2019-02-05 DIAGNOSIS — I10 BENIGN ESSENTIAL HYPERTENSION: ICD-10-CM

## 2019-02-05 DIAGNOSIS — Z11.59 ENCOUNTER FOR HEPATITIS C SCREENING TEST FOR LOW RISK PATIENT: ICD-10-CM

## 2019-02-05 DIAGNOSIS — R94.39 ABNORMAL STRESS TEST: ICD-10-CM

## 2019-02-05 PROCEDURE — 93000 ELECTROCARDIOGRAM COMPLETE: CPT | Performed by: INTERNAL MEDICINE

## 2019-02-05 PROCEDURE — 99214 OFFICE O/P EST MOD 30 MIN: CPT | Performed by: INTERNAL MEDICINE

## 2019-02-05 PROCEDURE — 77067 SCR MAMMO BI INCL CAD: CPT

## 2019-02-05 ASSESSMENT — ANXIETY QUESTIONNAIRES
3. WORRYING TOO MUCH ABOUT DIFFERENT THINGS: MORE THAN HALF THE DAYS
4. TROUBLE RELAXING: NEARLY EVERY DAY
1. FEELING NERVOUS, ANXIOUS, OR ON EDGE: MORE THAN HALF THE DAYS
GAD7 TOTAL SCORE: 11
7. FEELING AFRAID AS IF SOMETHING AWFUL MIGHT HAPPEN: NOT AT ALL
GAD7 TOTAL SCORE: 11
7. FEELING AFRAID AS IF SOMETHING AWFUL MIGHT HAPPEN: NOT AT ALL
GAD7 TOTAL SCORE: 11
2. NOT BEING ABLE TO STOP OR CONTROL WORRYING: MORE THAN HALF THE DAYS
6. BECOMING EASILY ANNOYED OR IRRITABLE: SEVERAL DAYS
5. BEING SO RESTLESS THAT IT IS HARD TO SIT STILL: SEVERAL DAYS

## 2019-02-05 ASSESSMENT — MIFFLIN-ST. JEOR: SCORE: 1548.3

## 2019-02-05 ASSESSMENT — PATIENT HEALTH QUESTIONNAIRE - PHQ9
10. IF YOU CHECKED OFF ANY PROBLEMS, HOW DIFFICULT HAVE THESE PROBLEMS MADE IT FOR YOU TO DO YOUR WORK, TAKE CARE OF THINGS AT HOME, OR GET ALONG WITH OTHER PEOPLE: VERY DIFFICULT
SUM OF ALL RESPONSES TO PHQ QUESTIONS 1-9: 17
SUM OF ALL RESPONSES TO PHQ QUESTIONS 1-9: 17

## 2019-02-05 NOTE — LETTER
[facility]  02/05/19    Patient: Vijaya Manjarrez  YOB: 1950  Medical Record Number: [id number]                                                                  Opioid / Opioid Plus Controlled Substance Agreement    I understand that my care provider has prescribed an opioid (narcotic) controlled substance to help manage my condition(s). I am taking this medicine to help me function or work. I know this is strong medicine, and that it can cause serious side effects. Opioid medicine can be sedating, addicting and may cause a dependency on the drug. They can affect my ability to drive or think, and cause depression. They need to be taken exactly as prescribed. Combining opioids with certain medicines or chemicals (such as cocaine, sedatives and tranquilizers, sleeping pills, meth) can be dangerous or even fatal. Also, if I stop opioids suddenly, I may have severe withdrawal symptoms. Last, I understand that opioids do not work for all types of pain nor for all patients. If not helpful, I may be asked to stop them.      The risks, benefits, and side effects of these medicine(s) were explained to me. I agree that:    1. I will take part in other treatments as advised by my care team. This may be psychiatry or counseling, physical therapy, behavioral therapy, group treatment or a referral to a pain clinic. I will reduce or stop my medicine when my care team tells me to do so.  2. I will take my medicines as prescribed. I will not change the dose or schedule unless my care team tells me to. There will be no refills if I  run out early.   I may be contactedwithout warning and asked to complete a urine drug test or pill count at any time.   3. I will keep all my appointments, and understand this is part of the monitoring of opioids. My care team may require an office visit for EVERY opioid/controlled substance refill. If I miss appointments or don t follow instructions, my care team may  stop my medicine.  4. I will not ask other providers to prescribe controlled substances, and I will not accept controlled substances from other people. If I need another prescribed controlled substance for a new reason, I will tell my care team within 1 business day.  5. I will use one pharmacy to fill all of my controlled substance prescriptions, and it is up to me to make sure that I do not run out of my medicines on weekends or holidays. If my care team is willing to refill my opioid prescription without a visit, I must request refills only during office hours, refills may take up to 3 days to process, and it may take up to 5 to 7 days for my medicine to be mailed and ready at my pharmacy. Prescriptions will not be mailed anywhere except my pharmacy.        [id number]  Rev 12/18         Registration to scan to EHR                             Page 1 of 2               Controlled Substance Agreement Opioid        [facility]  02/05/19  Patient: Vijaya Manjarrez  YOB: 1950  Medical Record Number: [id number]                                                                  6. I am responsible for my prescriptions. If the medicine/prescription is lost or stolen, it will not be replaced. I also agree not to share controlled substance medicines with anyone.  7. I agree to not use ANY illegal or recreational drugs. This includes marijuana, cocaine, bath salts or other drugs. I agree not to use alcohol unless my care team says I may.          I agree to give urine samples whenever asked. If I don t give a urine sample, the care team may stop my medicine.    8. If I enroll in the [place] Medical Marijuana program, I will tell my care team. I will also sign an agreement to share my medical records with my care team.   9. I will bring in my list of medicines (or my medicine bottles) each time I come to the clinic.   10. I will tell my care team right away if I become pregnant or have a new medical  problem treated outside of my regular clinic.  11. I understand that this medicine can affect my thinking and judgment. It may be unsafe for me to drive, use machinery and do dangerous tasks. I will not do any of these things until I know how the medicine affects me. If my dose changes, I will wait to see how it affects me. I will contact my care team if I have concerns about medicine side effects.    I understand that if I do not follow any of the conditions above, my prescriptions or treatment may be stopped.      I agree that my provider, clinic care team, and pharmacy may work with any city, state or federal law enforcement agency that investigates the misuse, sale, or other diversion of my controlled medicine. I will allow my provider to discuss my care with or share a copy of this agreement with any other treating provider, pharmacy or emergency room where I receive care. I agree to give up (waive) any right of privacy or confidentiality with respect to these consents.     I have read this agreement and have asked questions about anything I did not understand.      ________________________________________________________________________  Patient signature - Date/Time -  Vijaya Manjarrez                                      ________________________________________________________________________  Witness signature                                                            ________________________________________________________________________  Provider signature - Yayo Mendoza MD      [id number]  Rev 12/18         Registration to scan to EHR                         Page 2 of 2                   Controlled Substance Agreement Opioid           Page 1 of 2  Opioid Pain Medicines (also known as Narcotics)  What You Need to Know    What are opioids?   Opioids are pain medicines that must be prescribed by a doctor.  They are also known as narcotics.    Examples are:     morphine (MS Contin, Tere)    oxycodone  (Oxycontin)    oxycodone and acetaminophen (Percocet)    hydrocodone and acetaminophen (Vicodin, Norco)     fentanyl patch (Duragesic)     hydromorphone (Dilaudid)     methadone     What do opioids do well?   Opioids are best for short-term pain after a surgery or injury. They also work well for cancer pain. Unlike other pain medicines, they do not cause liver or kidney failure or ulcers. They may help some people with long-lasting (chronic) pain.     What do opioids NOT do well?   Opioids never get rid of pain entirely, and they do not work well for most patients with chronic pain. Opioids do not reduce swelling, one of the causes of pain. They also don t work well for nerve pain.                           For informational purposes only.  Not to replace the advice of your care provider.  Copyright 201 [facility]. All right reserved. [employer] [id number] 02/18.      Page 2 of 2    Risks and side effects   Talk to your doctor before you start or decide to keep taking one of these medicines. Side effects include:    Lowering your breathing rate enough to cause death    Overdose, including death, especially if taking higher than prescribed doses    Long-term opioid use    Worse depression symptoms; less pleasure in things you usually enjoy    Feeling tired or sluggish    Slower thoughts or cloudy thinking    Being more sensitive to pain over time; pain is harder to control    Trouble sleeping or restless sleep    Changes in hormone levels (for example, less testosterone)    Changes in sex drive or ability to have sex    Constipation    Unsafe driving    Itching and sweating    Feeling dizzy    Nausea, vomiting and dry mouth    What else should I know about opioids?  When someone takes opioids for too long or too often, they become dependent. This means that if you stop or reduce the medicine too quickly, you will have withdrawal symptoms.    Dependence is not the same as addiction. Addiction is when  people keep using a substance that harms their body, their mind or their relations with others. If you have a history of drug or alcohol abuse, taking opioids can cause a relapse.    Over time, opioids don t work as well. Most people will need higher and higher doses. The higher the dose, the more serious the side effects. We don t know the long-term effects of opioids.      Prescribed opioids aren't the best way to manage chronic pain    Other ways to manage pain include:      Ibuprofen or acetaminophen.  You should always try this first.      Treat health problems that may be causing pain.      acupuncture or massage, deep breathing, meditation, visual imagery, aromatherapy.      Use heat or ice at the pain site      Physical therapy and exercise      Stop smoking      See a counselor or therapist                                                  People who have used opioids for a long time may have a lower quality of life, worse depression, higher levels of pain and more visits to doctors.    Never share your opioids with others. Be sure to store opioids in a secure place, locked if possible.Young children can easily swallow them and overdose.     You can overdose on opioids.  Signs of overdose include decrease or loss of consciousness, slowed breathing, trouble waking and blue lips.  If someone is worried about overdose, they should call 911.    If you are at risk for overdose, you may get naloxone (Narcan, a medicine that reverses the effects of opioids.  If you overdose, a friend or family member can give you Narcan while waiting for the ambulance.  They need to know the signs of overdose and how to give Narcan.    While you're taking opioids:    Don't use alcohol or street drugs. Taking them together can cause death.    Don't take any of these medicines unless your doctor says its okay.  Taking these with opioids can cause death.    Benzodiazepines (such as lorazepam         or diazepam)    Muscle relaxers  (such as cyclobenzaprine)    sleeping pills    other opioids    Safe disposal of opioids  Find your area drug take-back program, your pharmacy mail-back program, buy a special disposal bag (such as Deterra) from your pharmacy or flush them down the toilet.  Use the guidelines at:  www.fda.gov/drugs/resourcesforyou

## 2019-02-05 NOTE — NURSING NOTE
"Vital signs:  Temp: 98.3  F (36.8  C) Temp src: Oral BP: 102/69 Pulse: 128   Resp: 14 SpO2: 94 %     Height: 175.3 cm (5' 9\") Weight: 94.9 kg (209 lb 3.2 oz)  Estimated body mass index is 30.89 kg/m  as calculated from the following:    Height as of this encounter: 1.753 m (5' 9\").    Weight as of this encounter: 94.9 kg (209 lb 3.2 oz).        "

## 2019-02-05 NOTE — PROGRESS NOTES
ASSESSMENT/PLAN:       1. Chest pain due to myocardial ischemia, unspecified ischemic chest pain type   Patient with exertional dyspnea and several CV risk factors(HLD, HTN, previous smoker also diabetes)  Given chronic pain, unable to do exercise stress  Baseline ECG obtained showing rightward axis, otherwise no st-t changes    Update: Lexiscan stress showing small equivocal area of apical ischemia  Cards referral placed, angiogram planned    - NM Lexiscan stress test; Future  - EKG 12-lead complete w/read - Clinics    2. Type 2 diabetes mellitus with diabetic neuropathy, without long-term current use of insulin (H)  Current regimen: metformin 500mg BID, A1c 6.9%  Microalbumin: sent  Foot exam: done today  Eye exam: referral given    Prevention:  Patient on lipitor 40mg daily, asa    - OPHTHALMOLOGY ADULT REFERRAL  - Lipid panel reflex to direct LDL Fasting; Future  - Hemoglobin A1c; Future  - Albumin Random Urine Quantitative with Creat Ratio; Future  - Comprehensive metabolic panel; Future    3. Controlled substance agreement signed  Signed for tramadol (was on this from previous PCP)    4. Encounter for hepatitis C screening test for low risk patient    - **Hepatitis C Screen Reflex to RNA FUTURE anytime; Future    5. Benign essential hypertension  Well controlled, continue lisinopril 20mg BID, amlodipine 5mg started    Yayo Mendoza MD  [facility]  Mammo done 2/19 normal  Colonoscopy was normal 2014.  Pneumonia 24 and prevnar 13 given, and pneumonia 23 booster given after 66  Tetanus given 2015  Zoster vaccine 2015   DEXA done 3/2018 showing normal bone density  Patient reports quitting smoking, she would be candidate to get screened for lung cancer.  She did have CT chest last year 2017 but no comment on nodules at the time        SUBJECTIVE:   Vijaya Manjarrez is a 68 year old female who presents to clinic today for the following health issues:    Diabetes  Follow-up    Patient is checking blood sugars: not at all    Diabetic concerns: None     Symptoms of hypoglycemia (low blood sugar): none     Paresthesias (numbness or burning in feet) or sores: Yes, numbness in legs, arms, and lower back.     Date of last diabetic eye exam: She is over due.  Diabetes Management Resources    Hyperlipidemia Follow-Up    Rate your low fat/cholesterol diet?: fair    Taking statin?  Yes, no muscle aches from statin    Other lipid medications/supplements?:  none    Hypertension Follow-up    Outpatient blood pressures are being checked at home.  Results are 177/137.  She reports values are 140s average or higher.    Low Salt Diet: Trying to get better.      BP Readings from Last 2 Encounters:   02/05/19 102/69   10/01/18 140/86     Hemoglobin A1C (%)   Date Value   03/03/2018 6.9 (H)   06/17/2017 6.8 (H)     LDL Cholesterol Calculated (mg/dL)   Date Value   03/03/2018 56   02/20/2017 58       Amount of exercise or physical activity: None    Problems taking medications regularly: No    Medication side effects: none  Diet: regular (no restrictions)    Depression:  Mood has been depressed recently given chronic medical issues  On cymbalta 60mg  No suicidal ideation    Chest pain:   Left-sided chest pain, and shortness of breath with exertion  Notes this in the last month, she noticed this even with walking short distances  CV risk factors: HLD, HTN, previous smoker also diabetes  Last stress in 2010 with no evidence of myocardial infarction or ischemia      Problem list and histories reviewed & adjusted, as indicated.  Additional history: as documented    Patient Active Problem List   Diagnosis     HCD (health care directive)     Type 2 diabetes mellitus with diabetic neuropathy, without long-term current use of insulin (H)     COPD (chronic obstructive pulmonary disease) (H)     Cervical spine degeneration     Facet arthropathy, lumbar     Lumbar degenerative disc disease     Migraine  "headache     Diabetic neuropathy (H)     Anxiety     GERD (gastroesophageal reflux disease)     Hyperlipidemia LDL goal <100     Fibromyalgia     Benign essential hypertension     Controlled substance agreement signed     Chronic pain syndrome     Health Care Home     Advanced directives, counseling/discussion     Narcotic dependence (H)     Mild episode of recurrent major depressive disorder (H)     Past Surgical History:   Procedure Laterality Date     CHOLECYSTECTOMY, LAPOROSCOPIC      Cholecystectomy, Laparoscopic       Social History     Tobacco Use     Smoking status: Former Smoker     Packs/day: 1.00     Years: 40.00     Pack years: 40.00     Types: Cigarettes     Last attempt to quit: 2017     Years since quittin.6     Smokeless tobacco: Never Used   Substance Use Topics     Alcohol use: No     Family History   Problem Relation Age of Onset     Cancer Mother      Cancer Father         Lung cancer     Cancer Maternal Grandmother         Breast cancer           Reviewed and updated as needed this visit by clinical staff  Tobacco  Allergies  Meds  Med Hx  Surg Hx  Fam Hx  Soc Hx      Reviewed and updated as needed this visit by Provider         ROS:  Constitutional, HEENT, cardiovascular, pulmonary, gi and gu systems are negative, except as otherwise noted.    OBJECTIVE:     /69 (BP Location: Right arm, Patient Position: Sitting, Cuff Size: Adult Large)   Pulse 128   Temp 98.3  F (36.8  C) (Oral)   Resp 14   Ht 1.753 m (5' 9\")   Wt 94.9 kg (209 lb 3.2 oz)   SpO2 94%   BMI 30.89 kg/m    Body mass index is 30.89 kg/m .  GENERAL: healthy, alert and no distress  NECK: no adenopathy, no asymmetry, masses, or scars and thyroid normal to palpation  RESP: lungs clear to auscultation - no rales, rhonchi or wheezes  CV: regular rate and rhythm, normal S1 S2, no S3 or S4, no murmur, click or rub, no peripheral edema and peripheral pulses strong  ABDOMEN: soft, nontender, no hepatosplenomegaly, " no masses and bowel sounds normal  MS: no gross musculoskeletal defects noted, no edema  NEURO: Normal strength and tone, mentation intact and speech normal  PSYCH: mentation appears normal, affect normal/bright    Diagnostic Test Results:  none       Answers for HPI/ROS submitted by the patient on 2/5/2019   If you checked off any problems, how difficult have these problems made it for you to do your work, take care of things at home, or get along with other people?: Very difficult  PHQ9 TOTAL SCORE: 17  BRADY 7 TOTAL SCORE: 11

## 2019-02-06 ASSESSMENT — ANXIETY QUESTIONNAIRES: GAD7 TOTAL SCORE: 11

## 2019-02-11 ENCOUNTER — TELEPHONE (OUTPATIENT)
Dept: INTERNAL MEDICINE | Facility: CLINIC | Age: 69
End: 2019-02-11

## 2019-02-11 NOTE — TELEPHONE ENCOUNTER
Pt was asking about the NM Lexiscan test that is scheduled for Wednesday.     She would like to know what the test looks for and if it will show calcium buildup in her arteries?    Please advise.

## 2019-02-13 ENCOUNTER — HOSPITAL ENCOUNTER (OUTPATIENT)
Dept: NUCLEAR MEDICINE | Facility: CLINIC | Age: 69
Setting detail: NUCLEAR MEDICINE
End: 2019-02-13
Attending: INTERNAL MEDICINE
Payer: COMMERCIAL

## 2019-02-13 ENCOUNTER — HOSPITAL ENCOUNTER (OUTPATIENT)
Dept: CARDIOLOGY | Facility: CLINIC | Age: 69
Discharge: HOME | End: 2019-02-13
Attending: INTERNAL MEDICINE | Admitting: INTERNAL MEDICINE
Payer: COMMERCIAL

## 2019-02-13 DIAGNOSIS — I25.9 CHEST PAIN DUE TO MYOCARDIAL ISCHEMIA, UNSPECIFIED ISCHEMIC CHEST PAIN TYPE: ICD-10-CM

## 2019-02-13 PROCEDURE — 93017 CV STRESS TEST TRACING ONLY: CPT

## 2019-02-13 PROCEDURE — 93018 CV STRESS TEST I&R ONLY: CPT | Performed by: INTERNAL MEDICINE

## 2019-02-13 PROCEDURE — 25000128 H RX IP 250 OP 636

## 2019-02-13 PROCEDURE — 34300033 ZZH RX 343: Performed by: INTERNAL MEDICINE

## 2019-02-13 PROCEDURE — 78452 HT MUSCLE IMAGE SPECT MULT: CPT | Mod: 26 | Performed by: INTERNAL MEDICINE

## 2019-02-13 PROCEDURE — 93016 CV STRESS TEST SUPVJ ONLY: CPT | Performed by: INTERNAL MEDICINE

## 2019-02-13 PROCEDURE — A9502 TC99M TETROFOSMIN: HCPCS | Performed by: INTERNAL MEDICINE

## 2019-02-13 PROCEDURE — 78452 HT MUSCLE IMAGE SPECT MULT: CPT

## 2019-02-13 RX ORDER — REGADENOSON 0.08 MG/ML
INJECTION, SOLUTION INTRAVENOUS
Status: COMPLETED
Start: 2019-02-13 | End: 2019-02-13

## 2019-02-13 RX ADMIN — TETROFOSMIN 32.2 MCI.: 1.38 INJECTION, POWDER, LYOPHILIZED, FOR SOLUTION INTRAVENOUS at 15:03

## 2019-02-13 RX ADMIN — REGADENOSON 0.4 MG: 0.08 INJECTION, SOLUTION INTRAVENOUS at 14:25

## 2019-02-13 RX ADMIN — TETROFOSMIN 11 MCI.: 1.38 INJECTION, POWDER, LYOPHILIZED, FOR SOLUTION INTRAVENOUS at 13:03

## 2019-02-13 NOTE — PROGRESS NOTES
Pre-procedure:  Are you having any pain or shortness of breath (prior to starting)? Short breath  Initial vital signs: /104, HR 90, RR 16  Allergies reviewed: pcn   Rhythm: Sinus  Medications taken within 48 hours of procedure: none   Sublingual nitro within the last 48 hours:none  Last Caffeine: none  Lung sounds: CTA, no wheezing, crackles or rtx  audible upper airway sounds  Health History (COPD, Asthma, etc): copd           Procedure: Lexiscan  Reaction/symptoms after receiving Tatiana injection: Shortness of breath  Intensity of Pain: none  Rhythm: sinus  1. Vital Signs:?72, , RR 16  2. Vital Signs:/68, HR 97, RR 18     Reversal agent: cola    Post:   Resolution of symptoms?: YES  Vital signs: /76, HR 95, RR 17  Vital signs: /68, HR 90, RR 17  Rhythm: sinus  Walk: NO  Comment: none  Return to Radiology

## 2019-02-20 ENCOUNTER — PATIENT OUTREACH (OUTPATIENT)
Dept: GERIATRIC MEDICINE | Facility: CLINIC | Age: 69
End: 2019-02-20

## 2019-02-20 DIAGNOSIS — J44.9 CHRONIC OBSTRUCTIVE PULMONARY DISEASE, UNSPECIFIED COPD TYPE (H): ICD-10-CM

## 2019-02-20 NOTE — TELEPHONE ENCOUNTER
"Requested Prescriptions   Pending Prescriptions Disp Refills     montelukast (SINGULAIR) 10 MG tablet [Pharmacy Med Name: MONTELUKAST SODIUM 10MG TABS]  Last Written Prescription Date:  3/6/2018  Last Fill Quantity: 90,  # refills: 3   Last office visit: No previous visit found with prescribing provider:     Future Office Visit:   90 tablet 0     Sig: TAKE ONE TABLET BY MOUTH AT BEDTIME    Leukotriene Inhibitors Protocol Passed - 2/20/2019  7:53 AM       Passed - Patient is age 12 or older    If patient is under 16, ok to refill using age based dosing.          Passed - Recent (12 mo) or future (30 days) visit within the authorizing provider's specialty    Patient had office visit in the last 12 months or has a visit in the next 30 days with authorizing provider or within the authorizing provider's specialty.  See \"Patient Info\" tab in inbasket, or \"Choose Columns\" in Meds & Orders section of the refill encounter.             Passed - Medication is active on med list        "

## 2019-02-20 NOTE — PROGRESS NOTES
[facility] Care Coordination Contact    Member Signature - POC Change:  Per CC, member has made a change to their POC.  Care Plan Change Letter mailed to member for signature with a self-addressed return envelope. and 2nd Attempt: Signed Letter not received from member, resent per process.    Kalpana Ny  Case Management Specialist  [facility]  [phone]

## 2019-02-21 ENCOUNTER — OFFICE VISIT (OUTPATIENT)
Dept: CARDIOLOGY | Facility: CLINIC | Age: 69
End: 2019-02-21
Attending: INTERNAL MEDICINE
Payer: COMMERCIAL

## 2019-02-21 VITALS
BODY MASS INDEX: 30.96 KG/M2 | SYSTOLIC BLOOD PRESSURE: 138 MMHG | WEIGHT: 209 LBS | DIASTOLIC BLOOD PRESSURE: 78 MMHG | HEART RATE: 100 BPM | HEIGHT: 69 IN

## 2019-02-21 DIAGNOSIS — I10 BENIGN ESSENTIAL HYPERTENSION: ICD-10-CM

## 2019-02-21 DIAGNOSIS — E78.5 DYSLIPIDEMIA: ICD-10-CM

## 2019-02-21 DIAGNOSIS — I25.10 CAD (CORONARY ARTERY DISEASE): Primary | ICD-10-CM

## 2019-02-21 DIAGNOSIS — I99.8 OTHER DISORDER OF CIRCULATORY SYSTEM: ICD-10-CM

## 2019-02-21 DIAGNOSIS — I25.10 CORONARY ARTERY DISEASE INVOLVING NATIVE CORONARY ARTERY, ANGINA PRESENCE UNSPECIFIED, UNSPECIFIED WHETHER NATIVE OR TRANSPLANTED HEART: Primary | ICD-10-CM

## 2019-02-21 DIAGNOSIS — I25.10 CAD (CORONARY ARTERY DISEASE): ICD-10-CM

## 2019-02-21 LAB
ANION GAP SERPL CALCULATED.3IONS-SCNC: 2 MMOL/L (ref 3–14)
APTT PPP: 34 SEC (ref 22–37)
BUN SERPL-MCNC: 13 MG/DL (ref 7–30)
CALCIUM SERPL-MCNC: 9 MG/DL (ref 8.5–10.1)
CHLORIDE SERPL-SCNC: 109 MMOL/L (ref 94–109)
CO2 SERPL-SCNC: 27 MMOL/L (ref 20–32)
CREAT SERPL-MCNC: 0.68 MG/DL (ref 0.52–1.04)
ERYTHROCYTE [DISTWIDTH] IN BLOOD BY AUTOMATED COUNT: 14.1 % (ref 10–15)
GFR SERPL CREATININE-BSD FRML MDRD: 90 ML/MIN/{1.73_M2}
GLUCOSE SERPL-MCNC: 97 MG/DL (ref 70–99)
HCT VFR BLD AUTO: 42.1 % (ref 35–47)
HGB BLD-MCNC: 13.2 G/DL (ref 11.7–15.7)
INR PPP: 0.97 (ref 0.86–1.14)
MCH RBC QN AUTO: 27.6 PG (ref 26.5–33)
MCHC RBC AUTO-ENTMCNC: 31.4 G/DL (ref 31.5–36.5)
MCV RBC AUTO: 88 FL (ref 78–100)
PLATELET # BLD AUTO: 299 10E9/L (ref 150–450)
POTASSIUM SERPL-SCNC: 5.6 MMOL/L (ref 3.4–5.3)
RBC # BLD AUTO: 4.79 10E12/L (ref 3.8–5.2)
SODIUM SERPL-SCNC: 138 MMOL/L (ref 133–144)
WBC # BLD AUTO: 9.3 10E9/L (ref 4–11)

## 2019-02-21 PROCEDURE — 80048 BASIC METABOLIC PNL TOTAL CA: CPT | Performed by: INTERNAL MEDICINE

## 2019-02-21 PROCEDURE — 85730 THROMBOPLASTIN TIME PARTIAL: CPT | Performed by: INTERNAL MEDICINE

## 2019-02-21 PROCEDURE — 99205 OFFICE O/P NEW HI 60 MIN: CPT | Performed by: INTERNAL MEDICINE

## 2019-02-21 PROCEDURE — 36415 COLL VENOUS BLD VENIPUNCTURE: CPT | Performed by: INTERNAL MEDICINE

## 2019-02-21 PROCEDURE — 85027 COMPLETE CBC AUTOMATED: CPT | Performed by: INTERNAL MEDICINE

## 2019-02-21 PROCEDURE — 85610 PROTHROMBIN TIME: CPT | Performed by: INTERNAL MEDICINE

## 2019-02-21 RX ORDER — MONTELUKAST SODIUM 10 MG/1
TABLET ORAL
Qty: 90 TABLET | Refills: 3 | Status: SHIPPED | OUTPATIENT
Start: 2019-02-21 | End: 2020-02-24

## 2019-02-21 RX ORDER — NITROGLYCERIN 0.4 MG/1
TABLET SUBLINGUAL
Qty: 30 TABLET | Refills: 3 | Status: SHIPPED | OUTPATIENT
Start: 2019-02-21 | End: 2020-06-25

## 2019-02-21 RX ORDER — METOPROLOL TARTRATE 25 MG/1
25 TABLET, FILM COATED ORAL 2 TIMES DAILY
Qty: 180 TABLET | Refills: 3 | Status: SHIPPED | OUTPATIENT
Start: 2019-02-21 | End: 2019-03-05

## 2019-02-21 RX ORDER — ASPIRIN 81 MG/1
81 TABLET, CHEWABLE ORAL DAILY
Qty: 90 TABLET | Refills: 3 | COMMUNITY
Start: 2019-02-21 | End: 2020-07-07

## 2019-02-21 ASSESSMENT — MIFFLIN-ST. JEOR: SCORE: 1547.4

## 2019-02-21 NOTE — PROGRESS NOTES
Service Date: 02/21/2019      HISTORY OF PRESENT ILLNESS:  Vijaya Manjarrez, a 68-year-old woman with diabetes, hypertension, previous smoking history, and a family history of premature coronary disease  was evaluated in consultation at your request for chest discomfort and an abnormal nuclear stress test.      For the last 3 weeks, Ms. Manjarrez has been troubled by progressive exertion associated chest discomfort and dyspnea despite current medical therapy.  She describes a substernal aching and heaviness that radiates to her arm and her back.  The discomfort has not occurred at rest nor has it awakened her from sleep.  You referred her for a nuclear stress test 02/13/2019 that showed an ejection fraction of 82% with a small equivocal area of apical ischemia.  Cardiology consultation was requested.      The patient's symptoms have not changed since she last saw you in the clinic.  She is on aspirin, amlodipine and atorvastatin, but has not yet been placed on a beta blocker.      PAST MEDICAL HISTORY:   1.  Type 2 diabetes mellitus.   2.  Hypertension - currently on amlodipine and lisinopril.   3.  Cigarette smoking history - smoked for many years, up to more than half a pack per day.  Recently stopped.   4.  Dyslipidemia - on atorvastatin.   5.  Fibromyalgia/chronic pain syndrome - on pregabalin 150 mg t.i.d. and tramadol 50 mg t.i.d.   6.  Depression - on trazodone and duloxetine.      ALLERGIES:  None reported.      HABITS:  The patient no longer smokes cigarettes.  She does not abuse alcohol.      SOCIAL HISTORY:  The patient is single.  She has 2 children.  She has 2 grandchildren.  She lives alone.  She is unemployed and she states that she cannot work due to fibromyalgia.      REVIEW OF SYSTEMS:  A 12-point review of systems was performed.  Outside the issues mentioned in the HPI, there are no other complaints.      PHYSICAL EXAMINATION:   GENERAL:  Exam today demonstrates a pleasant, soft spoken  "68-year-old woman who looks older than stated age.   VITAL SIGNS:  Her blood pressure is 138/78, her heart rate was 85.   LUNGS:  Clear to percussion and auscultation.   CARDIOVASCULAR:  Shows a normal S1 with a normal S2.  There is no S3.  There is no murmur, rub or click.  Her pulses are symmetrical in the carotid, radial, brachial, femoral, popliteal, dorsalis pedis and posterior tibials.   ABDOMEN:  Bowel sounds are present in all 4 quadrants.  Liver percusses to 7 cm, spleen was not palpable.  The aorta is not tender.   EXTREMITIES:  In the lower extremities there is no swelling, cyanosis or clubbing.   NEUROLOGIC:  Cranial nerves II-XII are intact.  Her strength equal and symmetrical.  She displays normal insight and judgment.      LABORATORY STUDIES:  Her most recent lipid profile from 03/2018 while on atorvastatin showed total cholesterol of 152, HDL of 53, LDL of 56, triglycerides of 213.  Her most recent creatinine was 0.75 with a GFR of 77.  Potassium was 3.9.  Her most recent hemoglobin A1c  was 6.9.  Her ECG during visit in your office on 02/05/2019 showed a sinus tachycardia with nonspecific intraventricular conduction delay and nonspecific ST-T wave changes.      Nuclear stress test reports an ejection fraction of 82% with \"equivocal apical seen.\"      ASSESSMENT:  This 68-year-old woman with  diabetes mellitus, hypertension, previous smoking , and a family history of premature coronary disease presents with a 3-week history of progressive chest discomfort  suspicious for angina.  She has been treated with both amlodipine, atorvatstatin, but as yet is on on a beta blocker.      In view of the progressive nature of her symptoms and the equivocal nature of her nuclear stress test, I would advise diagnostic coronary angiography to determine whether she would benefit from mechanical revascularization.      RECOMMENDATIONS:   1.  Add metoprolol 25 b.i.d.   2.  Continue aspirin, high-potency statin therapy, " amlodipine and lisinopril.   3.  Diagnostic coronary angiography with possible revascularization.  I have explained to the patient the risks of death, myocardial infarction, stroke, hematoma, peripheral vascular complications, possible need for urgent bypass surgery in the event of failed attempt at intervention, the use of dual antiplatelet therapy, the use of fractional flow reserve testing, and the option of medical therapy alone without further testing.  She voiced understanding and wishes to proceed.      We greatly appreciate the opportunity to participate in the care of your patient, Vijaya Braga.      cc:   Yayo Mendoza MD   [facility] [address] [place] [address]         COURT HALL MD             D: 2019   T: 2019   MT: DELLA      Name:     VIJAYA BRAGA   MRN:      [id number]        Account:      [id number]   :      1950           Service Date: 2019      Document: [id number]

## 2019-02-21 NOTE — LETTER
2/21/2019    Yayo Mendoza MD  [address]  [facility] [address]    RE: Vijaya Manjarrez       Dear Colleague,    I had the pleasure of seeing Vijaya Manjarrez in the [facility].    HISTORY OF PRESENT ILLNESS:  3 week history of exertional chest discomfort radiates to back and arms, progressive. Smoked in past ( heavily) DM, HTN, FHX dad 60. Fibromyalgia. Regadenoson stress test equivocal.  Taking asa 81 daily,.     Orders this Visit:  No orders of the defined types were placed in this encounter.    No orders of the defined types were placed in this encounter.    There are no discontinued medications.    No diagnosis found.    CURRENT MEDICATIONS:  Current Outpatient Medications   Medication Sig Dispense Refill     acetaminophen (TYLENOL ARTHRITIS PAIN) 650 MG CR tablet Take 650 mg by mouth 2 times daily       ADVAIR DISKUS 250-50 MCG/DOSE inhaler INHALE 1 PUFF INTO THE LUNGS TWO TIMES A DAY 1 Inhaler 4     albuterol (PROAIR HFA/PROVENTIL HFA/VENTOLIN HFA) 108 (90 BASE) MCG/ACT Inhaler Inhale 2 puffs into the lungs every 4 hours as needed for shortness of breath / dyspnea or wheezing 1 Inhaler 0     amLODIPine (NORVASC) 5 MG tablet Take 1 tablet (5 mg) by mouth daily 90 tablet 1     atorvastatin (LIPITOR) 40 MG tablet Take 1 tablet (40 mg) by mouth daily 90 tablet 3     blood glucose monitoring (NO BRAND SPECIFIED) test strip Use to test blood sugars 1 times daily or as directed, use brand same as previous 100 strip 3     cetirizine (ZYRTEC) 10 MG tablet TAKE ONE TABLET BY MOUTH EVERY DAY 90 tablet 1     cyclobenzaprine (FLEXERIL) 10 MG tablet Take 1 tablet (10 mg) by mouth 2 times daily 180 tablet 1     DULoxetine (CYMBALTA) 60 MG capsule Take 1 capsule (60 mg) by mouth daily 90 capsule 0     fluticasone (FLONASE) 50 MCG/ACT spray Spray 2 sprays into both nostrils daily 1 Bottle 11     lidocaine (LIDODERM) 5 % Patch Apply up to 3 patches to painful area at once for up to  "12 h within a 24 h period.  Remove after 12 hours. 30 patch 10     lisinopril (PRINIVIL/ZESTRIL) 20 MG tablet Take 1 tablet (20 mg) by mouth 2 times daily 90 tablet 3     metFORMIN (GLUCOPHAGE) 500 MG tablet TAKE ONE TABLET BY MOUTH TWICE A DAY WITH A MEAL 180 tablet 0     miconazole (MICATIN) 2 % cream Apply topically 2 times daily 57 g 5     montelukast (SINGULAIR) 10 MG tablet TAKE ONE TABLET BY MOUTH AT BEDTIME 90 tablet 3     omeprazole (PRILOSEC) 20 MG CR capsule Take 1 capsule (20 mg) by mouth daily 90 capsule 3     pregabalin (LYRICA) 150 MG capsule Take 1 capsule (150 mg) by mouth 3 times daily 270 capsule 1     traMADol (ULTRAM) 50 MG tablet Take 2 tablets (100 mg) by mouth 3 times daily 180 tablet 0     traZODone (DESYREL) 100 MG tablet TAKE TWO TABLETS BY MOUTH EVERY NIGHT AT BEDTIME 180 tablet 2       ALLERGIES     Allergies   Allergen Reactions     Penicillins Hives       PAST MEDICAL, SURGICAL, FAMILY, SOCIAL HISTORY:  History was reviewed and updated as needed, see medical record.    Review of Systems:  A 12-point review of systems was completed, see medical record for detailed review of systems information.    Physical Exam:  Vitals: /78   Pulse 100   Ht 1.753 m (5' 9\")   Wt 94.8 kg (209 lb)   BMI 30.86 kg/m       Constitutional:           Skin:           Head:           Eyes:           ENT:           Neck:           Chest:           Cardiac:                    Abdomen:           Vascular:                                        Extremities and Back:           Neurological:           ASSESSMENT:  Possible CAD Multiple risk factors with concerning story.        RECOMMENDATIONS:   Metoprolol 25 mg bid  NTG prn  Continue asa 81 daily   Avoid tobacco  Mediterranean diet  CAG possible PCI      Recent Lab Results:  LIPID RESULTS:  Lab Results   Component Value Date    CHOL 152 03/03/2018    HDL 53 03/03/2018    LDL 56 03/03/2018    TRIG 213 (H) 03/03/2018       LIVER ENZYME RESULTS:  Lab Results "   Component Value Date    AST 28 12/13/2017    ALT 22 12/13/2017       CBC RESULTS:  Lab Results   Component Value Date    WBC 7.3 12/13/2017    RBC 4.21 12/13/2017    HGB 12.6 12/13/2017    HCT 37.5 12/13/2017    MCV 89 12/13/2017    MCH 29.9 12/13/2017    MCHC 33.6 12/13/2017    RDW 13.3 12/13/2017     12/13/2017       BMP RESULTS:  Lab Results   Component Value Date     03/03/2018    POTASSIUM 3.9 03/03/2018    CHLORIDE 104 03/03/2018    CO2 28 03/03/2018    ANIONGAP 8 03/03/2018     (H) 03/03/2018    BUN 18 03/03/2018    CR 0.75 03/03/2018    GFRESTIMATED 77 03/03/2018    GFRESTBLACK >90 03/03/2018    RADHA 9.2 03/03/2018        A1C RESULTS:  Lab Results   Component Value Date    A1C 6.9 (H) 03/03/2018       INR RESULTS:  No results found for: INR    We greatly appreciate the opportunity to be involved in the care of your patient, Vijaya Manjarrez.    Sincerely,  Cheo Merida MD      CC  Yayo Mendoza MD  [address] [place] [id number]                                                                       Thank you for allowing me to participate in the care of your patient.      Sincerely,     Cheo Merida MD     [facility]    cc:   Yayo Mendoza MD  [address] [place] [id number]

## 2019-02-21 NOTE — PROGRESS NOTES
HISTORY OF PRESENT ILLNESS:  3 week history of exertional chest discomfort radiates to back and arms, progressive. Smoked in past ( heavily) DM, HTN, FHX dad 60. Fibromyalgia. Regadenoson stress test equivocal.  Taking asa 81 daily,.     Orders this Visit:  No orders of the defined types were placed in this encounter.    No orders of the defined types were placed in this encounter.    There are no discontinued medications.    No diagnosis found.    CURRENT MEDICATIONS:  Current Outpatient Medications   Medication Sig Dispense Refill     acetaminophen (TYLENOL ARTHRITIS PAIN) 650 MG CR tablet Take 650 mg by mouth 2 times daily       ADVAIR DISKUS 250-50 MCG/DOSE inhaler INHALE 1 PUFF INTO THE LUNGS TWO TIMES A DAY 1 Inhaler 4     albuterol (PROAIR HFA/PROVENTIL HFA/VENTOLIN HFA) 108 (90 BASE) MCG/ACT Inhaler Inhale 2 puffs into the lungs every 4 hours as needed for shortness of breath / dyspnea or wheezing 1 Inhaler 0     amLODIPine (NORVASC) 5 MG tablet Take 1 tablet (5 mg) by mouth daily 90 tablet 1     atorvastatin (LIPITOR) 40 MG tablet Take 1 tablet (40 mg) by mouth daily 90 tablet 3     blood glucose monitoring (NO BRAND SPECIFIED) test strip Use to test blood sugars 1 times daily or as directed, use brand same as previous 100 strip 3     cetirizine (ZYRTEC) 10 MG tablet TAKE ONE TABLET BY MOUTH EVERY DAY 90 tablet 1     cyclobenzaprine (FLEXERIL) 10 MG tablet Take 1 tablet (10 mg) by mouth 2 times daily 180 tablet 1     DULoxetine (CYMBALTA) 60 MG capsule Take 1 capsule (60 mg) by mouth daily 90 capsule 0     fluticasone (FLONASE) 50 MCG/ACT spray Spray 2 sprays into both nostrils daily 1 Bottle 11     lidocaine (LIDODERM) 5 % Patch Apply up to 3 patches to painful area at once for up to 12 h within a 24 h period.  Remove after 12 hours. 30 patch 10     lisinopril (PRINIVIL/ZESTRIL) 20 MG tablet Take 1 tablet (20 mg) by mouth 2 times daily 90 tablet 3     metFORMIN (GLUCOPHAGE) 500 MG tablet TAKE ONE TABLET BY  "MOUTH TWICE A DAY WITH A MEAL 180 tablet 0     miconazole (MICATIN) 2 % cream Apply topically 2 times daily 57 g 5     montelukast (SINGULAIR) 10 MG tablet TAKE ONE TABLET BY MOUTH AT BEDTIME 90 tablet 3     omeprazole (PRILOSEC) 20 MG CR capsule Take 1 capsule (20 mg) by mouth daily 90 capsule 3     pregabalin (LYRICA) 150 MG capsule Take 1 capsule (150 mg) by mouth 3 times daily 270 capsule 1     traMADol (ULTRAM) 50 MG tablet Take 2 tablets (100 mg) by mouth 3 times daily 180 tablet 0     traZODone (DESYREL) 100 MG tablet TAKE TWO TABLETS BY MOUTH EVERY NIGHT AT BEDTIME 180 tablet 2       ALLERGIES     Allergies   Allergen Reactions     Penicillins Hives       PAST MEDICAL, SURGICAL, FAMILY, SOCIAL HISTORY:  History was reviewed and updated as needed, see medical record.    Review of Systems:  A 12-point review of systems was completed, see medical record for detailed review of systems information.    Physical Exam:  Vitals: /78   Pulse 100   Ht 1.753 m (5' 9\")   Wt 94.8 kg (209 lb)   BMI 30.86 kg/m      Constitutional:           Skin:           Head:           Eyes:           ENT:           Neck:           Chest:           Cardiac:                    Abdomen:           Vascular:                                        Extremities and Back:           Neurological:           ASSESSMENT:  Possible CAD Multiple risk factors with concerning story.        RECOMMENDATIONS:   Metoprolol 25 mg bid  NTG prn  Continue asa 81 daily   Avoid tobacco  Mediterranean diet  CAG possible PCI      Recent Lab Results:  LIPID RESULTS:  Lab Results   Component Value Date    CHOL 152 03/03/2018    HDL 53 03/03/2018    LDL 56 03/03/2018    TRIG 213 (H) 03/03/2018       LIVER ENZYME RESULTS:  Lab Results   Component Value Date    AST 28 12/13/2017    ALT 22 12/13/2017       CBC RESULTS:  Lab Results   Component Value Date    WBC 7.3 12/13/2017    RBC 4.21 12/13/2017    HGB 12.6 12/13/2017    HCT 37.5 12/13/2017    MCV 89 " 12/13/2017    MCH 29.9 12/13/2017    MCHC 33.6 12/13/2017    RDW 13.3 12/13/2017     12/13/2017       BMP RESULTS:  Lab Results   Component Value Date     03/03/2018    POTASSIUM 3.9 03/03/2018    CHLORIDE 104 03/03/2018    CO2 28 03/03/2018    ANIONGAP 8 03/03/2018     (H) 03/03/2018    BUN 18 03/03/2018    CR 0.75 03/03/2018    GFRESTIMATED 77 03/03/2018    GFRESTBLACK >90 03/03/2018    RADHA 9.2 03/03/2018        A1C RESULTS:  Lab Results   Component Value Date    A1C 6.9 (H) 03/03/2018       INR RESULTS:  No results found for: INR    We greatly appreciate the opportunity to be involved in the care of your patient, Vijaya Manjarrez.    Sincerely,  Cheo Merida MD      CC  Yayo Mendoza MD  [address] [place] [address]

## 2019-02-21 NOTE — LETTER
2/21/2019      Yayo Mendoza MD  [address]  [facility] [address]      RE: Vijaya Manjarrez       Dear Colleague,    I had the pleasure of seeing Vijaya Manjarrez in the [facility].    Service Date: 02/21/2019      HISTORY OF PRESENT ILLNESS:  Vijaya Manjarrez, a 68-year-old woman with diabetes, hypertension, previous smoking history, and a family history of premature coronary disease  was evaluated in consultation at your request for chest discomfort and an abnormal nuclear stress test.      For the last 3 weeks, Ms. Manjarrez has been troubled by progressive exertion associated chest discomfort and dyspnea despite current medical therapy.  She describes a substernal aching and heaviness that radiates to her arm and her back.  The discomfort has not occurred at rest nor has it awakened her from sleep.  You referred her for a nuclear stress test 02/13/2019 that showed an ejection fraction of 82% with a small equivocal area of apical ischemia.  Cardiology consultation was requested.      The patient's symptoms have not changed since she last saw you in the clinic.  She is on aspirin, amlodipine and atorvastatin, but has not yet been placed on a beta blocker.      PAST MEDICAL HISTORY:   1.  Type 2 diabetes mellitus.   2.  Hypertension - currently on amlodipine and lisinopril.   3.  Cigarette smoking history - smoked for many years, up to more than half a pack per day.  Recently stopped.   4.  Dyslipidemia - on atorvastatin.   5.  Fibromyalgia/chronic pain syndrome - on pregabalin 150 mg t.i.d. and tramadol 50 mg t.i.d.   6.  Depression - on trazodone and duloxetine.      ALLERGIES:  None reported.      HABITS:  The patient no longer smokes cigarettes.  She does not abuse alcohol.      SOCIAL HISTORY:  The patient is single.  She has 2 children.  She has 2 grandchildren.  She lives alone.  She is unemployed and she states that she cannot work due to fibromyalgia.     "  REVIEW OF SYSTEMS:  A 12-point review of systems was performed.  Outside the issues mentioned in the HPI, there are no other complaints.      PHYSICAL EXAMINATION:   GENERAL:  Exam today demonstrates a pleasant, soft spoken 68-year-old woman who looks older than stated age.   VITAL SIGNS:  Her blood pressure is 138/78, her heart rate was 85.   LUNGS:  Clear to percussion and auscultation.   CARDIOVASCULAR:  Shows a normal S1 with a normal S2.  There is no S3.  There is no murmur, rub or click.  Her pulses are symmetrical in the carotid, radial, brachial, femoral, popliteal, dorsalis pedis and posterior tibials.   ABDOMEN:  Bowel sounds are present in all 4 quadrants.  Liver percusses to 7 cm, spleen was not palpable.  The aorta is not tender.   EXTREMITIES:  In the lower extremities there is no swelling, cyanosis or clubbing.   NEUROLOGIC:  Cranial nerves II-XII are intact.  Her strength equal and symmetrical.  She displays normal insight and judgment.      LABORATORY STUDIES:  Her most recent lipid profile from 03/2018 while on atorvastatin showed total cholesterol of 152, HDL of 53, LDL of 56, triglycerides of 213.  Her most recent creatinine was 0.75 with a GFR of 77.  Potassium was 3.9.  Her most recent hemoglobin A1c  was 6.9.  Her ECG during visit in your office on 02/05/2019 showed a sinus tachycardia with nonspecific intraventricular conduction delay and nonspecific ST-T wave changes.      Nuclear stress test reports an ejection fraction of 82% with \"equivocal apical seen.\"      ASSESSMENT:  This 68-year-old woman with  diabetes mellitus, hypertension, previous smoking , and a family history of premature coronary disease presents with a 3-week history of progressive chest discomfort  suspicious for angina.  She has been treated with both amlodipine, atorvatstatin, but as yet is on on a beta blocker.      In view of the progressive nature of her symptoms and the equivocal nature of her nuclear stress test, " I would advise diagnostic coronary angiography to determine whether she would benefit from mechanical revascularization.      RECOMMENDATIONS:   1.  Add metoprolol 25 b.i.d.   2.  Continue aspirin, high-potency statin therapy, amlodipine and lisinopril.   3.  Diagnostic coronary angiography with possible revascularization.  I have explained to the patient the risks of death, myocardial infarction, stroke, hematoma, peripheral vascular complications, possible need for urgent bypass surgery in the event of failed attempt at intervention, the use of dual antiplatelet therapy, the use of fractional flow reserve testing, and the option of medical therapy alone without further testing.  She voiced understanding and wishes to proceed.      We greatly appreciate the opportunity to participate in the care of your patient, Vijaya Braga.      cc:   Yayo Mendoza MD   [facility] [address] [place] [address]         COURT HALL MD             D: 2019   T: 2019   MT: DELLA      Name:     VIJAYA BRAGA   MRN:      [id number]        Account:      [id number]   :      1950           Service Date: 2019      Document: [id number]           No facility-administered encounter medications on file as of 2019.      Outpatient Encounter Medications as of 2019   Medication Sig Dispense Refill     acetaminophen (TYLENOL ARTHRITIS PAIN) 650 MG CR tablet Take 650 mg by mouth 2 times daily       ADVAIR DISKUS 250-50 MCG/DOSE inhaler INHALE 1 PUFF INTO THE LUNGS TWO TIMES A DAY 1 Inhaler 4     albuterol (PROAIR HFA/PROVENTIL HFA/VENTOLIN HFA) 108 (90 BASE) MCG/ACT Inhaler Inhale 2 puffs into the lungs every 4 hours as needed for shortness of breath / dyspnea or wheezing 1 Inhaler 0     amLODIPine (NORVASC) 5 MG tablet Take 1 tablet (5 mg) by mouth daily 90 tablet 1     aspirin (ASA) 81 MG chewable tablet Take 1 tablet (81 mg) by mouth daily 90 tablet 3      atorvastatin (LIPITOR) 40 MG tablet Take 1 tablet (40 mg) by mouth daily 90 tablet 3     blood glucose monitoring (NO BRAND SPECIFIED) test strip Use to test blood sugars 1 times daily or as directed, use brand same as previous 100 strip 3     cetirizine (ZYRTEC) 10 MG tablet TAKE ONE TABLET BY MOUTH EVERY DAY 90 tablet 1     cyclobenzaprine (FLEXERIL) 10 MG tablet Take 1 tablet (10 mg) by mouth 2 times daily 180 tablet 1     DULoxetine (CYMBALTA) 60 MG capsule Take 1 capsule (60 mg) by mouth daily 90 capsule 0     fluticasone (FLONASE) 50 MCG/ACT spray Spray 2 sprays into both nostrils daily 1 Bottle 11     lidocaine (LIDODERM) 5 % Patch Apply up to 3 patches to painful area at once for up to 12 h within a 24 h period.  Remove after 12 hours. 30 patch 10     lisinopril (PRINIVIL/ZESTRIL) 20 MG tablet Take 1 tablet (20 mg) by mouth 2 times daily 90 tablet 3     metFORMIN (GLUCOPHAGE) 500 MG tablet TAKE ONE TABLET BY MOUTH TWICE A DAY WITH A MEAL 180 tablet 0     metoprolol tartrate (LOPRESSOR) 25 MG tablet Take 1 tablet (25 mg) by mouth 2 times daily 180 tablet 3     miconazole (MICATIN) 2 % cream Apply topically 2 times daily 57 g 5     montelukast (SINGULAIR) 10 MG tablet TAKE ONE TABLET BY MOUTH AT BEDTIME 90 tablet 3     nitroGLYcerin (NITROSTAT) 0.4 MG sublingual tablet For chest pain place 1 tablet under the tongue every 5 minutes for 3 doses. If symptoms persist 5 minutes after 1st dose call 911. 30 tablet 3     omeprazole (PRILOSEC) 20 MG CR capsule Take 1 capsule (20 mg) by mouth daily 90 capsule 3     pregabalin (LYRICA) 150 MG capsule Take 1 capsule (150 mg) by mouth 3 times daily 270 capsule 1     traMADol (ULTRAM) 50 MG tablet Take 2 tablets (100 mg) by mouth 3 times daily 180 tablet 0     traZODone (DESYREL) 100 MG tablet TAKE TWO TABLETS BY MOUTH EVERY NIGHT AT BEDTIME 180 tablet 2     [DISCONTINUED] montelukast (SINGULAIR) 10 MG tablet Take 1 tablet (10 mg) by mouth At Bedtime 90 tablet 3        Again, thank you for allowing me to participate in the care of your patient.      Sincerely,    Cheo Merida MD     [facility]

## 2019-02-25 ENCOUNTER — TELEPHONE (OUTPATIENT)
Dept: CARDIOLOGY | Facility: CLINIC | Age: 69
End: 2019-02-25

## 2019-02-25 DIAGNOSIS — I25.10 CORONARY ARTERY DISEASE INVOLVING NATIVE CORONARY ARTERY, ANGINA PRESENCE UNSPECIFIED, UNSPECIFIED WHETHER NATIVE OR TRANSPLANTED HEART: Primary | ICD-10-CM

## 2019-02-25 DIAGNOSIS — E11.40 TYPE 2 DIABETES MELLITUS WITH DIABETIC NEUROPATHY, WITHOUT LONG-TERM CURRENT USE OF INSULIN (H): ICD-10-CM

## 2019-02-25 RX ORDER — ASPIRIN 81 MG/1
81 TABLET ORAL DAILY
Status: CANCELLED | OUTPATIENT
Start: 2019-02-25

## 2019-02-25 RX ORDER — LIDOCAINE 40 MG/G
CREAM TOPICAL
Status: CANCELLED | OUTPATIENT
Start: 2019-02-25

## 2019-02-25 RX ORDER — POTASSIUM CHLORIDE 750 MG/1
20 TABLET, EXTENDED RELEASE ORAL
Status: CANCELLED | OUTPATIENT
Start: 2019-02-25

## 2019-02-25 RX ORDER — SODIUM CHLORIDE 9 MG/ML
INJECTION, SOLUTION INTRAVENOUS CONTINUOUS
Status: CANCELLED | OUTPATIENT
Start: 2019-02-25

## 2019-02-25 NOTE — TELEPHONE ENCOUNTER
Coronary angiogram/[facility] prep instructions.     Patient is scheduled for a left heart cath/coronary angiogram at [facility], on 2/26/19. Check in time is at 10am and procedure time is at 12 noon.  Advised patient not eat or drink after midnight on 2/25/19.      Advised to hold Metformin the day of the procedure should continue to hold until after we can review results of BMP lab which is scheduled on 2/28/19.The patient will be called and advised if they can resume their metformin.    Patient is not taking an anticoagulant.     Patient should continue their current dose of Aspirin with their other daily medications the morning of the procedure with small sips of water.     Verified patient does not have an allergy to contrast dye.     Verified patient has someone available to drive them home from the hospital and can stay with them for 24 hours after the procedure.     Patient had no questions about their prep instructions.   Amy LESTER  [facility]

## 2019-02-25 NOTE — TELEPHONE ENCOUNTER
"Received call from pt stating she has noticed fatigue and \"heaviness\" in her chest on exertion since starting on metoprolol 25 mg BID, which was prescribed by Dr. Merida at visit on 2/21/19. Per Dr. Merida's note, pt had been having progressive chest discomfort and SOB on exertion at time of visit. Inquired if pt feels her symptoms are worse since starting the metoprolol and pt stated she believes the symptoms are worse, and pt stated she feels more fatigued. Pt reported she took SL nitroglycerin last night, which relieved the pain. Pt is scheduled for coronary angiogram tomorrow, 2/26/19 with Dr. Meirda. Advised will update Dr. Merida of pt's symptoms. Advised pt of ED precautions if pt experiences chest pain/heaviness unrelieved by SL nitroglycerin. Pt verbalized understanding.     Addendum 2/26/19 - received response below from Dr. Merida:     Cheo Merida MD Hair, Ashley W, RN   Caller: Unspecified (Yesterday,  3:29 PM)             OK. We will see how things are tomorrow. I agree if she becomes unstable, she should go to ER. Thanks Dr.m"

## 2019-02-25 NOTE — PROGRESS NOTES
[facility] Care Coordination Contact    Call from member who shares that she received the care plan letter, and will return it. Member has been struggling with chest pain and not feeling well over the last month. She is scheduled for an angiogram tomorrow. Has not started homemaking with new agency- she is not sure if she has called them back since the last time we spoke. She will call [facility] after her procedure tomorrow.     Vanesa Gtz RN  [facility]  [phone]  Fax: [phone]

## 2019-02-26 ENCOUNTER — SURGERY (OUTPATIENT)
Age: 69
End: 2019-02-26
Payer: COMMERCIAL

## 2019-02-26 ENCOUNTER — HOSPITAL ENCOUNTER (OUTPATIENT)
Facility: CLINIC | Age: 69
Discharge: HOME | End: 2019-02-26
Admitting: INTERNAL MEDICINE
Payer: COMMERCIAL

## 2019-02-26 VITALS
HEART RATE: 64 BPM | DIASTOLIC BLOOD PRESSURE: 69 MMHG | SYSTOLIC BLOOD PRESSURE: 132 MMHG | TEMPERATURE: 97.3 F | OXYGEN SATURATION: 92 % | RESPIRATION RATE: 18 BRPM

## 2019-02-26 DIAGNOSIS — I10 BENIGN ESSENTIAL HYPERTENSION: ICD-10-CM

## 2019-02-26 DIAGNOSIS — I25.10 CORONARY ARTERY DISEASE INVOLVING NATIVE CORONARY ARTERY, ANGINA PRESENCE UNSPECIFIED, UNSPECIFIED WHETHER NATIVE OR TRANSPLANTED HEART: ICD-10-CM

## 2019-02-26 DIAGNOSIS — Z98.890 STATUS POST CORONARY ANGIOGRAM: ICD-10-CM

## 2019-02-26 DIAGNOSIS — I25.10 ATHEROSCLEROSIS OF CORONARY ARTERY, ANGINA PRESENCE UNSPECIFIED, UNSPECIFIED VESSEL OR LESION TYPE, UNSPECIFIED WHETHER NATIVE OR TRANSPLANTED HEART: ICD-10-CM

## 2019-02-26 DIAGNOSIS — E78.5 DYSLIPIDEMIA: ICD-10-CM

## 2019-02-26 PROCEDURE — 25000132 ZZH RX MED GY IP 250 OP 250 PS 637: Performed by: INTERNAL MEDICINE

## 2019-02-26 PROCEDURE — 93010 ELECTROCARDIOGRAM REPORT: CPT | Mod: 59 | Performed by: INTERNAL MEDICINE

## 2019-02-26 PROCEDURE — 25000128 H RX IP 250 OP 636: Performed by: INTERNAL MEDICINE

## 2019-02-26 PROCEDURE — C1887 CATHETER, GUIDING: HCPCS | Performed by: INTERNAL MEDICINE

## 2019-02-26 PROCEDURE — 99152 MOD SED SAME PHYS/QHP 5/>YRS: CPT | Performed by: INTERNAL MEDICINE

## 2019-02-26 PROCEDURE — C1894 INTRO/SHEATH, NON-LASER: HCPCS | Performed by: INTERNAL MEDICINE

## 2019-02-26 PROCEDURE — 40000275 ZZH STATISTIC RCP TIME EA 10 MIN

## 2019-02-26 PROCEDURE — 93005 ELECTROCARDIOGRAM TRACING: CPT

## 2019-02-26 PROCEDURE — 27210794 ZZH OR GENERAL SUPPLY STERILE: Performed by: INTERNAL MEDICINE

## 2019-02-26 PROCEDURE — C1769 GUIDE WIRE: HCPCS | Performed by: INTERNAL MEDICINE

## 2019-02-26 PROCEDURE — 25000125 ZZHC RX 250: Performed by: INTERNAL MEDICINE

## 2019-02-26 PROCEDURE — 93454 CORONARY ARTERY ANGIO S&I: CPT | Mod: 26 | Performed by: INTERNAL MEDICINE

## 2019-02-26 PROCEDURE — 40000065 ZZH STATISTIC EKG NON-CHARGEABLE

## 2019-02-26 PROCEDURE — 25800030 ZZH RX IP 258 OP 636: Performed by: INTERNAL MEDICINE

## 2019-02-26 PROCEDURE — 93454 CORONARY ARTERY ANGIO S&I: CPT | Performed by: INTERNAL MEDICINE

## 2019-02-26 PROCEDURE — 93571 IV DOP VEL&/PRESS C FLO 1ST: CPT | Mod: 26 | Performed by: INTERNAL MEDICINE

## 2019-02-26 PROCEDURE — 93571 IV DOP VEL&/PRESS C FLO 1ST: CPT | Mod: 52 | Performed by: INTERNAL MEDICINE

## 2019-02-26 RX ORDER — ASPIRIN 81 MG/1
81 TABLET ORAL DAILY
Status: DISCONTINUED | OUTPATIENT
Start: 2019-02-26 | End: 2019-02-26 | Stop reason: HOSPADM

## 2019-02-26 RX ORDER — DOPAMINE HYDROCHLORIDE 160 MG/100ML
2-20 INJECTION, SOLUTION INTRAVENOUS CONTINUOUS PRN
Status: DISCONTINUED | OUTPATIENT
Start: 2019-02-26 | End: 2019-02-26 | Stop reason: HOSPADM

## 2019-02-26 RX ORDER — EPTIFIBATIDE 2 MG/ML
2 INJECTION, SOLUTION INTRAVENOUS CONTINUOUS PRN
Status: DISCONTINUED | OUTPATIENT
Start: 2019-02-26 | End: 2019-02-26 | Stop reason: HOSPADM

## 2019-02-26 RX ORDER — FENTANYL CITRATE 50 UG/ML
25-50 INJECTION, SOLUTION INTRAMUSCULAR; INTRAVENOUS
Status: DISCONTINUED | OUTPATIENT
Start: 2019-02-26 | End: 2019-02-26 | Stop reason: HOSPADM

## 2019-02-26 RX ORDER — NOREPINEPHRINE BITARTRATE/D5W 16MG/250ML
.03-.4 PLASTIC BAG, INJECTION (ML) INTRAVENOUS CONTINUOUS PRN
Status: DISCONTINUED | OUTPATIENT
Start: 2019-02-26 | End: 2019-02-26 | Stop reason: HOSPADM

## 2019-02-26 RX ORDER — NITROGLYCERIN 20 MG/100ML
.07-2 INJECTION INTRAVENOUS CONTINUOUS PRN
Status: DISCONTINUED | OUTPATIENT
Start: 2019-02-26 | End: 2019-02-26 | Stop reason: HOSPADM

## 2019-02-26 RX ORDER — LIDOCAINE HYDROCHLORIDE 10 MG/ML
INJECTION, SOLUTION EPIDURAL; INFILTRATION; INTRACAUDAL; PERINEURAL
Status: DISCONTINUED
Start: 2019-02-26 | End: 2019-02-26 | Stop reason: WASHOUT

## 2019-02-26 RX ORDER — HEPARIN SODIUM 1000 [USP'U]/ML
INJECTION, SOLUTION INTRAVENOUS; SUBCUTANEOUS
Status: DISCONTINUED | OUTPATIENT
Start: 2019-02-26 | End: 2019-02-26 | Stop reason: HOSPADM

## 2019-02-26 RX ORDER — ARGATROBAN 1 MG/ML
350 INJECTION, SOLUTION INTRAVENOUS
Status: DISCONTINUED | OUTPATIENT
Start: 2019-02-26 | End: 2019-02-26 | Stop reason: HOSPADM

## 2019-02-26 RX ORDER — FENTANYL CITRATE 50 UG/ML
INJECTION, SOLUTION INTRAMUSCULAR; INTRAVENOUS
Status: DISCONTINUED | OUTPATIENT
Start: 2019-02-26 | End: 2019-02-26 | Stop reason: HOSPADM

## 2019-02-26 RX ORDER — LIDOCAINE HYDROCHLORIDE 10 MG/ML
INJECTION, SOLUTION EPIDURAL; INFILTRATION; INTRACAUDAL; PERINEURAL
Status: DISCONTINUED
Start: 2019-02-26 | End: 2019-02-26 | Stop reason: HOSPADM

## 2019-02-26 RX ORDER — NALOXONE HYDROCHLORIDE 0.4 MG/ML
.1-.4 INJECTION, SOLUTION INTRAMUSCULAR; INTRAVENOUS; SUBCUTANEOUS
Status: DISCONTINUED | OUTPATIENT
Start: 2019-02-26 | End: 2019-02-26 | Stop reason: HOSPADM

## 2019-02-26 RX ORDER — NITROGLYCERIN 5 MG/ML
VIAL (ML) INTRAVENOUS
Status: DISCONTINUED | OUTPATIENT
Start: 2019-02-26 | End: 2019-02-26 | Stop reason: HOSPADM

## 2019-02-26 RX ORDER — ACETAMINOPHEN 325 MG/1
325-650 TABLET ORAL EVERY 4 HOURS PRN
Status: DISCONTINUED | OUTPATIENT
Start: 2019-02-26 | End: 2019-02-26 | Stop reason: HOSPADM

## 2019-02-26 RX ORDER — HYDROCODONE BITARTRATE AND ACETAMINOPHEN 5; 325 MG/1; MG/1
1-2 TABLET ORAL EVERY 4 HOURS PRN
Status: DISCONTINUED | OUTPATIENT
Start: 2019-02-26 | End: 2019-02-26 | Stop reason: HOSPADM

## 2019-02-26 RX ORDER — EPTIFIBATIDE 2 MG/ML
180 INJECTION, SOLUTION INTRAVENOUS EVERY 10 MIN PRN
Status: DISCONTINUED | OUTPATIENT
Start: 2019-02-26 | End: 2019-02-26 | Stop reason: HOSPADM

## 2019-02-26 RX ORDER — NALOXONE HYDROCHLORIDE 0.4 MG/ML
.2-.4 INJECTION, SOLUTION INTRAMUSCULAR; INTRAVENOUS; SUBCUTANEOUS
Status: DISCONTINUED | OUTPATIENT
Start: 2019-02-26 | End: 2019-02-26

## 2019-02-26 RX ORDER — SODIUM CHLORIDE 9 MG/ML
INJECTION, SOLUTION INTRAVENOUS CONTINUOUS
Status: DISCONTINUED | OUTPATIENT
Start: 2019-02-26 | End: 2019-02-26 | Stop reason: HOSPADM

## 2019-02-26 RX ORDER — HEPARIN SODIUM 1000 [USP'U]/ML
INJECTION, SOLUTION INTRAVENOUS; SUBCUTANEOUS
Status: DISCONTINUED
Start: 2019-02-26 | End: 2019-02-26 | Stop reason: HOSPADM

## 2019-02-26 RX ORDER — LIDOCAINE 40 MG/G
CREAM TOPICAL
Status: DISCONTINUED | OUTPATIENT
Start: 2019-02-26 | End: 2019-02-26 | Stop reason: HOSPADM

## 2019-02-26 RX ORDER — DOBUTAMINE HYDROCHLORIDE 200 MG/100ML
2-20 INJECTION INTRAVENOUS CONTINUOUS PRN
Status: DISCONTINUED | OUTPATIENT
Start: 2019-02-26 | End: 2019-02-26 | Stop reason: HOSPADM

## 2019-02-26 RX ORDER — ARGATROBAN 1 MG/ML
150 INJECTION, SOLUTION INTRAVENOUS
Status: DISCONTINUED | OUTPATIENT
Start: 2019-02-26 | End: 2019-02-26 | Stop reason: HOSPADM

## 2019-02-26 RX ORDER — VERAPAMIL HYDROCHLORIDE 2.5 MG/ML
INJECTION, SOLUTION INTRAVENOUS
Status: DISCONTINUED | OUTPATIENT
Start: 2019-02-26 | End: 2019-02-26 | Stop reason: HOSPADM

## 2019-02-26 RX ORDER — IOPAMIDOL 755 MG/ML
INJECTION, SOLUTION INTRAVASCULAR
Status: DISCONTINUED | OUTPATIENT
Start: 2019-02-26 | End: 2019-02-26 | Stop reason: HOSPADM

## 2019-02-26 RX ORDER — POTASSIUM CHLORIDE 1500 MG/1
20 TABLET, EXTENDED RELEASE ORAL
Status: DISCONTINUED | OUTPATIENT
Start: 2019-02-26 | End: 2019-02-26 | Stop reason: HOSPADM

## 2019-02-26 RX ORDER — ATROPINE SULFATE 0.1 MG/ML
0.5 INJECTION INTRAVENOUS EVERY 5 MIN PRN
Status: DISCONTINUED | OUTPATIENT
Start: 2019-02-26 | End: 2019-02-26 | Stop reason: HOSPADM

## 2019-02-26 RX ORDER — FENTANYL CITRATE 50 UG/ML
INJECTION, SOLUTION INTRAMUSCULAR; INTRAVENOUS
Status: DISCONTINUED
Start: 2019-02-26 | End: 2019-02-26 | Stop reason: HOSPADM

## 2019-02-26 RX ORDER — FLUMAZENIL 0.1 MG/ML
0.2 INJECTION, SOLUTION INTRAVENOUS
Status: DISCONTINUED | OUTPATIENT
Start: 2019-02-26 | End: 2019-02-26 | Stop reason: HOSPADM

## 2019-02-26 RX ORDER — VERAPAMIL HYDROCHLORIDE 2.5 MG/ML
INJECTION, SOLUTION INTRAVENOUS
Status: DISCONTINUED
Start: 2019-02-26 | End: 2019-02-26 | Stop reason: HOSPADM

## 2019-02-26 RX ORDER — NITROGLYCERIN 5 MG/ML
VIAL (ML) INTRAVENOUS
Status: DISCONTINUED
Start: 2019-02-26 | End: 2019-02-26 | Stop reason: HOSPADM

## 2019-02-26 RX ADMIN — LIDOCAINE HYDROCHLORIDE 1 ML: 10 INJECTION, SOLUTION INFILTRATION; PERINEURAL at 13:10

## 2019-02-26 RX ADMIN — SODIUM CHLORIDE: 9 INJECTION, SOLUTION INTRAVENOUS at 11:10

## 2019-02-26 RX ADMIN — NITROGLYCERIN 400 MCG: 5 INJECTION, SOLUTION INTRAVENOUS at 13:10

## 2019-02-26 RX ADMIN — SODIUM CHLORIDE: 9 INJECTION, SOLUTION INTRAVENOUS at 14:43

## 2019-02-26 RX ADMIN — ASPIRIN 81 MG: 81 TABLET, COATED ORAL at 11:08

## 2019-02-26 RX ADMIN — IOPAMIDOL 100 ML: 755 INJECTION, SOLUTION INTRAVENOUS at 13:32

## 2019-02-26 RX ADMIN — HEPARIN SODIUM 5000 UNITS: 1000 INJECTION, SOLUTION INTRAVENOUS; SUBCUTANEOUS at 13:11

## 2019-02-26 RX ADMIN — FENTANYL CITRATE 50 MCG: 50 INJECTION, SOLUTION INTRAMUSCULAR; INTRAVENOUS at 13:10

## 2019-02-26 RX ADMIN — MIDAZOLAM 1 MG: 1 INJECTION INTRAMUSCULAR; INTRAVENOUS at 13:10

## 2019-02-26 RX ADMIN — VERAPAMIL HYDROCHLORIDE 2.5 MG: 2.5 INJECTION, SOLUTION INTRAVENOUS at 13:10

## 2019-02-26 NOTE — Clinical Note
The DP pulses are 2+ bilaterally. The PT pulses are 2+ bilaterally. The radial pulses are 2+ bilaterally.

## 2019-02-26 NOTE — PROGRESS NOTES
Pt returned back from cath lab. Drowsy but oriented. Requiring supplemental o2 until more awake. Denies pain. R radial TR band fully inflated until around 1530, 2 hours last heparin dose. CMS intact. Site looks good. Education reviewed with pt, will continue to monitor.

## 2019-02-26 NOTE — PROGRESS NOTES
Pt arrived to floor as direct admit. VSS. Mid sternal intermittent dull pain that radiates down left arm. IV with double lumen placed to L hand prior to angio to R radial.  NPO, IVFs started per orders. K wnl and >4. ASA given. Call light within reach. Up sba to bathroom and was able to void. EKG complete. Consent signed with MD and RN & placed on chart.

## 2019-02-26 NOTE — PROCEDURES
Procedure  1) CAG  2) IFR mid CX  1.0    Findings   LMCA mild atheromatous change no focal stenosis  LAD proximal 20% irregular mid vessel 30 to 40%    CX mid vessel long 50%   RCA dominant 30 to 40% proximal    Assess no lesion that warrants PCI    Recommendation    No tobacco  Medical therapy  Asa stain bp control  Exercise diet   Follow-up with NEVAEH Merida

## 2019-02-26 NOTE — PRE-PROCEDURE
I have examined the patient, reviewed the history, medications and pre procedural tests. Please see my recent consultation note.  I have explained to the patient the risks of death, MI, stroke, hematoma, possible urgent bypass surgery for failed PCI, use of stents, thienopyridine agents, possible peripheral vascular complications, arrhythmia, the use of FFR in clinical decision-making and alternative of medical therapy alone in regards to left heart catheterization, left ventriculography, coronary angiography, and possible percutaneous coronary intervention. The patient voiced understanding and wishes to proceed. The patient has a good right radial pulse, normal ulnar pulse and a normal Giancarlo's sign.

## 2019-02-27 LAB — INTERPRETATION ECG - MUSE: NORMAL

## 2019-02-27 NOTE — PLAN OF CARE
Vital signs stable.  No c/o any pain.  Discharged with a ride home at 2045.  No s/s of any bleeding noted.  CMS and pulses intact.  Reviewed all discharge instructions with the patient.

## 2019-02-27 NOTE — PROGRESS NOTES
Completed TR band air release. Will keep deflated band in place until further monitored. Planning to discharge tonight with ride if meets discharge criteria.     A&O, VSS tolerating RA

## 2019-02-28 DIAGNOSIS — M79.7 FIBROMYALGIA: ICD-10-CM

## 2019-02-28 DIAGNOSIS — E11.40 TYPE 2 DIABETES MELLITUS WITH DIABETIC NEUROPATHY, WITHOUT LONG-TERM CURRENT USE OF INSULIN (H): ICD-10-CM

## 2019-02-28 DIAGNOSIS — I25.10 CORONARY ARTERY DISEASE INVOLVING NATIVE CORONARY ARTERY, ANGINA PRESENCE UNSPECIFIED, UNSPECIFIED WHETHER NATIVE OR TRANSPLANTED HEART: ICD-10-CM

## 2019-02-28 DIAGNOSIS — M47.812 FACET ARTHROPATHY, CERVICAL: ICD-10-CM

## 2019-02-28 DIAGNOSIS — M47.816 FACET ARTHROPATHY, LUMBAR: ICD-10-CM

## 2019-02-28 LAB
ANION GAP SERPL CALCULATED.3IONS-SCNC: 6 MMOL/L (ref 3–14)
BUN SERPL-MCNC: 13 MG/DL (ref 7–30)
CALCIUM SERPL-MCNC: 9.3 MG/DL (ref 8.5–10.1)
CHLORIDE SERPL-SCNC: 108 MMOL/L (ref 94–109)
CO2 SERPL-SCNC: 28 MMOL/L (ref 20–32)
CREAT SERPL-MCNC: 0.79 MG/DL (ref 0.52–1.04)
GFR SERPL CREATININE-BSD FRML MDRD: 77 ML/MIN/{1.73_M2}
GLUCOSE SERPL-MCNC: 120 MG/DL (ref 70–99)
POTASSIUM SERPL-SCNC: 4.5 MMOL/L (ref 3.4–5.3)
SODIUM SERPL-SCNC: 142 MMOL/L (ref 133–144)

## 2019-02-28 PROCEDURE — 36415 COLL VENOUS BLD VENIPUNCTURE: CPT | Performed by: INTERNAL MEDICINE

## 2019-02-28 PROCEDURE — 80048 BASIC METABOLIC PNL TOTAL CA: CPT | Performed by: INTERNAL MEDICINE

## 2019-03-04 NOTE — TELEPHONE ENCOUNTER
Controlled Substance Refill Request for Tramadol  Problem List Complete:  Yes  Patient is followed by Yayo Mendoza MD for ongoing prescription of pain medication.  All refills should only be approved by this provider, or covering partner.    Medication(s): Tramadol.   Maximum quantity per month: 90  Clinic visit frequency required: Q 6  months     Controlled substance agreement:  CSA signed 2/5/2019     Pain Clinic evaluation in the past: Yes       Date/Location:      DIRE Total Score(s):  No flowsheet data found.    Last [facility] website verification:  none   [email or web address]       checked in past 3 months? No--access not granted by provider.    Fax prescription to [facility] pharmacy.    Please advise, thanks.

## 2019-03-05 ENCOUNTER — OFFICE VISIT (OUTPATIENT)
Dept: CARDIOLOGY | Facility: CLINIC | Age: 69
End: 2019-03-05
Payer: COMMERCIAL

## 2019-03-05 VITALS
HEIGHT: 69 IN | OXYGEN SATURATION: 94 % | BODY MASS INDEX: 30.96 KG/M2 | DIASTOLIC BLOOD PRESSURE: 91 MMHG | HEART RATE: 81 BPM | SYSTOLIC BLOOD PRESSURE: 134 MMHG | WEIGHT: 209 LBS

## 2019-03-05 DIAGNOSIS — I25.10 NONOCCLUSIVE CORONARY ATHEROSCLEROSIS OF NATIVE CORONARY ARTERY: Primary | ICD-10-CM

## 2019-03-05 PROCEDURE — 99213 OFFICE O/P EST LOW 20 MIN: CPT | Performed by: PHYSICIAN ASSISTANT

## 2019-03-05 RX ORDER — TRAMADOL HYDROCHLORIDE 50 MG/1
100 TABLET ORAL 3 TIMES DAILY
Qty: 180 TABLET | Refills: 0 | Status: SHIPPED | OUTPATIENT
Start: 2019-03-05 | End: 2019-04-05

## 2019-03-05 RX ORDER — NYSTATIN 100000 [USP'U]/G
POWDER TOPICAL
COMMUNITY
End: 2019-04-04

## 2019-03-05 ASSESSMENT — MIFFLIN-ST. JEOR: SCORE: 1547.4

## 2019-03-05 NOTE — LETTER
3/5/2019    Yayo Mendoza MD  [address]  [facility] [address]    RE: Vijaya Manjarrez       Dear Colleague,    I had the pleasure of seeing Vijaya Manjarrez in the [facility].    Please see separate dictation for HPI, PHYSICAL EXAM AND IMPRESSION/PLAN.    CURRENT MEDICATIONS:  Current Outpatient Medications   Medication Sig Dispense Refill     acetaminophen (TYLENOL ARTHRITIS PAIN) 650 MG CR tablet Take 650 mg by mouth 2 times daily       ADVAIR DISKUS 250-50 MCG/DOSE inhaler INHALE 1 PUFF INTO THE LUNGS TWO TIMES A DAY 1 Inhaler 4     albuterol (PROAIR HFA/PROVENTIL HFA/VENTOLIN HFA) 108 (90 BASE) MCG/ACT Inhaler Inhale 2 puffs into the lungs every 4 hours as needed for shortness of breath / dyspnea or wheezing 1 Inhaler 0     amLODIPine (NORVASC) 5 MG tablet Take 1 tablet (5 mg) by mouth daily 90 tablet 1     aspirin (ASA) 81 MG chewable tablet Take 1 tablet (81 mg) by mouth daily 90 tablet 3     atorvastatin (LIPITOR) 40 MG tablet Take 1 tablet (40 mg) by mouth daily 90 tablet 3     blood glucose monitoring (NO BRAND SPECIFIED) test strip Use to test blood sugars 1 times daily or as directed, use brand same as previous 100 strip 3     cetirizine (ZYRTEC) 10 MG tablet TAKE ONE TABLET BY MOUTH EVERY DAY 90 tablet 1     cyclobenzaprine (FLEXERIL) 10 MG tablet Take 1 tablet (10 mg) by mouth 2 times daily 180 tablet 1     DULoxetine (CYMBALTA) 60 MG capsule Take 1 capsule (60 mg) by mouth daily 90 capsule 0     fluticasone (FLONASE) 50 MCG/ACT spray Spray 2 sprays into both nostrils daily 1 Bottle 11     lidocaine (LIDODERM) 5 % Patch Apply up to 3 patches to painful area at once for up to 12 h within a 24 h period.  Remove after 12 hours. 30 patch 10     lisinopril (PRINIVIL/ZESTRIL) 20 MG tablet Take 1 tablet (20 mg) by mouth 2 times daily 90 tablet 3     metFORMIN (GLUCOPHAGE) 500 MG tablet TAKE ONE TABLET BY MOUTH TWICE A DAY WITH A MEAL 180 tablet 0     metoprolol  tartrate (LOPRESSOR) 25 MG tablet Take 1 tablet (25 mg) by mouth 2 times daily 180 tablet 3     miconazole (MICATIN) 2 % cream Apply topically 2 times daily 57 g 5     montelukast (SINGULAIR) 10 MG tablet TAKE ONE TABLET BY MOUTH AT BEDTIME 90 tablet 3     nitroGLYcerin (NITROSTAT) 0.4 MG sublingual tablet For chest pain place 1 tablet under the tongue every 5 minutes for 3 doses. If symptoms persist 5 minutes after 1st dose call 911. 30 tablet 3     nystatin (NYAMYC) 440128 UNIT/GM external powder        omeprazole (PRILOSEC) 20 MG CR capsule Take 1 capsule (20 mg) by mouth daily 90 capsule 3     pregabalin (LYRICA) 150 MG capsule Take 1 capsule (150 mg) by mouth 3 times daily 270 capsule 1     traMADol (ULTRAM) 50 MG tablet Take 2 tablets (100 mg) by mouth 3 times daily 180 tablet 0     traZODone (DESYREL) 100 MG tablet TAKE TWO TABLETS BY MOUTH EVERY NIGHT AT BEDTIME 180 tablet 2       ALLERGIES:     Allergies   Allergen Reactions     Penicillins Hives       PAST MEDICAL HISTORY:  Past Medical History:   Diagnosis Date     Anxiety      Cervical spine degeneration 2016    spinal stenosis,      COPD (chronic obstructive pulmonary disease) (H) 2012    mild     Diabetes mellitus, type 2 (H)      Diabetic neuropathy (H)      Facet arthropathy, lumbar      GERD (gastroesophageal reflux disease)      HTN (hypertension)      Hyperlipidemia      Lumbar degenerative disc disease      Migraine headache        PAST SURGICAL HISTORY:  Past Surgical History:   Procedure Laterality Date     CHOLECYSTECTOMY, LAPOROSCOPIC      Cholecystectomy, Laparoscopic     CV HEART CATHETERIZATION WITH POSSIBLE INTERVENTION Left 2/26/2019    Procedure: Coronary Angiogram;  Surgeon: Cheo Merida MD;  Location:  HEART CARDIAC CATH LAB       SOCIAL HISTORY:  Social History     Socioeconomic History     Marital status: Single     Spouse name: None     Number of children: None     Years of education: None     Highest education level:  None   Occupational History     None   Social Needs     Financial resource strain: None     Food insecurity:     Worry: None     Inability: None     Transportation needs:     Medical: None     Non-medical: None   Tobacco Use     Smoking status: Former Smoker     Packs/day: 1.00     Years: 40.00     Pack years: 40.00     Types: Cigarettes     Last attempt to quit: 2017     Years since quittin.7     Smokeless tobacco: Never Used   Substance and Sexual Activity     Alcohol use: No     Drug use: No     Sexual activity: No   Lifestyle     Physical activity:     Days per week: None     Minutes per session: None     Stress: None   Relationships     Social connections:     Talks on phone: None     Gets together: None     Attends Protestant service: None     Active member of club or organization: None     Attends meetings of clubs or organizations: None     Relationship status: None     Intimate partner violence:     Fear of current or ex partner: None     Emotionally abused: None     Physically abused: None     Forced sexual activity: None   Other Topics Concern     Parent/sibling w/ CABG, MI or angioplasty before 65F 55M? Not Asked   Social History Narrative     None       FAMILY HISTORY:  Family History   Problem Relation Age of Onset     Cancer Mother      Cancer Father         Lung cancer     Cancer Maternal Grandmother         Breast cancer       Review of Systems:  Skin:  Negative       Eyes:  Positive for glasses    ENT:  Positive for postnasal drainage coughing   Respiratory:  Positive for dyspnea on exertion SOB with minimal exertion    Cardiovascular:    chest pain;Positive for;lightheadedness;dizziness;palpitations;edema All symptoms are still present after procedure   Gastroenterology: Positive for nausea;heartburn;constipation uses prilosec but no longer effective  Genitourinary:  Positive for urinary frequency    Musculoskeletal:  Positive for fibromyalgia;joint pain;back pain    Neurologic:  Positive  for migraine headaches;headaches;numbness or tingling of hands;numbness or tingling of feet    Psychiatric:  Positive for excessive stress;anxiety;depression    Heme/Lymph/Imm:  Negative      Endocrine:  Positive for   pre-diabetes currently taking Metformin     Reviewed. Remainder of the note dictated.    Dai Costa PA-C        Service Date: 03/05/2019      HISTORY OF PRESENT ILLNESS:  Nyasia is a pleasant 68-year-old female who presents to the office today for followup after a recent coronary angiogram.      Her cardiovascular risk factors include diabetes, hypertension, hyperlipidemia, prior tobacco use and a family history of premature coronary artery disease.  She recently presented to her primary care provider's office with symptoms of chest discomfort and dyspnea with exertion.  She was sent for a nuclear stress test which showed a small area which was equivocal for apical ischemia.  She was referred on to the Cardiology office.  For further evaluation, it was recommended that she undergo a coronary angiogram.  She was also started on metoprolol tartrate 25 mg b.i.d. at that time.      Her coronary angiogram showed mild to at most moderate nonobstructive coronary disease.  The most significant lesion was a 50% lesion in the mid circumflex.  IFR of this lesion was 1.0 suggesting that it was not hemodynamically significant.  Continued medical management and risk factor modification were recommended.      At this point, the patient states that her symptoms are about the same.  She has not noted any improvement in symptomatology since starting the metoprolol.  She questions if she could discontinue this today.      CURRENT CARDIAC MEDICATIONS:   1.  Amlodipine 5 mg daily.   2.  Aspirin 81 mg daily.   3.  Atorvastatin 40 mg daily.   4.  Lisinopril 20 mg b.i.d.   5.  Metoprolol tartrate 25 mg b.i.d.   6.  Sublingual nitro p.r.n.      The remainder of her medications, allergies and review of systems were  reviewed and as are documented separately.      PHYSICAL EXAMINATION:   GENERAL:  The patient is a pleasant 68-year-old female who appears her stated age.  She is in no apparent distress.   VITAL SIGNS:  Her blood pressure in the office today was 138/78, pulse is 81, weight is 209 pounds.    PULMONARY:  Breathing is nonlabored and lungs are clear to auscultation.   CARDIAC:  Reveals a regular rate and rhythm.  I did not appreciate any significant murmur.   EXTREMITIES:  Lower extremities show no evidence of edema.   NEUROLOGIC:  Alert and oriented.      ASSESSMENT AND PLAN:  Pat is a 68-year-old female with cardiac risk factors of hypertension, dyslipidemia, diabetes, prior tobacco use, and family history of premature coronary artery disease who was recently referred after she was experiencing chest discomfort and had an equivocal stress test.  She was referred on for coronary angiography which showed mild to at most moderate nonobstructive coronary artery disease.  Continued risk factor modification was recommended.  She already is on aspirin and high-intensity statin therapy which should be continued.  I reiterated the importance of adequate blood pressure control with a goal blood pressure of less than 130/90.  The patient states that she has had some trouble with labile blood pressures in the past several months, but has been working with her primary care provider to try to get this under better control.  She has not noticed any improvement in symptoms or in her blood pressure with the low dose of metoprolol that was recently added and she wished to discontinue the medication.  I think that this is reasonable.  I did ask her to keep a close eye on her blood pressure and continue to work with her primary care provider to ensure that this is adequately controlled.  I recommended that she remain tobacco free.      I encouraged her to follow up with her primary care provider to assess for other noncardiac reasons of  her chest discomfort.  She can follow up in the Cardiology office on an as needed basis.      Thank you for allowing us to participate in the care of this pleasant patient.         DAI GONZALEZ PA-C             D: 2019   T: 2019   MT: DELLA      Name:     CHARLES BRAGA   MRN:      [id number]        Account:      [id number]   :      1950           Service Date: 2019      Document: [id number]        Thank you for allowing me to participate in the care of your patient.      Sincerely,     Dai Gonzalez PA-C     [facility] [place]    cc:   Cheo Merida MD  [address]  [place] [id number]

## 2019-03-05 NOTE — PROGRESS NOTES
Service Date: 03/05/2019      HISTORY OF PRESENT ILLNESS:  Pat is a pleasant 68-year-old female who presents to the office today for followup after a recent coronary angiogram.      Her cardiovascular risk factors include diabetes, hypertension, hyperlipidemia, prior tobacco use and a family history of premature coronary artery disease.  She recently presented to her primary care provider's office with symptoms of chest discomfort and dyspnea with exertion.  She was sent for a nuclear stress test which showed a small area which was equivocal for apical ischemia.  She was referred on to the Cardiology office.  For further evaluation, it was recommended that she undergo a coronary angiogram.  She was also started on metoprolol tartrate 25 mg b.i.d. at that time.      Her coronary angiogram showed mild to at most moderate nonobstructive coronary disease.  The most significant lesion was a 50% lesion in the mid circumflex.  IFR of this lesion was 1.0 suggesting that it was not hemodynamically significant.  Continued medical management and risk factor modification were recommended.      At this point, the patient states that her symptoms are about the same.  She has not noted any improvement in symptomatology since starting the metoprolol.  She questions if she could discontinue this today.      CURRENT CARDIAC MEDICATIONS:   1.  Amlodipine 5 mg daily.   2.  Aspirin 81 mg daily.   3.  Atorvastatin 40 mg daily.   4.  Lisinopril 20 mg b.i.d.   5.  Metoprolol tartrate 25 mg b.i.d.   6.  Sublingual nitro p.r.n.      The remainder of her medications, allergies and review of systems were reviewed and as are documented separately.      PHYSICAL EXAMINATION:   GENERAL:  The patient is a pleasant 68-year-old female who appears her stated age.  She is in no apparent distress.   VITAL SIGNS:  Her blood pressure in the office today was 138/78, pulse is 81, weight is 209 pounds.    PULMONARY:  Breathing is nonlabored and lungs are  clear to auscultation.   CARDIAC:  Reveals a regular rate and rhythm.  I did not appreciate any significant murmur.   EXTREMITIES:  Lower extremities show no evidence of edema.   NEUROLOGIC:  Alert and oriented.      ASSESSMENT AND PLAN:  Nyasia is a 68-year-old female with cardiac risk factors of hypertension, dyslipidemia, diabetes, prior tobacco use, and family history of premature coronary artery disease who was recently referred after she was experiencing chest discomfort and had an equivocal stress test.  She was referred on for coronary angiography which showed mild to at most moderate nonobstructive coronary artery disease.  Continued risk factor modification was recommended.  She already is on aspirin and high-intensity statin therapy which should be continued.  I reiterated the importance of adequate blood pressure control with a goal blood pressure of less than 130/90.  The patient states that she has had some trouble with labile blood pressures in the past several months, but has been working with her primary care provider to try to get this under better control.  She has not noticed any improvement in symptoms or in her blood pressure with the low dose of metoprolol that was recently added and she wished to discontinue the medication.  I think that this is reasonable.  I did ask her to keep a close eye on her blood pressure and continue to work with her primary care provider to ensure that this is adequately controlled.  I recommended that she remain tobacco free.      I encouraged her to follow up with her primary care provider to assess for other noncardiac reasons of her chest discomfort.  She can follow up in the Cardiology office on an as needed basis.      Thank you for allowing us to participate in the care of this pleasant patient.         HERACLIO GONZALEZ PA-C             D: 03/05/2019   T: 03/05/2019   MT: DELLA      Name:     CHARLES BRAGA   MRN:      [id number]        Account:       [id number]   :      1950           Service Date: 2019      Document: [id number]

## 2019-03-05 NOTE — LETTER
3/5/2019      Yayo Mendoza MD  [address]  [facility] [address]      RE: Vijaya Manjarrez       Dear Colleague,    I had the pleasure of seeing Vijaya CARROLL Noblejonny in the [facility].    Service Date: 03/05/2019      HISTORY OF PRESENT ILLNESS:  Nyasia is a pleasant 68-year-old female who presents to the office today for followup after a recent coronary angiogram.      Her cardiovascular risk factors include diabetes, hypertension, hyperlipidemia, prior tobacco use and a family history of premature coronary artery disease.  She recently presented to her primary care provider's office with symptoms of chest discomfort and dyspnea with exertion.  She was sent for a nuclear stress test which showed a small area which was equivocal for apical ischemia.  She was referred on to the Cardiology office.  For further evaluation, it was recommended that she undergo a coronary angiogram.  She was also started on metoprolol tartrate 25 mg b.i.d. at that time.      Her coronary angiogram showed mild to at most moderate nonobstructive coronary disease.  The most significant lesion was a 50% lesion in the mid circumflex.  IFR of this lesion was 1.0 suggesting that it was not hemodynamically significant.  Continued medical management and risk factor modification were recommended.      At this point, the patient states that her symptoms are about the same.  She has not noted any improvement in symptomatology since starting the metoprolol.  She questions if she could discontinue this today.      CURRENT CARDIAC MEDICATIONS:   1.  Amlodipine 5 mg daily.   2.  Aspirin 81 mg daily.   3.  Atorvastatin 40 mg daily.   4.  Lisinopril 20 mg b.i.d.   5.  Metoprolol tartrate 25 mg b.i.d.   6.  Sublingual nitro p.r.n.      The remainder of her medications, allergies and review of systems were reviewed and as are documented separately.      PHYSICAL EXAMINATION:   GENERAL:  The patient is a pleasant  68-year-old female who appears her stated age.  She is in no apparent distress.   VITAL SIGNS:  Her blood pressure in the office today was 138/78, pulse is 81, weight is 209 pounds.    PULMONARY:  Breathing is nonlabored and lungs are clear to auscultation.   CARDIAC:  Reveals a regular rate and rhythm.  I did not appreciate any significant murmur.   EXTREMITIES:  Lower extremities show no evidence of edema.   NEUROLOGIC:  Alert and oriented.      ASSESSMENT AND PLAN:  Pat is a 68-year-old female with cardiac risk factors of hypertension, dyslipidemia, diabetes, prior tobacco use, and family history of premature coronary artery disease who was recently referred after she was experiencing chest discomfort and had an equivocal stress test.  She was referred on for coronary angiography which showed mild to at most moderate nonobstructive coronary artery disease.  Continued risk factor modification was recommended.  She already is on aspirin and high-intensity statin therapy which should be continued.  I reiterated the importance of adequate blood pressure control with a goal blood pressure of less than 130/90.  The patient states that she has had some trouble with labile blood pressures in the past several months, but has been working with her primary care provider to try to get this under better control.  She has not noticed any improvement in symptoms or in her blood pressure with the low dose of metoprolol that was recently added and she wished to discontinue the medication.  I think that this is reasonable.  I did ask her to keep a close eye on her blood pressure and continue to work with her primary care provider to ensure that this is adequately controlled.  I recommended that she remain tobacco free.      I encouraged her to follow up with her primary care provider to assess for other noncardiac reasons of her chest discomfort.  She can follow up in the Cardiology office on an as needed basis.      Thank you for  allowing us to participate in the care of this pleasant patient.         HERACLIO GONZALEZ PA-C             D: 2019   T: 2019   MT: DELLA      Name:     CHARLES BRAGA   MRN:      [id number]        Account:      [id number]   :      1950           Service Date: 2019      Document: [id number]           Outpatient Encounter Medications as of 3/5/2019   Medication Sig Dispense Refill     acetaminophen (TYLENOL ARTHRITIS PAIN) 650 MG CR tablet Take 650 mg by mouth 2 times daily       albuterol (PROAIR HFA/PROVENTIL HFA/VENTOLIN HFA) 108 (90 BASE) MCG/ACT Inhaler Inhale 2 puffs into the lungs every 4 hours as needed for shortness of breath / dyspnea or wheezing 1 Inhaler 0     amLODIPine (NORVASC) 5 MG tablet Take 1 tablet (5 mg) by mouth daily 90 tablet 1     aspirin (ASA) 81 MG chewable tablet Take 1 tablet (81 mg) by mouth daily 90 tablet 3     atorvastatin (LIPITOR) 40 MG tablet Take 1 tablet (40 mg) by mouth daily 90 tablet 3     blood glucose monitoring (NO BRAND SPECIFIED) test strip Use to test blood sugars 1 times daily or as directed, use brand same as previous 100 strip 3     cetirizine (ZYRTEC) 10 MG tablet TAKE ONE TABLET BY MOUTH EVERY DAY 90 tablet 1     cyclobenzaprine (FLEXERIL) 10 MG tablet Take 1 tablet (10 mg) by mouth 2 times daily 180 tablet 1     DULoxetine (CYMBALTA) 60 MG capsule Take 1 capsule (60 mg) by mouth daily 90 capsule 0     fluticasone (FLONASE) 50 MCG/ACT spray Spray 2 sprays into both nostrils daily 1 Bottle 11     lidocaine (LIDODERM) 5 % Patch Apply up to 3 patches to painful area at once for up to 12 h within a 24 h period.  Remove after 12 hours. 30 patch 10     lisinopril (PRINIVIL/ZESTRIL) 20 MG tablet Take 1 tablet (20 mg) by mouth 2 times daily 90 tablet 3     metFORMIN (GLUCOPHAGE) 500 MG tablet TAKE ONE TABLET BY MOUTH TWICE A DAY WITH A MEAL 180 tablet 0     miconazole (MICATIN) 2 % cream Apply topically 2 times daily 57 g 5      montelukast (SINGULAIR) 10 MG tablet TAKE ONE TABLET BY MOUTH AT BEDTIME 90 tablet 3     nitroGLYcerin (NITROSTAT) 0.4 MG sublingual tablet For chest pain place 1 tablet under the tongue every 5 minutes for 3 doses. If symptoms persist 5 minutes after 1st dose call 911. 30 tablet 3     nystatin (NYAMYC) 539960 UNIT/GM external powder        pregabalin (LYRICA) 150 MG capsule Take 1 capsule (150 mg) by mouth 3 times daily 270 capsule 1     traMADol (ULTRAM) 50 MG tablet Take 2 tablets (100 mg) by mouth 3 times daily 180 tablet 0     traZODone (DESYREL) 100 MG tablet TAKE TWO TABLETS BY MOUTH EVERY NIGHT AT BEDTIME 180 tablet 2     [DISCONTINUED] ADVAIR DISKUS 250-50 MCG/DOSE inhaler INHALE 1 PUFF INTO THE LUNGS TWO TIMES A DAY 1 Inhaler 4     [DISCONTINUED] omeprazole (PRILOSEC) 20 MG CR capsule Take 1 capsule (20 mg) by mouth daily 90 capsule 3     [DISCONTINUED] metoprolol tartrate (LOPRESSOR) 25 MG tablet Take 1 tablet (25 mg) by mouth 2 times daily 180 tablet 3     No facility-administered encounter medications on file as of 3/5/2019.        Again, thank you for allowing me to participate in the care of your patient.      Sincerely,    Dai Costa PA-C     [facility]

## 2019-03-05 NOTE — PATIENT INSTRUCTIONS
Thank you for your [facility] visit today. Your provider has recommended the following:  Medication Changes:  Ok to stop the metoprolol.  Continue your other medications the same.   Recommendations:  Ideal BP is <130/80  Follow-up:  As needed.     Reminder:  Please bring in your current medication list or your medication, over the counter supplements and vitamin bottles as we will review these at each office visit.

## 2019-03-05 NOTE — PROGRESS NOTES
Please see separate dictation for HPI, PHYSICAL EXAM AND IMPRESSION/PLAN.    CURRENT MEDICATIONS:  Current Outpatient Medications   Medication Sig Dispense Refill     acetaminophen (TYLENOL ARTHRITIS PAIN) 650 MG CR tablet Take 650 mg by mouth 2 times daily       ADVAIR DISKUS 250-50 MCG/DOSE inhaler INHALE 1 PUFF INTO THE LUNGS TWO TIMES A DAY 1 Inhaler 4     albuterol (PROAIR HFA/PROVENTIL HFA/VENTOLIN HFA) 108 (90 BASE) MCG/ACT Inhaler Inhale 2 puffs into the lungs every 4 hours as needed for shortness of breath / dyspnea or wheezing 1 Inhaler 0     amLODIPine (NORVASC) 5 MG tablet Take 1 tablet (5 mg) by mouth daily 90 tablet 1     aspirin (ASA) 81 MG chewable tablet Take 1 tablet (81 mg) by mouth daily 90 tablet 3     atorvastatin (LIPITOR) 40 MG tablet Take 1 tablet (40 mg) by mouth daily 90 tablet 3     blood glucose monitoring (NO BRAND SPECIFIED) test strip Use to test blood sugars 1 times daily or as directed, use brand same as previous 100 strip 3     cetirizine (ZYRTEC) 10 MG tablet TAKE ONE TABLET BY MOUTH EVERY DAY 90 tablet 1     cyclobenzaprine (FLEXERIL) 10 MG tablet Take 1 tablet (10 mg) by mouth 2 times daily 180 tablet 1     DULoxetine (CYMBALTA) 60 MG capsule Take 1 capsule (60 mg) by mouth daily 90 capsule 0     fluticasone (FLONASE) 50 MCG/ACT spray Spray 2 sprays into both nostrils daily 1 Bottle 11     lidocaine (LIDODERM) 5 % Patch Apply up to 3 patches to painful area at once for up to 12 h within a 24 h period.  Remove after 12 hours. 30 patch 10     lisinopril (PRINIVIL/ZESTRIL) 20 MG tablet Take 1 tablet (20 mg) by mouth 2 times daily 90 tablet 3     metFORMIN (GLUCOPHAGE) 500 MG tablet TAKE ONE TABLET BY MOUTH TWICE A DAY WITH A MEAL 180 tablet 0     metoprolol tartrate (LOPRESSOR) 25 MG tablet Take 1 tablet (25 mg) by mouth 2 times daily 180 tablet 3     miconazole (MICATIN) 2 % cream Apply topically 2 times daily 57 g 5     montelukast (SINGULAIR) 10 MG tablet TAKE ONE TABLET BY  MOUTH AT BEDTIME 90 tablet 3     nitroGLYcerin (NITROSTAT) 0.4 MG sublingual tablet For chest pain place 1 tablet under the tongue every 5 minutes for 3 doses. If symptoms persist 5 minutes after 1st dose call 911. 30 tablet 3     nystatin (NYAMYC) 334619 UNIT/GM external powder        omeprazole (PRILOSEC) 20 MG CR capsule Take 1 capsule (20 mg) by mouth daily 90 capsule 3     pregabalin (LYRICA) 150 MG capsule Take 1 capsule (150 mg) by mouth 3 times daily 270 capsule 1     traMADol (ULTRAM) 50 MG tablet Take 2 tablets (100 mg) by mouth 3 times daily 180 tablet 0     traZODone (DESYREL) 100 MG tablet TAKE TWO TABLETS BY MOUTH EVERY NIGHT AT BEDTIME 180 tablet 2       ALLERGIES:     Allergies   Allergen Reactions     Penicillins Hives       PAST MEDICAL HISTORY:  Past Medical History:   Diagnosis Date     Anxiety      Cervical spine degeneration 2016    spinal stenosis,      COPD (chronic obstructive pulmonary disease) (H) 2012    mild     Diabetes mellitus, type 2 (H)      Diabetic neuropathy (H)      Facet arthropathy, lumbar      GERD (gastroesophageal reflux disease)      HTN (hypertension)      Hyperlipidemia      Lumbar degenerative disc disease      Migraine headache        PAST SURGICAL HISTORY:  Past Surgical History:   Procedure Laterality Date     CHOLECYSTECTOMY, LAPOROSCOPIC      Cholecystectomy, Laparoscopic     CV HEART CATHETERIZATION WITH POSSIBLE INTERVENTION Left 2/26/2019    Procedure: Coronary Angiogram;  Surgeon: Cheo Merida MD;  Location:  HEART CARDIAC CATH LAB       SOCIAL HISTORY:  Social History     Socioeconomic History     Marital status: Single     Spouse name: None     Number of children: None     Years of education: None     Highest education level: None   Occupational History     None   Social Needs     Financial resource strain: None     Food insecurity:     Worry: None     Inability: None     Transportation needs:     Medical: None     Non-medical: None   Tobacco Use      Smoking status: Former Smoker     Packs/day: 1.00     Years: 40.00     Pack years: 40.00     Types: Cigarettes     Last attempt to quit: 2017     Years since quittin.7     Smokeless tobacco: Never Used   Substance and Sexual Activity     Alcohol use: No     Drug use: No     Sexual activity: No   Lifestyle     Physical activity:     Days per week: None     Minutes per session: None     Stress: None   Relationships     Social connections:     Talks on phone: None     Gets together: None     Attends Islam service: None     Active member of club or organization: None     Attends meetings of clubs or organizations: None     Relationship status: None     Intimate partner violence:     Fear of current or ex partner: None     Emotionally abused: None     Physically abused: None     Forced sexual activity: None   Other Topics Concern     Parent/sibling w/ CABG, MI or angioplasty before 65F 55M? Not Asked   Social History Narrative     None       FAMILY HISTORY:  Family History   Problem Relation Age of Onset     Cancer Mother      Cancer Father         Lung cancer     Cancer Maternal Grandmother         Breast cancer       Review of Systems:  Skin:  Negative       Eyes:  Positive for glasses    ENT:  Positive for postnasal drainage coughing   Respiratory:  Positive for dyspnea on exertion SOB with minimal exertion    Cardiovascular:    chest pain;Positive for;lightheadedness;dizziness;palpitations;edema All symptoms are still present after procedure   Gastroenterology: Positive for nausea;heartburn;constipation uses prilosec but no longer effective  Genitourinary:  Positive for urinary frequency    Musculoskeletal:  Positive for fibromyalgia;joint pain;back pain    Neurologic:  Positive for migraine headaches;headaches;numbness or tingling of hands;numbness or tingling of feet    Psychiatric:  Positive for excessive stress;anxiety;depression    Heme/Lymph/Imm:  Negative      Endocrine:  Positive for    pre-diabetes currently taking Metformin     Reviewed. Remainder of the note dictated.    Dai Costa PA-C

## 2019-03-06 ENCOUNTER — OFFICE VISIT (OUTPATIENT)
Dept: INTERNAL MEDICINE | Facility: CLINIC | Age: 69
End: 2019-03-06
Payer: COMMERCIAL

## 2019-03-06 VITALS
DIASTOLIC BLOOD PRESSURE: 75 MMHG | HEART RATE: 111 BPM | HEIGHT: 69 IN | TEMPERATURE: 99 F | BODY MASS INDEX: 30.69 KG/M2 | WEIGHT: 207.2 LBS | SYSTOLIC BLOOD PRESSURE: 128 MMHG | OXYGEN SATURATION: 96 % | RESPIRATION RATE: 16 BRPM

## 2019-03-06 DIAGNOSIS — Z12.2 ENCOUNTER FOR SCREENING FOR LUNG CANCER: ICD-10-CM

## 2019-03-06 DIAGNOSIS — I25.10 CORONARY ARTERY DISEASE INVOLVING NATIVE CORONARY ARTERY OF NATIVE HEART WITHOUT ANGINA PECTORIS: Primary | ICD-10-CM

## 2019-03-06 DIAGNOSIS — E11.40 TYPE 2 DIABETES MELLITUS WITH DIABETIC NEUROPATHY, WITHOUT LONG-TERM CURRENT USE OF INSULIN (H): ICD-10-CM

## 2019-03-06 DIAGNOSIS — J44.9 CHRONIC OBSTRUCTIVE PULMONARY DISEASE, UNSPECIFIED COPD TYPE (H): ICD-10-CM

## 2019-03-06 DIAGNOSIS — Z11.59 ENCOUNTER FOR HEPATITIS C SCREENING TEST FOR LOW RISK PATIENT: ICD-10-CM

## 2019-03-06 DIAGNOSIS — Z87.891 PERSONAL HISTORY OF NICOTINE DEPENDENCE: ICD-10-CM

## 2019-03-06 DIAGNOSIS — K21.9 GASTROESOPHAGEAL REFLUX DISEASE WITHOUT ESOPHAGITIS: ICD-10-CM

## 2019-03-06 DIAGNOSIS — F11.20 NARCOTIC DEPENDENCE (H): ICD-10-CM

## 2019-03-06 LAB — HBA1C MFR BLD: 6.8 % (ref 0–5.6)

## 2019-03-06 PROCEDURE — 80061 LIPID PANEL: CPT | Performed by: INTERNAL MEDICINE

## 2019-03-06 PROCEDURE — 99214 OFFICE O/P EST MOD 30 MIN: CPT | Performed by: INTERNAL MEDICINE

## 2019-03-06 PROCEDURE — 80053 COMPREHEN METABOLIC PANEL: CPT | Performed by: INTERNAL MEDICINE

## 2019-03-06 PROCEDURE — 83721 ASSAY OF BLOOD LIPOPROTEIN: CPT | Mod: 59 | Performed by: INTERNAL MEDICINE

## 2019-03-06 PROCEDURE — 86803 HEPATITIS C AB TEST: CPT | Performed by: INTERNAL MEDICINE

## 2019-03-06 PROCEDURE — 83036 HEMOGLOBIN GLYCOSYLATED A1C: CPT | Performed by: INTERNAL MEDICINE

## 2019-03-06 PROCEDURE — 36415 COLL VENOUS BLD VENIPUNCTURE: CPT | Performed by: INTERNAL MEDICINE

## 2019-03-06 PROCEDURE — 82043 UR ALBUMIN QUANTITATIVE: CPT | Performed by: INTERNAL MEDICINE

## 2019-03-06 RX ORDER — TIOTROPIUM BROMIDE 18 UG/1
18 CAPSULE ORAL; RESPIRATORY (INHALATION) DAILY
Qty: 60 CAPSULE | Refills: 0 | Status: SHIPPED | OUTPATIENT
Start: 2019-03-06 | End: 2019-05-01

## 2019-03-06 ASSESSMENT — MIFFLIN-ST. JEOR: SCORE: 1539.23

## 2019-03-06 NOTE — PROGRESS NOTES
ASSESSMENT/PLAN:       1. Coronary artery disease involving native coronary artery of native heart without angina pectoris  Mild-Moderate obstructive CAD on recent cath, no indication for PCI  Continue high intensity statin, asa, and metoprolol recently added by cards      2. Narcotic dependence (H)  Discussed with patient her use of tramadol. Currently on 100mg TID, this was prescribed by previous provider.  Encouraged her to slowly wean down and she will start taking 100mg BID now. She will need to continue to work on weaning this down    Signed CSA on file    3. Chronic obstructive pulmonary disease, unspecified COPD type (H)  Last spirometry done 12/2017 showing borderline FEV1/FEV of 70%  Will switch from Advair to Spiriva as this is first line for COPD  If patient continues to have shortness of breath, may be reasonable to repeat spirometry    - tiotropium (SPIRIVA) 18 MCG inhaled capsule; Inhale 1 capsule (18 mcg) into the lungs daily  Dispense: 60 capsule; Refill: 0    4. Gastroesophageal reflux disease without esophagitis  Given worsening acid reflux which may be cause of her atypical pain, have increased her prilosec to 20mg BID  - omeprazole (PRILOSEC) 20 MG DR capsule; Take 1 capsule (20 mg) by mouth 2 times daily  Dispense: 90 capsule; Refill: 3    5. Encounter for screening for lung cancer  Screen as below    - CT Chest Lung Cancer Scrn Low Dose wo; Future    6. Personal history of nicotine dependence   See below  - CT Chest Lung Cancer Scrn Low Dose wo; Future        Health Maintenance  Mammo done 2/19 normal  Colonoscopy was normal 2014.  Pneumonia 23 and prevnar 13 given, and pneumonia 23 booster given after 65  Tetanus given 2015  Zoster vaccine 2015   DEXA done 3/2018 showing normal bone density  Patient reports recently quitting smoking, she would be candidate to get screened for lung cancer.  She did have CT chest last year 2017 but no comment on nodules at the time  Order placed this  visit       Yayo Mendoza MD  [facility]    See last visit for history on other chronic medical issues        SUBJECTIVE:   Vijaya Manjarrez is a 68 year old female who presents to clinic today for the following health issues:    CAD  Patient presented last visit with left-sided chest pain  I ordered Lexiscan that returned equivocal  Patient had cath done showing mild-mod obstructive CAD. Medical management was recommended by cards    She continues to have some shortness of breath on exertion  Has history of COPD, last spirometery showed FEV1/FEV of 70%. On advair currently  Patient also with extensive smoking history, no previous lung ca screen    Please refer to last OV for other chronic medical issues      Diabetes Follow-up    Patient is checking blood sugars: not at all    Diabetic concerns: None     Symptoms of hypoglycemia (low blood sugar): none     Paresthesias (numbness or burning in feet) or sores: No   Date of last diabetic eye exam: Not recently    Diabetes Management Resources    Hyperlipidemia Follow-Up    Rate your low fat/cholesterol diet?: not monitoring fat    Taking statin? No    Other lipid medications/supplements?:  none    Hypertension Follow-up    Outpatient blood pressures are being checked at home.  Results are 150/80.    Low Salt Diet: no    BP Readings from Last 2 Encounters:   03/05/19 (!) 134/91   02/26/19 132/69     Hemoglobin A1C (%)   Date Value   03/03/2018 6.9 (H)   06/17/2017 6.8 (H)     LDL Cholesterol Calculated (mg/dL)   Date Value   03/03/2018 56   02/20/2017 58       Amount of exercise or physical activity: None    Problems taking medications regularly: No    Medication side effects: none    Diet: regular (no restrictions)      Patient quit smoking several weeks ago but reports over the last several days had a few cigarettes.  She does carry a diagnosis of COPD, below are result of spirometry   FEV-1 12/04/2017 12:00 AM Unknown   2.06    FVC 12/04/2017  12:00 AM Unknown   2.94    FEV1/FVC 2017 12:00 AM Unknown   70%    FEF 25/75 2017 12:00 AM Unknown   1.60      GERD  Feels her reflux is worse lately.  -Prilosec 20mg qAM.          Problem list and histories reviewed & adjusted, as indicated.  Additional history: as documented    Patient Active Problem List   Diagnosis     HCD (health care directive)     Type 2 diabetes mellitus with diabetic neuropathy, without long-term current use of insulin (H)     COPD (chronic obstructive pulmonary disease) (H)     Cervical spine degeneration     Facet arthropathy, lumbar     Lumbar degenerative disc disease     Migraine headache     Diabetic neuropathy (H)     Anxiety     GERD (gastroesophageal reflux disease)     Hyperlipidemia LDL goal <100     Fibromyalgia     Benign essential hypertension     Controlled substance agreement signed     Chronic pain syndrome     Health Care Home     Advanced directives, counseling/discussion     Narcotic dependence (H)     Mild episode of recurrent major depressive disorder (H)     Coronary artery disease involving native coronary artery, angina presence unspecified, unspecified whether native or transplanted heart     Dyslipidemia     Status post coronary angiogram     Past Surgical History:   Procedure Laterality Date     CHOLECYSTECTOMY, LAPOROSCOPIC      Cholecystectomy, Laparoscopic     CV HEART CATHETERIZATION WITH POSSIBLE INTERVENTION Left 2019    Procedure: Coronary Angiogram;  Surgeon: Cheo Merida MD;  Location:  HEART CARDIAC CATH LAB       Social History     Tobacco Use     Smoking status: Former Smoker     Packs/day: 1.00     Years: 40.00     Pack years: 40.00     Types: Cigarettes     Last attempt to quit: 2017     Years since quittin.7     Smokeless tobacco: Never Used   Substance Use Topics     Alcohol use: No     Family History   Problem Relation Age of Onset     Cancer Mother      Cancer Father         Lung cancer     Cancer Maternal  "Grandmother         Breast cancer           Reviewed and updated as needed this visit by clinical staff  Tobacco  Allergies  Meds  Med Hx  Surg Hx  Fam Hx  Soc Hx      Reviewed and updated as needed this visit by Provider         ROS:  Constitutional, HEENT, cardiovascular, pulmonary, gi and gu systems are negative, except as otherwise noted.    OBJECTIVE:     /75 (BP Location: Right arm, Patient Position: Sitting, Cuff Size: Adult Regular)   Pulse 111   Temp 99  F (37.2  C) (Oral)   Resp 16   Ht 1.753 m (5' 9\")   Wt 94 kg (207 lb 3.2 oz)   SpO2 96%   BMI 30.60 kg/m    Body mass index is 30.6 kg/m .  GENERAL: healthy, alert and no distress  NECK: no adenopathy, no asymmetry, masses, or scars and thyroid normal to palpation  RESP: Coarse breath sounds throughout, no wheezing heard  CV: regular rate and rhythm, normal S1 S2, no S3 or S4, no murmur, click or rub, no peripheral edema and peripheral pulses strong  ABDOMEN: soft, nontender, no hepatosplenomegaly, no masses and bowel sounds normal  MS: no gross musculoskeletal defects noted, no edema    Diagnostic Test Results:  none     "

## 2019-03-06 NOTE — NURSING NOTE
"Vital signs:  Temp: 99  F (37.2  C) Temp src: Oral BP: 128/75 Pulse: 111   Resp: 16 SpO2: 96 %     Height: 175.3 cm (5' 9\") Weight: 94 kg (207 lb 3.2 oz)  Estimated body mass index is 30.6 kg/m  as calculated from the following:    Height as of this encounter: 1.753 m (5' 9\").    Weight as of this encounter: 94 kg (207 lb 3.2 oz).          "

## 2019-03-07 LAB
ALBUMIN SERPL-MCNC: 4.4 G/DL (ref 3.4–5)
ALP SERPL-CCNC: 108 U/L (ref 40–150)
ALT SERPL W P-5'-P-CCNC: 40 U/L (ref 0–50)
ANION GAP SERPL CALCULATED.3IONS-SCNC: 6 MMOL/L (ref 3–14)
AST SERPL W P-5'-P-CCNC: 39 U/L (ref 0–45)
BILIRUB SERPL-MCNC: 0.5 MG/DL (ref 0.2–1.3)
BUN SERPL-MCNC: 10 MG/DL (ref 7–30)
CALCIUM SERPL-MCNC: 9 MG/DL (ref 8.5–10.1)
CHLORIDE SERPL-SCNC: 107 MMOL/L (ref 94–109)
CHOLEST SERPL-MCNC: 273 MG/DL
CO2 SERPL-SCNC: 28 MMOL/L (ref 20–32)
CREAT SERPL-MCNC: 0.75 MG/DL (ref 0.52–1.04)
GFR SERPL CREATININE-BSD FRML MDRD: 82 ML/MIN/{1.73_M2}
GLUCOSE SERPL-MCNC: 145 MG/DL (ref 70–99)
HCV AB SERPL QL IA: NONREACTIVE
HDLC SERPL-MCNC: 33 MG/DL
LDLC SERPL CALC-MCNC: ABNORMAL MG/DL
LDLC SERPL DIRECT ASSAY-MCNC: 164 MG/DL
NONHDLC SERPL-MCNC: 240 MG/DL
POTASSIUM SERPL-SCNC: 3.7 MMOL/L (ref 3.4–5.3)
PROT SERPL-MCNC: 8.2 G/DL (ref 6.8–8.8)
SODIUM SERPL-SCNC: 141 MMOL/L (ref 133–144)
TRIGL SERPL-MCNC: 403 MG/DL

## 2019-03-08 LAB
CREAT UR-MCNC: 364 MG/DL
MICROALBUMIN UR-MCNC: 90 MG/L
MICROALBUMIN/CREAT UR: 24.73 MG/G CR (ref 0–25)

## 2019-03-15 ENCOUNTER — HOSPITAL ENCOUNTER (OUTPATIENT)
Dept: CT IMAGING | Facility: CLINIC | Age: 69
Discharge: HOME | End: 2019-03-15
Attending: INTERNAL MEDICINE | Admitting: INTERNAL MEDICINE
Payer: COMMERCIAL

## 2019-03-15 DIAGNOSIS — Z87.891 PERSONAL HISTORY OF NICOTINE DEPENDENCE: ICD-10-CM

## 2019-03-15 DIAGNOSIS — Z12.2 ENCOUNTER FOR SCREENING FOR LUNG CANCER: ICD-10-CM

## 2019-03-15 PROCEDURE — G0297 LDCT FOR LUNG CA SCREEN: HCPCS

## 2019-03-20 ENCOUNTER — TELEPHONE (OUTPATIENT)
Dept: INTERNAL MEDICINE | Facility: CLINIC | Age: 69
End: 2019-03-20

## 2019-03-20 NOTE — LETTER
[facility]  [address], [address]  [place], [place]  [address]                                            TEL:[phone]  FAX:[phone]      Vijaya Manjarrez  [address]  [place] [facility] [address]      March 21, 2019    Dear Vijaya    Your lung nodule is stable.  Recommend annual screening follow up (in one year).     Also you has moderate to severe coronary artery calcium.  LDL is not at goal - 164mg/dL.  Blood pressure is borderline.     The patient did see cardiology 3/2019.     Make sure you establish care again to keep a close eye on her blood pressure and recheck cholesterol.     Sincerely,      Vidhi Mario M.D.

## 2019-03-20 NOTE — TELEPHONE ENCOUNTER
Reason for Call:  Request for results:    Name of test or procedure: CT scan    Date of test of procedure: 3/15    Location of the test or procedure: specialty care    OK to leave the result message on voice mail or with a family member? YES    Phone number Patient can be reached at:  Cell number on file:    Telephone Information:   Mobile [phone]       Additional comments: asap    Call taken on 3/20/2019 at 3:00 PM by Angelica Cornejo

## 2019-03-21 NOTE — TELEPHONE ENCOUNTER
Patient informed of Dr. Mario's message below.  She asks that this information be mailed to her in letter form.  She will call back to schedule appointment to establish care with a new provider.    Letter mailed.

## 2019-03-21 NOTE — TELEPHONE ENCOUNTER
Latoya from imaging called and reported that the findings were coronary artory - moderate to severe.  Please call them to acknowledge this at [phone] or acknowledge it in epic.

## 2019-03-21 NOTE — TELEPHONE ENCOUNTER
Please let patient know that lung nodule is stable.  Recommend annual screening follow up (in one year).    Patient also has moderate to severe coronary artery calcium.  Patient does have CAD listed in her chart, and is on aspirin and statin.  LDL is not at goal - 164mg/dL.  Blood pressure is borderline.    The patient did see cardiology 3/2019.    Make sure she establishes care again to keep a close eye on her blood pressure and recheck cholesterol.

## 2019-03-26 NOTE — PROGRESS NOTES
[facility] Care Coordination Contact    No Letter Received: 60 day tracking of letter complete, no letter received from client Vijaya. Tracking discontinued.     Daysi Valenciaatoblank  Case Management Specialist   [facility]   [phone]

## 2019-03-27 DIAGNOSIS — M54.50 CHRONIC BILATERAL LOW BACK PAIN WITHOUT SCIATICA: ICD-10-CM

## 2019-03-27 DIAGNOSIS — J30.2 SEASONAL ALLERGIC RHINITIS, UNSPECIFIED TRIGGER: ICD-10-CM

## 2019-03-27 DIAGNOSIS — G89.29 CHRONIC BILATERAL LOW BACK PAIN WITHOUT SCIATICA: ICD-10-CM

## 2019-03-27 DIAGNOSIS — M79.7 FIBROMYALGIA: ICD-10-CM

## 2019-03-29 RX ORDER — CETIRIZINE HYDROCHLORIDE 10 MG/1
10 TABLET ORAL DAILY
Qty: 90 TABLET | Refills: 1 | Status: SHIPPED | OUTPATIENT
Start: 2019-03-29 | End: 2019-06-25

## 2019-03-29 RX ORDER — PREGABALIN 150 MG/1
150 CAPSULE ORAL 3 TIMES DAILY
Qty: 270 CAPSULE | Refills: 1 | Status: SHIPPED | OUTPATIENT
Start: 2019-03-29 | End: 2019-11-13

## 2019-03-29 NOTE — TELEPHONE ENCOUNTER
Controlled Substance Refill Request for Lyrica  Problem List Complete:    No     PROVIDER TO CONSIDER COMPLETION OF PROBLEM LIST AND OVERVIEW/CONTROLLED SUBSTANCE AGREEMENT    Last Written Prescription Date:  9/12/18  Last Fill Quantity: 270,   # refills: 1    THE MOST RECENT OFFICE VISIT MUST BE WITHIN THE PAST 3 MONTHS. AT LEAST ONE FACE TO FACE VISIT MUST OCCUR EVERY 6 MONTHS. ADDITIONAL VISITS CAN BE VIRTUAL.      Last Office Visit with [facility] primary care provider: 3/6/19    Future Office visit:     Controlled substance agreement:   Encounter-Level CSA - 01/12/2017:    Controlled Substance Agreement - Scan on 1/26/2017 12:28 PM: CONTROLLED SUBSTANCE AGREEMENT (below)       Patient-Level CSA:    Controlled Substance Agreement - Opioid - Scan on 2/6/2019  9:37 AM (below)           Last Urine Drug Screen: No results found for: CDAUT, No results found for: COMDAT,   Cannabinoids (96-bdc-7-carboxy-9-THC)   Date Value Ref Range Status   03/03/2018 Not Detected NDET^Not Detected ng/mL Final     Comment:     Cutoff for a negative cannabinoid is 50 ng/mL or less.     Phencyclidine (Phencyclidine)   Date Value Ref Range Status   03/03/2018 Not Detected NDET^Not Detected ng/mL Final     Comment:     Cutoff for a negative PCP is 25 ng/mL or less.     Cocaine (Benzoylecgonine)   Date Value Ref Range Status   03/03/2018 Not Detected NDET^Not Detected ng/mL Final     Comment:     Cutoff for a negative cocaine is 150 ng/ml or less.     Methamphetamine (d-Methamphetamine)   Date Value Ref Range Status   03/03/2018 Not Detected NDET^Not Detected ng/mL Final     Comment:     Cutoff for a negative methamphetamine is 500 ng/ml or less.     Opiates (Morphine)   Date Value Ref Range Status   03/03/2018 Not Detected NDET^Not Detected ng/mL Final     Comment:     Cutoff for a negative opiate is 100 ng/ml or less.     Amphetamine (d-Amphetamine)   Date Value Ref Range Status   03/03/2018 Not Detected NDET^Not Detected ng/mL Final      Comment:     Cutoff for a negative amphetamine is 500 ng/mL or less.     Benzodiazepines (Nordiazepam)   Date Value Ref Range Status   03/03/2018 Not Detected NDET^Not Detected ng/mL Final     Comment:     Cutoff for a negative benzodiazepine is 150 ng/ml or less.     Tricyclic Antidepressants (Desipramine)   Date Value Ref Range Status   03/03/2018 Detected, Abnormal Result (A) NDET^Not Detected ng/mL Final     Comment:     Cutoff for a positive tricyclic antidepressant is greater than 300 ng/ml.  This is an unconfirmed screening result to be used for medical purposes only.   Order [id number] for confirmation or individual confirmation tests to [employer].       Methadone (Methadone)   Date Value Ref Range Status   03/03/2018 Not Detected NDET^Not Detected ng/mL Final     Comment:     Cutoff for a negative methadone is 200 ng/ml or less.     Barbiturates (Butalbital)   Date Value Ref Range Status   03/03/2018 Not Detected NDET^Not Detected ng/mL Final     Comment:     Cutoff for a negative barbituate is 200 ng/ml or less.     Oxycodone (Oxycodone)   Date Value Ref Range Status   03/03/2018 Not Detected NDET^Not Detected ng/mL Final     Comment:     Cutoff for a negative Oxycodone is 100 ng/mL or less.     Propoxyphene (Norpropoxyphene)   Date Value Ref Range Status   03/03/2018 Not Detected NDET^Not Detected ng/mL Final     Comment:     Cutoff for a negative propoxyphene is 300 ng/ml or less     Buprenorphine (Buprenorphine)   Date Value Ref Range Status   03/03/2018 Not Detected NDET^Not Detected ng/mL Final     Comment:     Cutoff for a negative buprenorphine is 10 ng/ml or less        Processing:  Staff will hand deliver Rx to on-site pharmacy     Not reviewed - provider has not provided access to this RN    Requested Prescriptions   Pending Prescriptions Disp Refills     cetirizine (ZYRTEC) 10 MG tablet 90 tablet 1     Sig: Take 1 tablet (10 mg) by mouth daily    Antihistamines Protocol Failed - 3/27/2019  5:09 PM     "   Failed - Patient is 3-64 years of age    Apply weight-based dosing for peds patients age 3 - 12 years of age.    Forward request to provider for patients under the age of 3 or over the age of 64.         Passed - Recent (12 mo) or future (30 days) visit within the authorizing provider's specialty    Patient had office visit in the last 12 months or has a visit in the next 30 days with authorizing provider or within the authorizing provider's specialty.  See \"Patient Info\" tab in inbasket, or \"Choose Columns\" in Meds & Orders section of the refill encounter.             Passed - Medication is active on med list        pregabalin (LYRICA) 150 MG capsule 270 capsule 1     Sig: Take 1 capsule (150 mg) by mouth 3 times daily    There is no refill protocol information for this order        Routing refill request to provider for review/approval because:  Drug not on the [facility] refill protocol   Fails protocol due to age is over 64  Will route to covering provider, original primary care provider is no longer in office.                "

## 2019-04-04 DIAGNOSIS — M47.812 FACET ARTHROPATHY, CERVICAL: ICD-10-CM

## 2019-04-04 DIAGNOSIS — M79.7 FIBROMYALGIA: ICD-10-CM

## 2019-04-04 DIAGNOSIS — M47.816 FACET ARTHROPATHY, LUMBAR: ICD-10-CM

## 2019-04-04 DIAGNOSIS — B37.2 YEAST INFECTION OF THE SKIN: Primary | ICD-10-CM

## 2019-04-04 RX ORDER — NYSTATIN 100000 [USP'U]/G
POWDER TOPICAL
Qty: 60 G | Refills: 0 | Status: SHIPPED | OUTPATIENT
Start: 2019-04-04 | End: 2019-06-25

## 2019-04-04 NOTE — TELEPHONE ENCOUNTER
"Requested Prescriptions   Pending Prescriptions Disp Refills     NYAMYC 113706 UNIT/GM external powder [Pharmacy Med Name: NYAMYC [id number]/GM POWD] 60 g 0     Sig: APPLY TOPICALLY THREE TIMES A DAY AS NEEDED    Antifungal Agents Passed - 4/4/2019  4:07 PM       Passed - Recent (12 mo) or future (30 days) visit within the authorizing provider's specialty    Patient had office visit in the last 12 months or has a visit in the next 30 days with authorizing provider or within the authorizing provider's specialty.  See \"Patient Info\" tab in inbasket, or \"Choose Columns\" in Meds & Orders section of the refill encounter.             Passed - Not Fluconazole or Terconazole     If oral Fluconazole or Terconazole, may refill if indicated in progress notes.          Passed - Medication is active on med list        Routed to provider for approval as this is a pt reported med, and has not been previously rx'd within [facility].        Susan Kathleen RN    "

## 2019-04-05 RX ORDER — TRAMADOL HYDROCHLORIDE 50 MG/1
100 TABLET ORAL 3 TIMES DAILY
Qty: 180 TABLET | Refills: 0 | Status: CANCELLED | OUTPATIENT
Start: 2019-04-05

## 2019-04-05 RX ORDER — TRAMADOL HYDROCHLORIDE 50 MG/1
100 TABLET ORAL 3 TIMES DAILY
Qty: 180 TABLET | Refills: 0 | Status: SHIPPED | OUTPATIENT
Start: 2019-04-05 | End: 2019-05-21

## 2019-04-05 NOTE — TELEPHONE ENCOUNTER
Controlled Substance Refill Request for Tramadol  Problem List Complete:    Yes - needs to be updated by a new PCP    Last Written Prescription Date:  3/5/19  Last Fill Quantity: 180,   # refills: 0    Last Office Visit with [facility] primary care provider: 3/6/19    Future Office visit:     Controlled substance agreement:   Encounter-Level CSA - 01/12/2017:    Controlled Substance Agreement - Scan on 1/26/2017 12:28 PM: CONTROLLED SUBSTANCE AGREEMENT (below)       Patient-Level CSA:    Controlled Substance Agreement - Opioid - Scan on 2/6/2019  9:37 AM (below)           Last Urine Drug Screen: No results found for: CDAUT, No results found for: COMDAT,   Cannabinoids (03-arz-1-carboxy-9-THC)   Date Value Ref Range Status   03/03/2018 Not Detected NDET^Not Detected ng/mL Final     Comment:     Cutoff for a negative cannabinoid is 50 ng/mL or less.     Phencyclidine (Phencyclidine)   Date Value Ref Range Status   03/03/2018 Not Detected NDET^Not Detected ng/mL Final     Comment:     Cutoff for a negative PCP is 25 ng/mL or less.     Cocaine (Benzoylecgonine)   Date Value Ref Range Status   03/03/2018 Not Detected NDET^Not Detected ng/mL Final     Comment:     Cutoff for a negative cocaine is 150 ng/ml or less.     Methamphetamine (d-Methamphetamine)   Date Value Ref Range Status   03/03/2018 Not Detected NDET^Not Detected ng/mL Final     Comment:     Cutoff for a negative methamphetamine is 500 ng/ml or less.     Opiates (Morphine)   Date Value Ref Range Status   03/03/2018 Not Detected NDET^Not Detected ng/mL Final     Comment:     Cutoff for a negative opiate is 100 ng/ml or less.     Amphetamine (d-Amphetamine)   Date Value Ref Range Status   03/03/2018 Not Detected NDET^Not Detected ng/mL Final     Comment:     Cutoff for a negative amphetamine is 500 ng/mL or less.     Benzodiazepines (Nordiazepam)   Date Value Ref Range Status   03/03/2018 Not Detected NDET^Not Detected ng/mL Final     Comment:     Cutoff for a  negative benzodiazepine is 150 ng/ml or less.     Tricyclic Antidepressants (Desipramine)   Date Value Ref Range Status   03/03/2018 Detected, Abnormal Result (A) NDET^Not Detected ng/mL Final     Comment:     Cutoff for a positive tricyclic antidepressant is greater than 300 ng/ml.  This is an unconfirmed screening result to be used for medical purposes only.   Order [id number] for confirmation or individual confirmation tests to [employer].       Methadone (Methadone)   Date Value Ref Range Status   03/03/2018 Not Detected NDET^Not Detected ng/mL Final     Comment:     Cutoff for a negative methadone is 200 ng/ml or less.     Barbiturates (Butalbital)   Date Value Ref Range Status   03/03/2018 Not Detected NDET^Not Detected ng/mL Final     Comment:     Cutoff for a negative barbituate is 200 ng/ml or less.     Oxycodone (Oxycodone)   Date Value Ref Range Status   03/03/2018 Not Detected NDET^Not Detected ng/mL Final     Comment:     Cutoff for a negative Oxycodone is 100 ng/mL or less.     Propoxyphene (Norpropoxyphene)   Date Value Ref Range Status   03/03/2018 Not Detected NDET^Not Detected ng/mL Final     Comment:     Cutoff for a negative propoxyphene is 300 ng/ml or less     Buprenorphine (Buprenorphine)   Date Value Ref Range Status   03/03/2018 Not Detected NDET^Not Detected ng/mL Final     Comment:     Cutoff for a negative buprenorphine is 10 ng/ml or less        Processing:  Staff will hand deliver Rx to on-site pharmacy    [email or web address]   checked in past 3 months?  No, access was not granted by Dr. Mendoza

## 2019-04-25 DIAGNOSIS — M51.369 LUMBAR DEGENERATIVE DISC DISEASE: ICD-10-CM

## 2019-04-25 DIAGNOSIS — E11.40 TYPE 2 DIABETES MELLITUS WITH DIABETIC NEUROPATHY, WITHOUT LONG-TERM CURRENT USE OF INSULIN (H): ICD-10-CM

## 2019-04-25 NOTE — TELEPHONE ENCOUNTER
Both last filled 1-25-19 for a 3 month supply      Thank you,  Pat Hurst [facility]  [phone]

## 2019-04-25 NOTE — LETTER
[facility]  [address], [address]  [place], [place]  [address]                                            TEL:[phone]  FAX:[phone]        Vijaya Manjarrez  [id number] [place] [address] [address] [place] [id number]            April 26, 2019    Dear Vijaya     We have received refill requests from your pharmacy, however, we were only able to provide a one time refill of your medications because you are due for follow-up. Please call [phone] to schedule an appointment before you are due for your next refill.      Thank you,     Sincerely,      [facility]

## 2019-04-26 RX ORDER — DULOXETIN HYDROCHLORIDE 60 MG/1
60 CAPSULE, DELAYED RELEASE ORAL DAILY
Qty: 90 CAPSULE | Refills: 0 | Status: SHIPPED | OUTPATIENT
Start: 2019-04-26 | End: 2019-07-15

## 2019-04-26 NOTE — TELEPHONE ENCOUNTER
"Requested Prescriptions   Pending Prescriptions Disp Refills     metFORMIN (GLUCOPHAGE) 500 MG tablet 180 tablet 0     Sig: TAKE ONE TABLET BY MOUTH TWICE A DAY WITH A MEAL       Biguanide Agents Passed - 4/25/2019  4:38 PM        Passed - Blood pressure less than 140/90 in past 6 months     BP Readings from Last 3 Encounters:   03/06/19 128/75   03/05/19 (!) 134/91   02/26/19 132/69                 Passed - Patient has documented LDL within the past 12 mos.     Recent Labs   Lab Test 03/06/19  1254   LDL Cannot estimate LDL when triglyceride exceeds 400 mg/dL  164*             Passed - Patient has had a Microalbumin in the past 15 mos.     Recent Labs   Lab Test 03/06/19  1255   MICROL 90   UMALCR 24.73             Passed - Patient is age 10 or older        Passed - Patient has documented A1c within the specified period of time.     If HgbA1C is 8 or greater, it needs to be on file within the past 3 months.  If less than 8, must be on file within the past 6 months.     Recent Labs   Lab Test 03/06/19  1254   A1C 6.8*             Passed - Patient's CR is NOT>1.4 OR Patient's EGFR is NOT<45 within past 12 mos.     Recent Labs   Lab Test 03/06/19  1254   GFRESTIMATED 82   GFRESTBLACK >90       Recent Labs   Lab Test 03/06/19  1254   CR 0.75             Passed - Patient does NOT have a diagnosis of CHF.        Passed - Medication is active on med list        Passed - Patient is not pregnant        Passed - Patient has not had a positive pregnancy test within the past 12 mos.         Passed - Recent (6 mo) or future (30 days) visit within the authorizing provider's specialty     Patient had office visit in the last 6 months or has a visit in the next 30 days with authorizing provider or within the authorizing provider's specialty.  See \"Patient Info\" tab in inbasket, or \"Choose Columns\" in Meds & Orders section of the refill encounter.            DULoxetine (CYMBALTA) 60 MG capsule 90 capsule 0     Sig: Take 1 capsule " "(60 mg) by mouth daily       Serotonin-Norepinephrine Reuptake Inhibitors  Passed - 4/25/2019  4:38 PM        Passed - Blood pressure under 140/90 in past 12 months     BP Readings from Last 3 Encounters:   03/06/19 128/75   03/05/19 (!) 134/91   02/26/19 132/69                 Passed - Recent (12 mo) or future (30 days) visit within the authorizing provider's specialty     Patient had office visit in the last 12 months or has a visit in the next 30 days with authorizing provider or within the authorizing provider's specialty.  See \"Patient Info\" tab in inbasket, or \"Choose Columns\" in Meds & Orders section of the refill encounter.              Passed - Medication is active on med list        Passed - Patient is age 18 or older        Passed - No active pregnancy on record        Passed - No positive pregnancy test in past 12 months        Medication is being filled for 1 time refill only due to:  Patient needs to be seen because due for f/u office visit.   Per last office visit 3/6/19:  Return in about 3 months (6/6/19)    Mailed letter notify patient.     "

## 2019-04-30 DIAGNOSIS — J44.9 CHRONIC OBSTRUCTIVE PULMONARY DISEASE, UNSPECIFIED COPD TYPE (H): ICD-10-CM

## 2019-05-01 NOTE — TELEPHONE ENCOUNTER
Spiriva Refill request. Takes for COPD.     Last OV with Dr Mendoza on 3/6/19    Provider approval needed.

## 2019-05-02 RX ORDER — TIOTROPIUM BROMIDE 18 UG/1
18 CAPSULE ORAL; RESPIRATORY (INHALATION) DAILY
Qty: 60 CAPSULE | Refills: 2 | Status: SHIPPED | OUTPATIENT
Start: 2019-05-02 | End: 2019-06-25

## 2019-05-06 ENCOUNTER — PATIENT OUTREACH (OUTPATIENT)
Dept: GERIATRIC MEDICINE | Facility: CLINIC | Age: 69
End: 2019-05-06

## 2019-05-06 NOTE — PROGRESS NOTES
[facility] Care Coordination Contact    Called member to schedule annual HRA home visit. Left a message requesting a return call to schedule HRA.     Vanesa Gtz RN  [facility]  [phone]  Fax: [phone]

## 2019-05-06 NOTE — PROGRESS NOTES
[facility] Care Coordination Contact    Return call from member- annual assessment scheduled for 5/9 at 2:30.    Vanesa Gtz RN  [facility]  [phone]  Fax: [phone]

## 2019-05-09 NOTE — PROGRESS NOTES
[facility] Care Coordination Contact    Call from member who shares that she has a terrible migraine and would like to reschedule our appointment. Annual assessment switched to 5/16 at 2:30.    Vanesa Gtz RN  [facility]  [phone]  Fax: [phone]

## 2019-05-16 ENCOUNTER — PATIENT OUTREACH (OUTPATIENT)
Dept: GERIATRIC MEDICINE | Facility: CLINIC | Age: 69
End: 2019-05-16

## 2019-05-20 ENCOUNTER — PATIENT OUTREACH (OUTPATIENT)
Dept: GERIATRIC MEDICINE | Facility: CLINIC | Age: 69
End: 2019-05-20

## 2019-05-20 NOTE — PROGRESS NOTES
[facility] Care Coordination Contact    Service authorization form emailed to [employer] to begin services.     Referral for MTM placed via email.     Email to Darlyn with [employer] to ensure homemaking services begin as previously discussed.    Vanesa Gtz RN  [facility]  [phone]  Fax: [phone]

## 2019-05-20 NOTE — PROGRESS NOTES
[facility] Care Coordination Contact    Received after visit chart from care coordinator.  Completed following tasks: Mailed copy of care plan to client, Updated services in access and Submitted referrals/auths for [employer]: Homemaking; [employer]: Meals on Wheels  Chart was returned to CC.    Provider Signature - No POC Shared:  Member indicates that they do not want their POC shared with any EW providers.  Order placed with [facility] (p: [phone]; f: [phone]) for Incontinence supplies.  Order is continuous. Access updated.  As required, authorization submitted to health plan.    Kay Pierre  Care Management Specialist  [facility]  [phone]

## 2019-05-20 NOTE — PROGRESS NOTES
[facility] Care Coordination Contact    [facility] Home Visit Assessment     Home visit for Health Risk Assessment with Vijaya Manjarrez completed on May 16, 2019.    Type of residence: Apartment  Current living arrangement: I live alone     Assessment completed with: Patient    Current Care Plan  Member currently receiving the following home care services: None- discussed RN for assistance with med set up if needed. Declined for now.   Member currently receiving the following community resources: Housekeeping/[employer]    Medication Review  Medication reconciliation completed in Epic: Yes  Medication set-up & administration: Independent and sets up on own weekly.  Self-administers medications.  Medication Risk Assessment Medication (1 or more, place referral to MTM): Member reports dry mouth and often feels dizzy- feels this could be side effects of medications.   MTM Referral Placed: Yes: MTM Referral Placed    Mental/Behavioral Health   Depression Screening: See PHQ assessment flowsheet.   Mental health DX: Yes   Mental health DX how managed: Medication  Mental Health Diagnosis: Yes: Depression and anxiety  Mental Health Services: None: No further intervention needed at this time.    Falls Assessment:   Fallen 2 or more times in the past year?: No   Any fall with injury in the past year?: No    ADL/IADL Dependencies:   Dependent ADLs:: Independent  Dependent IADLs:: Transportation, Cleaning, Meal Preparation, Laundry    [facility] sponsored benefits: Shared information re: Silver Sneakers/gym memberships, ASA, Calcium +D.    PCA Assessment completed at visit: No     Elderly Waiver Eligibility: Yes-will continue on EW    Care Plan & Recommendations: Continue with homemaking. Will add home delivered meals. Will make referral for MTM.     See [facility] for detailed assessment information.    Follow-Up Plan: Member informed of future contact, plan to f/u with member with a 6 month telephone  assessment.  Contact information shared with member and family, encouraged member to call with any questions or concerns at any time.    [place] care continuum providers: Please refer to Health Care Home on the Epic Problem List to view this patient's [facility] Care Plan Summary.    Vanesa Gtz RN  [facility]  [phone]  Fax: [phone]

## 2019-05-20 NOTE — LETTER
May 20, 2019      VIJAYA BRAGA  [address]  [place] [address]      Dear Vijaya:    At [facility], we are dedicated to improving your health and well-being. Enclosed is the Comprehensive Care Plan that we developed with you on 05/16/2019. Please review the Care Plan carefully.    As a reminder, some of the things we discussed at your visit include:    Your physical and mental health    Ways to reduce falls    Health care needs you may have    Don t forget to contact your care coordinator if you:    Have been hospitalized or plan to be hospitalized     Have had a fall     Have experienced a change in physical health    Are experiencing emotional problems     If you do not agree with your Care Plan, have questions about it, or have experienced a change in your needs, please call me at [phone]. If you are hearing impaired, please call the [place] Rel[employer] at 401 or [phone] (kvlqqm-tb-ndedkn relay service).    Sincerely,    Vanesa Gtz RN    E-mail: [email or web address]  Phone: [phone]      [facility] ([facility]) is a health plan that contracts with both Medicare and the [place] Medical Assistance (Medicaid) program to provide benefits of both programs to enrollees. Enrollment in [facility] depends on contract renewal.    [id number])     [id number] (11/18)

## 2019-05-21 DIAGNOSIS — M79.7 FIBROMYALGIA: ICD-10-CM

## 2019-05-21 DIAGNOSIS — M47.816 FACET ARTHROPATHY, LUMBAR: ICD-10-CM

## 2019-05-21 DIAGNOSIS — M47.812 FACET ARTHROPATHY, CERVICAL: ICD-10-CM

## 2019-05-21 RX ORDER — TRAMADOL HYDROCHLORIDE 50 MG/1
TABLET ORAL
Qty: 180 TABLET | Refills: 0 | Status: SHIPPED | OUTPATIENT
Start: 2019-05-21 | End: 2019-06-25

## 2019-05-21 NOTE — TELEPHONE ENCOUNTER
Requested Prescriptions   Pending Prescriptions Disp Refills     traMADol (ULTRAM) 50 MG tablet [Pharmacy Med Name: TRAMADOL HCL 50MG  Last Written Prescription Date:  4/5/2019  Last Fill Quantity: 180,  # refills: 0   Last office visit: 3/6/2019 with prescribing provider:     Future Office Visit:   TABS] 180 tablet 0     Sig: TAKE TWO TABLETS BY MOUTH THREE TIMES A DAY       There is no refill protocol information for this order

## 2019-05-21 NOTE — TELEPHONE ENCOUNTER
Controlled Substance Refill Request for traMADol (ULTRAM) 50 MG tablet   Problem List Complete:    No     PROVIDER TO CONSIDER COMPLETION OF PROBLEM LIST AND OVERVIEW/CONTROLLED SUBSTANCE AGREEMENT    Last Written Prescription Date:  4/5/2019  Last Fill Quantity: 180,  # refills: 0     Last office visit: 3/6/2019 with prescribing provider: Reji     Future Office visit:      Controlled substance agreement:   Encounter-Level CSA - 01/12/2017:    Controlled Substance Agreement - Scan on 1/26/2017 12:28 PM: CONTROLLED SUBSTANCE AGREEMENT (below)       Patient-Level CSA:    Controlled Substance Agreement - Opioid - Scan on 2/6/2019  9:37 AM (below)           Last Urine Drug Screen: No results found for: CDAUT, No results found for: COMDAT,   Cannabinoids (93-tfq-9-carboxy-9-THC)   Date Value Ref Range Status   03/03/2018 Not Detected NDET^Not Detected ng/mL Final     Comment:     Cutoff for a negative cannabinoid is 50 ng/mL or less.     Phencyclidine (Phencyclidine)   Date Value Ref Range Status   03/03/2018 Not Detected NDET^Not Detected ng/mL Final     Comment:     Cutoff for a negative PCP is 25 ng/mL or less.     Cocaine (Benzoylecgonine)   Date Value Ref Range Status   03/03/2018 Not Detected NDET^Not Detected ng/mL Final     Comment:     Cutoff for a negative cocaine is 150 ng/ml or less.     Methamphetamine (d-Methamphetamine)   Date Value Ref Range Status   03/03/2018 Not Detected NDET^Not Detected ng/mL Final     Comment:     Cutoff for a negative methamphetamine is 500 ng/ml or less.     Opiates (Morphine)   Date Value Ref Range Status   03/03/2018 Not Detected NDET^Not Detected ng/mL Final     Comment:     Cutoff for a negative opiate is 100 ng/ml or less.     Amphetamine (d-Amphetamine)   Date Value Ref Range Status   03/03/2018 Not Detected NDET^Not Detected ng/mL Final     Comment:     Cutoff for a negative amphetamine is 500 ng/mL or less.     Benzodiazepines (Nordiazepam)   Date Value Ref Range Status    03/03/2018 Not Detected NDET^Not Detected ng/mL Final     Comment:     Cutoff for a negative benzodiazepine is 150 ng/ml or less.     Tricyclic Antidepressants (Desipramine)   Date Value Ref Range Status   03/03/2018 Detected, Abnormal Result (A) NDET^Not Detected ng/mL Final     Comment:     Cutoff for a positive tricyclic antidepressant is greater than 300 ng/ml.  This is an unconfirmed screening result to be used for medical purposes only.   Order [id number] for confirmation or individual confirmation tests to [employer].       Methadone (Methadone)   Date Value Ref Range Status   03/03/2018 Not Detected NDET^Not Detected ng/mL Final     Comment:     Cutoff for a negative methadone is 200 ng/ml or less.     Barbiturates (Butalbital)   Date Value Ref Range Status   03/03/2018 Not Detected NDET^Not Detected ng/mL Final     Comment:     Cutoff for a negative barbituate is 200 ng/ml or less.     Oxycodone (Oxycodone)   Date Value Ref Range Status   03/03/2018 Not Detected NDET^Not Detected ng/mL Final     Comment:     Cutoff for a negative Oxycodone is 100 ng/mL or less.     Propoxyphene (Norpropoxyphene)   Date Value Ref Range Status   03/03/2018 Not Detected NDET^Not Detected ng/mL Final     Comment:     Cutoff for a negative propoxyphene is 300 ng/ml or less     Buprenorphine (Buprenorphine)   Date Value Ref Range Status   03/03/2018 Not Detected NDET^Not Detected ng/mL Final     Comment:     Cutoff for a negative buprenorphine is 10 ng/ml or less        Processing:  Staff will hand deliver Rx to on-site pharmacy     [email or web address]    [place]:  reviewed- no concerns    Routed to covering provider, Dr. Irwin to review.

## 2019-05-22 DIAGNOSIS — I10 BENIGN ESSENTIAL HYPERTENSION: ICD-10-CM

## 2019-05-22 RX ORDER — LISINOPRIL 20 MG/1
20 TABLET ORAL 2 TIMES DAILY
Qty: 90 TABLET | Refills: 0 | Status: SHIPPED | OUTPATIENT
Start: 2019-05-22 | End: 2019-07-16

## 2019-05-22 NOTE — TELEPHONE ENCOUNTER
Last Written Prescription Date:  5/23/18  Last Fill Quantity: 90,  # refills: 3   Last office visit: 3/6/2019 with prescribing provider:  Reji   Future Office Visit:   Next 5 appointments (look out 90 days)    Jun 25, 2019  3:20 PM CDT  SHORT with Vidhi Mario MD  [facility] [address]  [facility] [address]  [phone]         Prescription approved per [facility] Refill Protocol.  Soledad Pan RN

## 2019-05-22 NOTE — TELEPHONE ENCOUNTER
"Requested Prescriptions   Pending Prescriptions Disp Refills     lisinopril (PRINIVIL/ZESTRIL) 20 MG tablet 90 tablet 3     Sig: Take 1 tablet (20 mg) by mouth 2 times daily       ACE Inhibitors (Including Combos) Protocol Passed - 5/22/2019  3:42 PM        Passed - Blood pressure under 140/90 in past 12 months     BP Readings from Last 3 Encounters:   03/06/19 128/75   03/05/19 (!) 134/91   02/26/19 132/69                 Passed - Recent (12 mo) or future (30 days) visit within the authorizing provider's specialty     Patient had office visit in the last 12 months or has a visit in the next 30 days with authorizing provider or within the authorizing provider's specialty.  See \"Patient Info\" tab in inbasket, or \"Choose Columns\" in Meds & Orders section of the refill encounter.              Passed - Medication is active on med list        Passed - Patient is age 18 or older        Passed - No active pregnancy on record        Passed - Normal serum creatinine on file in past 12 months     Recent Labs   Lab Test 03/06/19  1254  02/14/17  0712   CR 0.75   < >  --    CREAT  --   --  0.8    < > = values in this interval not displayed.             Passed - Normal serum potassium on file in past 12 months     Recent Labs   Lab Test 03/06/19  1254   POTASSIUM 3.7             Passed - No positive pregnancy test within past 12 months          "

## 2019-06-18 NOTE — TELEPHONE ENCOUNTER
"        Requested Prescriptions   Pending Prescriptions Disp Refills     atorvastatin (LIPITOR) 40 MG tablet [Pharmacy Med Name: ATORVASTATIN CALCIUM 40MG TABS] 90 tablet 0     Sig: TAKE ONE TABLET BY MOUTH EVERY DAY    Statins Protocol Passed    5/14/2018  2:35 PM       Passed - LDL on file in past 12 months    Recent Labs   Lab Test  03/03/18   1044   LDL  56            Passed - No abnormal creatine kinase in past 12 months    Recent Labs   Lab Test  06/16/17   1335   CKT  56               Passed - Recent (12 mo) or future (30 days) visit within the authorizing provider's specialty    Patient had office visit in the last 12 months or has a visit in the next 30 days with authorizing provider or within the authorizing provider's specialty.  See \"Patient Info\" tab in inbasket, or \"Choose Columns\" in Meds & Orders section of the refill encounter.           Passed - Patient is age 18 or older       Passed - No active pregnancy on record       Passed - No positive pregnancy test in past 12 months          " Yes

## 2019-06-25 ENCOUNTER — OFFICE VISIT (OUTPATIENT)
Dept: INTERNAL MEDICINE | Facility: CLINIC | Age: 69
End: 2019-06-25
Payer: COMMERCIAL

## 2019-06-25 VITALS
DIASTOLIC BLOOD PRESSURE: 78 MMHG | TEMPERATURE: 98.6 F | BODY MASS INDEX: 30.66 KG/M2 | HEIGHT: 69 IN | RESPIRATION RATE: 14 BRPM | OXYGEN SATURATION: 94 % | HEART RATE: 82 BPM | SYSTOLIC BLOOD PRESSURE: 136 MMHG | WEIGHT: 207 LBS

## 2019-06-25 DIAGNOSIS — E11.40 TYPE 2 DIABETES MELLITUS WITH DIABETIC NEUROPATHY, WITHOUT LONG-TERM CURRENT USE OF INSULIN (H): Primary | ICD-10-CM

## 2019-06-25 DIAGNOSIS — J30.1 SEASONAL ALLERGIC RHINITIS DUE TO POLLEN: ICD-10-CM

## 2019-06-25 DIAGNOSIS — M47.812 FACET ARTHROPATHY, CERVICAL: ICD-10-CM

## 2019-06-25 DIAGNOSIS — I10 BENIGN ESSENTIAL HYPERTENSION: ICD-10-CM

## 2019-06-25 DIAGNOSIS — J30.2 SEASONAL ALLERGIC RHINITIS, UNSPECIFIED TRIGGER: ICD-10-CM

## 2019-06-25 DIAGNOSIS — M79.7 FIBROMYALGIA: ICD-10-CM

## 2019-06-25 DIAGNOSIS — E78.5 HYPERLIPIDEMIA LDL GOAL <100: ICD-10-CM

## 2019-06-25 DIAGNOSIS — J44.9 CHRONIC OBSTRUCTIVE PULMONARY DISEASE, UNSPECIFIED COPD TYPE (H): ICD-10-CM

## 2019-06-25 DIAGNOSIS — M47.816 FACET ARTHROPATHY, LUMBAR: ICD-10-CM

## 2019-06-25 DIAGNOSIS — B37.2 YEAST INFECTION OF THE SKIN: ICD-10-CM

## 2019-06-25 DIAGNOSIS — G47.00 INSOMNIA, UNSPECIFIED TYPE: ICD-10-CM

## 2019-06-25 LAB
HBA1C MFR BLD: 6.8 % (ref 0–5.6)
VIT B12 SERPL-MCNC: 387 PG/ML (ref 193–986)

## 2019-06-25 PROCEDURE — 84443 ASSAY THYROID STIM HORMONE: CPT | Performed by: INTERNAL MEDICINE

## 2019-06-25 PROCEDURE — 99214 OFFICE O/P EST MOD 30 MIN: CPT | Performed by: INTERNAL MEDICINE

## 2019-06-25 PROCEDURE — 82607 VITAMIN B-12: CPT | Performed by: INTERNAL MEDICINE

## 2019-06-25 PROCEDURE — 36415 COLL VENOUS BLD VENIPUNCTURE: CPT | Performed by: INTERNAL MEDICINE

## 2019-06-25 PROCEDURE — 83036 HEMOGLOBIN GLYCOSYLATED A1C: CPT | Performed by: INTERNAL MEDICINE

## 2019-06-25 RX ORDER — TRAZODONE HYDROCHLORIDE 100 MG/1
TABLET ORAL
Qty: 180 TABLET | Refills: 2 | Status: SHIPPED | OUTPATIENT
Start: 2019-06-25 | End: 2020-02-24

## 2019-06-25 RX ORDER — ALBUTEROL SULFATE 90 UG/1
2 AEROSOL, METERED RESPIRATORY (INHALATION) EVERY 6 HOURS
Qty: 18 G | Refills: 1 | Status: SHIPPED | OUTPATIENT
Start: 2019-06-25 | End: 2021-08-04

## 2019-06-25 RX ORDER — ATORVASTATIN CALCIUM 80 MG/1
80 TABLET, FILM COATED ORAL DAILY
Qty: 90 TABLET | Refills: 1 | Status: SHIPPED | OUTPATIENT
Start: 2019-06-25 | End: 2019-11-13

## 2019-06-25 RX ORDER — ATORVASTATIN CALCIUM 40 MG/1
40 TABLET, FILM COATED ORAL DAILY
Qty: 90 TABLET | Refills: 3 | Status: CANCELLED | OUTPATIENT
Start: 2019-06-25

## 2019-06-25 RX ORDER — TRAMADOL HYDROCHLORIDE 50 MG/1
TABLET ORAL
Qty: 180 TABLET | Refills: 0 | Status: SHIPPED | OUTPATIENT
Start: 2019-06-25 | End: 2019-08-06

## 2019-06-25 RX ORDER — METOPROLOL TARTRATE 25 MG/1
TABLET, FILM COATED ORAL
COMMUNITY
Start: 2019-05-21 | End: 2020-02-28

## 2019-06-25 RX ORDER — AMLODIPINE BESYLATE 5 MG/1
5 TABLET ORAL DAILY
Qty: 90 TABLET | Refills: 1 | Status: SHIPPED | OUTPATIENT
Start: 2019-06-25 | End: 2019-11-13

## 2019-06-25 RX ORDER — CETIRIZINE HYDROCHLORIDE 10 MG/1
10 TABLET ORAL DAILY
Qty: 90 TABLET | Refills: 1 | Status: SHIPPED | OUTPATIENT
Start: 2019-06-25 | End: 2019-12-11

## 2019-06-25 RX ORDER — NYSTATIN 100000 [USP'U]/G
POWDER TOPICAL
Qty: 60 G | Refills: 0 | Status: SHIPPED | OUTPATIENT
Start: 2019-06-25 | End: 2020-01-14

## 2019-06-25 RX ORDER — FLUTICASONE PROPIONATE 50 MCG
2 SPRAY, SUSPENSION (ML) NASAL DAILY
Qty: 16 G | Refills: 0 | Status: SHIPPED | OUTPATIENT
Start: 2019-06-25 | End: 2020-01-14

## 2019-06-25 RX ORDER — TIOTROPIUM BROMIDE 18 UG/1
18 CAPSULE ORAL; RESPIRATORY (INHALATION) DAILY
Qty: 60 CAPSULE | Refills: 2 | Status: SHIPPED | OUTPATIENT
Start: 2019-06-25 | End: 2019-11-13

## 2019-06-25 ASSESSMENT — MIFFLIN-ST. JEOR: SCORE: 1533.33

## 2019-06-25 NOTE — PATIENT INSTRUCTIONS
Exercise 5-10 minutes per day  Take the albuterol inhaler prior  Gradual increase of exercise    Tramadol - take 1 less pill   Addiction specialist?

## 2019-06-25 NOTE — PROGRESS NOTES
Subjective     Vijaya Manjarrez is a 69 year old female who presents to clinic today for the following health issues:    Patient is transferring care from Dr. Mendoza.    HPI   Diabetes Follow-up      How often are you checking your blood sugar? A few times a month    What time of day are you checking your blood sugars (select all that apply)?  Before and after meals    Have you had any blood sugars above 200?  No    Have you had any blood sugars below 70?  No    What symptoms do you notice when your blood sugar is low?  Shaky, Dizzy and Weak    What concerns do you have today about your diabetes? None     Do you have any of these symptoms? (Select all that apply)  Numbness in feet, Burning in feet and Blurry vision     Have you had a diabetic eye exam in the last 12 months? No    Patient notes that she does not have a regular exercise routine. Mentions that she gets SOB when she gets the chance to exercise.     BP Readings from Last 2 Encounters:   06/25/19 136/78   03/06/19 128/75     Hemoglobin A1C (%)   Date Value   03/06/2019 6.8 (H)   03/03/2018 6.9 (H)     LDL Cholesterol Calculated (mg/dL)   Date Value   03/06/2019     Cannot estimate LDL when triglyceride exceeds 400 mg/dL   03/03/2018 56     LDL Cholesterol Direct (mg/dL)   Date Value   03/06/2019 164 (H)       Diabetes Management Resources  Hyperlipidemia Follow-Up      Are you having any of the following symptoms? (Select all that apply)  Shortness of breath, Left-sided neck or arm pain and Chest pain or pressure Patient has been seen by cardiology and had a cardiac catheterization    Are you regularly taking any medication or supplement to lower your cholesterol?   Yes- Atorvastatin    Are you having muscle aches or other side effects that you think could be caused by your cholesterol lowering medication?  No  Patient is experiencing SOB with exertion. Reports that she does not smoke cigarettes.     Hypertension Follow-up      Do you check your blood  "pressure regularly outside of the clinic? Yes     Are you following a low salt diet? No    Are your blood pressures ever more than 140 on the top number (systolic) OR more   than 90 on the bottom number (diastolic), for example 140/90? Yes    Amount of exercise or physical activity: None    Problems taking medications regularly: yes, due to quantity of medications.    Medication side effects: dry mouth    Diet: regular (no restrictions)    Patient is taking lisinopril 20 mg and Norvasc 5 mg.     Fibromyalgia   Patient is taking tramadol 50 mg twice a day and 100 mg at bedtime. She was advised to decrease her tramadol slowly. She has not yet since her last visit with Dr. Mendoza.    Memory issue   Patient reports that she is having difficulties remembering things. She mentions that he aunts has dementia and memory issues. She hasn't had B12 checked recently.    Dry mouth  Patient notes that she is experiencing dry mouth. Says, \"my tongue is glued to the roof of the mouth when I wake up in the morning.\" She attributes this to her medications.   She is not seeing the dentist regularly.     Reviewed and updated as needed this visit by Provider         Review of Systems   ROS COMP: CONSTITUTIONAL: NEGATIVE for fever, chills, change in weight  RESP: NEGATIVE for significant cough or SOB  CV: NEGATIVE for chest pain, palpitations or peripheral edema  PSYCHIATRIC: NEGATIVE for mood changes  This document serves as a record of the services and decisions personally performed and made by Vidhi Mario MD. It was created on his behalf by Su Bates, a trained medical scribe. The creation of this document is based on the provider's statements to the medical scribe.  Su Bates June 25, 2019 3:31 PM         Objective    /78 (BP Location: Left arm, Patient Position: Sitting, Cuff Size: Adult Large)   Pulse 82   Temp 98.6  F (37  C) (Oral)   Resp 14   Ht 1.753 m (5' 9\")   Wt 93.9 kg (207 lb)   LMP  (LMP Unknown)   " SpO2 94%   Breastfeeding? No   BMI 30.57 kg/m    Body mass index is 30.57 kg/m .  Physical Exam   GENERAL: healthy, alert and no distress  RESP: lungs clear to auscultation - no rales, rhonchi or wheezes  CV: regular rate and rhythm, normal S1 S2, no S3 or S4, no murmur, click or rub, no peripheral edema and peripheral pulses strong  PSYCH: mentation appears normal, affect normal/bright  Foot Exam: normal DP and PT pulses, no trophic changes or ulcerative lesions, decreased to monofilament bilaterally    Diagnostic Test Results:  Labs reviewed in Epic  No results found for this or any previous visit (from the past 24 hour(s)).        Assessment & Plan     (E11.40) Type 2 diabetes mellitus with diabetic neuropathy, without long-term current use of insulin (H)  (primary encounter diagnosis)  Comment:  Assess if at goal  Plan: Vitamin B12, TSH with free T4 reflex,         Hemoglobin A1c            (J44.9) Chronic obstructive pulmonary disease, unspecified COPD type (H)  Comment: improved symptoms with spiriva  Plan: tiotropium (SPIRIVA) 18 MCG inhaled capsule,         albuterol (PROAIR HFA/PROVENTIL HFA/VENTOLIN         HFA) 108 (90 Base) MCG/ACT inhaler            (I10) Benign essential hypertension  Comment: close to goal of < 130/80  Plan: amLODIPine (NORVASC) 5 MG tablet            (E78.5) Hyperlipidemia LDL goal <100  Comment: LDL taken on 3/06.2019 is at 164.   Plan: atorvastatin (LIPITOR) 80 MG tablet        Continue with current medication. Patient was recommended to increase her activity  Level. Advised to exercise 5-10 minutes per day and to take the Albuterol inhaler prior.     (J30.2) Seasonal allergic rhinitis, unspecified trigger  Comment: Stable.  Plan: cetirizine (ZYRTEC) 10 MG tablet        Continue with current medication.     (J30.1) Seasonal allergic rhinitis due to pollen  Comment: Stable.  Plan: fluticasone (FLONASE) 50 MCG/ACT nasal spray        Continue with current medication.     (B37.2)  "Yeast infection of the skin  Comment: requests refill  Plan: nystatin (NYAMYC) 726153 UNIT/GM external         powder        Continue with current medication.    (M47.812) Facet arthropathy, cervical  Comment:   Plan: traMADol (ULTRAM) 50 MG tablet          Patient was given a script for tramadol 50 mg in today's office visit.          She was advised to take one less pill and to see an addiction specialist.     (M47.816) Facet arthropathy, lumbar  Comment:   Plan: traMADol (ULTRAM) 50 MG tablet       Patient was given a script for tramadol 50 mg in today's office visit.          She was advised to take one less pill and to see an addiction specialist.     (M79.7) Fibromyalgia  Comment:   Plan: traMADol (ULTRAM) 50 MG tablet          Patient was given a script for tramadol 50 mg in today's office visit           She was advised to take one less pill and to see an addiction specialist.     (G47.00) Insomnia, unspecified type  Comment: requests refill  Plan: traZODone (DESYREL) 100 MG tablet       Continue with current medication.        BMI:   Estimated body mass index is 30.57 kg/m  as calculated from the following:    Height as of this encounter: 1.753 m (5' 9\").    Weight as of this encounter: 93.9 kg (207 lb).   Weight management plan: Discussed healthy diet and exercise guidelines        FUTURE APPOINTMENTS:  Return in about 3 months (around 9/25/2019) for diabetes.    The information in this document, created by the medical scribe for me, accurately reflects the services I personally performed and the decisions made by me. I have reviewed and approved this document for accuracy.   June 25, 2019 3:46 PM     Vidhi Mario MD  [facility] [address]        "

## 2019-06-26 LAB — TSH SERPL DL<=0.005 MIU/L-ACNC: 0.96 MU/L (ref 0.4–4)

## 2019-07-12 DIAGNOSIS — M51.369 LUMBAR DEGENERATIVE DISC DISEASE: ICD-10-CM

## 2019-07-15 DIAGNOSIS — I10 BENIGN ESSENTIAL HYPERTENSION: ICD-10-CM

## 2019-07-15 RX ORDER — DULOXETIN HYDROCHLORIDE 60 MG/1
60 CAPSULE, DELAYED RELEASE ORAL DAILY
Qty: 90 CAPSULE | Refills: 3 | Status: SHIPPED | OUTPATIENT
Start: 2019-07-15 | End: 2020-05-15

## 2019-07-15 NOTE — TELEPHONE ENCOUNTER
"Requested Prescriptions   Pending Prescriptions Disp Refills     lisinopril (PRINIVIL/ZESTRIL) 20 MG tablet  Last Written Prescription Date:  5/22/2019  Last Fill Quantity: 90,  # refills: 0   Last office visit: 6/25/2019 with prescribing provider:     Future Office Visit:   90 tablet 0     Sig: Take 1 tablet (20 mg) by mouth 2 times daily       ACE Inhibitors (Including Combos) Protocol Passed - 7/15/2019  2:13 PM        Passed - Blood pressure under 140/90 in past 12 months     BP Readings from Last 3 Encounters:   06/25/19 136/78   03/06/19 128/75   03/05/19 (!) 134/91                 Passed - Recent (12 mo) or future (30 days) visit within the authorizing provider's specialty     Patient had office visit in the last 12 months or has a visit in the next 30 days with authorizing provider or within the authorizing provider's specialty.  See \"Patient Info\" tab in inbasket, or \"Choose Columns\" in Meds & Orders section of the refill encounter.              Passed - Medication is active on med list        Passed - Patient is age 18 or older        Passed - No active pregnancy on record        Passed - Normal serum creatinine on file in past 12 months     Recent Labs   Lab Test 03/06/19  1254  02/14/17  0712   CR 0.75   < >  --    CREAT  --   --  0.8    < > = values in this interval not displayed.             Passed - Normal serum potassium on file in past 12 months     Recent Labs   Lab Test 03/06/19  1254   POTASSIUM 3.7             Passed - No positive pregnancy test within past 12 months        "

## 2019-07-15 NOTE — TELEPHONE ENCOUNTER
Cymbalta refill request     Last OV 6/25/19     BP Readings from Last 3 Encounters:   06/25/19 136/78   03/06/19 128/75   03/05/19 (!) 134/91     Prescription approved per [facility] Refill Protocol.

## 2019-07-16 RX ORDER — LISINOPRIL 20 MG/1
20 TABLET ORAL 2 TIMES DAILY
Qty: 90 TABLET | Refills: 0 | Status: SHIPPED | OUTPATIENT
Start: 2019-07-16 | End: 2019-09-23

## 2019-07-16 NOTE — TELEPHONE ENCOUNTER
Routing refill request to provider for review/approval because:  BP out of range set for refill protocol.

## 2019-08-02 DIAGNOSIS — M47.812 FACET ARTHROPATHY, CERVICAL: ICD-10-CM

## 2019-08-02 DIAGNOSIS — M47.816 FACET ARTHROPATHY, LUMBAR: ICD-10-CM

## 2019-08-02 DIAGNOSIS — M79.7 FIBROMYALGIA: ICD-10-CM

## 2019-08-06 RX ORDER — TRAMADOL HYDROCHLORIDE 50 MG/1
TABLET ORAL
Qty: 180 TABLET | Refills: 0 | Status: SHIPPED | OUTPATIENT
Start: 2019-08-06 | End: 2019-09-18

## 2019-08-06 NOTE — TELEPHONE ENCOUNTER
Controlled Substance Refill Request for Tramadol 50 mg tablet  Problem List Complete:    No - done with other providers    PROVIDER TO CONSIDER COMPLETION OF PROBLEM LIST AND OVERVIEW/CONTROLLED SUBSTANCE AGREEMENT    Last Written Prescription Date:  6/25/19  Last Fill Quantity: 180,   # refills: 0    Last Office Visit with [facility] primary care provider: 6/25/19    Future Office visit:     Controlled substance agreement:   Encounter-Level CSA - 01/12/2017:    Controlled Substance Agreement - Scan on 1/26/2017 12:28 PM: CONTROLLED SUBSTANCE AGREEMENT (below)       Patient-Level CSA:    Controlled Substance Agreement - Opioid - Scan on 2/6/2019  9:37 AM (below)           Last Urine Drug Screen: No results found for: CDAUT, No results found for: COMDAT,   Cannabinoids (55-fba-9-carboxy-9-THC)   Date Value Ref Range Status   03/03/2018 Not Detected NDET^Not Detected ng/mL Final     Comment:     Cutoff for a negative cannabinoid is 50 ng/mL or less.     Phencyclidine (Phencyclidine)   Date Value Ref Range Status   03/03/2018 Not Detected NDET^Not Detected ng/mL Final     Comment:     Cutoff for a negative PCP is 25 ng/mL or less.     Cocaine (Benzoylecgonine)   Date Value Ref Range Status   03/03/2018 Not Detected NDET^Not Detected ng/mL Final     Comment:     Cutoff for a negative cocaine is 150 ng/ml or less.     Methamphetamine (d-Methamphetamine)   Date Value Ref Range Status   03/03/2018 Not Detected NDET^Not Detected ng/mL Final     Comment:     Cutoff for a negative methamphetamine is 500 ng/ml or less.     Opiates (Morphine)   Date Value Ref Range Status   03/03/2018 Not Detected NDET^Not Detected ng/mL Final     Comment:     Cutoff for a negative opiate is 100 ng/ml or less.     Amphetamine (d-Amphetamine)   Date Value Ref Range Status   03/03/2018 Not Detected NDET^Not Detected ng/mL Final     Comment:     Cutoff for a negative amphetamine is 500 ng/mL or less.     Benzodiazepines (Nordiazepam)   Date Value Ref  Range Status   03/03/2018 Not Detected NDET^Not Detected ng/mL Final     Comment:     Cutoff for a negative benzodiazepine is 150 ng/ml or less.     Tricyclic Antidepressants (Desipramine)   Date Value Ref Range Status   03/03/2018 Detected, Abnormal Result (A) NDET^Not Detected ng/mL Final     Comment:     Cutoff for a positive tricyclic antidepressant is greater than 300 ng/ml.  This is an unconfirmed screening result to be used for medical purposes only.   Order [id number] for confirmation or individual confirmation tests to [facility].       Methadone (Methadone)   Date Value Ref Range Status   03/03/2018 Not Detected NDET^Not Detected ng/mL Final     Comment:     Cutoff for a negative methadone is 200 ng/ml or less.     Barbiturates (Butalbital)   Date Value Ref Range Status   03/03/2018 Not Detected NDET^Not Detected ng/mL Final     Comment:     Cutoff for a negative barbituate is 200 ng/ml or less.     Oxycodone (Oxycodone)   Date Value Ref Range Status   03/03/2018 Not Detected NDET^Not Detected ng/mL Final     Comment:     Cutoff for a negative Oxycodone is 100 ng/mL or less.     Propoxyphene (Norpropoxyphene)   Date Value Ref Range Status   03/03/2018 Not Detected NDET^Not Detected ng/mL Final     Comment:     Cutoff for a negative propoxyphene is 300 ng/ml or less     Buprenorphine (Buprenorphine)   Date Value Ref Range Status   03/03/2018 Not Detected NDET^Not Detected ng/mL Final     Comment:     Cutoff for a negative buprenorphine is 10 ng/ml or less        Processing:  Staff will hand deliver Rx to on-site pharmacy     [email or web address]      [place] reviewed:  reviewed- no concerns

## 2019-08-07 DIAGNOSIS — E11.40 TYPE 2 DIABETES MELLITUS WITH DIABETIC NEUROPATHY, WITHOUT LONG-TERM CURRENT USE OF INSULIN (H): ICD-10-CM

## 2019-08-07 DIAGNOSIS — M62.838 MUSCLE SPASM: ICD-10-CM

## 2019-08-07 NOTE — TELEPHONE ENCOUNTER
"Requested Prescriptions   Pending Prescriptions Disp Refills     metFORMIN (GLUCOPHAGE) 500 MG tablet 180 tablet 0     Sig: TAKE ONE TABLET BY MOUTH TWICE A DAY WITH A MEAL   Last Written Prescription Date:  04/26/2019  Last Fill Quantity: 180,  # refills: 0   Last office visit: 6/25/2019 with prescribing provider:     Future Office Visit:      Biguanide Agents Passed - 8/7/2019 10:28 AM        Passed - Blood pressure less than 140/90 in past 6 months     BP Readings from Last 3 Encounters:   06/25/19 136/78   03/06/19 128/75   03/05/19 (!) 134/91                 Passed - Patient has documented LDL within the past 12 mos.     Recent Labs   Lab Test 03/06/19  1254   LDL Cannot estimate LDL when triglyceride exceeds 400 mg/dL  164*             Passed - Patient has had a Microalbumin in the past 15 mos.     Recent Labs   Lab Test 03/06/19  1255   MICROL 90   UMALCR 24.73             Passed - Patient is age 10 or older        Passed - Patient has documented A1c within the specified period of time.     If HgbA1C is 8 or greater, it needs to be on file within the past 3 months.  If less than 8, must be on file within the past 6 months.     Recent Labs   Lab Test 06/25/19  1555   A1C 6.8*             Passed - Patient's CR is NOT>1.4 OR Patient's EGFR is NOT<45 within past 12 mos.     Recent Labs   Lab Test 03/06/19  1254   GFRESTIMATED 82   GFRESTBLACK >90       Recent Labs   Lab Test 03/06/19  1254   CR 0.75             Passed - Patient does NOT have a diagnosis of CHF.        Passed - Medication is active on med list        Passed - Patient is not pregnant        Passed - Patient has not had a positive pregnancy test within the past 12 mos.         Passed - Recent (6 mo) or future (30 days) visit within the authorizing provider's specialty     Patient had office visit in the last 6 months or has a visit in the next 30 days with authorizing provider or within the authorizing provider's specialty.  See \"Patient Info\" tab " "in inbasket, or \"Choose Columns\" in Meds & Orders section of the refill encounter.            "

## 2019-08-07 NOTE — LETTER
[facility]  [address], [address]  [place], [place]  [address]                                            TEL:[phone]  FAX:[phone]      Vijaya Manjarrez  [address]  [place] [address]      August 8, 2019    Dear Vijaya       We have received a refill request from your pharmacy for your medication - Metformin. Many medications require routine follow-up with your provider and a review of your chart indicates that you are due for a follow-up appointment with Dr. Mario in September. We have sent a one time refill to your pharmacy to allow you time to schedule an appointment.     Please call [phone] to schedule an appointment.  We look forward to seeing you in the near future.      Thank you,     TREVON Adame

## 2019-08-08 ENCOUNTER — TELEPHONE (OUTPATIENT)
Dept: INTERNAL MEDICINE | Facility: CLINIC | Age: 69
End: 2019-08-08

## 2019-08-08 RX ORDER — CYCLOBENZAPRINE HCL 10 MG
10 TABLET ORAL 2 TIMES DAILY
Qty: 180 TABLET | Refills: 1 | Status: SHIPPED | OUTPATIENT
Start: 2019-08-08 | End: 2020-02-21

## 2019-08-08 NOTE — TELEPHONE ENCOUNTER
Prior Authorization Retail Medication Request    Medication/Dose: cyclobenzaprine 10mg  ICD code (if different than what is on RX):    Previously Tried and Failed:    Rationale:      Insurance Name:  [facility] Dual  Insurance ID:  [id number]      Pharmacy Information (if different than what is on RX)  Name:  [facility]  Phone:  [phone]

## 2019-08-08 NOTE — TELEPHONE ENCOUNTER
Vijaya's Cyclobenzaprine requires a prior authorization, please contact her insurance at [phone]. She has [employer] DUAL AND HER ID # IS [id number]. Her pa  20. Please let us know if it's approved or denied. Thanks Nidhi  On behalf of [facility]

## 2019-08-08 NOTE — TELEPHONE ENCOUNTER
Cyclobenzaprine - Routing refill request to provider for review/approval because:  Drug not on the [facility] refill protocol     Metformin - Medication is being filled for 1 time refill only due to:  Patient needs to be seen because due for follow up apointment in September.     Letter mailed to patient advising her to schedule follow-up appointment.

## 2019-08-15 NOTE — TELEPHONE ENCOUNTER
Central Prior Authorization Team   Phone: [phone]      PA Initiation    Medication: cyclobenzaprine 10mg -Initiated  Insurance Company: [employer] - Phone [phone] Fax [phone]  Pharmacy Filling the Rx[place] [facility] [address] [address]  Filling Pharmacy Phone: [phone]  Filling Pharmacy Fax:    Start Date: 8/15/2019

## 2019-08-15 NOTE — TELEPHONE ENCOUNTER
Prior Authorization Approval    Authorization Effective Date: 7/16/2019  Authorization Expiration Date: 8/14/2020  Medication: cyclobenzaprine 10mg -APPROVED  Approved Dose/Quantity:   Reference #:     Insurance Company: [employer] - Phone [phone] Fax [phone]  Expected CoPay:       CoPay Card Available:      Foundation Assistance Needed:    Which Pharmacy is filling the prescription (Not needed for infusion/clinic administered): [facility] [place], [place] - [address] [address]  Pharmacy Notified: Yes  Patient Notified: No    Pharmacy will notify patient when medication is ready.

## 2019-09-18 DIAGNOSIS — M79.7 FIBROMYALGIA: ICD-10-CM

## 2019-09-18 DIAGNOSIS — M47.812 FACET ARTHROPATHY, CERVICAL: ICD-10-CM

## 2019-09-18 DIAGNOSIS — G89.4 CHRONIC PAIN SYNDROME: ICD-10-CM

## 2019-09-18 DIAGNOSIS — M47.816 FACET ARTHROPATHY, LUMBAR: ICD-10-CM

## 2019-09-18 NOTE — TELEPHONE ENCOUNTER
Requested Prescriptions   Pending Prescriptions Disp Refills     traMADol (ULTRAM) 50 MG tablet [Pharmacy Med Name: TRAMADOL HCL 50MG  Last Written Prescription Date:  8/6/2019  Last Fill Quantity: 180,  # refills: 0   Last office visit: 6/25/2019 with prescribing provider:     Future Office Visit:   TABS] 180 tablet 0     Sig: TAKE TWO TABLETS BY MOUTH THREE TIMES A DAY       There is no refill protocol information for this order

## 2019-09-20 DIAGNOSIS — I10 BENIGN ESSENTIAL HYPERTENSION: ICD-10-CM

## 2019-09-20 RX ORDER — TRAMADOL HYDROCHLORIDE 50 MG/1
TABLET ORAL
Qty: 180 TABLET | Refills: 0 | Status: SHIPPED | OUTPATIENT
Start: 2019-09-20 | End: 2019-10-22

## 2019-09-20 NOTE — TELEPHONE ENCOUNTER
Controlled Substance Refill Request for Tramadol  Problem List Complete:    No     PROVIDER TO CONSIDER COMPLETION OF PROBLEM LIST AND OVERVIEW/CONTROLLED SUBSTANCE AGREEMENT - there are 2 problem list overviews but primary care provider listed is not up to date in either of them    Last Written Prescription Date:  8/6/19  Last Fill Quantity: 180,   # refills: 0    THE MOST RECENT OFFICE VISIT MUST BE WITHIN THE PAST 3 MONTHS. AT LEAST ONE FACE TO FACE VISIT MUST OCCUR EVERY 6 MONTHS. ADDITIONAL VISITS CAN BE VIRTUAL.    Last Office Visit with [facility] [place] care provider: 6/25/19    Future Office visit:     Controlled substance agreement:   Encounter-Level CSA - 01/12/2017:    Controlled Substance Agreement - Scan on 1/26/2017 12:28 PM: CONTROLLED SUBSTANCE AGREEMENT (below)       Patient-Level CSA:    Controlled Substance Agreement - Opioid - Scan on 2/6/2019  9:37 AM (below)           Last Urine Drug Screen: No results found for: CDAUT, No results found for: COMDAT,   Cannabinoids (23-jwh-8-carboxy-9-THC)   Date Value Ref Range Status   03/03/2018 Not Detected NDET^Not Detected ng/mL Final     Comment:     Cutoff for a negative cannabinoid is 50 ng/mL or less.     Phencyclidine (Phencyclidine)   Date Value Ref Range Status   03/03/2018 Not Detected NDET^Not Detected ng/mL Final     Comment:     Cutoff for a negative PCP is 25 ng/mL or less.     Cocaine (Benzoylecgonine)   Date Value Ref Range Status   03/03/2018 Not Detected NDET^Not Detected ng/mL Final     Comment:     Cutoff for a negative cocaine is 150 ng/ml or less.     Methamphetamine (d-Methamphetamine)   Date Value Ref Range Status   03/03/2018 Not Detected NDET^Not Detected ng/mL Final     Comment:     Cutoff for a negative methamphetamine is 500 ng/ml or less.     Opiates (Morphine)   Date Value Ref Range Status   03/03/2018 Not Detected NDET^Not Detected ng/mL Final     Comment:     Cutoff for a negative opiate is 100 ng/ml or less.     Amphetamine  (d-Amphetamine)   Date Value Ref Range Status   03/03/2018 Not Detected NDET^Not Detected ng/mL Final     Comment:     Cutoff for a negative amphetamine is 500 ng/mL or less.     Benzodiazepines (Nordiazepam)   Date Value Ref Range Status   03/03/2018 Not Detected NDET^Not Detected ng/mL Final     Comment:     Cutoff for a negative benzodiazepine is 150 ng/ml or less.     Tricyclic Antidepressants (Desipramine)   Date Value Ref Range Status   03/03/2018 Detected, Abnormal Result (A) NDET^Not Detected ng/mL Final     Comment:     Cutoff for a positive tricyclic antidepressant is greater than 300 ng/ml.  This is an unconfirmed screening result to be used for medical purposes only.   Order [id number] for confirmation or individual confirmation tests to [facility].       Methadone (Methadone)   Date Value Ref Range Status   03/03/2018 Not Detected NDET^Not Detected ng/mL Final     Comment:     Cutoff for a negative methadone is 200 ng/ml or less.     Barbiturates (Butalbital)   Date Value Ref Range Status   03/03/2018 Not Detected NDET^Not Detected ng/mL Final     Comment:     Cutoff for a negative barbituate is 200 ng/ml or less.     Oxycodone (Oxycodone)   Date Value Ref Range Status   03/03/2018 Not Detected NDET^Not Detected ng/mL Final     Comment:     Cutoff for a negative Oxycodone is 100 ng/mL or less.     Propoxyphene (Norpropoxyphene)   Date Value Ref Range Status   03/03/2018 Not Detected NDET^Not Detected ng/mL Final     Comment:     Cutoff for a negative propoxyphene is 300 ng/ml or less     Buprenorphine (Buprenorphine)   Date Value Ref Range Status   03/03/2018 Not Detected NDET^Not Detected ng/mL Final     Comment:     Cutoff for a negative buprenorphine is 10 ng/ml or less        Processing:  Staff will hand deliver Rx to on-site pharmacy     [email or web address]       checked in past 3 months?  Yes no concerns 9/20/19

## 2019-09-20 NOTE — TELEPHONE ENCOUNTER
"Requested Prescriptions   Pending Prescriptions Disp Refills     lisinopril (PRINIVIL/ZESTRIL) 20 MG tablet 90 tablet 0     Sig: Take 1 tablet (20 mg) by mouth 2 times daily   Last Written Prescription Date:  07/16/2019  Last Fill Quantity: 90,  # refills: 0   Last office visit: 6/25/2019 with prescribing provider:     Future Office Visit:      ACE Inhibitors (Including Combos) Protocol Passed - 9/20/2019  9:33 AM        Passed - Blood pressure under 140/90 in past 12 months     BP Readings from Last 3 Encounters:   06/25/19 136/78   03/06/19 128/75   03/05/19 (!) 134/91                 Passed - Recent (12 mo) or future (30 days) visit within the authorizing provider's specialty     Patient had office visit in the last 12 months or has a visit in the next 30 days with authorizing provider or within the authorizing provider's specialty.  See \"Patient Info\" tab in inbasket, or \"Choose Columns\" in Meds & Orders section of the refill encounter.              Passed - Medication is active on med list        Passed - Patient is age 18 or older        Passed - No active pregnancy on record        Passed - Normal serum creatinine on file in past 12 months     Recent Labs   Lab Test 03/06/19  1254  02/14/17  0712   CR 0.75   < >  --    CREAT  --   --  0.8    < > = values in this interval not displayed.             Passed - Normal serum potassium on file in past 12 months     Recent Labs   Lab Test 03/06/19  1254   POTASSIUM 3.7             Passed - No positive pregnancy test within past 12 months        "

## 2019-09-23 DIAGNOSIS — M79.7 FIBROMYALGIA: ICD-10-CM

## 2019-09-23 RX ORDER — LISINOPRIL 20 MG/1
20 TABLET ORAL 2 TIMES DAILY
Qty: 90 TABLET | Refills: 0 | Status: SHIPPED | OUTPATIENT
Start: 2019-09-23 | End: 2019-10-26

## 2019-09-23 NOTE — LETTER
[facility]  [address], [address]  [place], [place]  [address]                                            TEL:[phone]  FAX:[phone]      Vijaya Manjarrez  [address] [address] [place] [id number]      September 26, 2019    Dear Vijaya       We have received a refill request from your pharmacy for your medication - Lidocaine patches. Many medications require routine follow-up with your provider and a review of your chart indicates that you are due for a diabetes follow-up appointment. We have sent a one time refill to your pharmacy to allow you time to schedule an appointment.     Please call [phone] to schedule an appointment.  We look forward to seeing you in the near future.      Thank you,     TREVON Adame

## 2019-09-23 NOTE — TELEPHONE ENCOUNTER
Routing refill request to provider for review/approval because:  Nicole given x1 and patient did not follow up, please advise    Letter mailed on 8/8/19 per chart. No future appointments scheduled.

## 2019-09-26 RX ORDER — LIDOCAINE 50 MG/G
PATCH TOPICAL
Qty: 30 PATCH | Refills: 0 | Status: SHIPPED | OUTPATIENT
Start: 2019-09-26 | End: 2020-01-15

## 2019-09-26 NOTE — TELEPHONE ENCOUNTER
"Requested Prescriptions   Pending Prescriptions Disp Refills     lidocaine (LIDODERM) 5 % patch  Last Written Prescription Date:  9/12/18  Last Fill Quantity: 30,  # refills: 10   Last office visit: 6/25/2019 with prescribing provider:  Shelbi   Future Office Visit:    30 patch 10     Sig: Apply up to 3 patches to painful area at once for up to 12 h within a 24 h period.  Remove after 12 hours.       Topical Anesthetic Agents Passed - 9/23/2019  2:56 PM        Passed - Recent (12 mo) or future (30 days) visit within the authorizing provider's specialty     Patient had office visit in the last 12 months or has a visit in the next 30 days with authorizing provider or within the authorizing provider's specialty.  See \"Patient Info\" tab in inbasket, or \"Choose Columns\" in Meds & Orders section of the refill encounter.              Passed - Medication is active on med list        Passed - Patient is age 2 or older      Medication is being filled for 1 time refill only due to:  Patient needs to be seen because due for diabetes follow up.     Letter mailed to patient advising her to schedule an appointment.  "

## 2019-09-27 ENCOUNTER — TELEPHONE (OUTPATIENT)
Dept: INTERNAL MEDICINE | Facility: CLINIC | Age: 69
End: 2019-09-27

## 2019-09-27 DIAGNOSIS — M79.7 FIBROMYALGIA: ICD-10-CM

## 2019-09-27 RX ORDER — LIDOCAINE 50 MG/G
PATCH TOPICAL
Qty: 30 PATCH | Refills: 0 | Status: CANCELLED | OUTPATIENT
Start: 2019-09-27

## 2019-09-27 NOTE — TELEPHONE ENCOUNTER
Prior authorization needed. Please call [facility] at [phone]  ID# [id number]  Gr: WILLIAM  MED D/NON-D DETERMINATION REQ per insurance rejection    Thank you,  Pat Hurst [facility]  [phone]

## 2019-09-30 ENCOUNTER — TELEPHONE (OUTPATIENT)
Dept: INTERNAL MEDICINE | Facility: CLINIC | Age: 69
End: 2019-09-30

## 2019-09-30 NOTE — TELEPHONE ENCOUNTER
Panel Management Review      Patient has the following on her problem list:     Depression / Dysthymia review    Measure:  Needs PHQ-9 score of 4 or less during index window.  Administer PHQ-9 and if score is 5 or more, send encounter to provider for next steps.    5 - 7 month window range: yes    PHQ-9 SCORE 6/19/2018 2/5/2019 2/6/2019   PHQ-9 Total Score MyChart - 17 (Moderately severe depression) -   PHQ-9 Total Score 11 17 16       If PHQ-9 recheck is 5 or more, route to provider for next steps.    Patient is due for:  PHQ9        Diabetes    ASA: Passed    Last A1C  Lab Results   Component Value Date    A1C 6.8 06/25/2019    A1C 6.8 03/06/2019    A1C 6.9 03/03/2018    A1C 6.8 06/17/2017    A1C 6.7 02/20/2017     A1C tested: MONITOR    Last LDL:    Lab Results   Component Value Date    CHOL 273 03/06/2019     Lab Results   Component Value Date    HDL 33 03/06/2019     Lab Results   Component Value Date    LDL  03/06/2019     Cannot estimate LDL when triglyceride exceeds 400 mg/dL     03/06/2019     Lab Results   Component Value Date    TRIG 403 03/06/2019     No results found for: CHOLHDLRATIO  Lab Results   Component Value Date    NHDL 240 03/06/2019       Is the patient on a Statin? YES             Is the patient on Aspirin? YES    Medications     HMG CoA Reductase Inhibitors     atorvastatin (LIPITOR) 40 MG tablet        atorvastatin (LIPITOR) 80 MG tablet       Salicylates     aspirin (ASA) 81 MG chewable tablet             Last three blood pressure readings:  BP Readings from Last 3 Encounters:   06/25/19 136/78   03/06/19 128/75   03/05/19 (!) 134/91       Date of last diabetes office visit: 6/2019     Tobacco History:     History   Smoking Status     Former Smoker     Packs/day: 1.00     Years: 40.00     Types: Cigarettes     Quit date: 6/1/2017   Smokeless Tobacco     Never Used         Hypertension   Last three blood pressure readings:  BP Readings from Last 3 Encounters:   06/25/19 136/78    03/06/19 128/75   03/05/19 (!) 134/91     Blood pressure: MONITOR    HTN Guidelines:  Less than 140/90     COPD  Diagnosis date: 2012   Is this diagnosis new within the last year?   NO   Was spirometry completed?  NO      Composite cancer screening  Chart review shows that this patient is due/due soon for the following None  Summary:    Patient is due/failing the following:   FOLLOW UP and PHQ9    Action needed:   Patient needs office visit for 3 month f/u.    Type of outreach:    Sent letter.    Questions for provider review:    None                                                                                                                                    Kenzie Menenedz CMA       Chart routed to no one .

## 2019-09-30 NOTE — LETTER
[facility]  [address], [address]  [place], [place]  [address]                                            TEL:[phone]  FAX:[phone]        Vijaya Manjarrez  [address] [place] [address] [address] [place] [phone]      September 30, 2019    Dear Vijaya,    At [facility] we care about your health and well-being.  A review of your chart has indicated that you are due for a 3 month follow up.  Please contact us at [phone] to schedule an appointment.         Sincerely,      Vidhi Mario M.D.

## 2019-10-01 NOTE — TELEPHONE ENCOUNTER
Central Prior Authorization Team   Phone: [phone]      PA Initiation    Medication: Lidoderm 5% patch-Initiated  Insurance Company: [employer] - Phone [phone] Fax [phone]  Pharmacy Filling the Rx: [facility] [place], [place] - [address] [facility]  Filling Pharmacy Phone: [phone]  Filling Pharmacy Fax:    Start Date: 10/1/2019

## 2019-10-01 NOTE — TELEPHONE ENCOUNTER
Prior Authorization Approval    Authorization Effective Date: 9/1/2019  Authorization Expiration Date: 9/30/2020  Medication: Lidoderm 5% patch-APPROVED  Approved Dose/Quantity:   Reference #:     Insurance Company: [employer] - Phone [phone] Fax [phone]  Expected CoPay:       CoPay Card Available:      Foundation Assistance Needed:    Which Pharmacy is filling the prescription (Not needed for infusion/clinic administered): [facility] [address] [facility]  Pharmacy Notified: Yes  Patient Notified: No    Pharmacy will notify patient when medication is ready.

## 2019-10-22 ENCOUNTER — PATIENT OUTREACH (OUTPATIENT)
Dept: GERIATRIC MEDICINE | Facility: CLINIC | Age: 69
End: 2019-10-22

## 2019-10-22 DIAGNOSIS — M47.816 FACET ARTHROPATHY, LUMBAR: ICD-10-CM

## 2019-10-22 DIAGNOSIS — M79.7 FIBROMYALGIA: ICD-10-CM

## 2019-10-22 DIAGNOSIS — M47.812 FACET ARTHROPATHY, CERVICAL: ICD-10-CM

## 2019-10-22 DIAGNOSIS — I10 BENIGN ESSENTIAL HYPERTENSION: ICD-10-CM

## 2019-10-22 NOTE — PROGRESS NOTES
[facility] Care Coordination Contact    Notified by [facility] that member has not returned her AVS form and her MA will term as of 10/31/19. Call to member who is familiar with this form and shares that she mailed it in within the last couple days.       [facility] Six-Month Telephone Assessment    6 month telephone assessment completed on 10/22/19. Member shares that she has been evicted for smoking in her apartment and needs to be out by 11/30/19. Member has been calling around to try and get information on HUD and Section 8 Housing. Will ask CHW to contact member for resources. Member is not interested in assisted living.     ER visits: No  Hospitalizations: No  TCU stays: No  Significant health status changes: No  Falls/Injuries: Yes: Frequent falls related to stress.   ADL/IADL changes: No  Changes in services: No    Goals: See POC in chart for goal progress documentation.     Will see member in 6 months for an annual health risk assessment.   Encouraged member to call CC with any questions or concerns in the meantime.     Vanesa Gtz RN  [facility]  [phone]  Fax: [phone]

## 2019-10-23 RX ORDER — TRAMADOL HYDROCHLORIDE 50 MG/1
TABLET ORAL
Qty: 180 TABLET | Refills: 0 | Status: SHIPPED | OUTPATIENT
Start: 2019-10-23 | End: 2019-11-22

## 2019-10-23 NOTE — TELEPHONE ENCOUNTER
"Requested Prescriptions   Pending Prescriptions Disp Refills     lisinopril (PRINIVIL/ZESTRIL) 20 MG tablet  Last Written Prescription Date:  9/23/19  Last Fill Quantity: 90,  # refills: 0   Last office visit: 6/25/2019 with prescribing provider:  Dr. Mario   Future Office Visit:    90 tablet 0     Sig: Take 1 tablet (20 mg) by mouth 2 times daily       ACE Inhibitors (Including Combos) Protocol Passed - 10/22/2019 11:54 AM        Passed - Blood pressure under 140/90 in past 12 months     BP Readings from Last 3 Encounters:   06/25/19 136/78   03/06/19 128/75   03/05/19 (!) 134/91                 Passed - Recent (12 mo) or future (30 days) visit within the authorizing provider's specialty     Patient has had an office visit with the authorizing provider or a provider within the authorizing providers department within the previous 12 mos or has a future within next 30 days. See \"Patient Info\" tab in inbasket, or \"Choose Columns\" in Meds & Orders section of the refill encounter.              Passed - Medication is active on med list        Passed - Patient is age 18 or older        Passed - No active pregnancy on record        Passed - Normal serum creatinine on file in past 12 months     Recent Labs   Lab Test 03/06/19  1254  02/14/17  0712   CR 0.75   < >  --    CREAT  --   --  0.8    < > = values in this interval not displayed.             Passed - Normal serum potassium on file in past 12 months     Recent Labs   Lab Test 03/06/19  1254   POTASSIUM 3.7             Passed - No positive pregnancy test within past 12 months      Due for diabetes follow up.   Attempted to contact patient. Left voice message to call back. Can refill x1 once appointment is scheduled.     "

## 2019-10-23 NOTE — TELEPHONE ENCOUNTER
Patient was scheduled for an appointment today. She did not show.   Requested Prescriptions   Pending Prescriptions Disp Refills     traMADol (ULTRAM) 50 MG tablet [Pharmacy Med Name: TRAMADOL HCL 50MG TABS] 180 tablet 0     Sig: TAKE TWO TABLETS BY MOUTH THREE TIMES A DAY       There is no refill protocol information for this order      Last Written Prescription Date:  09/20/2019  Last Fill Quantity: 180,  # refills: 0   Last office visit: 6/25/2019 with prescribing provider:     Future Office Visit:

## 2019-10-25 NOTE — PROGRESS NOTES
[facility] Care Coordination Contact    Call from member who shares that she has been making many phone calls, but isn't having any luck with housing leads. Writer acknowledged how challenging finding low income housing can be. Also expressed concern that having an eviction on her record will make placement even harder. Member stated it took her 3 years to get a HUD spot. Shared that CDA placement is generally 2+ years. Encouraged that she re-look at informal support- has a cousin in [place] and a son in [place]. Mailed information on emergency assistance from [facility] and emergency shelter information for [facility]. Writer mentioned assisted living as a possibility again- member will think about it and call writer back next week with an update.     Vanesa Gtz RN  [facility]  [phone]  Fax: [phone]

## 2019-10-26 RX ORDER — LISINOPRIL 20 MG/1
20 TABLET ORAL 2 TIMES DAILY
Qty: 60 TABLET | Refills: 0 | Status: SHIPPED | OUTPATIENT
Start: 2019-10-26 | End: 2019-11-13

## 2019-10-30 NOTE — PROGRESS NOTES
[facility] Care Coordination Contact    Call from member's son Luis and his wife Nadia. They want to review housing options that are available. Writer shared information already reviewed with member. Family is interested in assisted living options. Writer discussed the admission process and financial obligations for assisted living briefly. Calls to several assisted livings and the only one with availability, and that would consider accepting a resident after an eviction, is [facility]. [facility] shared that they have availability at the [place] and [place] locations. [facility] emailed admission info to writer.     Call back to son Luis and discussed [facility] as the only current option. Forwarded the admission info to Luis and encouraged that they reach out to [facility] with any further questions, or to set up a tour.    Vanesa Gtz RN  [facility]  [phone]  Fax: [phone]

## 2019-11-13 ENCOUNTER — PATIENT OUTREACH (OUTPATIENT)
Dept: GERIATRIC MEDICINE | Facility: CLINIC | Age: 69
End: 2019-11-13

## 2019-11-13 ENCOUNTER — OFFICE VISIT (OUTPATIENT)
Dept: INTERNAL MEDICINE | Facility: CLINIC | Age: 69
End: 2019-11-13
Payer: COMMERCIAL

## 2019-11-13 VITALS
TEMPERATURE: 98.3 F | BODY MASS INDEX: 29.77 KG/M2 | HEART RATE: 86 BPM | RESPIRATION RATE: 12 BRPM | SYSTOLIC BLOOD PRESSURE: 102 MMHG | OXYGEN SATURATION: 96 % | WEIGHT: 201 LBS | HEIGHT: 69 IN | DIASTOLIC BLOOD PRESSURE: 70 MMHG

## 2019-11-13 DIAGNOSIS — E78.5 HYPERLIPIDEMIA LDL GOAL <100: ICD-10-CM

## 2019-11-13 DIAGNOSIS — J44.9 CHRONIC OBSTRUCTIVE PULMONARY DISEASE, UNSPECIFIED COPD TYPE (H): ICD-10-CM

## 2019-11-13 DIAGNOSIS — E55.9 VITAMIN D DEFICIENCY: ICD-10-CM

## 2019-11-13 DIAGNOSIS — K21.9 GASTROESOPHAGEAL REFLUX DISEASE WITHOUT ESOPHAGITIS: ICD-10-CM

## 2019-11-13 DIAGNOSIS — I10 BENIGN ESSENTIAL HYPERTENSION: ICD-10-CM

## 2019-11-13 DIAGNOSIS — M54.50 CHRONIC BILATERAL LOW BACK PAIN WITHOUT SCIATICA: ICD-10-CM

## 2019-11-13 DIAGNOSIS — G89.29 CHRONIC BILATERAL LOW BACK PAIN WITHOUT SCIATICA: ICD-10-CM

## 2019-11-13 DIAGNOSIS — M79.7 FIBROMYALGIA: ICD-10-CM

## 2019-11-13 DIAGNOSIS — E11.40 TYPE 2 DIABETES MELLITUS WITH DIABETIC NEUROPATHY, WITHOUT LONG-TERM CURRENT USE OF INSULIN (H): ICD-10-CM

## 2019-11-13 DIAGNOSIS — F33.0 MILD EPISODE OF RECURRENT MAJOR DEPRESSIVE DISORDER (H): Primary | ICD-10-CM

## 2019-11-13 DIAGNOSIS — R26.89 IMBALANCE: ICD-10-CM

## 2019-11-13 LAB — HBA1C MFR BLD: 6.7 % (ref 0–5.6)

## 2019-11-13 PROCEDURE — 36415 COLL VENOUS BLD VENIPUNCTURE: CPT | Performed by: INTERNAL MEDICINE

## 2019-11-13 PROCEDURE — 83036 HEMOGLOBIN GLYCOSYLATED A1C: CPT | Performed by: INTERNAL MEDICINE

## 2019-11-13 PROCEDURE — 82306 VITAMIN D 25 HYDROXY: CPT | Performed by: INTERNAL MEDICINE

## 2019-11-13 PROCEDURE — 99214 OFFICE O/P EST MOD 30 MIN: CPT | Performed by: INTERNAL MEDICINE

## 2019-11-13 PROCEDURE — 80061 LIPID PANEL: CPT | Performed by: INTERNAL MEDICINE

## 2019-11-13 PROCEDURE — 80053 COMPREHEN METABOLIC PANEL: CPT | Performed by: INTERNAL MEDICINE

## 2019-11-13 RX ORDER — PREGABALIN 150 MG/1
150 CAPSULE ORAL 3 TIMES DAILY
Qty: 270 CAPSULE | Refills: 1 | Status: SHIPPED | OUTPATIENT
Start: 2019-11-13 | End: 2020-05-15

## 2019-11-13 RX ORDER — AMLODIPINE BESYLATE 5 MG/1
5 TABLET ORAL DAILY
Qty: 90 TABLET | Refills: 1 | Status: SHIPPED | OUTPATIENT
Start: 2019-11-13 | End: 2020-05-15

## 2019-11-13 RX ORDER — TIOTROPIUM BROMIDE 18 UG/1
18 CAPSULE ORAL; RESPIRATORY (INHALATION) DAILY
Qty: 90 CAPSULE | Refills: 1 | Status: SHIPPED | OUTPATIENT
Start: 2019-11-13 | End: 2020-05-15

## 2019-11-13 RX ORDER — LISINOPRIL 20 MG/1
20 TABLET ORAL 2 TIMES DAILY
Qty: 90 TABLET | Refills: 1 | Status: SHIPPED | OUTPATIENT
Start: 2019-11-13 | End: 2020-02-24

## 2019-11-13 RX ORDER — ATORVASTATIN CALCIUM 80 MG/1
80 TABLET, FILM COATED ORAL DAILY
Qty: 90 TABLET | Refills: 1 | Status: SHIPPED | OUTPATIENT
Start: 2019-11-13 | End: 2020-05-15

## 2019-11-13 RX ORDER — NAPROXEN SODIUM 220 MG
220 TABLET ORAL PRN
Status: ON HOLD | COMMUNITY
End: 2020-10-01

## 2019-11-13 ASSESSMENT — MIFFLIN-ST. JEOR: SCORE: 1506.11

## 2019-11-13 NOTE — NURSING NOTE
"/70   Pulse 86   Temp 98.3  F (36.8  C) (Oral)   Resp 12   Ht 1.753 m (5' 9\")   Wt 91.2 kg (201 lb)   LMP  (LMP Unknown)   SpO2 96%   Breastfeeding No   BMI 29.68 kg/m      "

## 2019-11-13 NOTE — LETTER
November 15, 2019      Vijaya Manjarrez  [address] [place]  [place] [phone]        Dear ,    We are writing to inform you of your test results.  The LDL cholesterol is at goal.  The hemoglobin A1c, diabetes test, is at goal.  Good work!The vitamin D is low. Recommend taking vitamin D 1000 international unit(s) daily.    The electrolytes are normal.The kidney function (GFR) is normal.  The liver function tests (AST and ALT) are within normal limits.     Resulted Orders   Lipid panel reflex to direct LDL Fasting   Result Value Ref Range    Cholesterol 164 <200 mg/dL    Triglycerides 225 (H) <150 mg/dL      Comment:      Borderline high:  150-199 mg/dl  High:             200-499 mg/dl  Very high:       >499 mg/dl  Fasting specimen      HDL Cholesterol 36 (L) >49 mg/dL    LDL Cholesterol Calculated 83 <100 mg/dL      Comment:      Desirable:       <100 mg/dl    Non HDL Cholesterol 128 <130 mg/dL   Comprehensive metabolic panel   Result Value Ref Range    Sodium 142 133 - 144 mmol/L    Potassium 4.2 3.4 - 5.3 mmol/L    Chloride 107 94 - 109 mmol/L    Carbon Dioxide 26 20 - 32 mmol/L    Anion Gap 9 3 - 14 mmol/L    Glucose 99 70 - 99 mg/dL      Comment:      Fasting specimen    Urea Nitrogen 13 7 - 30 mg/dL    Creatinine 0.79 0.52 - 1.04 mg/dL    GFR Estimate 77 >60 mL/min/[1.73_m2]      Comment:      Non  GFR Calc  Starting 12/18/2018, serum creatinine based estimated GFR (eGFR) will be   calculated using the Chronic Kidney Disease Epidemiology Collaboration   (CKD-EPI) equation.      GFR Estimate If Black 89 >60 mL/min/[1.73_m2]      Comment:       GFR Calc  Starting 12/18/2018, serum creatinine based estimated GFR (eGFR) will be   calculated using the Chronic Kidney Disease Epidemiology Collaboration   (CKD-EPI) equation.      Calcium 8.8 8.5 - 10.1 mg/dL    Bilirubin Total 0.6 0.2 - 1.3 mg/dL    Albumin 4.1 3.4 - 5.0 g/dL    Protein Total 7.0 6.8 - 8.8  g/dL    Alkaline Phosphatase 112 40 - 150 U/L    ALT 23 0 - 50 U/L    AST 26 0 - 45 U/L   Hemoglobin A1c   Result Value Ref Range    Hemoglobin A1C 6.7 (H) 0 - 5.6 %      Comment:      Normal <5.7% Prediabetes 5.7-6.4%  Diabetes 6.5% or higher - adopted from ADA   consensus guidelines.     Vitamin D Deficiency   Result Value Ref Range    Vitamin D Deficiency screening 26 20 - 75 ug/L      Comment:      Season, race, dietary intake, and treatment affect the concentration of   25-hydroxy-Vitamin D. Values may decrease during winter months and increase   during summer months. Values 20-29 ug/L may indicate Vitamin D insufficiency   and values <20 ug/L may indicate Vitamin D deficiency.  Vitamin D determination is routinely performed by an immunoassay specific for   25 hydroxyvitamin D3.  If an individual is on vitamin D2 (ergocalciferol)   supplementation, please specify 25 OH vitamin D2 and D3 level determination by   LCMSMS test [id number].         If you have any questions or concerns, please call the clinic at the number listed above.       Sincerely,        Vidhi Mario MD

## 2019-11-13 NOTE — PATIENT INSTRUCTIONS
20 minutes per day      Counselor    Consider wellbutrin for medication if counseling and exercise do not improve things.

## 2019-11-13 NOTE — PROGRESS NOTES
Subjective     Vijaya Manjarrez is a 69 year old female who presents to clinic today for the following health issues:    HPI   Diabetes Follow-up    How often are you checking your blood sugar? Last check was a month ago  What time of day are you checking your blood sugars (select all that apply)? N/a at this time  Have you had any blood sugars above 200?  No  Have you had any blood sugars below 70?  No    What symptoms do you notice when your blood sugar is low?  None    What concerns do you have today about your diabetes? Other: meter not working     Do you have any of these symptoms? (Select all that apply)  No numbness or tingling in feet.  No redness, sores or blisters on feet.  No complaints of excessive thirst.  No reports of blurry vision.  No significant changes to weight.     Have you had a diabetic eye exam in the last 12 months? No    BP Readings from Last 2 Encounters:   11/13/19 102/70   06/25/19 136/78     Hemoglobin A1C (%)   Date Value   06/25/2019 6.8 (H)   03/06/2019 6.8 (H)     LDL Cholesterol Calculated (mg/dL)   Date Value   03/06/2019     Cannot estimate LDL when triglyceride exceeds 400 mg/dL   03/03/2018 56     LDL Cholesterol Direct (mg/dL)   Date Value   03/06/2019 164 (H)     Pt has lost about 8 lbs since Feb, 2019. She is taking metformin 500 mg. Mentions that she does not take her blood sugar at home due to losing her glucometer.     Diabetes Management Resources    Hypertension Follow-up      Do you check your blood pressure regularly outside of the clinic? No     Are you following a low salt diet? Yes    Are your blood pressures ever more than 140 on the top number (systolic) OR more   than 90 on the bottom number (diastolic), for example 140/90? No    She is taking metoprolol 10 mg, amlodipine 5 mg, and lisinopril 20 mg. Pt reports that she does not take her blood pressure regularly.     Vascular Disease Follow-up      Are you having any of the following symptoms? (Select all  that apply) Shortness of breath, Increased sweating or nausea with activity, Left-sided neck or arm pain and Chest pain or pressure for cpl months    How often do you take nitroglycerin? Not recently-cpl times last month    Do you take an aspirin every day? Yes    Depression and Anxiety Follow-Up    How are you doing with your depression since your last visit? Worsened     How are you doing with your anxiety since your last visit?  No change    Are you having other symptoms that might be associated with depression or anxiety? No    Have you had a significant life event? No     Do you have any concerns with your use of alcohol or other drugs? No    Social History     Tobacco Use     Smoking status: Former Smoker     Packs/day: 1.00     Years: 40.00     Pack years: 40.00     Types: Cigarettes     Last attempt to quit: 2017     Years since quittin.4     Smokeless tobacco: Never Used   Substance Use Topics     Alcohol use: No     Drug use: No     PHQ 2018   PHQ-9 Total Score 11 17 16   Q9: Thoughts of better off dead/self-harm past 2 weeks Not at all Several days Not at all   F/U: Thoughts of suicide or self-harm - No -   F/U: Safety concerns - No -     BRADY-7 SCORE 2017 3/10/2018 2019   Total Score - - 11 (moderate anxiety)   Total Score 3 6 11     Last PHQ-9 2019   1.  Little interest or pleasure in doing things 2   2.  Feeling down, depressed, or hopeless 2   3.  Trouble falling or staying asleep, or sleeping too much 3   4.  Feeling tired or having little energy 3   5.  Poor appetite or overeating 2   6.  Feeling bad about yourself 2   7.  Trouble concentrating 2   8.  Moving slowly or restless 0   Q9: Thoughts of better off dead/self-harm past 2 weeks 0   PHQ-9 Total Score 16   Difficulty at work, home, or with people -   In the past two weeks have you had thoughts of suicide or self harm? -   Do you have concerns about your personal safety or the safety of others? -      BRADY-7  2/5/2019   1. Feeling nervous, anxious, or on edge 2   2. Not being able to stop or control worrying 2   3. Worrying too much about different things 2   4. Trouble relaxing 3   5. Being so restless that it is hard to sit still 1   6. Becoming easily annoyed or irritable 1   7. Feeling afraid, as if something awful might happen 0   BRADY-7 Total Score 11   If you checked any problems, how difficult have they made it for you to do your work, take care of things at home, or get along with other people? -     Suicide Assessment Five-step Evaluation and Treatment (SAFE-T)      How many servings of fruits and vegetables do you eat daily?  0-1    On average, how many sweetened beverages do you drink each day (soda, juice, sweet tea, etc)?   Water mainly, some Sprite  How many days per week do you miss taking your medication? One time pt noted    What makes it hard for you to take your medications?  remembering to take    Pt is taking Cymbalta 60 mg and trazodone 100 mg. She does not see a counselor regularly. Also, she does not have a regular exercise routine. BRADY score of 14. PHQ-9 score of 20. Pt notes that her depression worsen due to life stressors and recently having a fall. She declines having a hx of seizures.    Other concerns...  Pt reports that she had a fall about two week ago. States that the fall occurred due to her feeling weak. Mentions that she has been intermittently feeling weak for about two weeks.     Reviewed and updated as needed this visit by Provider  Problems       Review of Systems   ROS COMP: CONSTITUTIONAL: NEGATIVE for fever, chills, change in weight  RESP: NEGATIVE for significant cough or SOB  CV: NEGATIVE for chest pain, palpitations or peripheral edema  Foot exam: intact to monofilament; no ulcers or calluses appreciated; dorsalis pedis pulse intact  PSYCHIATRIC: NEGATIVE for changes in mood or affect, HX anxiety and HX depression  This document serves as a record of the services  "and decisions personally performed and made by Vidhi Mario MD. It was created on her behalf by Su Bates, a trained medical scribe. The creation of this document is based on the provider's statements to the medical scribe.  Su Bates November 13, 2019 2:41 PM       Objective    /70   Pulse 86   Temp 98.3  F (36.8  C) (Oral)   Resp 12   Ht 1.753 m (5' 9\")   Wt 91.2 kg (201 lb)   LMP  (LMP Unknown)   SpO2 96%   Breastfeeding No   BMI 29.68 kg/m    Body mass index is 29.68 kg/m .  Physical Exam   GENERAL: healthy, alert and no distress  RESP: lungs clear to auscultation - no rales, rhonchi or wheezes  CV: regular rate and rhythm, normal S1 S2, no S3 or S4, no murmur, click or rub, no peripheral edema and peripheral pulses strong  PSYCH: mentation appears normal, affect normal/bright  Foot Exam: normal DP and PT pulses, no trophic changes or ulcerative lesions, normal sensory exam and normal monofilament on left foot. POS for decreased to monofilament on right foot. POS for sore on the first left toe near a callus.     Diagnostic Test Results:  Labs reviewed in Epic  No results found for this or any previous visit (from the past 24 hour(s)).        Assessment & Plan     (F33.0) Mild episode of recurrent major depressive disorder (H)  (primary encounter diagnosis)  Comment: PHQ-9 score of 20.   Plan: MENTAL HEALTH REFERRAL  - Adult; Outpatient         Treatment; Individual/Couples/Family/Group         Therapy/Health Psychology; [facility] [place]: [facility] [phone]; We will         contact you to schedule the appointment or         please call with any questions        Recommended pt to be seen by a specialist.   -consider wellbutrin    (K21.9) Gastroesophageal reflux disease without esophagitis  Comment:   Plan: omeprazole (PRILOSEC) 20 MG DR capsule        Continue with current medication.     (J44.9) Chronic obstructive pulmonary disease, unspecified COPD type (H)  Comment: " "  Plan: tiotropium (SPIRIVA) 18 MCG inhaled capsule        Continue with current medication.     (E11.40) Type 2 diabetes mellitus with diabetic neuropathy, without long-term current use of insulin (H)  Comment: Assess if at goal  Plan: metFORMIN (GLUCOPHAGE) 500 MG tablet, order for        DME, Hemoglobin A1c        Continue with current medication.   -pt to see podiatry if sore does not heal    (M79.7) Fibromyalgia  Comment:   Plan: pregabalin (LYRICA) 150 MG capsule        Continue with current medication.     (M54.5,  G89.29) Chronic bilateral low back pain without sciatica  Comment:   Plan: pregabalin (LYRICA) 150 MG capsule        Continue with current medication.     (I10) Benign essential hypertension  Comment: At goal  Plan: lisinopril (PRINIVIL/ZESTRIL) 20 MG tablet,         amLODIPine (NORVASC) 5 MG tablet        Continue with current medication.     (E78.5) Hyperlipidemia LDL goal <100  Comment: Assess  Plan: atorvastatin (LIPITOR) 80 MG tablet, Lipid         panel reflex to direct LDL Fasting,         Comprehensive metabolic panel               BMI:   Estimated body mass index is 29.68 kg/m  as calculated from the following:    Height as of this encounter: 1.753 m (5' 9\").    Weight as of this encounter: 91.2 kg (201 lb).   Weight management plan: Discussed healthy diet and exercise guidelines    FUTURE APPOINTMENTS:  Return in about 6 months (around 5/13/2020) for Physical Exam.    The information in this document, created by the medical scribe for me, accurately reflects the services I personally performed and the decisions made by me. I have reviewed and approved this document for accuracy.   November 13, 2019 3:01 PM   Vidhi Mario MD  [facility]    "

## 2019-11-13 NOTE — PROGRESS NOTES
[facility] Care Coordination Contact    Email from [employer] that member has canceled her home delivered meals. Call to member to see if she wants to stop long term, hold off for awhile, or if she would be interested in another provider. VM left requesting a return call.     Vanesa Gtz RN  [facility]  [phone]  Fax: [phone]

## 2019-11-14 LAB
ALBUMIN SERPL-MCNC: 4.1 G/DL (ref 3.4–5)
ALP SERPL-CCNC: 112 U/L (ref 40–150)
ALT SERPL W P-5'-P-CCNC: 23 U/L (ref 0–50)
ANION GAP SERPL CALCULATED.3IONS-SCNC: 9 MMOL/L (ref 3–14)
AST SERPL W P-5'-P-CCNC: 26 U/L (ref 0–45)
BILIRUB SERPL-MCNC: 0.6 MG/DL (ref 0.2–1.3)
BUN SERPL-MCNC: 13 MG/DL (ref 7–30)
CALCIUM SERPL-MCNC: 8.8 MG/DL (ref 8.5–10.1)
CHLORIDE SERPL-SCNC: 107 MMOL/L (ref 94–109)
CHOLEST SERPL-MCNC: 164 MG/DL
CO2 SERPL-SCNC: 26 MMOL/L (ref 20–32)
CREAT SERPL-MCNC: 0.79 MG/DL (ref 0.52–1.04)
DEPRECATED CALCIDIOL+CALCIFEROL SERPL-MC: 26 UG/L (ref 20–75)
GFR SERPL CREATININE-BSD FRML MDRD: 77 ML/MIN/{1.73_M2}
GLUCOSE SERPL-MCNC: 99 MG/DL (ref 70–99)
HDLC SERPL-MCNC: 36 MG/DL
LDLC SERPL CALC-MCNC: 83 MG/DL
NONHDLC SERPL-MCNC: 128 MG/DL
POTASSIUM SERPL-SCNC: 4.2 MMOL/L (ref 3.4–5.3)
PROT SERPL-MCNC: 7 G/DL (ref 6.8–8.8)
SODIUM SERPL-SCNC: 142 MMOL/L (ref 133–144)
TRIGL SERPL-MCNC: 225 MG/DL

## 2019-11-14 NOTE — PROGRESS NOTES
[facility] Care Coordination Contact    Return call from member who shares that she is sick of the food from [employer], but would be open to another provider. She has used [employer] in the past and does not want them again. Sent menu for [facility] for her review and she will call writer back with her decision.     Vanesa Gtz RN  [facility]  [phone]  Fax: [phone]

## 2019-11-22 DIAGNOSIS — M79.7 FIBROMYALGIA: ICD-10-CM

## 2019-11-22 DIAGNOSIS — M47.816 FACET ARTHROPATHY, LUMBAR: ICD-10-CM

## 2019-11-22 DIAGNOSIS — M47.812 FACET ARTHROPATHY, CERVICAL: ICD-10-CM

## 2019-11-22 RX ORDER — TRAMADOL HYDROCHLORIDE 50 MG/1
TABLET ORAL
Qty: 180 TABLET | Refills: 0 | Status: SHIPPED | OUTPATIENT
Start: 2019-11-22 | End: 2020-01-08

## 2019-11-22 NOTE — TELEPHONE ENCOUNTER
Requested Prescriptions   Pending Prescriptions Disp Refills     traMADol (ULTRAM) 50 MG tablet. [Pharmacy Med Name: TRAMADOL HCL 50MG  Controlled Substance Refill Request for   Problem List Complete:    Yes    Last Written Prescription Date:  10/23/19  Last Fill Quantity: 180,   # refills: 0        Last Office Visit with [facility] [place] provider: 11/13/19    Future Office visit:     Controlled substance agreement:   Encounter-Level CSA - 01/12/2017:    Controlled Substance Agreement - Scan on 1/26/2017 12:28 PM: CONTROLLED SUBSTANCE AGREEMENT     Patient-Level CSA:    Controlled Substance Agreement - Opioid - Scan on 2/6/2019  9:37 AM         Last Urine Drug Screen: No results found for: CDAUT, No results found for: COMDAT,   Cannabinoids (96-arn-8-carboxy-9-THC)   Date Value Ref Range Status   03/03/2018 Not Detected NDET^Not Detected ng/mL Final     Comment:     Cutoff for a negative cannabinoid is 50 ng/mL or less.     Phencyclidine (Phencyclidine)   Date Value Ref Range Status   03/03/2018 Not Detected NDET^Not Detected ng/mL Final     Comment:     Cutoff for a negative PCP is 25 ng/mL or less.     Cocaine (Benzoylecgonine)   Date Value Ref Range Status   03/03/2018 Not Detected NDET^Not Detected ng/mL Final     Comment:     Cutoff for a negative cocaine is 150 ng/ml or less.     Methamphetamine (d-Methamphetamine)   Date Value Ref Range Status   03/03/2018 Not Detected NDET^Not Detected ng/mL Final     Comment:     Cutoff for a negative methamphetamine is 500 ng/ml or less.     Opiates (Morphine)   Date Value Ref Range Status   03/03/2018 Not Detected NDET^Not Detected ng/mL Final     Comment:     Cutoff for a negative opiate is 100 ng/ml or less.     Amphetamine (d-Amphetamine)   Date Value Ref Range Status   03/03/2018 Not Detected NDET^Not Detected ng/mL Final     Comment:     Cutoff for a negative amphetamine is 500 ng/mL or less.     Benzodiazepines (Nordiazepam)   Date Value Ref Range Status    03/03/2018 Not Detected NDET^Not Detected ng/mL Final     Comment:     Cutoff for a negative benzodiazepine is 150 ng/ml or less.     Tricyclic Antidepressants (Desipramine)   Date Value Ref Range Status   03/03/2018 Detected, Abnormal Result (A) NDET^Not Detected ng/mL Final     Comment:     Cutoff for a positive tricyclic antidepressant is greater than 300 ng/ml.  This is an unconfirmed screening result to be used for medical purposes only.   Order [id number] for confirmation or individual confirmation tests to [employer].       Methadone (Methadone)   Date Value Ref Range Status   03/03/2018 Not Detected NDET^Not Detected ng/mL Final     Comment:     Cutoff for a negative methadone is 200 ng/ml or less.     Barbiturates (Butalbital)   Date Value Ref Range Status   03/03/2018 Not Detected NDET^Not Detected ng/mL Final     Comment:     Cutoff for a negative barbituate is 200 ng/ml or less.     Oxycodone (Oxycodone)   Date Value Ref Range Status   03/03/2018 Not Detected NDET^Not Detected ng/mL Final     Comment:     Cutoff for a negative Oxycodone is 100 ng/mL or less.     Propoxyphene (Norpropoxyphene)   Date Value Ref Range Status   03/03/2018 Not Detected NDET^Not Detected ng/mL Final     Comment:     Cutoff for a negative propoxyphene is 300 ng/ml or less     Buprenorphine (Buprenorphine)   Date Value Ref Range Status   03/03/2018 Not Detected NDET^Not Detected ng/mL Final     Comment:     Cutoff for a negative buprenorphine is 10 ng/ml or less         TABS] 180 tablet 0     Sig: TAKE TWO TABLETS BY MOUTH THREE TIMES A DAY       There is no refill protocol information for this order

## 2019-12-11 DIAGNOSIS — J30.2 SEASONAL ALLERGIC RHINITIS, UNSPECIFIED TRIGGER: ICD-10-CM

## 2019-12-11 NOTE — TELEPHONE ENCOUNTER
"Requested Prescriptions   Pending Prescriptions Disp Refills     cetirizine (ZYRTEC) 10 MG tablet [Pharmacy Med Name: CETIRIZINE HCL 10MG  Last Written Prescription Date:  6/25/19  Last Fill Quantity: 90,  # refills: 1   Last Office Visit: 11/13/2019   Future Office Visit:      TABS] 90 tablet 1     Sig: TAKE ONE TABLET BY MOUTH ONCE DAILY       Antihistamines Protocol Failed - 12/11/2019 12:24 PM        Failed - Patient is 3-64 years of age     Apply weight-based dosing for peds patients age 3 - 12 years of age.    Forward request to provider for patients under the age of 3 or over the age of 64.          Passed - Recent (12 mo) or future (30 days) visit within the authorizing provider's specialty     Patient has had an office visit with the authorizing provider or a provider within the authorizing providers department within the previous 12 mos or has a future within next 30 days. See \"Patient Info\" tab in inbasket, or \"Choose Columns\" in Meds & Orders section of the refill encounter.            Passed - Medication is active on med list          "

## 2019-12-12 ENCOUNTER — PATIENT OUTREACH (OUTPATIENT)
Dept: GERIATRIC MEDICINE | Facility: CLINIC | Age: 69
End: 2019-12-12

## 2019-12-12 NOTE — PROGRESS NOTES
[facility] Care Coordination Contact    Call from member's son Luis to discuss services. Luis shared that they would like to be proactive and start touring assisted livings. Reviewed finding one with an EW opening. Email sent with some suggestions and the full Senior Housing Guide will be mailed to Luis. Discussed homemaking- last writer knew, member needed to call [employer] to schedule a time to meet with a new homemaker. Member had not been returning their calls. Provided contact number and email for Luis to follow up with [employer]. Luis shares that he feels her mental health (depression) may be the cause of many current issues. Writer suggested ACP therapist, or possibly an ARM worker. Member has not been open to this in the past, but Luis will discuss with her. Writer able to make the referral if member is agreeable.     Vanesa Gtz RN  [facility]  [phone]  Fax: [phone]

## 2019-12-12 NOTE — TELEPHONE ENCOUNTER
Routing refill request to provider for review/approval because:  Patient exceeds age recommendations for RN refill  Hollie ROQUE - Registered Nurse  [facility]  Acute and Diagnostic Services

## 2019-12-13 RX ORDER — CETIRIZINE HYDROCHLORIDE 10 MG/1
TABLET ORAL
Qty: 90 TABLET | Refills: 3 | Status: SHIPPED | OUTPATIENT
Start: 2019-12-13 | End: 2021-01-14

## 2020-01-05 DIAGNOSIS — M79.7 FIBROMYALGIA: ICD-10-CM

## 2020-01-05 DIAGNOSIS — M47.816 FACET ARTHROPATHY, LUMBAR: ICD-10-CM

## 2020-01-05 DIAGNOSIS — M47.812 FACET ARTHROPATHY, CERVICAL: ICD-10-CM

## 2020-01-06 NOTE — TELEPHONE ENCOUNTER
Requested Prescriptions   Pending Prescriptions Disp Refills     traMADol (ULTRAM) 50 MG tablet [Pharmacy Med Name: TRAMADOL HCL 50MG TABS] 180 tablet 0     Sig: TAKE TWO TABLETS BY MOUTH THREE TIMES A DAY       There is no refill protocol information for this order      Last Written Prescription Date:  11/22/2019  Last Fill Quantity: 180,  # refills: 0   Last office visit: 11/13/2019 with prescribing provider:     Future Office Visit:

## 2020-01-07 NOTE — TELEPHONE ENCOUNTER
Controlled Substance Refill Request for tramadol  Problem List Complete:    Yes    Last Written Prescription Date:  11/22/19  Last Fill Quantity: 180,   # refills: 0    THE MOST RECENT OFFICE VISIT MUST BE WITHIN THE PAST 3 MONTHS. AT LEAST ONE FACE TO FACE VISIT MUST OCCUR EVERY 6 MONTHS. ADDITIONAL VISITS CAN BE VIRTUAL.      Last Office Visit with [facility] primary care provider: 11/13/19    Future Office visit:     Controlled substance agreement:   Encounter-Level CSA - 01/12/2017:    Controlled Substance Agreement - Scan on 1/26/2017 12:28 PM: CONTROLLED SUBSTANCE AGREEMENT     Patient-Level CSA:    Controlled Substance Agreement - Opioid - Scan on 2/6/2019  9:37 AM         Last Urine Drug Screen: No results found for: CDAUT, No results found for: COMDAT,   Cannabinoids (07-afm-1-carboxy-9-THC)   Date Value Ref Range Status   03/03/2018 Not Detected NDET^Not Detected ng/mL Final     Comment:     Cutoff for a negative cannabinoid is 50 ng/mL or less.     Phencyclidine (Phencyclidine)   Date Value Ref Range Status   03/03/2018 Not Detected NDET^Not Detected ng/mL Final     Comment:     Cutoff for a negative PCP is 25 ng/mL or less.     Cocaine (Benzoylecgonine)   Date Value Ref Range Status   03/03/2018 Not Detected NDET^Not Detected ng/mL Final     Comment:     Cutoff for a negative cocaine is 150 ng/ml or less.     Methamphetamine (d-Methamphetamine)   Date Value Ref Range Status   03/03/2018 Not Detected NDET^Not Detected ng/mL Final     Comment:     Cutoff for a negative methamphetamine is 500 ng/ml or less.     Opiates (Morphine)   Date Value Ref Range Status   03/03/2018 Not Detected NDET^Not Detected ng/mL Final     Comment:     Cutoff for a negative opiate is 100 ng/ml or less.     Amphetamine (d-Amphetamine)   Date Value Ref Range Status   03/03/2018 Not Detected NDET^Not Detected ng/mL Final     Comment:     Cutoff for a negative amphetamine is 500 ng/mL or less.     Benzodiazepines (Nordiazepam)   Date Value  Ref Range Status   03/03/2018 Not Detected NDET^Not Detected ng/mL Final     Comment:     Cutoff for a negative benzodiazepine is 150 ng/ml or less.     Tricyclic Antidepressants (Desipramine)   Date Value Ref Range Status   03/03/2018 Detected, Abnormal Result (A) NDET^Not Detected ng/mL Final     Comment:     Cutoff for a positive tricyclic antidepressant is greater than 300 ng/ml.  This is an unconfirmed screening result to be used for medical purposes only.   Order [id number] for confirmation or individual confirmation tests to [employer].       Methadone (Methadone)   Date Value Ref Range Status   03/03/2018 Not Detected NDET^Not Detected ng/mL Final     Comment:     Cutoff for a negative methadone is 200 ng/ml or less.     Barbiturates (Butalbital)   Date Value Ref Range Status   03/03/2018 Not Detected NDET^Not Detected ng/mL Final     Comment:     Cutoff for a negative barbituate is 200 ng/ml or less.     Oxycodone (Oxycodone)   Date Value Ref Range Status   03/03/2018 Not Detected NDET^Not Detected ng/mL Final     Comment:     Cutoff for a negative Oxycodone is 100 ng/mL or less.     Propoxyphene (Norpropoxyphene)   Date Value Ref Range Status   03/03/2018 Not Detected NDET^Not Detected ng/mL Final     Comment:     Cutoff for a negative propoxyphene is 300 ng/ml or less     Buprenorphine (Buprenorphine)   Date Value Ref Range Status   03/03/2018 Not Detected NDET^Not Detected ng/mL Final     Comment:     Cutoff for a negative buprenorphine is 10 ng/ml or less        Processing:  Rx to be electronically transmitted to pharmacy by provider     [email or web address]    Writer unable to check -not authorized by primary care provider.

## 2020-01-08 RX ORDER — TRAMADOL HYDROCHLORIDE 50 MG/1
TABLET ORAL
Qty: 180 TABLET | Refills: 0 | Status: SHIPPED | OUTPATIENT
Start: 2020-01-08 | End: 2020-02-25

## 2020-01-11 DIAGNOSIS — B37.2 YEAST INFECTION OF THE SKIN: ICD-10-CM

## 2020-01-11 DIAGNOSIS — J30.1 SEASONAL ALLERGIC RHINITIS DUE TO POLLEN: ICD-10-CM

## 2020-01-13 NOTE — TELEPHONE ENCOUNTER
"Requested Prescriptions   Pending Prescriptions Disp Refills     nystatin (NYAMYC) 615253 UNIT/GM external powder [[id number]] 60 g 0     Sig: APPLY TO AFFECTED AREA(S) TOPICALLY THREE TIMES A DAY AS NEEDED   Last Written Prescription Date:  06/25/2019  Last Fill Quantity: 60,  # refills: 0   Last office visit: 11/13/2019 with prescribing provider:     Future Office Visit:      Antifungal Agents Passed - 1/11/2020  3:59 PM        Passed - Recent (12 mo) or future (30 days) visit within the authorizing provider's specialty     Patient has had an office visit with the authorizing provider or a provider within the authorizing providers department within the previous 12 mos or has a future within next 30 days. See \"Patient Info\" tab in inbasket, or \"Choose Columns\" in Meds & Orders section of the refill encounter.              Passed - Not Fluconazole or Terconazole      If oral Fluconazole or Terconazole, may refill if indicated in progress notes.           Passed - Medication is active on med list        fluticasone (FLONASE) 50 MCG/ACT nasal spray [Pharmacy Med Name: FLUTICASONE 50MCG NASAL SPRAY] 16 g 0     Sig: SPRAY 2 SPRAYS IN EACH NOSTRIL ONCE DAILY   Last Written Prescription Date:  06/25/2019  Last Fill Quantity: 16 g,  # refills: 0   Last office visit: 11/13/2019 with prescribing provider:     Future Office Visit:      Inhaled Steroids Protocol Passed - 1/11/2020  3:59 PM        Passed - Patient is age 12 or older        Passed - Recent (12 mo) or future (30 days) visit within the authorizing provider's specialty     Patient has had an office visit with the authorizing provider or a provider within the authorizing providers department within the previous 12 mos or has a future within next 30 days. See \"Patient Info\" tab in inbasket, or \"Choose Columns\" in Meds & Orders section of the refill encounter.              Passed - Medication is active on med list        "

## 2020-01-14 DIAGNOSIS — M79.7 FIBROMYALGIA: ICD-10-CM

## 2020-01-14 RX ORDER — NYSTATIN 100000 [USP'U]/G
POWDER TOPICAL
Qty: 60 G | Refills: 0 | Status: SHIPPED | OUTPATIENT
Start: 2020-01-14 | End: 2020-04-04

## 2020-01-14 RX ORDER — FLUTICASONE PROPIONATE 50 MCG
SPRAY, SUSPENSION (ML) NASAL
Qty: 16 G | Refills: 1 | Status: SHIPPED | OUTPATIENT
Start: 2020-01-14 | End: 2021-02-18

## 2020-01-14 NOTE — TELEPHONE ENCOUNTER
"Requested Prescriptions   Pending Prescriptions Disp Refills     lidocaine (LIDODERM) 5 % patch 30 patch 0     Sig: Apply up to 3 patches to painful area at once for up to 12 h within a 24 h period.  Remove after 12 hours.       Topical Anesthetic Agents Passed - 1/14/2020  3:14 PM        Passed - Recent (12 mo) or future (30 days) visit within the authorizing provider's specialty     Patient has had an office visit with the authorizing provider or a provider within the authorizing providers department within the previous 12 mos or has a future within next 30 days. See \"Patient Info\" tab in inbasket, or \"Choose Columns\" in Meds & Orders section of the refill encounter.              Passed - Medication is active on med list        Passed - Patient is age 2 or older        Last Written Prescription Date:  9/26/19  Last Fill Quantity: 30,  # refills: 0   Last office visit: 11/13/2019 with prescribing provider:  11/13/19   Future Office Visit:      "

## 2020-01-15 RX ORDER — LIDOCAINE 50 MG/G
PATCH TOPICAL
Qty: 30 PATCH | Refills: 0 | Status: SHIPPED | OUTPATIENT
Start: 2020-01-15 | End: 2020-08-08

## 2020-01-27 ENCOUNTER — PATIENT OUTREACH (OUTPATIENT)
Dept: GERIATRIC MEDICINE | Facility: CLINIC | Age: 70
End: 2020-01-27

## 2020-01-27 NOTE — PROGRESS NOTES
[facility] Care Coordination Contact    VM from member who would like to discuss services. Return call to member and VM was left requesting a return call.     Vanesa Gtz RN  [facility]  [phone]  Fax: [phone]

## 2020-01-27 NOTE — PROGRESS NOTES
[facility] Care Coordination Contact    Email from manager at 's building who shares that she is concerned about member's ability to care for her apartment. She shares that member had a H[employer] inspection and her apartment was very cluttered. Return email that writer is waiting for a call back from  and we will discuss options.     Return call from  who shares that she really needs her homemaking assistance back. She was never assigned a new homemaker after her last one left [facility]. Writer will call [facility] and see if a new homemaker can be assigned.  has a history of not returning provider's calls, so encouraged that she make sure to follow through this time. Encouraged an [facility] worker to help with organization, paperwork, and independent living skills.  was open to this assistance.     Electronic referral for an [facility] worker was submitted with [facility]. Anticipated wait is about 8 weeks.    Email to Darlyn with [facility] to check status of homemaking and to see if they have someone new that could be assigned.     Vanesa Gtz RN  [facility]  [phone]  Fax: [phone]

## 2020-01-30 NOTE — PROGRESS NOTES
"[facility] Care Coordination Contact    Return email from Darlyn with [facility] [employer] who shares that member has been difficult to contact when they have available staff, and dismissed the last person they sent requesting an \"older and more experienced\" person. They will continue to look for another homemaker what would work.     Call to [facility] and left message to see if they have available homemaking staff.     Email to [facility] to see if they have available homemakers.     Vanesa Gtz RN  [facility]  [phone]  Fax: [phone]  "

## 2020-01-31 NOTE — PROGRESS NOTES
[facility] Care Coordination Contact    Return call from Luis with [employer] who shares that they do have available homemakers in [place]. Member information emailed to Luis and he shared that will call member to schedule an intake appointment and then will have a homemaker in place within 1-2 weeks.     Called member with this update.     Vanesa Gtz RN  [facility]  [phone]  Fax: [phone]

## 2020-02-05 NOTE — PROGRESS NOTES
[facility] Care Coordination Contact    2/4/2020 Call from member who shares that she has not received [facility] She also shares that she is experiencing yeast/rash issues. Member shares this is an on-going issue and it has been addressed with her PCP in the past. Member was encouraged to call the clinic and speak with a nurse- number was provided. Discussed that they may be able to prescribe something for her, or she may need an appointment. Encouraged UC if need is more urgent.     Email sent to Luis [facility] to follow up on intake appointment.     Vanesa Gtz RN  [facility]  [phone]  Fax: [phone]

## 2020-02-06 NOTE — PROGRESS NOTES
[facility] Care Coordination Contact    Call to member who shares that she received a call from [employer] [facility] this morning. Intake appointment has been scheduled for Wed 1/12.    Vanesa Gtz RN  [facility]  [phone]  Fax: [phone]

## 2020-02-20 DIAGNOSIS — I10 BENIGN ESSENTIAL HYPERTENSION: ICD-10-CM

## 2020-02-20 DIAGNOSIS — G47.00 INSOMNIA, UNSPECIFIED TYPE: ICD-10-CM

## 2020-02-20 DIAGNOSIS — M62.838 MUSCLE SPASM: ICD-10-CM

## 2020-02-20 DIAGNOSIS — J44.9 CHRONIC OBSTRUCTIVE PULMONARY DISEASE, UNSPECIFIED COPD TYPE (H): ICD-10-CM

## 2020-02-21 DIAGNOSIS — I10 BENIGN ESSENTIAL HYPERTENSION: ICD-10-CM

## 2020-02-21 DIAGNOSIS — M79.7 FIBROMYALGIA: Primary | ICD-10-CM

## 2020-02-21 RX ORDER — CYCLOBENZAPRINE HCL 10 MG
TABLET ORAL
Qty: 180 TABLET | Refills: 1 | Status: SHIPPED | OUTPATIENT
Start: 2020-02-21 | End: 2021-07-05

## 2020-02-21 NOTE — TELEPHONE ENCOUNTER
"Requested Prescriptions   Pending Prescriptions Disp Refills     MONTELUKAST 10 MG PO tablet [Pharmacy Med Name: MONTELUKAST SODIUM 10MG TABS] 90 tablet 3     Sig: TAKE ONE TABLET BY MOUTH AT BEDTIME       Leukotriene Inhibitors Protocol Passed - 2/20/2020  5:21 PM        Passed - Patient is age 12 or older     If patient is under 16, ok to refill using age based dosing.           Passed - Recent (12 mo) or future (30 days) visit within the authorizing provider's specialty     Patient has had an office visit with the authorizing provider or a provider within the authorizing providers department within the previous 12 mos or has a future within next 30 days. See \"Patient Info\" tab in inbasket, or \"Choose Columns\" in Meds & Orders section of the refill encounter.              Passed - Medication is active on med list        Last Written Prescription Date:  2/21/19  Last Fill Quantity: 90,  # refills: 3   Last office visit: 11/13/2019 with prescribing provider:  11/13/19   Future Office Visit:      "

## 2020-02-21 NOTE — TELEPHONE ENCOUNTER
"Last Written Prescription Date:  05/21/2019  Last Fill Quantity: 0,  # refills: 0   Last office visit: 11/13/2019 with prescribing provider:  Disha Van   Future Office Visit:      Requested Prescriptions   Pending Prescriptions Disp Refills     metoprolol tartrate 25 MG PO tablet         Beta-Blockers Protocol Passed - 2/21/2020 10:23 AM        Passed - Blood pressure under 140/90 in past 12 months     BP Readings from Last 3 Encounters:   11/13/19 102/70   06/25/19 136/78   03/06/19 128/75                 Passed - Patient is age 6 or older        Passed - Recent (12 mo) or future (30 days) visit within the authorizing provider's specialty     Patient has had an office visit with the authorizing provider or a provider within the authorizing providers department within the previous 12 mos or has a future within next 30 days. See \"Patient Info\" tab in inbasket, or \"Choose Columns\" in Meds & Orders section of the refill encounter.              Passed - Medication is active on med list          "

## 2020-02-21 NOTE — TELEPHONE ENCOUNTER
"Requested Prescriptions   Pending Prescriptions Disp Refills     LISINOPRIL 20 MG PO tablet [Pharmacy Med Name: LISINOPRIL 20MG TABS] 90 tablet 1     Sig: TAKE ONE TABLET BY MOUTH TWICE A DAY       ACE Inhibitors (Including Combos) Protocol Passed - 2/20/2020  5:21 PM        Passed - Blood pressure under 140/90 in past 12 months     BP Readings from Last 3 Encounters:   11/13/19 102/70   06/25/19 136/78   03/06/19 128/75                 Passed - Recent (12 mo) or future (30 days) visit within the authorizing provider's specialty     Patient has had an office visit with the authorizing provider or a provider within the authorizing providers department within the previous 12 mos or has a future within next 30 days. See \"Patient Info\" tab in inbasket, or \"Choose Columns\" in Meds & Orders section of the refill encounter.              Passed - Medication is active on med list        Passed - Patient is age 18 or older        Passed - No active pregnancy on record        Passed - Normal serum creatinine on file in past 12 months     Recent Labs   Lab Test 11/13/19  1505  02/14/17  0712   CR 0.79   < >  --    CREAT  --   --  0.8    < > = values in this interval not displayed.             Passed - Normal serum potassium on file in past 12 months     Recent Labs   Lab Test 11/13/19  1505   POTASSIUM 4.2             Passed - No positive pregnancy test within past 12 months   Requested Prescriptions   Pending Prescriptions Disp Refills     LISINOPRIL 20 MG PO tablet [Pharmacy Med Name: LISINOPRIL 20MG TABS] 90 tablet 1     Sig: TAKE ONE TABLET BY MOUTH TWICE A DAY       ACE Inhibitors (Including Combos) Protocol Passed - 2/21/2020  9:31 AM        Passed - Blood pressure under 140/90 in past 12 months     BP Readings from Last 3 Encounters:   11/13/19 102/70   06/25/19 136/78   03/06/19 128/75                 Passed - Recent (12 mo) or future (30 days) visit within the authorizing provider's specialty     Patient has had an " "office visit with the authorizing provider or a provider within the authorizing providers department within the previous 12 mos or has a future within next 30 days. See \"Patient Info\" tab in inbasket, or \"Choose Columns\" in Meds & Orders section of the refill encounter.              Passed - Medication is active on med list        Passed - Patient is age 18 or older        Passed - No active pregnancy on record        Passed - Normal serum creatinine on file in past 12 months     Recent Labs   Lab Test 11/13/19  1505  02/14/17  0712   CR 0.79   < >  --    CREAT  --   --  0.8    < > = values in this interval not displayed.             Passed - Normal serum potassium on file in past 12 months     Recent Labs   Lab Test 11/13/19  1505   POTASSIUM 4.2             Passed - No positive pregnancy test within past 12 months        TRAZODONE 100 MG PO tablet [Pharmacy Med Name: TRAZODONE HCL 100MG TABS] 180 tablet 2     Sig: TAKE TWO TABLETS BY MOUTH EVERY NIGHT AT BEDTIME       Serotonin Modulators Passed - 2/21/2020  9:31 AM        Passed - Recent (12 mo) or future (30 days) visit within the authorizing provider's specialty     Patient has had an office visit with the authorizing provider or a provider within the authorizing providers department within the previous 12 mos or has a future within next 30 days. See \"Patient Info\" tab in inbasket, or \"Choose Columns\" in Meds & Orders section of the refill encounter.              Passed - Medication is active on med list        Passed - Patient is age 18 or older        Passed - No active pregnancy on record        Passed - No positive pregnancy test in past 12 months   Last Written Prescription Date:  11/13/19  Last Fill Quantity: 90,  # refills: 1   Last office visit: 11/13/2019 with prescribing provider:  11/13/19   Future Office Visit:            TRAZODONE 100 MG PO tablet [Pharmacy Med Name: TRAZODONE HCL 100MG TABS] 180 tablet 2     Sig: TAKE TWO TABLETS BY MOUTH EVERY " "NIGHT AT BEDTIME       Serotonin Modulators Passed - 2/20/2020  5:21 PM        Passed - Recent (12 mo) or future (30 days) visit within the authorizing provider's specialty     Patient has had an office visit with the authorizing provider or a provider within the authorizing providers department within the previous 12 mos or has a future within next 30 days. See \"Patient Info\" tab in inbasket, or \"Choose Columns\" in Meds & Orders section of the refill encounter.              Passed - Medication is active on med list        Passed - Patient is age 18 or older        Passed - No active pregnancy on record        Passed - No positive pregnancy test in past 12 months        Last Written Prescription Date:  6/25/19  Last Fill Quantity: 180,  # refills: 1   Last office visit: 11/13/2019 with prescribing provider:  11/13/19   Future Office Visit:      "

## 2020-02-21 NOTE — TELEPHONE ENCOUNTER
Requested Prescriptions   Pending Prescriptions Disp Refills     CYCLOBENZAPRINE 10 MG PO tablet [Pharmacy Med Name: CYCLOBENZAPRINE HCL 10MG TABS] 180 tablet 1     Sig: TAKE ONE TABLET BY MOUTH TWICE A DAY       There is no refill protocol information for this order        Last Written Prescription Date:  8/8/19  Last Fill Quantity: 180,  # refills: 1   Last office visit: 11/13/2019 with prescribing provider:  11/13/19   Future Office Visit:

## 2020-02-22 DIAGNOSIS — M47.812 FACET ARTHROPATHY, CERVICAL: ICD-10-CM

## 2020-02-22 DIAGNOSIS — M79.7 FIBROMYALGIA: ICD-10-CM

## 2020-02-22 DIAGNOSIS — M47.816 FACET ARTHROPATHY, LUMBAR: ICD-10-CM

## 2020-02-24 RX ORDER — TRAZODONE HYDROCHLORIDE 100 MG/1
TABLET ORAL
Qty: 180 TABLET | Refills: 1 | Status: SHIPPED | OUTPATIENT
Start: 2020-02-24 | End: 2020-09-08

## 2020-02-24 RX ORDER — LISINOPRIL 20 MG/1
TABLET ORAL
Qty: 90 TABLET | Refills: 1 | Status: SHIPPED | OUTPATIENT
Start: 2020-02-24 | End: 2020-04-02

## 2020-02-24 RX ORDER — MONTELUKAST SODIUM 10 MG/1
TABLET ORAL
Qty: 90 TABLET | Refills: 1 | Status: SHIPPED | OUTPATIENT
Start: 2020-02-24 | End: 2020-05-15

## 2020-02-25 RX ORDER — TRAMADOL HYDROCHLORIDE 50 MG/1
TABLET ORAL
Qty: 180 TABLET | Refills: 0 | Status: SHIPPED | OUTPATIENT
Start: 2020-02-25 | End: 2020-04-04

## 2020-02-25 NOTE — TELEPHONE ENCOUNTER
Controlled Substance Refill Request for Tramadol  Problem List Complete:    No     PROVIDER TO CONSIDER COMPLETION OF PROBLEM LIST AND OVERVIEW/CONTROLLED SUBSTANCE AGREEMENT    Last Written Prescription Date:  1/8/20  Last Fill Quantity: 180,   # refills: 0    THE MOST RECENT OFFICE VISIT MUST BE WITHIN THE PAST 3 MONTHS. AT LEAST ONE FACE TO FACE VISIT MUST OCCUR EVERY 6 MONTHS. ADDITIONAL VISITS CAN BE VIRTUAL.  (THIS STATEMENT SHOULD BE DELETED.)    Last Office Visit with [facility] primary care provider: 11/13/19    Future Office visit:     Controlled substance agreement:   Encounter-Level CSA - 01/12/2017:    Controlled Substance Agreement - Scan on 1/26/2017 12:28 PM: CONTROLLED SUBSTANCE AGREEMENT     Patient-Level CSA:    Controlled Substance Agreement - Opioid - Scan on 2/6/2019  9:37 AM         Last Urine Drug Screen: No results found for: CDAUT, No results found for: COMDAT,   Cannabinoids (03-bdc-7-carboxy-9-THC)   Date Value Ref Range Status   03/03/2018 Not Detected NDET^Not Detected ng/mL Final     Comment:     Cutoff for a negative cannabinoid is 50 ng/mL or less.     Phencyclidine (Phencyclidine)   Date Value Ref Range Status   03/03/2018 Not Detected NDET^Not Detected ng/mL Final     Comment:     Cutoff for a negative PCP is 25 ng/mL or less.     Cocaine (Benzoylecgonine)   Date Value Ref Range Status   03/03/2018 Not Detected NDET^Not Detected ng/mL Final     Comment:     Cutoff for a negative cocaine is 150 ng/ml or less.     Methamphetamine (d-Methamphetamine)   Date Value Ref Range Status   03/03/2018 Not Detected NDET^Not Detected ng/mL Final     Comment:     Cutoff for a negative methamphetamine is 500 ng/ml or less.     Opiates (Morphine)   Date Value Ref Range Status   03/03/2018 Not Detected NDET^Not Detected ng/mL Final     Comment:     Cutoff for a negative opiate is 100 ng/ml or less.     Amphetamine (d-Amphetamine)   Date Value Ref Range Status   03/03/2018 Not Detected NDET^Not Detected  ng/mL Final     Comment:     Cutoff for a negative amphetamine is 500 ng/mL or less.     Benzodiazepines (Nordiazepam)   Date Value Ref Range Status   03/03/2018 Not Detected NDET^Not Detected ng/mL Final     Comment:     Cutoff for a negative benzodiazepine is 150 ng/ml or less.     Tricyclic Antidepressants (Desipramine)   Date Value Ref Range Status   03/03/2018 Detected, Abnormal Result (A) NDET^Not Detected ng/mL Final     Comment:     Cutoff for a positive tricyclic antidepressant is greater than 300 ng/ml.  This is an unconfirmed screening result to be used for medical purposes only.   Order [id number] for confirmation or individual confirmation tests to [facility].       Methadone (Methadone)   Date Value Ref Range Status   03/03/2018 Not Detected NDET^Not Detected ng/mL Final     Comment:     Cutoff for a negative methadone is 200 ng/ml or less.     Barbiturates (Butalbital)   Date Value Ref Range Status   03/03/2018 Not Detected NDET^Not Detected ng/mL Final     Comment:     Cutoff for a negative barbituate is 200 ng/ml or less.     Oxycodone (Oxycodone)   Date Value Ref Range Status   03/03/2018 Not Detected NDET^Not Detected ng/mL Final     Comment:     Cutoff for a negative Oxycodone is 100 ng/mL or less.     Propoxyphene (Norpropoxyphene)   Date Value Ref Range Status   03/03/2018 Not Detected NDET^Not Detected ng/mL Final     Comment:     Cutoff for a negative propoxyphene is 300 ng/ml or less     Buprenorphine (Buprenorphine)   Date Value Ref Range Status   03/03/2018 Not Detected NDET^Not Detected ng/mL Final     Comment:     Cutoff for a negative buprenorphine is 10 ng/ml or less        Processing:  Rx to be electronically transmitted to pharmacy by provider      [email or web address]       checked in past 3 months?  Yes 2/25/20  Last filled 1/17/20 #180 tabs.    Please advise, thanks.

## 2020-02-28 RX ORDER — METOPROLOL TARTRATE 25 MG/1
25 TABLET, FILM COATED ORAL 2 TIMES DAILY
Qty: 180 TABLET | Refills: 0 | Status: SHIPPED | OUTPATIENT
Start: 2020-02-28 | End: 2020-05-15

## 2020-02-28 RX ORDER — METOPROLOL TARTRATE 25 MG/1
TABLET, FILM COATED ORAL
Status: CANCELLED | OUTPATIENT
Start: 2020-02-28

## 2020-03-10 NOTE — PROGRESS NOTES
[facility] Care Coordination Contact    VM from Yin with [employer] who shares that she has reached out to member 4 times over 2 weeks and she has not returned any calls to schedule intake for [facility] worker. They are closing the referral at this time. Writer will see member later this month for her annual assessment and discuss again. Will need to make another referral if she is still interested.    Vanesa Gtz RN  [facility]  [phone]  Fax: [phone]

## 2020-03-16 NOTE — PROGRESS NOTES
[facility] Care Coordination Contact    Email from [facility] who shares that they started homemaking services for member on 3/6/20. Requested that CMS submit a new auth. CP change letter completed- budget worksheet only to the member, and nothing to the provider.     Email to Darlyn at [employer] to let her know member has changed agencies and she no longer needs to look for one.    Vanesa Gtz RN  [facility]  [phone]  Fax: [phone]

## 2020-03-17 NOTE — PROGRESS NOTES
[facility] Care Coordination Contact    Submitted a new auth to [facility]. Mailed member CP change letter and budget worksheet.  Cynthia Bell  Care Management Specialist  [facility]  [phone]

## 2020-04-02 DIAGNOSIS — M79.7 FIBROMYALGIA: ICD-10-CM

## 2020-04-02 DIAGNOSIS — I10 BENIGN ESSENTIAL HYPERTENSION: ICD-10-CM

## 2020-04-02 DIAGNOSIS — M47.816 FACET ARTHROPATHY, LUMBAR: ICD-10-CM

## 2020-04-02 DIAGNOSIS — M47.812 FACET ARTHROPATHY, CERVICAL: ICD-10-CM

## 2020-04-02 DIAGNOSIS — B37.2 YEAST INFECTION OF THE SKIN: ICD-10-CM

## 2020-04-02 RX ORDER — LISINOPRIL 20 MG/1
20 TABLET ORAL 2 TIMES DAILY
Qty: 180 TABLET | Refills: 1 | Status: SHIPPED | OUTPATIENT
Start: 2020-04-02 | End: 2020-10-15

## 2020-04-02 NOTE — TELEPHONE ENCOUNTER
"Requested Prescriptions   Pending Prescriptions Disp Refills     traMADol (ULTRAM) 50 MG tablet [Pharmacy Med Name: TRAMADOL HCL 50MG TABS] 180 tablet 0     Sig: TAKE TWO TABLETS BY MOUTH THREE TIMES A DAY   Last Written Prescription Date:  02/25/2020  Last Fill Quantity: 180,  # refills: 0   Last office visit: 11/13/2019 with prescribing provider:     Future Office Visit:      There is no refill protocol information for this order        nystatin (MYCOSTATIN) 955034 UNIT/GM external powder [Pharmacy Med Name: NYSTATIN 116510FNYT/GM POWD] 60 g 0     Sig: APPLY TO AFFECTED AREA(S) TOPICALLY THREE TIMES A DAY AS NEEDED   Last Written Prescription Date:  01/14/2020  Last Fill Quantity: 60 g,  # refills: 0   Last office visit: 11/13/2019 with prescribing provider:     Future Office Visit:      Antifungal Agents Passed - 4/2/2020  2:25 PM        Passed - Recent (12 mo) or future (30 days) visit within the authorizing provider's specialty     Patient has had an office visit with the authorizing provider or a provider within the authorizing providers department within the previous 12 mos or has a future within next 30 days. See \"Patient Info\" tab in inbasket, or \"Choose Columns\" in Meds & Orders section of the refill encounter.              Passed - Not Fluconazole or Terconazole      If oral Fluconazole or Terconazole, may refill if indicated in progress notes.           Passed - Medication is active on med list           "

## 2020-04-02 NOTE — TELEPHONE ENCOUNTER
Lisinopril 20mg --- patient would like 3 months please  Change from a 1.5 months to 3 months     [facility]       Requested Prescriptions   Pending Prescriptions Disp Refills     lisinopril (ZESTRIL) 20 MG tablet 90 tablet 1     Sig: Take 1 tablet (20 mg) by mouth 2 times daily       There is no refill protocol information for this order      Last Written Prescription Date:  2/24/20   Last Fill Quantity: 90 ,  # refills: 1   Last office visit: 11/13/2019 with prescribing provider:  11/13/20   Future Office Visit:

## 2020-04-04 RX ORDER — TRAMADOL HYDROCHLORIDE 50 MG/1
TABLET ORAL
Qty: 180 TABLET | Refills: 0 | Status: SHIPPED | OUTPATIENT
Start: 2020-04-04 | End: 2020-05-14

## 2020-04-04 RX ORDER — NYSTATIN 100000 [USP'U]/G
POWDER TOPICAL
Qty: 60 G | Refills: 0 | Status: SHIPPED | OUTPATIENT
Start: 2020-04-04 | End: 2020-12-06

## 2020-04-04 NOTE — TELEPHONE ENCOUNTER
Routing refill request to provider for review/approval because:  Please advise on nystatin refill.  thanks            Controlled Substance Refill Request for tramadol  Problem List Complete:    Yes    Last Written Prescription Date:  2/25/20- due until 4/25/20 per CSA..  Last Fill Quantity: 180,   # refills: 0    THE MOST RECENT OFFICE VISIT MUST BE WITHIN THE PAST 3 MONTHS. AT LEAST ONE FACE TO FACE VISIT MUST OCCUR EVERY 6 MONTHS. ADDITIONAL VISITS CAN BE VIRTUAL.      Last Office Visit with [facility] primary care provider: 11/13/19 with instructions to return in about 6 months for Physical exam.      Future Office visit:     Controlled substance agreement:   Encounter-Level CSA - 01/12/2017:    Controlled Substance Agreement - Scan on 1/26/2017 12:28 PM: CONTROLLED SUBSTANCE AGREEMENT     Patient-Level CSA:    Controlled Substance Agreement - Opioid - Scan on 2/6/2019  9:37 AM         Last Urine Drug Screen: No results found for: CDAUT, No results found for: COMDAT,   Cannabinoids (70-slu-1-carboxy-9-THC)   Date Value Ref Range Status   03/03/2018 Not Detected NDET^Not Detected ng/mL Final     Comment:     Cutoff for a negative cannabinoid is 50 ng/mL or less.     Phencyclidine (Phencyclidine)   Date Value Ref Range Status   03/03/2018 Not Detected NDET^Not Detected ng/mL Final     Comment:     Cutoff for a negative PCP is 25 ng/mL or less.     Cocaine (Benzoylecgonine)   Date Value Ref Range Status   03/03/2018 Not Detected NDET^Not Detected ng/mL Final     Comment:     Cutoff for a negative cocaine is 150 ng/ml or less.     Methamphetamine (d-Methamphetamine)   Date Value Ref Range Status   03/03/2018 Not Detected NDET^Not Detected ng/mL Final     Comment:     Cutoff for a negative methamphetamine is 500 ng/ml or less.     Opiates (Morphine)   Date Value Ref Range Status   03/03/2018 Not Detected NDET^Not Detected ng/mL Final     Comment:     Cutoff for a negative opiate is 100 ng/ml or less.     Amphetamine  (d-Amphetamine)   Date Value Ref Range Status   03/03/2018 Not Detected NDET^Not Detected ng/mL Final     Comment:     Cutoff for a negative amphetamine is 500 ng/mL or less.     Benzodiazepines (Nordiazepam)   Date Value Ref Range Status   03/03/2018 Not Detected NDET^Not Detected ng/mL Final     Comment:     Cutoff for a negative benzodiazepine is 150 ng/ml or less.     Tricyclic Antidepressants (Desipramine)   Date Value Ref Range Status   03/03/2018 Detected, Abnormal Result (A) NDET^Not Detected ng/mL Final     Comment:     Cutoff for a positive tricyclic antidepressant is greater than 300 ng/ml.  This is an unconfirmed screening result to be used for medical purposes only.   Order [id number] for confirmation or individual confirmation tests to [facility].       Methadone (Methadone)   Date Value Ref Range Status   03/03/2018 Not Detected NDET^Not Detected ng/mL Final     Comment:     Cutoff for a negative methadone is 200 ng/ml or less.     Barbiturates (Butalbital)   Date Value Ref Range Status   03/03/2018 Not Detected NDET^Not Detected ng/mL Final     Comment:     Cutoff for a negative barbituate is 200 ng/ml or less.     Oxycodone (Oxycodone)   Date Value Ref Range Status   03/03/2018 Not Detected NDET^Not Detected ng/mL Final     Comment:     Cutoff for a negative Oxycodone is 100 ng/mL or less.     Propoxyphene (Norpropoxyphene)   Date Value Ref Range Status   03/03/2018 Not Detected NDET^Not Detected ng/mL Final     Comment:     Cutoff for a negative propoxyphene is 300 ng/ml or less     Buprenorphine (Buprenorphine)   Date Value Ref Range Status   03/03/2018 Not Detected NDET^Not Detected ng/mL Final     Comment:     Cutoff for a negative buprenorphine is 10 ng/ml or less        Processing:  Rx to be electronically transmitted to pharmacy by provider     [email or web address]   checked in past 3 months?  Yes checked- no concerns    Last filled 2/25/20

## 2020-04-06 ENCOUNTER — PATIENT OUTREACH (OUTPATIENT)
Dept: GERIATRIC MEDICINE | Facility: CLINIC | Age: 70
End: 2020-04-06

## 2020-04-06 NOTE — PROGRESS NOTES
[facility] Care Coordination Contact    Called member to schedule annual HRA. Discussed that assessment will be via phone due to COVID-19 pandemic. Assessment scheduled for 4/9 at 1PM.     Vanesa Gtz RN  [facility]  [phone]  Fax: [phone]

## 2020-04-09 ENCOUNTER — PATIENT OUTREACH (OUTPATIENT)
Dept: GERIATRIC MEDICINE | Facility: CLINIC | Age: 70
End: 2020-04-09

## 2020-04-09 ASSESSMENT — ACTIVITIES OF DAILY LIVING (ADL): DEPENDENT_IADLS:: CLEANING;SHOPPING;TRANSPORTATION

## 2020-04-09 NOTE — PROGRESS NOTES
[facility] Care Coordination Contact    [facility] Home Visit Assessment    Health Risk Assessment with Vijaya CARROLL Noblejonny completed on April 9, 2020. Visit completed telephonically due to COVID-19 pandemic.     Type of residence:  [facility] ([facility]  Current living arrangement: I live alone     Assessment completed with: Patient    Current Care Plan  Member currently receiving the following home care services: None    Member currently receiving the following community resources: Housekeeping/Chore Agency.  Member requesting to stop home delivered meals- claims she is a picky eater and doesn't like the food. Encouraged keeping the service during pandemic, but member declines.     Medication Review  Medication reconciliation completed in Epic: No- telephonic visit this year. Unable to review pill  bottles. Member shares that she doesn't know the indication for every med, but she follows the instructions and is confident she is taking correctly. Member feels she is on too many pills. MTM has been recommended in the past- she will consider.   Medication set-up & administration: Independent and sets up on own weekly.  Self-administers medications.      Mental/Behavioral Health   Depression Screening: See PHQ assessment flowsheet.   Mental health DX how managed: Medication  Mental Health Diagnosis: Yes: depression and anxiety  Mental Health Services: None: Will place referral for mental health services:  [facility] worker. Recently made referral and ACP reached out to member 4x without a call back. Will make referral again.     Falls Assessment:   Fallen 2 or more times in the past year?: No   Any fall with injury in the past year?: No    ADL/IADL Dependencies:   Dependent ADLs: Independent  Dependent IADLs: Cleaning, Shopping, laundry, Transportation. Started with a new homemaking agency and she really likes the homemaker. Discussed MM and bus. Will send MM application again- reviewed  application process.     [facility] Plan sponsored benefits: Shared information re: Silver Sneakers/gym memberships, ASA, Calcium +D.    PCA Assessment completed at visit: No     Elderly Waiver Eligibility: Yes-will continue on EW    Care Plan & Recommendations: Will terminate home delivered meals per member's request. Continue with weekly homemaking. Writer will place referral with ACP for [facility] worker. Email to [facility] medical to change liner to a thinner product- quantity coming is good.     See [facility] for detailed assessment information.    Follow-Up Plan: Member informed of future contact, plan to f/u with member with a 6 month telephone assessment.  Contact information shared with member and family, encouraged member to call with any questions or concerns at any time.    [place] care continuum providers: Please refer to Health Care Home on the Epic Problem List to view this patient's [facility] Care Plan Summary.    Vanesa Gtz RN  [facility]  [phone]  Fax: [phone]

## 2020-04-13 ENCOUNTER — PATIENT OUTREACH (OUTPATIENT)
Dept: GERIATRIC MEDICINE | Facility: CLINIC | Age: 70
End: 2020-04-13

## 2020-04-13 NOTE — PROGRESS NOTES
[facility] Care Coordination Contact    Received after visit chart from care coordinator.  Completed following tasks: Mailed copy of care plan to client, Updated services in access and Submitted referrals/auths for homemaking. Incontinence supplies are on continuous order through [facility] [employer]--no need for auth.  Chart was returned to CC.    and Provider Signature - No POC Shared:  Member indicates that they do not want their POC shared with any EW providers.    Cynthia Bell  Care Management Specialist  [facility]  [phone]

## 2020-04-13 NOTE — LETTER
April 13, 2020      VIJAYA BRAGA  [address] [place]  [place] [address]      Dear Vijaya:    At [facility], we are dedicated to improving your health and well-being. Enclosed is the Comprehensive Care Plan that we developed with you on 04/09/2020. Please review the Care Plan carefully.    As a reminder, some of the things we discussed at your visit include:    Your physical and mental health    Ways to reduce falls    Health care needs you may have    Don t forget to contact your care coordinator if you:    Have been hospitalized or plan to be hospitalized     Have had a fall     Have experienced a change in physical health    Are experiencing emotional problems     If you do not agree with your Care Plan, have questions about it, or have experienced a change in your needs, please call me at [phone]. If you are hearing impaired, please call the [place] [employer] at 855 or [phone] (tzhjiz-si-nwndim relay service).    Sincerely,    Vanesa Gtz RN    E-mail: [email or web address]  Phone: [phone]      [facility]) is a health plan that contracts with both Medicare and the [place] Medical Assistance (Medicaid) program to provide benefits of both programs to enrollees. Enrollment in [facility] depends on contract renewal.    [id number])     [id number] (11/18)

## 2020-04-21 ENCOUNTER — PATIENT OUTREACH (OUTPATIENT)
Dept: GERIATRIC MEDICINE | Facility: CLINIC | Age: 70
End: 2020-04-21

## 2020-04-21 NOTE — PROGRESS NOTES
[facility] Care Coordination Contact    Received a request to submit a DTR for the terminated of Meals. Documentation completed and faxed to the health plan. Care Coordinator aware.

## 2020-04-28 ENCOUNTER — PATIENT OUTREACH (OUTPATIENT)
Dept: GERIATRIC MEDICINE | Facility: CLINIC | Age: 70
End: 2020-04-28

## 2020-04-28 NOTE — PROGRESS NOTES
[facility] Care Coordination Contact    Call from member who shares that she is having her intake assessment with ACP this afternoon. She is wondering what to expect. Writer reviewed we were looking for [facility] assistance to help with community living skills, organization, and management of depression and anxiety. Writer shared that they should be able to provide a thorough description of what they can help with, and make recommendations after today's assessment.     Vanesa Gtz RN  [facility]  [phone]  Fax: [phone]

## 2020-05-12 DIAGNOSIS — M47.816 FACET ARTHROPATHY, LUMBAR: ICD-10-CM

## 2020-05-12 DIAGNOSIS — M47.812 FACET ARTHROPATHY, CERVICAL: ICD-10-CM

## 2020-05-12 DIAGNOSIS — M79.7 FIBROMYALGIA: ICD-10-CM

## 2020-05-12 NOTE — TELEPHONE ENCOUNTER
Controlled Substance Refill Request for tramadol  Problem List Complete:    Yes    Last Written Prescription Date:  4/4/20  Last Fill Quantity: 180,   # refills: 0    Last Office Visit with [facility] primary care provider: 11/13/19    Future Office visit:     Controlled substance agreement:   Encounter-Level CSA - 01/12/2017:    Controlled Substance Agreement - Scan on 1/26/2017 12:28 PM: CONTROLLED SUBSTANCE AGREEMENT     Patient-Level CSA:    Controlled Substance Agreement - Opioid - Scan on 2/6/2019  9:37 AM         Last Urine Drug Screen: No results found for: CDAUT, No results found for: COMDAT,   Cannabinoids (62-jwq-4-carboxy-9-THC)   Date Value Ref Range Status   03/03/2018 Not Detected NDET^Not Detected ng/mL Final     Comment:     Cutoff for a negative cannabinoid is 50 ng/mL or less.     Phencyclidine (Phencyclidine)   Date Value Ref Range Status   03/03/2018 Not Detected NDET^Not Detected ng/mL Final     Comment:     Cutoff for a negative PCP is 25 ng/mL or less.     Cocaine (Benzoylecgonine)   Date Value Ref Range Status   03/03/2018 Not Detected NDET^Not Detected ng/mL Final     Comment:     Cutoff for a negative cocaine is 150 ng/ml or less.     Methamphetamine (d-Methamphetamine)   Date Value Ref Range Status   03/03/2018 Not Detected NDET^Not Detected ng/mL Final     Comment:     Cutoff for a negative methamphetamine is 500 ng/ml or less.     Opiates (Morphine)   Date Value Ref Range Status   03/03/2018 Not Detected NDET^Not Detected ng/mL Final     Comment:     Cutoff for a negative opiate is 100 ng/ml or less.     Amphetamine (d-Amphetamine)   Date Value Ref Range Status   03/03/2018 Not Detected NDET^Not Detected ng/mL Final     Comment:     Cutoff for a negative amphetamine is 500 ng/mL or less.     Benzodiazepines (Nordiazepam)   Date Value Ref Range Status   03/03/2018 Not Detected NDET^Not Detected ng/mL Final     Comment:     Cutoff for a negative benzodiazepine is 150 ng/ml or less.     Tricyclic  Antidepressants (Desipramine)   Date Value Ref Range Status   03/03/2018 Detected, Abnormal Result (A) NDET^Not Detected ng/mL Final     Comment:     Cutoff for a positive tricyclic antidepressant is greater than 300 ng/ml.  This is an unconfirmed screening result to be used for medical purposes only.   Order [id number] for confirmation or individual confirmation tests to [employer].       Methadone (Methadone)   Date Value Ref Range Status   03/03/2018 Not Detected NDET^Not Detected ng/mL Final     Comment:     Cutoff for a negative methadone is 200 ng/ml or less.     Barbiturates (Butalbital)   Date Value Ref Range Status   03/03/2018 Not Detected NDET^Not Detected ng/mL Final     Comment:     Cutoff for a negative barbituate is 200 ng/ml or less.     Oxycodone (Oxycodone)   Date Value Ref Range Status   03/03/2018 Not Detected NDET^Not Detected ng/mL Final     Comment:     Cutoff for a negative Oxycodone is 100 ng/mL or less.     Propoxyphene (Norpropoxyphene)   Date Value Ref Range Status   03/03/2018 Not Detected NDET^Not Detected ng/mL Final     Comment:     Cutoff for a negative propoxyphene is 300 ng/ml or less     Buprenorphine (Buprenorphine)   Date Value Ref Range Status   03/03/2018 Not Detected NDET^Not Detected ng/mL Final     Comment:     Cutoff for a negative buprenorphine is 10 ng/ml or less        Processing:  Rx to be electronically transmitted to pharmacy by provider     [email or web address]   checked in past 3 months?  Yes checked - no concerns    Last filled 4/5/20

## 2020-05-14 RX ORDER — TRAMADOL HYDROCHLORIDE 50 MG/1
TABLET ORAL
Qty: 180 TABLET | Refills: 0 | Status: SHIPPED | OUTPATIENT
Start: 2020-05-14 | End: 2020-06-23

## 2020-05-15 ENCOUNTER — VIRTUAL VISIT (OUTPATIENT)
Dept: INTERNAL MEDICINE | Facility: CLINIC | Age: 70
End: 2020-05-15
Payer: COMMERCIAL

## 2020-05-15 VITALS — WEIGHT: 205 LBS | DIASTOLIC BLOOD PRESSURE: 85 MMHG | SYSTOLIC BLOOD PRESSURE: 125 MMHG | BODY MASS INDEX: 30.27 KG/M2

## 2020-05-15 DIAGNOSIS — E78.5 HYPERLIPIDEMIA LDL GOAL <100: ICD-10-CM

## 2020-05-15 DIAGNOSIS — M79.7 FIBROMYALGIA: ICD-10-CM

## 2020-05-15 DIAGNOSIS — K21.9 GASTROESOPHAGEAL REFLUX DISEASE WITHOUT ESOPHAGITIS: ICD-10-CM

## 2020-05-15 DIAGNOSIS — M54.50 CHRONIC BILATERAL LOW BACK PAIN WITHOUT SCIATICA: ICD-10-CM

## 2020-05-15 DIAGNOSIS — G89.29 CHRONIC BILATERAL LOW BACK PAIN WITHOUT SCIATICA: ICD-10-CM

## 2020-05-15 DIAGNOSIS — E11.40 TYPE 2 DIABETES MELLITUS WITH DIABETIC NEUROPATHY, WITHOUT LONG-TERM CURRENT USE OF INSULIN (H): Primary | ICD-10-CM

## 2020-05-15 DIAGNOSIS — I10 BENIGN ESSENTIAL HYPERTENSION: ICD-10-CM

## 2020-05-15 DIAGNOSIS — E55.9 VITAMIN D DEFICIENCY: ICD-10-CM

## 2020-05-15 DIAGNOSIS — M51.369 LUMBAR DEGENERATIVE DISC DISEASE: ICD-10-CM

## 2020-05-15 DIAGNOSIS — J44.9 CHRONIC OBSTRUCTIVE PULMONARY DISEASE, UNSPECIFIED COPD TYPE (H): ICD-10-CM

## 2020-05-15 DIAGNOSIS — M62.81 GENERALIZED MUSCLE WEAKNESS: ICD-10-CM

## 2020-05-15 PROCEDURE — 99214 OFFICE O/P EST MOD 30 MIN: CPT | Mod: 95 | Performed by: INTERNAL MEDICINE

## 2020-05-15 RX ORDER — METOPROLOL TARTRATE 25 MG/1
25 TABLET, FILM COATED ORAL 2 TIMES DAILY
Qty: 180 TABLET | Refills: 1 | Status: ON HOLD | OUTPATIENT
Start: 2020-05-15 | End: 2020-10-03

## 2020-05-15 RX ORDER — TIOTROPIUM BROMIDE 18 UG/1
18 CAPSULE ORAL; RESPIRATORY (INHALATION) DAILY
Qty: 90 CAPSULE | Refills: 1 | Status: SHIPPED | OUTPATIENT
Start: 2020-05-15 | End: 2021-05-13

## 2020-05-15 RX ORDER — MONTELUKAST SODIUM 10 MG/1
TABLET ORAL
Qty: 90 TABLET | Refills: 1 | Status: SHIPPED | OUTPATIENT
Start: 2020-05-15 | End: 2020-12-06

## 2020-05-15 RX ORDER — AMLODIPINE BESYLATE 5 MG/1
5 TABLET ORAL DAILY
Qty: 90 TABLET | Refills: 1 | Status: SHIPPED | OUTPATIENT
Start: 2020-05-15 | End: 2020-10-15

## 2020-05-15 RX ORDER — ATORVASTATIN CALCIUM 80 MG/1
80 TABLET, FILM COATED ORAL DAILY
Qty: 90 TABLET | Refills: 1 | Status: SHIPPED | OUTPATIENT
Start: 2020-05-15 | End: 2020-12-06

## 2020-05-15 RX ORDER — DULOXETIN HYDROCHLORIDE 60 MG/1
60 CAPSULE, DELAYED RELEASE ORAL DAILY
Qty: 90 CAPSULE | Refills: 3 | Status: SHIPPED | OUTPATIENT
Start: 2020-05-15 | End: 2021-08-03

## 2020-05-15 RX ORDER — PREGABALIN 150 MG/1
150 CAPSULE ORAL 3 TIMES DAILY
Qty: 270 CAPSULE | Refills: 1 | Status: SHIPPED | OUTPATIENT
Start: 2020-05-15 | End: 2021-01-14

## 2020-05-15 ASSESSMENT — ANXIETY QUESTIONNAIRES
3. WORRYING TOO MUCH ABOUT DIFFERENT THINGS: NOT AT ALL
IF YOU CHECKED OFF ANY PROBLEMS ON THIS QUESTIONNAIRE, HOW DIFFICULT HAVE THESE PROBLEMS MADE IT FOR YOU TO DO YOUR WORK, TAKE CARE OF THINGS AT HOME, OR GET ALONG WITH OTHER PEOPLE: SOMEWHAT DIFFICULT
1. FEELING NERVOUS, ANXIOUS, OR ON EDGE: MORE THAN HALF THE DAYS
7. FEELING AFRAID AS IF SOMETHING AWFUL MIGHT HAPPEN: NOT AT ALL
GAD7 TOTAL SCORE: 7
6. BECOMING EASILY ANNOYED OR IRRITABLE: SEVERAL DAYS
5. BEING SO RESTLESS THAT IT IS HARD TO SIT STILL: NOT AT ALL
2. NOT BEING ABLE TO STOP OR CONTROL WORRYING: MORE THAN HALF THE DAYS

## 2020-05-15 ASSESSMENT — PATIENT HEALTH QUESTIONNAIRE - PHQ9
5. POOR APPETITE OR OVEREATING: MORE THAN HALF THE DAYS
SUM OF ALL RESPONSES TO PHQ QUESTIONS 1-9: 11

## 2020-05-15 NOTE — PROGRESS NOTES
"Vijaya Manjarrez is a 70 year old female who is being evaluated via a billable video visit.      The patient has been notified of following:     \"This video visit will be conducted via a call between you and your physician/provider. We have found that certain health care needs can be provided without the need for an in-person physical exam.  This service lets us provide the care you need with a video conversation.  If a prescription is necessary we can send it directly to your pharmacy.  If lab work is needed we can place an order for that and you can then stop by our lab to have the test done at a later time.    Video visits are billed at different rates depending on your insurance coverage.  Please reach out to your insurance provider with any questions.    If during the course of the call the physician/provider feels a video visit is not appropriate, you will not be charged for this service.\"    Patient has given verbal consent for Video visit? Yes    How would you like to obtain your AVS? Mail a copy    Patient would like the video invitation sent by: Text to cell phone: [phone]    Will anyone else be joining your video visit? No    Subjective     Vijaya Manjarrez is a 70 year old female who presents today via video visit for the following health issues:    HPI  Diabetes Follow-up    How often are you checking your blood sugar? A few times a week  What time of day are you checking your blood sugars (select all that apply)?  Before meals  Have you had any blood sugars above 200?  No  Have you had any blood sugars below 70?  No    What symptoms do you notice when your blood sugar is low?  Shaky    What concerns do you have today about your diabetes? None     Do you have any of these symptoms? (Select all that apply)  Burning in feet    Have you had a diabetic eye exam in the last 12 months? No        BP Readings from Last 2 Encounters:   11/13/19 102/70   06/25/19 136/78     Hemoglobin A1C (%)   Date " Value   11/13/2019 6.7 (H)   06/25/2019 6.8 (H)     LDL Cholesterol Calculated (mg/dL)   Date Value   11/13/2019 83   03/06/2019     Cannot estimate LDL when triglyceride exceeds 400 mg/dL     LDL Cholesterol Direct (mg/dL)   Date Value   03/06/2019 164 (H)         Hypertension Follow-up      Do you check your blood pressure regularly outside of the clinic? Yes     Are you following a low salt diet? Yes    Are your blood pressures ever more than 140 on the top number (systolic) OR more   than 90 on the bottom number (diastolic), for example 140/90? No    Depression.   Improved.    PHQ 2/5/2019 2/6/2019 5/15/2020   PHQ-9 Total Score 17 16 11   Q9: Thoughts of better off dead/self-harm past 2 weeks Several days Not at all Not at all   F/U: Thoughts of suicide or self-harm No - -   F/U: Safety concerns No - -     Lower back pain.  She has had some weakness. She also has had pain down her legs.  She has fallen a couple of times.  MRI of lumbar spine.  Has seen spine specialist.   She is on lyrica for fibromyalgia and back pain.       How many servings of fruits and vegetables do you eat daily?  0-1    On average, how many sweetened beverages do you drink each day (Examples: soda, juice, sweet tea, etc.  Do NOT count diet or artificially sweetened beverages)?   2    How many days per week do you exercise enough to make your heart beat faster? 3 or less    How many minutes a day do you exercise enough to make your heart beat faster? 9 or less    How many days per week do you miss taking your medication? 0         Video Start Time: 3:27pm        Patient Active Problem List   Diagnosis     HCD (health care directive)     Type 2 diabetes mellitus with diabetic neuropathy, without long-term current use of insulin (H)     COPD (chronic obstructive pulmonary disease) (H)     Cervical spine degeneration     Facet arthropathy, lumbar     Lumbar degenerative disc disease     Migraine headache     Diabetic neuropathy (H)      Anxiety     GERD (gastroesophageal reflux disease)     Hyperlipidemia LDL goal <100     Fibromyalgia     Benign essential hypertension     Controlled substance agreement signed- ok 20     Chronic pain syndrome     Health Care Home     Advanced directives, counseling/discussion     Narcotic dependence (H)     Mild episode of recurrent major depressive disorder (H)     Coronary artery disease involving native coronary artery, angina presence unspecified, unspecified whether native or transplanted heart     Dyslipidemia     Status post coronary angiogram     Coronary artery disease involving native coronary artery of native heart without angina pectoris     Past Surgical History:   Procedure Laterality Date     CHOLECYSTECTOMY, LAPOROSCOPIC      Cholecystectomy, Laparoscopic     CV HEART CATHETERIZATION WITH POSSIBLE INTERVENTION Left 2019    Procedure: Coronary Angiogram;  Surgeon: Cheo Merida MD;  Location:  HEART CARDIAC CATH LAB       Social History     Tobacco Use     Smoking status: Former Smoker     Packs/day: 1.00     Years: 40.00     Pack years: 40.00     Types: Cigarettes     Last attempt to quit: 2017     Years since quittin.9     Smokeless tobacco: Never Used   Substance Use Topics     Alcohol use: No     Family History   Problem Relation Age of Onset     Cancer Mother      Cancer Father         Lung cancer     Cancer Maternal Grandmother         Breast cancer           Reviewed and updated as needed this visit by Provider         Review of Systems   CONSTITUTIONAL: NEGATIVE for fever, chills, change in weight  RESP: NEGATIVE for significant cough or SOB  CV: NEGATIVE for chest pain, palpitations or peripheral edema  Neuro: she has had decreased strength in both of her legs a couple of months ago; she has leg pain; she has pain in her lower back; she reports she feels weak everywhere      Objective    LMP  (LMP Unknown)   Estimated body mass index is 29.68 kg/m  as  "calculated from the following:    Height as of 11/13/19: 1.753 m (5' 9\").    Weight as of 11/13/19: 91.2 kg (201 lb).  Physical Exam   Weight at home of 205lb    GENERAL: Healthy, alert and no distress  EYES: Eyes grossly normal to inspection.  No discharge or erythema, or obvious scleral/conjunctival abnormalities.  RESP: No audible wheeze, cough, or visible cyanosis.  No visible retractions or increased work of breathing.    SKIN: Visible skin clear. No significant rash, abnormal pigmentation or lesions.  NEURO: Cranial nerves grossly intact.  Mentation and speech appropriate for age.  PSYCH: Mentation appears normal, affect normal/bright, judgement and insight intact, normal speech and appearance well-groomed.      Diagnostic Test Results:  Labs reviewed in Epic        Assessment & Plan     (E11.40) Type 2 diabetes mellitus with diabetic neuropathy, without long-term current use of insulin (H)  (primary encounter diagnosis)  Comment: assess  Plan: metFORMIN (GLUCOPHAGE) 500 MG tablet,         Hemoglobin A1c, Albumin Random Urine         Quantitative with Creat Ratio, **Comprehensive         metabolic panel FUTURE anytime, **TSH with free        T4 reflex FUTURE anytime            (I10) Benign essential hypertension  Comment:   Plan: amLODIPine (NORVASC) 5 MG tablet, metoprolol         tartrate (LOPRESSOR) 25 MG tablet          (E78.5) Hyperlipidemia LDL goal <100  Comment:   Plan: atorvastatin (LIPITOR) 80 MG tablet          (M51.36) Lumbar degenerative disc disease  Comment:   Plan: DULoxetine (CYMBALTA) 60 MG capsule            (K21.9) Gastroesophageal reflux disease without esophagitis  Comment:   Plan: omeprazole (PRILOSEC) 20 MG DR capsule            (M79.7) Fibromyalgia  Comment:   Plan: pregabalin (LYRICA) 150 MG capsule            (M54.5,  G89.29) Chronic bilateral low back pain without sciatica  Comment:   Plan: pregablin (LYRICA) 150 MG capsule            (J44.9) Chronic obstructive pulmonary disease, " "unspecified COPD type (H)  Comment:   Plan: tiotropium (SPIRIVA) 18 MCG inhaled capsule,         montelukast (SINGULAIR) 10 MG tablet            (M62.81) Generalized muscle weakness  Comment: assess for metabolic etiology  Plan: **TSH with free T4 reflex FUTURE anytime, **CBC        with platelets FUTURE anytime, CK total,         **Vitamin B12 FUTURE 2mo        -consider MRI of lumbar spine/spine specialist or Physical therapy pending labs    (E55.9) Vitamin D deficiency  Comment: assess  Plan: **Vitamin D Deficiency FUTURE 2mo               BMI:   Estimated body mass index is 30.27 kg/m  as calculated from the following:    Height as of 11/13/19: 1.753 m (5' 9\").    Weight as of this encounter: 93 kg (205 lb).           See Patient Instructions    Every 6 months for dm    Vidhi Mario MD  [facility]      Video-Visit Details    Type of service:  Video Visit    Video End Time:3:38pm     Originating Location (pt. Location): Home    Distant Location (provider location):  [facility]     Platform used for Video Visit: Doximity    No follow-ups on file.       Vidhi Mario MD          "

## 2020-05-16 ASSESSMENT — ANXIETY QUESTIONNAIRES: GAD7 TOTAL SCORE: 7

## 2020-05-20 DIAGNOSIS — M62.81 GENERALIZED MUSCLE WEAKNESS: ICD-10-CM

## 2020-05-20 DIAGNOSIS — E11.40 TYPE 2 DIABETES MELLITUS WITH DIABETIC NEUROPATHY, WITHOUT LONG-TERM CURRENT USE OF INSULIN (H): ICD-10-CM

## 2020-05-20 DIAGNOSIS — E55.9 VITAMIN D DEFICIENCY: ICD-10-CM

## 2020-05-20 LAB
ERYTHROCYTE [DISTWIDTH] IN BLOOD BY AUTOMATED COUNT: 15.4 % (ref 10–15)
HBA1C MFR BLD: 7.3 % (ref 0–5.6)
HCT VFR BLD AUTO: 38 % (ref 35–47)
HGB BLD-MCNC: 12 G/DL (ref 11.7–15.7)
MCH RBC QN AUTO: 26.3 PG (ref 26.5–33)
MCHC RBC AUTO-ENTMCNC: 31.6 G/DL (ref 31.5–36.5)
MCV RBC AUTO: 83 FL (ref 78–100)
PLATELET # BLD AUTO: 294 10E9/L (ref 150–450)
RBC # BLD AUTO: 4.56 10E12/L (ref 3.8–5.2)
VIT B12 SERPL-MCNC: 370 PG/ML (ref 193–986)
WBC # BLD AUTO: 9 10E9/L (ref 4–11)

## 2020-05-20 PROCEDURE — 82043 UR ALBUMIN QUANTITATIVE: CPT | Performed by: INTERNAL MEDICINE

## 2020-05-20 PROCEDURE — 83036 HEMOGLOBIN GLYCOSYLATED A1C: CPT | Performed by: INTERNAL MEDICINE

## 2020-05-20 PROCEDURE — 85027 COMPLETE CBC AUTOMATED: CPT | Performed by: INTERNAL MEDICINE

## 2020-05-20 PROCEDURE — 84443 ASSAY THYROID STIM HORMONE: CPT | Performed by: INTERNAL MEDICINE

## 2020-05-20 PROCEDURE — 82607 VITAMIN B-12: CPT | Performed by: INTERNAL MEDICINE

## 2020-05-20 PROCEDURE — 36415 COLL VENOUS BLD VENIPUNCTURE: CPT | Performed by: INTERNAL MEDICINE

## 2020-05-20 PROCEDURE — 82550 ASSAY OF CK (CPK): CPT | Performed by: INTERNAL MEDICINE

## 2020-05-20 PROCEDURE — 80053 COMPREHEN METABOLIC PANEL: CPT | Performed by: INTERNAL MEDICINE

## 2020-05-20 PROCEDURE — 82306 VITAMIN D 25 HYDROXY: CPT | Performed by: INTERNAL MEDICINE

## 2020-05-21 LAB
ALBUMIN SERPL-MCNC: 4 G/DL (ref 3.4–5)
ALP SERPL-CCNC: 103 U/L (ref 40–150)
ALT SERPL W P-5'-P-CCNC: 35 U/L (ref 0–50)
ANION GAP SERPL CALCULATED.3IONS-SCNC: 8 MMOL/L (ref 3–14)
AST SERPL W P-5'-P-CCNC: 20 U/L (ref 0–45)
BILIRUB SERPL-MCNC: 0.5 MG/DL (ref 0.2–1.3)
BUN SERPL-MCNC: 16 MG/DL (ref 7–30)
CALCIUM SERPL-MCNC: 8.9 MG/DL (ref 8.5–10.1)
CHLORIDE SERPL-SCNC: 107 MMOL/L (ref 94–109)
CK SERPL-CCNC: 70 U/L (ref 30–225)
CO2 SERPL-SCNC: 26 MMOL/L (ref 20–32)
CREAT SERPL-MCNC: 0.72 MG/DL (ref 0.52–1.04)
CREAT UR-MCNC: 218 MG/DL
DEPRECATED CALCIDIOL+CALCIFEROL SERPL-MC: 34 UG/L (ref 20–75)
GFR SERPL CREATININE-BSD FRML MDRD: 86 ML/MIN/{1.73_M2}
GLUCOSE SERPL-MCNC: 144 MG/DL (ref 70–99)
MICROALBUMIN UR-MCNC: 74 MG/L
MICROALBUMIN/CREAT UR: 34.04 MG/G CR (ref 0–25)
POTASSIUM SERPL-SCNC: 4 MMOL/L (ref 3.4–5.3)
PROT SERPL-MCNC: 7.7 G/DL (ref 6.8–8.8)
SODIUM SERPL-SCNC: 141 MMOL/L (ref 133–144)
TSH SERPL DL<=0.005 MIU/L-ACNC: 1.22 MU/L (ref 0.4–4)

## 2020-05-22 ENCOUNTER — TELEPHONE (OUTPATIENT)
Dept: INTERNAL MEDICINE | Facility: CLINIC | Age: 70
End: 2020-05-22

## 2020-05-22 NOTE — TELEPHONE ENCOUNTER
Please review labs and advise.    Patient advised she does not meet the testing criteria for COVID-19.    Shira Stuart RN

## 2020-05-22 NOTE — TELEPHONE ENCOUNTER
Pt calling for 5/20/20 lab results. She is also requesting orders for COVID-19 testing. She stated that someone in her apartment complex test positive and that the apartment complex would like everyone in the building tested. Pt is not currently experiencing any symptoms. Pt can be reached at [phone]. Please advise. Thanks.

## 2020-05-26 NOTE — TELEPHONE ENCOUNTER
Patient advised of lab results.  She also wants to report they now have a second person in her building with positive covid.

## 2020-06-20 DIAGNOSIS — M47.812 FACET ARTHROPATHY, CERVICAL: ICD-10-CM

## 2020-06-20 DIAGNOSIS — M47.816 FACET ARTHROPATHY, LUMBAR: ICD-10-CM

## 2020-06-20 DIAGNOSIS — M79.7 FIBROMYALGIA: ICD-10-CM

## 2020-06-23 ENCOUNTER — TELEPHONE (OUTPATIENT)
Dept: INTERNAL MEDICINE | Facility: CLINIC | Age: 70
End: 2020-06-23

## 2020-06-23 RX ORDER — TRAMADOL HYDROCHLORIDE 50 MG/1
TABLET ORAL
Qty: 180 TABLET | Refills: 0 | Status: SHIPPED | OUTPATIENT
Start: 2020-06-23 | End: 2020-07-28

## 2020-06-23 NOTE — TELEPHONE ENCOUNTER
Controlled Substance Refill Request for Tramadol   Problem List Complete:    No     PROVIDER TO CONSIDER COMPLETION OF PROBLEM LIST AND OVERVIEW/CONTROLLED SUBSTANCE AGREEMENT    Last Written Prescription Date:  5/14/20  Last Fill Quantity: 180,   # refills: 0    THE MOST RECENT OFFICE VISIT MUST BE WITHIN THE PAST 3 MONTHS. AT LEAST ONE FACE TO FACE VISIT MUST OCCUR EVERY 6 MONTHS. ADDITIONAL VISITS CAN BE VIRTUAL.  (THIS STATEMENT SHOULD BE DELETED.)    [place] [facility] [place] 5/15/20    Future Office visit:     Controlled substance agreement:   Encounter-Level CSA - 01/12/2017:    Controlled Substance Agreement - Scan on 1/26/2017 12:28 PM: CONTROLLED SUBSTANCE AGREEMENT     Patient-Level CSA:    Controlled Substance Agreement - Opioid - Scan on 2/6/2019  9:37 AM         Last Urine Drug Screen: No results found for: CDAUT, No results found for: COMDAT,   Cannabinoids (91-wrv-9-carboxy-9-THC)   Date Value Ref Range Status   03/03/2018 Not Detected NDET^Not Detected ng/mL Final     Comment:     Cutoff for a negative cannabinoid is 50 ng/mL or less.     Phencyclidine (Phencyclidine)   Date Value Ref Range Status   03/03/2018 Not Detected NDET^Not Detected ng/mL Final     Comment:     Cutoff for a negative PCP is 25 ng/mL or less.     Cocaine (Benzoylecgonine)   Date Value Ref Range Status   03/03/2018 Not Detected NDET^Not Detected ng/mL Final     Comment:     Cutoff for a negative cocaine is 150 ng/ml or less.     Methamphetamine (d-Methamphetamine)   Date Value Ref Range Status   03/03/2018 Not Detected NDET^Not Detected ng/mL Final     Comment:     Cutoff for a negative methamphetamine is 500 ng/ml or less.     Opiates (Morphine)   Date Value Ref Range Status   03/03/2018 Not Detected NDET^Not Detected ng/mL Final     Comment:     Cutoff for a negative opiate is 100 ng/ml or less.     Amphetamine (d-Amphetamine)   Date Value Ref Range Status   03/03/2018 Not Detected NDET^Not Detected  ng/mL Final     Comment:     Cutoff for a negative amphetamine is 500 ng/mL or less.     Benzodiazepines (Nordiazepam)   Date Value Ref Range Status   03/03/2018 Not Detected NDET^Not Detected ng/mL Final     Comment:     Cutoff for a negative benzodiazepine is 150 ng/ml or less.     Tricyclic Antidepressants (Desipramine)   Date Value Ref Range Status   03/03/2018 Detected, Abnormal Result (A) NDET^Not Detected ng/mL Final     Comment:     Cutoff for a positive tricyclic antidepressant is greater than 300 ng/ml.  This is an unconfirmed screening result to be used for medical purposes only.   Order [id number] for confirmation or individual confirmation tests to [employer].       Methadone (Methadone)   Date Value Ref Range Status   03/03/2018 Not Detected NDET^Not Detected ng/mL Final     Comment:     Cutoff for a negative methadone is 200 ng/ml or less.     Barbiturates (Butalbital)   Date Value Ref Range Status   03/03/2018 Not Detected NDET^Not Detected ng/mL Final     Comment:     Cutoff for a negative barbituate is 200 ng/ml or less.     Oxycodone (Oxycodone)   Date Value Ref Range Status   03/03/2018 Not Detected NDET^Not Detected ng/mL Final     Comment:     Cutoff for a negative Oxycodone is 100 ng/mL or less.     Propoxyphene (Norpropoxyphene)   Date Value Ref Range Status   03/03/2018 Not Detected NDET^Not Detected ng/mL Final     Comment:     Cutoff for a negative propoxyphene is 300 ng/ml or less     Buprenorphine (Buprenorphine)   Date Value Ref Range Status   03/03/2018 Not Detected NDET^Not Detected ng/mL Final     Comment:     Cutoff for a negative buprenorphine is 10 ng/ml or less         [email or web address]       checked in past 3 months?  Yes 5/12/20

## 2020-06-23 NOTE — TELEPHONE ENCOUNTER
Patient was seen in May and discussed with PCP about recent fall causing numbness and chest pain (not currently having). She is wondering if Dr Mario wanted her to follow up with neurology or a cardiologist. Call back to advise. [phone]

## 2020-06-24 DIAGNOSIS — I25.10 CORONARY ARTERY DISEASE INVOLVING NATIVE CORONARY ARTERY, ANGINA PRESENCE UNSPECIFIED, UNSPECIFIED WHETHER NATIVE OR TRANSPLANTED HEART: ICD-10-CM

## 2020-06-24 NOTE — TELEPHONE ENCOUNTER
Writer does not see this noted in 5/15/20 virtual visit note. Will route to primary care provider for review.

## 2020-06-25 RX ORDER — NITROGLYCERIN 0.4 MG/1
TABLET SUBLINGUAL
Qty: 30 TABLET | Refills: 3 | Status: SHIPPED | OUTPATIENT
Start: 2020-06-25 | End: 2021-09-01

## 2020-06-25 RX ORDER — NITROGLYCERIN 0.4 MG/1
TABLET SUBLINGUAL
Qty: 30 TABLET | Refills: 3 | Status: SHIPPED | OUTPATIENT
Start: 2020-06-25 | End: 2020-06-25

## 2020-07-01 NOTE — TELEPHONE ENCOUNTER
Tried to call patient to clarify, as this is not documented in the note.  Left a message to call back  Please try to call her again to obtain details.

## 2020-07-01 NOTE — TELEPHONE ENCOUNTER
Patient calls back.     She states that after her fall with Dr. Mario, she fell and hit her back left side of her head on hard floor. She states the fall occurred after she had video visit with Dr. Mario. She does not think she passed out. Patient states that falls have been an ongoing issue for her. She is having headaches, back pain and nausea since the fall. She feels like the headaches have been getting worse. She states that can't walk more than 10 feet before her legs give out on her - states she has had some issues with her legs in the past, but they seem to be getting worse. She is having numbness and tingling in her legs, this usually worse later in the day and improves some overnight. She is more tired and sleeping a lot, states she will go to bed around 9-10 pm and then sleeps until 1 pm the next day. Patient states she does wake up at night due to fibromyalgia, but this is normal for her, she also will get up for a short time in the morning before going back to bed. She does have a decreased appetite.     Chest pain has been occurring on and off, pain occurs after coughing. She states that she recalls this starting after a procedure she had last year. Patient had COPD and thinks symptoms have been worse recently. She is coughing more and noticed a slight increase in shortness of breath. Cough is non-productive, she is taking inhalers as prescribed. Denies any fevers.      Routed to Dr. Mario to review - do you recommend an appointment to discuss this?

## 2020-07-07 ENCOUNTER — VIRTUAL VISIT (OUTPATIENT)
Dept: INTERNAL MEDICINE | Facility: CLINIC | Age: 70
End: 2020-07-07
Payer: COMMERCIAL

## 2020-07-07 DIAGNOSIS — Z12.2 ENCOUNTER FOR SCREENING FOR LUNG CANCER: ICD-10-CM

## 2020-07-07 DIAGNOSIS — M54.50 LUMBAR SPINE PAIN: Primary | ICD-10-CM

## 2020-07-07 DIAGNOSIS — F41.1 GAD (GENERALIZED ANXIETY DISORDER): ICD-10-CM

## 2020-07-07 DIAGNOSIS — R07.89 ATYPICAL CHEST PAIN: ICD-10-CM

## 2020-07-07 DIAGNOSIS — Z87.891 PERSONAL HISTORY OF NICOTINE DEPENDENCE: ICD-10-CM

## 2020-07-07 DIAGNOSIS — Z12.2 ENCOUNTER FOR SCREENING FOR MALIGNANT NEOPLASM OF LUNG: ICD-10-CM

## 2020-07-07 PROCEDURE — 99214 OFFICE O/P EST MOD 30 MIN: CPT | Mod: 95 | Performed by: INTERNAL MEDICINE

## 2020-07-07 RX ORDER — HYDROXYZINE HYDROCHLORIDE 10 MG/1
10 TABLET, FILM COATED ORAL 3 TIMES DAILY PRN
Qty: 30 TABLET | Refills: 0 | Status: SHIPPED | OUTPATIENT
Start: 2020-07-07 | End: 2020-07-28

## 2020-07-07 NOTE — PROGRESS NOTES
"Vijaya Manjarrez is a 70 year old female who is being evaluated via a billable video visit.      The patient has been notified of following:     \"This video visit will be conducted via a call between you and your physician/provider. We have found that certain health care needs can be provided without the need for an in-person physical exam.  This service lets us provide the care you need with a video conversation.  If a prescription is necessary we can send it directly to your pharmacy.  If lab work is needed we can place an order for that and you can then stop by our lab to have the test done at a later time.    Video visits are billed at different rates depending on your insurance coverage.  Please reach out to your insurance provider with any questions.    If during the course of the call the physician/provider feels a video visit is not appropriate, you will not be charged for this service.\"    Patient has given verbal consent for Video visit? Yes  How would you like to obtain your AVS? Mail a copy  Patient would like the video invitation sent by: Text to cell phone: .  Will anyone else be joining your video visit? No      Subjective     Vijaya Manjarrez is a 70 year old female who presents today via video visit for the following health issues:    HPI    The patient reports that she had fallen in her apartment in her bathroom a couple of weeks ago.   The patient reports that her legs get weak and feels like her legs give out. She has lumbar spine pain.  She would be interested in seeing a spine specialist. Denies any loss of bowel or bladder.     Chest pain. The patient has had intermittent chest pain for over one year.   The pain starts in the center of the chest and goes to the left underneath the chest.  The pain occurs both when sitting or moving around.  The pain lasts for less than 10 minutes. When she takes the nitroglycerin, the pain is less.    She had a cardiac catheterization revealing no " myocardial ischemia causing the pain.   The patient reports that she has had chest pain which has worsened since her last fall a couple of weeks ago. She reports that it is more frequent.  The patient reports that she has shortness of breath and coughing with this.   She is using her inhalers, which do seem to help.   She report that the pain is muscle pain. She has not tried heat on her chest.  She is taking flexeril daily.  Not located on her breast tissue.   She thinks some of this could be anxiety also.   BRADY-7 SCORE 3/10/2018 2/5/2019 5/15/2020   Total Score - 11 (moderate anxiety) -   Total Score 6 11 7         Patient wishes for lung cancer screening.      Video Start Time: 3:54pm        Patient Active Problem List   Diagnosis     HCD (health care directive)     Type 2 diabetes mellitus with diabetic neuropathy, without long-term current use of insulin (H)     COPD (chronic obstructive pulmonary disease) (H)     Cervical spine degeneration     Facet arthropathy, lumbar     Lumbar degenerative disc disease     Migraine headache     Diabetic neuropathy (H)     Anxiety     GERD (gastroesophageal reflux disease)     Hyperlipidemia LDL goal <100     Fibromyalgia     Benign essential hypertension     Controlled substance agreement signed- ok 5/12/20     Chronic pain syndrome     Health Care Home     Advanced directives, counseling/discussion     Narcotic dependence (H)     Mild episode of recurrent major depressive disorder (H)     Coronary artery disease involving native coronary artery, angina presence unspecified, unspecified whether native or transplanted heart     Dyslipidemia     Status post coronary angiogram     Coronary artery disease involving native coronary artery of native heart without angina pectoris     Past Surgical History:   Procedure Laterality Date     CHOLECYSTECTOMY, LAPOROSCOPIC      Cholecystectomy, Laparoscopic     CV HEART CATHETERIZATION WITH POSSIBLE INTERVENTION Left 2/26/2019     Procedure: Coronary Angiogram;  Surgeon: Cheo Merida MD;  Location:  HEART CARDIAC CATH LAB       Social History     Tobacco Use     Smoking status: Former Smoker     Packs/day: 1.00     Years: 40.00     Pack years: 40.00     Types: Cigarettes     Last attempt to quit: 6/1/2017     Years since quitting: 3.1     Smokeless tobacco: Never Used   Substance Use Topics     Alcohol use: No     Family History   Problem Relation Age of Onset     Cancer Mother      Cancer Father         Lung cancer     Cancer Maternal Grandmother         Breast cancer           Reviewed and updated as needed this visit by Provider         Review of Systems   CONSTITUTIONAL: NEGATIVE for fever, chills, change in weight  RESP: POS shortness of breath  CV: POS chest pain      Objective             Physical Exam     GENERAL: Healthy, alert and no distress  EYES: Eyes grossly normal to inspection.  No discharge or erythema, or obvious scleral/conjunctival abnormalities.  RESP: No audible wheeze, cough, or visible cyanosis.  No visible retractions or increased work of breathing.    SKIN: Visible skin clear. No significant rash, abnormal pigmentation or lesions.  NEURO: Cranial nerves grossly intact.  Mentation and speech appropriate for age.  PSYCH: Mentation appears normal, affect normal/bright, judgement and insight intact, normal speech and appearance well-groomed.      Diagnostic Test Results:  Labs reviewed in Epic        Assessment & Plan       (M54.5) Lumbar spine pain  (primary encounter diagnosis)  Comment: causing falls  Plan: Orthopedic & Spine  Referral          (Z12.2) Encounter for screening for malignant neoplasm of lung  Comment:  Plan: CT Chest Lung Cancer Scrn Low Dose wo            (Z87.891) Personal history of nicotine dependence   Comment:   Plan: CT Chest Lung Cancer Scrn Low Dose wo          (F41.1) BRADY (generalized anxiety disorder)  Comment:   Plan: hydrOXYzine (ATARAX) 10 MG tablet, MENTAL          HEALTH REFERRAL  - Adult; Outpatient Treatment;        Individual/Couples/Family/Group Therapy/Health         [place]ology[place] [facility] [facility]         [phone]; We will contact you to schedule        the appointment or please call with any         questions            Atypical chest pain. Consider muscle pain- try heat  Consider anxiety- counseling and hydroxyzine  - seen by cardiology in past and deemed not cardiac  -xray to assess for rib fracture    See Patient Instructions    No follow-ups on file.    Vidhi Mario MD  [facility]      Video-Visit Details    Type of service:  Video Visit    Video End Time:4:17pm    Originating Location (pt. Location): Home    Distant Location (provider location): home    Platform used for Video Visit: Doximity    No follow-ups on file.     Vidhi Mario MD

## 2020-07-08 ENCOUNTER — TELEPHONE (OUTPATIENT)
Dept: INTERNAL MEDICINE | Facility: CLINIC | Age: 70
End: 2020-07-08

## 2020-07-08 NOTE — TELEPHONE ENCOUNTER
PRIOR AUTHORIZATION DENIED - COMPLETED VIA EPA     Medication: hydrOXYzine (ATARAX) 10 MG tablet - DENIED     Denial Date:  07/08/2020    Denial Rational: BEFORE this medication can be approved you must first try and fail at least TWO other products approved by the FDA that are considered to be safer for patient's 65 and older.     FDA Approved Alternatives    Alprazolam    Clonazepam     Lorazepam    Diazepam        Appeal Information:

## 2020-07-12 NOTE — TELEPHONE ENCOUNTER
Please let the patient know that her insurance dose not cover this medication.   Recommend she schedule an appt with counseling     thanks

## 2020-07-13 ENCOUNTER — PATIENT OUTREACH (OUTPATIENT)
Dept: GERIATRIC MEDICINE | Facility: CLINIC | Age: 70
End: 2020-07-13

## 2020-07-13 NOTE — PROGRESS NOTES
[facility] Medical Spine Consultation    Date of visit: 7/14/2020    Primary Care Provider: Dr. Vidhi Mario    Reason for consultation:    Vijaya Manjarrez is a 70 year old female who is seen in consultation today at the request of her primary care physician, Vidhi Mario.     Consultation and Evaluation for: Medical spine evaluation    Review of [place] Prescription Monitoring Program (): Today I have also reviewed the patient's history of controlled substance use, as provided by [place] licensed pharmacies and prescriber dispensers.     Review of Pain Questionnaire: Please see the [facility] [place] health questionnaire, which the patient completed and reviewed with me in detail.    Review of Electronic Chart: Today I have also reviewed available medical information in the patient's medical record at [facility] ([facility]), including relevant provider notes, laboratory work, and imaging.     Chief Complaint:    Back pain    Pain history:  Vijaya Manjarrez is a 70 year old female with a PMH significant for DM type 2 with neuropathy, migraines, fibromyalgia, COPD, chronic opioid use, GERD, HTN, CAD, depression, anxiety who first started having problems with back pain in bilateral low back many years ago. Equal on both sides. Radiates into the posterior legs down to the ankles. Pain is constant and leg symptoms occur on daily basis. Aggravated with any activity. By end of day hard to walk from living room to kitchen. Symptoms are worse as day goes on. Back is more painful than the legs. She describes the pain as tiring, sharp and unbearable. Pain bothers her at night as well when trying to sleep. She feels that she has weakness of both legs which has been worsening over time.     Denies red flags including: bowel or bladder symptoms, fever, chills, saddle anesthesia, profound motor loss, history of cancer, history of immune compromise, weight loss.     Current medication  "treatments include:  Hydroxyzine 10 mg TID prn   Tramadol 50 mg 2 tabs TID - [facility] \" takes edge off the pain\"  Cymbalta 60 mg daily - [facility]  Lyrica 150 mg TID - [facility]  Cyclobenzaprine 10 mg at night - [facility] with sleep  Tylenol 650 mg BID - [facility]  Naproxen 220 mg daily to BID prn - [facility]    Pain medications are being prescribed by PCP.    Previous medication treatments included:  Lidocaine patch - [facility]    Other treatments have included:  Behavioral interventions: No   PT: No  Manual Medicine: Yes - in the past - [facility]  Surgeries: None  Acupuncture: No  TENs Unit: Yes - [facility]  Injections: Had injections in the past in [place] years ago. Not sure of details. They were not helpful.     Past Medical History:  Past Medical History:   Diagnosis Date     Anxiety      Cervical spine degeneration 2016    spinal stenosis,      COPD (chronic obstructive pulmonary disease) (H) 2012    mild     Diabetes mellitus, type 2 (H)      Diabetic neuropathy (H)      Facet arthropathy, lumbar      GERD (gastroesophageal reflux disease)      HTN (hypertension)      Hyperlipidemia      Lumbar degenerative disc disease      Migraine headache      Past Surgical History:  Past Surgical History:   Procedure Laterality Date     CHOLECYSTECTOMY, LAPOROSCOPIC      Cholecystectomy, Laparoscopic     CV HEART CATHETERIZATION WITH POSSIBLE INTERVENTION Left 2/26/2019    Procedure: Coronary Angiogram;  Surgeon: Cheo Merida MD;  Location:  HEART CARDIAC CATH LAB     Medications:  Current Outpatient Medications   Medication Sig Dispense Refill     acetaminophen (TYLENOL ARTHRITIS PAIN) 650 MG CR tablet Take 650 mg by mouth 2 times daily       albuterol (PROAIR HFA/PROVENTIL HFA/VENTOLIN HFA) 108 (90 Base) MCG/ACT inhaler Inhale 2 puffs into the lungs every 6 hours 18 g 1     amLODIPine (NORVASC) 5 MG tablet Take 1 tablet (5 mg) by mouth daily 90 tablet 1     atorvastatin (LIPITOR) 80 MG tablet Take 1 tablet (80 mg) by mouth daily Recheck cholesterol in 3 months " 90 tablet 1     blood glucose monitoring (NO BRAND SPECIFIED) test strip Use to test blood sugars 1 times daily or as directed, use brand same as previous 100 strip 3     cetirizine (ZYRTEC) 10 MG tablet TAKE ONE TABLET BY MOUTH ONCE DAILY 90 tablet 3     cyclobenzaprine (FLEXERIL) 10 MG tablet TAKE ONE TABLET BY MOUTH TWICE A  tablet 1     DULoxetine (CYMBALTA) 60 MG capsule Take 1 capsule (60 mg) by mouth daily 90 capsule 3     fluticasone (FLONASE) 50 MCG/ACT nasal spray SPRAY 2 SPRAYS IN EACH NOSTRIL ONCE DAILY 16 g 1     hydrOXYzine (ATARAX) 10 MG tablet Take 1 tablet (10 mg) by mouth 3 times daily as needed for anxiety 30 tablet 0     lidocaine (LIDODERM) 5 % patch Apply up to 3 patches to painful area at once for up to 12 h within a 24 h period.  Remove after 12 hours. 30 patch 0     lisinopril (ZESTRIL) 20 MG tablet Take 1 tablet (20 mg) by mouth 2 times daily 180 tablet 1     metFORMIN (GLUCOPHAGE) 500 MG tablet TAKE ONE TABLET BY MOUTH TWICE A DAY WITH A MEAL 180 tablet 1     metoprolol tartrate (LOPRESSOR) 25 MG tablet Take 1 tablet (25 mg) by mouth 2 times daily 180 tablet 1     miconazole (MICATIN) 2 % cream Apply topically 2 times daily 57 g 5     montelukast (SINGULAIR) 10 MG tablet TAKE ONE TABLET BY MOUTH AT BEDTIME 90 tablet 1     naproxen sodium (ALEVE) 220 MG tablet Take 220 mg by mouth as needed for moderate pain       nitroGLYcerin (NITROSTAT) 0.4 MG sublingual tablet For chest pain place 1 tablet under the tongue every 5 minutes for 3 doses. If symptoms persist 5 minutes after 1st dose call 911. 30 tablet 3     nystatin (MYCOSTATIN) 704448 UNIT/GM external powder APPLY TO AFFECTED AREA(S) TOPICALLY THREE TIMES A DAY AS NEEDED 60 g 0     omeprazole (PRILOSEC) 20 MG DR capsule Take 1 capsule (20 mg) by mouth 2 times daily 180 capsule 1     order for DME Equipment being ordered: glucometer 1 each 0     pregabalin (LYRICA) 150 MG capsule Take 1 capsule (150 mg) by mouth 3 times daily 270  capsule 1     tiotropium (SPIRIVA) 18 MCG inhaled capsule Inhale 1 capsule (18 mcg) into the lungs daily 90 capsule 1     traMADol (ULTRAM) 50 MG tablet TAKE TWO TABLETS BY MOUTH THREE TIMES A  tablet 0     traZODone (DESYREL) 100 MG tablet TAKE TWO TABLETS BY MOUTH EVERY NIGHT AT BEDTIME 180 tablet 1     Allergies:     Allergies   Allergen Reactions     Penicillins Hives     Social History:  Home situation: Lives in [place], [place]. Lives in a senior center.   Occupation/Schooling: Retired.   Tobacco use: Past. Not currently.   Alcohol use: none  Drug use: None    Family history:  Family History   Problem Relation Age of Onset     Cancer Mother      Cancer Father         Lung cancer     Cancer Maternal Grandmother         Breast cancer     Diagnostic Tests:  Lumbar MRI completed in 2016 showed:        Labs:  Hemoglobin A1c 7.3  Creatinine 0.72    Review of Systems:    POSTIVE IN BOLD  GENERAL: fever/chills, fatigue, general unwell feeling, weight gain/loss.  HEAD/EYES:  headache, dizziness, or vision changes.    EARS/NOSE/THROAT:  Nosebleeds, hearing loss, sinus infection, earache, tinnitus.  IMMUNE:  Allergies, cancer, immune deficiency, or infections.  SKIN:  Urticaria, rash, hives  HEME/Lymphatic:   anemia, easy bruising, easy bleeding.  RESPIRATORY:  cough, wheezing, or shortness of breath  CARDIOVASCULAR/Circulation:  Extremity edema, syncope, hypertension, tachycardia, or angina.  GASTROINTESTINAL:  abdominal pain, nausea/emesis, diarrhea, constipation,  hematochezia, or melena.  ENDOCRINE:  Diabetes, steroid use,  thyroid disease or osteoporosis.  MUSCULOSKELETAL: neck pain, back pain, arthralgia, arthritis, or gout.  GENITOURINARY:  frequency, urgency, dysuria, difficulty voiding, hematuria or incontinence.  NEUROLOGIC:  weakness, numbness, paresthesias, seizure, tremor, stroke or memory loss.  PSYCHIATRIC:  depression, anxiety, stress, suicidal thoughts or mood swings.     Physical Exam:  Vitals:     07/14/20 1533   BP: (!) 140/86   Pulse: 99   SpO2: 97%   Weight: 95.3 kg (210 lb)     Exam:  Constitutional: alert, no distress and cooperative  Head: normocephalic. Atraumatic.   Eyes: no redness or jaundice noted   ENT: oropharnx normal.  MMM.  Neck supple.    Respiratory: breathing unlabored on room air  Gastrointestinal: soft, non-tender  : deferred  Skin: no suspicious lesions or rashes with exception of dry flaky skin on her hand  Psychiatric: mentation appears normal and affect normal/bright    Musculoskeletal exam:  Gait/Station/Posture: Gait is slow and she stands with forward flexed posture.   Thoracic spine:  Normal   Lumbar spine: Severe restriction in lumbar flexion due to fear of aggravating pain. Does not want to attempt lumbar extension or rotation due to those motions aggravating pain.   Myofascial tenderness:  Tender to palpation of lumbar paraspinals  Straight leg exam: positive b/l  Greater trochanteric tenderness: negative     Neurologic exam:  Motor:  5/5 LE strength    Reflexes:     Patella:  R:  2/4 L: 2/4   Achilles:  R:  1/4 L: 1/4   Sensory:  (lower extremities):   Light touch: normal    Allodynia: absent    Hyperalgesia: absent     Assessment:  Vijaya Manjarrez is a 70 year old female with a past medical history significant for DM type 2 with neuropathy, migraines, fibromyalgia, COPD, chronic opioid use, GERD, HTN, CAD, depression, anxiety   who presents with complaints of chronic back pain.     1. Chronic back pain: Pain has been ongoing for years. Located in bilateral low back. Endorses radiation of pain into posterior aspect of bilateral lower extremities down to the ankles. Back pain is greater than leg pain. She also endorses general weakness in both legs which has been worsening over time and tends to be worse at the end of the day. Exam shows slow gait, pain with lumbar ROM in any plane and tenderness of lumbar paraspinals. MRI of lumbar spine from 2016 shows multi-level  degenerative disease with facet arthropathy and moderate to severe foraminal stenosis at L5-S1 bilaterally. No recent imaging available. Etiology of pain likely combination of lumbar spondylosis, facet arthropathy, physical deconditioning and potentially radiculitis/radiculopathy.     Plan:  Diagnosis reviewed, treatment options addressed, and risks/benefits discussed. The patient was involved in shared decision making regarding the plan as laid out below.    1. Physical Therapy:   She has previously done PT for back pain and states she has all the exercises at home still. She will start with those exercises. If this is not helping then will put in a new order to restart formal PT through [facility].   2. Diagnostic Studies:  Order placed for an updated lumbar MRI to evaluate for progression of stenosis and potential nerve root impingement.   3. Medication Management:  No medication changes made.   4. Further procedures recommended: Will be determined based on results of lumbar MRI.   5. Complementary treatments: none at this time.   6. Follow up: I will contact her with results of the MRI to discuss injection options.     Jazmine Saeed MD  [facility]      Total time spent face to face was 40 minutes and more than 50% of face to face time was spent in counseling and/or coordination of care regarding the diagnosis and recommendations above.

## 2020-07-13 NOTE — PROGRESS NOTES
[facility] Care Coordination Contact    Call from member who shares that she was prescribed a new medication and was charged $10. She has never had a co-pay before and wonders if her insurance changed. Writer shared it's likely that the medication is not in the formulary- she is unsure which medication as she has not picked it up yet. Encouraged her to check with the pharmacy if there is an alternative in her formulary, and if not the primary clinic can complete a prior authorization. She will be at the clinic tomorrow and discuss with the pharmacy.     Member shares she has been weak and her legs are giving out. Visit with PCP last week and will see a specialist tomorrow. Member states she can only walk 10-15 feet at a time. Writer expressed concern with how she is managing at home alone. She stated her homemaker is coming today and that will be helpful. Writer more concerned about day to day tasks. Shared that we could look into direct placement at a TCU, or implement home care if needed. Member was not interested in TCU, but will consider home care. Writer also offered to set up medical transportation with [facility] if she needs more help getting to appointments. Writer requested a follow up call after appointment tomorrow and we can discuss further.     Vanesa Gtz RN  [facility]  [phone]  Fax: [phone]

## 2020-07-13 NOTE — TELEPHONE ENCOUNTER
Patient advised of Atarax PA denial and recommendation to contact [facility] for therapy, phone number given.  ARELY Farooq R.N.

## 2020-07-14 ENCOUNTER — OFFICE VISIT (OUTPATIENT)
Dept: PALLIATIVE MEDICINE | Facility: CLINIC | Age: 70
End: 2020-07-14
Payer: COMMERCIAL

## 2020-07-14 VITALS
WEIGHT: 210 LBS | HEART RATE: 99 BPM | SYSTOLIC BLOOD PRESSURE: 140 MMHG | BODY MASS INDEX: 31.01 KG/M2 | OXYGEN SATURATION: 97 % | DIASTOLIC BLOOD PRESSURE: 86 MMHG

## 2020-07-14 DIAGNOSIS — M54.16 LUMBAR RADICULITIS: ICD-10-CM

## 2020-07-14 DIAGNOSIS — M47.817 LUMBOSACRAL SPONDYLOSIS WITHOUT MYELOPATHY: Primary | ICD-10-CM

## 2020-07-14 PROCEDURE — 99203 OFFICE O/P NEW LOW 30 MIN: CPT | Performed by: PHYSICAL MEDICINE & REHABILITATION

## 2020-07-14 ASSESSMENT — PAIN SCALES - GENERAL: PAINLEVEL: EXTREME PAIN (8)

## 2020-07-14 NOTE — PATIENT INSTRUCTIONS
Thank you for coming to [facility] for your care. It is my goal to partner with you to help you reach your optimal state of health.     You were seen today for your back pain. As discussed during your visit these are the recommendations:    1. Medications: No medication changes today.   2. Therapies: Restart your PT exercises. If these are not helping I will put in a new order for PT to get a new evaluation and exercises.   3. Procedures: Will depend on results of MRI.   4. Imaging/Diagnostic Studies: Order placed for an updated MRI of your lumbar spine. I will call you with the results.   5. Follow up: I will call with results of lumbar MRI.     Jazmine Saeed MD  [facility]   ----------------------------------------------------------------  Clinic Number:  [phone]     Call with any questions about your care and for scheduling assistance.     Calls are returned Monday through Friday between 8 AM and 4:30 PM. We usually get back to you within 2 business days depending on the issue/request.    If we are prescribing your medications:    For opioid medication refills, call the clinic or send a jslyhl message 7 days in advance.  Please include:    Name of requested medication    Name of the pharmacy.    For non-opioid medications, call your pharmacy directly to request a refill. Please allow 3-4 days to be processed.     Per [place] State Law:    All controlled substance prescriptions must be filled within 30 days of being written.      For those controlled substances allowing refills, pickup must occur within 30 days of last fill.      We believe regular attendance is key to your success in our program!      Any time you are unable to keep your appointment we ask that you call us at least 24 hours in advance to cancel.This will allow us to offer the appointment time to another patient.     Multiple missed appointments may lead to dismissal from the clinic.

## 2020-07-22 ENCOUNTER — PATIENT OUTREACH (OUTPATIENT)
Dept: GERIATRIC MEDICINE | Facility: CLINIC | Age: 70
End: 2020-07-22

## 2020-07-22 NOTE — PROGRESS NOTES
[facility] Care Coordination Contact    Call from member who received a bill for her homemaking and is wondering why. Reminded member that she has a waiver obligation and is required to pay that amount monthly before the waiver kicks in and pays the remainder. Member remembers this after discussing.     Member shares she is still in a lot of pain. She saw the back specialist last week and is waiting to schedule an MRI. She called the imaging center yesterday and left a message. She will call back today. Writer again expressed concern with safety in her apartment. Offered to add additional services, home care, or look into TCU placement. Member declined all options, but will call if she changes her mind.     Vanesa Gtz RN  [facility]  [phone]  Fax: [phone]

## 2020-07-26 DIAGNOSIS — M79.7 FIBROMYALGIA: ICD-10-CM

## 2020-07-26 DIAGNOSIS — M47.816 FACET ARTHROPATHY, LUMBAR: ICD-10-CM

## 2020-07-26 DIAGNOSIS — F41.1 GAD (GENERALIZED ANXIETY DISORDER): ICD-10-CM

## 2020-07-26 DIAGNOSIS — M47.812 FACET ARTHROPATHY, CERVICAL: ICD-10-CM

## 2020-07-28 ENCOUNTER — TELEPHONE (OUTPATIENT)
Dept: INTERNAL MEDICINE | Facility: CLINIC | Age: 70
End: 2020-07-28

## 2020-07-28 DIAGNOSIS — F41.1 GAD (GENERALIZED ANXIETY DISORDER): ICD-10-CM

## 2020-07-28 RX ORDER — HYDROXYZINE HYDROCHLORIDE 10 MG/1
TABLET, FILM COATED ORAL
Qty: 30 TABLET | Refills: 0 | Status: SHIPPED | OUTPATIENT
Start: 2020-07-28 | End: 2021-03-08

## 2020-07-28 RX ORDER — TRAMADOL HYDROCHLORIDE 50 MG/1
TABLET ORAL
Qty: 180 TABLET | Refills: 0 | Status: SHIPPED | OUTPATIENT
Start: 2020-07-28 | End: 2020-09-14

## 2020-07-28 RX ORDER — HYDROXYZINE HYDROCHLORIDE 10 MG/1
TABLET, FILM COATED ORAL
Qty: 30 TABLET | Refills: 0 | Status: CANCELLED | OUTPATIENT
Start: 2020-07-28

## 2020-07-28 NOTE — TELEPHONE ENCOUNTER
Routing refill request to provider for review/approval because:  Drug not on [place] [facility] [place] for associated dx of Generalized Anxiety Disorder.    Controlled Substance Refill Request for traMADol (ULTRAM) 50 MG tablet   Problem List Complete:    No - Pain Agreement  on 20    PROVIDER TO CONSIDER COMPLETION OF PROBLEM LIST AND OVERVIEW/CONTROLLED SUBSTANCE AGREEMENT    Last Written Prescription Date:  20  Last Fill Quantity: 180,   # refills: 0    THE MOST RECENT OFFICE VISIT MUST BE WITHIN THE PAST 3 MONTHS. AT LEAST ONE FACE TO FACE VISIT MUST OCCUR EVERY 6 MONTHS. ADDITIONAL VISITS CAN BE VIRTUAL.  (THIS STATEMENT SHOULD BE DELETED.)    Last Office Visit with [facility] [place] care provider: 19    Future Office visit:     Controlled substance agreement:   Encounter-Level CSA - 2017:    Controlled Substance Agreement - Scan on 2017 12:28 PM: CONTROLLED SUBSTANCE AGREEMENT     Patient-Level CSA:    Controlled Substance Agreement - Opioid - Scan on 2019  9:37 AM         Last Urine Drug Screen: No results found for: CDAUT, No results found for: COMDAT,   Cannabinoids (65-ene-4-carboxy-9-THC)   Date Value Ref Range Status   2018 Not Detected NDET^Not Detected ng/mL Final     Comment:     Cutoff for a negative cannabinoid is 50 ng/mL or less.     Phencyclidine (Phencyclidine)   Date Value Ref Range Status   2018 Not Detected NDET^Not Detected ng/mL Final     Comment:     Cutoff for a negative PCP is 25 ng/mL or less.     Cocaine (Benzoylecgonine)   Date Value Ref Range Status   2018 Not Detected NDET^Not Detected ng/mL Final     Comment:     Cutoff for a negative cocaine is 150 ng/ml or less.     Methamphetamine (d-Methamphetamine)   Date Value Ref Range Status   2018 Not Detected NDET^Not Detected ng/mL Final     Comment:     Cutoff for a negative methamphetamine is 500 ng/ml or less.     Opiates (Morphine)   Date Value Ref Range Status   2018 Not  Detected NDET^Not Detected ng/mL Final     Comment:     Cutoff for a negative opiate is 100 ng/ml or less.     Amphetamine (d-Amphetamine)   Date Value Ref Range Status   03/03/2018 Not Detected NDET^Not Detected ng/mL Final     Comment:     Cutoff for a negative amphetamine is 500 ng/mL or less.     Benzodiazepines (Nordiazepam)   Date Value Ref Range Status   03/03/2018 Not Detected NDET^Not Detected ng/mL Final     Comment:     Cutoff for a negative benzodiazepine is 150 ng/ml or less.     Tricyclic Antidepressants (Desipramine)   Date Value Ref Range Status   03/03/2018 Detected, Abnormal Result (A) NDET^Not Detected ng/mL Final     Comment:     Cutoff for a positive tricyclic antidepressant is greater than 300 ng/ml.  This is an unconfirmed screening result to be used for medical purposes only.   Order [id number] for confirmation or individual confirmation tests to [facility].       Methadone (Methadone)   Date Value Ref Range Status   03/03/2018 Not Detected NDET^Not Detected ng/mL Final     Comment:     Cutoff for a negative methadone is 200 ng/ml or less.     Barbiturates (Butalbital)   Date Value Ref Range Status   03/03/2018 Not Detected NDET^Not Detected ng/mL Final     Comment:     Cutoff for a negative barbituate is 200 ng/ml or less.     Oxycodone (Oxycodone)   Date Value Ref Range Status   03/03/2018 Not Detected NDET^Not Detected ng/mL Final     Comment:     Cutoff for a negative Oxycodone is 100 ng/mL or less.     Propoxyphene (Norpropoxyphene)   Date Value Ref Range Status   03/03/2018 Not Detected NDET^Not Detected ng/mL Final     Comment:     Cutoff for a negative propoxyphene is 300 ng/ml or less     Buprenorphine (Buprenorphine)   Date Value Ref Range Status   03/03/2018 Not Detected NDET^Not Detected ng/mL Final     Comment:     Cutoff for a negative buprenorphine is 10 ng/ml or less     [email or web address]     checked in past 3 months?  Yes 5/12/20    RACHEL Gotti,  RN

## 2020-07-28 NOTE — TELEPHONE ENCOUNTER
Prior authorization is needed on this medication.  are ID# [id number]  Phone# [phone]  Group# [id number]    Thank you,  [facility]st [facility]  [phone]

## 2020-07-29 NOTE — TELEPHONE ENCOUNTER
Per the letter, it is considered a High risk medication for patients 65 years and over, as a First generation antihistamine.     I am not sure where the benzo alternatives came from as they are not listed in the letter?     It is #3.00 retail price at [facility] for #30.  Or can get coupon on [employer] for $7.00 at another pharmacy, cash price.

## 2020-07-29 NOTE — TELEPHONE ENCOUNTER
I am confused why insurance would recommend a benzo over hydroxyzine in an elderly patient.  Please send back to PA team to see why this is    thanks

## 2020-07-30 NOTE — TELEPHONE ENCOUNTER
Left message on home/cell voicemail asking patient to return call to clinic.  Let patient know that the PA for hydroxyzine was denied but that she can get it for $3-7.  See MD message below.  ARELY Farooq R.N.

## 2020-07-31 ENCOUNTER — TELEPHONE (OUTPATIENT)
Dept: INTERNAL MEDICINE | Facility: CLINIC | Age: 70
End: 2020-07-31

## 2020-07-31 NOTE — TELEPHONE ENCOUNTER
.PA Initiation    Medication: cyclobenzaprine (FLEXERIL) 10 MG tablet - INITIATED  Insurance Company: [employer] - Phone [phone] Fax [phone]  Pharmacy Filling the Rx: [facility] [place], [place] - [address] [address] [facility]  Filling Pharmacy Phone: [phone]  Filling Pharmacy Fax:    Start Date: 7/31/2020

## 2020-08-03 NOTE — TELEPHONE ENCOUNTER
Prior Authorization Approval    Authorization Effective Date: 7/1/2020  Authorization Expiration Date: 7/31/2021  Medication: cyclobenzaprine (FLEXERIL) 10 MG tablet - APPROVED  Reference #: [id number]   Insurance Company: [employer] - Phone [phone] Fax [phone]  Which Pharmacy is filling the prescription (Not needed for infusion/clinic administered): [facility] [address] [address]  Pharmacy Notified: Yes  Patient Notified: Yes      [place] [facility] Team

## 2020-08-04 ENCOUNTER — APPOINTMENT (OUTPATIENT)
Dept: CT IMAGING | Facility: CLINIC | Age: 70
End: 2020-08-04
Attending: EMERGENCY MEDICINE
Payer: COMMERCIAL

## 2020-08-04 ENCOUNTER — TELEPHONE (OUTPATIENT)
Dept: INTERNAL MEDICINE | Facility: CLINIC | Age: 70
End: 2020-08-04

## 2020-08-04 ENCOUNTER — HOSPITAL ENCOUNTER (EMERGENCY)
Facility: CLINIC | Age: 70
Discharge: HOME | End: 2020-08-04
Attending: EMERGENCY MEDICINE | Admitting: EMERGENCY MEDICINE
Payer: COMMERCIAL

## 2020-08-04 VITALS
TEMPERATURE: 98 F | SYSTOLIC BLOOD PRESSURE: 132 MMHG | HEART RATE: 90 BPM | OXYGEN SATURATION: 92 % | DIASTOLIC BLOOD PRESSURE: 91 MMHG | RESPIRATION RATE: 18 BRPM

## 2020-08-04 DIAGNOSIS — Z20.822 PERSON UNDER INVESTIGATION FOR COVID-19: ICD-10-CM

## 2020-08-04 DIAGNOSIS — R07.9 CHEST PAIN, UNSPECIFIED TYPE: ICD-10-CM

## 2020-08-04 DIAGNOSIS — D72.829 LEUKOCYTOSIS, UNSPECIFIED TYPE: ICD-10-CM

## 2020-08-04 DIAGNOSIS — B34.9 NONSPECIFIC SYNDROME SUGGESTIVE OF VIRAL ILLNESS: ICD-10-CM

## 2020-08-04 LAB
ALBUMIN UR-MCNC: 30 MG/DL
ANION GAP SERPL CALCULATED.3IONS-SCNC: 9 MMOL/L (ref 3–14)
APPEARANCE UR: CLEAR
BASOPHILS # BLD AUTO: 0.1 10E9/L (ref 0–0.2)
BASOPHILS NFR BLD AUTO: 0.4 %
BILIRUB UR QL STRIP: NEGATIVE
BUN SERPL-MCNC: 9 MG/DL (ref 7–30)
CALCIUM SERPL-MCNC: 8.7 MG/DL (ref 8.5–10.1)
CHLORIDE SERPL-SCNC: 107 MMOL/L (ref 94–109)
CO2 SERPL-SCNC: 23 MMOL/L (ref 20–32)
COLOR UR AUTO: ABNORMAL
CREAT SERPL-MCNC: 0.63 MG/DL (ref 0.52–1.04)
D DIMER PPP FEU-MCNC: 0.8 UG/ML FEU (ref 0–0.5)
DIFFERENTIAL METHOD BLD: ABNORMAL
EOSINOPHIL # BLD AUTO: 0 10E9/L (ref 0–0.7)
EOSINOPHIL NFR BLD AUTO: 0.2 %
ERYTHROCYTE [DISTWIDTH] IN BLOOD BY AUTOMATED COUNT: 14.8 % (ref 10–15)
GFR SERPL CREATININE-BSD FRML MDRD: >90 ML/MIN/{1.73_M2}
GLUCOSE SERPL-MCNC: 160 MG/DL (ref 70–99)
GLUCOSE UR STRIP-MCNC: NEGATIVE MG/DL
HCT VFR BLD AUTO: 41.3 % (ref 35–47)
HGB BLD-MCNC: 12.7 G/DL (ref 11.7–15.7)
HGB UR QL STRIP: NEGATIVE
IMM GRANULOCYTES # BLD: 0.1 10E9/L (ref 0–0.4)
IMM GRANULOCYTES NFR BLD: 0.4 %
KETONES UR STRIP-MCNC: NEGATIVE MG/DL
LEUKOCYTE ESTERASE UR QL STRIP: NEGATIVE
LYMPHOCYTES # BLD AUTO: 2.5 10E9/L (ref 0.8–5.3)
LYMPHOCYTES NFR BLD AUTO: 18 %
MCH RBC QN AUTO: 25.3 PG (ref 26.5–33)
MCHC RBC AUTO-ENTMCNC: 30.8 G/DL (ref 31.5–36.5)
MCV RBC AUTO: 82 FL (ref 78–100)
MONOCYTES # BLD AUTO: 0.9 10E9/L (ref 0–1.3)
MONOCYTES NFR BLD AUTO: 6.3 %
MUCOUS THREADS #/AREA URNS LPF: PRESENT /LPF
NEUTROPHILS # BLD AUTO: 10.3 10E9/L (ref 1.6–8.3)
NEUTROPHILS NFR BLD AUTO: 74.7 %
NITRATE UR QL: NEGATIVE
NRBC # BLD AUTO: 0 10*3/UL
NRBC BLD AUTO-RTO: 0 /100
PH UR STRIP: 6.5 PH (ref 5–7)
PLATELET # BLD AUTO: 341 10E9/L (ref 150–450)
POTASSIUM SERPL-SCNC: 3.3 MMOL/L (ref 3.4–5.3)
RBC # BLD AUTO: 5.01 10E12/L (ref 3.8–5.2)
RBC #/AREA URNS AUTO: 1 /HPF (ref 0–2)
SODIUM SERPL-SCNC: 139 MMOL/L (ref 133–144)
SOURCE: ABNORMAL
SP GR UR STRIP: 1.03 (ref 1–1.03)
SQUAMOUS #/AREA URNS AUTO: 2 /HPF (ref 0–1)
TROPONIN I SERPL-MCNC: <0.015 UG/L (ref 0–0.04)
UROBILINOGEN UR STRIP-MCNC: NORMAL MG/DL (ref 0–2)
WBC # BLD AUTO: 13.7 10E9/L (ref 4–11)
WBC #/AREA URNS AUTO: 1 /HPF (ref 0–5)

## 2020-08-04 PROCEDURE — C9803 HOPD COVID-19 SPEC COLLECT: HCPCS

## 2020-08-04 PROCEDURE — 96375 TX/PRO/DX INJ NEW DRUG ADDON: CPT

## 2020-08-04 PROCEDURE — 96361 HYDRATE IV INFUSION ADD-ON: CPT

## 2020-08-04 PROCEDURE — 25000128 H RX IP 250 OP 636: Performed by: EMERGENCY MEDICINE

## 2020-08-04 PROCEDURE — 80048 BASIC METABOLIC PNL TOTAL CA: CPT | Performed by: EMERGENCY MEDICINE

## 2020-08-04 PROCEDURE — 36415 COLL VENOUS BLD VENIPUNCTURE: CPT

## 2020-08-04 PROCEDURE — 85025 COMPLETE CBC W/AUTO DIFF WBC: CPT | Performed by: EMERGENCY MEDICINE

## 2020-08-04 PROCEDURE — 85379 FIBRIN DEGRADATION QUANT: CPT | Performed by: EMERGENCY MEDICINE

## 2020-08-04 PROCEDURE — 99285 EMERGENCY DEPT VISIT HI MDM: CPT | Mod: 25

## 2020-08-04 PROCEDURE — 25800030 ZZH RX IP 258 OP 636: Performed by: EMERGENCY MEDICINE

## 2020-08-04 PROCEDURE — 25000132 ZZH RX MED GY IP 250 OP 250 PS 637: Performed by: EMERGENCY MEDICINE

## 2020-08-04 PROCEDURE — U0003 INFECTIOUS AGENT DETECTION BY NUCLEIC ACID (DNA OR RNA); SEVERE ACUTE RESPIRATORY SYNDROME CORONAVIRUS 2 (SARS-COV-2) (CORONAVIRUS DISEASE [COVID-19]), AMPLIFIED PROBE TECHNIQUE, MAKING USE OF HIGH THROUGHPUT TECHNOLOGIES AS DESCRIBED BY CMS-2020-01-R: HCPCS | Performed by: EMERGENCY MEDICINE

## 2020-08-04 PROCEDURE — 25000125 ZZHC RX 250: Performed by: EMERGENCY MEDICINE

## 2020-08-04 PROCEDURE — 71275 CT ANGIOGRAPHY CHEST: CPT | Mod: 59

## 2020-08-04 PROCEDURE — 93005 ELECTROCARDIOGRAM TRACING: CPT

## 2020-08-04 PROCEDURE — 96374 THER/PROPH/DIAG INJ IV PUSH: CPT

## 2020-08-04 PROCEDURE — 96376 TX/PRO/DX INJ SAME DRUG ADON: CPT

## 2020-08-04 PROCEDURE — 84484 ASSAY OF TROPONIN QUANT: CPT | Performed by: EMERGENCY MEDICINE

## 2020-08-04 PROCEDURE — 81001 URINALYSIS AUTO W/SCOPE: CPT | Performed by: EMERGENCY MEDICINE

## 2020-08-04 RX ORDER — IOPAMIDOL 755 MG/ML
500 INJECTION, SOLUTION INTRAVASCULAR ONCE
Status: COMPLETED | OUTPATIENT
Start: 2020-08-04 | End: 2020-08-04

## 2020-08-04 RX ORDER — ONDANSETRON 2 MG/ML
4 INJECTION INTRAMUSCULAR; INTRAVENOUS ONCE
Status: COMPLETED | OUTPATIENT
Start: 2020-08-04 | End: 2020-08-04

## 2020-08-04 RX ORDER — ACETAMINOPHEN 325 MG/1
650 TABLET ORAL ONCE
Status: COMPLETED | OUTPATIENT
Start: 2020-08-04 | End: 2020-08-04

## 2020-08-04 RX ORDER — POTASSIUM CHLORIDE 1500 MG/1
20 TABLET, EXTENDED RELEASE ORAL ONCE
Status: COMPLETED | OUTPATIENT
Start: 2020-08-04 | End: 2020-08-04

## 2020-08-04 RX ORDER — ONDANSETRON 4 MG/1
4 TABLET, ORALLY DISINTEGRATING ORAL EVERY 8 HOURS PRN
Qty: 10 TABLET | Refills: 0 | Status: SHIPPED | OUTPATIENT
Start: 2020-08-04 | End: 2020-08-07

## 2020-08-04 RX ORDER — KETOROLAC TROMETHAMINE 15 MG/ML
15 INJECTION, SOLUTION INTRAMUSCULAR; INTRAVENOUS ONCE
Status: COMPLETED | OUTPATIENT
Start: 2020-08-04 | End: 2020-08-04

## 2020-08-04 RX ADMIN — ONDANSETRON 4 MG: 2 INJECTION INTRAMUSCULAR; INTRAVENOUS at 18:09

## 2020-08-04 RX ADMIN — IOPAMIDOL 69 ML: 755 INJECTION, SOLUTION INTRAVENOUS at 18:30

## 2020-08-04 RX ADMIN — POTASSIUM CHLORIDE 20 MEQ: 1500 TABLET, EXTENDED RELEASE ORAL at 19:44

## 2020-08-04 RX ADMIN — ACETAMINOPHEN 650 MG: 325 TABLET, FILM COATED ORAL at 17:07

## 2020-08-04 RX ADMIN — SODIUM CHLORIDE 1000 ML: 9 INJECTION, SOLUTION INTRAVENOUS at 17:30

## 2020-08-04 RX ADMIN — KETOROLAC TROMETHAMINE 15 MG: 15 INJECTION, SOLUTION INTRAMUSCULAR; INTRAVENOUS at 18:13

## 2020-08-04 RX ADMIN — LIDOCAINE HYDROCHLORIDE 30 ML: 20 SOLUTION ORAL; TOPICAL at 20:27

## 2020-08-04 RX ADMIN — ONDANSETRON 4 MG: 2 INJECTION INTRAMUSCULAR; INTRAVENOUS at 20:27

## 2020-08-04 RX ADMIN — SODIUM CHLORIDE 88 ML: 9 INJECTION, SOLUTION INTRAVENOUS at 18:30

## 2020-08-04 ASSESSMENT — ENCOUNTER SYMPTOMS
COUGH: 0
SHORTNESS OF BREATH: 1
RHINORRHEA: 1
SORE THROAT: 1
VOMITING: 0
DIARRHEA: 1
DYSURIA: 1
NAUSEA: 1
BLOOD IN STOOL: 0
FEVER: 0

## 2020-08-04 NOTE — TELEPHONE ENCOUNTER
"Patient started out asking how long she can take Prilosec.  She is taking 20 mg twice daily x6 days without any relief of symptoms.  She is so nauseous she \"cannot sleep or do anything\".   She then goes on to describe chest pain in the center of her chest, \"a constant pressure that feels like an elephant sitting on my chest.  She rates pain 9/10.  Also describes shortness of breath constant.  When asked if she is havng pain in her arms, back, neck or jaw she states she has fibromyalgia and has a hard time differentiating fibromyalgia pain from anything new so is unable to say if pain radiates anywhere.  With health history of hypertension, CAD and diabetes advised patient to be seen in ER now.  Patient agrees and states she is getting scared so will gladly go to ER.  ARLEY Farooq R.N.    "

## 2020-08-04 NOTE — ED TRIAGE NOTES
Pt presents with a few weeks of cough and SOB, which increased this last week. Pt also began having diarrhea this week. Pt has been nauseous but no vomiting. Afebrile. Here, pt also c/o 8/10 chest pressure. Hx of asthma and use of inhalers at home. ABCs intact. A&Ox4.

## 2020-08-04 NOTE — ED AVS SNAPSHOT
[facility] Emergency Department  [address]  [facility] [address]  Phone:  [phone]  Fax:  [phone]                                    Vijaya Manjarrez   MRN: [id number]    Department:  [facility] Emergency Department   Date of Visit:  8/4/2020           After Visit Summary Signature Page    I have received my discharge instructions, and my questions have been answered. I have discussed any challenges I see with this plan with the nurse or doctor.    ..........................................................................................................................................  Patient/Patient Representative Signature      ..........................................................................................................................................  Patient Representative Print Name and Relationship to Patient    ..................................................               ................................................  Date                                   Time    ..........................................................................................................................................  Reviewed by Signature/Title    ...................................................              ..............................................  Date                                               Time          22EPIC Rev 08/18

## 2020-08-04 NOTE — ED PROVIDER NOTES
"  History   Chief Complaint  Shortness of breath and chest pain    The history is provided by the patient.      Vijaya Manjarrez is a 70 year old female with a history of asthma/COPD, hypertension, and hyperlipidemia who presents for evaluation of shortness of breath and chest pain. Pat describes 6 days of chest pain which is an 8/10 constant substernal non-radiating pressure without exacerbating or alleviating factors she can identify. She has associated shortness of breath as well as nausea without vomiting, \"like my stomach is churning\". She has had non-bloody diarrhea which seems to be overall improving with only 1 episode today. She also endorses feeling hot without fever with rhinorrhea and sore throat. She has very poor appetite due to her nausea and dysgeusia. She denies cough or lower extremity edema. She has baseline headaches and myalgias which she attributes to her fibromyalgia. She has \"a little bit\" of dysuria. She has been taking Aleve for her symptoms without much improvement. For her shortness of breath, she tried her Spiriva inhaler, without improvement. She did not try albuterol. She denies known sick contacts and remarks her building has had no COVID-19 cases, but believes the building attached to hers has. She denies recent travel or surgeries.     Allergies  Penicillins    Medications  Albuterol inhaler  Amlodipine  Atorvastatin  Cymbalta  Tramadol   Hydroxyzine   Lisinopril   Metoprolol tartrate  Aleve  Omeprazole  Lyrica  Spiriva inhaler  Trazodone     Past Medical History  Anxiety  Cervical spine degeneration  COPD  Type 2 diabetes  Diabetic neuropathy  Facet arthropathy lumbar  GERD  Hypertension  Hyperlipidemia   Asthma  Lumbar degenerative disc disease  Migraines     Past Surgical History  Cholecystectomy , laparoscopic  Heart catheterization with possible intervention    Family History  Mother - cancer  Father - lung cancer     Social History  The patient was unaccompanied to the " ED.  Smoking Status: former smoker for 40 years  Smokeless Tobacco: never  Alcohol Use: no  Drug Use: no    Review of Systems   Constitutional: Positive for appetite change. Negative for fever.   HENT: Positive for rhinorrhea and sore throat.    Respiratory: Positive for shortness of breath. Negative for cough.    Cardiovascular: Positive for chest pain. Negative for leg swelling.   Gastrointestinal: Positive for diarrhea and nausea. Negative for blood in stool and vomiting.   Genitourinary: Positive for dysuria.   Musculoskeletal: Negative for myalgias (baseline).   Neurological: Negative for headaches (baseline).   All other systems reviewed and are negative.    Physical Exam     Patient Vitals for the past 24 hrs:   BP Temp Temp src Pulse Heart Rate Resp SpO2   08/04/20 2000 -- -- -- -- 87 -- 92 %   08/04/20 1945 (!) 132/91 -- -- 90 -- -- 95 %   08/04/20 1815 (!) 169/148 -- -- 85 74 -- --   08/04/20 1800 (!) 165/84 -- -- 73 -- -- 93 %   08/04/20 1745 (!) 154/106 -- -- 75 -- -- 94 %   08/04/20 1730 -- -- -- -- -- -- 97 %   08/04/20 1715 132/84 -- -- 83 -- -- --   08/04/20 1630 (!) 147/84 -- -- 86 -- -- 98 %   08/04/20 1615 (!) 146/105 -- -- 88 -- -- 98 %   08/04/20 1611 (!) 173/101 98  F (36.7  C) Oral 95 -- 18 99 %       Physical Exam  General: Well-developed and well-nourished. Well appearing elderly  woman. Cooperative.  Head:  Atraumatic.  Eyes:  Conjunctivae, lids, and sclerae are normal.  Neck:  Supple. Normal range of motion.  CV:  Regular rate and rhythm. Normal heart sounds with no murmurs, rubs, or gallops detected.  Resp:  No respiratory distress. Clear to auscultation bilaterally without decreased breath sounds, wheezing, rales, or rhonchi.  GI:  Soft. Non-distended. Non-tender.    MS:  Normal ROM. No bilateral lower extremity edema.  Skin:  Warm. Non-diaphoretic. No pallor.  Neuro:  Awake. A&Ox3. Normal strength.  Psych: Normal mood and affect. Normal speech.  Vitals reviewed.    Emergency  Department Course   EKG  Indication: shortness of breath  Time: 1613  Rate 92 bpm. NJ interval 162. QRS duration 90. QT/QTc 362/447.   Normal sinus rhythm  Rightward axis  Borderline ECG   No acute ST changes.  No change compared to prior, dated 02/26/2019.  Read time: 1614  Read by Sarah Gage MD    Imaging:  Radiology findings were communicated with the patient who voiced understanding of the findings.    CT chest pulmonary embolism w contrast:  IMPRESSION:  1.  No evidence for pulmonary embolism or other acute abnormality.  Readings per Radiology    Laboratory:  Laboratory findings were communicated with the patient who voiced understanding of the findings.    Troponin (Collected 1652): <0.015  BMP: potassium 3.3 (L), glucose 160 (H) o/w WNL (Creatinine 0.63)  D Dimer (Collected 1652): 0.8 (H)  CBC: WBC 13.7 (H) o/w WNL (HGB 12.7, )    UA with Microscopic: protein albumin 30 (A), squamous epithelial 2 (H), mucous present (A) o/w WNL    Symptomatic COVID-19 virus by PCR: pending    Interventions:  1707 Tylenol 650 mg PO  1730 NS 1L IV Bolus  1809 Zofran 4 mg IV  1813 Toradol 15 mg IV  1944 Klor-Con 20 mEq PO   2027 Zofran 4 mg IV  2027 GI cocktail 30 mL PO    Emergency Department Course:  Past medical records, nursing notes, and vitals reviewed.    1612 I physically examined the patient as documented above.    EKG obtained in the ED, see results above.     IV was inserted and blood was drawn for laboratory testing, results above.    The patient provided a urine sample here in the emergency department. This was sent for laboratory testing, findings above.    The patient was sent for radiographs while in the emergency department, results above.     1955 I rechecked the patient and discussed the findings of their workup thus far. She is feeling a bit better. Her nausea had improved, but is now returning.     A nasopharyngeal swab was obtained here in the ED.    Findings and plan explained to the patient.  She was discharged home with instructions regarding supportive care, medications, and reasons to return. The importance of close follow-up was reviewed. She was prescribed Zofran.     I personally reviewed the laboratory and imaging results with the patient and answered all related questions prior to discharge.     Impression & Plan   Covid-19  Vijaya Manjarrez was evaluated during a global COVID-19 pandemic, which necessitated consideration that the patient might be at risk for infection with the SARS-CoV-2 virus that causes COVID-19.   Applicable protocols for evaluation were followed during the patient's care.   COVID-19 was considered as part of the patient's evaluation. The plan for testing is:  a test was obtained during this visit.    Medical Decision Making:  Nyasia is a 70 year old woman who presents with about 5 to 6 days of several symptoms.  She describes shortness of breath, chest pain, diarrhea, and nausea without vomiting.  She actually feels her diarrhea is improving.  While she does not endorse fever, she has felt very hot.  Fortunately, she appears quite well on exam.  She has no focal findings including clear lungs and no hypoxia.  EKG is reassuring without acute ST changes or arrhythmias and troponin is undetectable.  Given as she has had constant chest pressure for days this essentially rules out ACS.  D-dimer was minimally elevated so she was sent for CT angiogram of the chest.  This reveals no PE or other acute pathology.  The remainder of her labs are significant primarily for hypokalemia to 3.3, which was repleted with 20 mEq of potassium chloride by mouth, and leukocytosis.  There is no urinary tract infection.  Given her constellation of symptoms, an infectious etiology is certainly considered high on the differential.  I did send COVID-19 PCR and she understands she needs to isolate accordingly.  However, at this time, there is no indication for admission.  Her chest pain is unlikely to  "be of cardiac etiology given the whole picture today.  Further, she had angiogram in February 2019 which did not show significant degree of coronary artery disease.  Outpatient stress testing can safely be deferred.  She was treated with Toradol, Tylenol, Zofran, and IV fluids.  Overall she was feeling improved although she felt her nausea was starting to return.  She was then given repeat dose of Zofran prior to discharge.  She also describes her stomach \"churning\" so I gave her a GI cocktail.  I encouraged her to eat a very bland diet and continue Zofran as needed for nausea and vomiting.  I encouraged her to drink lots of fluids and rest while recovering from this illness.  She should continue Tylenol and ibuprofen as needed for pain or if she develops fever and follow closely with her primary care provider.  She understands to return if she has having worsening of symptoms or development of any new concerns.  Isolation while awaiting COVID-19 test was reviewed.  All questions answered.  Amenable to discharge.    Diagnosis:    ICD-10-CM    1. Nonspecific syndrome suggestive of viral illness  B34.9    2. Person under investigation for COVID-19  Z20.828    3. Leukocytosis, unspecified type  D72.829    4. Chest pain, unspecified type  R07.9      Disposition:  Discharged home.    Discharge Medications:  Discharge Medication List as of 8/4/2020  8:20 PM      START taking these medications    Details   ondansetron (ZOFRAN ODT) 4 MG ODT tab Take 1 tablet (4 mg) by mouth every 8 hours as needed for nausea, Disp-10 tablet,R-0, Local Print           Scribe Disclosure:  I, Sakina Cisneros, am serving as a scribe at 4:12 PM on 8/4/2020 to document services personally performed by Sarah Gage MD based on my observations and the provider's statements to me.      Sarah Gage MD  08/05/20 1157    "

## 2020-08-05 ENCOUNTER — PATIENT OUTREACH (OUTPATIENT)
Dept: GERIATRIC MEDICINE | Facility: CLINIC | Age: 70
End: 2020-08-05

## 2020-08-05 LAB
INTERPRETATION ECG - MUSE: NORMAL
SARS-COV-2 RNA SPEC QL NAA+PROBE: NOT DETECTED
SPECIMEN SOURCE: NORMAL

## 2020-08-05 ASSESSMENT — ENCOUNTER SYMPTOMS
MYALGIAS: 0
APPETITE CHANGE: 1
HEADACHES: 0

## 2020-08-05 NOTE — DISCHARGE INSTRUCTIONS
COVID-19 takes 1 to 2 days.  You will be called with positive results.    You need to isolate for 10 days from when symptoms started AND 72 hours after fever resolves (without fever reducing medications) AND improvement of respiratory symptoms (whichever is longer)  You and any members of your household should limit activities in public for 14 days after starting home isolation.   Follow CDC recommendations for isolation, household cleaning, etc.    Lots of rest and fluids.  [place] diet.  Zofran as needed for nausea.  Tylenol or ibuprofen as needed for pain or fever.  Return with severe worsening of symptoms or new concerns.  If your symptoms improve except the chest pain, you may need further work up for that.  Please follow up with your primary care provider via phone or virtual visit this week to ensure you are approving as expected.

## 2020-08-05 NOTE — PROGRESS NOTES
[facility] Care Coordination Contact  CC received notification of Emergency Room visit.  ER visit occurred on 8/4/20 at [facility] with Dx of possible viral illness with symptoms of shortness of breath and chest pain..    CC contacted member and reviewed discharge summary. She reports that she is doing poorly and continues to have same symptoms.  She was given a prescription for Zofran in the ER but is unable to take it to her pharmacy.  She states they often deliver her meds.  Spoke with RN in ER and she was able to call the prescription to the [facility] in [place].  Called member back to inform her of that.  Member has a follow-up appointment with PCP: Yes: scheduled on Friday, August 7th at 9:40.  She states she cannot go out until she gets the results of  Covid-19.  Member has had a change in condition: No  New referrals placed: No  Home Visit Needed: No  Care plan reviewed and updated.  PCP notified of ED visit via EMR.  LISANDRA Nino, CCM  [facility] Care Coordinator  Tel [phone]  Fax [phone]

## 2020-08-07 ENCOUNTER — VIRTUAL VISIT (OUTPATIENT)
Dept: INTERNAL MEDICINE | Facility: CLINIC | Age: 70
End: 2020-08-07
Payer: COMMERCIAL

## 2020-08-07 DIAGNOSIS — R06.02 SHORTNESS OF BREATH: Primary | ICD-10-CM

## 2020-08-07 DIAGNOSIS — R11.0 NAUSEA: ICD-10-CM

## 2020-08-07 DIAGNOSIS — E87.6 LOW BLOOD POTASSIUM: ICD-10-CM

## 2020-08-07 PROCEDURE — 99214 OFFICE O/P EST MOD 30 MIN: CPT | Mod: 95 | Performed by: INTERNAL MEDICINE

## 2020-08-07 RX ORDER — ONDANSETRON 4 MG/1
4 TABLET, ORALLY DISINTEGRATING ORAL EVERY 8 HOURS PRN
Qty: 20 TABLET | Refills: 0 | Status: SHIPPED | OUTPATIENT
Start: 2020-08-07 | End: 2020-08-08

## 2020-08-07 NOTE — PROGRESS NOTES
"Vijaya Manjarrez is a 70 year old female who is being evaluated via a billable video visit.      The patient has been notified of following:     \"This video visit will be conducted via a call between you and your physician/provider. We have found that certain health care needs can be provided without the need for an in-person physical exam.  This service lets us provide the care you need with a video conversation.  If a prescription is necessary we can send it directly to your pharmacy.  If lab work is needed we can place an order for that and you can then stop by our lab to have the test done at a later time.    Video visits are billed at different rates depending on your insurance coverage.  Please reach out to your insurance provider with any questions.    If during the course of the call the physician/provider feels a video visit is not appropriate, you will not be charged for this service.\"    Patient has given verbal consent for Video visit? Yes  How would you like to obtain your AVS? Mail a copy  If you are dropped from the video visit, the video invite should be resent to: Text to cell phone: [phone]  Will anyone else be joining your video visit? No      Subjective     Vijaya Manjarrez is a 70 year old female who presents today via video visit for the following health issues:    Patient states continuing shortness of breath, chest pain, nausea and lack of appetite.  Patient has recently broken her glasses and has not been able to see all of her medications in order to take them, some medications have yet to be picked up from the pharmacy.  [facility]    ED/UC Followup:    Facility:  [facility] ED  Date of visit: 8/04/2020  Reason for visit: shortness of breath and chest pain  Current Status: not improved.       The patient reports that she has had shortness of breath, cough, chest pain, diarrhea, and nausea starting the end of July.  She had a runny nose.  She has not had a fever.   Her throat is " really irritating.  COVID test was negative.   CBC revealed elevated WBC.  UA was negative.   Troponin was negative.  BMP revealed borderline low potassium.  ddimer was elevated.    EKG without ischemia.     CT scan of the chest was negative for a pulmonary embolism.  Oxygen was 95%.    She reports that the chest pain is constant.  She continues to feel nauseated.   She continues to have shortness of breath.  The patient reports that the inhalers are helping her shortness of breath.    The patient has tried peptobismol without much relief.    She received zofran from the ER, and has not used this yet.   She reports she doesn't sleep secondary to the nausea.  She does not think it is heartburn.     Of note, patient did have a heart cath in 2/2019 with no significant narrowing that warrants mechanical revascularization.     She does not think it is her anxiety.  She has not noticed a difference when she tried hydroxyzine.        DM.  She has not been checking her blood glucoses.       Video Start Time: 10:19am        Patient Active Problem List   Diagnosis     HCD (health care directive)     Type 2 diabetes mellitus with diabetic neuropathy, without long-term current use of insulin (H)     COPD (chronic obstructive pulmonary disease) (H)     Cervical spine degeneration     Facet arthropathy, lumbar     Lumbar degenerative disc disease     Migraine headache     Diabetic neuropathy (H)     Anxiety     GERD (gastroesophageal reflux disease)     Hyperlipidemia LDL goal <100     Fibromyalgia     Benign essential hypertension     Controlled substance agreement signed- ok 5/12/20     Chronic pain syndrome     Health Care Home     Advanced directives, counseling/discussion     Narcotic dependence (H)     Mild episode of recurrent major depressive disorder (H)     Coronary artery disease involving native coronary artery, angina presence unspecified, unspecified whether native or transplanted heart     Dyslipidemia      Status post coronary angiogram     Coronary artery disease involving native coronary artery of native heart without angina pectoris     Past Surgical History:   Procedure Laterality Date     CHOLECYSTECTOMY, LAPOROSCOPIC      Cholecystectomy, Laparoscopic     CV HEART CATHETERIZATION WITH POSSIBLE INTERVENTION Left 2/26/2019    Procedure: Coronary Angiogram;  Surgeon: Cheo Merida MD;  Location:  HEART CARDIAC CATH LAB       Social History     Tobacco Use     Smoking status: Former Smoker     Packs/day: 1.00     Years: 40.00     Pack years: 40.00     Types: Cigarettes     Last attempt to quit: 6/1/2017     Years since quitting: 3.1     Smokeless tobacco: Never Used   Substance Use Topics     Alcohol use: No     Family History   Problem Relation Age of Onset     Cancer Mother      Cancer Father         Lung cancer     Cancer Maternal Grandmother         Breast cancer           Reviewed and updated as needed this visit by Provider         Review of Systems   CONSTITUTIONAL: NEGATIVE for fever, chills, change in weight  ENT/MOUTH: throat irritated  RESP: POS SOB  CV: NEGATIVE for chest pain, palpitations or peripheral edema      Objective             Physical Exam     GENERAL: Healthy, alert and no distress  EYES: Eyes grossly normal to inspection.  No discharge or erythema, or obvious scleral/conjunctival abnormalities.  RESP: No audible wheeze, cough, or visible cyanosis.  No visible retractions or increased work of breathing.    SKIN: Visible skin clear. No significant rash, abnormal pigmentation or lesions.  NEURO: Cranial nerves grossly intact.  Mentation and speech appropriate for age.  PSYCH: Mentation appears normal, affect normal/bright, judgement and insight intact, normal speech and appearance well-groomed.      Diagnostic Test Results:  Labs reviewed in Epic        Assessment & Plan     (R06.02) Shortness of breath  (primary encounter diagnosis)  Comment: likely secondary to viral illness  Plan:  continue to monitor    (E87.6) Low blood potassium  Comment: assess  Plan: **Basic metabolic panel FUTURE anytime,         Magnesium            (R11.0) Nausea  Comment: wants a refill   Plan: ondansetron (ZOFRAN ODT) 4 MG ODT tab          -pt to follow up if not improvement    Return in about 6 months (around 2/7/2021) for diabetes.    Vidhi Mario MD  [facility]      Video-Visit Details    Type of service:  Video Visit    Video End Time:10:41am    Originating Location (pt. Location): Home    Distant Location (provider location):  home    Platform used for Video Visit: Doximity    Return in about 6 months (around 2/7/2021) for diabetes.       Vidhi Mario MD

## 2020-08-08 ENCOUNTER — APPOINTMENT (OUTPATIENT)
Dept: GENERAL RADIOLOGY | Facility: CLINIC | Age: 70
End: 2020-08-08
Attending: EMERGENCY MEDICINE
Payer: COMMERCIAL

## 2020-08-08 ENCOUNTER — HOSPITAL ENCOUNTER (OUTPATIENT)
Facility: CLINIC | Age: 70
Setting detail: OBSERVATION
Discharge: HOME | End: 2020-08-09
Attending: EMERGENCY MEDICINE | Admitting: INTERNAL MEDICINE
Payer: COMMERCIAL

## 2020-08-08 DIAGNOSIS — Z20.822 SUSPECTED COVID-19 VIRUS INFECTION: ICD-10-CM

## 2020-08-08 DIAGNOSIS — J44.1 COPD EXACERBATION (H): ICD-10-CM

## 2020-08-08 DIAGNOSIS — R07.9 CHEST PAIN, UNSPECIFIED TYPE: ICD-10-CM

## 2020-08-08 DIAGNOSIS — E87.6 HYPOKALEMIA: ICD-10-CM

## 2020-08-08 LAB
ABO + RH BLD: NORMAL
ABO + RH BLD: NORMAL
ALBUMIN SERPL-MCNC: 4.4 G/DL (ref 3.4–5)
ALP SERPL-CCNC: 112 U/L (ref 40–150)
ALT SERPL W P-5'-P-CCNC: 31 U/L (ref 0–50)
ANION GAP SERPL CALCULATED.3IONS-SCNC: 9 MMOL/L (ref 3–14)
AST SERPL W P-5'-P-CCNC: 26 U/L (ref 0–45)
BASOPHILS # BLD AUTO: 0.1 10E9/L (ref 0–0.2)
BASOPHILS NFR BLD AUTO: 0.8 %
BILIRUB SERPL-MCNC: 0.5 MG/DL (ref 0.2–1.3)
BLD GP AB SCN SERPL QL: NORMAL
BLOOD BANK CMNT PATIENT-IMP: NORMAL
BUN SERPL-MCNC: 10 MG/DL (ref 7–30)
CALCIUM SERPL-MCNC: 9.3 MG/DL (ref 8.5–10.1)
CHLORIDE SERPL-SCNC: 106 MMOL/L (ref 94–109)
CO2 SERPL-SCNC: 24 MMOL/L (ref 20–32)
CREAT SERPL-MCNC: 0.62 MG/DL (ref 0.52–1.04)
DIFFERENTIAL METHOD BLD: ABNORMAL
EOSINOPHIL # BLD AUTO: 0.1 10E9/L (ref 0–0.7)
EOSINOPHIL NFR BLD AUTO: 0.5 %
ERYTHROCYTE [DISTWIDTH] IN BLOOD BY AUTOMATED COUNT: 15.3 % (ref 10–15)
GFR SERPL CREATININE-BSD FRML MDRD: >90 ML/MIN/{1.73_M2}
GLUCOSE BLDC GLUCOMTR-MCNC: 205 MG/DL (ref 70–99)
GLUCOSE BLDC GLUCOMTR-MCNC: 298 MG/DL (ref 70–99)
GLUCOSE SERPL-MCNC: 183 MG/DL (ref 70–99)
HCT VFR BLD AUTO: 44.6 % (ref 35–47)
HEMOCCULT STL QL: NEGATIVE
HGB BLD-MCNC: 14.1 G/DL (ref 11.7–15.7)
IMM GRANULOCYTES # BLD: 0 10E9/L (ref 0–0.4)
IMM GRANULOCYTES NFR BLD: 0.3 %
LABORATORY COMMENT REPORT: NORMAL
LIPASE SERPL-CCNC: 167 U/L (ref 73–393)
LYMPHOCYTES # BLD AUTO: 3.5 10E9/L (ref 0.8–5.3)
LYMPHOCYTES NFR BLD AUTO: 30.3 %
MAGNESIUM SERPL-MCNC: 2 MG/DL (ref 1.6–2.3)
MCH RBC QN AUTO: 25.8 PG (ref 26.5–33)
MCHC RBC AUTO-ENTMCNC: 31.6 G/DL (ref 31.5–36.5)
MCV RBC AUTO: 82 FL (ref 78–100)
MONOCYTES # BLD AUTO: 0.7 10E9/L (ref 0–1.3)
MONOCYTES NFR BLD AUTO: 5.9 %
NEUTROPHILS # BLD AUTO: 7.1 10E9/L (ref 1.6–8.3)
NEUTROPHILS NFR BLD AUTO: 62.2 %
NRBC # BLD AUTO: 0 10*3/UL
NRBC BLD AUTO-RTO: 0 /100
NT-PROBNP SERPL-MCNC: 132 PG/ML (ref 0–900)
PLATELET # BLD AUTO: 456 10E9/L (ref 150–450)
POTASSIUM SERPL-SCNC: 2.6 MMOL/L (ref 3.4–5.3)
POTASSIUM SERPL-SCNC: 3.8 MMOL/L (ref 3.4–5.3)
PROT SERPL-MCNC: 8.3 G/DL (ref 6.8–8.8)
RBC # BLD AUTO: 5.47 10E12/L (ref 3.8–5.2)
SARS-COV-2 RNA SPEC QL NAA+PROBE: NEGATIVE
SARS-COV-2 RNA SPEC QL NAA+PROBE: NORMAL
SODIUM SERPL-SCNC: 139 MMOL/L (ref 133–144)
SPECIMEN EXP DATE BLD: NORMAL
SPECIMEN SOURCE: NORMAL
SPECIMEN SOURCE: NORMAL
TROPONIN I SERPL-MCNC: <0.015 UG/L (ref 0–0.04)
TROPONIN I SERPL-MCNC: <0.015 UG/L (ref 0–0.04)
WBC # BLD AUTO: 11.4 10E9/L (ref 4–11)

## 2020-08-08 PROCEDURE — 83690 ASSAY OF LIPASE: CPT | Performed by: EMERGENCY MEDICINE

## 2020-08-08 PROCEDURE — 25000125 ZZHC RX 250: Performed by: EMERGENCY MEDICINE

## 2020-08-08 PROCEDURE — 83735 ASSAY OF MAGNESIUM: CPT | Performed by: EMERGENCY MEDICINE

## 2020-08-08 PROCEDURE — 00000146 ZZHCL STATISTIC GLUCOSE BY METER IP

## 2020-08-08 PROCEDURE — 84484 ASSAY OF TROPONIN QUANT: CPT | Performed by: EMERGENCY MEDICINE

## 2020-08-08 PROCEDURE — 84132 ASSAY OF SERUM POTASSIUM: CPT | Performed by: INTERNAL MEDICINE

## 2020-08-08 PROCEDURE — 71045 X-RAY EXAM CHEST 1 VIEW: CPT

## 2020-08-08 PROCEDURE — 83880 ASSAY OF NATRIURETIC PEPTIDE: CPT | Performed by: EMERGENCY MEDICINE

## 2020-08-08 PROCEDURE — 82272 OCCULT BLD FECES 1-3 TESTS: CPT | Performed by: EMERGENCY MEDICINE

## 2020-08-08 PROCEDURE — 93005 ELECTROCARDIOGRAM TRACING: CPT

## 2020-08-08 PROCEDURE — 25800030 ZZH RX IP 258 OP 636: Performed by: EMERGENCY MEDICINE

## 2020-08-08 PROCEDURE — 25000131 ZZH RX MED GY IP 250 OP 636 PS 637: Performed by: INTERNAL MEDICINE

## 2020-08-08 PROCEDURE — 96372 THER/PROPH/DIAG INJ SC/IM: CPT

## 2020-08-08 PROCEDURE — 86850 RBC ANTIBODY SCREEN: CPT | Performed by: EMERGENCY MEDICINE

## 2020-08-08 PROCEDURE — 96375 TX/PRO/DX INJ NEW DRUG ADDON: CPT

## 2020-08-08 PROCEDURE — G0378 HOSPITAL OBSERVATION PER HR: HCPCS

## 2020-08-08 PROCEDURE — U0003 INFECTIOUS AGENT DETECTION BY NUCLEIC ACID (DNA OR RNA); SEVERE ACUTE RESPIRATORY SYNDROME CORONAVIRUS 2 (SARS-COV-2) (CORONAVIRUS DISEASE [COVID-19]), AMPLIFIED PROBE TECHNIQUE, MAKING USE OF HIGH THROUGHPUT TECHNOLOGIES AS DESCRIBED BY CMS-2020-01-R: HCPCS | Performed by: EMERGENCY MEDICINE

## 2020-08-08 PROCEDURE — C9803 HOPD COVID-19 SPEC COLLECT: HCPCS

## 2020-08-08 PROCEDURE — 96361 HYDRATE IV INFUSION ADD-ON: CPT

## 2020-08-08 PROCEDURE — 86900 BLOOD TYPING SEROLOGIC ABO: CPT | Performed by: EMERGENCY MEDICINE

## 2020-08-08 PROCEDURE — 25000132 ZZH RX MED GY IP 250 OP 250 PS 637: Performed by: INTERNAL MEDICINE

## 2020-08-08 PROCEDURE — 40000275 ZZH STATISTIC RCP TIME EA 10 MIN

## 2020-08-08 PROCEDURE — 84484 ASSAY OF TROPONIN QUANT: CPT | Mod: 91 | Performed by: INTERNAL MEDICINE

## 2020-08-08 PROCEDURE — 25000132 ZZH RX MED GY IP 250 OP 250 PS 637: Performed by: EMERGENCY MEDICINE

## 2020-08-08 PROCEDURE — 86901 BLOOD TYPING SEROLOGIC RH(D): CPT | Performed by: EMERGENCY MEDICINE

## 2020-08-08 PROCEDURE — 94640 AIRWAY INHALATION TREATMENT: CPT

## 2020-08-08 PROCEDURE — 25000128 H RX IP 250 OP 636: Performed by: EMERGENCY MEDICINE

## 2020-08-08 PROCEDURE — 96365 THER/PROPH/DIAG IV INF INIT: CPT | Mod: 59

## 2020-08-08 PROCEDURE — 99285 EMERGENCY DEPT VISIT HI MDM: CPT | Mod: 25

## 2020-08-08 PROCEDURE — 36415 COLL VENOUS BLD VENIPUNCTURE: CPT | Performed by: INTERNAL MEDICINE

## 2020-08-08 PROCEDURE — 80053 COMPREHEN METABOLIC PANEL: CPT | Performed by: EMERGENCY MEDICINE

## 2020-08-08 PROCEDURE — 85025 COMPLETE CBC W/AUTO DIFF WBC: CPT | Performed by: EMERGENCY MEDICINE

## 2020-08-08 PROCEDURE — 25800030 ZZH RX IP 258 OP 636: Performed by: INTERNAL MEDICINE

## 2020-08-08 PROCEDURE — 99220 ZZC INITIAL OBSERVATION CARE,LEVL III: CPT | Performed by: INTERNAL MEDICINE

## 2020-08-08 RX ORDER — FLUTICASONE PROPIONATE 50 MCG
1 SPRAY, SUSPENSION (ML) NASAL DAILY
Status: DISCONTINUED | OUTPATIENT
Start: 2020-08-09 | End: 2020-08-09 | Stop reason: HOSPADM

## 2020-08-08 RX ORDER — TRAMADOL HYDROCHLORIDE 50 MG/1
100 TABLET ORAL 3 TIMES DAILY PRN
Status: DISCONTINUED | OUTPATIENT
Start: 2020-08-08 | End: 2020-08-09 | Stop reason: HOSPADM

## 2020-08-08 RX ORDER — ATORVASTATIN CALCIUM 40 MG/1
80 TABLET, FILM COATED ORAL DAILY
Status: DISCONTINUED | OUTPATIENT
Start: 2020-08-09 | End: 2020-08-09 | Stop reason: HOSPADM

## 2020-08-08 RX ORDER — PREGABALIN 75 MG/1
150 CAPSULE ORAL 3 TIMES DAILY
Status: DISCONTINUED | OUTPATIENT
Start: 2020-08-08 | End: 2020-08-09 | Stop reason: HOSPADM

## 2020-08-08 RX ORDER — DIPHENHYDRAMINE HYDROCHLORIDE 50 MG/ML
25 INJECTION INTRAMUSCULAR; INTRAVENOUS ONCE
Status: COMPLETED | OUTPATIENT
Start: 2020-08-08 | End: 2020-08-08

## 2020-08-08 RX ORDER — METOPROLOL TARTRATE 25 MG/1
25 TABLET, FILM COATED ORAL 2 TIMES DAILY
Status: DISCONTINUED | OUTPATIENT
Start: 2020-08-08 | End: 2020-08-09 | Stop reason: HOSPADM

## 2020-08-08 RX ORDER — ALBUTEROL SULFATE 90 UG/1
2 AEROSOL, METERED RESPIRATORY (INHALATION) EVERY 6 HOURS
Status: DISCONTINUED | OUTPATIENT
Start: 2020-08-08 | End: 2020-08-09

## 2020-08-08 RX ORDER — POTASSIUM CHLORIDE 7.45 MG/ML
10 INJECTION INTRAVENOUS ONCE
Status: COMPLETED | OUTPATIENT
Start: 2020-08-08 | End: 2020-08-08

## 2020-08-08 RX ORDER — SODIUM CHLORIDE, SODIUM LACTATE, POTASSIUM CHLORIDE, CALCIUM CHLORIDE 600; 310; 30; 20 MG/100ML; MG/100ML; MG/100ML; MG/100ML
INJECTION, SOLUTION INTRAVENOUS CONTINUOUS
Status: DISCONTINUED | OUTPATIENT
Start: 2020-08-08 | End: 2020-08-09 | Stop reason: HOSPADM

## 2020-08-08 RX ORDER — NICOTINE POLACRILEX 4 MG
15-30 LOZENGE BUCCAL
Status: DISCONTINUED | OUTPATIENT
Start: 2020-08-08 | End: 2020-08-09 | Stop reason: HOSPADM

## 2020-08-08 RX ORDER — NALOXONE HYDROCHLORIDE 0.4 MG/ML
.1-.4 INJECTION, SOLUTION INTRAMUSCULAR; INTRAVENOUS; SUBCUTANEOUS
Status: DISCONTINUED | OUTPATIENT
Start: 2020-08-08 | End: 2020-08-09 | Stop reason: HOSPADM

## 2020-08-08 RX ORDER — POTASSIUM CHLORIDE 1500 MG/1
40 TABLET, EXTENDED RELEASE ORAL ONCE
Status: COMPLETED | OUTPATIENT
Start: 2020-08-08 | End: 2020-08-08

## 2020-08-08 RX ORDER — CYCLOBENZAPRINE HCL 10 MG
10 TABLET ORAL 2 TIMES DAILY PRN
Status: DISCONTINUED | OUTPATIENT
Start: 2020-08-08 | End: 2020-08-09 | Stop reason: HOSPADM

## 2020-08-08 RX ORDER — ALBUTEROL SULFATE 0.83 MG/ML
2.5 SOLUTION RESPIRATORY (INHALATION)
Status: COMPLETED | OUTPATIENT
Start: 2020-08-08 | End: 2020-08-08

## 2020-08-08 RX ORDER — LISINOPRIL 20 MG/1
20 TABLET ORAL 2 TIMES DAILY
Status: DISCONTINUED | OUTPATIENT
Start: 2020-08-08 | End: 2020-08-09 | Stop reason: HOSPADM

## 2020-08-08 RX ORDER — METHYLPREDNISOLONE SODIUM SUCCINATE 125 MG/2ML
125 INJECTION, POWDER, LYOPHILIZED, FOR SOLUTION INTRAMUSCULAR; INTRAVENOUS ONCE
Status: COMPLETED | OUTPATIENT
Start: 2020-08-08 | End: 2020-08-08

## 2020-08-08 RX ORDER — HYDROXYZINE HYDROCHLORIDE 25 MG/1
25 TABLET, FILM COATED ORAL 3 TIMES DAILY PRN
Status: DISCONTINUED | OUTPATIENT
Start: 2020-08-08 | End: 2020-08-09 | Stop reason: HOSPADM

## 2020-08-08 RX ORDER — ACETAMINOPHEN 325 MG/1
650 TABLET ORAL EVERY 4 HOURS PRN
Status: DISCONTINUED | OUTPATIENT
Start: 2020-08-08 | End: 2020-08-09 | Stop reason: HOSPADM

## 2020-08-08 RX ORDER — AMLODIPINE BESYLATE 5 MG/1
5 TABLET ORAL DAILY
Status: DISCONTINUED | OUTPATIENT
Start: 2020-08-08 | End: 2020-08-09 | Stop reason: HOSPADM

## 2020-08-08 RX ORDER — DULOXETIN HYDROCHLORIDE 60 MG/1
60 CAPSULE, DELAYED RELEASE ORAL DAILY
Status: DISCONTINUED | OUTPATIENT
Start: 2020-08-09 | End: 2020-08-09 | Stop reason: HOSPADM

## 2020-08-08 RX ORDER — MONTELUKAST SODIUM 10 MG/1
10 TABLET ORAL AT BEDTIME
Status: DISCONTINUED | OUTPATIENT
Start: 2020-08-08 | End: 2020-08-09 | Stop reason: HOSPADM

## 2020-08-08 RX ORDER — DEXTROSE MONOHYDRATE 25 G/50ML
25-50 INJECTION, SOLUTION INTRAVENOUS
Status: DISCONTINUED | OUTPATIENT
Start: 2020-08-08 | End: 2020-08-09 | Stop reason: HOSPADM

## 2020-08-08 RX ORDER — PROCHLORPERAZINE MALEATE 5 MG
5 TABLET ORAL EVERY 6 HOURS PRN
Status: DISCONTINUED | OUTPATIENT
Start: 2020-08-08 | End: 2020-08-09 | Stop reason: HOSPADM

## 2020-08-08 RX ORDER — PROCHLORPERAZINE 25 MG
12.5 SUPPOSITORY, RECTAL RECTAL EVERY 12 HOURS PRN
Status: DISCONTINUED | OUTPATIENT
Start: 2020-08-08 | End: 2020-08-09 | Stop reason: HOSPADM

## 2020-08-08 RX ORDER — TRAZODONE HYDROCHLORIDE 100 MG/1
200 TABLET ORAL
Status: DISCONTINUED | OUTPATIENT
Start: 2020-08-08 | End: 2020-08-09 | Stop reason: HOSPADM

## 2020-08-08 RX ORDER — ACETAMINOPHEN 650 MG/1
650 SUPPOSITORY RECTAL EVERY 4 HOURS PRN
Status: DISCONTINUED | OUTPATIENT
Start: 2020-08-08 | End: 2020-08-09 | Stop reason: HOSPADM

## 2020-08-08 RX ADMIN — LISINOPRIL 20 MG: 20 TABLET ORAL at 20:40

## 2020-08-08 RX ADMIN — PROCHLORPERAZINE EDISYLATE 5 MG: 5 INJECTION INTRAMUSCULAR; INTRAVENOUS at 11:32

## 2020-08-08 RX ADMIN — AMLODIPINE BESYLATE 5 MG: 5 TABLET ORAL at 16:50

## 2020-08-08 RX ADMIN — DIPHENHYDRAMINE HYDROCHLORIDE 25 MG: 50 INJECTION, SOLUTION INTRAMUSCULAR; INTRAVENOUS at 11:32

## 2020-08-08 RX ADMIN — ALBUTEROL SULFATE 2 PUFF: 90 AEROSOL, METERED RESPIRATORY (INHALATION) at 20:06

## 2020-08-08 RX ADMIN — METOPROLOL TARTRATE 25 MG: 25 TABLET, FILM COATED ORAL at 20:40

## 2020-08-08 RX ADMIN — SODIUM CHLORIDE 1000 ML: 9 INJECTION, SOLUTION INTRAVENOUS at 11:32

## 2020-08-08 RX ADMIN — POTASSIUM CHLORIDE 10 MEQ: 7.46 INJECTION, SOLUTION INTRAVENOUS at 12:39

## 2020-08-08 RX ADMIN — FAMOTIDINE 20 MG: 10 INJECTION, SOLUTION INTRAVENOUS at 11:31

## 2020-08-08 RX ADMIN — MONTELUKAST 10 MG: 10 TABLET, FILM COATED ORAL at 21:34

## 2020-08-08 RX ADMIN — SODIUM CHLORIDE, POTASSIUM CHLORIDE, SODIUM LACTATE AND CALCIUM CHLORIDE: 600; 310; 30; 20 INJECTION, SOLUTION INTRAVENOUS at 16:39

## 2020-08-08 RX ADMIN — INSULIN ASPART 2 UNITS: 100 INJECTION, SOLUTION INTRAVENOUS; SUBCUTANEOUS at 18:16

## 2020-08-08 RX ADMIN — ACETAMINOPHEN 650 MG: 325 TABLET, FILM COATED ORAL at 20:01

## 2020-08-08 RX ADMIN — OMEPRAZOLE 20 MG: 20 CAPSULE, DELAYED RELEASE ORAL at 20:40

## 2020-08-08 RX ADMIN — METHYLPREDNISOLONE SODIUM SUCCINATE 125 MG: 125 INJECTION, POWDER, FOR SOLUTION INTRAMUSCULAR; INTRAVENOUS at 11:31

## 2020-08-08 RX ADMIN — TRAZODONE HYDROCHLORIDE 200 MG: 100 TABLET ORAL at 21:34

## 2020-08-08 RX ADMIN — ALBUTEROL SULFATE 2.5 MG: 2.5 SOLUTION RESPIRATORY (INHALATION) at 12:23

## 2020-08-08 RX ADMIN — TRAMADOL HYDROCHLORIDE 100 MG: 50 TABLET, FILM COATED ORAL at 21:34

## 2020-08-08 RX ADMIN — PREGABALIN 150 MG: 75 CAPSULE ORAL at 21:34

## 2020-08-08 RX ADMIN — PREGABALIN 150 MG: 75 CAPSULE ORAL at 16:50

## 2020-08-08 RX ADMIN — POTASSIUM CHLORIDE 40 MEQ: 1500 TABLET, EXTENDED RELEASE ORAL at 12:38

## 2020-08-08 RX ADMIN — POTASSIUM CHLORIDE 40 MEQ: 1500 TABLET, EXTENDED RELEASE ORAL at 16:50

## 2020-08-08 RX ADMIN — ALBUTEROL SULFATE 2.5 MG: 2.5 SOLUTION RESPIRATORY (INHALATION) at 11:31

## 2020-08-08 ASSESSMENT — ENCOUNTER SYMPTOMS
FEVER: 0
APPETITE CHANGE: 1
NAUSEA: 1
DYSURIA: 0
SHORTNESS OF BREATH: 1
BLOOD IN STOOL: 1
COUGH: 1
DIARRHEA: 1
CHILLS: 0
VOMITING: 0
ABDOMINAL PAIN: 0
BACK PAIN: 0

## 2020-08-08 ASSESSMENT — MIFFLIN-ST. JEOR: SCORE: 1482.06

## 2020-08-08 NOTE — ED TRIAGE NOTES
Pt presents via EMS for having c/o's chest pain, generalized weakness, SOB and dry cough X 10 days. Pt also c/o's being unable to sleep. Pt is A&O, ABC's intact.

## 2020-08-08 NOTE — H&P
[facility]    History and Physical  Hospitalist       Date of Admission:  8/8/2020    Assessment & Plan   Vijaya Manjarrez is a 70 year old female who presents with a broad range of symptoms such as generalized aches and pains, shortness of breath, chest pain, headache, nausea, diarrhea, poor appetite.    She has a past medical history significant for COPD, cigarette smoking (last smoking about 4 months ago), hypertension, dyslipidemia, anxiety.  Has a history of chest pain in the past, status post cardiac cath in February 2019 for an equivocal Lexiscan.  The cardiac cath showed nonobstructive coronary artery disease with no significant narrowing.    Patient presented with patient first presented to the emergency room on 8/4/2020 with about 6-day history of these nonspecific broad range of symptoms.  She was evaluated extensively including a negative COVID-19 test and an unremarkable CT chest which was negative for pneumonia or PE.  Troponin was negative.  Repeat troponin done today was also negative.    She states that she tried taking Zofran yesterday for her nausea but that made her symptoms worse.  After taking the Zofran she started experiencing headache, generalized body pains.    She has been experiencing shortness of breath but denies any significant cough.  She has a longstanding history of smoking, last smoking about 4 months ago.  She has a history of COPD but not on oxygen.  In the emergency room she has not been requiring any oxygen.    She also gives a history of nonspecific chest pain going on for almost 10 days now.  Nonradiating retrosternal pain.  No association with activity.  Cannot think of any aggravating or relieving factors.      She has been experiencing nausea, vomiting, diarrhea.  Denies any significant abdominal pain.  No dysuria.  No fever.    She admits that she has been experiencing a lot of anxiety.  She has a history of anxiety disorder and she thinks it has  worsened since her symptoms started.    In the ER, patient's potassium was quite low 2.6.  She was given potassium replacement.  But otherwise her labs were unremarkable.  COVID-19 testing was repeated again. Patient is being admitted to internal medicine service as an observation.    Problem List:    Hypokalemia.  In the setting of poor appetite with nausea/vomiting/diarrhea.  - Telemetry monitoring due to significant hypokalemia.  - Replace and monitor.  Recheck at 8 PM today and then in the morning.  - Phenergan for nausea and vomiting.  Patient reports worsening of some of her symptoms after taking Zofran.  -Denies any recent antibiotic use.    Shortness of breath.  COPD.  - Subjective shortness of breath.  Not requiring any oxygen.  Patient had some wheezing at presentation so she was given IV Solu-Medrol and neb treatment.  Feels better.  -Continue bronchodilators.  - No further steroids for now.  - COVID-19 testing was repeated.  If comes back negative then precautions can be removed.  -Unremarkable chest x-ray.    Nonspecific chest pain.  -Patient on the description appears to be recurrent issue and atypical appearing chest pain.  - Troponin checked on the fourth was negative.  Repeated again today and remains negative.  Recheck at 8 PM with potassium check.  -Could be related to reflux in the setting of nausea and vomiting.    Hypertension  - Continue lisinopril, amlodipine, metoprolol.    Anxiety  -Continue home treatment in the form of hydroxyzine, duloxetine.      DVT Prophylaxis: Low Risk/Ambulatory with no VTE prophylaxis indicated  Code Status: Full Code    Sy Temple MD    Primary Care Physician   Vidhi Mario    Chief Complaint   See above      History of Present Illness   Vijaya Manjarrez is a 70 year old female presented with a myriad of symptoms.      Past Medical History    I have reviewed this patient's medical history and updated it with pertinent information if needed.   Past  Medical History:   Diagnosis Date     Anxiety      Cervical spine degeneration 2016    spinal stenosis,      COPD (chronic obstructive pulmonary disease) (H) 2012    mild     Diabetes mellitus, type 2 (H)      Diabetic neuropathy (H)      Facet arthropathy, lumbar      GERD (gastroesophageal reflux disease)      HTN (hypertension)      Hyperlipidemia      Lumbar degenerative disc disease      Migraine headache        Past Surgical History   I have reviewed this patient's surgical history and updated it with pertinent information if needed.  Past Surgical History:   Procedure Laterality Date     CHOLECYSTECTOMY, LAPOROSCOPIC      Cholecystectomy, Laparoscopic     CV HEART CATHETERIZATION WITH POSSIBLE INTERVENTION Left 2/26/2019    Procedure: Coronary Angiogram;  Surgeon: Cheo Merida MD;  Location:  HEART CARDIAC CATH LAB       Prior to Admission Medications   Prior to Admission Medications   Prescriptions Last Dose Informant Patient Reported? Taking?   DULoxetine (CYMBALTA) 60 MG capsule 7/30/2020  No No   Sig: Take 1 capsule (60 mg) by mouth daily   Multiple Vitamins-Minerals (MULTIVITAMIN ADULT PO) 7/30/2020  Yes Yes   Sig: Take 5-6 tablets by mouth daily Pro-caps vitamin   acetaminophen (TYLENOL ARTHRITIS PAIN) 650 MG CR tablet Past Month at Unknown time  Yes Yes   Sig: Take 650 mg by mouth 2 times daily as needed    albuterol (PROAIR HFA/PROVENTIL HFA/VENTOLIN HFA) 108 (90 Base) MCG/ACT inhaler 8/8/2020 at am  No Yes   Sig: Inhale 2 puffs into the lungs every 6 hours   amLODIPine (NORVASC) 5 MG tablet 7/30/2020  No No   Sig: Take 1 tablet (5 mg) by mouth daily   atorvastatin (LIPITOR) 80 MG tablet 7/30/2020  No No   Sig: Take 1 tablet (80 mg) by mouth daily Recheck cholesterol in 3 months   blood glucose monitoring (NO BRAND SPECIFIED) test strip   No No   Sig: Use to test blood sugars 1 times daily or as directed, use brand same as previous   cetirizine (ZYRTEC) 10 MG tablet 7/30/2020  No No   Sig:  TAKE ONE TABLET BY MOUTH ONCE DAILY   cyclobenzaprine (FLEXERIL) 10 MG tablet 7/30/2020  No No   Sig: TAKE ONE TABLET BY MOUTH TWICE A DAY   fluticasone (FLONASE) 50 MCG/ACT nasal spray 7/30/2020  No No   Sig: SPRAY 2 SPRAYS IN EACH NOSTRIL ONCE DAILY   hydrOXYzine (ATARAX) 10 MG tablet 7/30/2020  No No   Sig: TAKE ONE TABLET BY MOUTH THREE TIMES A DAY AS NEEDED FOR ANXIETY   lisinopril (ZESTRIL) 20 MG tablet 7/30/2020  No No   Sig: Take 1 tablet (20 mg) by mouth 2 times daily   metFORMIN (GLUCOPHAGE) 500 MG tablet Past Week at Unknown time  No Yes   Sig: TAKE ONE TABLET BY MOUTH TWICE A DAY WITH A MEAL   metoprolol tartrate (LOPRESSOR) 25 MG tablet Past Week at Unknown time  No Yes   Sig: Take 1 tablet (25 mg) by mouth 2 times daily   montelukast (SINGULAIR) 10 MG tablet 7/30/2020  No No   Sig: TAKE ONE TABLET BY MOUTH AT BEDTIME   naproxen sodium (ALEVE) 220 MG tablet 8/8/2020 at am  Yes Yes   Sig: Take 220 mg by mouth as needed for moderate pain   nitroGLYcerin (NITROSTAT) 0.4 MG sublingual tablet 8/7/2020 at Unknown time  No Yes   Sig: For chest pain place 1 tablet under the tongue every 5 minutes for 3 doses. If symptoms persist 5 minutes after 1st dose call 911.   nystatin (MYCOSTATIN) 460515 UNIT/GM external powder 8/7/2020 at Unknown time  No Yes   Sig: APPLY TO AFFECTED AREA(S) TOPICALLY THREE TIMES A DAY AS NEEDED   omeprazole (PRILOSEC) 20 MG DR capsule 7/30/2020  No No   Sig: Take 1 capsule (20 mg) by mouth 2 times daily   order for DME   No No   Sig: Equipment being ordered: glucometer   pregabalin (LYRICA) 150 MG capsule 7/30/2020  No No   Sig: Take 1 capsule (150 mg) by mouth 3 times daily   tiotropium (SPIRIVA) 18 MCG inhaled capsule 7/30/2020  No No   Sig: Inhale 1 capsule (18 mcg) into the lungs daily   traMADol (ULTRAM) 50 MG tablet 8/7/2020 at pm  No Yes   Sig: TAKE TWO TABLETS BY MOUTH THREE TIMES DAILY   traZODone (DESYREL) 100 MG tablet Past Week at Unknown time  No Yes   Sig: TAKE TWO TABLETS  BY MOUTH EVERY NIGHT AT BEDTIME      Facility-Administered Medications: None     Allergies   Allergies   Allergen Reactions     Penicillins Hives       Social History   I have reviewed this patient's social history and updated it with pertinent information if needed. Vijaya Manjarrez  reports that she quit smoking about 3 years ago. Her smoking use included cigarettes. She has a 40.00 pack-year smoking history. She has never used smokeless tobacco. She reports that she does not drink alcohol or use drugs.    Family History   I have reviewed this patient's family history and updated it with pertinent information if needed.   Family History   Problem Relation Age of Onset     Cancer Mother      Cancer Father         Lung cancer     Cancer Maternal Grandmother         Breast cancer       Review of Systems   The 10 point Review of Systems is negative other than noted in the HPI or here.     Physical Exam   Temp: 97.9  F (36.6  C) Temp src: Oral BP: (!) 170/113 Pulse: 92 Heart Rate: 85 Resp: (!) 51 SpO2: 97 % O2 Device: None (Room air)    Vital Signs with Ranges  Temp:  [97.9  F (36.6  C)] 97.9  F (36.6  C)  Pulse:  [80-94] 92  Heart Rate:  [72-91] 85  Resp:  [9-51] 51  BP: (154-174)/() 170/113  SpO2:  [95 %-100 %] 97 %  0 lbs 0 oz    Constitutional: Awake, alert, cooperative, no apparent distress.  Eyes: Conjunctiva and pupils examined and normal.  HEENT: dry mucous membranes, normal dentition.  Respiratory: Mostly Clear to auscultation bilaterally, no crackles. Faint wheezing.  Cardiovascular: Regular rate and rhythm, normal S1 and S2, and no murmur noted.  GI: Soft, non-distended, non-tender, normal bowel sounds.  Lymph/Hematologic: No anterior cervical or supraclavicular adenopathy.  Skin: No rashes, no cyanosis, no edema.  Musculoskeletal: No joint swelling, erythema or tenderness.  Neurologic: Cranial nerves 2-12 intact, normal strength and sensation.  Psychiatric: Alert, oriented to person, place and  time.    Data     Recent Labs   Lab 08/08/20  1116 08/04/20  1729 08/04/20  1652   WBC 11.4* 13.7*  --    HGB 14.1 12.7  --    MCV 82 82  --    * 341  --      --  139   POTASSIUM 2.6*  --  3.3*   CHLORIDE 106  --  107   CO2 24  --  23   BUN 10  --  9   CR 0.62  --  0.63   ANIONGAP 9  --  9   RADHA 9.3  --  8.7   *  --  160*   ALBUMIN 4.4  --   --    PROTTOTAL 8.3  --   --    BILITOTAL 0.5  --   --    ALKPHOS 112  --   --    ALT 31  --   --    AST 26  --   --    LIPASE 167  --   --    TROPI <0.015  --  <0.015       Recent Results (from the past 24 hour(s))   XR Chest Port 1 View    Narrative    XR CHEST PORT 1 VW   8/8/2020 11:45 AM     HISTORY: sob    COMPARISON: 12/13/2017      Impression    IMPRESSION: No acute cardiopulmonary abnormality.    GRACY JOSEPH MD

## 2020-08-08 NOTE — ED PROVIDER NOTES
"  History     Chief Complaint:  Chest Pain      The history is provided by the patient. The history is limited by a language barrier.      iVjaya Manjarrez is a 70 year old female former tobacco user with a history of hypertension, hyperlipidemia, coronary artery disease, COPD, type 2 diabetes, and asthma who presents with myriad of complaints predominately chest pain.  Patient reports for roughly the past 10 days having a sternal nonradiating chest pain that is constant and is somewhat worsened on exertion.  She reports associated dyspnea on exertion, dry cough, nausea, dull headaches and loose stools.  She states she believes that her stools had blood in them and were \"dark\" though she cannot be certain this was blood.  She denies any fever, vomiting, significant abdominal pain, urinary symptoms, focal weakness, paresthesias.  She expresses significant anxiety because she is unable to sleep at night secondary to her symptoms.  She denies any recent hospitalizations though was recently seen in the ED 4 days prior, extensive work-up below.  No known sick contacts.    Workup from 08/04/2020 [facility]   EKG  Indication: shortness of breath  Time: 1613  Rate 92 bpm. WV interval 162. QRS duration 90. QT/QTc 362/447.   Normal sinus rhythm  Rightward axis  Borderline ECG   No acute ST changes.  No change compared to prior, dated 02/26/2019.  Read time: 1614  Read by Sarah Gage MD    Imaging  CT chest pulmonary embolism w contrast:  IMPRESSION:  1.  No evidence for pulmonary embolism or other acute abnormality.  Readings per Radiology    Lab results  Troponin (Collected 1652): <0.015    BMP: potassium 3.3 (L), glucose 160 (H) o/w WNL (Creatinine 0.63)    D Dimer (Collected 1652): 0.8 (H)    CBC: WBC 13.7 (H) o/w WNL (HGB 12.7, )     UA with Microscopic: protein albumin 30 (A), squamous epithelial 2 (H), mucous present (A) o/w WNL    COVID-19 Virus by PCR Nasopharyngeal swab: not " detected      Allergies:  Penicillins    Medications:    Albuterol inhaler  Amlodipine  Atorvastatin  Cymbalta  Tramadol   Hydroxyzine   Lisinopril   Metoprolol tartrate  Aleve  Omeprazole  Lyrica  Spiriva inhaler  Trazodone   Ultram     Past Medical History:    Anxiety  Cervical spine degeneration  COPD  Type 2 diabetes  Diabetic neuropathy  Facet arthropathy lumbar  GERD  Hypertension  Hyperlipidemia   Asthma  Lumbar degenerative disc disease  Migraines   Depression   Narcotic dependence  Coronary artery disease  Chronic pain syndrome     Past Surgical History:    Cholecystectomy , laparoscopic  Heart catheterization with possible intervention    Family History:    Mother: cancer  Father: cancer (lung)    Social History:  Smoking status: former. Quit date 2017  Alcohol use: no  Drug use: no  The patient presents to the emergency department alone  PCP: Vidhi Mario  Marital Status:  Single [1]    Review of Systems   Constitutional: Positive for appetite change. Negative for chills and fever.   Respiratory: Positive for cough and shortness of breath.    Cardiovascular: Positive for chest pain.   Gastrointestinal: Positive for blood in stool (possible), diarrhea and nausea. Negative for abdominal pain and vomiting.   Genitourinary: Negative for dysuria.   Musculoskeletal: Negative for back pain.   All other systems reviewed and are negative.      Physical Exam     Patient Vitals for the past 24 hrs:   BP Temp Temp src Pulse Heart Rate Resp SpO2   08/08/20 1245 (!) 155/99 -- -- 85 85 30 100 %   08/08/20 1230 (!) 161/96 -- -- 84 72 19 98 %   08/08/20 1215 (!) 166/98 -- -- 81 80 14 100 %   08/08/20 1200 (!) 167/99 -- -- 80 79 9 100 %   08/08/20 1148 (!) 174/104 -- -- -- -- 25 95 %   08/08/20 1145 (!) 165/72 -- -- -- 86 12 95 %   08/08/20 1130 (!) 164/106 -- -- 90 87 (!) 34 95 %   08/08/20 1115 (!) 154/115 -- -- 94 91 12 99 %   08/08/20 1108 -- 97.9  F (36.6  C) Oral -- -- -- --   08/08/20 1101 (!) 154/107 -- --  93 -- 20 99 %       Physical Exam  Nursing note and vitals reviewed.  Constitutional: Well nourished.   Eyes: Conjunctiva normal.  Pupils are equal, round, and reactive to light.   ENT: Nose normal. Mucous membranes pink and moist.    Neck: Normal range of motion.  CVS: Normal rate, regular rhythm.  Normal heart sounds.  No murmur.  Pulmonary: Lungs with wheezing bilaterally  GI: Abdomen soft. Nontender, nondistended. No rigidity or guarding.    MSK: No calf tenderness or swelling.  Neuro: Alert. Follows simple commands.  Skin: Skin is warm and dry. No rash noted. Generalized pallor  Psychiatric: Anxious appearing       Emergency Department Course   ECG (11:02:29):  Rate 93 bpm. NH interval 160. QRS duration 78. QT/QTc 350/435. P-R-T axes 44 54 26. Normal sinus rhythm; similar to EKG 8/4/20. Interpreted at by Tyra Elliott DO.    Imaging:  Radiology findings were communicated with the patient who voiced understanding of the findings.    XR Chest Port 1 View  IMPRESSION: No acute cardiopulmonary abnormality.  As read by Radiology.    Laboratory:  Laboratory findings were communicated with the patient who voiced understanding of the findings.    BNP: 132    Troponin I (1116): <0.015    Lipase: 167    CMP: Potassium: 2.6 (L), glucose: 183 (H) o/w (Creatinine 0.62)    CBC: WBC 11.4 (H),  (H) o/w WNL (HGB 14.1)    ABO/Rh type and screen: A+ (Antibody Neg)    Stool: occult blood: negative    COVID-19 Virus by PCR Nasopharyngeal swab: pending    Magnesium: 2.0     Interventions:  1131 Pepcid 20 mg IV  1131 Albuterol 2.5 mg Neb   1131 Solu-medrol 125 mg IV  1132 Benadryl 25 mg IV  1132 Compazine 5 mg IB  1132 NS 1L IV Bolus  1223 Albuterol 2.5 mg neb  1238 klor-con 40 mEq PO   1239 Potassium chloride 10 mEq IV    Emergency Department Course:  Past medical records, nursing notes, and vitals reviewed.  1120: I performed an exam of the patient and obtained history, as documented above.     EKG was obtained and  reviewed in the emergency department.    IV inserted and blood drawn.    The patient was sent for a Chest XR while in the emergency department, results above.     I rechecked the patient. I reviewed the results with the Patient and answered all related questions prior to admission.     1302: I discussed the patient with Dr. Temple, of hospitalist.    Findings and plan explained to the Patient who consents to admission. Discussed the patient with Dr. Temple, who will admit the patient to a [facility] with tele bed for further monitoring, evaluation, and treatment.  Impression & Plan   Covid-19  Vijaya Manjarrez was evaluated during a global COVID-19 pandemic, which necessitated consideration that the patient might be at risk for infection with the SARS-CoV-2 virus that causes COVID-19.   Applicable protocols for evaluation were followed during the patient's care.   COVID-19 was considered as part of the patient's evaluation. The plan for testing is:  a test was obtained during this visit.    HEART Score:    Predicts Major Adverse Cardiac Events within 6 weeks:    History:   Highly suspicious:  2   Moderately suspicious: 1   Slightly/Non-suspicious: 0  ECG:   Significant ST depression 2   Nonspecific repolarization 1   Normal    0  Age:    >=65    2   >45-<65   1   <= 45    0  Risk Factors [DM, Current or recent smoker, HTN, HLP, FMH CAD, Obesity]   >=3 or Hx. CAD  2   1-2    1   None    0  Troponin:   >= 3x normal   2   >1 to <3x normal  1   Normal    0    This Patient's Score:   4    Interpretation:    0-3:    2.5% risk of MACE (may consider D/C)   4-6:    20.3% (Observation)   7-10:    72.7% (Admit, consider early invasive strategy)        Medical Decision Making:  Patient is a 70-year-old female presenting with a myriad of complaints.  She complains of chest pain, dyspnea, cough, diarrhea with possible blood.  She is quite hypertensive on arrival though overall nontoxic-appearing.  She underwent an extensive work-up  during her time in the ED.  I reviewed patient's recent extensive work-up from 4 days prior including CT which was fortunately negative for PE.  In light of her symptoms she was tested for COVID-19 today.  She is not requiring supplemental O2 though on arrival did have expiratory wheezing.  She has a known history of COPD so was treated with breathing treatments as well as steroids.  No evidence of sepsis today.  Chest x-ray without focal pneumonia, fluid overload.  I considered possible PE though lower clinical suspicion and I do not feel that she warrants repeat CT at this time.  EKG without focal ischemia, delta troponin negative.  I have a lower clinical suspicion for ACS given chronicity of symptoms.  Patient is noted to have significant electrolyte derangements today most notably hypokalemia.  She would benefit from electrolyte replacement as well as telemetry monitoring at this time.  She remained hemodynamically stable during her time in the ED.  She was accepted by hospitalist for admission.    Diagnosis:    ICD-10-CM    1. Hypokalemia  E87.6 Magnesium     Magnesium     CANCELED: Magnesium   2. Suspected COVID-19 virus infection  Z20.828    3. Chest pain, unspecified type  R07.9    4. COPD exacerbation (H)  J44.1        Disposition:  Admitted to obs with tele bed.    Tiffanie Krause  8/8/2020   [facility] EMERGENCY DEPARTMENT  Scribe Disclosure:  ITiffanei, am serving as a scribe at 11:40 AM on 8/8/2020 to document services personally performed by Tyra Elliott DO based on my observations and the provider's statements to me.        Tyra Elliott DO  08/08/20 0970

## 2020-08-08 NOTE — ED NOTES
[facility]  ED Nurse Handoff Report    Vijaya Manjarrez is a 70 year old female   ED Chief complaint: Chest Pain  . ED Diagnosis:   Final diagnoses:   Hypokalemia   Suspected COVID-19 virus infection   Chest pain, unspecified type     Allergies:   Allergies   Allergen Reactions     Penicillins Hives       Code Status: Full Code  Activity level - Baseline/Home:  Independent. Activity Level - Current:   Assist X 1. Lift room needed: No. Bariatric: No   Needed: No   Isolation: Yes. Infection: Not Applicable  COVID.     Vital Signs:   Vitals:    08/08/20 1130 08/08/20 1145 08/08/20 1148 08/08/20 1200   BP: (!) 164/106 (!) 165/72 (!) 174/104 (!) 167/99   Pulse: 90   80   Resp: (!) 34 12 25 9   Temp:       TempSrc:       SpO2: 95% 95% 95% 100%       Cardiac Rhythm:  ,      Pain level:    Patient confused: No. Patient Falls Risk: Yes.   Elimination Status: Has voided   Patient Report - Initial Complaint: Increased weakness, shortness of breath. Focused Assessment: Pt is diaphoretic, short of breath, drowsy, pale. Increased work of breathing noted. Awakes to voice. A+Ox4. CMS intact. Cap refill 3 seconds.    Tests Performed: EKG, Labs, XR Abnormal Results: Low K  Treatments provided: Meds  Family Comments: None  OBS brochure/video discussed/provided to patient:  N/A  ED Medications:   Medications   potassium chloride ER (KLOR-CON M) CR tablet 40 mEq (has no administration in time range)   potassium chloride 10 mEq in 100 mL sterile water intermittent infusion (premix) (has no administration in time range)   famotidine (PEPCID) injection 20 mg (20 mg Intravenous Given 8/8/20 1131)   diphenhydrAMINE (BENADRYL) injection 25 mg (25 mg Intravenous Given 8/8/20 1132)   albuterol (PROVENTIL) neb solution 2.5 mg (2.5 mg Nebulization Given 8/8/20 1223)   methylPREDNISolone sodium succinate (solu-MEDROL) injection 125 mg (125 mg Intravenous Given 8/8/20 1131)   prochlorperazine (COMPAZINE) injection 5 mg  (5 mg Intravenous Given 8/8/20 1132)   0.9% sodium chloride BOLUS (0 mLs Intravenous Stopped 8/8/20 1223)     Drips infusing:  No  For the majority of the shift, the patient's behavior Green. Interventions performed were NA.    Sepsis treatment initiated: No       ED Nurse Name/Phone Number: Malka Spencer RN,   12:25 PM    RECEIVING UNIT ED HANDOFF REVIEW    Above ED Nurse Handoff Report was reviewed: Yes  Reviewed by: Kiana Jaffe RN on August 8, 2020 at 3:26 PM

## 2020-08-08 NOTE — PHARMACY-ADMISSION MEDICATION HISTORY
Admission medication history interview status for this patient is complete. See [facility] admission navigator for allergy information, prior to admission medications and immunization status.     Medication history interview done via telephone during Covid-19 pandemic, indicate source(s): Patient  Medication history resources (including written lists, pill bottles, clinic record): care everywhere    Changes made to PTA medication list:  Added: procaps vitamin  Deleted: lidocaine patch, miconazole, zofran,   Changed: tylenol to prn    Actions taken by pharmacist (provider contacted, etc):None     Additional medication history information: Patient reported that she stopped taking all of her medications 10 days ago, because she is overwhelmed and needs help with her meds. There were some medications she stopped taking and do not wish to take moving forward -- these medications were removed from her med list. She stopped taking lidocaine because she said it didn't help her pain. She stopped taking the miconazole due to not needing it anymore, and she believes that taking the zofran last night is what has made her feel so bad.    Medication reconciliation/reorder completed by provider prior to medication history?  N   (Y/N)     For patients on insulin therapy: N  (Y/N)      Prior to Admission medications    Medication Sig Last Dose Taking? Auth Provider   acetaminophen (TYLENOL ARTHRITIS PAIN) 650 MG CR tablet Take 650 mg by mouth 2 times daily as needed  Past Month at Unknown time Yes Reported, Patient   albuterol (PROAIR HFA/PROVENTIL HFA/VENTOLIN HFA) 108 (90 Base) MCG/ACT inhaler Inhale 2 puffs into the lungs every 6 hours 8/8/2020 at am Yes Vidhi Mario MD   metFORMIN (GLUCOPHAGE) 500 MG tablet TAKE ONE TABLET BY MOUTH TWICE A DAY WITH A MEAL Past Week at Unknown time Yes Vidhi Mario MD   metoprolol tartrate (LOPRESSOR) 25 MG tablet Take 1 tablet (25 mg) by mouth 2 times daily Past Week at Unknown time  Yes Vidhi Mario MD   Multiple Vitamins-Minerals (MULTIVITAMIN ADULT PO) Take 5-6 tablets by mouth daily Pro-caps vitamin 7/30/2020 Yes Unknown, Entered By History   naproxen sodium (ALEVE) 220 MG tablet Take 220 mg by mouth as needed for moderate pain 8/8/2020 at am Yes Reported, Patient   nitroGLYcerin (NITROSTAT) 0.4 MG sublingual tablet For chest pain place 1 tablet under the tongue every 5 minutes for 3 doses. If symptoms persist 5 minutes after 1st dose call 911. 8/7/2020 at Unknown time Yes Vidhi Mario MD   nystatin (MYCOSTATIN) 620107 UNIT/GM external powder APPLY TO AFFECTED AREA(S) TOPICALLY THREE TIMES A DAY AS NEEDED 8/7/2020 at Unknown time Yes Vidhi Mario MD   traMADol (ULTRAM) 50 MG tablet TAKE TWO TABLETS BY MOUTH THREE TIMES DAILY 8/7/2020 at pm Yes Vidhi Mario MD   traZODone (DESYREL) 100 MG tablet TAKE TWO TABLETS BY MOUTH EVERY NIGHT AT BEDTIME Past Week at Unknown time Yes Vidhi Mario MD   amLODIPine (NORVASC) 5 MG tablet Take 1 tablet (5 mg) by mouth daily 7/30/2020  Vidhi Mario MD   atorvastatin (LIPITOR) 80 MG tablet Take 1 tablet (80 mg) by mouth daily Recheck cholesterol in 3 months 7/30/2020  Vidhi Mario MD   blood glucose monitoring (NO BRAND SPECIFIED) test strip Use to test blood sugars 1 times daily or as directed, use brand same as previous   Disha Van MD   cetirizine (ZYRTEC) 10 MG tablet TAKE ONE TABLET BY MOUTH ONCE DAILY 7/30/2020  Hector Irwin MD   cyclobenzaprine (FLEXERIL) 10 MG tablet TAKE ONE TABLET BY MOUTH TWICE A DAY 7/30/2020  Vidhi Mario MD   DULoxetine (CYMBALTA) 60 MG capsule Take 1 capsule (60 mg) by mouth daily 7/30/2020  Vidhi Mario MD   fluticasone (FLONASE) 50 MCG/ACT nasal spray SPRAY 2 SPRAYS IN EACH NOSTRIL ONCE DAILY 7/30/2020  Vidhi Mario MD   hydrOXYzine (ATARAX) 10 MG tablet TAKE ONE TABLET BY MOUTH THREE TIMES A DAY AS NEEDED FOR ANXIETY 7/30/2020  Shelbi  Vidhi Spencer MD   lisinopril (ZESTRIL) 20 MG tablet Take 1 tablet (20 mg) by mouth 2 times daily 7/30/2020  Vidhi Mario MD   montelukast (SINGULAIR) 10 MG tablet TAKE ONE TABLET BY MOUTH AT BEDTIME 7/30/2020  Vidhi Mario MD   omeprazole (PRILOSEC) 20 MG DR capsule Take 1 capsule (20 mg) by mouth 2 times daily 7/30/2020  Vidhi Mario MD   order for DME Equipment being ordered: glucometer   Vidhi Mario MD   pregabalin (LYRICA) 150 MG capsule Take 1 capsule (150 mg) by mouth 3 times daily 7/30/2020  Vidhi Mario MD   tiotropium (SPIRIVA) 18 MCG inhaled capsule Inhale 1 capsule (18 mcg) into the lungs daily 7/30/2020  Vidhi Mario MD

## 2020-08-09 VITALS
HEART RATE: 93 BPM | RESPIRATION RATE: 18 BRPM | WEIGHT: 196.8 LBS | SYSTOLIC BLOOD PRESSURE: 145 MMHG | TEMPERATURE: 98.2 F | OXYGEN SATURATION: 95 % | HEIGHT: 69 IN | DIASTOLIC BLOOD PRESSURE: 85 MMHG | BODY MASS INDEX: 29.15 KG/M2

## 2020-08-09 LAB
ANION GAP SERPL CALCULATED.3IONS-SCNC: 3 MMOL/L (ref 3–14)
BUN SERPL-MCNC: 9 MG/DL (ref 7–30)
CALCIUM SERPL-MCNC: 9 MG/DL (ref 8.5–10.1)
CHLORIDE SERPL-SCNC: 112 MMOL/L (ref 94–109)
CO2 SERPL-SCNC: 25 MMOL/L (ref 20–32)
CREAT SERPL-MCNC: 0.51 MG/DL (ref 0.52–1.04)
GFR SERPL CREATININE-BSD FRML MDRD: >90 ML/MIN/{1.73_M2}
GLUCOSE BLDC GLUCOMTR-MCNC: 135 MG/DL (ref 70–99)
GLUCOSE BLDC GLUCOMTR-MCNC: 135 MG/DL (ref 70–99)
GLUCOSE BLDC GLUCOMTR-MCNC: 196 MG/DL (ref 70–99)
GLUCOSE SERPL-MCNC: 141 MG/DL (ref 70–99)
POTASSIUM SERPL-SCNC: 4.1 MMOL/L (ref 3.4–5.3)
SODIUM SERPL-SCNC: 140 MMOL/L (ref 133–144)

## 2020-08-09 PROCEDURE — 00000146 ZZHCL STATISTIC GLUCOSE BY METER IP

## 2020-08-09 PROCEDURE — 40000274 ZZH STATISTIC RCP CONSULT EA 30 MIN

## 2020-08-09 PROCEDURE — G0378 HOSPITAL OBSERVATION PER HR: HCPCS

## 2020-08-09 PROCEDURE — 25000132 ZZH RX MED GY IP 250 OP 250 PS 637: Performed by: INTERNAL MEDICINE

## 2020-08-09 PROCEDURE — 80048 BASIC METABOLIC PNL TOTAL CA: CPT | Performed by: INTERNAL MEDICINE

## 2020-08-09 PROCEDURE — 94640 AIRWAY INHALATION TREATMENT: CPT

## 2020-08-09 PROCEDURE — 36415 COLL VENOUS BLD VENIPUNCTURE: CPT | Performed by: INTERNAL MEDICINE

## 2020-08-09 PROCEDURE — 99207 ZZC CDG-CODE CATEGORY CHANGED: CPT | Performed by: INTERNAL MEDICINE

## 2020-08-09 PROCEDURE — 99217 ZZC OBSERVATION CARE DISCHARGE: CPT | Performed by: INTERNAL MEDICINE

## 2020-08-09 PROCEDURE — 40000275 ZZH STATISTIC RCP TIME EA 10 MIN

## 2020-08-09 RX ORDER — PROCHLORPERAZINE MALEATE 5 MG
5 TABLET ORAL EVERY 6 HOURS PRN
Qty: 20 TABLET | Refills: 0 | Status: ON HOLD | OUTPATIENT
Start: 2020-08-09 | End: 2020-10-01

## 2020-08-09 RX ORDER — ALBUTEROL SULFATE 90 UG/1
2 AEROSOL, METERED RESPIRATORY (INHALATION) EVERY 6 HOURS PRN
Status: DISCONTINUED | OUTPATIENT
Start: 2020-08-09 | End: 2020-08-09 | Stop reason: HOSPADM

## 2020-08-09 RX ADMIN — PREGABALIN 150 MG: 75 CAPSULE ORAL at 09:13

## 2020-08-09 RX ADMIN — METOPROLOL TARTRATE 25 MG: 25 TABLET, FILM COATED ORAL at 09:13

## 2020-08-09 RX ADMIN — ATORVASTATIN CALCIUM 80 MG: 40 TABLET, FILM COATED ORAL at 09:13

## 2020-08-09 RX ADMIN — TRAMADOL HYDROCHLORIDE 100 MG: 50 TABLET, FILM COATED ORAL at 07:13

## 2020-08-09 RX ADMIN — PROCHLORPERAZINE MALEATE 5 MG: 5 TABLET ORAL at 12:08

## 2020-08-09 RX ADMIN — OMEPRAZOLE 20 MG: 20 CAPSULE, DELAYED RELEASE ORAL at 09:13

## 2020-08-09 RX ADMIN — ALBUTEROL SULFATE 2 PUFF: 90 AEROSOL, METERED RESPIRATORY (INHALATION) at 07:33

## 2020-08-09 RX ADMIN — FLUTICASONE PROPIONATE 1 SPRAY: 50 SPRAY, METERED NASAL at 09:16

## 2020-08-09 RX ADMIN — DULOXETINE HYDROCHLORIDE 60 MG: 60 CAPSULE, DELAYED RELEASE ORAL at 09:13

## 2020-08-09 RX ADMIN — LISINOPRIL 20 MG: 20 TABLET ORAL at 09:13

## 2020-08-09 RX ADMIN — AMLODIPINE BESYLATE 5 MG: 5 TABLET ORAL at 09:13

## 2020-08-09 NOTE — DISCHARGE SUMMARY
[facility]    Discharge Summary  Hospitalist    Date of Admission:  8/8/2020  Date of Discharge:  8/9/2020  Discharging Provider: Sy Temple MD  Date of Service (when I saw the patient): 08/09/20    Discharge Diagnoses   Severe hypokalemia.  In the setting of nausea and vomiting, and diarrhea.  COPD, overall stable.  Hypertension.  Anxiety disorder.  Diabetes.    History of Present Illness   Vijaya Manjarrez is an 70 year old female who presented with a broad range of symptoms such as generalized aches and pains, shortness of breath, chest pain, headache, nausea, diarrhea, poor appetite.    Patient presented with patient first presented to the emergency room on 8/4/2020 with about 6-day history of these nonspecific broad range of symptoms.  She was evaluated extensively including a negative COVID-19 test and an unremarkable CT chest which was negative for pneumonia or PE.  Troponin was negative.  Repeat troponin done today was also negative.     She states that she tried taking Zofran 2 days ago for her nausea but that made her symptoms worse.  After taking the Zofran she started experiencing headache, generalized body pains.     She has been experiencing shortness of breath but denies any significant cough.  She has a longstanding history of smoking, last smoking about 4 months ago.  She has a history of COPD but not on oxygen.  In the emergency room she has not been requiring any oxygen.     She also gives a history of nonspecific chest pain going on for almost 10 days now.  Nonradiating retrosternal pain.  No association with activity.  Cannot think of any aggravating or relieving factors.       She has been experiencing nausea, vomiting, diarrhea.  Denies any significant abdominal pain.  No dysuria.  No fever.     She admits that she has been experiencing a lot of anxiety.  She has a history of anxiety disorder and she thinks it has worsened since her symptoms started.     In the ER,  patient's potassium was quite low 2.6.  She was given potassium replacement.  But otherwise her labs were unremarkable.  COVID-19 testing was repeated again (came back negative). Patient is being admitted to internal medicine service as an observation.    Hospital Course     Patient was admitted to internal medicine service.  Started on IV fluid hydration for her dehydration.  Started on antiemetics.    Her symptoms are now resolved.  Denies any nausea/vomiting/diarrhea.  No abdominal pain.  Tolerating a regular diet.    Her potassium was very low at admission which was replaced appropriately.  Repeat potassium remains within the normal limits.    Most of her symptoms have now resolved.  Denies any more chest pain.  Ambulating in the hallway.    She did have a lot of questions about her chronic medical issues such as her fibromyalgia, anxiety disorder, chronic insomnia.  These issues have been bothering her for more than a decade.  Recommended to have a follow-up with her primary care physician regarding these issues.  Recommended cognitive behavioral therapy for her chronic insomnia.      I personally evaluated and examined the patient on the day of discharge.    Sy Temple MD      Pending Results   These results will be followed up by PCP  Unresulted Labs Ordered in the Past 30 Days of this Admission     No orders found for last 31 day(s).               Primary Care Physician   Vidhi Mario        Discharge Disposition   Discharged to home  Condition at discharge: Stable    Consultations This Hospital Stay   ADVANCE DIRECTIVE IP CONSULT    Time Spent on this Encounter   Discharge time: greater than 30 minutes.    Discharge Orders      Reason for your hospital stay    Nausea vomiting hypokalemia     Follow-up and recommended labs and tests     Follow up with primary care provider, Vidhi Mario, within 7 days for hospital follow- up.     Activity    Your activity upon discharge: activity as tolerated      Diet    Follow this diet upon discharge: Orders Placed This Encounter      Regular Diet Adult     Discharge Medications   Current Discharge Medication List      START taking these medications    Details   prochlorperazine (COMPAZINE) 5 MG tablet Take 1 tablet (5 mg) by mouth every 6 hours as needed for vomiting  Qty: 20 tablet, Refills: 0    Associated Diagnoses: Hypokalemia         CONTINUE these medications which have NOT CHANGED    Details   acetaminophen (TYLENOL ARTHRITIS PAIN) 650 MG CR tablet Take 650 mg by mouth 2 times daily as needed       albuterol (PROAIR HFA/PROVENTIL HFA/VENTOLIN HFA) 108 (90 Base) MCG/ACT inhaler Inhale 2 puffs into the lungs every 6 hours  Qty: 18 g, Refills: 1    Associated Diagnoses: Chronic obstructive pulmonary disease, unspecified COPD type (H)      metFORMIN (GLUCOPHAGE) 500 MG tablet TAKE ONE TABLET BY MOUTH TWICE A DAY WITH A MEAL  Qty: 180 tablet, Refills: 1    Associated Diagnoses: Type 2 diabetes mellitus with diabetic neuropathy, without long-term current use of insulin (H)      metoprolol tartrate (LOPRESSOR) 25 MG tablet Take 1 tablet (25 mg) by mouth 2 times daily  Qty: 180 tablet, Refills: 1    Associated Diagnoses: Benign essential hypertension      Multiple Vitamins-Minerals (MULTIVITAMIN ADULT PO) Take 5-6 tablets by mouth daily Pro-caps vitamin      naproxen sodium (ALEVE) 220 MG tablet Take 220 mg by mouth as needed for moderate pain      nitroGLYcerin (NITROSTAT) 0.4 MG sublingual tablet For chest pain place 1 tablet under the tongue every 5 minutes for 3 doses. If symptoms persist 5 minutes after 1st dose call 911.  Qty: 30 tablet, Refills: 3    Associated Diagnoses: Coronary artery disease involving native coronary artery, angina presence unspecified, unspecified whether native or transplanted heart      nystatin (MYCOSTATIN) 348494 UNIT/GM external powder APPLY TO AFFECTED AREA(S) TOPICALLY THREE TIMES A DAY AS NEEDED  Qty: 60 g, Refills: 0    Associated  Diagnoses: Yeast infection of the skin      traMADol (ULTRAM) 50 MG tablet TAKE TWO TABLETS BY MOUTH THREE TIMES DAILY  Qty: 180 tablet, Refills: 0    Associated Diagnoses: Facet arthropathy, cervical; Facet arthropathy, lumbar; Fibromyalgia      traZODone (DESYREL) 100 MG tablet TAKE TWO TABLETS BY MOUTH EVERY NIGHT AT BEDTIME  Qty: 180 tablet, Refills: 1    Associated Diagnoses: Insomnia, unspecified type      amLODIPine (NORVASC) 5 MG tablet Take 1 tablet (5 mg) by mouth daily  Qty: 90 tablet, Refills: 1    Associated Diagnoses: Benign essential hypertension      atorvastatin (LIPITOR) 80 MG tablet Take 1 tablet (80 mg) by mouth daily Recheck cholesterol in 3 months  Qty: 90 tablet, Refills: 1    Associated Diagnoses: Hyperlipidemia LDL goal <100      blood glucose monitoring (NO BRAND SPECIFIED) test strip Use to test blood sugars 1 times daily or as directed, use brand same as previous  Qty: 100 strip, Refills: 3    Associated Diagnoses: Type 2 diabetes mellitus with diabetic neuropathy, without long-term current use of insulin (H)      cetirizine (ZYRTEC) 10 MG tablet TAKE ONE TABLET BY MOUTH ONCE DAILY  Qty: 90 tablet, Refills: 3    Associated Diagnoses: Seasonal allergic rhinitis, unspecified trigger      cyclobenzaprine (FLEXERIL) 10 MG tablet TAKE ONE TABLET BY MOUTH TWICE A DAY  Qty: 180 tablet, Refills: 1    Associated Diagnoses: Muscle spasm      DULoxetine (CYMBALTA) 60 MG capsule Take 1 capsule (60 mg) by mouth daily  Qty: 90 capsule, Refills: 3    Associated Diagnoses: Lumbar degenerative disc disease      fluticasone (FLONASE) 50 MCG/ACT nasal spray SPRAY 2 SPRAYS IN EACH NOSTRIL ONCE DAILY  Qty: 16 g, Refills: 1    Associated Diagnoses: Seasonal allergic rhinitis due to pollen      hydrOXYzine (ATARAX) 10 MG tablet TAKE ONE TABLET BY MOUTH THREE TIMES A DAY AS NEEDED FOR ANXIETY  Qty: 30 tablet, Refills: 0    Associated Diagnoses: BRADY (generalized anxiety disorder)      lisinopril (ZESTRIL) 20 MG  tablet Take 1 tablet (20 mg) by mouth 2 times daily  Qty: 180 tablet, Refills: 1    Associated Diagnoses: Benign essential hypertension      montelukast (SINGULAIR) 10 MG tablet TAKE ONE TABLET BY MOUTH AT BEDTIME  Qty: 90 tablet, Refills: 1    Associated Diagnoses: Chronic obstructive pulmonary disease, unspecified COPD type (H)      omeprazole (PRILOSEC) 20 MG DR capsule Take 1 capsule (20 mg) by mouth 2 times daily  Qty: 180 capsule, Refills: 1    Associated Diagnoses: Gastroesophageal reflux disease without esophagitis      order for DME Equipment being ordered: glucometer  Qty: 1 each, Refills: 0    Associated Diagnoses: Type 2 diabetes mellitus with diabetic neuropathy, without long-term current use of insulin (H)      pregabalin (LYRICA) 150 MG capsule Take 1 capsule (150 mg) by mouth 3 times daily  Qty: 270 capsule, Refills: 1    Associated Diagnoses: Fibromyalgia; Chronic bilateral low back pain without sciatica      tiotropium (SPIRIVA) 18 MCG inhaled capsule Inhale 1 capsule (18 mcg) into the lungs daily  Qty: 90 capsule, Refills: 1    Associated Diagnoses: Chronic obstructive pulmonary disease, unspecified COPD type (H)           Allergies   Allergies   Allergen Reactions     Penicillins Hives     Data   Most Recent 3 CBC's:  Recent Labs   Lab Test 08/08/20  1116 08/04/20  1729 05/20/20  1415   WBC 11.4* 13.7* 9.0   HGB 14.1 12.7 12.0   MCV 82 82 83   * 341 294      Most Recent 3 BMP's:  Recent Labs   Lab Test 08/09/20  0754 08/08/20  1939 08/08/20  1116 08/04/20  1652     --  139 139   POTASSIUM 4.1 3.8 2.6* 3.3*   CHLORIDE 112*  --  106 107   CO2 25  --  24 23   BUN 9  --  10 9   CR 0.51*  --  0.62 0.63   ANIONGAP 3  --  9 9   RADHA 9.0  --  9.3 8.7   *  --  183* 160*     Most Recent 2 LFT's:  Recent Labs   Lab Test 08/08/20  1116 05/20/20  1415   AST 26 20   ALT 31 35   ALKPHOS 112 103   BILITOTAL 0.5 0.5     Most Recent INR's and Anticoagulation Dosing History:  Anticoagulation Dose  History     Recent Dosing and Labs Latest Ref Rng & Units 2/21/2019    INR 0.86 - 1.14 0.97        Most Recent 3 Troponin's:  Recent Labs   Lab Test 08/08/20  1939 08/08/20  1116 08/04/20  1652   TROPI <0.015 <0.015 <0.015     Most Recent Cholesterol Panel:  Recent Labs   Lab Test 11/13/19  1505   CHOL 164   LDL 83   HDL 36*   TRIG 225*     Most Recent 6 Bacteria Isolates From Any Culture (See EPIC Reports for Culture Details):  Recent Labs   Lab Test 07/02/17 2059 07/02/17  2023 06/16/17  1509 06/16/17  1500   CULT No growth No growth No growth No growth     Most Recent TSH, T4 and A1c Labs:  Recent Labs   Lab Test 05/20/20  1415   TSH 1.22   A1C 7.3*

## 2020-08-09 NOTE — PROGRESS NOTES
DOLORES called and scheduled a ride through her insurance provider [employer].    Bijan and Kamila Garcia will  at 3.         LISANDRA Vasques, [facility]  [phone]  [address]   [facility]. [id number]  [facility]

## 2020-08-09 NOTE — PROGRESS NOTES
Spiritual Assessment Progress Note  [facility]  [address]      PRIMARY FOCUS:      Emotional/spiritual/Hindu distress    Support for coping    ILLNESS CIRCUMSTANCES:    Reviewed documentation. Reflective conversation shared with Pat which integrated elements of illness and family narratives.  The initial reason for the visit was request for HCD.  However, most of the visit was about how hard Pat's life has been.  She said she has fibromyalgia and has been disabled for a number of years.  She is estranged from a son and several other family members.  She is very isolated in her apt because of COVID but also admits that she is a loner without much support.           Context of Serious Illness/Symptom(s) - lots of aches and pains; she states she has fibromyalgia.      Resources for Support - son who lives in [place].      DISTRESS:      Emotional/ ExistentialRelational Distress - lonely, isolated, estranged from son and other family members    Spiritual/Lutheran Distress - not discussed    Social/Cultural/EconomicDistress - isolated from others, on disability       SPIRITUAL/Druze (Coping):      Scientology/Chica - not discussed    Spiritual Practice(s) - not discussed    Emotional/Existential/ Relationall/Connections - son who lives in [place]      GOALS OF CARE:    Goals of Care - she is thinking about restarting some therapy.  She would be open to visiting with a  at [facility].    Meaning/Sense-Making - She is doing some reflecting on her life and what it has meant.      PLAN: Try to see if  at [facility] would contact her for some follow up.        Lima Santacruz    Pager [phone]

## 2020-08-09 NOTE — PROGRESS NOTES
OBSERVATION patient END time: 1655    Pt to D/C to home.  Pt provided with d/c instructions, including new medications, when medications were last given, and when to take them again.  Pt also informed to f/u with  PCP in 7 days.  Pt verbalized understanding of all d/c and f/u instructions.  All questions were answered at this time.  Copy of paperwork sent with pt.  Medication sent with patient.  [employer] to provide transport.  All personal belongings sent with pt (clothing, cellphone, , glasses, purse).

## 2020-08-09 NOTE — PROGRESS NOTES
Pt reports she takes tramadol 50 mg TID for chronic pain & would like order restarted, reporting pain 8/10, MD paged.

## 2020-08-09 NOTE — PLAN OF CARE
"PRIMARY DIAGNOSIS: \"GENERIC\" NURSING  OUTPATIENT/OBSERVATION GOALS TO BE MET BEFORE DISCHARGE:  ADLs back to baseline: Yes    Activity and level of assistance: Ambulating independently.    Pain status: Pain free.    Return to near baseline physical activity: Yes     Discharge Planner Nurse   Safe discharge environment identified: Yes  Barriers to discharge: No       Entered by: Clara Potts 08/09/2020 1:22 PM     Please review provider order for any additional goals.   Nurse to notify provider when observation goals have been met and patient is ready for discharge.  "

## 2020-08-09 NOTE — PROGRESS NOTES
PRIMARY DIAGNOSIS: HYPOKALEMIA  OUTPATIENT/OBSERVATION GOALS TO BE MET BEFORE DISCHARGE:  1. ADLs back to baseline: No    2. Activity and level of assistance: Up with 1 assist, unsteady @ times.    3. Pain status: Improved-controlled with oral pain medications.    4. Return to near baseline physical activity: No     Discharge Planner Nurse   Safe discharge environment identified: Yes  Barriers to discharge: Yes       Entered by: Megan Cyr 08/09/2020 4:34 AM     Please review provider order for any additional goals.   Nurse to notify provider when observation goals have been met and patient is ready for discharge.    End of Shift Summary  For vital signs and complete assessments, please see documentation flowsheets.    Pertinent assessments:Tachy. LS fine crackles t/o, dry cough, SOB.   Treatment Plan: IVF, tele, pain management & lab monitoring.    Discharge Readiness: Medically active  Expected Discharge Date: TBD  Discharge Disposition: Home with Self care  Barriers/Criteria for discharge: Ongoing tx.

## 2020-08-09 NOTE — PROGRESS NOTES
PRIMARY DIAGNOSIS: HYPOKALEMIA  OUTPATIENT/OBSERVATION GOALS TO BE MET BEFORE DISCHARGE:  1. ADLs back to baseline: No    2. Activity and level of assistance: Up with assist of 1    3. Pain status: Improved-controlled with oral pain medications.    4. Return to near baseline physical activity: No - unsteady, up with assistance.     Discharge Planner Nurse   Safe discharge environment identified: Yes  Barriers to discharge: Yes - On going tx.       Entered by: Megan Cyr 08/09/2020 1:58 AM     Please review provider order for any additional goals.   Nurse to notify provider when observation goals have been met and patient is ready for discharge.    End of Shift Summary  For vital signs and complete assessments, please see documentation flowsheets.     Pertinent assessments: Tachy. LS fine crackles t/o, dry cough, SOB. K+ recheck 3.8  Major Shift Events: Pain 8/10, tylenol & tramadol given.   Treatment Plan: IVF, tele, pain management & lab monitoring.

## 2020-08-10 ENCOUNTER — PATIENT OUTREACH (OUTPATIENT)
Dept: GERIATRIC MEDICINE | Facility: CLINIC | Age: 70
End: 2020-08-10

## 2020-08-10 ENCOUNTER — TELEPHONE (OUTPATIENT)
Dept: INTERNAL MEDICINE | Facility: CLINIC | Age: 70
End: 2020-08-10

## 2020-08-10 LAB — INTERPRETATION ECG - MUSE: NORMAL

## 2020-08-10 NOTE — TELEPHONE ENCOUNTER
Patient calling  She has a MRI scheduled to tomorrow and is having a hard time finding a ride. Patient wants to know how important it is to have this done tomorrow or if its something that can wait for another day. Please advise. Ok to call and mabel [phone]

## 2020-08-10 NOTE — PROGRESS NOTES
[facility] Care Coordination Contact    Return call from [facility]. She states member does not need anyone present with her - she does not have orders for any sedation. Member needs to arrive at 2:15 and MRI should last about 30 minutes. Check in at [address]. Return call to member with this update. She does not think she needs STS and will use the regular cab. Writer to call [employer] and schedule.     Vanesa Gtz RN  [facility]  [phone]  Fax: [phone]

## 2020-08-10 NOTE — PROGRESS NOTES
[facility] Care Coordination Contact    Member shares that she has an MRI tomorrow, and they said she needs to have someone present with her. Member does not have anyone to accompany her to this appointment. Writer stated we could try to arrange medial transportation (door to door with wheelchair) if this would be sufficient. Member has a cousin who is of town and will not be back until Wednesday. She thinks cousin would help once she is back if MRI could be rescheduled. Call to [facility] to ask if medical transportation would work for tomorrow. Waited on hold for an extended time, and then left a  requesting a return call to discuss. Will attempt to schedule medical transportation if deemed appropriate.     Vanesa Gtz RN  [facility]  [phone]  Fax: [phone]

## 2020-08-10 NOTE — PROGRESS NOTES
[facility] Care Coordination Contact    Arranged transportation thru [facility] for the below appt:  Appt Date & Time: 08/11/2020 2:15pm  Clinic Name & Address:  [facility] [address] [place] [id number]  Transportation Provider: [employer] [phone]   time:  1:15pm-1:30pm.  Will call return ride.    Notified CC of  time.  CC will call member and provide ride information.    Cynthia Bell  Care Management Specialist  [facility]  [phone]

## 2020-08-10 NOTE — PROGRESS NOTES
[facility] Care Coordination Contact    CC received notification of discharge to home. Discharge occurred on 8/9/20. Member was evaluated in the ED on 8/8 due to chest pain and then admitted under observation overnight.  CC contacted member and reviewed discharge summary.  Member has a follow-up appointment with PCP in 7 days: No- MRI scheduled for tomorrow and worried about getting to that appointment. Writer will look into STS options.   Member has had a change in condition: No  Home visit needed: No  Care plan reviewed and updated.  The following home based services Homem[employer] were resumed.  New referrals placed: No   PCP notified of transition back to home via EMR.    Vanesa Gtz RN  [facility]  [phone]  Fax: [phone]

## 2020-08-11 ENCOUNTER — HOSPITAL ENCOUNTER (OUTPATIENT)
Dept: MRI IMAGING | Facility: CLINIC | Age: 70
Discharge: HOME | End: 2020-08-11
Attending: PHYSICAL MEDICINE & REHABILITATION | Admitting: PHYSICAL MEDICINE & REHABILITATION
Payer: COMMERCIAL

## 2020-08-11 DIAGNOSIS — M54.16 LUMBAR RADICULITIS: ICD-10-CM

## 2020-08-11 PROCEDURE — 72148 MRI LUMBAR SPINE W/O DYE: CPT

## 2020-08-11 NOTE — TELEPHONE ENCOUNTER
Patient plans on keeping MRI appt today, will take a cab.  States she wants to find out what is going on with her and would prefer not to put it off.  ARELY Farooq R.N.

## 2020-08-12 ENCOUNTER — TELEPHONE (OUTPATIENT)
Dept: PALLIATIVE MEDICINE | Facility: CLINIC | Age: 70
End: 2020-08-12

## 2020-08-12 DIAGNOSIS — M47.817 LUMBOSACRAL SPONDYLOSIS WITHOUT MYELOPATHY: Primary | ICD-10-CM

## 2020-08-12 NOTE — TELEPHONE ENCOUNTER
Called patient to review results of lumbar MRI. She has severe facet arthropathy at multiple levels and moderate to severe bilateral neural foraminal narrowing at L5-S1. She reports that back pain is most bothersome for her. Discussed proceeding with lumbar facet joint injections which she is agreeable to. Order placed.     Jazmine Saeed MD  [facility]

## 2020-08-13 NOTE — TELEPHONE ENCOUNTER
"Patient will call back once she gets back her COVID test        Screening Questions for Radiology Injections:    Injection to be done at which interventional clinic site? [facility]    Instruct patient to arrive as directed prior to the scheduled appointment time:    [place] minutes before      [place]: 30 minutes before; if IV needed 1 hour before     Dr. Stanton-no IV needed for Cervical WADE; please instruct to arrive 30\" early    Procedure ordered by Darlin     Procedure ordered? Bilateral Lumbar facet joint injections    As a reminder, receiving steroids can decrease your body's ability to fight infection.   Would you still like to move forward with scheduling the injection?  Yes      Transforaminal Cervical WADE - no pain provider currently performing    What insurance would patient like us to bill for this procedure? [employer]       Worker's comp or MVA (motor vehicle accident) -Any injection DO NOT SCHEDULE and route to Brynn Juan Pablo.      [employer] insurance - For SI joint injections, DO NOT SCHEDULE and route Brynn Juan Pablo.       [employer] - Any injection besides hip/shoulder/knee joint DO NOT SCHEDULE and route to Brynn Juan Pablo. She will obtain PA and call pt back to schedule procedure or notify pt of denial.       [employer]-Route to [place] for review      **[employer]BS- ALL need to be routed to [place] for review if a PA is needed**      IF SCHEDULING IN [place] AND NEEDS A PA, IT IS OKAY TO SCHEDULE. [place] HANDLES THEIR OWN PA'S AFTER THE PATIENT IS SCHEDULED. PLEASE SCHEDULE AT LEAST 1 WEEK OUT SO A PA CAN BE OBTAINED.    Any chance of pregnancy? NO   If YES, do NOT schedule and route to RN [place]    Is an  needed? No     Patient has a drive home? (mandatory) YES: informed     Is patient taking any blood thinners (i.e. plavix, coumadin, jantoven, warfarin, heparin, pradaxa or dabigatran, etc)? No   If hold needed, do NOT schedule, route to RN pool     Is patient taking any aspirin products " (includes Excedrin and Fiorinal)? No     If more than 325mg/day, OK to schedule; Instruct pt to decrease to less than 325 mg for 7 days AND route to RN pool    For CERVICAL procedures, hold all aspirin products for 6 days.     Tell pt that if aspirin product is not held for 6 days, the procedure WILL BE cancelled.      Does the patient have a bleeding or clotting disorder? No     If YES, okay to schedule AND route to RN nurse pool    For any patients with platelet count <100, must be forwarded to provider    Is patient diabetic?  Yes  If YES, instruct them to bring their glucometer.    Does patient have an active infection or treated for one within the past week? Yes - possible COVID symptoms waiting for test results      Is patient currently taking any antibiotics?  No     For patients on chronic, preventative, or prophylactic antibiotics, procedures may be scheduled.     For patients on antibiotics for active or recent infection:antibiotic course must have been completed for 4 days    Is patient currently taking any steroid medications? (i.e. Prednisone, Medrol)  No     For patients on steroid medications, course must have been completed for 4 days    Is patient actively being treated for cancer or immunocompromised? No  If YES, do NOT schedule and route to RN pool     Are you able to get on and off an exam table with minimal or no assistance? Yes  If NO, do NOT schedule and route to RN pool    Are you able to roll over and lay on your stomach with minimal or no assistance? Yes  If NO, do NOT schedule and route to RN pool     Any allergies to contrast dye, iodine, shellfish, or numbing and steroid medications? No  If YES, route to RN pool AND add allergy information to appointment notes    Allergies: Penicillins      Has the patient had a flu shot or any other vaccinations within 7 days before or after the procedure.  No     Does patient have an MRI/CT?  YES: 2020  Check Procedure Scheduling Grid to see if  required.      Was the MRI done within the last 3 years?  Yes    If yes, where was the MRI done i.e.[facility], [facility], [place], [facility] etc?       If no, do not schedule and route to RN pool    If MRI was not done at [place], [facility] or [facility] do NOT schedule and route to RN pool.      If pt has an imaging disc, the injection MAY be scheduled but pt has to bring disc to appt.     If they show up without the disc the injection cannot be done    Procedure Specific Instructions:      If celiac plexus block, informed patient NPO for 6 hours and that it is okay to take medications with sips of water, especially blood pressure medications  Not Applicable         If this is for a cervical procedure, informed patient that aspirin needs to be held for 6 days.   Not Applicable      If IV needed:    Do not schedule procedures requiring IV placement in the first appointment of the day or first appointment after lunch. Do NOT schedule at 0745, 0815 or 1245.     Instructed pt to arrive 30 minutes early for IV start if required. (Check Procedure Scheduling Grid)  Not Applicable    Reminders:      If you are started on any steroids or antibiotics between now and your appointment, you must contact us because the procedure may need to be cancelled.  No      For all procedures except radiofrequency ablations (RFAs) and spinal cord stimulator (SCS) trials, informed patient:    IV sedation is not provided for this procedure.  If you feel that an oral anti-anxiety medication is needed, you can discuss this further with your referring provider or primary care provider.  The Pain Clinic provider will discuss specifics of what the procedure includes at your appointment.  Most procedures last 10-20 minutes.  We use numbing medications to help with any discomfort during the procedure.  Not Applicable      For patients 85 or older we recommend having an adult stay w/ them for the remainder of the day.       Does the  patient have any questions?  NO  Nicole Barrientos  [facility] [place]

## 2020-08-14 ENCOUNTER — VIRTUAL VISIT (OUTPATIENT)
Dept: INTERNAL MEDICINE | Facility: CLINIC | Age: 70
End: 2020-08-14
Payer: COMMERCIAL

## 2020-08-14 DIAGNOSIS — E87.6 HYPOKALEMIA: ICD-10-CM

## 2020-08-14 DIAGNOSIS — R10.13 EPIGASTRIC PAIN: ICD-10-CM

## 2020-08-14 DIAGNOSIS — B34.9 VIRAL SYNDROME: Primary | ICD-10-CM

## 2020-08-14 PROCEDURE — 99495 TRANSJ CARE MGMT MOD F2F 14D: CPT | Performed by: INTERNAL MEDICINE

## 2020-08-14 NOTE — PROGRESS NOTES
"Viajya Manjarrez is a 70 year old female who is being evaluated via a billable video visit.      The patient has been notified of following:     \"This video visit will be conducted via a call between you and your physician/provider. We have found that certain health care needs can be provided without the need for an in-person physical exam.  This service lets us provide the care you need with a video conversation.  If a prescription is necessary we can send it directly to your pharmacy.  If lab work is needed we can place an order for that and you can then stop by our lab to have the test done at a later time.    Video visits are billed at different rates depending on your insurance coverage.  Please reach out to your insurance provider with any questions.    If during the course of the call the physician/provider feels a video visit is not appropriate, you will not be charged for this service.\"    Patient has given verbal consent for Video visit? Yes  How would you like to obtain your AVS? Mail a copy  If you are dropped from the video visit, the video invite should be resent to: Text to cell phone: [phone]  Will anyone else be joining your video visit? No    Subjective     Vijaya Manjarrez is a 70 year old female who presents today via video visit for the following health issues:    HPI    Video visit start time: 3:07    Hospital Follow-up Visit:    Hospital/Nursing Home/IP Rehab Facility: [facility]  Date of Admission: 08/08/2020  Date of Discharge: 08/09/2020  Reason(s) for Admission: Suspect COVID, nausea, vomiting, diarrhea, hypokalemia      Was your hospitalization related to COVID-19? No Testing was negative though there was some suspicion of this  Problems taking medications regularly:  None  Medication changes since discharge: none  Problems adhering to non-medication therapy:  None    Summary of hospitalization:  [address] discharge summary reviewed  Diagnostic " Tests/Treatments reviewed.  Follow up needed: Potassium level scheduled for 8/17/2020  Other Healthcare Providers Involved in Patient s Care:         None  Update since discharge: Minimally improved   She had problems with nausea, vomiting, diarrhea, these all resolved while she was in the hospital.  They reported COPD exacerbation but she was really not having significant change in her breathing but did have some dry cough.  COVID testing was negative.  She was given potassium replacement and discharged home the next day after she was able to eat.    She reports she still has episodic nausea, Compazine helps a little bit.  She is not eating very well, still feels very tired and wiped out.  She has some dry cough, is using her albuterol regularly but it only seems to help cough and breathing for short time.  She reports that she is having some central abdominal discomfort that does not seem to be necessarily related to her bowels.  She feels a little constipated the last few days and is on stool softener but also taking quite a bit of Pepto-Bismol since it seems to help her stomach.  She is taking Aleve fairly regularly.    She has had ongoing back pain, recently had an MRI and pain clinic is going to do back injections but wants her to recover longer before they do this.    She reports her blood sugars are doing okay.  Her blood pressure is varying anywhere between 105/73 up to 160/100.  Most of them are 1 20-1 30.  She does seem a little lightheaded when it is lower.  She has had episodic lightheadedness and some falling which may be more related to the generalized weakness.    She is concerned about her potassium, did  some smart water asking if that may be helpful.        Post Discharge Medication Reconciliation: discharge medications reconciled, continue medications without change.  Plan of care communicated with patient          Patient Active Problem List   Diagnosis     HCD (health care directive)      Type 2 diabetes mellitus with diabetic neuropathy, without long-term current use of insulin (H)     COPD (chronic obstructive pulmonary disease) (H)     Cervical spine degeneration     Facet arthropathy, lumbar     Lumbar degenerative disc disease     Migraine headache     Diabetic neuropathy (H)     Anxiety     GERD (gastroesophageal reflux disease)     Hyperlipidemia LDL goal <100     Fibromyalgia     Benign essential hypertension     Controlled substance agreement signed- ok 5/12/20     Chronic pain syndrome     Health Care Home     Advanced directives, counseling/discussion     Narcotic dependence (H)     Mild episode of recurrent major depressive disorder (H)     Coronary artery disease involving native coronary artery, angina presence unspecified, unspecified whether native or transplanted heart     Dyslipidemia     Status post coronary angiogram     Coronary artery disease involving native coronary artery of native heart without angina pectoris     Hypokalemia     Current Outpatient Medications   Medication Sig Dispense Refill     albuterol (PROAIR HFA/PROVENTIL HFA/VENTOLIN HFA) 108 (90 Base) MCG/ACT inhaler Inhale 2 puffs into the lungs every 6 hours 18 g 1     amLODIPine (NORVASC) 5 MG tablet Take 1 tablet (5 mg) by mouth daily 90 tablet 1     atorvastatin (LIPITOR) 80 MG tablet Take 1 tablet (80 mg) by mouth daily Recheck cholesterol in 3 months 90 tablet 1     blood glucose monitoring (NO BRAND SPECIFIED) test strip Use to test blood sugars 1 times daily or as directed, use brand same as previous 100 strip 3     cetirizine (ZYRTEC) 10 MG tablet TAKE ONE TABLET BY MOUTH ONCE DAILY 90 tablet 3     cyclobenzaprine (FLEXERIL) 10 MG tablet TAKE ONE TABLET BY MOUTH TWICE A  tablet 1     DULoxetine (CYMBALTA) 60 MG capsule Take 1 capsule (60 mg) by mouth daily 90 capsule 3     fluticasone (FLONASE) 50 MCG/ACT nasal spray SPRAY 2 SPRAYS IN EACH NOSTRIL ONCE DAILY 16 g 1     lisinopril (ZESTRIL) 20 MG  tablet Take 1 tablet (20 mg) by mouth 2 times daily 180 tablet 1     metFORMIN (GLUCOPHAGE) 500 MG tablet TAKE ONE TABLET BY MOUTH TWICE A DAY WITH A MEAL 180 tablet 1     metoprolol tartrate (LOPRESSOR) 25 MG tablet Take 1 tablet (25 mg) by mouth 2 times daily 180 tablet 1     montelukast (SINGULAIR) 10 MG tablet TAKE ONE TABLET BY MOUTH AT BEDTIME 90 tablet 1     Multiple Vitamins-Minerals (MULTIVITAMIN ADULT PO) Take 5-6 tablets by mouth daily Pro-caps vitamin       naproxen sodium (ALEVE) 220 MG tablet Take 220 mg by mouth as needed for moderate pain       nitroGLYcerin (NITROSTAT) 0.4 MG sublingual tablet For chest pain place 1 tablet under the tongue every 5 minutes for 3 doses. If symptoms persist 5 minutes after 1st dose call 911. 30 tablet 3     nystatin (MYCOSTATIN) 324591 UNIT/GM external powder APPLY TO AFFECTED AREA(S) TOPICALLY THREE TIMES A DAY AS NEEDED 60 g 0     omeprazole (PRILOSEC) 20 MG DR capsule Take 1 capsule (20 mg) by mouth 2 times daily 180 capsule 1     order for DME Equipment being ordered: glucometer 1 each 0     pregabalin (LYRICA) 150 MG capsule Take 1 capsule (150 mg) by mouth 3 times daily 270 capsule 1     prochlorperazine (COMPAZINE) 5 MG tablet Take 1 tablet (5 mg) by mouth every 6 hours as needed for vomiting 20 tablet 0     tiotropium (SPIRIVA) 18 MCG inhaled capsule Inhale 1 capsule (18 mcg) into the lungs daily 90 capsule 1     traMADol (ULTRAM) 50 MG tablet TAKE TWO TABLETS BY MOUTH THREE TIMES DAILY 180 tablet 0     traZODone (DESYREL) 100 MG tablet TAKE TWO TABLETS BY MOUTH EVERY NIGHT AT BEDTIME 180 tablet 1     acetaminophen (TYLENOL ARTHRITIS PAIN) 650 MG CR tablet Take 650 mg by mouth 2 times daily as needed        hydrOXYzine (ATARAX) 10 MG tablet TAKE ONE TABLET BY MOUTH THREE TIMES A DAY AS NEEDED FOR ANXIETY (Patient not taking: Reported on 8/14/2020) 30 tablet 0      Social History     Tobacco Use     Smoking status: Former Smoker     Packs/day: 1.00     Years:  40.00     Pack years: 40.00     Types: Cigarettes     Last attempt to quit: 6/1/2017     Years since quitting: 3.2     Smokeless tobacco: Never Used   Substance Use Topics     Alcohol use: No     Drug use: No          Reviewed and updated as needed this visit by Provider         Review of Systems   No fever, chills, some mild dyspnea, dry cough, no further diarrhea, no blood in the stool,      Objective           Vitals:  No vitals were obtained today due to virtual visit.    Physical Exam     GENERAL: Healthy, alert and no distress  EYES: Eyes grossly normal to inspection.  No discharge or erythema, or obvious scleral/conjunctival abnormalities.  RESP: No audible wheeze, cough, or visible cyanosis.  No visible retractions or increased work of breathing.    SKIN: Visible skin clear. No significant rash, abnormal pigmentation or lesions.  NEURO: Cranial nerves grossly intact.  Mentation and speech appropriate for age.  PSYCH: Mentation appears normal, affect normal/bright, judgement and insight intact, normal speech and appearance well-groomed.              Assessment & Plan     1. Viral syndrome  She had a negative COVID tests, imaging did not suggest COVID changes in the lungs.  Advised that it is very likely she has some other type of viral syndrome however there can be false negatives with cover testing.  Advised that continued malaise is not unusual and it may take her some time to improve, strongly encouraged her to boost her protein in her diet and try to eat well which will help improve her strength.  Call for any new fever, worsening symptoms.    2. Epigastric pain  Advised it is possible the epigastric pain is related to Aleve, recommend stop the medication, continue her omeprazole    3. Hypokalemia  This was probably related to her diarrhea.  Levels supposed to be done on 8/17 that can go through to her primary             Return in about 3 months (around 11/14/2020) for with Primary for diabetes  follow-up.    Disha Van MD  [facility]      Video-Visit Details    Type of service:  Video Visit    Video End Time:3:32    Originating Location (pt. Location): Home    Distant Location (provider location):  home    Platform used for Video Visit: Chana

## 2020-08-17 DIAGNOSIS — E87.6 LOW BLOOD POTASSIUM: ICD-10-CM

## 2020-08-17 PROCEDURE — 83735 ASSAY OF MAGNESIUM: CPT | Performed by: INTERNAL MEDICINE

## 2020-08-17 PROCEDURE — 80048 BASIC METABOLIC PNL TOTAL CA: CPT | Performed by: INTERNAL MEDICINE

## 2020-08-17 PROCEDURE — 36415 COLL VENOUS BLD VENIPUNCTURE: CPT | Performed by: INTERNAL MEDICINE

## 2020-08-18 LAB
ANION GAP SERPL CALCULATED.3IONS-SCNC: 8 MMOL/L (ref 3–14)
BUN SERPL-MCNC: 6 MG/DL (ref 7–30)
CALCIUM SERPL-MCNC: 9.2 MG/DL (ref 8.5–10.1)
CHLORIDE SERPL-SCNC: 108 MMOL/L (ref 94–109)
CO2 SERPL-SCNC: 26 MMOL/L (ref 20–32)
CREAT SERPL-MCNC: 0.78 MG/DL (ref 0.52–1.04)
GFR SERPL CREATININE-BSD FRML MDRD: 77 ML/MIN/{1.73_M2}
GLUCOSE SERPL-MCNC: 124 MG/DL (ref 70–99)
MAGNESIUM SERPL-MCNC: 1.6 MG/DL (ref 1.6–2.3)
POTASSIUM SERPL-SCNC: 4 MMOL/L (ref 3.4–5.3)
SODIUM SERPL-SCNC: 142 MMOL/L (ref 133–144)

## 2020-08-21 ENCOUNTER — TELEPHONE (OUTPATIENT)
Dept: INTERNAL MEDICINE | Facility: CLINIC | Age: 70
End: 2020-08-21

## 2020-08-21 DIAGNOSIS — R11.0 NAUSEA: ICD-10-CM

## 2020-08-21 DIAGNOSIS — R10.84 ABDOMINAL PAIN, GENERALIZED: Primary | ICD-10-CM

## 2020-08-21 DIAGNOSIS — R41.3 MEMORY LOSS: ICD-10-CM

## 2020-08-21 NOTE — TELEPHONE ENCOUNTER
Patient calling  She would like to know what is her next step now that lab work came back. Ok to call and mabel [phone]

## 2020-08-22 NOTE — TELEPHONE ENCOUNTER
Recommend she hold of aleve, per Dr. Van's message.    If her nausea and abdominal pain continue, recommend GI referral

## 2020-08-24 ENCOUNTER — PATIENT OUTREACH (OUTPATIENT)
Dept: GERIATRIC MEDICINE | Facility: CLINIC | Age: 70
End: 2020-08-24

## 2020-08-24 NOTE — PROGRESS NOTES
[facility] Care Coordination Contact    Call from Toshia with [facility] who wanted to pass along concerns the homemaker has. Homemaker shares that member is frequently canceling and then it is difficult to catch up. Homemaker also shares that the condition of the apartment is declining. Member has a history of collecting papers and other items in her apartment. Writer asked if increasing weekly hours would be a possibility. Also asked if it would be possible to do a one time deep clean to help get the apartment organized and in better shape. Toshia is going to check and see if this would be possible, and then writer will call member to discuss.    Vanesa Gtz RN  [facility]  [phone]  Fax: [phone]

## 2020-08-25 NOTE — TELEPHONE ENCOUNTER
Patient stopped Aleve 2 weeks ago with no change in nausea and stomach pain.  She states at this time she really does not want to see a GI provider but really wants to see a neurologist.     Having ear problems, eye pain, headaches, memory issues and would [facility] as she thinks she may have MS.  CKameron Farooq R.N.

## 2020-08-25 NOTE — TELEPHONE ENCOUNTER
Patient calling back and she is still not feeling well and would like to know what she should do. Patient feels she needs to have a referral to see a neurologist    Ok to call and  [phone]

## 2020-08-26 NOTE — TELEPHONE ENCOUNTER
[facility] [phone]   Patient advised referral has been generated, she already has contact information.  ARELY Farooq R.N.

## 2020-08-31 NOTE — PROGRESS NOTES
[facility] Care Coordination Contact    Email from Nereida at [facility] who shares that they can increase homemaking to 4 hours/week effective today. Shared that auth request will be submitted to [facility].     Call to member to share that homemaking will be increased starting today. Discussed that CP letter change will be coming and to please review, sign, and return.     Care plan change letter completed and requested that CMS mail letter and submit new auth.     Vanesa Gtz RN  [facility]  [phone]  Fax: [phone]

## 2020-09-01 NOTE — PROGRESS NOTES
[facility] Care Coordination Contact    2nd Attempt: Signed Letter not received from member, resent per process.   Kalpana Ny  Case Management Specialist  [facility]  [phone]

## 2020-09-02 ENCOUNTER — TRANSFERRED RECORDS (OUTPATIENT)
Dept: HEALTH INFORMATION MANAGEMENT | Facility: CLINIC | Age: 70
End: 2020-09-02

## 2020-09-03 DIAGNOSIS — M47.812 FACET ARTHROPATHY, CERVICAL: ICD-10-CM

## 2020-09-03 DIAGNOSIS — M47.816 FACET ARTHROPATHY, LUMBAR: ICD-10-CM

## 2020-09-03 DIAGNOSIS — M79.7 FIBROMYALGIA: ICD-10-CM

## 2020-09-04 NOTE — TELEPHONE ENCOUNTER
Controlled Substance Refill Request for Tramadol  Problem List Complete:    No     PROVIDER TO CONSIDER COMPLETION OF PROBLEM LIST AND OVERVIEW/CONTROLLED SUBSTANCE AGREEMENT    Last Written Prescription Date:  7/28/20  Last Fill Quantity: 180,   # refills: 0    Last Office Visit with [facility] primary care provider: 8/14/20    Future Office visit:     Controlled substance agreement:   Encounter-Level CSA - 01/12/2017:    Controlled Substance Agreement - Scan on 1/26/2017 12:28 PM: CONTROLLED SUBSTANCE AGREEMENT     Patient-Level CSA:    Controlled Substance Agreement - Opioid - Scan on 2/6/2019  9:37 AM         Last Urine Drug Screen: No results found for: CDAUT, No results found for: COMDAT,   Cannabinoids (70-typ-9-carboxy-9-THC)   Date Value Ref Range Status   03/03/2018 Not Detected NDET^Not Detected ng/mL Final     Comment:     Cutoff for a negative cannabinoid is 50 ng/mL or less.     Phencyclidine (Phencyclidine)   Date Value Ref Range Status   03/03/2018 Not Detected NDET^Not Detected ng/mL Final     Comment:     Cutoff for a negative PCP is 25 ng/mL or less.     Cocaine (Benzoylecgonine)   Date Value Ref Range Status   03/03/2018 Not Detected NDET^Not Detected ng/mL Final     Comment:     Cutoff for a negative cocaine is 150 ng/ml or less.     Methamphetamine (d-Methamphetamine)   Date Value Ref Range Status   03/03/2018 Not Detected NDET^Not Detected ng/mL Final     Comment:     Cutoff for a negative methamphetamine is 500 ng/ml or less.     Opiates (Morphine)   Date Value Ref Range Status   03/03/2018 Not Detected NDET^Not Detected ng/mL Final     Comment:     Cutoff for a negative opiate is 100 ng/ml or less.     Amphetamine (d-Amphetamine)   Date Value Ref Range Status   03/03/2018 Not Detected NDET^Not Detected ng/mL Final     Comment:     Cutoff for a negative amphetamine is 500 ng/mL or less.     Benzodiazepines (Nordiazepam)   Date Value Ref Range Status   03/03/2018 Not Detected NDET^Not Detected ng/mL  Final     Comment:     Cutoff for a negative benzodiazepine is 150 ng/ml or less.     Tricyclic Antidepressants (Desipramine)   Date Value Ref Range Status   03/03/2018 Detected, Abnormal Result (A) NDET^Not Detected ng/mL Final     Comment:     Cutoff for a positive tricyclic antidepressant is greater than 300 ng/ml.  This is an unconfirmed screening result to be used for medical purposes only.   Order [id number] for confirmation or individual confirmation tests to [facility].       Methadone (Methadone)   Date Value Ref Range Status   03/03/2018 Not Detected NDET^Not Detected ng/mL Final     Comment:     Cutoff for a negative methadone is 200 ng/ml or less.     Barbiturates (Butalbital)   Date Value Ref Range Status   03/03/2018 Not Detected NDET^Not Detected ng/mL Final     Comment:     Cutoff for a negative barbituate is 200 ng/ml or less.     Oxycodone (Oxycodone)   Date Value Ref Range Status   03/03/2018 Not Detected NDET^Not Detected ng/mL Final     Comment:     Cutoff for a negative Oxycodone is 100 ng/mL or less.     Propoxyphene (Norpropoxyphene)   Date Value Ref Range Status   03/03/2018 Not Detected NDET^Not Detected ng/mL Final     Comment:     Cutoff for a negative propoxyphene is 300 ng/ml or less     Buprenorphine (Buprenorphine)   Date Value Ref Range Status   03/03/2018 Not Detected NDET^Not Detected ng/mL Final     Comment:     Cutoff for a negative buprenorphine is 10 ng/ml or less        [place] reviewed:  reviewed- no concerns  [place] profile:  [email or web address]

## 2020-09-06 DIAGNOSIS — G47.00 INSOMNIA, UNSPECIFIED TYPE: ICD-10-CM

## 2020-09-08 RX ORDER — TRAZODONE HYDROCHLORIDE 100 MG/1
TABLET ORAL
Qty: 180 TABLET | Refills: 1 | Status: SHIPPED | OUTPATIENT
Start: 2020-09-08 | End: 2021-03-11

## 2020-09-08 NOTE — TELEPHONE ENCOUNTER
Prescription approved per [facility], [facility] or [facility] refill protocol.  Hollie ROQUE - Registered Nurse  [facility]  Acute and Diagnostic Services

## 2020-09-14 RX ORDER — TRAMADOL HYDROCHLORIDE 50 MG/1
TABLET ORAL
Qty: 180 TABLET | Refills: 0 | Status: ON HOLD | OUTPATIENT
Start: 2020-09-14 | End: 2020-10-01

## 2020-09-17 ENCOUNTER — PATIENT OUTREACH (OUTPATIENT)
Dept: GERIATRIC MEDICINE | Facility: CLINIC | Age: 70
End: 2020-09-17

## 2020-09-17 NOTE — PROGRESS NOTES
[facility] Care Coordination Contact    Member calls to share that she accidentally sent a money order to her old homemaking provider ([employer]) instead of [facility]. She spoke to Marito at [facility] who was going to look into this and call her back, but she hasn't heard from him. Writer sent a follow up email to Marito.     Member had her neurology appointment and needs an MRI, EMG, and some blood work. Member also needs to schedule her injections. Member reports feeling overwhelmed with all the appointments. Member was encouraged to schedule and complete these appointments to hopefully finally have some answers and a plan to feel better.     Member shares that she used the [employer] cab lat week and they never came back for her. Her phone  and she ended up walking home. Encouraged that she call [employer] to file a grievance.     Vanesa Gtz RN  [facility]  [phone]  Fax: [phone]

## 2020-09-22 ENCOUNTER — TELEPHONE (OUTPATIENT)
Dept: OPHTHALMOLOGY | Facility: CLINIC | Age: 70
End: 2020-09-22

## 2020-09-22 ENCOUNTER — HOSPITAL ENCOUNTER (EMERGENCY)
Facility: CLINIC | Age: 70
Discharge: HOME | End: 2020-09-22
Attending: EMERGENCY MEDICINE | Admitting: EMERGENCY MEDICINE
Payer: COMMERCIAL

## 2020-09-22 ENCOUNTER — APPOINTMENT (OUTPATIENT)
Dept: CT IMAGING | Facility: CLINIC | Age: 70
End: 2020-09-22
Attending: EMERGENCY MEDICINE
Payer: COMMERCIAL

## 2020-09-22 VITALS
HEART RATE: 71 BPM | RESPIRATION RATE: 18 BRPM | TEMPERATURE: 98.3 F | OXYGEN SATURATION: 98 % | SYSTOLIC BLOOD PRESSURE: 122 MMHG | DIASTOLIC BLOOD PRESSURE: 73 MMHG

## 2020-09-22 DIAGNOSIS — S02.30XA ORBITAL FLOOR (BLOW-OUT) CLOSED FRACTURE (H): ICD-10-CM

## 2020-09-22 DIAGNOSIS — S00.83XA CONTUSION OF FOREHEAD, INITIAL ENCOUNTER: ICD-10-CM

## 2020-09-22 DIAGNOSIS — S06.0X0A CONCUSSION WITHOUT LOSS OF CONSCIOUSNESS, INITIAL ENCOUNTER: ICD-10-CM

## 2020-09-22 DIAGNOSIS — S02.401A CLOSED FRACTURE OF MAXILLARY SINUS, INITIAL ENCOUNTER (H): ICD-10-CM

## 2020-09-22 DIAGNOSIS — S50.812A ABRASION OF LEFT FOREARM, INITIAL ENCOUNTER: ICD-10-CM

## 2020-09-22 LAB
ANION GAP SERPL CALCULATED.3IONS-SCNC: 6 MMOL/L (ref 3–14)
BASOPHILS # BLD AUTO: 0.1 10E9/L (ref 0–0.2)
BASOPHILS NFR BLD AUTO: 0.6 %
BUN SERPL-MCNC: 12 MG/DL (ref 7–30)
CALCIUM SERPL-MCNC: 9.3 MG/DL (ref 8.5–10.1)
CHLORIDE SERPL-SCNC: 108 MMOL/L (ref 94–109)
CO2 SERPL-SCNC: 28 MMOL/L (ref 20–32)
CREAT SERPL-MCNC: 0.76 MG/DL (ref 0.52–1.04)
DIFFERENTIAL METHOD BLD: ABNORMAL
EOSINOPHIL # BLD AUTO: 0.1 10E9/L (ref 0–0.7)
EOSINOPHIL NFR BLD AUTO: 0.4 %
ERYTHROCYTE [DISTWIDTH] IN BLOOD BY AUTOMATED COUNT: 17.2 % (ref 10–15)
GFR SERPL CREATININE-BSD FRML MDRD: 80 ML/MIN/{1.73_M2}
GLUCOSE SERPL-MCNC: 186 MG/DL (ref 70–99)
HCT VFR BLD AUTO: 39.9 % (ref 35–47)
HGB BLD-MCNC: 11.9 G/DL (ref 11.7–15.7)
IMM GRANULOCYTES # BLD: 0.1 10E9/L (ref 0–0.4)
IMM GRANULOCYTES NFR BLD: 0.4 %
LYMPHOCYTES # BLD AUTO: 4.1 10E9/L (ref 0.8–5.3)
LYMPHOCYTES NFR BLD AUTO: 30.2 %
MCH RBC QN AUTO: 26 PG (ref 26.5–33)
MCHC RBC AUTO-ENTMCNC: 29.8 G/DL (ref 31.5–36.5)
MCV RBC AUTO: 87 FL (ref 78–100)
MONOCYTES # BLD AUTO: 0.7 10E9/L (ref 0–1.3)
MONOCYTES NFR BLD AUTO: 4.8 %
NEUTROPHILS # BLD AUTO: 8.7 10E9/L (ref 1.6–8.3)
NEUTROPHILS NFR BLD AUTO: 63.6 %
NRBC # BLD AUTO: 0 10*3/UL
NRBC BLD AUTO-RTO: 0 /100
PLATELET # BLD AUTO: 393 10E9/L (ref 150–450)
POTASSIUM SERPL-SCNC: 3.8 MMOL/L (ref 3.4–5.3)
RBC # BLD AUTO: 4.58 10E12/L (ref 3.8–5.2)
SODIUM SERPL-SCNC: 142 MMOL/L (ref 133–144)
WBC # BLD AUTO: 13.7 10E9/L (ref 4–11)

## 2020-09-22 PROCEDURE — 80048 BASIC METABOLIC PNL TOTAL CA: CPT | Performed by: EMERGENCY MEDICINE

## 2020-09-22 PROCEDURE — 25000132 ZZH RX MED GY IP 250 OP 250 PS 637: Performed by: EMERGENCY MEDICINE

## 2020-09-22 PROCEDURE — 70486 CT MAXILLOFACIAL W/O DYE: CPT

## 2020-09-22 PROCEDURE — 85025 COMPLETE CBC W/AUTO DIFF WBC: CPT | Performed by: EMERGENCY MEDICINE

## 2020-09-22 PROCEDURE — 99285 EMERGENCY DEPT VISIT HI MDM: CPT | Mod: 25

## 2020-09-22 PROCEDURE — 70450 CT HEAD/BRAIN W/O DYE: CPT

## 2020-09-22 PROCEDURE — 93005 ELECTROCARDIOGRAM TRACING: CPT

## 2020-09-22 RX ORDER — ECHINACEA PURPUREA EXTRACT 125 MG
TABLET ORAL
Qty: 15 ML | Refills: 0 | Status: SHIPPED | OUTPATIENT
Start: 2020-09-22 | End: 2020-10-15

## 2020-09-22 RX ORDER — HYDROCODONE BITARTRATE AND ACETAMINOPHEN 5; 325 MG/1; MG/1
1 TABLET ORAL EVERY 6 HOURS PRN
Qty: 10 TABLET | Refills: 0 | Status: SHIPPED | OUTPATIENT
Start: 2020-09-22 | End: 2020-09-29

## 2020-09-22 RX ORDER — CEPHALEXIN 500 MG/1
500 CAPSULE ORAL 2 TIMES DAILY
Qty: 14 CAPSULE | Refills: 0 | Status: SHIPPED | OUTPATIENT
Start: 2020-09-22 | End: 2020-09-29

## 2020-09-22 RX ORDER — ACETAMINOPHEN 500 MG
1000 TABLET ORAL ONCE
Status: COMPLETED | OUTPATIENT
Start: 2020-09-22 | End: 2020-09-22

## 2020-09-22 RX ORDER — IBUPROFEN 600 MG/1
600 TABLET, FILM COATED ORAL EVERY 6 HOURS PRN
Qty: 20 TABLET | Refills: 0 | Status: ON HOLD | OUTPATIENT
Start: 2020-09-22 | End: 2020-10-01

## 2020-09-22 RX ADMIN — ACETAMINOPHEN 1000 MG: 500 TABLET, FILM COATED ORAL at 14:25

## 2020-09-22 ASSESSMENT — ENCOUNTER SYMPTOMS
HEADACHES: 0
WOUND: 1
VOMITING: 0
NAUSEA: 0

## 2020-09-22 NOTE — ED TRIAGE NOTES
Presents to the ED following a fall. States tripped on a hill. Struck head. No LOC. Reports a left sided nose bleed since then. Also reports left arm pain. States feels generally weak.

## 2020-09-22 NOTE — TELEPHONE ENCOUNTER
ED provider called from [facility] regarding patient with orbital fracture with concern for possible entrapment seen on imaging. ED provider stated that they noted deficit in extraocular motility and was concerned about this.     Spoke with the ED provider - who stated that patient has no visual complaints and is otherwise doing well. Given this concern, I advised patient to be evaluated by ophthalmology on an urgent basis for a full examination, given concern for motility deficit. Patient sounds like she is reluctant to seek evaluation and would rather wait till she can follow-up in clinic. I discussed this with the ED provider and still recommended immediate ophthalmology follow-up. Deferred decision making process to patient and ED provider.    Ferny Mac MD  Department of Ophthalmology  Pager: [phone]

## 2020-09-22 NOTE — ED AVS SNAPSHOT
[facility] Emergency Department  [address]  [facility] [address]  Phone:  [phone]  Fax:  [phone]                                    Vijaya Manjarrez   MRN: [id number]    Department:  [facility] Emergency Department   Date of Visit:  9/22/2020           After Visit Summary Signature Page    I have received my discharge instructions, and my questions have been answered. I have discussed any challenges I see with this plan with the nurse or doctor.    ..........................................................................................................................................  Patient/Patient Representative Signature      ..........................................................................................................................................  Patient Representative Print Name and Relationship to Patient    ..................................................               ................................................  Date                                   Time    ..........................................................................................................................................  Reviewed by Signature/Title    ...................................................              ..............................................  Date                                               Time          22EPIC Rev 08/18

## 2020-09-22 NOTE — ED NOTES
Bed: ED03  Expected date: 9/22/20  Expected time: 1:53 PM  Means of arrival: Ambulance  Comments:  BV2

## 2020-09-22 NOTE — ED PROVIDER NOTES
History   Chief Complaint:  Fall    HPI   Vijaya Manjarrez is a 70 year old female with history of CAD, DM type 2, narcotic dependence, and hypertension, who presents via EMS for evaluation after a fall at roughly 0930 AM. She reports she was walking on a grassy hill when she tripped and fell, hitting her head on a tree. She denies loss of consciousness. She also denies headache, chest pain or other symptoms prior to the fall. She reports a left-sided nose bleed since then, as well as left arm pain. She denies headache, nausea, or vomiting or other symptoms. Her last tetanus was 2015. She denies anticoagulation.    Allergies:  Penicillins      Medications:    Albuterol inhaler  Amlodipine   Atorvastatin  Cymbalta  Atarax  Lisinopril  Metformin  Metoprolol tartrate  Singulair   Nitroglycerin PRN  Omeprazole  Lyrica  Compazine  Spiriva inhaler  Trazodone    Tramadol    Past Medical History:    Anxiety   Coronary artery disease    Cervical spine degeneration  Chronic Obstructive Pulmonary Disease  Chronic pain syndrome   Diabetes mellitus type 2  Diabetic neuropathy   Facet arthropathy   Fibromyalgia   Gastroesophageal reflux disease  Hypertension   Hyperlipidemia  lumbar degenerative disc disease  Migraine   Narcotic dependence    Past Surgical History:    Cholecystectomy  Heart catheterization     Family History:    Mother: cancer  Father: lung cancer   Maternal grandmother: breast cancer     Social History:  Smoking Status: former smoker for 40 pack years   Smokeless Tobacco: Never Used  Alcohol Use: No  Drug Use: No  PCP: Vidhi Mario  Marital Status:  Single     Review of Systems   HENT: Positive for nosebleeds.    Cardiovascular: Negative for chest pain.   Gastrointestinal: Negative for nausea and vomiting.   Musculoskeletal:        Left arm pain   Skin: Positive for wound.   Neurological: Negative for syncope and headaches.   All other systems reviewed and are negative.      Physical Exam      Patient Vitals for the past 24 hrs:   BP Temp Temp src Pulse Resp SpO2   09/22/20 1500 128/84 -- -- 74 -- 94 %   09/22/20 1454 -- -- -- -- -- 95 %   09/22/20 1453 125/82 -- -- 73 -- --   09/22/20 1430 -- -- -- -- -- 96 %   09/22/20 1415 -- -- -- -- -- 97 %   09/22/20 1404 133/79 97.9  F (36.6  C) Oral 69 18 97 %      Physical Exam  General: Alert. Appears comfortable  Head:  The R. Frontal scalp with noted contusion  Eyes:  Sclera white; Pupils are equal and round. EOMI. 1mm possible entrapment noted to inferior rectus on left, mild L. Zygomatic arch tenderness noted; IOP 12 right eye; 13 left eye  Visual acuity: 20/25 bilaterally  ENT:    External ears normal.  No hemotympanum.      External nares normal.  No septal hematoma.    Neck:  No midline tenderness or pain with full ROM.  CV:  Rate as above with regular rhythm   No murmur   2/2 radial and dorsal pedal pulses  Resp:  Breath sounds clear and equal bilaterally    Non-labored, no retractions or accessory muscle use  GI:  Abdomen soft, non-tender, non-distended    No rebound tenderness or guarding  MSK:  No midline tenderness or bony step-off    No deformity    Moves all extremities equally and symmetrically    Noted skin abrasion to volar aspect of L. Forearm, no bony tenderness  Skin:  No rash or lesions noted.  Neuro:   No apparent deficit.    Strength 5/5 x4.  Sensation intact x4.      Cranial nerves intact by examination.    GCS: 15  Psych:  Normal affect.        Emergency Department Course   ECG:  Indication: Weakness  Time: 1435  Vent. Rate 73 bpm. NC interval 172. QRS duration 86. QT/QTc 386/425. P-R-T axis 59 36 42.   Sinus rhythm with premature atrial complexes  When compared to 8/8/20 - Premature atrial complexes now present, ST no longer depressed in anterior leads.  Read time: 1437     Imaging:  Radiology findings were communicated with the patient who voiced understanding of the findings.     CT Head w/o IV contrast:   1. No evidence for  intracranial hemorrhage or skull fracture.   2. Left medial and probable inferior orbital floor fractures. These   are incompletely visualized. If clinically indicated, CT of the facial   bones could be helpful in further evaluation, as per radiology.    CT Facial Bones w/o IV contrast:   1. Acute left orbital floor blowout fracture with inferior rectus   muscle herniation suggestive of entrapment.   2. Acute left medial orbital blowout fracture.   3. Acute fractures of the anterior and posterolateral walls of the   left maxillary sinus, as per radiology.     Laboratory:  Laboratory findings were communicated with the patient who voiced understanding of the findings.     CBC: WBC: 13.7 (H), HGB: 11.9, PLT: 393     BMP: Glucose 186 (H), o/w WNL (Creatinine: 0.76)     Interventions:  1425: Tylenol, 1,000 mg, Oral     Emergency Department Course:  Past medical records, nursing notes, and vitals reviewed.     1503:   I performed an exam of the patient as documented above after the patient was signed out to me from Dr. Weinberg.     EKG obtained in the ED, see results above.   IV was inserted and blood was drawn for laboratory testing, results above.  The patient was sent for a CT while in the emergency department, results above.      1520:   The patient's abrasions were cleaned.    1542:   I rechecked the patient and discussed the results of his workup thus far.     1652:   I spoke with Dr. Mac of the Opthalmology service regarding patient's presentation, findings, and plan of care.    1736:   I spoke with Dr. Gutierrez of the Maxillofacial service from the [facility] regarding patient's presentation, findings, and plan of care.     Findings and plan explained to the Patient. Patient discharged home with instructions regarding supportive care, medications, and reasons to return. The importance of close follow-up was reviewed. The patient was prescribed Norco, Keflex, Ocean spray, and Ibuprofen.     I personally reviewed the  "laboratory and imaging results with the Patient and answered all related questions prior to discharge.      Impression & Plan     Medical Decision Making:  Patient is a 70-year-old female presenting status post mechanical fall.  She has obvious facial trauma on exam.  CT head initially ordered by my partner and without intracranial ischemia or hemorrhage though noted to possibly be orbital floor fracture.  Patient was then sent for formal CT face which confirms left orbital blowout fracture as well as maxillary sinus fracture.  I did discuss the case with ophthalmology and maxillofacial teams at the [facility].  The patient does not want to be transferred to their facility for further management.  She is requesting outpatient follow-up.  The patient is to call the clinic tomorrow for evaluation.  In the interim she will be discharged home with analgesia as well as empiric antibiotics in the setting of sinus fracture.  Nasal saline spray as well.  No evidence of increased IOP on exam or significant visual disturbances. Patient does have some signs of entrapment on clinical exam and this was noted on CT. I discussed with patient she is to be managed as sustaining a concussion. The patient understand that they must return if any \"red flags\" appear/develop in the coming hours/days, as this may represent an indication to perform a CT scan.  I have noted that \"red flags\" include: headaches that get worse, increased drowsiness, strange behavior, repetitive speech, seizures, repeated vomiting, growing confusion, increased irritability, slurred speech, weakness or numbness, and loss of responsiveness.  This information will also be provided in writing at discharge.  I have discussed the second impact syndrome, and the importance of not sustaining repeated concussion in the next 1-2 weeks.  Post concussive syndrome was also discussed. Remainder of patient's head to toe exam is without significant trauma. No bony " forearm tenderness, and patient deferred xray which I think is reasonable.  Counseled on wound care instructions.     Diagnosis:    ICD-10-CM    1. Concussion without loss of consciousness, initial encounter  S06.0X0A    2. Abrasion of left forearm, initial encounter  S50.812A    3. Contusion of forehead, initial encounter  S00.83XA    4. Orbital floor (blow-out) closed fracture (H)  S02.30XA    5. Closed fracture of maxillary sinus, initial encounter (H)  S02.401A           Disposition:  Discharged to home.     Discharge Medications:  Discharge Medication List as of 9/22/2020  5:28 PM      START taking these medications    Details   cephALEXin (KEFLEX) 500 MG capsule Take 1 capsule (500 mg) by mouth 2 times daily for 7 days, Disp-14 capsule,R-0, Local Print      HYDROcodone-acetaminophen (NORCO) 5-325 MG tablet Take 1 tablet by mouth every 6 hours as needed for pain, Disp-10 tablet,R-0, Local Print      ibuprofen (ADVIL/MOTRIN) 600 MG tablet Take 1 tablet (600 mg) by mouth every 6 hours as needed for moderate pain, Disp-20 tablet,R-0, Local Print      sodium chloride (OCEAN) 0.65 % nasal spray Apply one spray to nares two times daily, Disp-15 mL,R-0, Local Print              Scribe Disclosure:  I, Melody Cortes, am serving as a scribe at 3:47 PM on 9/22/2020 to document services personally performed by Tyra Elliott DO based on my observations and the provider's statements to me.      Tyra Elliott DO  09/22/20 1926

## 2020-09-22 NOTE — ED NOTES
Patient alert and oriented. Respirations even and unlabored. All discharge education given. All questions answered. All medications explained in detail. Patient denies further needs and states that they are ready to leave. Patient wheeled out of the ER

## 2020-09-23 ENCOUNTER — TELEPHONE (OUTPATIENT)
Dept: OPHTHALMOLOGY | Facility: CLINIC | Age: 70
End: 2020-09-23

## 2020-09-23 ENCOUNTER — PATIENT OUTREACH (OUTPATIENT)
Dept: GERIATRIC MEDICINE | Facility: CLINIC | Age: 70
End: 2020-09-23

## 2020-09-23 LAB — INTERPRETATION ECG - MUSE: NORMAL

## 2020-09-23 NOTE — TELEPHONE ENCOUNTER
"Pt was unable to come in today secondary to transportation    Pt on schedule for tomorrow at 1245 and states has transportation arranged    Eddie Esteban RN 3:28 PM 09/23/20    --    Spoke to pt at 1100    Reviewed recommendations per Bubba for earlier evaluation and able to see today at 2:45 PM with Dr. Hemphill at [facility] location    Pt states will work on transportation and call direct triage number to confirm    Eddie Esteban RN 11:07 AM 09/23/20          Vijaya Manjarrez \"Pat\" [phone]      Left message at 1005    Reviewed able to see at 2:45 Pm today with Dr. Hemphill at [facility] location    Direct number provided to confirm scheduling-- appt time slot on hold at this time for this patient     Eddie Esteban RN 10:15 AM 09/23/20           Health Call Center    Phone Message    May a detailed message be left on voicemail: yes     Reason for Call: Other: Pt called in and made a 9/24 appt with Dr Glaser to evaluate eye trauma. Was in ED 9/22. Thank you    Action Taken: Message routed to:  [facility] E[facility]    Travel Screening: Not Applicable   "

## 2020-09-23 NOTE — PROGRESS NOTES
[facility] Care Coordination Contact  CC received notification of Emergency Room visit.  ER visit occurred on 9/22/20 at [facility] with Dx of concussion and facial fracture.    CC contacted member and left a message requesting a return call.  Member has a follow-up appointment with PCP: Yes: scheduled on 9/28 Needs an ophthalmology appointment as well.    Member has had a change in condition: No  New referrals placed: No  Home Visit Needed: No  Care plan reviewed and updated.  PCP notified of ED visit via EMR.    Vanesa Gtz RN  [facility]  [phone]  Fax: [phone]

## 2020-09-23 NOTE — TELEPHONE ENCOUNTER
FUTURE VISIT INFORMATION      FUTURE VISIT INFORMATION:    Date: 9/24/20    Time: 1:00pm    Location: [facility]  REFERRAL INFORMATION:    Referring providers clinic:  [place] [facility]    Reason for visit/diagnosis  Eye trauma    RECORDS REQUESTED FROM:       Clinic name Comments Records Status Imaging Status   [place] [facility] ED visit 9/22  CT facial bones done 9/22/20  CT Head done 9/22/20 [facility]

## 2020-09-23 NOTE — PROGRESS NOTES
[facility] Care Coordination Contact    Return call from member who expresses difficulty scheduling needed appointments. She needs to make 3 appointments, but is having trouble connecting with the right person to schedule them. Member also needs help getting transportation. Requested that CMS call member (and potentially 3 way call the clinic) to help schedule appointments and rides.     Vanesa Gtz RN  [facility]  [phone]  Fax: [phone]

## 2020-09-23 NOTE — PROGRESS NOTES
[facility] Care Coordination Contact    CMS conference called the following providers (with Vijaya on the line) to confirm appointments and reschedule as needed to ensure times did not overlap.  Arranged transportation thru [facility] for the below appts as indicated:    Thursday, September 24th  Appointment location: [facility] [address], [address]th floor, [place] [address]   time: 11:45am-12:15pm  Transportation Provider: [employer] [phone]  Appointment time: 1:00 pm - I requested arrival by 12:45 for check in.  This appointment is 1-2 hours long.  Return ride: call [employer] directly for your return ride home    Monday, September, 28th  Video conference call with Dr. Mario   Appt time: 9:40am    Monday, September, 28th  Appointment Location: [facility] [address], [address] [place] [address]   time: 1:45-2:15  Transportation Provider: [employer] [phone]  Appointment time: 2:45pm - this appointment is 15-30 minutes  Return ride: call [employer] directly for your return ride home    This CMS requested that Vijaya be granted door to door service and is walked directly to the clinic suite where she will receive services to decrease fall risk.    Notified Vijaya and CC of  time.    Member was also mailed an email SOUMYA to sign and return mail with a ERNST Bell  Care Management Specialist  [facility]  [phone]

## 2020-09-24 ENCOUNTER — OFFICE VISIT (OUTPATIENT)
Dept: OPHTHALMOLOGY | Facility: CLINIC | Age: 70
End: 2020-09-24
Payer: COMMERCIAL

## 2020-09-24 ENCOUNTER — PRE VISIT (OUTPATIENT)
Dept: OPHTHALMOLOGY | Facility: CLINIC | Age: 70
End: 2020-09-24

## 2020-09-24 DIAGNOSIS — H05.412 ENOPHTHALMOS DUE TO ATROPHY OF LEFT ORBITAL TISSUE: ICD-10-CM

## 2020-09-24 DIAGNOSIS — S00.12XA CONTUSION OF LEFT EYELID AND PERIOCULAR AREA, INITIAL ENCOUNTER: ICD-10-CM

## 2020-09-24 DIAGNOSIS — H53.2 DIPLOPIA: ICD-10-CM

## 2020-09-24 DIAGNOSIS — H52.00 HYPERMETROPIA, UNSPECIFIED LATERALITY: ICD-10-CM

## 2020-09-24 DIAGNOSIS — H25.13 NUCLEAR SCLEROTIC CATARACT OF BOTH EYES: ICD-10-CM

## 2020-09-24 DIAGNOSIS — S02.85XA CLOSED FRACTURE OF ORBIT, INITIAL ENCOUNTER (H): Primary | ICD-10-CM

## 2020-09-24 DIAGNOSIS — S05.8X2A BLUNT TRAUMA EYE, LEFT, INITIAL ENCOUNTER: Primary | ICD-10-CM

## 2020-09-24 DIAGNOSIS — S02.30XA BLOW-OUT FRACTURE OF ORBITAL FLOOR (H): ICD-10-CM

## 2020-09-24 ASSESSMENT — VISUAL ACUITY
OS_CC: 20/40
OD_SC: 20/125
OS_SC: 20/150
OS_CC: 20/40
OD_CC: 20/30
OS_PH_SC: 20/40
OS_PH_SC: 20/40
OD_PH_SC: 20/50
OS_SC: 20/150
METHOD: SNELLEN - LINEAR
OD_CC: 20/30
OD_SC: 20/125
METHOD: SNELLEN - LINEAR
OD_PH_SC: 20/50

## 2020-09-24 ASSESSMENT — REFRACTION_WEARINGRX
OD_SPHERE: +1.75
OD_CYLINDER: +0.75
OS_AXIS: 170
OD_SPHERE: +1.75
OD_CYLINDER: +0.75
OD_AXIS: 014
OS_CYLINDER: +1.00
OS_CYLINDER: +1.00
OS_AXIS: 170
OD_AXIS: 014
OS_SPHERE: +2.00
OS_SPHERE: +2.00

## 2020-09-24 ASSESSMENT — SLIT LAMP EXAM - LIDS
COMMENTS: NORMAL

## 2020-09-24 ASSESSMENT — TONOMETRY
OD_IOP_MMHG: 14
IOP_METHOD: ICARE
IOP_METHOD: ICARE
OS_IOP_MMHG: 13
OD_IOP_MMHG: 14
OS_IOP_MMHG: 13

## 2020-09-24 ASSESSMENT — CONF VISUAL FIELD
METHOD: COUNTING FINGERS
OS_NORMAL: 1
OD_NORMAL: 1
OD_NORMAL: 1
METHOD: COUNTING FINGERS
OS_NORMAL: 1

## 2020-09-24 ASSESSMENT — EXTERNAL EXAM - RIGHT EYE
OD_EXAM: NORMAL
OD_EXAM: NORMAL

## 2020-09-24 ASSESSMENT — CUP TO DISC RATIO
OD_RATIO: 0.4
OD_RATIO: 0.54
OS_RATIO: 0.4
OS_RATIO: 0.4

## 2020-09-24 NOTE — NURSING NOTE
"Chief Complaints and History of Present Illnesses   Patient presents with     Orbital Fracture Evaluation     Chief Complaint(s) and History of Present Illness(es)     Orbital Fracture Evaluation     Laterality: left upper lid and left lower lid    Onset: present since trauma    Duration: 2 days    Associated signs and symptoms: double vision (in upgaze, small amount per pt), eye pain (7/10, took oxycodone earlier this morning, feels that this helps the pain), blurred vision, imbalance (some dizziness, feels that it is related to wearing the wrong gls and falling) and redness    Treatments tried: eye drops    Response to treatment: no improvement              Comments     Patient notes that she had an eye exam on Tuesday, then fell after that, broke gls, was wearing reading gls when falling, she thought ground was level and it was steep and ran into a tree and fell and hit a tree, doesn't recall hitting the tree, but she had \"tree rash\" on her arm and she is unsure if she rolled, she doesn't believe she passed out but couldve temporarily. Patient used saline and felt that it stung and made nose run. She denies diplopia.               Altagracia Nguyen COT September 24, 2020 2:59 PM    "

## 2020-09-24 NOTE — PROGRESS NOTES
HPI  Vijaya Manjarrez is a 70 year oldfemale here for a blunt injury to the left eye after a fall on 9/22/2020.  She was leaving eye exam at [facility] and was wearing reading glasses to walk, lost her balance and hit a tree.  She hit her head, left side of face and arm.  She had a bloody nose and facial/eye abrasions and contusions.  She is unsure if she lost consciousness but was told after ED evaluation that she had a concussion.  She had imaging which showed an orbital fracture concerning for inferior rectus entrapment.      Currently she is unsure if vision is blurrier than baseline, she does not have distance glasses.  With her recent prescription from [facility] in the phoropter (dilated) she feels vision is close to baseline both eyes.  She denies flashes/floaters.  She denies diplopia in primary gaze but has horizontal diplopia in left gaze and oblique binocular diplopia in upgaze.  She does not feel nauseated or lightheaded in upgaze but does have headache and eye pain.  She has had nasal congestion and clear rhinorrhea.        CT Facial Bones w/o IV contrast:   ED READ  1. Acute left orbital floor blowout fracture with inferior rectus   muscle herniation suggestive of entrapment.   2. Acute left medial orbital blowout fracture.   3. Acute fractures of the anterior and posterolateral walls of the   left maxillary sinus, as per radiology.     PMH:   Past Medical History:   Diagnosis Date     Anxiety      Cervical spine degeneration 2016    spinal stenosis,      COPD (chronic obstructive pulmonary disease) (H) 2012    mild     Diabetes mellitus, type 2 (H)      Diabetic neuropathy (H)      Facet arthropathy, lumbar      GERD (gastroesophageal reflux disease)      HTN (hypertension)      Hyperlipidemia      Lumbar degenerative disc disease      Migraine headache       POH: glasses for hypermetropia, catarats, no surgery, no eye trauma before today  Oc Meds: none           Assessment & Plan     -----------------------------------------------------------------------------------   (S05.8X2A) Blunt trauma eye, left, initial encounter  (primary encounter diagnosis)  (S00.12XA) Contusion of left eyelid and periocular area, initial encounter - Left Eye  (S02.30XA) Blow-out fracture of orbital floor (H) - Left Eye  Comment: entrapment of fat,  Possible muscle tissue of inferior rectus  MICHELL base 104- 22 right eye 19 left eye     Plan: oculoplastics to review scan and possible consult as needed today  No ocular signs of trauma  Told patient to follow-up for exam in 1-2 months for gonioscopy, increase risk of future glaucoma   Call immediately for flashes/floaters eye pain or blurry vision     (H52.00) Hypermetropia, unspecified laterality  Comment: has glasses prescription from [facility] to fill   Plan: above    (H25.13) Nuclear sclerotic cataract of both eyes  Comment: mild not visually significant   Plan: follow           Patient disposition:   Return in about 1 month (around 10/15/2017) for follow up- eye trauma OD . To call sooner as needed for sooner appointment.    Complete documentation of historical and exam elements from today's encounter can be found in the full encounter summary report (not reduplicated in this progress note). I personally obtained the chief complaint(s) and history of present illness.  I have confirmed and edited as necessary the CC, HPI, PMH/PSH, social history, FMH, ROS, and exam/neuro findings as obtained by the technician or others. I have examined this patient myself and I personally viewed the image(s) and studies listed above and the documentation reflects my findings and interpretation.  I formulated and edited as necessary the assessment and plan and discussed the findings and management plan with the patient and family.     Dai Glaser MD

## 2020-09-24 NOTE — NURSING NOTE
"Chief Complaints and History of Present Illnesses   Patient presents with     Orbital Fracture Evaluation     Chief Complaint(s) and History of Present Illness(es)     Orbital Fracture Evaluation     Laterality: left upper lid and left lower lid    Duration: 2 days    Associated signs and symptoms: trauma, eye pain (7/10, took a pain pill earlier this morning, took oxycodone this morning and feels that it helps the pain.), blurred vision, imbalance (some dizziness, feels that it is related to wearing the wrong gls and falling) and redness.  Negative for double vision    Context: trauma    Treatments tried: eye drops              Comments     Patient notes that she had an eye exam on Tuesday, then fell after that, broke gls, was wearing reading gls when falling, she thought ground was level and it was steep and ran into a tree and fell and hit a tree, doesn't recall hitting the tree, but she had \"tree rash\" on her arm and she is unsure if she rolled, she doesn't believe she passed out but couldve temporarily. Patient used saline and felt that it stung and made nose run. She denies diplopia.     Altagracia Nguyen COT September 24, 2020 1:05 PM                  "

## 2020-09-24 NOTE — LETTER
"9/24/2020       RE: Vijaya Manjarrez  [address]  [facility] [address]     Dear Colleague,    Thank you for referring your patient, Vijaya Manjarrez, to the [facility] OPHTHALMOLOGY at [facility]. Please see a copy of my visit note below.         Chief Complaint(s) and History of Present Illness(es)     Orbital Fracture Evaluation     Laterality: left upper lid and left lower lid    Onset: present since trauma    Duration: 2 days    Associated signs and symptoms: double vision (in upgaze, small amount per   pt), eye pain (7/10, took oxycodone earlier this morning, feels that this   helps the pain), blurred vision, imbalance (some dizziness, feels that it   is related to wearing the wrong gls and falling) and redness    Treatments tried: eye drops    Response to treatment: no improvement              Comments     Patient notes that she had an eye exam on Tuesday, then fell after that,   broke gls, was wearing reading gls when falling, she thought ground was   level and it was steep and ran into a tree and fell and hit a tree,   doesn't recall hitting the tree, but she had \"tree rash\" on her arm and   she is unsure if she rolled, she doesn't believe she passed out but   couldve temporarily. Patient used saline and felt that it stung and made   nose run. She denies diplopia.            Assessment & Plan     Vijaya Manjarrez is a 70 year old female with the following diagnoses:     Encounter Diagnoses   Name Primary?     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 We discussed diplopia may or may not improve without intervention. She is bothered by the enophthalmos. Discussed r/b/a including implant related issues, infection, bleeding, rare loss of vision, scar, need for more surgery.  She would like to proceed.    Left orbit fracture repair " with wrap around implant and navigation.   Attending Physician Attestation: Complete documentation of historical and exam elements from today's encounter can be found in the full encounter summary report (not reduplicated in this progress note). I personally obtained the chief complaint(s) and history of present illness. I confirmed and edited as necessary the review of systems, past medical/surgical history, family history, social history, and examination findings as documented by others; and I examined the patient myself. I personally reviewed the relevant tests, images, and reports as documented above. I formulated and edited as necessary the assessment and plan and discussed the findings and management plan with the patient.  -George Doll MD        Again, thank you for allowing me to participate in the care of your patient.      Sincerely,    George Doll MD

## 2020-09-24 NOTE — PROGRESS NOTES
"     Chief Complaint(s) and History of Present Illness(es)     Orbital Fracture Evaluation     Laterality: left upper lid and left lower lid    Onset: present since trauma    Duration: 2 days    Associated signs and symptoms: double vision (in upgaze, small amount per   pt), eye pain (7/10, took oxycodone earlier this morning, feels that this   helps the pain), blurred vision, imbalance (some dizziness, feels that it   is related to wearing the wrong gls and falling) and redness    Treatments tried: eye drops    Response to treatment: no improvement              Comments     Patient notes that she had an eye exam on Tuesday, then fell after that,   broke gls, was wearing reading gls when falling, she thought ground was   level and it was steep and ran into a tree and fell and hit a tree,   doesn't recall hitting the tree, but she had \"tree rash\" on her arm and   she is unsure if she rolled, she doesn't believe she passed out but   couldve temporarily. Patient used saline and felt that it stung and made   nose run. She denies diplopia.            Assessment & Plan     Vijaya Manjarrez is a 70 year old female with the following diagnoses:     Encounter Diagnoses   Name Primary?     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 We discussed diplopia may or may not improve without intervention. She is bothered by the enophthalmos. Discussed r/b/a including implant related issues, infection, bleeding, rare loss of vision, scar, need for more surgery.  She would like to proceed.    Left orbit fracture repair with wrap around implant and navigation.   Attending Physician Attestation: Complete documentation of historical and exam elements from today's encounter can be found in the full encounter summary report (not reduplicated in this progress note). I personally obtained the chief complaint(s) and history of " present illness. I confirmed and edited as necessary the review of systems, past medical/surgical history, family history, social history, and examination findings as documented by others; and I examined the patient myself. I personally reviewed the relevant tests, images, and reports as documented above. I formulated and edited as necessary the assessment and plan and discussed the findings and management plan with the patient.  -George Doll MD

## 2020-09-25 ENCOUNTER — TELEPHONE (OUTPATIENT)
Dept: INTERNAL MEDICINE | Facility: CLINIC | Age: 70
End: 2020-09-25

## 2020-09-25 ENCOUNTER — HOSPITAL ENCOUNTER (OUTPATIENT)
Facility: AMBULATORY SURGERY CENTER | Age: 70
End: 2020-09-25
Attending: OPHTHALMOLOGY
Payer: COMMERCIAL

## 2020-09-25 PROBLEM — S02.85XA CLOSED FRACTURE OF ORBIT, INITIAL ENCOUNTER (H): Status: ACTIVE | Noted: 2020-09-25

## 2020-09-25 PROBLEM — H05.412: Status: ACTIVE | Noted: 2020-09-25

## 2020-09-25 PROBLEM — H53.2 DIPLOPIA: Status: ACTIVE | Noted: 2020-09-25

## 2020-09-25 NOTE — TELEPHONE ENCOUNTER
Reason for call:  Other   Patient called regarding (reason for call): appointment  Additional comments: Patient stated she has an upcoming eye surgery on October 1, 2020 and states her surgeon told her she would be able to do a Pre-op appointment virtually. She would like to know if she could do this with Vidhi Mario as soon as possible. Please advise. She is also requesting an order for a Covid-19 order be placed for the procedure. Please advise.     Phone number to reach patient:  Home number on file [phone] (home)    Best Time:  Asap    Can we leave a detailed message on this number?  YES    Travel screening: Not Applicable

## 2020-09-25 NOTE — TELEPHONE ENCOUNTER
Patient calls confused about appointments.  Preop can not be done virtual.  Patient may need lab and/or EKG as well as COVID-19 testing.  COVID-19 testing done in clinic at Preop will be done by a nurse in the exam room and not in the lab with a separate appointment.   Orders are also needed for COVID-19 testing.   Confirmed information with Clinic Admin, Colette Cheema.  Scheduled in clinic preop and cancelled lab appointment and patient advised.

## 2020-09-28 DIAGNOSIS — Z11.59 ENCOUNTER FOR SCREENING FOR OTHER VIRAL DISEASES: Primary | ICD-10-CM

## 2020-09-29 ENCOUNTER — PATIENT OUTREACH (OUTPATIENT)
Dept: GERIATRIC MEDICINE | Facility: CLINIC | Age: 70
End: 2020-09-29

## 2020-09-29 ENCOUNTER — OFFICE VISIT (OUTPATIENT)
Dept: INTERNAL MEDICINE | Facility: CLINIC | Age: 70
End: 2020-09-29
Payer: COMMERCIAL

## 2020-09-29 VITALS
TEMPERATURE: 97.7 F | DIASTOLIC BLOOD PRESSURE: 78 MMHG | OXYGEN SATURATION: 96 % | RESPIRATION RATE: 18 BRPM | WEIGHT: 201.6 LBS | HEART RATE: 88 BPM | BODY MASS INDEX: 29.77 KG/M2 | SYSTOLIC BLOOD PRESSURE: 133 MMHG

## 2020-09-29 DIAGNOSIS — F11.20 NARCOTIC DEPENDENCE (H): ICD-10-CM

## 2020-09-29 DIAGNOSIS — I10 BENIGN ESSENTIAL HYPERTENSION: ICD-10-CM

## 2020-09-29 DIAGNOSIS — S02.85XD CLOSED FRACTURE OF ORBIT WITH ROUTINE HEALING, SUBSEQUENT ENCOUNTER: ICD-10-CM

## 2020-09-29 DIAGNOSIS — Z11.59 ENCOUNTER FOR SCREENING FOR OTHER VIRAL DISEASES: ICD-10-CM

## 2020-09-29 DIAGNOSIS — R07.9 CHEST PAIN, UNSPECIFIED TYPE: ICD-10-CM

## 2020-09-29 DIAGNOSIS — I25.10 CORONARY ARTERY DISEASE INVOLVING NATIVE CORONARY ARTERY, ANGINA PRESENCE UNSPECIFIED, UNSPECIFIED WHETHER NATIVE OR TRANSPLANTED HEART: ICD-10-CM

## 2020-09-29 DIAGNOSIS — Z01.818 PREOP GENERAL PHYSICAL EXAM: Primary | ICD-10-CM

## 2020-09-29 DIAGNOSIS — E11.40 TYPE 2 DIABETES MELLITUS WITH DIABETIC NEUROPATHY, WITHOUT LONG-TERM CURRENT USE OF INSULIN (H): ICD-10-CM

## 2020-09-29 PROCEDURE — U0003 INFECTIOUS AGENT DETECTION BY NUCLEIC ACID (DNA OR RNA); SEVERE ACUTE RESPIRATORY SYNDROME CORONAVIRUS 2 (SARS-COV-2) (CORONAVIRUS DISEASE [COVID-19]), AMPLIFIED PROBE TECHNIQUE, MAKING USE OF HIGH THROUGHPUT TECHNOLOGIES AS DESCRIBED BY CMS-2020-01-R: HCPCS | Performed by: OPHTHALMOLOGY

## 2020-09-29 PROCEDURE — 90662 IIV NO PRSV INCREASED AG IM: CPT | Performed by: INTERNAL MEDICINE

## 2020-09-29 PROCEDURE — 99215 OFFICE O/P EST HI 40 MIN: CPT | Mod: 25 | Performed by: INTERNAL MEDICINE

## 2020-09-29 PROCEDURE — G0008 ADMIN INFLUENZA VIRUS VAC: HCPCS | Performed by: INTERNAL MEDICINE

## 2020-09-29 RX ORDER — HYDROCODONE BITARTRATE AND ACETAMINOPHEN 5; 325 MG/1; MG/1
1 TABLET ORAL EVERY 6 HOURS PRN
Qty: 10 TABLET | Refills: 0 | Status: ON HOLD | OUTPATIENT
Start: 2020-09-29 | End: 2020-10-03

## 2020-09-29 NOTE — PROGRESS NOTES
[place] [address]  [facility] [address]  [phone]  Dept: [phone]    PRE-OP EVALUATION:  Today's date: 2020    Vijaya Manjarrez (: 1950) presents for pre-operative evaluation assessment.    Proposed Surgery/ Procedure: LEFT OPEN REDUCTION INTERNAL FIXATION, FRACTURE LEFT ORBIT WITH INTRAOPERATIVE NAVIGATION  Date of Surgery/ Procedure: 10-1-2020  Time of Surgery/ Procedure: AM  [facility] Fax Number: Note does not need to be faxed, will be available electronically in Epic.  Primary Physician: Vidhi Mario  Type of Anesthesia Anticipated: General    Preoperative Questionnaire:   No - Have you ever had a heart attack or stroke?  No - Have you ever had surgery on your heart or blood vessels, such as a stent, coronary (heart) bypass, or surgery on an artery in the head, neck, heart, or legs?  YES - DO YOU HAVE CHEST PAIN WHEN YOU ARE PHYSICALLY ACTIVE? Started about a month or more ago. Is getting worse. Lasts 10 minutes. Generally comes with exertion and is relieved with stopping. But she thinks it is due to anxiety   No - Do you have a history of heart failure?  No - Do you currently have a cold, bronchitis, or symptoms of other respiratory (head and chest) infections?  No - Do you have a cough, shortness of breath, or wheezing?  No - Do you or anyone in your family have a history of blood clots?  No - Do you or anyone in your family have a serious bleeding problem, such as long-lasting bleeding after surgeries or cuts?  No - Have you ever had anemia or been told to take iron pills?  No - Have you had any abnormal blood loss such as black, tarry or bloody stools, or abnormal vaginal bleeding?  No - Have you ever had a blood transfusion?  Yes - Are you willing to have a blood transfusion if it is medically needed before, during, or after your surgery?  No - Have you or anyone in your family ever had problems with  anesthesia (sedation for surgery)?  No - Do you have sleep apnea, excessive snoring, or daytime drowsiness?   No - Do you have any artifical heart valves or other implanted medical devices, such as a pacemaker, defibrillator, or continuous glucose monitor?  No - Do you have any artifical joints?  No - Are you allergic to latex?  No - Is there any chance that you may be pregnant?    Patient has a Health Care Directive or Living Will:  NO    HPI:     HPI related to upcoming procedure: fell hitting her left facial area 1 week ago. Has orbital fracture in 2 places and sees double now. Going in for repair.       See problem list for active medical problems.  Problems all longstanding and stable, except as noted/documented.  See ROS for pertinent symptoms related to these conditions.      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 09/25/2020     Priority: Medium     Added automatically from request for surgery [id number]       Diplopia 09/25/2020     Priority: Medium     Added automatically from request for surgery [id number]       Hypokalemia 08/08/2020     Priority: Medium     Coronary artery disease involving native coronary artery of native heart without angina pectoris 03/06/2019     Priority: Medium     Status post coronary angiogram 02/26/2019     Priority: Medium     Coronary artery disease involving native coronary artery, angina presence unspecified, unspecified whether native or transplanted heart 02/21/2019     Priority: Medium     Added automatically from request for surgery [id number]       Dyslipidemia 02/21/2019     Priority: Medium     Added automatically from request for surgery [id number]       Mild episode of recurrent major depressive disorder (H) 03/12/2018     Priority: Medium     Narcotic  "dependence (H) 03/10/2018     Priority: Medium     Advanced directives, counseling/discussion 2017     Priority: Medium     Advance Care Planning 2017: ACP Review of Chart / Resources Provided:  Reviewed chart for advance care plan.  Vijaya Manjarrez has been provided information and resources to begin or update their advance care plan.  Added by Vanesa Gtz           [facility] 07/10/2017     Priority: Medium     [facility]   Vanesa Gtz RN  [phone]    [facility] CARE PLAN SUMMARY    Client Name:  Vijaya Manjarrez  Address:  [address] [place] [address] [address] [place] [address] Phone: [phone]    :  1950 Date of Assessment: 2020   Health Plan:  [facility]  Health Plan Number: [id number] Medical Assistance Number: [id number]  Financial Worker:  Rubén [phone]  Case #:  [id number]   [facility] :  Vanesa Gtz RN  Phone:  [phone]  CM Fax:  [phone]   [facility] Enrollment Date: 2017 Case Mix:  B  Rate Cell:  B  Waiver Type:  EW   Emergency Contact: Luis Manjarrez  Mobile Phone: [phone]  Relation: Son Language:  English  :  No   Health Care Agent/POA:  None Advanced Directives/Living Will:  None   Primary Care Clinic/Phone/Fax:  [facility]p) [phone], (f) [phone] Primary Dx:  COPD [J44.9]  Secondary Dx: Chronic Pain Syndrome [G89.4]   Primary Physician:  Dr. Vidhi Mario   Height:  5' 9\"  Weight:  207 lbs   Specialty Physician:   Dr. Galan OB/GYN Audiologist:  Declined   Eye Care Provider:  [facility] Provider:    [facility]: [facility] [phone] or [phone]   Other:        [facility] CURRENT SERVICES SUMMARY  Equipment owned/DME history: Glasses, walker, shower chair  SERVICE TYPE/PROVIDER NAME/PHONE AUTH DATE FREQUENCY Units OR $ Amt DESCRIPTION   Medical Transportation: [facility] [phone] 20-  21 as needed  Medical rides " as needed     Supplies: [employer] [phone] 5/1/20-  4/30/21 monthly MA Incontinent products monthly     Homemaking: [employer]   Fax: [phone]  NPI: [id number] 8/31/20-  4/30/21 weekly  4 hours/week $335.57              Hyperlipidemia LDL goal <100 01/20/2017     Priority: Medium     Fibromyalgia 01/20/2017     Priority: Medium     Benign essential hypertension 01/20/2017     Priority: Medium     Chronic pain syndrome 01/20/2017     Priority: Medium     Patient is followed by Disha Van MD for ongoing prescription of pain medication.  All refills should only be approved by this provider, or covering partner.    Medication(s): Tramadol.   Maximum quantity per month: 90  Clinic visit frequency required: Q 6  months     Controlled substance agreement:  Encounter-Level CSA:     There are no encounter-level csa.        Pain Clinic evaluation in the past: Yes       Date/Location:      DIRE Total Score(s):  No flowsheet data found.    Last [facility] website verification:  none   [email or web address]       Type 2 diabetes mellitus with diabetic neuropathy, without long-term current use of insulin (H)      Priority: Medium     Cervical spine degeneration      Priority: Medium     spinal stenosis,        Facet arthropathy, lumbar      Priority: Medium     Lumbar degenerative disc disease      Priority: Medium     Migraine headache      Priority: Medium     Diabetic neuropathy (H)      Priority: Medium     Anxiety      Priority: Medium     GERD (gastroesophageal reflux disease)      Priority: Medium     HCD (health care directive) 01/12/2017     Priority: Medium     Pt received HCD and will return when complete.         Controlled substance agreement signed- ok 5/12/20 01/12/2017     Priority: Medium     Patient is followed by Yayo Mendoza MD for ongoing prescription of pain medication.  All refills should only be approved by this provider, or covering partner.    Medication(s):  Tramadol.   Maximum quantity per month: 90  Clinic visit frequency required: Q 6  months     Controlled substance agreement:  CSA signed 2/5/2019     Pain Clinic evaluation in the past: Yes       Date/Location:      DIRE Total Score(s):  No flowsheet data found.    Last [facility] website verification:  none   [email or web address]         COPD (chronic obstructive pulmonary disease) (H) 10/18/2012     Priority: Medium     mild        Past Medical History:   Diagnosis Date     Anxiety      Cervical spine degeneration 2016    spinal stenosis,      COPD (chronic obstructive pulmonary disease) (H) 2012    mild     Diabetes mellitus, type 2 (H)      Diabetic neuropathy (H)      Facet arthropathy, lumbar      GERD (gastroesophageal reflux disease)      HTN (hypertension)      Hyperlipidemia      Lumbar degenerative disc disease      Migraine headache      Past Surgical History:   Procedure Laterality Date     CHOLECYSTECTOMY, LAPOROSCOPIC      Cholecystectomy, Laparoscopic     CV HEART CATHETERIZATION WITH POSSIBLE INTERVENTION Left 2/26/2019    Procedure: Coronary Angiogram;  Surgeon: Cheo Merida MD;  Location:  HEART CARDIAC CATH LAB     Current Outpatient Medications   Medication Sig Dispense Refill     acetaminophen (TYLENOL ARTHRITIS PAIN) 650 MG CR tablet Take 650 mg by mouth 2 times daily as needed        albuterol (PROAIR HFA/PROVENTIL HFA/VENTOLIN HFA) 108 (90 Base) MCG/ACT inhaler Inhale 2 puffs into the lungs every 6 hours 18 g 1     amLODIPine (NORVASC) 5 MG tablet Take 1 tablet (5 mg) by mouth daily 90 tablet 1     atorvastatin (LIPITOR) 80 MG tablet Take 1 tablet (80 mg) by mouth daily Recheck cholesterol in 3 months 90 tablet 1     blood glucose monitoring (NO BRAND SPECIFIED) test strip Use to test blood sugars 1 times daily or as directed, use brand same as previous 100 strip 3     cephALEXin (KEFLEX) 500 MG capsule Take 1 capsule (500 mg) by mouth 2 times daily for 7 days 14 capsule 0      cetirizine (ZYRTEC) 10 MG tablet TAKE ONE TABLET BY MOUTH ONCE DAILY 90 tablet 3     cyclobenzaprine (FLEXERIL) 10 MG tablet TAKE ONE TABLET BY MOUTH TWICE A  tablet 1     DULoxetine (CYMBALTA) 60 MG capsule Take 1 capsule (60 mg) by mouth daily 90 capsule 3     fluticasone (FLONASE) 50 MCG/ACT nasal spray SPRAY 2 SPRAYS IN EACH NOSTRIL ONCE DAILY 16 g 1     HYDROcodone-acetaminophen (NORCO) 5-325 MG tablet Take 1 tablet by mouth every 6 hours as needed for pain 10 tablet 0     hydrOXYzine (ATARAX) 10 MG tablet TAKE ONE TABLET BY MOUTH THREE TIMES A DAY AS NEEDED FOR ANXIETY 30 tablet 0     ibuprofen (ADVIL/MOTRIN) 600 MG tablet Take 1 tablet (600 mg) by mouth every 6 hours as needed for moderate pain 20 tablet 0     lisinopril (ZESTRIL) 20 MG tablet Take 1 tablet (20 mg) by mouth 2 times daily 180 tablet 1     metFORMIN (GLUCOPHAGE) 500 MG tablet TAKE ONE TABLET BY MOUTH TWICE A DAY WITH A MEAL 180 tablet 1     metoprolol tartrate (LOPRESSOR) 25 MG tablet Take 1 tablet (25 mg) by mouth 2 times daily 180 tablet 1     montelukast (SINGULAIR) 10 MG tablet TAKE ONE TABLET BY MOUTH AT BEDTIME 90 tablet 1     Multiple Vitamins-Minerals (MULTIVITAMIN ADULT PO) Take 5-6 tablets by mouth daily Pro-caps vitamin       naproxen sodium (ALEVE) 220 MG tablet Take 220 mg by mouth as needed for moderate pain       nitroGLYcerin (NITROSTAT) 0.4 MG sublingual tablet For chest pain place 1 tablet under the tongue every 5 minutes for 3 doses. If symptoms persist 5 minutes after 1st dose call 911. 30 tablet 3     nystatin (MYCOSTATIN) 665061 UNIT/GM external powder APPLY TO AFFECTED AREA(S) TOPICALLY THREE TIMES A DAY AS NEEDED 60 g 0     omeprazole (PRILOSEC) 20 MG DR capsule Take 1 capsule (20 mg) by mouth 2 times daily 180 capsule 1     order for DME Equipment being ordered: glucometer 1 each 0     pregabalin (LYRICA) 150 MG capsule Take 1 capsule (150 mg) by mouth 3 times daily 270 capsule 1     prochlorperazine (COMPAZINE)  5 MG tablet Take 1 tablet (5 mg) by mouth every 6 hours as needed for vomiting 20 tablet 0     sodium chloride (OCEAN) 0.65 % nasal spray Apply one spray to nares two times daily 15 mL 0     tiotropium (SPIRIVA) 18 MCG inhaled capsule Inhale 1 capsule (18 mcg) into the lungs daily 90 capsule 1     traMADol (ULTRAM) 50 MG tablet TAKE TWO TABLETS BY MOUTH THREE TIMES A  tablet 0     traZODone (DESYREL) 100 MG tablet TAKE TWO TABLETS BY MOUTH EVERY NIGHT AT BEDTIME 180 tablet 1     OTC products: None, except as noted above    Allergies   Allergen Reactions     Penicillins Hives      Latex Allergy: NO    Social History     Tobacco Use     Smoking status: Former Smoker     Packs/day: 1.00     Years: 40.00     Pack years: 40.00     Types: Cigarettes     Last attempt to quit: 6/1/2017     Years since quitting: 3.3     Smokeless tobacco: Never Used   Substance Use Topics     Alcohol use: No     History   Drug Use No       REVIEW OF SYSTEMS:   CONSTITUTIONAL: NEGATIVE for fever, chills, change in weight  INTEGUMENTARY/SKIN: NEGATIVE for worrisome rashes, moles or lesions  EYES: NEGATIVE for vision changes or irritation  ENT/MOUTH: NEGATIVE for ear, mouth and throat problems  RESP: NEGATIVE for significant cough or SOB  BREAST: NEGATIVE for masses, tenderness or discharge  CV: NEGATIVE for chest pain, palpitations or peripheral edema  GI: NEGATIVE for nausea, abdominal pain, heartburn, or change in bowel habits  : NEGATIVE for frequency, dysuria, or hematuria  MUSCULOSKELETAL: NEGATIVE for significant arthralgias or myalgia  NEURO: NEGATIVE for weakness, dizziness or paresthesias  ENDOCRINE: NEGATIVE for temperature intolerance, skin/hair changes  HEME: NEGATIVE for bleeding problems  PSYCHIATRIC: NEGATIVE for changes in mood or affect    EXAM:   /78   Pulse 88   Temp 97.7  F (36.5  C) (Oral)   Resp 18   Wt 91.4 kg (201 lb 9.6 oz)   LMP  (LMP Unknown)   SpO2 96%   Breastfeeding No   BMI 29.77 kg/m       GENERAL APPEARANCE: healthy, alert and no distress     EYES: EOMI, PERRL     HENT: ear canals and TM's normal and nose and mouth without ulcers or lesions     NECK: no adenopathy, no asymmetry, masses, or scars and thyroid normal to palpation     RESP: lungs clear to auscultation - no rales, rhonchi or wheezes     CV: regular rates and rhythm, normal S1 S2, no S3 or S4 and no murmur, click or rub     ABDOMEN:  soft, nontender, no HSM or masses and bowel sounds normal     MS: extremities normal- no gross deformities noted, no evidence of inflammation in joints, FROM in all extremities.     SKIN: no suspicious lesions or rashes     NEURO: Normal strength and tone, sensory exam grossly normal, mentation intact and speech normal     PSYCH: mentation appears normal. and affect normal/bright     LYMPHATICS: No cervical adenopathy    DIAGNOSTICS:   EKG: see chart      Recent Labs   Lab Test 09/22/20  1412 08/17/20  1457  08/08/20  1116  05/20/20  1415 11/13/19  1505  02/21/19  1429   HGB 11.9  --   --  14.1   < > 12.0  --   --  13.2     --   --  456*   < > 294  --   --  299   INR  --   --   --   --   --   --   --   --  0.97    142   < > 139   < > 141 142   < > 138   POTASSIUM 3.8 4.0   < > 2.6*   < > 4.0 4.2   < > 5.6*   CR 0.76 0.78   < > 0.62   < > 0.72 0.79   < > 0.68   A1C  --   --   --   --   --  7.3* 6.7*   < >  --     < > = values in this interval not displayed.        IMPRESSION:   Reason for surgery/procedure: orbital fracture repair  Diagnosis/reason for consult: chest pain     The proposed surgical procedure is considered INTERMEDIATE risk.    REVISED CARDIAC RISK INDEX  She is a diabetic having new exertional chest pain with prior 50% lesion seen on Angiogram 2/2019.   She has been set up for dobutamine stress test tomorrow. If it looks normal will pass her for surgery.     The patient has the following additional risks for perioperative complications:  Chronic use of Tramadol      ICD-10-CM    1.  Preop general physical exam  Z01.818 Asymptomatic COVID-19 Virus (Coronavirus) by PCR   2. Closed fracture of orbit with routine healing, subsequent encounter  S02.85XD HYDROcodone-acetaminophen (NORCO) 5-325 MG tablet   3. Chest pain, unspecified type  R07.9 Echocardiogram Exercise Stress   4. Encounter for screening for other viral diseases  Z11.59 Asymptomatic COVID-19 Virus (Coronavirus) by PCR   5. Type 2 diabetes mellitus with diabetic neuropathy, without long-term current use of insulin (H)  E11.40    6. Narcotic dependence (H)  F11.20    7. Coronary artery disease involving native coronary artery, angina presence unspecified, unspecified whether native or transplanted heart  I25.10    8. Benign essential hypertension  I10        RECOMMENDATIONS:     --Consult hospital rounder / IM to assist post-op medical management    --Patient is to take all scheduled medications on the day of surgery EXCEPT for modifications listed below.    Diabetes Medication Use     -----Hold usual oral and non-insulin diabetic meds (e.g. Metformin, Actos, Glipizide) while NPO.       APPROVAL GIVEN to proceed with proposed procedure, if she passes tomorrows dobutamine stress test.        Signed Electronically by: Deena Weeks MD    Copy of this evaluation report is provided to requesting physician.    [place] Preop Guidelines    Revised Cardiac Risk Index

## 2020-09-29 NOTE — PROGRESS NOTES
[facility] Care Coordination Contact    Arranged transportation thru [facility] PAR for the below appt:  Appt Date & Time: Thursday, October 1st, 2020 9:30am  Clinic Name & Address:  [address] [address]  Transportation Provider: [employer] [phone]   time:  8:30-9:00.  Member will also have a friend ride with her to the appointment.    Notified member of  time.      Cynthia Bell  Care Management Specialist  [facility]  [phone]

## 2020-09-30 ENCOUNTER — HOSPITAL ENCOUNTER (OUTPATIENT)
Dept: CARDIOLOGY | Facility: CLINIC | Age: 70
Discharge: HOME | End: 2020-09-30
Attending: INTERNAL MEDICINE | Admitting: INTERNAL MEDICINE
Payer: COMMERCIAL

## 2020-09-30 ENCOUNTER — HOSPITAL ENCOUNTER (EMERGENCY)
Facility: CLINIC | Age: 70
Discharge: ANOTHER HOSPITAL | End: 2020-10-01
Attending: EMERGENCY MEDICINE | Admitting: EMERGENCY MEDICINE
Payer: COMMERCIAL

## 2020-09-30 ENCOUNTER — TELEPHONE (OUTPATIENT)
Dept: INTERNAL MEDICINE | Facility: CLINIC | Age: 70
End: 2020-09-30

## 2020-09-30 ENCOUNTER — APPOINTMENT (OUTPATIENT)
Dept: GENERAL RADIOLOGY | Facility: CLINIC | Age: 70
End: 2020-09-30
Attending: EMERGENCY MEDICINE
Payer: COMMERCIAL

## 2020-09-30 ENCOUNTER — PREP FOR PROCEDURE (OUTPATIENT)
Dept: OPHTHALMOLOGY | Facility: CLINIC | Age: 70
End: 2020-09-30

## 2020-09-30 DIAGNOSIS — S02.85XA: Primary | ICD-10-CM

## 2020-09-30 DIAGNOSIS — R07.9 CHEST PAIN, UNSPECIFIED TYPE: ICD-10-CM

## 2020-09-30 DIAGNOSIS — R94.39 ABNORMAL STRESS TEST: Primary | ICD-10-CM

## 2020-09-30 DIAGNOSIS — I24.9 ACUTE CORONARY SYNDROME (H): ICD-10-CM

## 2020-09-30 LAB
ANION GAP SERPL CALCULATED.3IONS-SCNC: 10 MMOL/L (ref 3–14)
BASOPHILS # BLD AUTO: 0.1 10E9/L (ref 0–0.2)
BASOPHILS NFR BLD AUTO: 0.5 %
BUN SERPL-MCNC: 10 MG/DL (ref 7–30)
CALCIUM SERPL-MCNC: 8.8 MG/DL (ref 8.5–10.1)
CHLORIDE SERPL-SCNC: 105 MMOL/L (ref 94–109)
CO2 SERPL-SCNC: 25 MMOL/L (ref 20–32)
CREAT SERPL-MCNC: 0.93 MG/DL (ref 0.52–1.04)
DIFFERENTIAL METHOD BLD: ABNORMAL
EOSINOPHIL # BLD AUTO: 0.1 10E9/L (ref 0–0.7)
EOSINOPHIL NFR BLD AUTO: 0.8 %
ERYTHROCYTE [DISTWIDTH] IN BLOOD BY AUTOMATED COUNT: 16.9 % (ref 10–15)
GFR SERPL CREATININE-BSD FRML MDRD: 62 ML/MIN/{1.73_M2}
GLUCOSE SERPL-MCNC: 159 MG/DL (ref 70–99)
HCT VFR BLD AUTO: 37.2 % (ref 35–47)
HGB BLD-MCNC: 11.4 G/DL (ref 11.7–15.7)
IMM GRANULOCYTES # BLD: 0 10E9/L (ref 0–0.4)
IMM GRANULOCYTES NFR BLD: 0.3 %
LABORATORY COMMENT REPORT: NORMAL
LYMPHOCYTES # BLD AUTO: 3.4 10E9/L (ref 0.8–5.3)
LYMPHOCYTES NFR BLD AUTO: 36.3 %
MCH RBC QN AUTO: 26.3 PG (ref 26.5–33)
MCHC RBC AUTO-ENTMCNC: 30.6 G/DL (ref 31.5–36.5)
MCV RBC AUTO: 86 FL (ref 78–100)
MONOCYTES # BLD AUTO: 0.7 10E9/L (ref 0–1.3)
MONOCYTES NFR BLD AUTO: 7.4 %
NEUTROPHILS # BLD AUTO: 5.1 10E9/L (ref 1.6–8.3)
NEUTROPHILS NFR BLD AUTO: 54.7 %
NRBC # BLD AUTO: 0 10*3/UL
NRBC BLD AUTO-RTO: 0 /100
PLATELET # BLD AUTO: 379 10E9/L (ref 150–450)
POTASSIUM SERPL-SCNC: 3.5 MMOL/L (ref 3.4–5.3)
RBC # BLD AUTO: 4.34 10E12/L (ref 3.8–5.2)
SARS-COV-2 RNA SPEC QL NAA+PROBE: NEGATIVE
SARS-COV-2 RNA SPEC QL NAA+PROBE: NORMAL
SODIUM SERPL-SCNC: 140 MMOL/L (ref 133–144)
SPECIMEN SOURCE: NORMAL
SPECIMEN SOURCE: NORMAL
TROPONIN I SERPL-MCNC: 0.02 UG/L (ref 0–0.04)
WBC # BLD AUTO: 9.3 10E9/L (ref 4–11)

## 2020-09-30 PROCEDURE — 96366 THER/PROPH/DIAG IV INF ADDON: CPT

## 2020-09-30 PROCEDURE — 93321 DOPPLER ECHO F-UP/LMTD STD: CPT | Mod: 26 | Performed by: INTERNAL MEDICINE

## 2020-09-30 PROCEDURE — 25000128 H RX IP 250 OP 636: Performed by: EMERGENCY MEDICINE

## 2020-09-30 PROCEDURE — 93016 CV STRESS TEST SUPVJ ONLY: CPT | Performed by: INTERNAL MEDICINE

## 2020-09-30 PROCEDURE — 93005 ELECTROCARDIOGRAM TRACING: CPT

## 2020-09-30 PROCEDURE — 25500064 ZZH RX 255 OP 636: Performed by: INTERNAL MEDICINE

## 2020-09-30 PROCEDURE — 93350 STRESS TTE ONLY: CPT | Mod: 26 | Performed by: INTERNAL MEDICINE

## 2020-09-30 PROCEDURE — C9803 HOPD COVID-19 SPEC COLLECT: HCPCS

## 2020-09-30 PROCEDURE — 96368 THER/DIAG CONCURRENT INF: CPT

## 2020-09-30 PROCEDURE — 85025 COMPLETE CBC W/AUTO DIFF WBC: CPT | Performed by: EMERGENCY MEDICINE

## 2020-09-30 PROCEDURE — 71045 X-RAY EXAM CHEST 1 VIEW: CPT

## 2020-09-30 PROCEDURE — 99285 EMERGENCY DEPT VISIT HI MDM: CPT | Mod: 25

## 2020-09-30 PROCEDURE — U0003 INFECTIOUS AGENT DETECTION BY NUCLEIC ACID (DNA OR RNA); SEVERE ACUTE RESPIRATORY SYNDROME CORONAVIRUS 2 (SARS-COV-2) (CORONAVIRUS DISEASE [COVID-19]), AMPLIFIED PROBE TECHNIQUE, MAKING USE OF HIGH THROUGHPUT TECHNOLOGIES AS DESCRIBED BY CMS-2020-01-R: HCPCS | Performed by: EMERGENCY MEDICINE

## 2020-09-30 PROCEDURE — 80048 BASIC METABOLIC PNL TOTAL CA: CPT | Performed by: EMERGENCY MEDICINE

## 2020-09-30 PROCEDURE — 96365 THER/PROPH/DIAG IV INF INIT: CPT

## 2020-09-30 PROCEDURE — 93325 DOPPLER ECHO COLOR FLOW MAPG: CPT | Mod: 26 | Performed by: INTERNAL MEDICINE

## 2020-09-30 PROCEDURE — 84484 ASSAY OF TROPONIN QUANT: CPT | Performed by: EMERGENCY MEDICINE

## 2020-09-30 PROCEDURE — 93018 CV STRESS TEST I&R ONLY: CPT | Performed by: INTERNAL MEDICINE

## 2020-09-30 RX ORDER — NITROGLYCERIN 20 MG/100ML
10-200 INJECTION INTRAVENOUS CONTINUOUS
Status: DISCONTINUED | OUTPATIENT
Start: 2020-09-30 | End: 2020-10-01 | Stop reason: HOSPADM

## 2020-09-30 RX ORDER — DOBUTAMINE HYDROCHLORIDE 200 MG/100ML
INJECTION INTRAVENOUS
Status: DISPENSED
Start: 2020-09-30 | End: 2020-09-30

## 2020-09-30 RX ADMIN — HUMAN ALBUMIN MICROSPHERES AND PERFLUTREN 3 ML: 10; .22 INJECTION, SOLUTION INTRAVENOUS at 09:30

## 2020-09-30 RX ADMIN — NITROGLYCERIN 10 MCG/MIN: 20 INJECTION INTRAVENOUS at 22:48

## 2020-09-30 ASSESSMENT — ENCOUNTER SYMPTOMS
CHILLS: 0
FEVER: 0
DIAPHORESIS: 1
SHORTNESS OF BREATH: 1

## 2020-09-30 NOTE — TELEPHONE ENCOUNTER
Lydia from [facility] calling.  Patient there for preop stress test.  States found an obstruction and cardiologist recommends a cardiology consult and a lexiscan stress test to be done as soon as possible.    1.  Asking for provider to place lexiscan order in chart (order pended).  Lydia states she will schedule patient for a cardiology consult to be done tomorrow.    2.  Lydia also asking if patient should cancel surgery that is scheduled tomorrow.    Please advise, thanks.

## 2020-09-30 NOTE — TELEPHONE ENCOUNTER
Advised patient that Dr. Weeks spoke with surgeon and the surgery has been canceled for now.  Patient has already scheduled a Lexiscan and knows to hold the Metoprolol the day of the test.  She is aware of tomorrow's cardiology appt also.  ARELY Farooq R.N.

## 2020-09-30 NOTE — TELEPHONE ENCOUNTER
Emilydled with Dr. Weeks in clinic. Dr. Weeks advised she spoke with Dr. Doll (surgeon) who advised surgery can wait until next week.    Primary care provider signed order for Lexiscan, and advised patient should hold her beta blocker the day of the test.     Writer called Lydia and advised her of information above. Lydia verbalized understanding. Lydia advised patient is aware of her abnormal echo, and that she will be seeing a cardiologist tomorrow AM.

## 2020-09-30 NOTE — TELEPHONE ENCOUNTER
Left a voicemail asking patient to call the clinic back. Please transfer patient to [facility]. RN needs to inform patient that per surgeon, she will not be having surgery tomorrow.

## 2020-09-30 NOTE — PROGRESS NOTES
Patient here for Dobutamine stress echo.  Due to abnormal resting images Dr. Martinez was consulted. Cardiology consult was scheduled and stress test was changed to Lexiscan.  Dr. Munson office was notified.

## 2020-09-30 NOTE — TELEPHONE ENCOUNTER
Lydia calling back, missed MD call.  If you need her, call her back at [phone].    FYI, patient does have a cardiology appt 10/1 at 10:45 a.m. with Dr. Mcfarlane

## 2020-10-01 ENCOUNTER — APPOINTMENT (OUTPATIENT)
Dept: NUCLEAR MEDICINE | Facility: CLINIC | Age: 70
End: 2020-10-01
Attending: INTERNAL MEDICINE
Payer: COMMERCIAL

## 2020-10-01 ENCOUNTER — PATIENT OUTREACH (OUTPATIENT)
Dept: GERIATRIC MEDICINE | Facility: CLINIC | Age: 70
End: 2020-10-01

## 2020-10-01 ENCOUNTER — HOSPITAL ENCOUNTER (OUTPATIENT)
Facility: CLINIC | Age: 70
Setting detail: OBSERVATION
Discharge: HOME | End: 2020-10-03
Attending: HOSPITALIST | Admitting: INTERNAL MEDICINE
Payer: COMMERCIAL

## 2020-10-01 ENCOUNTER — APPOINTMENT (OUTPATIENT)
Dept: CARDIOLOGY | Facility: CLINIC | Age: 70
End: 2020-10-01
Attending: INTERNAL MEDICINE
Payer: COMMERCIAL

## 2020-10-01 VITALS
OXYGEN SATURATION: 92 % | HEART RATE: 96 BPM | HEIGHT: 69 IN | BODY MASS INDEX: 29.77 KG/M2 | RESPIRATION RATE: 18 BRPM | SYSTOLIC BLOOD PRESSURE: 98 MMHG | DIASTOLIC BLOOD PRESSURE: 65 MMHG | TEMPERATURE: 98.3 F

## 2020-10-01 DIAGNOSIS — E11.40 TYPE 2 DIABETES MELLITUS WITH DIABETIC NEUROPATHY, WITHOUT LONG-TERM CURRENT USE OF INSULIN (H): ICD-10-CM

## 2020-10-01 DIAGNOSIS — I25.83 CORONARY ARTERY DISEASE DUE TO LIPID RICH PLAQUE: ICD-10-CM

## 2020-10-01 DIAGNOSIS — I25.10 CORONARY ARTERY DISEASE INVOLVING NATIVE CORONARY ARTERY, ANGINA PRESENCE UNSPECIFIED, UNSPECIFIED WHETHER NATIVE OR TRANSPLANTED HEART: Primary | ICD-10-CM

## 2020-10-01 DIAGNOSIS — I25.10 CORONARY ARTERY DISEASE DUE TO LIPID RICH PLAQUE: ICD-10-CM

## 2020-10-01 PROBLEM — R07.9 CHEST PAIN: Status: ACTIVE | Noted: 2020-10-01

## 2020-10-01 PROBLEM — F17.200 SMOKER: Status: ACTIVE | Noted: 2020-10-01

## 2020-10-01 PROBLEM — F11.20 NARCOTIC DEPENDENCE (H): Status: RESOLVED | Noted: 2018-03-10 | Resolved: 2020-10-01

## 2020-10-01 LAB
CV STRESS MAX HR HE: 81
GLUCOSE BLDC GLUCOMTR-MCNC: 155 MG/DL (ref 70–99)
GLUCOSE BLDC GLUCOMTR-MCNC: 160 MG/DL (ref 70–99)
INTERPRETATION ECG - MUSE: NORMAL
LABORATORY COMMENT REPORT: NORMAL
RATE PRESSURE PRODUCT: NORMAL
SARS-COV-2 RNA SPEC QL NAA+PROBE: NEGATIVE
SARS-COV-2 RNA SPEC QL NAA+PROBE: NORMAL
SPECIMEN SOURCE: NORMAL
SPECIMEN SOURCE: NORMAL
STRESS ECHO BASELINE DIASTOLIC HE: 81
STRESS ECHO BASELINE HR: 63
STRESS ECHO BASELINE SYSTOLIC BP: 136
STRESS ECHO CALCULATED PERCENT HR: 54 %
STRESS ECHO LAST STRESS DIASTOLIC BP: 80
STRESS ECHO LAST STRESS SYSTOLIC BP: 134
STRESS ECHO TARGET HR: 151
TROPONIN I SERPL-MCNC: <0.015 UG/L (ref 0–0.04)
TROPONIN I SERPL-MCNC: <0.015 UG/L (ref 0–0.04)

## 2020-10-01 PROCEDURE — 250N000013 HC RX MED GY IP 250 OP 250 PS 637: Performed by: HOSPITALIST

## 2020-10-01 PROCEDURE — C9803 HOPD COVID-19 SPEC COLLECT: HCPCS

## 2020-10-01 PROCEDURE — 999N000049 HC STATISTIC ECHO STRESS OR NM NPI

## 2020-10-01 PROCEDURE — 250N000011 HC RX IP 250 OP 636: Performed by: EMERGENCY MEDICINE

## 2020-10-01 PROCEDURE — 84484 ASSAY OF TROPONIN QUANT: CPT | Performed by: HOSPITALIST

## 2020-10-01 PROCEDURE — 36415 COLL VENOUS BLD VENIPUNCTURE: CPT | Performed by: HOSPITALIST

## 2020-10-01 PROCEDURE — 999N001017 HC STATISTIC GLUCOSE BY METER IP

## 2020-10-01 PROCEDURE — 250N000013 HC RX MED GY IP 250 OP 250 PS 637: Performed by: INTERNAL MEDICINE

## 2020-10-01 PROCEDURE — 78452 HT MUSCLE IMAGE SPECT MULT: CPT | Mod: 26 | Performed by: INTERNAL MEDICINE

## 2020-10-01 PROCEDURE — 99204 OFFICE O/P NEW MOD 45 MIN: CPT | Mod: 25 | Performed by: INTERNAL MEDICINE

## 2020-10-01 PROCEDURE — A9502 TC99M TETROFOSMIN: HCPCS | Performed by: HOSPITALIST

## 2020-10-01 PROCEDURE — 78452 HT MUSCLE IMAGE SPECT MULT: CPT

## 2020-10-01 PROCEDURE — 96365 THER/PROPH/DIAG IV INF INIT: CPT | Mod: 59

## 2020-10-01 PROCEDURE — 99220 PR INITIAL OBSERVATION CARE,LEVEL III: CPT | Performed by: HOSPITALIST

## 2020-10-01 PROCEDURE — 99207 PR NO CHARGE LOS: CPT | Performed by: INTERNAL MEDICINE

## 2020-10-01 PROCEDURE — 250N000009 HC RX 250: Performed by: HOSPITALIST

## 2020-10-01 PROCEDURE — 258N000003 HC RX IP 258 OP 636: Performed by: HOSPITALIST

## 2020-10-01 PROCEDURE — 96366 THER/PROPH/DIAG IV INF ADDON: CPT

## 2020-10-01 PROCEDURE — 93018 CV STRESS TEST I&R ONLY: CPT | Performed by: INTERNAL MEDICINE

## 2020-10-01 PROCEDURE — 250N000011 HC RX IP 250 OP 636: Performed by: INTERNAL MEDICINE

## 2020-10-01 PROCEDURE — 343N000001 HC RX 343: Performed by: HOSPITALIST

## 2020-10-01 PROCEDURE — 93017 CV STRESS TEST TRACING ONLY: CPT

## 2020-10-01 PROCEDURE — 250N000011 HC RX IP 250 OP 636: Performed by: HOSPITALIST

## 2020-10-01 PROCEDURE — 84484 ASSAY OF TROPONIN QUANT: CPT | Mod: 91 | Performed by: HOSPITALIST

## 2020-10-01 PROCEDURE — 999N000128 HC STATISTIC PERIPHERAL IV START W/O US GUIDANCE

## 2020-10-01 PROCEDURE — 96368 THER/DIAG CONCURRENT INF: CPT

## 2020-10-01 PROCEDURE — G0378 HOSPITAL OBSERVATION PER HR: HCPCS

## 2020-10-01 RX ORDER — HEPARIN SODIUM 10000 [USP'U]/100ML
900 INJECTION, SOLUTION INTRAVENOUS CONTINUOUS
Status: DISCONTINUED | OUTPATIENT
Start: 2020-10-01 | End: 2020-10-01

## 2020-10-01 RX ORDER — PREGABALIN 150 MG/1
150 CAPSULE ORAL 3 TIMES DAILY
Status: DISCONTINUED | OUTPATIENT
Start: 2020-10-01 | End: 2020-10-03 | Stop reason: HOSPADM

## 2020-10-01 RX ORDER — AMINOPHYLLINE 25 MG/ML
50-100 INJECTION, SOLUTION INTRAVENOUS
Status: DISCONTINUED | OUTPATIENT
Start: 2020-10-01 | End: 2020-10-02

## 2020-10-01 RX ORDER — NITROGLYCERIN 0.4 MG/1
0.4 TABLET SUBLINGUAL EVERY 5 MIN PRN
Status: DISCONTINUED | OUTPATIENT
Start: 2020-10-01 | End: 2020-10-03 | Stop reason: HOSPADM

## 2020-10-01 RX ORDER — HEPARIN SODIUM 10000 [USP'U]/100ML
900 INJECTION, SOLUTION INTRAVENOUS CONTINUOUS
Status: DISCONTINUED | OUTPATIENT
Start: 2020-10-01 | End: 2020-10-01 | Stop reason: HOSPADM

## 2020-10-01 RX ORDER — HYDROCODONE BITARTRATE AND ACETAMINOPHEN 5; 325 MG/1; MG/1
1 TABLET ORAL EVERY 6 HOURS PRN
Status: DISCONTINUED | OUTPATIENT
Start: 2020-10-01 | End: 2020-10-02

## 2020-10-01 RX ORDER — ACYCLOVIR 200 MG/1
0-1 CAPSULE ORAL
Status: DISCONTINUED | OUTPATIENT
Start: 2020-10-01 | End: 2020-10-02 | Stop reason: HOSPADM

## 2020-10-01 RX ORDER — ALBUTEROL SULFATE 90 UG/1
2 AEROSOL, METERED RESPIRATORY (INHALATION) EVERY 5 MIN PRN
Status: DISCONTINUED | OUTPATIENT
Start: 2020-10-01 | End: 2020-10-02

## 2020-10-01 RX ORDER — REGADENOSON 0.08 MG/ML
0.4 INJECTION, SOLUTION INTRAVENOUS ONCE
Status: COMPLETED | OUTPATIENT
Start: 2020-10-01 | End: 2020-10-01

## 2020-10-01 RX ORDER — NALOXONE HYDROCHLORIDE 0.4 MG/ML
.1-.4 INJECTION, SOLUTION INTRAMUSCULAR; INTRAVENOUS; SUBCUTANEOUS
Status: DISCONTINUED | OUTPATIENT
Start: 2020-10-01 | End: 2020-10-03 | Stop reason: HOSPADM

## 2020-10-01 RX ORDER — LIDOCAINE 40 MG/G
CREAM TOPICAL
Status: DISCONTINUED | OUTPATIENT
Start: 2020-10-01 | End: 2020-10-02

## 2020-10-01 RX ORDER — ASPIRIN 81 MG/1
162 TABLET, CHEWABLE ORAL ONCE
Status: DISCONTINUED | OUTPATIENT
Start: 2020-10-01 | End: 2020-10-02

## 2020-10-01 RX ORDER — ALUMINA, MAGNESIA, AND SIMETHICONE 2400; 2400; 240 MG/30ML; MG/30ML; MG/30ML
30 SUSPENSION ORAL EVERY 4 HOURS PRN
Status: DISCONTINUED | OUTPATIENT
Start: 2020-10-01 | End: 2020-10-03 | Stop reason: HOSPADM

## 2020-10-01 RX ORDER — METOPROLOL TARTRATE 25 MG/1
25 TABLET, FILM COATED ORAL 2 TIMES DAILY
Status: DISCONTINUED | OUTPATIENT
Start: 2020-10-01 | End: 2020-10-01

## 2020-10-01 RX ORDER — SODIUM CHLORIDE 9 MG/ML
INJECTION, SOLUTION INTRAVENOUS CONTINUOUS
Status: DISCONTINUED | OUTPATIENT
Start: 2020-10-01 | End: 2020-10-02

## 2020-10-01 RX ORDER — NITROGLYCERIN 20 MG/100ML
10-200 INJECTION INTRAVENOUS CONTINUOUS
Status: DISCONTINUED | OUTPATIENT
Start: 2020-10-01 | End: 2020-10-01

## 2020-10-01 RX ORDER — METOPROLOL TARTRATE 50 MG
50 TABLET ORAL 2 TIMES DAILY
Status: DISCONTINUED | OUTPATIENT
Start: 2020-10-01 | End: 2020-10-02

## 2020-10-01 RX ORDER — ATORVASTATIN CALCIUM 80 MG/1
80 TABLET, FILM COATED ORAL DAILY
Status: DISCONTINUED | OUTPATIENT
Start: 2020-10-01 | End: 2020-10-03 | Stop reason: HOSPADM

## 2020-10-01 RX ORDER — ACETAMINOPHEN 650 MG/1
650 SUPPOSITORY RECTAL EVERY 4 HOURS PRN
Status: DISCONTINUED | OUTPATIENT
Start: 2020-10-01 | End: 2020-10-03 | Stop reason: HOSPADM

## 2020-10-01 RX ORDER — CEPHALEXIN 500 MG/1
500 CAPSULE ORAL 2 TIMES DAILY
COMMUNITY
End: 2021-01-14

## 2020-10-01 RX ORDER — ASPIRIN 81 MG/1
81 TABLET ORAL DAILY
Status: DISCONTINUED | OUTPATIENT
Start: 2020-10-02 | End: 2020-10-02

## 2020-10-01 RX ORDER — ACETAMINOPHEN 325 MG/1
650 TABLET ORAL EVERY 4 HOURS PRN
Status: DISCONTINUED | OUTPATIENT
Start: 2020-10-01 | End: 2020-10-03 | Stop reason: HOSPADM

## 2020-10-01 RX ADMIN — HEPARIN SODIUM 900 UNITS/HR: 10000 INJECTION, SOLUTION INTRAVENOUS at 00:39

## 2020-10-01 RX ADMIN — METOPROLOL TARTRATE 50 MG: 50 TABLET, FILM COATED ORAL at 20:17

## 2020-10-01 RX ADMIN — HYDROCODONE BITARTRATE AND ACETAMINOPHEN 1 TABLET: 5; 325 TABLET ORAL at 08:49

## 2020-10-01 RX ADMIN — PREGABALIN 150 MG: 150 CAPSULE ORAL at 17:01

## 2020-10-01 RX ADMIN — ATORVASTATIN CALCIUM 80 MG: 80 TABLET, FILM COATED ORAL at 08:45

## 2020-10-01 RX ADMIN — PREGABALIN 150 MG: 150 CAPSULE ORAL at 09:53

## 2020-10-01 RX ADMIN — HYDROCODONE BITARTRATE AND ACETAMINOPHEN 1 TABLET: 5; 325 TABLET ORAL at 17:05

## 2020-10-01 RX ADMIN — METOPROLOL TARTRATE 25 MG: 25 TABLET, FILM COATED ORAL at 08:45

## 2020-10-01 RX ADMIN — ACETAMINOPHEN 650 MG: 325 TABLET, FILM COATED ORAL at 20:17

## 2020-10-01 RX ADMIN — NITROGLYCERIN 80 MCG/MIN: 20 INJECTION INTRAVENOUS at 11:37

## 2020-10-01 RX ADMIN — SODIUM CHLORIDE: 9 INJECTION, SOLUTION INTRAVENOUS at 05:37

## 2020-10-01 RX ADMIN — TETROFOSMIN 32.7 MCI.: 1.38 INJECTION, POWDER, LYOPHILIZED, FOR SOLUTION INTRAVENOUS at 14:06

## 2020-10-01 RX ADMIN — Medication 4500 UNITS: at 00:38

## 2020-10-01 RX ADMIN — REGADENOSON 0.4 MG: 0.08 INJECTION, SOLUTION INTRAVENOUS at 14:01

## 2020-10-01 RX ADMIN — TETROFOSMIN 10.5 MCI.: 1.38 INJECTION, POWDER, LYOPHILIZED, FOR SOLUTION INTRAVENOUS at 11:55

## 2020-10-01 NOTE — PROVIDER NOTIFICATION
MD Notification    Notified Person: MD    Notified Person Name: Yaa    Notification Date/Time: 10/1/2020 0400    Notification Interaction: Page     Purpose of Notification: 263/1 HO...P. Pt states she takes Norco at home r/t fall. Her next scheduled dose was supposed to be 0200. Can we get this ordered? Thank you, Nan *[id number]    Orders Received:    Comments:

## 2020-10-01 NOTE — PROGRESS NOTES
TRANSITIONS OF CARE (NEO) LOG   NEO tasks should be completed by the CC within one (1) business day of notification of each transition. Follow up contact with member is required after return to their usual care setting.  Note:  If CC finds out about the transitions fifteen (15) days or more after the member has returned to their usual care setting, no NEO log is needed. However, the CC should check in with the member to discuss the transition process, any changes needed to the care plan and document it in a case note.    Member Name:  Vijaya Manjarrez [facility]   Product: [facility] Care Coordinator Contact:  Vanesa Gtz RN Agency/County/Care System: [facility]   Transition Communication Actions from Care Management Contact   Transition #1   Notification Date: 10/1/20 Transition Date: 9/30/20 Transition From: Home     Is this the member s usual care setting?               yes Transition To: Hospital, R ED and then transferred to [facility]   Transition Type:  Unplanned  Reason for Admission/Comments:  Chest pain      Shared CC contact info, care plan/services with receiving setting--Date completed: 10/1/20   Notified PCP of transition--Date completed: 10/1/20 via  EMR   Transition #2   Transition #3  (if applicable)   Notification Date: 10/5/20     Transition To:  Home  Transition Date: 10/3/20     Transition Type:    Planned  Notified PCP -- Date completed: 10/5/20              Shared CC contact info, care plan/services with receiving setting or, if applicable, home care agency--Date completed: 10/5/20  *Complete additional tasks below, if this transition is a return to usual care setting.      Comments:   CC received notification of discharge to home on 10/3/20.   CC contacted member and reviewed discharge instructions. Member unsure when/if her surgery has been rescheduled - per Epic it appears surgery is scheduled for 10/16. Provided the number for the clinic to call  and confirm.   Member has a follow-up appointment with PCP in 7 days: No: Offered Assistance with setting up a follow up appointment - waiting to figure out surgery schedule first.   Member has had a change in condition: No  Home visit needed: No  Care plan reviewed and updated.  The following home based services Homemaking were resumed.  New referrals placed: No  Transition log completed.   Vanesa Gtz RN  [facility]  [phone]  Fax: [phone]        *Complete tasks below when the member is discharging TO their usual care setting within one (1) business day of notification.  For situations where the Care Coordinator is notified of the discharge prior to the date of discharge, the Care Coordinator must follow up with the member or designated representative to confirm that discharge actually occurred and discuss required NEO tasks as outlined in the NEO Instructions.  (This includes situations where it may be a  new  usual care setting for the member. (i.e., a community member who decides upon permanent nursing home placement following hospitalization and rehab).    Date completed: 10/5/20  Communicated with member or their designated representative about the following:  care transition process; about changes to the member s health status; plan of care updates; education about transitions and how to prevent unplanned transitions/readmissions  Four Pillars for Optimal Transition:    Check  Yes  - if the member, family member and/or SNF/facility staff manages the following:    If  No  provide explanation in the comments section.          []  Yes     [x]  No     Does the member have a follow-up appointment scheduled with primary care or specialist? (Mental health hospitalizations--the appt. should be w/in 7 days)   [x]  Yes     []  No     Can the member manage their medications or is there a system in place to manage medications (e.g. home care set-up)?         [x]  Yes     []  No     Can the member  verbalize warning signs and symptoms to watch for and how to respond?         [x]  Yes     []  No     Does the member use a Personal Health Care Record?  Check  Yes  if visit summary, discharge summary, and/or healthcare summary are being used as a PHR.                                                                                                                                                                                    [] Yes      [x] No      Have you updated the member s care plan?  If  No  provide explanation in comments.   Comments: No changes in services. Will schedule with PCP once surgery has been scheduled - this is her first priority to figure out.

## 2020-10-01 NOTE — ED TRIAGE NOTES
Pt arrives via EMS, EMS states that pt was at home tonight when she started having midsternal chest pain, pt had stress test today and didn't pass according to EMS, PT received 324 of aspirin and 2 nitroglycerins en route, pain was an 9/10 prior to nitroglycerin and now a 6/10 after. VSS and ABCs intact

## 2020-10-01 NOTE — PLAN OF CARE
[id number]: A&O x 4. Patient c/o CP 1/10 on arrival - relieved by nitroglycerin gtt. Pt currently states CP is 4/10, uptitrating nitroglycerin gtt. Heparin gtt discontinued. VSS, on RA. Up with SBA w/ gait belt. Tele: NSR. Negative trops. Bruising under right eye & on R arm related to a fall at home. Plan for cardiology consult. Continue to Monitor.

## 2020-10-01 NOTE — PLAN OF CARE
Patient is having nuclear stress test, denies chest pain after nitroglycerine drip was stopped. Up with stand by assist.

## 2020-10-01 NOTE — PHARMACY-ADMISSION MEDICATION HISTORY
Admission medication history interview status for the 10/1/2020  admission is complete. See EPIC admission navigator for prior to admission medications     Medication history source reliability:Good    Actions taken by pharmacist (provider contacted, etc): verified all medications with patient. Patient had a med list with her     Additional medication history information not noted on PTA med list :None    Medication reconciliation/reorder completed by provider prior to medication history? Yes    Time spent in this activity: 1 hour    Prior to Admission medications    Medication Sig Last Dose Taking? Auth Provider   acetaminophen (TYLENOL ARTHRITIS PAIN) 650 MG CR tablet Take 650 mg by mouth 2 times daily as needed  prn Yes Reported, Patient   albuterol (PROAIR HFA/PROVENTIL HFA/VENTOLIN HFA) 108 (90 Base) MCG/ACT inhaler Inhale 2 puffs into the lungs every 6 hours  Patient taking differently: Inhale 2 puffs into the lungs every 6 hours as needed  prn Yes Vidhi Mario MD   amLODIPine (NORVASC) 5 MG tablet Take 1 tablet (5 mg) by mouth daily 9/29/2020 Yes Vidhi Mairo MD   atorvastatin (LIPITOR) 80 MG tablet Take 1 tablet (80 mg) by mouth daily Recheck cholesterol in 3 months 9/29/2020 Yes Vidhi Mario MD   cephALEXin (KEFLEX) 500 MG capsule Take 500 mg by mouth 2 times daily For 7 days. 9/30/2020 at Unknown time Yes Unknown, Entered By History   cetirizine (ZYRTEC) 10 MG tablet TAKE ONE TABLET BY MOUTH ONCE DAILY 9/30/2020 Yes Hector Irwin MD   cyclobenzaprine (FLEXERIL) 10 MG tablet TAKE ONE TABLET BY MOUTH TWICE A DAY 9/29/2020 Yes Vidhi Mario MD   DULoxetine (CYMBALTA) 60 MG capsule Take 1 capsule (60 mg) by mouth daily 9/30/2020 Yes Vidhi Mario MD   fluticasone (FLONASE) 50 MCG/ACT nasal spray SPRAY 2 SPRAYS IN EACH NOSTRIL ONCE DAILY 9/29/2020 Yes Vidhi Mario MD   HYDROcodone-acetaminophen (NORCO) 5-325 MG tablet Take 1 tablet by mouth every 6 hours as  needed for pain prn Yes Deena Weeks MD   hydrOXYzine (ATARAX) 10 MG tablet TAKE ONE TABLET BY MOUTH THREE TIMES A DAY AS NEEDED FOR ANXIETY prn Yes Vidhi Mario MD   lisinopril (ZESTRIL) 20 MG tablet Take 1 tablet (20 mg) by mouth 2 times daily 9/30/2020 Yes Vidhi Mario MD   metFORMIN (GLUCOPHAGE) 500 MG tablet TAKE ONE TABLET BY MOUTH TWICE A DAY WITH A MEAL 9/28/2020 Yes Vidhi Mario MD   metoprolol tartrate (LOPRESSOR) 25 MG tablet Take 1 tablet (25 mg) by mouth 2 times daily 9/28/2020 Yes Vidhi Mario MD   montelukast (SINGULAIR) 10 MG tablet TAKE ONE TABLET BY MOUTH AT BEDTIME 9/30/2020 at hs Yes Vidhi Mario MD   Multiple Vitamins-Minerals (MULTIVITAMIN ADULT PO) Take 5 tablets by mouth daily Pro-caps vitamin  9/28/2020 Yes Unknown, Entered By History   nitroGLYcerin (NITROSTAT) 0.4 MG sublingual tablet For chest pain place 1 tablet under the tongue every 5 minutes for 3 doses. If symptoms persist 5 minutes after 1st dose call 911. prn Yes Vidhi Mario MD   nystatin (MYCOSTATIN) 640399 UNIT/GM external powder APPLY TO AFFECTED AREA(S) TOPICALLY THREE TIMES A DAY AS NEEDED prn Yes Vidhi Mario MD   omeprazole (PRILOSEC) 20 MG DR capsule Take 1 capsule (20 mg) by mouth 2 times daily 9/29/2020 Yes Vidhi Mario MD   pregabalin (LYRICA) 150 MG capsule Take 1 capsule (150 mg) by mouth 3 times daily 9/29/2020 Yes Vidhi Mario MD   sodium chloride (OCEAN) 0.65 % nasal spray Apply one spray to nares two times daily  Patient taking differently: Apply one spray to nares two times daily as needed prn Yes Tyra Elliott,    tiotropium (SPIRIVA) 18 MCG inhaled capsule Inhale 1 capsule (18 mcg) into the lungs daily 9/27/2020 Yes Vidhi Mario MD   traZODone (DESYREL) 100 MG tablet TAKE TWO TABLETS BY MOUTH EVERY NIGHT AT BEDTIME 9/28/2020 at hs Yes Vidhi Mario MD   blood glucose monitoring (NO BRAND SPECIFIED) test strip  Use to test blood sugars 1 times daily or as directed, use brand same as previous   Disha Van MD   order for DME Equipment being ordered: glucometer   Vidhi Mario MD

## 2020-10-01 NOTE — PROGRESS NOTES
[facility]    Hospitalist Progress Note    Assessment & Plan   70 year old female who was admitted on 10/1/2020 with chest pain    Impression:   Principal Problem:    Chest pain -- trop normal x 3, but abnormal NM stress test   -- anticipate coronary angiogram tomorrow      CAD, non-obstruction on Angiogram in 2019      Smoker -- needs to quit    Active Problems:    DM type 2, Hgb A1C 7.3 on 5/20/20      COPD (chronic obstructive pulmonary disease)      Anxiety      GERD (gastroesophageal reflux disease)      Benign essential hypertension      Controlled substance agreement signed- ok 5/12/20      Left Orbit Floor Fracture -- Dx 9/25/20    Smoker      Plan:  As above    DVT Prophylaxis: Pneumatic Compression Devices  Code Status: Full Code    Disposition: Expected discharge Wed to home    Scott Sheppard MD  Pager [phone]  Cell Phone [phone]  Text Page (7am to 6pm)    Interval History   No new complaints, had nuclear stress test today.     Physical Exam   Temp: 98.1  F (36.7  C) Temp src: Oral BP: (!) 169/84 Pulse: 72   Resp: 21 SpO2: 95 % O2 Device: None (Room air)    There were no vitals filed for this visit.  Vital Signs with Ranges  Temp:  [97.4  F (36.3  C)-98.3  F (36.8  C)] 98.1  F (36.7  C)  Pulse:  [] 72  Resp:  [11-29] 21  BP: ()/(51-89) 169/84  SpO2:  [92 %-99 %] 95 %  I/O last 3 completed shifts:  In: 738.33 [I.V.:738.33]  Out: 300 [Urine:300]    # Pain Assessment:  Current Pain Score 10/1/2020   Patient currently in pain? yes   Pain score (0-10) 2   Pain location -   - Vijaya is experiencing pain due to chest. Pain management was discussed and the plan was created in a collaborative fashion.  Vijaya's response to the current recommendations: engaged  - Please see the plan for pain management as documented above    (exam per staff)  Constitutional: Awake, alert, cooperative, no apparent distress  Respiratory: Clear to auscultation  bilaterally, no crackles or wheezing  Cardiovascular: Regular rate and rhythm, normal S1 and S2, and no murmur noted  GI: Normal bowel sounds, soft, non-distended, non-tender  Extrem: No calf tenderness, no ankle edema  Neuro: Ox3, no focal motor or sensory deficits    Medications     sodium chloride 100 mL/hr at 10/01/20 1547       aspirin  162 mg Oral Once     [START ON 10/2/2020] aspirin  81 mg Oral Daily     atorvastatin  80 mg Oral Daily     metoprolol tartrate  50 mg Oral BID     pregabalin  150 mg Oral TID     sodium chloride (PF)  3 mL Intracatheter Q8H       Data   Recent Labs   Lab 10/01/20  0929 10/01/20  0520 09/30/20  2201   WBC  --   --  9.3   HGB  --   --  11.4*   MCV  --   --  86   PLT  --   --  379   NA  --   --  140   POTASSIUM  --   --  3.5   CHLORIDE  --   --  105   CO2  --   --  25   BUN  --   --  10   CR  --   --  0.93   ANIONGAP  --   --  10   RADHA  --   --  8.8   GLC  --   --  159*   TROPI <0.015 <0.015 0.018       Imaging:   Recent Results (from the past 24 hour(s))   XR Chest Port 1 View    Narrative    EXAM: XR CHEST PORT 1 VW  LOCATION: [facility]  DATE/TIME: 9/30/2020 10:29 PM    INDICATION: Chest pain.  COMPARISON: 08/08/2020      Impression    IMPRESSION: Heart size within normal limits for portable technique. Calcified aortic arch. Lungs are clear. No visible pneumothorax or pleural effusion. Arthritic changes of the left shoulder.   NM Lexiscan stress test (nuc card)   Result Value    Target     Baseline Systolic     Baseline Diastolic BP 81    Last Stress Systolic     Last Stress Diastolic BP 80    Baseline HR 63    Max HR 81    Calculated Percent HR 54    Rate Pressure Product 10,854.0    Narrative       The nuclear stress test is abnormal.     There is a medium sized area of ischemia in the entire inferior and   inferoseptal segment(s) of the left ventricle.     A prior study was conducted on 2/13/2019.  This study has changes noted   when compared  with the prior study.  Inferior inferoseptal ischemia is a   new finding.

## 2020-10-01 NOTE — PROGRESS NOTES
Nuclear stress perfusion study reviewed and findings discussed with patient.  There appears to be moderate inferior wall ischemia, which is surprising given that coronary angiography last year demonstrated only mild to moderate right coronary artery disease.  Regardless, given her chest discomfort and abnormal nuclear stress test coronary angiography is indicated.  I have explained the indications, risks and benefits of coronary angiography to the patient in detail who is agreeable with proceeding.    I will attempt to discuss the urgency however orbital surgery with Dr Doll tomorrow morning.  This may impact decisions regarding revascularization.

## 2020-10-01 NOTE — ED PROVIDER NOTES
History     Chief Complaint:  Chest Pain and Shortness of Breath    HPI   Vijaya Manjarrez is a 70 year old female on with pertinent medical history of CAD, hypertension, hyperlipidemia, diabetes, and anxiety who presents to the emergency department via EMS evaluation of chest pain and shortness of breath. The patient was at home tonight when she began having midsternal chest pain. En route, the patient was given 324 of aspirin and 2 nitroglycerins by EMS. The patient's pain has decreased from a 9/10 to a 6/10 after interventions. The patient reports shortness of breath with exertion but denies presently having any while sitting on bed. The patient feels slightly clammy that is worse with exertion. She denies fever or chills. Of note, the patient had stress test today, see results below.    Echocardiogram Exercise Stress 09/30/2020  Hyperdynamic left ventricular function  The visual ejection fraction is estimated at >70%.  Dynamic mid LV obstruction noted with peak gradient of 70mm with Valsalva.  This is new compared to prior study from 6/17. The study was technically difficult.    Allergies:  Penicillins     Medications:    Albuterol inhaler   Norvasc   Lipitor   Zyrtec   Flexeril   Cymbalta   Flonase   Atarax   Lisinopril   Metformin   Metoprolol tartrate   Singulair  Nitrostat   Prilosec   Lyrica   Compazine   Spiriva   Ultram   Desyrel    Past Medical History:    Anxiety   Cervical spine degeneration   COPD   Diabetes mellitus   Diabetic neuropathy   Facet arthropathy, lumbar   GERD   Hypertension   Hyperlipidemia   Lumbard DDD   Migraine headache  Fibromyalgia   CAD   Hypokalemia      Past Surgical History:    Cholecystectomy, lap   CV heart catheterization     Family History:    Cancer - mother   Lung cancer - father     Social History:  The patient was accompanied to the ED by EMS.  Smoking Status: Former Smoker  Smokeless Tobacco: Never Used  Alcohol Use: Negative  Marital Status:  Single     Review  "of Systems   Constitutional: Positive for diaphoresis. Negative for chills and fever.   Respiratory: Positive for shortness of breath (resolved).    Cardiovascular: Positive for chest pain.   All other systems reviewed and are negative.      Physical Exam     Patient Vitals for the past 24 hrs:   BP Temp Temp src Pulse Resp SpO2 Height   10/01/20 0000 -- -- -- 98 26 94 % --   09/30/20 2345 101/70 -- -- 95 13 92 % 1.753 m (5' 9\")   09/30/20 2330 130/70 -- -- 96 -- 96 % --   09/30/20 2315 135/89 -- -- 92 19 99 % --   09/30/20 2245 117/84 -- -- 92 15 -- --   09/30/20 2202 130/88 98.3  F (36.8  C) Oral 100 16 95 % --       Physical Exam  Constitutional: Alert  HENT:    Nose: Nose normal.    Mouth/Throat: Oropharynx is clear, mucous membranes are moist  Eyes: EOM are normal. Pupils are equal, round, and reactive to light.   CV: Regular rate and rhythm, no murmurs, rubs or gallops.  Resp: Clear lungs to auscultation, all lung fields. Normal respiratory effort.   GI: Soft, non-distended. There is no tenderness. No rebound or guarding.   MSK: Normal range of motion. No deformity.   Neurological:   GCS 15; A/Ox3; Cranial nerves 2-12 intact;   5/5 strength is symmetric to the upper and lower extremities;   Sensation intact to light touch throughout the upper and lower extremities;   2+ DTRs to the bilateral upper and lower extremities (biceps, patellar, achilles);   Normal cerebellar exam (finger nose finger, heel shin) bilaterally;   Normal gait   No meningismus  Skin: Skin is warm and dry.    Emergency Department Course     ECG:  ECG taken at 2205, ECG read at 2207  Sinus rhythm with marked sinus arrhythmia   Q waves inferiorly.  No ST elevation or depression  No T wave inversion  Rate 97 bpm. MD interval 150 ms. QRS duration 82 ms. QT/QTc 356/452 ms. P-R-T axes 59 14 46.    Imaging:  Radiology findings were communicated with the patient who voiced understanding of the findings.    XR Chest Port 1 View  Heart size within " normal limits for portable technique. Calcified aortic arch. Lungs are clear. No visible pneumothorax or pleural effusion. Arthritic changes of the left shoulder.     Reading per radiology.    Laboratory:  Laboratory findings were communicated with the patient who voiced understanding of the findings.    CBC: WBC 9.3, HGB 11.4 (L),     BMP: Glucose 159 (H) o/w WNL (Creatinine 0.93)    Troponin (): <0.018    COVID-19 by PCR: pending    Interventions:  2248 Nitroglycerin, 10 mcg/min, IV   2255 Nitroglycerin, 20 mcg/min, IV   2304 Nitroglycerin, 30 mcg/min, IV   2310 Nitroglycerin, 50 mcg/min, IV   2320 Nitroglycerin, 70 mcg/min, IV   2326 Nitroglycerin, 90 mcg/min, IV   2332 Nitroglycerin, 110 mcg/min, IV   0019 Nitroglycerin, 75 mcg/min, IV     Emergency Department Course:     Nursing notes and vitals reviewed.    2201 IV was inserted and blood was drawn for laboratory testing, results above.    2203 I performed an exam of the patient as documented above.     2205 ECG obtained as noted above.    2229 The patient was sent for a XR while in the emergency department, results above.     2307 The patient's nose was swabbed and this sample was sent for COVID testing, findings above.     0004 I spoke with Dr. Mon of the hospitalist service from [facility] regarding patient's presentation, findings, and plan of care.    0016 Findings and plan explained to the Patient. Patient will be transferred to [facility] via EMS. Discussed the case with Dr. Mon, who will admit the patient to a monitored bed for further monitoring, evaluation, and treatment.    Impression & Plan      Medical Decision Makin-year-old female who comes in with chest pain and shortness of breath after having a positive stress test earlier today.  Initial exam, patient is well-appearing but does appear uncomfortable.  Patient received nitroglycerin in route by EMS and this brought her pain from a 9 down to a 6.  I then started  the patient on nitroglycerin drip.  Troponin came back as detectable but not elevated.  Patient did have improvement on the nitroglycerin drip but required 110 mcg/min.  Discussed the patient with hospitalist service at [facility] as we currently do not have any ICU beds and because of the nitroglycerin drip she cannot go to the floor here at [facility].  Patient was accepted at [facility].  I rechecked the patient and explained the need for admission and transfer and she expressed understanding.  Given the likelihood of acute coronary syndrome I did start the patient on heparin drip as well.  This decision was made in conjunction with the hospitalist as well who agreed with my plan.    Covid-19  Vijaya Manjarrez was evaluated during a global COVID-19 pandemic, which necessitated consideration that the patient might be at risk for infection with the SARS-CoV-2 virus that causes COVID-19.   Applicable protocols for evaluation were followed during the patient's care.   COVID-19 was considered as part of the patient's evaluation. The plan for testing is:  a test was obtained during this visit.    Diagnosis:    ICD-10-CM   1. Acute coronary syndrome (H)  I24.9       Disposition:   Patient will be transferred to [facility] via EMS.     Critical Care time was 37 minutes for this patient excluding procedures.       Scribe Disclosure:  I, Hollie Li, am serving as a scribe at 10:05 PM on 9/30/2020 to document services personally performed by Darrion Nettles DO based on my observations and the provider's statements to me.  [facility] EMERGENCY DEPARTMENT       Darrion Nettles DO  10/01/20 0448

## 2020-10-01 NOTE — PROGRESS NOTES
"Lexiscan Stress: Pt tolerated well. VSS. Pt denied chest pain pre lexiscan. After injection pt did experience some sob and slight chest pain in the middle of her chest that she described as \"being familiar to what brought her in.\" Closing Bp and pulse stable. Chest pain has subsided now.    Pt transferred back to Rm 263 -1  per w/c.     "

## 2020-10-01 NOTE — PROGRESS NOTES
Briefly, patient with abnormal stress echo yesterday, presenting with chest pain.  Here for rule out.  On nitro and heparin drip.  Full note to follow.  Vitals stable.  Cardiology consult.

## 2020-10-01 NOTE — CONSULTS
Cardiology Consultation      Vijaya Manjarrez MRN# [id number]   YOB: 1950 Age: 70 year old   Date of Admission: 10/1/2020     Reason for consult: Chest discomfort, abnormal echocardiogram           Assessment and Plan:     70-year-old female with a past medical history significant for nonobstructive disease on coronary angiography in 2019, diabetes, hypertension who recently sustained a left orbital floor fracture.  She was scheduled for a stress echocardiogram for preoperative risk assessment which was not performed due to an elevated mid ventricular gradient in the context of hyperdynamic systolic function.  She then presented to the emergency department yesterday with chest discomfort.  Cardiac troponins are negative.      1. Nonobstructive coronary artery disease on coronary angiography in 2019  2. Exertional chest discomfort and dyspnea over the past few years  3. Mid ventricular gradient in the context of hyperdynamic systolic function on echocardiography yesterday  4. Hypertension  5. Dyslipidemia  6. Diabetes    PLAN  -Lexiscan nuclear stress test today to assess for progression of coronary artery disease  -I reviewed her echocardiogram from yesterday as well as the previous study from 2017.  She has a small left ventricular cavity with hyperdynamic systolic function and appears to have significant mid cavity gradients despite only mild concentric left ventricular hypertrophy.  Given that these gradients are up to 70 mm at rest with Valsalva, it is certainly possible that these are responsible for the patient's exertional symptoms.  -In this context I think it is appropriate to titrate up her beta-blocker therapy as much as possible and to minimize the use of vasodilators.  I have increased her metoprolol to 50 mg twice daily and would recommend that we continue to titrate up this agent and wean her off her amlodipine.  -I would also recommend a cardiovascular MRI for a more comprehensive  assessment of cardiac structure/function as an outpatient given the technically limited nature of her echocardiogram.              Chief Complaint:   No chief complaint on file.           History of Present Illness:   This patient is a 70 year old female who was seen by Dr Merida in consultation in our clinic in 02/2019.  She has a history of exertional dyspnea and chest discomfort in the context of a significant risk factor profile including diabetes, hypertension, dyslipidemia and tobacco abuse.  She underwent a stress perfusion study prior to her office visit at the time that demonstrated a small area of apical ischemia.    Given these findings, coronary angiography was performed on 2/26/2019.  She was found to have mild to moderate nonobstructive coronary artery disease.  The most significant lesion was a 50% tubular narrowing of the circumflex that was not flow-limiting by FFR.    Most recently the patient sustained a left orbital floor fracture.  Prior to surgical repair, she was scheduled for a stress echocardiogram for preoperative risk assessment.  The resting echocardiogram, however, demonstrated hyperdynamic systolic function with a mid ventricular gradient of 70 mmHg with Valsalva.  A Lexiscan stress test was suggested but the patient was admitted yesterday with chest discomfort of the echocardiogram.    Here she feels well.  The chest discomfort did improve with nitroglycerin and she is currently on a nitroglycerin drip.  She states that for the past few years the exertional chest discomfort/dyspnea has not changed in frequency or duration.  She is not very active but tends to experience symptoms with little exertion.  They are improved by rest.    She denies any history of palpitations PND orthopnea or lower extremity edema.  Interestingly, she does mention a few syncopal episodes that have occurred towards the evening hours.  These are occasionally accompanied by a prodrome of diaphoresis and  nausea.         Physical Exam:   Vitals were reviewed  Blood pressure 115/68, pulse 70, temperature 97.4  F (36.3  C), temperature source Oral, resp. rate 18, SpO2 94 %, not currently breastfeeding.  Temperatures:  Current - Temp: 97.4  F (36.3  C); Max - Temp  Av  F (36.7  C)  Min: 97.4  F (36.3  C)  Max: 98.3  F (36.8  C)  Respiration range: Resp  Av.6  Min: 13  Max: 29  Pulse range: Pulse  Av  Min: 64  Max: 100  Blood pressure range: Systolic (24hrs), Av , Min:76 , Max:135   ; Diastolic (24hrs), Av, Min:51, Max:89    Pulse oximetry range: SpO2  Av.6 %  Min: 92 %  Max: 99 %  No intake or output data in the 24 hours ending 10/01/20 1127  Constitutional:   awake, alert, cooperative, no apparent distress, and appears stated age     Eyes:   Lids and lashes normal, pupils equal, round and reactive to light, extra ocular muscles intact, sclera clear, conjunctiva normal     Neck:   supple, symmetrical, trachea midline, no JVD     Back:   symmetric     Lungs:   No increased work of breathing, good air exchange, clear to auscultation bilaterally, no crackles or wheezing  clear to auscultation     Cardiovascular:   Normal apical impulse, regular rate and rhythm, normal S1 and S2, no S3 or S4, 2/6 ROD     Abdomen:   non-tender     Musculoskeletal:   motor strength is 5 out of 5 all extremities bilaterally     Neurologic:   Grossly nonfocal     Skin:   no bruising or bleeding     Additional findings:     No edema               Past Medical History:   I have reviewed this patient's past medical history  Past Medical History:   Diagnosis Date     Anxiety      Cervical spine degeneration 2016    spinal stenosis,      COPD (chronic obstructive pulmonary disease) (H) 2012    mild     Diabetes mellitus, type 2 (H)      Diabetic neuropathy (H)      Facet arthropathy, lumbar      GERD (gastroesophageal reflux disease)      HTN (hypertension)      Hyperlipidemia      Lumbar degenerative disc disease       Migraine headache              Past Surgical History:   I have reviewed this patient's past surgical history  Past Surgical History:   Procedure Laterality Date     CHOLECYSTECTOMY, LAPOROSCOPIC      Cholecystectomy, Laparoscopic     CV HEART CATHETERIZATION WITH POSSIBLE INTERVENTION Left 2/26/2019    Procedure: Coronary Angiogram;  Surgeon: Cheo Merida MD;  Location:  HEART CARDIAC CATH LAB               Social History:   I have reviewed this patient's social history  Social History     Tobacco Use     Smoking status: Former Smoker     Packs/day: 1.00     Years: 40.00     Pack years: 40.00     Types: Cigarettes     Quit date: 6/1/2017     Years since quitting: 3.3     Smokeless tobacco: Never Used   Substance Use Topics     Alcohol use: No             Family History:   I have reviewed this patient's family history  Family History   Problem Relation Age of Onset     Cancer Mother      Cancer Father         Lung cancer     Cancer Maternal Grandmother         Breast cancer     Glaucoma Maternal Grandmother      Macular Degeneration No family hx of              Allergies:     Allergies   Allergen Reactions     Penicillins Hives             Medications:   I have reviewed this patient's current medications  Medications Prior to Admission   Medication Sig Dispense Refill Last Dose     acetaminophen (TYLENOL ARTHRITIS PAIN) 650 MG CR tablet Take 650 mg by mouth 2 times daily as needed         albuterol (PROAIR HFA/PROVENTIL HFA/VENTOLIN HFA) 108 (90 Base) MCG/ACT inhaler Inhale 2 puffs into the lungs every 6 hours 18 g 1      amLODIPine (NORVASC) 5 MG tablet Take 1 tablet (5 mg) by mouth daily 90 tablet 1      atorvastatin (LIPITOR) 80 MG tablet Take 1 tablet (80 mg) by mouth daily Recheck cholesterol in 3 months 90 tablet 1      blood glucose monitoring (NO BRAND SPECIFIED) test strip Use to test blood sugars 1 times daily or as directed, use brand same as previous 100 strip 3      cetirizine (ZYRTEC) 10 MG  tablet TAKE ONE TABLET BY MOUTH ONCE DAILY 90 tablet 3      cyclobenzaprine (FLEXERIL) 10 MG tablet TAKE ONE TABLET BY MOUTH TWICE A  tablet 1      DULoxetine (CYMBALTA) 60 MG capsule Take 1 capsule (60 mg) by mouth daily 90 capsule 3      fluticasone (FLONASE) 50 MCG/ACT nasal spray SPRAY 2 SPRAYS IN EACH NOSTRIL ONCE DAILY 16 g 1      HYDROcodone-acetaminophen (NORCO) 5-325 MG tablet Take 1 tablet by mouth every 6 hours as needed for pain 10 tablet 0      hydrOXYzine (ATARAX) 10 MG tablet TAKE ONE TABLET BY MOUTH THREE TIMES A DAY AS NEEDED FOR ANXIETY 30 tablet 0      ibuprofen (ADVIL/MOTRIN) 600 MG tablet Take 1 tablet (600 mg) by mouth every 6 hours as needed for moderate pain 20 tablet 0      lisinopril (ZESTRIL) 20 MG tablet Take 1 tablet (20 mg) by mouth 2 times daily 180 tablet 1      metFORMIN (GLUCOPHAGE) 500 MG tablet TAKE ONE TABLET BY MOUTH TWICE A DAY WITH A MEAL 180 tablet 1      metoprolol tartrate (LOPRESSOR) 25 MG tablet Take 1 tablet (25 mg) by mouth 2 times daily 180 tablet 1      montelukast (SINGULAIR) 10 MG tablet TAKE ONE TABLET BY MOUTH AT BEDTIME 90 tablet 1      Multiple Vitamins-Minerals (MULTIVITAMIN ADULT PO) Take 5-6 tablets by mouth daily Pro-caps vitamin        naproxen sodium (ALEVE) 220 MG tablet Take 220 mg by mouth as needed for moderate pain        nitroGLYcerin (NITROSTAT) 0.4 MG sublingual tablet For chest pain place 1 tablet under the tongue every 5 minutes for 3 doses. If symptoms persist 5 minutes after 1st dose call 911. 30 tablet 3      nystatin (MYCOSTATIN) 110602 UNIT/GM external powder APPLY TO AFFECTED AREA(S) TOPICALLY THREE TIMES A DAY AS NEEDED 60 g 0      omeprazole (PRILOSEC) 20 MG DR capsule Take 1 capsule (20 mg) by mouth 2 times daily 180 capsule 1      order for DME Equipment being ordered: glucometer 1 each 0      pregabalin (LYRICA) 150 MG capsule Take 1 capsule (150 mg) by mouth 3 times daily 270 capsule 1      prochlorperazine (COMPAZINE) 5 MG  tablet Take 1 tablet (5 mg) by mouth every 6 hours as needed for vomiting 20 tablet 0      sodium chloride (OCEAN) 0.65 % nasal spray Apply one spray to nares two times daily 15 mL 0      tiotropium (SPIRIVA) 18 MCG inhaled capsule Inhale 1 capsule (18 mcg) into the lungs daily 90 capsule 1      traMADol (ULTRAM) 50 MG tablet TAKE TWO TABLETS BY MOUTH THREE TIMES A  tablet 0      traZODone (DESYREL) 100 MG tablet TAKE TWO TABLETS BY MOUTH EVERY NIGHT AT BEDTIME 180 tablet 1      Current Facility-Administered Medications Ordered in Epic   Medication Dose Route Frequency Last Rate Last Dose     acetaminophen (TYLENOL) Suppository 650 mg  650 mg Rectal Q4H PRN         acetaminophen (TYLENOL) tablet 650 mg  650 mg Oral Q4H PRN         alum & mag hydroxide-simethicone (MAALOX  ES) suspension 30 mL  30 mL Oral Q4H PRN         aspirin (ASA) chewable tablet 162 mg  162 mg Oral Once         [START ON 10/2/2020] aspirin EC tablet 81 mg  81 mg Oral Daily         atorvastatin (LIPITOR) tablet 80 mg  80 mg Oral Daily   80 mg at 10/01/20 0845     HYDROcodone-acetaminophen (NORCO) 5-325 MG per tablet 1 tablet  1 tablet Oral Q6H PRN   1 tablet at 10/01/20 0849     lidocaine (LMX4) cream   Topical Q1H PRN         lidocaine 1 % 0.1-1 mL  0.1-1 mL Other Q1H PRN         metoprolol tartrate (LOPRESSOR) tablet 50 mg  50 mg Oral BID         naloxone (NARCAN) injection 0.1-0.4 mg  0.1-0.4 mg Intravenous Q2 Min PRN         nitroGLYcerin (NITROSTAT) sublingual tablet 0.4 mg  0.4 mg Sublingual Q5 Min PRN         nitroGLYcerin 50 mg in D5W 250 mL (adult std) infusion   mcg/min Intravenous Continuous 24 mL/hr at 10/01/20 1126 80 mcg/min at 10/01/20 1126     pregabalin (LYRICA) capsule 150 mg  150 mg Oral TID   150 mg at 10/01/20 0953     sodium chloride (PF) 0.9% PF flush 3 mL  3 mL Intracatheter q1 min prn         sodium chloride (PF) 0.9% PF flush 3 mL  3 mL Intracatheter Q8H         sodium chloride 0.9% infusion   Intravenous  Continuous 100 mL/hr at 10/01/20 0537       No current Epic-ordered outpatient medications on file.             Review of Systems:   The 10 point Review of Systems is negative other than noted in the HPI            Data:   All laboratory data reviewed  Results for orders placed or performed during the hospital encounter of 10/01/20 (from the past 24 hour(s))   Glucose by meter   Result Value Ref Range    Glucose 155 (H) 70 - 99 mg/dL   Troponin I - Now then in 4 hours x 2   Result Value Ref Range    Troponin I ES <0.015 0.000 - 0.045 ug/L   Glucose by meter   Result Value Ref Range    Glucose 160 (H) 70 - 99 mg/dL   Troponin I - Now then in 4 hours x 2   Result Value Ref Range    Troponin I ES <0.015 0.000 - 0.045 ug/L     EKG results: NSR with no acute ST T wave abnormalities

## 2020-10-01 NOTE — PROGRESS NOTES
[facility] Care Coordination Contact    [facility]  Ambulatory Care Coordination to Inpatient Care Management   Hand-In Communication    Date:  October 1, 2020  Name: Vijaya Manjarrez is enrolled in [facility] Care Coordination program and I am the Lead Care Coordinator.  CC Contact Information: Cell: [phone]  Payor Source: Payor: [facility] / Plan: [facility] [employer] / Product Type: HMO /   Current services in place:    Please see the CC Snaphot and Care Management Flowsheets for specific details of this Vijaya Manjarrez care plan.   Additional details/specific concerns r/t this admission: Nyasia has not been feeling well for several months - multiple ED visits due to weakness, SOB, nausea, and vomiting. Also has chronic pain issues. Thought to be a viral illness, but she continued not feeling well. Has had recent appointments (and testing) with pain specialist, PCP and neurology with no real answers yet. Unfortunately on 9/22 Nyasia had a fall and sustained facial fractures that were scheduled to be repaired today (10/1). During the pre-op she had an abnormal stress test and surgery was canceled. She was scheduled for a follow up cardiology appointment today. Nyasia feels overwhelmed with volume of recent appointments and struggles with managing appointments and rides - our support staff have been helping coordinate as needed.     I will follow this admission in Epic. Please feel free to contact me with questions or for further collaboration in discharge planning.    Vanesa Gtz RN  [facility]  [phone]  Cell: [phone]

## 2020-10-01 NOTE — H&P
[facility]    History and Physical  Hospitalist       Date of Admission:  10/1/2020    Assessment & Plan   Vijaya Manjarrez is a 70 year old female with a history of CAD, type II DM, narcotic dependence, HTN who recently had a left orbital floor fracture.  As part of preop work-up she underwent stress echo on 9/29 that was reported as abnormal.  Presented to ED with chest pain on 9/30.    Chest pain, atypical  Nonobstructive CAD on cardiac cath in February 2019   Patient has known CAD and underwent cardiac catheterization in February 2019.  This did not show any significant narrowing that warranted mechanical revascularization.  Medical therapy recommended.  Had a dobutamine stress echo on 9/29 as part of preop work-up.  This showed hyperdynamic LV with EF estimated at greater than 70%.  Dynamic mid LV obstruction noted.  Given resting abnormal images, Lexiscan nuclear stress test was planned.  However patient went home and had chest pain as described below the following day and presented to ED.  EKG with Q waves in inferior leads but per my review these are not new.  No acute ischemic changes.  Initial troponin was 0.018.  She was started on nitro and heparin drip in the ED.  Heparin drip stopped after subsequent troponin negative.    -Admit under observation status  -Consult to cardiology.  Will defer to them to order nuclear stress test as deemed necessary.  -Wean off nitro drip as able.  Stop heparin drip as second troponin negative.  -Telemetry monitoring.  -N.p.o. till seen by cardiology.  -Aspirin, PRN nitroglycerin  -Gentle IV NS  -PTA statin    Hypertension  Hyperlipidemia  -Continue PTA metoprolol.  Hold PTA lisinopril, amlodipine given soft BP at admission.  -Continue PTA statin    Chronic pain, chronic opioid use  Diabetic neuropathy  Fibromyalgia  -Continue PTA Norco  -Continue PTA Lyrica, tramadol and Cymbalta when verified    Recent traumatic left orbital floor  fracture  -Surgery was supposed to be on 10/1.  Obviously rescheduled given current hospitalization.    Type II DM  -Hold PTA metformin.  -Low-dose sliding scale insulin.  Titrate as needed.    Anxiety  -Continue PTA Atarax and trazodone when verified    GERD  -Continue PTA Prilosec when verified    DVT Prophylaxis: Pneumatic Compression Devices, was on heparin drip  Code Status: Full Code  Expected discharge: Tomorrow, recommended to prior living arrangement once adequate pain management/ tolerating PO medications.  Cleared by cardiology.  Isabelle Mon MD    Primary Care Physician   Vidhi Mario    Chief Complaint   Chest pain    History is obtained from the patient, ED provider and chart review    History of Present Illness   Vijaya Manjarrez is a 70 year old female with a history of CAD, type II DM, narcotic and tobacco dependence, HTN who recently had a left orbital floor fracture.  As part of preop work-up she underwent stress echo on 9/29 that was reported as abnormal.  Presented to ED with chest pain on 9/30.  Patient states that she has been under a lot of stress recently with her multiple hospital visits.  She developed substernal chest pain/tightness around 7 PM.  Denies any significant physical exertion.  She is quite anxious about it.  The pains does not particularly radiate down any arm.  No associated shortness of breath.  No abdominal pain or nausea.  She arrived to ED via EMS.  She was given aspirin and 2 nitro.  In the ED, noted to have stable vitals.  Initial EKG shows Q waves inferiorly which the ED provider reported as being new.  Chest x-ray with no acute findings.  Initial troponin at 0.018.  She was started on nitro drip and heparin drip and is admitted to CCU for further management.      Past Medical History    I have reviewed this patient's medical history and updated it with pertinent information if needed.   Past Medical History:   Diagnosis Date     Anxiety      Cervical  spine degeneration 2016    spinal stenosis,      COPD (chronic obstructive pulmonary disease) (H) 2012    mild     Diabetes mellitus, type 2 (H)      Diabetic neuropathy (H)      Facet arthropathy, lumbar      GERD (gastroesophageal reflux disease)      HTN (hypertension)      Hyperlipidemia      Lumbar degenerative disc disease      Migraine headache        Past Surgical History   I have reviewed this patient's surgical history and updated it with pertinent information if needed.  Past Surgical History:   Procedure Laterality Date     CHOLECYSTECTOMY, LAPOROSCOPIC      Cholecystectomy, Laparoscopic     CV HEART CATHETERIZATION WITH POSSIBLE INTERVENTION Left 2/26/2019    Procedure: Coronary Angiogram;  Surgeon: Cheo Merida MD;  Location:  HEART CARDIAC CATH LAB       Prior to Admission Medications   Prior to Admission Medications   Prescriptions Last Dose Informant Patient Reported? Taking?   DULoxetine (CYMBALTA) 60 MG capsule   No No   Sig: Take 1 capsule (60 mg) by mouth daily   HYDROcodone-acetaminophen (NORCO) 5-325 MG tablet   No No   Sig: Take 1 tablet by mouth every 6 hours as needed for pain   Multiple Vitamins-Minerals (MULTIVITAMIN ADULT PO)   Yes No   Sig: Take 5-6 tablets by mouth daily Pro-caps vitamin   acetaminophen (TYLENOL ARTHRITIS PAIN) 650 MG CR tablet   Yes No   Sig: Take 650 mg by mouth 2 times daily as needed    albuterol (PROAIR HFA/PROVENTIL HFA/VENTOLIN HFA) 108 (90 Base) MCG/ACT inhaler   No No   Sig: Inhale 2 puffs into the lungs every 6 hours   amLODIPine (NORVASC) 5 MG tablet   No No   Sig: Take 1 tablet (5 mg) by mouth daily   atorvastatin (LIPITOR) 80 MG tablet   No No   Sig: Take 1 tablet (80 mg) by mouth daily Recheck cholesterol in 3 months   blood glucose monitoring (NO BRAND SPECIFIED) test strip   No No   Sig: Use to test blood sugars 1 times daily or as directed, use brand same as previous   cetirizine (ZYRTEC) 10 MG tablet   No No   Sig: TAKE ONE TABLET BY MOUTH  ONCE DAILY   cyclobenzaprine (FLEXERIL) 10 MG tablet   No No   Sig: TAKE ONE TABLET BY MOUTH TWICE A DAY   fluticasone (FLONASE) 50 MCG/ACT nasal spray   No No   Sig: SPRAY 2 SPRAYS IN EACH NOSTRIL ONCE DAILY   hydrOXYzine (ATARAX) 10 MG tablet   No No   Sig: TAKE ONE TABLET BY MOUTH THREE TIMES A DAY AS NEEDED FOR ANXIETY   ibuprofen (ADVIL/MOTRIN) 600 MG tablet   No No   Sig: Take 1 tablet (600 mg) by mouth every 6 hours as needed for moderate pain   lisinopril (ZESTRIL) 20 MG tablet   No No   Sig: Take 1 tablet (20 mg) by mouth 2 times daily   metFORMIN (GLUCOPHAGE) 500 MG tablet   No No   Sig: TAKE ONE TABLET BY MOUTH TWICE A DAY WITH A MEAL   metoprolol tartrate (LOPRESSOR) 25 MG tablet   No No   Sig: Take 1 tablet (25 mg) by mouth 2 times daily   montelukast (SINGULAIR) 10 MG tablet   No No   Sig: TAKE ONE TABLET BY MOUTH AT BEDTIME   naproxen sodium (ALEVE) 220 MG tablet   Yes No   Sig: Take 220 mg by mouth as needed for moderate pain   nitroGLYcerin (NITROSTAT) 0.4 MG sublingual tablet   No No   Sig: For chest pain place 1 tablet under the tongue every 5 minutes for 3 doses. If symptoms persist 5 minutes after 1st dose call 911.   nystatin (MYCOSTATIN) 222312 UNIT/GM external powder   No No   Sig: APPLY TO AFFECTED AREA(S) TOPICALLY THREE TIMES A DAY AS NEEDED   omeprazole (PRILOSEC) 20 MG DR capsule   No No   Sig: Take 1 capsule (20 mg) by mouth 2 times daily   order for DME   No No   Sig: Equipment being ordered: glucometer   pregabalin (LYRICA) 150 MG capsule   No No   Sig: Take 1 capsule (150 mg) by mouth 3 times daily   prochlorperazine (COMPAZINE) 5 MG tablet   No No   Sig: Take 1 tablet (5 mg) by mouth every 6 hours as needed for vomiting   sodium chloride (OCEAN) 0.65 % nasal spray   No No   Sig: Apply one spray to nares two times daily   tiotropium (SPIRIVA) 18 MCG inhaled capsule   No No   Sig: Inhale 1 capsule (18 mcg) into the lungs daily   traMADol (ULTRAM) 50 MG tablet   No No   Sig: TAKE TWO  TABLETS BY MOUTH THREE TIMES A DAY   traZODone (DESYREL) 100 MG tablet   No No   Sig: TAKE TWO TABLETS BY MOUTH EVERY NIGHT AT BEDTIME      Facility-Administered Medications: None     Allergies   Allergies   Allergen Reactions     Penicillins Hives       Social History   I have reviewed this patient's social history and updated it with pertinent information if needed. Vijaya Manjarrez  reports that she quit smoking about 3 years ago. Her smoking use included cigarettes. She has a 40.00 pack-year smoking history. She has never used smokeless tobacco. She reports that she does not drink alcohol or use drugs.    Family History   I have reviewed this patient's family history and updated it with pertinent information if needed.   Family History   Problem Relation Age of Onset     Cancer Mother      Cancer Father         Lung cancer     Cancer Maternal Grandmother         Breast cancer     Glaucoma Maternal Grandmother      Macular Degeneration No family hx of        Review of Systems   The 10 point Review of Systems is negative other than noted in the HPI or here.     Physical Exam   Temp: 97.4  F (36.3  C) Temp src: Oral BP: 119/73 Pulse: 84   Resp: 19 SpO2: 94 % O2 Device: None (Room air)    Vital Signs with Ranges  Temp:  [97.4  F (36.3  C)-98.3  F (36.8  C)] 97.4  F (36.3  C)  Pulse:  [] 84  Resp:  [13-26] 19  BP: ()/(51-89) 119/73  SpO2:  [92 %-99 %] 94 %  0 lbs 0 oz    Constitutional: Awake, alert, cooperative, no apparent distress.  Eyes: no icterus, EOMs intact  HEENT: Moist mucous membranes  Respiratory: Clear to auscultation bilaterally  Cardiovascular: Regular rate and rhythm, normal S1 and S2, and no murmur noted.  GI: Soft, non-distended, non-tender, normal bowel sounds.  Skin: No rashes, no cyanosis, no edema.  Musculoskeletal: No joint swelling, erythema or tenderness.  Neurologic: Alert, oriented and engages in appropriate conversation, no facial asymmetry, moving all extremities, fluent  speech  Psychiatric: Appears mildly anxious    Data   Data reviewed today:  I personally reviewed EKG with result as noted above  Recent Labs   Lab 10/01/20  0520 20  2201   WBC  --  9.3   HGB  --  11.4*   MCV  --  86   PLT  --  379   NA  --  140   POTASSIUM  --  3.5   CHLORIDE  --  105   CO2  --  25   BUN  --  10   CR  --  0.93   ANIONGAP  --  10   RADHA  --  8.8   GLC  --  159*   TROPI <0.015 0.018       Recent Results (from the past 24 hour(s))   Echocardiogram Exercise Stress    Narrative    [id number]  [id number]  [id number]  [id number]^[id number]^RADHA           [facility]  Echocardiography Laboratory  [address] [place] [address]        Name: CHARLES BRAGA  MRN: [id number]  : 1950  Study Date: 2020 08:16 AM  Age: 70 yrs  Gender: Female  Patient Location: [facility]  Reason For Study: Chest pain, unspecified type  Ordering Physician: SID NOYOLA  Referring Physician: SID NOYOLA  Performed By: Sushila Benson     BSA: 2.1 m2  Height: 69 in  Weight: 200 lb  HR: 106  _____________________________________________________________________________  __        Procedure  Complete Echo Adult. Optison (NDC #[id number]) given intravenously. Technically  difficult study.Extremely difficult acoustic windows despite the use of  contrast for endcardial border definition.  _____________________________________________________________________________  __        Interpretation Summary     Hyperdynamic left ventricular function  The visual ejection fraction is estimated at >70%.  Dynamic mid LV obstruction noted with peak gradient of 70mm with Valsalva.  This is new compared to prior study from . The study was technically  difficult.  _____________________________________________________________________________  __        Left Ventricle  The left ventricular cavity is small. There is concentric remodeling present.  Left ventricular hypertrophy is noted by two-dimensional  echocardiography.  There is likely no systolic anterior motion of mitral valve noted.  Hyperdynamic left ventricular function. The visual ejection fraction is  estimated at >70%. Grade I or early diastolic dysfunction. No regional wall  motion abnormalities noted.     Right Ventricle  Right ventricular function cannot be assessed due to poor image quality.     Atria  Normal left atrial size. Right atrium not well visualized.     Mitral Valve  The mitral valve is not well visualized. There is trace mitral regurgitation.        Tricuspid Valve  No tricuspid regurgitation. Right ventricular systolic pressure could not be  approximated due to inadequate tricuspid regurgitation.     Aortic Valve  The aortic valve is not well visualized. No aortic regurgitation is present.  By doppler, likely no aortic stenosis although evaluation was suboptimal.     Pulmonic Valve  The pulmonic valve is not well visualized.     Vessels  The aortic root is normal size.     Pericardium  Trivial pericardial effusion. Prominenet pericardial fat pad.        Rhythm  Sinus rhythm was noted.  _____________________________________________________________________________  __  MMode/2D Measurements & Calculations     IVSd: 1.4 cm  LVIDd: 3.1 cm  LVIDs: 1.4 cm  LVPWd: 1.3 cm  FS: 52.8 %  LV mass(C)d: 141.1 grams  LV mass(C)dI: 68.3 grams/m2  Ao root diam: 3.3 cm  asc Aorta Diam: 3.2 cm  LVOT diam: 2.1 cm  LVOT area: 3.6 cm2  LA Volume (BP): 49.0 ml  LA Volume Index (BP): 23.7 ml/m2  RWT: 0.87           Doppler Measurements & Calculations  MV E max carlos: 67.2 cm/sec  MV A max carlos: 126.1 cm/sec  MV E/A: 0.53  MV dec time: 0.19 sec  LV V1 max P.7 mmHg  LV V1 max: 155.5 cm/sec  LV V1 VTI: 24.5 cm  CO(LVOT): 9.4 l/min  CI(LVOT): 4.6 l/min/m2  SV(LVOT): 88.8 ml  SI(LVOT): 43.0 ml/m2  E/E' av.8  Lateral E/e': 8.8  Medial E/e': 8.7           _____________________________________________________________________________  __           Report approved  by: Deena Garcia 09/30/2020 10:58 AM      XR Chest Port 1 View    Narrative    EXAM: XR CHEST PORT 1 VW  LOCATION: [facility]  DATE/TIME: 9/30/2020 10:29 PM    INDICATION: Chest pain.  COMPARISON: 08/08/2020      Impression    IMPRESSION: Heart size within normal limits for portable technique. Calcified aortic arch. Lungs are clear. No visible pneumothorax or pleural effusion. Arthritic changes of the left shoulder.

## 2020-10-02 LAB
GLUCOSE BLDC GLUCOMTR-MCNC: 107 MG/DL (ref 70–99)
GLUCOSE BLDC GLUCOMTR-MCNC: 124 MG/DL (ref 70–99)
GLUCOSE BLDC GLUCOMTR-MCNC: 146 MG/DL (ref 70–99)
GLUCOSE BLDC GLUCOMTR-MCNC: 182 MG/DL (ref 70–99)

## 2020-10-02 PROCEDURE — 93458 L HRT ARTERY/VENTRICLE ANGIO: CPT | Performed by: INTERNAL MEDICINE

## 2020-10-02 PROCEDURE — C1887 CATHETER, GUIDING: HCPCS | Performed by: INTERNAL MEDICINE

## 2020-10-02 PROCEDURE — 99226 PR SUBSEQUENT OBSERVATION CARE,LEVEL III: CPT | Performed by: INTERNAL MEDICINE

## 2020-10-02 PROCEDURE — 99214 OFFICE O/P EST MOD 30 MIN: CPT | Mod: 25 | Performed by: INTERNAL MEDICINE

## 2020-10-02 PROCEDURE — 272N000001 HC OR GENERAL SUPPLY STERILE: Performed by: INTERNAL MEDICINE

## 2020-10-02 PROCEDURE — 99207 PR NO CHARGE LOS: CPT | Performed by: INTERNAL MEDICINE

## 2020-10-02 PROCEDURE — 250N000009 HC RX 250: Performed by: HOSPITALIST

## 2020-10-02 PROCEDURE — 99153 MOD SED SAME PHYS/QHP EA: CPT | Performed by: INTERNAL MEDICINE

## 2020-10-02 PROCEDURE — G0378 HOSPITAL OBSERVATION PER HR: HCPCS

## 2020-10-02 PROCEDURE — 93571 IV DOP VEL&/PRESS C FLO 1ST: CPT | Performed by: INTERNAL MEDICINE

## 2020-10-02 PROCEDURE — 250N000013 HC RX MED GY IP 250 OP 250 PS 637: Performed by: INTERNAL MEDICINE

## 2020-10-02 PROCEDURE — C1894 INTRO/SHEATH, NON-LASER: HCPCS | Performed by: INTERNAL MEDICINE

## 2020-10-02 PROCEDURE — 250N000011 HC RX IP 250 OP 636: Performed by: INTERNAL MEDICINE

## 2020-10-02 PROCEDURE — 250N000013 HC RX MED GY IP 250 OP 250 PS 637: Performed by: HOSPITALIST

## 2020-10-02 PROCEDURE — 258N000003 HC RX IP 258 OP 636: Performed by: INTERNAL MEDICINE

## 2020-10-02 PROCEDURE — 999N001017 HC STATISTIC GLUCOSE BY METER IP

## 2020-10-02 PROCEDURE — 250N000009 HC RX 250: Performed by: INTERNAL MEDICINE

## 2020-10-02 PROCEDURE — 99152 MOD SED SAME PHYS/QHP 5/>YRS: CPT | Performed by: INTERNAL MEDICINE

## 2020-10-02 PROCEDURE — C1769 GUIDE WIRE: HCPCS | Performed by: INTERNAL MEDICINE

## 2020-10-02 RX ORDER — LISINOPRIL 20 MG/1
20 TABLET ORAL 2 TIMES DAILY
Status: DISCONTINUED | OUTPATIENT
Start: 2020-10-02 | End: 2020-10-02

## 2020-10-02 RX ORDER — FLUMAZENIL 0.1 MG/ML
0.2 INJECTION, SOLUTION INTRAVENOUS
Status: ACTIVE | OUTPATIENT
Start: 2020-10-02 | End: 2020-10-02

## 2020-10-02 RX ORDER — DULOXETIN HYDROCHLORIDE 60 MG/1
60 CAPSULE, DELAYED RELEASE ORAL DAILY
Status: DISCONTINUED | OUTPATIENT
Start: 2020-10-02 | End: 2020-10-03 | Stop reason: HOSPADM

## 2020-10-02 RX ORDER — NITROGLYCERIN 5 MG/ML
VIAL (ML) INTRAVENOUS
Status: DISCONTINUED | OUTPATIENT
Start: 2020-10-02 | End: 2020-10-02 | Stop reason: HOSPADM

## 2020-10-02 RX ORDER — OXYCODONE HYDROCHLORIDE 5 MG/1
5 TABLET ORAL
Status: DISCONTINUED | OUTPATIENT
Start: 2020-10-02 | End: 2020-10-03 | Stop reason: HOSPADM

## 2020-10-02 RX ORDER — SODIUM CHLORIDE 9 MG/ML
INJECTION, SOLUTION INTRAVENOUS CONTINUOUS
Status: DISCONTINUED | OUTPATIENT
Start: 2020-10-02 | End: 2020-10-02 | Stop reason: HOSPADM

## 2020-10-02 RX ORDER — ATROPINE SULFATE 0.1 MG/ML
0.5 INJECTION INTRAVENOUS
Status: ACTIVE | OUTPATIENT
Start: 2020-10-02 | End: 2020-10-02

## 2020-10-02 RX ORDER — HYDROXYZINE HYDROCHLORIDE 10 MG/1
10 TABLET, FILM COATED ORAL 3 TIMES DAILY PRN
Status: DISCONTINUED | OUTPATIENT
Start: 2020-10-02 | End: 2020-10-03 | Stop reason: HOSPADM

## 2020-10-02 RX ORDER — ACETAMINOPHEN 325 MG/1
650 TABLET ORAL EVERY 4 HOURS PRN
Status: DISCONTINUED | OUTPATIENT
Start: 2020-10-02 | End: 2020-10-02

## 2020-10-02 RX ORDER — AMLODIPINE BESYLATE 5 MG/1
5 TABLET ORAL DAILY
Status: DISCONTINUED | OUTPATIENT
Start: 2020-10-02 | End: 2020-10-03 | Stop reason: HOSPADM

## 2020-10-02 RX ORDER — HEPARIN SODIUM 1000 [USP'U]/ML
INJECTION, SOLUTION INTRAVENOUS; SUBCUTANEOUS
Status: DISCONTINUED | OUTPATIENT
Start: 2020-10-02 | End: 2020-10-02 | Stop reason: HOSPADM

## 2020-10-02 RX ORDER — CEPHALEXIN 500 MG/1
500 CAPSULE ORAL 2 TIMES DAILY
Status: DISCONTINUED | OUTPATIENT
Start: 2020-10-02 | End: 2020-10-03 | Stop reason: HOSPADM

## 2020-10-02 RX ORDER — FENTANYL CITRATE 50 UG/ML
INJECTION, SOLUTION INTRAMUSCULAR; INTRAVENOUS
Status: DISCONTINUED | OUTPATIENT
Start: 2020-10-02 | End: 2020-10-02 | Stop reason: HOSPADM

## 2020-10-02 RX ORDER — ADENOSINE 3 MG/ML
INJECTION, SOLUTION INTRAVENOUS
Status: DISCONTINUED | OUTPATIENT
Start: 2020-10-02 | End: 2020-10-02 | Stop reason: HOSPADM

## 2020-10-02 RX ORDER — TRAZODONE HYDROCHLORIDE 100 MG/1
200 TABLET ORAL AT BEDTIME
Status: DISCONTINUED | OUTPATIENT
Start: 2020-10-02 | End: 2020-10-03 | Stop reason: HOSPADM

## 2020-10-02 RX ORDER — CETIRIZINE HYDROCHLORIDE 10 MG/1
10 TABLET ORAL DAILY
Status: DISCONTINUED | OUTPATIENT
Start: 2020-10-02 | End: 2020-10-03 | Stop reason: HOSPADM

## 2020-10-02 RX ORDER — AMLODIPINE BESYLATE 5 MG/1
5 TABLET ORAL DAILY
Status: DISCONTINUED | OUTPATIENT
Start: 2020-10-02 | End: 2020-10-02

## 2020-10-02 RX ORDER — ALBUTEROL SULFATE 90 UG/1
2 AEROSOL, METERED RESPIRATORY (INHALATION) EVERY 6 HOURS PRN
Status: DISCONTINUED | OUTPATIENT
Start: 2020-10-02 | End: 2020-10-03 | Stop reason: HOSPADM

## 2020-10-02 RX ORDER — LISINOPRIL 20 MG/1
20 TABLET ORAL 2 TIMES DAILY
Status: DISCONTINUED | OUTPATIENT
Start: 2020-10-02 | End: 2020-10-03 | Stop reason: HOSPADM

## 2020-10-02 RX ORDER — LIDOCAINE 40 MG/G
CREAM TOPICAL
Status: DISCONTINUED | OUTPATIENT
Start: 2020-10-02 | End: 2020-10-02 | Stop reason: HOSPADM

## 2020-10-02 RX ORDER — MONTELUKAST SODIUM 10 MG/1
10 TABLET ORAL AT BEDTIME
Status: DISCONTINUED | OUTPATIENT
Start: 2020-10-02 | End: 2020-10-03 | Stop reason: HOSPADM

## 2020-10-02 RX ORDER — SODIUM CHLORIDE 9 MG/ML
INJECTION, SOLUTION INTRAVENOUS CONTINUOUS
Status: DISCONTINUED | OUTPATIENT
Start: 2020-10-02 | End: 2020-10-03 | Stop reason: HOSPADM

## 2020-10-02 RX ORDER — FLUTICASONE PROPIONATE 50 MCG
1 SPRAY, SUSPENSION (ML) NASAL DAILY
Status: DISCONTINUED | OUTPATIENT
Start: 2020-10-02 | End: 2020-10-03 | Stop reason: HOSPADM

## 2020-10-02 RX ORDER — LORAZEPAM 2 MG/ML
0.5 INJECTION INTRAMUSCULAR
Status: DISCONTINUED | OUTPATIENT
Start: 2020-10-02 | End: 2020-10-02 | Stop reason: HOSPADM

## 2020-10-02 RX ORDER — VERAPAMIL HYDROCHLORIDE 2.5 MG/ML
INJECTION, SOLUTION INTRAVENOUS
Status: DISCONTINUED | OUTPATIENT
Start: 2020-10-02 | End: 2020-10-02 | Stop reason: HOSPADM

## 2020-10-02 RX ORDER — IOPAMIDOL 755 MG/ML
INJECTION, SOLUTION INTRAVASCULAR
Status: DISCONTINUED | OUTPATIENT
Start: 2020-10-02 | End: 2020-10-02 | Stop reason: HOSPADM

## 2020-10-02 RX ORDER — CYCLOBENZAPRINE HCL 5 MG
10 TABLET ORAL 2 TIMES DAILY
Status: DISCONTINUED | OUTPATIENT
Start: 2020-10-02 | End: 2020-10-03 | Stop reason: HOSPADM

## 2020-10-02 RX ORDER — FENTANYL CITRATE 50 UG/ML
25-50 INJECTION, SOLUTION INTRAMUSCULAR; INTRAVENOUS
Status: ACTIVE | OUTPATIENT
Start: 2020-10-02 | End: 2020-10-02

## 2020-10-02 RX ORDER — LORAZEPAM 0.5 MG/1
0.5 TABLET ORAL
Status: DISCONTINUED | OUTPATIENT
Start: 2020-10-02 | End: 2020-10-02 | Stop reason: HOSPADM

## 2020-10-02 RX ORDER — NALOXONE HYDROCHLORIDE 0.4 MG/ML
.2-.4 INJECTION, SOLUTION INTRAMUSCULAR; INTRAVENOUS; SUBCUTANEOUS
Status: ACTIVE | OUTPATIENT
Start: 2020-10-02 | End: 2020-10-02

## 2020-10-02 RX ORDER — POTASSIUM CHLORIDE 1500 MG/1
20 TABLET, EXTENDED RELEASE ORAL
Status: DISCONTINUED | OUTPATIENT
Start: 2020-10-02 | End: 2020-10-02 | Stop reason: HOSPADM

## 2020-10-02 RX ADMIN — OXYCODONE HYDROCHLORIDE 5 MG: 5 TABLET ORAL at 16:47

## 2020-10-02 RX ADMIN — LORAZEPAM 0.5 MG: 0.5 TABLET ORAL at 09:13

## 2020-10-02 RX ADMIN — HYDROCODONE BITARTRATE AND ACETAMINOPHEN 1 TABLET: 5; 325 TABLET ORAL at 06:12

## 2020-10-02 RX ADMIN — PREGABALIN 150 MG: 150 CAPSULE ORAL at 09:13

## 2020-10-02 RX ADMIN — PREGABALIN 150 MG: 150 CAPSULE ORAL at 00:28

## 2020-10-02 RX ADMIN — LISINOPRIL 20 MG: 20 TABLET ORAL at 18:14

## 2020-10-02 RX ADMIN — PREGABALIN 150 MG: 150 CAPSULE ORAL at 16:46

## 2020-10-02 RX ADMIN — TRAZODONE HYDROCHLORIDE 200 MG: 100 TABLET ORAL at 20:40

## 2020-10-02 RX ADMIN — ACETAMINOPHEN 650 MG: 325 TABLET, FILM COATED ORAL at 16:46

## 2020-10-02 RX ADMIN — AMLODIPINE BESYLATE 5 MG: 5 TABLET ORAL at 10:48

## 2020-10-02 RX ADMIN — CETIRIZINE HYDROCHLORIDE 10 MG: 10 TABLET, FILM COATED ORAL at 16:47

## 2020-10-02 RX ADMIN — ACETAMINOPHEN 650 MG: 325 TABLET, FILM COATED ORAL at 00:32

## 2020-10-02 RX ADMIN — CEPHALEXIN 500 MG: 500 CAPSULE ORAL at 20:40

## 2020-10-02 RX ADMIN — OXYCODONE HYDROCHLORIDE 5 MG: 5 TABLET ORAL at 10:48

## 2020-10-02 RX ADMIN — MONTELUKAST 10 MG: 10 TABLET, FILM COATED ORAL at 20:40

## 2020-10-02 RX ADMIN — ACETAMINOPHEN 650 MG: 325 TABLET, FILM COATED ORAL at 10:48

## 2020-10-02 RX ADMIN — SODIUM CHLORIDE: 9 INJECTION, SOLUTION INTRAVENOUS at 13:46

## 2020-10-02 RX ADMIN — CYCLOBENZAPRINE HYDROCHLORIDE 10 MG: 5 TABLET, FILM COATED ORAL at 20:40

## 2020-10-02 RX ADMIN — ASPIRIN 325 MG: 325 TABLET, COATED ORAL at 09:13

## 2020-10-02 RX ADMIN — DULOXETINE HYDROCHLORIDE 60 MG: 60 CAPSULE, DELAYED RELEASE ORAL at 16:47

## 2020-10-02 RX ADMIN — OMEPRAZOLE 20 MG: 20 CAPSULE, DELAYED RELEASE ORAL at 20:40

## 2020-10-02 RX ADMIN — PREGABALIN 150 MG: 150 CAPSULE ORAL at 20:40

## 2020-10-02 RX ADMIN — METOPROLOL TARTRATE 50 MG: 50 TABLET, FILM COATED ORAL at 06:11

## 2020-10-02 RX ADMIN — ATORVASTATIN CALCIUM 80 MG: 80 TABLET, FILM COATED ORAL at 09:13

## 2020-10-02 RX ADMIN — METOPROLOL TARTRATE 75 MG: 50 TABLET, FILM COATED ORAL at 20:40

## 2020-10-02 RX ADMIN — OXYCODONE HYDROCHLORIDE 5 MG: 5 TABLET ORAL at 13:35

## 2020-10-02 RX ADMIN — OXYCODONE HYDROCHLORIDE 5 MG: 5 TABLET ORAL at 20:40

## 2020-10-02 NOTE — PROGRESS NOTES
Cardiology Progress Note          Assessment and Plan:         70-year-old female with a past medical history significant for nonobstructive disease on coronary angiography in 2019, diabetes, hypertension who recently sustained a left orbital floor fracture.  She was scheduled for a stress echocardiogram for preoperative risk assessment which was not performed due to an elevated mid ventricular gradient in the context of hyperdynamic systolic function.  She then presented to the emergency department yesterday with chest discomfort. Stress perfusion demonstrates moderate inferior wall ischemia.        1. Nonobstructive coronary artery disease on coronary angiography in 2019  2. Exertional chest discomfort and dyspnea over the past few years  3. Mid ventricular gradient in the context of hyperdynamic systolic function on echocardiography yesterday  4. Hypertension  5. Dyslipidemia  6. Diabetes  7. Left orbital floor fracture for which surgery was scheduled this week    PLAN  - Coronary angiography today. Discussed with Dr Doll's staff.  He is OK with deferring surgery while her cardiac issues are being addressed.   - Will need titration of beta blockers and CMR as outpatient        Interval History:     Complaining of left eye pain. Some intermittent chest discomfort.              Review of Systems:   As per subjective, otherwise 5 systems reviewed and negative.           Physical Exam:   Blood pressure (!) 166/102, pulse 55, temperature 98  F (36.7  C), temperature source Oral, resp. rate 21, SpO2 94 %, not currently breastfeeding.      Vital Sign Ranges  Temperature Temp  Av.4  F (36.9  C)  Min: 98  F (36.7  C)  Max: 99.2  F (37.3  C)   Blood pressure Systolic (24hrs), Av , Min:99 , Max:180        Diastolic (24hrs), Av, Min:52, Max:102      Pulse Pulse  Av.3  Min: 55  Max: 77   Respirations Resp  Av.8  Min: 9  Max: 23   Pulse oximetry SpO2  Av %  Min: 94 %  Max: 98 %          Intake/Output Summary (Last 24 hours) at 10/2/2020 0915  Last data filed at 10/1/2020 2300  Gross per 24 hour   Intake 1438.33 ml   Output --   Net 1438.33 ml       Constitutional: NAD  Skin: Warm and dry  Neck: No JVD  Lungs: CTA  Cardiovascular: RRR, no m/r/g  Abdomen: Soft, non tender.  Extremities and Back: No LE edema  Neurological: Nonfocal           Medications:     I have reviewed this patient's current medications.              Data:     Labs reviewed.     Tele: LISA Vazquez MD, M.D.

## 2020-10-02 NOTE — PROGRESS NOTES
[facility]    Hospitalist Progress Note    Assessment & Plan   70 year old female who was admitted on 10/1/2020 with chest pain, stress test done here indicates abnormal results, plan for angiogram today.    Impression:   Principal Problem:    Chest pain   CAD  --- Patient presented with chest pain, she has history of angiogram done in 2019 which showed nonobstructive CAD.  Current presentation is with exertional dyspnea and chest discomfort.  ---Her nuclear stress indicates inferior wall ischemia, appreciate cardiology input, plan for angiogram later today.  Patient mentioned that the angiogram plan was discussed with the her ophthalmologist and that surgery can be deferred.    Tobacco use disorder  COPD (chronic obstructive pulmonary disease)  ---Recommended cessation of smoking, will order nicotine replacement.  ---Continue PRN albuterol       DM type 2, Hgb A1C 7.3 on 5/20/20  --Continue sliding scale coverage, will hold metformin for next 2 days.    Left Orbit Floor Fracture -- Dx 9/25/20   Controlled substance agreement signed- ok 5/12/20  ---Patient had a preop evaluation, which was with stress echocardiogram, which was postponed due to tachycardia and patient presented here, for now due to plan for angiogram plan for orbital surgery is on hold.  This was discussed with her surgeon by cardiology team.  ---Pain control seems to be an issue, narcotics adjusted for now, will readjust following her procedure.      Anxiety      GERD (gastroesophageal reflux disease)      Benign essential hypertension  ---Appears to be uncontrolled, will restart amlodipine, patient is started on metoprolol 50 mg twice daily, will restart lisinopril following her procedure today.  No recent COVID testing results are available.  The last one was in 9/30        Plan:  As above    DVT Prophylaxis: Pneumatic Compression Devices  Code Status: Full Code    Disposition: Expected discharge Wed to home    Yun  MD Lucas  Interval History   Patient upset about high blood pressures, she is upset that many of her medications have not been restarted, she complains about her orbital pain, patient has prior history of narcotic use for pain control.  Discussed about restarting her PTA medications.    Physical Exam   Temp: 98  F (36.7  C) Temp src: Oral BP: (!) 171/95 Pulse: 67   Resp: 16 SpO2: 98 % O2 Device: None (Room air)    There were no vitals filed for this visit.  Vital Signs with Ranges  Temp:  [98  F (36.7  C)-99.2  F (37.3  C)] 98  F (36.7  C)  Pulse:  [55-77] 67  Resp:  [9-23] 16  BP: (119-180)/() 171/95  SpO2:  [94 %-98 %] 98 %  I/O last 3 completed shifts:  In: 1438.33 [P.O.:100; I.V.:1338.33]  Out: 300 [Urine:300]        Constitutional: Awake, alert, cooperative, no apparent distress,  tenderness noted on the left orbital area  Respiratory: Clear to auscultation bilaterally, no crackles or wheezing  Cardiovascular: Regular rate and rhythm, normal S1 and S2, and no murmur noted  GI: Normal bowel sounds, soft, non-distended, non-tender  Skin/Integumen: No rashes, no cyanosis, no edema  Neuro : moving all 4 extremities, no focal deficit noted       Medications     - MEDICATION INSTRUCTIONS -       sodium chloride         amLODIPine  5 mg Oral Daily     aspirin  325 mg Oral Daily     atorvastatin  80 mg Oral Daily     metoprolol tartrate  50 mg Oral BID     pregabalin  150 mg Oral TID     sodium chloride (PF)  10 mL Intravenous Once     sodium chloride (PF)  3 mL Intracatheter Q8H       Data   Recent Labs   Lab 10/01/20  0929 10/01/20  0520 09/30/20  2201   WBC  --   --  9.3   HGB  --   --  11.4*   MCV  --   --  86   PLT  --   --  379   NA  --   --  140   POTASSIUM  --   --  3.5   CHLORIDE  --   --  105   CO2  --   --  25   BUN  --   --  10   CR  --   --  0.93   ANIONGAP  --   --  10   RADHA  --   --  8.8   GLC  --   --  159*   TROPI <0.015 <0.015 0.018       Imaging:   Recent Results (from the past 24 hour(s))    NM Lexiscan stress test (nuc card)   Result Value    Target     Baseline Systolic     Baseline Diastolic BP 81    Last Stress Systolic     Last Stress Diastolic BP 80    Baseline HR 63    Max HR 81    Calculated Percent HR 54    Rate Pressure Product 10,854.0    Narrative       The nuclear stress test is abnormal.     There is a medium sized area of ischemia in the entire inferior and   inferoseptal segment(s) of the left ventricle.     A prior study was conducted on 2/13/2019.  This study has changes noted   when compared with the prior study.  Inferior inferoseptal ischemia is a   new finding.

## 2020-10-02 NOTE — PRE-PROCEDURE
GENERAL PRE-PROCEDURE:   Procedure:  Cor angio possible PCI  Date/Time:  10/2/2020 2:12 PM    Written consent obtained?: Yes    Risks and benefits: Risks, benefits and alternatives were discussed    Consent given by:  Patient  Patient states understanding of procedure being performed: Yes    Patient's understanding of procedure matches consent: Yes    Procedure consent matches procedure scheduled: Yes    Appropriately NPO:  Yes  ASA Class:  Class 4- Severe systemic disease, acute unstable problems  History & Physical reviewed:  History and physical reviewed and no updates needed  Statement of review:  I have reviewed the lab findings, diagnostic data, medications, and the plan for sedation

## 2020-10-02 NOTE — PROGRESS NOTES
Coronary angio reviewed and discussed with patient. Moderate RCA stenosis not flow limiting by FFR. Anatomy unchanged from 2019. Recommend up-titration of beta blockers and outpatient CMR for better assessment of LV anatomy.

## 2020-10-02 NOTE — UTILIZATION REVIEW
Concurrent stay review; Secondary Review Determination     [facility]          Under the authority of the Utilization Management Committee, the utilization review process indicated a secondary review on the above patient.  The review outcome is based on review of the medical records, discussions with staff, and applying clinical experience noted on the date of the review.          (x) Observation Status Appropriate - Concurrent stay review    RATIONALE FOR DETERMINATION     70-year-old female with a past medical history significant for nonobstructive disease on coronary angiography in 2019, diabetes, hypertension who recently sustained a left orbital floor fracture.  She was scheduled for a stress echocardiogram for preoperative risk assessment which was not performed due to an elevated mid ventricular gradient in the context of hyperdynamic systolic function.  She then presented to the emergency department yesterday with chest discomfort.  Cardiac troponins are negative.   Patient had an angiogram this afternoon showing non-occlusive CAD. Moderate stenosis of the midRCA was assessed with FFR which was 0.93.  This is not considered hemodynamically significant for revascularization. The severity of illness, intensity of service provided, expected LOS and risk for adverse outcome make the care appropriate for observation.      This document was produced using voice recognition software       The information on this document is developed by the utilization review team in order for the business office to ensure compliance.  This only denotes the appropriateness of proper admission status and does not reflect the quality of care rendered.         The definitions of Inpatient Status and Observation Status used in making the determination above are those provided in the CMS Coverage Manual, Chapter 1 and Chapter 6, section 70.4.      Sincerely,     GUIDO WILLAMS MD    System Medical Director  Utilization  Management  [facility].

## 2020-10-02 NOTE — PLAN OF CARE
[id number]. A&O x 4. C/O severe headache - Norco given. VSS, on RA, besides HTN - AM metoprolol given early. Up with SBA. Tele: SR/SB (50s-60s). Brusing on L side of face and L arm. Plan for angiogram today. Continue to Monitor.

## 2020-10-03 VITALS
WEIGHT: 169.8 LBS | RESPIRATION RATE: 18 BRPM | BODY MASS INDEX: 25.08 KG/M2 | TEMPERATURE: 98.3 F | HEART RATE: 64 BPM | DIASTOLIC BLOOD PRESSURE: 86 MMHG | SYSTOLIC BLOOD PRESSURE: 159 MMHG | OXYGEN SATURATION: 96 %

## 2020-10-03 LAB
GLUCOSE BLDC GLUCOMTR-MCNC: 162 MG/DL (ref 70–99)
GLUCOSE BLDC GLUCOMTR-MCNC: 179 MG/DL (ref 70–99)

## 2020-10-03 PROCEDURE — 250N000009 HC RX 250: Performed by: HOSPITALIST

## 2020-10-03 PROCEDURE — 250N000013 HC RX MED GY IP 250 OP 250 PS 637: Performed by: HOSPITALIST

## 2020-10-03 PROCEDURE — G0378 HOSPITAL OBSERVATION PER HR: HCPCS

## 2020-10-03 PROCEDURE — 250N000013 HC RX MED GY IP 250 OP 250 PS 637: Performed by: INTERNAL MEDICINE

## 2020-10-03 PROCEDURE — 99217 PR OBSERVATION CARE DISCHARGE: CPT | Performed by: INTERNAL MEDICINE

## 2020-10-03 PROCEDURE — 999N001017 HC STATISTIC GLUCOSE BY METER IP

## 2020-10-03 PROCEDURE — 99207 PR NO CHARGE LOS: CPT | Performed by: INTERNAL MEDICINE

## 2020-10-03 PROCEDURE — 99213 OFFICE O/P EST LOW 20 MIN: CPT | Performed by: INTERNAL MEDICINE

## 2020-10-03 RX ORDER — ASPIRIN 81 MG/1
81 TABLET, CHEWABLE ORAL DAILY
Qty: 30 TABLET | Refills: 1 | Status: SHIPPED | OUTPATIENT
Start: 2020-10-03 | End: 2021-11-02

## 2020-10-03 RX ORDER — OXYCODONE HYDROCHLORIDE 5 MG/1
5 TABLET ORAL EVERY 6 HOURS PRN
Qty: 10 TABLET | Refills: 0 | Status: SHIPPED | OUTPATIENT
Start: 2020-10-03 | End: 2020-10-15

## 2020-10-03 RX ORDER — ASPIRIN 81 MG/1
81 TABLET, CHEWABLE ORAL DAILY
Status: DISCONTINUED | OUTPATIENT
Start: 2020-10-03 | End: 2020-10-03 | Stop reason: HOSPADM

## 2020-10-03 RX ORDER — METOPROLOL TARTRATE 75 MG/1
75 TABLET, FILM COATED ORAL 2 TIMES DAILY
Qty: 60 TABLET | Refills: 1 | Status: SHIPPED | OUTPATIENT
Start: 2020-10-03 | End: 2020-10-15

## 2020-10-03 RX ADMIN — ATORVASTATIN CALCIUM 80 MG: 80 TABLET, FILM COATED ORAL at 09:13

## 2020-10-03 RX ADMIN — LISINOPRIL 20 MG: 20 TABLET ORAL at 09:14

## 2020-10-03 RX ADMIN — UMECLIDINIUM 1 PUFF: 62.5 AEROSOL, POWDER ORAL at 10:33

## 2020-10-03 RX ADMIN — DULOXETINE HYDROCHLORIDE 60 MG: 60 CAPSULE, DELAYED RELEASE ORAL at 09:14

## 2020-10-03 RX ADMIN — CYCLOBENZAPRINE HYDROCHLORIDE 10 MG: 5 TABLET, FILM COATED ORAL at 09:13

## 2020-10-03 RX ADMIN — ACETAMINOPHEN 650 MG: 325 TABLET, FILM COATED ORAL at 02:40

## 2020-10-03 RX ADMIN — AMLODIPINE BESYLATE 5 MG: 5 TABLET ORAL at 09:14

## 2020-10-03 RX ADMIN — MICONAZOLE NITRATE: 20 POWDER TOPICAL at 11:54

## 2020-10-03 RX ADMIN — PREGABALIN 150 MG: 150 CAPSULE ORAL at 09:13

## 2020-10-03 RX ADMIN — METOPROLOL TARTRATE 75 MG: 50 TABLET, FILM COATED ORAL at 09:14

## 2020-10-03 RX ADMIN — OXYCODONE HYDROCHLORIDE 5 MG: 5 TABLET ORAL at 09:21

## 2020-10-03 RX ADMIN — FLUTICASONE PROPIONATE 1 SPRAY: 50 SPRAY, METERED NASAL at 10:33

## 2020-10-03 RX ADMIN — OMEPRAZOLE 20 MG: 20 CAPSULE, DELAYED RELEASE ORAL at 09:14

## 2020-10-03 RX ADMIN — CEPHALEXIN 500 MG: 500 CAPSULE ORAL at 09:13

## 2020-10-03 RX ADMIN — CETIRIZINE HYDROCHLORIDE 10 MG: 10 TABLET, FILM COATED ORAL at 09:14

## 2020-10-03 NOTE — PLAN OF CARE
Pt d/c'd home with all belongings. All medications, d/c instructions, and follow up instructions/recommendations reviewed with pt who verbalized understanding.

## 2020-10-03 NOTE — PLAN OF CARE
Pt is A&O. Reports that CP has resolved post cath lab. Using PRN oxycodone for pain to R eye. Reports this is helpful. Tele shows SR/SB with rare PVCs. Returned from cath lab with TR band in place on R wrist. Site was WDL. Later noted that a hematoma was forming both proximal and distal to TR band. Held manual pressure for about 20 min. After removal of TR band pt was assessed and had negative reverse Barbeau. Held manual pressure again to hematoma. Area softened but is very ecchymotic. Pt up with SBA. BPs improving after home meds restarted. Likely will discharge home tomorrow.

## 2020-10-03 NOTE — PROGRESS NOTES
Cardiology Progress Note          Assessment and Plan:         70-year-old female with a past medical history significant for nonobstructive disease on coronary angiography in 2019, diabetes, hypertension who recently sustained a left orbital floor fracture.  She was scheduled for a stress echocardiogram for preoperative risk assessment which was not performed due to an elevated mid ventricular gradient in the context of hyperdynamic systolic function.  She then presented to the emergency department yesterday with chest discomfort.  Nuclear stress perfusion demonstrates moderate inferior wall ischemia.  She had coronary  angiogram yesterday that revealed moderate RCA stenosis, unchanged from prior angiogram.  FFR was not significant.        1. Nonobstructive coronary artery disease on coronary angiography in 2019 and again yesterday  2. Exertional chest discomfort and dyspnea over the past few years  3. Mid ventricular gradient in the context of hyperdynamic systolic function on echocardiography  4. Hypertension  5. Dyslipidemia  6. Diabetes  7. Left orbital floor fracture for which surgery was scheduled this week    PLAN  In absence of significant obstructive CAD, continue medical management.  Her beta-blockers have already been increased to 75 mg twice daily which will also help decrease the dynamic gradient.  I will arrange for a follow-up with our RITA in 7 to 10 days.  She should be is to be started on baby aspirin.  She is able to proceed with orbital surgery.  In near future, we will also do a cardiac MRI to assess LV anatomy and assess reasoning behind the dynamic LV obstruction.  Patient be discharged on sublingual nitroglycerin.        Interval History:     Complaining of left eye pain.  No further chest discomfort.             Review of Systems:   As per subjective, otherwise 5 systems reviewed and negative.           Physical Exam:   Blood pressure (!) 159/86, pulse 64, temperature 98.3  F (36.8  C),  temperature source Oral, resp. rate 18, weight 77 kg (169 lb 12.8 oz), SpO2 96 %, not currently breastfeeding.      Vital Sign Ranges  Temperature Temp  Av.4  F (36.9  C)  Min: 98  F (36.7  C)  Max: 99.2  F (37.3  C)   Blood pressure Systolic (24hrs), Av , Min:99 , Max:180        Diastolic (24hrs), Av, Min:52, Max:102      Pulse Pulse  Av.3  Min: 55  Max: 77   Respirations Resp  Av.8  Min: 9  Max: 23   Pulse oximetry SpO2  Av %  Min: 94 %  Max: 98 %         Intake/Output Summary (Last 24 hours) at 10/2/2020 0915  Last data filed at 10/1/2020 2300  Gross per 24 hour   Intake 1438.33 ml   Output --   Net 1438.33 ml       Constitutional: NAD  Skin: Warm and dry  Neck: No JVD  Lungs: CTA  Cardiovascular: RRR, no m/r/g  Abdomen: Soft, non tender.  Extremities and Back: No LE edema  Neurological: Nonfocal           Medications:     I have reviewed this patient's current medications.              Data:     Labs reviewed.     Tele: NSR        Jose Juan Vazquez MD, MGABRIEL.

## 2020-10-03 NOTE — DISCHARGE INSTRUCTIONS
Cardiac Angiogram Discharge Instructions - Radial    After you go home:      Have an adult stay with you until tomorrow.    Drink extra fluids for 2 days.    You may resume your normal diet.    No smoking       For 24 hours - due to the sedation you received:    Relax and take it easy.    Do NOT make any important or legal decisions.    Do NOT drive or operate machines at home or at work.    Do NOT drink alcohol.    Care of Wrist Puncture Site:      For the first 24 hrs - check the puncture site every 1-2 hours while awake.    It is normal to have soreness at the puncture site and mild tingling in your hand for up to 3 days.    Remove the bandaid after 24 hours. If there is minor oozing, apply another bandaid and remove it after 12 hours.    You may shower tomorrow.  Do NOT take a bath, or use a hot tub or pool for at least 3 days. Do NOT scrub the site. Do not use lotion or powder near the puncture site.           Activity:        For 2 days:     do not use your hand or arm to support your weight (such as rising from a chair)     do not bend your wrist (such as lifting a garage door).    do not lift more than 5 pounds or exercise your arm (such as tennis, golf or bowling).    Do NOT do any heavy activity such as exercise, lifting, or straining.     Bleeding:      If you start bleeding from the site in your wrist, sit down and press firmly on/above the site for 10 minutes.     Once bleeding stops, keep arm still for 2 hours.     Call [facility] as soon as you can.       Call 911 right away if you have heavy bleeding or bleeding that does not stop.      Medicines:      If you are taking an antiplatelet medication such as Plavix, Brilinta or Effient, do not stop taking it until you talk to your cardiologist.        If you are on Metformin (Glucophage), do not restart it until you have blood tests (within 2 to 3 days after discharge).  After you have your blood drawn, you may restart the Metformin.     Take your  medications, including blood thinners, unless your provider tells you not to.      If you take Coumadin (Warfarin), have your INR checked by your provider in  3-5 days. Call your clinic to schedule this.    If you have stopped any medicines, check with your provider about when to restart them.    Follow Up Appointments:      Follow up with [facility] Nurse Practitioner at [facility] of patient preference in 7-10 days.    Call the clinic if:      You have a large or growing hard lump around the site.    The site is red, swollen, hot or tender.    Blood or fluid is draining from the site.    You have chills or a fever greater than 101 F (38 C).    Your arm feels numb, cool or changes color.    You have hives, a rash or unusual itching.    Any questions or concerns.          [facility] [place]:    [phone] [facility] (7 days a week)

## 2020-10-03 NOTE — PLAN OF CARE
VSS ex HTN at times 150s-160s systolic last evening, improved overnight. SR/SB per tele monitor, HR high 40s-60s. Right wrist is ecchymotic, unchanged. CMS intact. PRN oxycodone for pain to right eye, pt reportring good pain control. No new complaints overnight. Possible discharge today.

## 2020-10-03 NOTE — DISCHARGE SUMMARY
[facility]    Discharge Summary  Hospitalist    Date of Admission:  10/1/2020  Date of Discharge:  10/3/2020  Discharging Provider: Yun Zavala MD    Discharge Diagnoses   Chest pain     History of Present Illness   Please review admission h and p .    Hospital Course   Vijaya Manjarrez was admitted on 10/1/2020.  The following problems were addressed during her hospitalization:    Principal Problem:    Chest pain  Active Problems:    DM type 2, Hgb A1C 7.3 on 5/20/20    COPD (chronic obstructive pulmonary disease) (H)    Anxiety    GERD (gastroesophageal reflux disease)    Benign essential hypertension    Controlled substance agreement signed- ok 5/12/20    Coronary artery disease involving native coronary artery, angina presence unspecified, unspecified whether native or transplanted heart    Left Orbit Floor Fracture -- Dx 9/25/20    Smoker    70 year old female who was admitted on 10/1/2020 with chest pain, stress test done here indicates abnormal results, plan for angiogram today.     Impression:   Principal Problem:    Chest pain   CAD  --- Patient presented with chest pain, she has history of angiogram done in 2019 which showed nonobstructive CAD.  Current presentation is with exertional dyspnea and chest discomfort.  ---Her nuclear stress indicates inferior wall ischemia, appreciate cardiology input, plan for angiogram later today.  Patient mentioned that the angiogram plan was discussed with the her ophthalmologist and that surgery can be deferred.  Patient underwent angiogram and did not require any intervention, she had nonobstructive CAD, plan was to manage it medically, her metoprolol dose increased to 75 mg twice daily     Tobacco use disorder  COPD (chronic obstructive pulmonary disease)  -Smoking cessation was recommended and her PTA albuterol was continued.       DM type 2, Hgb A1C 7.3 on 5/20/20  Patient can start back on her medications, to start back metformin  in 2 days.    Left Orbit Floor Fracture -- Dx 9/25/20   Controlled substance agreement signed- ok 5/12/20  ---Patient had a preop evaluation, which was with stress echocardiogram, which was postponed due to tachycardia and patient presented here, for now due to plan for angiogram plan for orbital surgery is on hold.  This was discussed with her surgeon by cardiology team.  ---Pain control seems to be an issue, narcotics adjusted for now, provided prescription for next 5days for her orbital pain until she has the surgery.      Anxiety       GERD (gastroesophageal reflux disease)       Benign essential hypertension  Was uncontrolled during her stay here since few of her blood pressure medications were on hold for her angiogram, they were restarted and patient is currently back on all her blood pressure medications and her blood pressure seems to be better controlled.  No recent COVID testing results are available.  The last one was in 9/30      Yun Zavala MD, MD    Significant Results and Procedures       Pending Results     Unresulted Labs Ordered in the Past 30 Days of this Admission     No orders found from 9/1/2020 to 10/2/2020.          Code Status   Full Code       Primary Care Physician   Vidhi Mario    Physical Exam   Temp: 98.3  F (36.8  C) Temp src: Oral BP: (!) 159/86 Pulse: 64   Resp: 18 SpO2: 96 % O2 Device: None (Room air)    Vitals:    10/03/20 0500   Weight: 77 kg (169 lb 12.8 oz)     Vital Signs with Ranges  Temp:  [98.3  F (36.8  C)-98.9  F (37.2  C)] 98.3  F (36.8  C)  Pulse:  [45-74] 64  Resp:  [0-40] 18  BP: (107-190)/() 159/86  SpO2:  [83 %-99 %] 96 %  I/O last 3 completed shifts:  In: 1686 [P.O.:480; I.V.:1206]  Out: 750 [Urine:750]    The patient was examined on the day of discharge.    Discharge Disposition   Discharged to home  Condition at discharge: Stable    Consultations This Hospital Stay   CARDIOLOGY IP CONSULT  PHARMACY TO DOSE HEPARIN  CARE MANAGEMENT / SOCIAL  WORK IP CONSULT  PHARMACY IP CONSULT  PHARMACY IP CONSULT  SMOKING CESSATION PROGRAM IP CONSULT    Time Spent on this Encounter   I, Yun Zavala MD, personally saw the patient today and spent greater than 30 minutes discharging this patient.    Discharge Orders      Reason for your hospital stay    Chest pain     Follow-up and recommended labs and tests     Follow up with primary care provider, Vidhi Mario, within 7 days to evaluate medication change and for hospital follow- up.  The following labs/tests are recommended: blood pressure check , pain evaluation .follow up with cardiology as recommended in 7-10 days. Follow for orbital surgery as recommended.     Activity    Your activity upon discharge: activity as tolerated     Diet    Follow this diet upon discharge: Orders Placed This Encounter      Regular Diet Adult     Discharge Medications   Current Discharge Medication List      START taking these medications    Details   aspirin (ASA) 81 MG chewable tablet Take 1 tablet (81 mg) by mouth daily  Qty: 30 tablet, Refills: 1    Associated Diagnoses: Coronary artery disease due to lipid rich plaque      oxyCODONE (ROXICODONE) 5 MG tablet Take 1 tablet (5 mg) by mouth every 6 hours as needed for moderate to severe pain  Qty: 10 tablet, Refills: 0    Associated Diagnoses: Coronary artery disease due to lipid rich plaque         CONTINUE these medications which have CHANGED    Details   metFORMIN (GLUCOPHAGE) 500 MG tablet TAKE ONE TABLET BY MOUTH TWICE A DAY WITH A MEAL  Qty: 180 tablet, Refills: 1    Associated Diagnoses: Type 2 diabetes mellitus with diabetic neuropathy, without long-term current use of insulin (H)      metoprolol tartrate 75 MG TABS Take 75 mg by mouth 2 times daily  Qty: 60 tablet, Refills: 1    Associated Diagnoses: Coronary artery disease due to lipid rich plaque         CONTINUE these medications which have NOT CHANGED    Details   acetaminophen (TYLENOL ARTHRITIS PAIN) 650 MG CR  tablet Take 650 mg by mouth 2 times daily as needed       albuterol (PROAIR HFA/PROVENTIL HFA/VENTOLIN HFA) 108 (90 Base) MCG/ACT inhaler Inhale 2 puffs into the lungs every 6 hours  Qty: 18 g, Refills: 1    Associated Diagnoses: Chronic obstructive pulmonary disease, unspecified COPD type (H)      amLODIPine (NORVASC) 5 MG tablet Take 1 tablet (5 mg) by mouth daily  Qty: 90 tablet, Refills: 1    Associated Diagnoses: Benign essential hypertension      atorvastatin (LIPITOR) 80 MG tablet Take 1 tablet (80 mg) by mouth daily Recheck cholesterol in 3 months  Qty: 90 tablet, Refills: 1    Associated Diagnoses: Hyperlipidemia LDL goal <100      cephALEXin (KEFLEX) 500 MG capsule Take 500 mg by mouth 2 times daily For 7 days.      cetirizine (ZYRTEC) 10 MG tablet TAKE ONE TABLET BY MOUTH ONCE DAILY  Qty: 90 tablet, Refills: 3    Associated Diagnoses: Seasonal allergic rhinitis, unspecified trigger      cyclobenzaprine (FLEXERIL) 10 MG tablet TAKE ONE TABLET BY MOUTH TWICE A DAY  Qty: 180 tablet, Refills: 1    Associated Diagnoses: Muscle spasm      DULoxetine (CYMBALTA) 60 MG capsule Take 1 capsule (60 mg) by mouth daily  Qty: 90 capsule, Refills: 3    Associated Diagnoses: Lumbar degenerative disc disease      fluticasone (FLONASE) 50 MCG/ACT nasal spray SPRAY 2 SPRAYS IN EACH NOSTRIL ONCE DAILY  Qty: 16 g, Refills: 1    Associated Diagnoses: Seasonal allergic rhinitis due to pollen      hydrOXYzine (ATARAX) 10 MG tablet TAKE ONE TABLET BY MOUTH THREE TIMES A DAY AS NEEDED FOR ANXIETY  Qty: 30 tablet, Refills: 0    Associated Diagnoses: BRADY (generalized anxiety disorder)      lisinopril (ZESTRIL) 20 MG tablet Take 1 tablet (20 mg) by mouth 2 times daily  Qty: 180 tablet, Refills: 1    Associated Diagnoses: Benign essential hypertension      montelukast (SINGULAIR) 10 MG tablet TAKE ONE TABLET BY MOUTH AT BEDTIME  Qty: 90 tablet, Refills: 1    Associated Diagnoses: Chronic obstructive pulmonary disease, unspecified COPD  type (H)      Multiple Vitamins-Minerals (MULTIVITAMIN ADULT PO) Take 5 tablets by mouth daily Pro-caps vitamin       nitroGLYcerin (NITROSTAT) 0.4 MG sublingual tablet For chest pain place 1 tablet under the tongue every 5 minutes for 3 doses. If symptoms persist 5 minutes after 1st dose call 911.  Qty: 30 tablet, Refills: 3    Associated Diagnoses: Coronary artery disease involving native coronary artery, angina presence unspecified, unspecified whether native or transplanted heart      nystatin (MYCOSTATIN) 772076 UNIT/GM external powder APPLY TO AFFECTED AREA(S) TOPICALLY THREE TIMES A DAY AS NEEDED  Qty: 60 g, Refills: 0    Associated Diagnoses: Yeast infection of the skin      omeprazole (PRILOSEC) 20 MG DR capsule Take 1 capsule (20 mg) by mouth 2 times daily  Qty: 180 capsule, Refills: 1    Associated Diagnoses: Gastroesophageal reflux disease without esophagitis      pregabalin (LYRICA) 150 MG capsule Take 1 capsule (150 mg) by mouth 3 times daily  Qty: 270 capsule, Refills: 1    Associated Diagnoses: Fibromyalgia; Chronic bilateral low back pain without sciatica      sodium chloride (OCEAN) 0.65 % nasal spray Apply one spray to nares two times daily  Qty: 15 mL, Refills: 0      tiotropium (SPIRIVA) 18 MCG inhaled capsule Inhale 1 capsule (18 mcg) into the lungs daily  Qty: 90 capsule, Refills: 1    Associated Diagnoses: Chronic obstructive pulmonary disease, unspecified COPD type (H)      traZODone (DESYREL) 100 MG tablet TAKE TWO TABLETS BY MOUTH EVERY NIGHT AT BEDTIME  Qty: 180 tablet, Refills: 1    Associated Diagnoses: Insomnia, unspecified type      blood glucose monitoring (NO BRAND SPECIFIED) test strip Use to test blood sugars 1 times daily or as directed, use brand same as previous  Qty: 100 strip, Refills: 3    Associated Diagnoses: Type 2 diabetes mellitus with diabetic neuropathy, without long-term current use of insulin (H)      order for DME Equipment being ordered: glucometer  Qty: 1 each,  Refills: 0    Associated Diagnoses: Type 2 diabetes mellitus with diabetic neuropathy, without long-term current use of insulin (H)         STOP taking these medications       HYDROcodone-acetaminophen (NORCO) 5-325 MG tablet Comments:   Reason for Stopping:             Allergies   Allergies   Allergen Reactions     Penicillins Hives     Data   Most Recent 3 CBC's:  Recent Labs   Lab Test 09/30/20 2201 09/22/20  1412 08/08/20  1116   WBC 9.3 13.7* 11.4*   HGB 11.4* 11.9 14.1   MCV 86 87 82    393 456*      Most Recent 3 BMP's:  Recent Labs   Lab Test 09/30/20 2201 09/22/20  1412 08/17/20  1457    142 142   POTASSIUM 3.5 3.8 4.0   CHLORIDE 105 108 108   CO2 25 28 26   BUN 10 12 6*   CR 0.93 0.76 0.78   ANIONGAP 10 6 8   RADHA 8.8 9.3 9.2   * 186* 124*     Most Recent 2 LFT's:  Recent Labs   Lab Test 08/08/20  1116 05/20/20  1415   AST 26 20   ALT 31 35   ALKPHOS 112 103   BILITOTAL 0.5 0.5     Most Recent INR's and Anticoagulation Dosing History:  Anticoagulation Dose History     Recent Dosing and Labs Latest Ref Rng & Units 2/21/2019    INR 0.86 - 1.14 0.97        Most Recent 3 Troponin's:  Recent Labs   Lab Test 10/01/20  0929 10/01/20  0520 09/30/20  2201   TROPI <0.015 <0.015 0.018     Most Recent Cholesterol Panel:  Recent Labs   Lab Test 11/13/19  1505   CHOL 164   LDL 83   HDL 36*   TRIG 225*     Most Recent 6 Bacteria Isolates From Any Culture (See EPIC Reports for Culture Details):  Recent Labs   Lab Test 07/02/17 2059 07/02/17 2023 06/16/17  1509 06/16/17  1500   CULT No growth No growth No growth No growth     Most Recent TSH, T4 and A1c Labs:  Recent Labs   Lab Test 05/20/20  1415   TSH 1.22   A1C 7.3*     Results for orders placed or performed during the hospital encounter of 10/01/20   Cardiac Catheterization    Narrative    1.  Non-occlusive CAD.  2.  Moderate stenosis of the midRCA was assessed with FFR which was 0.93.    This is not considered hemodynamically significant for  revascularization.  3.  Normal LVEDP 11mmHg.    NM Lexiscan stress test (nuc card)     Value    Target     Baseline Systolic     Baseline Diastolic BP 81    Last Stress Systolic     Last Stress Diastolic BP 80    Baseline HR 63    Max HR 81    Calculated Percent HR 54    Rate Pressure Product 10,854.0    Narrative       The nuclear stress test is abnormal.     There is a medium sized area of ischemia in the entire inferior and   inferoseptal segment(s) of the left ventricle.     A prior study was conducted on 2/13/2019.  This study has changes noted   when compared with the prior study.  Inferior inferoseptal ischemia is a   new finding.

## 2020-10-05 DIAGNOSIS — Z11.59 ENCOUNTER FOR SCREENING FOR OTHER VIRAL DISEASES: Primary | ICD-10-CM

## 2020-10-05 PROBLEM — S02.85XA CLOSED FRACTURE OF ORBIT (H): Status: ACTIVE | Noted: 2020-10-05

## 2020-10-06 ENCOUNTER — TELEPHONE (OUTPATIENT)
Dept: CARDIOLOGY | Facility: CLINIC | Age: 70
End: 2020-10-06

## 2020-10-06 ENCOUNTER — TELEPHONE (OUTPATIENT)
Dept: INTERNAL MEDICINE | Facility: CLINIC | Age: 70
End: 2020-10-06

## 2020-10-06 DIAGNOSIS — Z01.818 PREOP GENERAL PHYSICAL EXAM: ICD-10-CM

## 2020-10-06 DIAGNOSIS — Z11.59 ENCOUNTER FOR SCREENING FOR OTHER VIRAL DISEASES: ICD-10-CM

## 2020-10-06 LAB
SARS-COV-2 RNA SPEC QL NAA+PROBE: NOT DETECTED
SPECIMEN SOURCE: NORMAL

## 2020-10-06 PROCEDURE — U0003 INFECTIOUS AGENT DETECTION BY NUCLEIC ACID (DNA OR RNA); SEVERE ACUTE RESPIRATORY SYNDROME CORONAVIRUS 2 (SARS-COV-2) (CORONAVIRUS DISEASE [COVID-19]), AMPLIFIED PROBE TECHNIQUE, MAKING USE OF HIGH THROUGHPUT TECHNOLOGIES AS DESCRIBED BY CMS-2020-01-R: HCPCS | Mod: 90 | Performed by: PATHOLOGY

## 2020-10-06 PROCEDURE — 99000 SPECIMEN HANDLING OFFICE-LAB: CPT | Performed by: PATHOLOGY

## 2020-10-06 RX ORDER — IBUPROFEN 600 MG/1
TABLET, FILM COATED ORAL
COMMUNITY
Start: 2020-09-23 | End: 2021-11-02

## 2020-10-06 NOTE — TELEPHONE ENCOUNTER
IP F/U    Date: 10/3/20  Diagnosis: Coronary Artery Disease Involving Native Coronary Artery, Angina Presence Unspecified, Unspecified Whether Native  Is patient active in care coordination? Yes - Route to Care Coordination (P [id number])  Was patient in TCU? No

## 2020-10-06 NOTE — TELEPHONE ENCOUNTER
Patient was evaluated by cardiology while inpatient for chest pain and abnormal lexiscan in preparation for left orbital fx repair. PMH: non obstructive CAD, DM, HTN. 10/2/20: Coronary angiogram via RRA showed non obstructive CAD with moderate, non flow limiting stenosis to RCA. Pt was discharged with PRN SL NTG and ASA. Called patient to discuss any post hospital d/c questions she may have, review medication changes, and confirm f/u appts. Patient denied any questions regarding new medications or changes with their PTA medications. Patient denied any SOB, chest pain, or light headedness. RRA cardiac cath site is without bleeding, swelling, redness or tenderness. Denies fever. RN confirmed with patient that she needs to be scheduled for a f/u cardiology JOSE ANGEL visit and scheduling phone number was provided. Patient advised to call clinic with any cardiac related questions or concerns prior to this jose angel't. Patient verbalized understanding and agreed with plan. KRISSY Mcbride RN.

## 2020-10-07 ENCOUNTER — PATIENT OUTREACH (OUTPATIENT)
Dept: GERIATRIC MEDICINE | Facility: CLINIC | Age: 70
End: 2020-10-07

## 2020-10-07 ENCOUNTER — ANESTHESIA EVENT (OUTPATIENT)
Dept: SURGERY | Facility: AMBULATORY SURGERY CENTER | Age: 70
End: 2020-10-07

## 2020-10-07 ENCOUNTER — TELEPHONE (OUTPATIENT)
Dept: OPHTHALMOLOGY | Facility: CLINIC | Age: 70
End: 2020-10-07

## 2020-10-07 NOTE — MR AVS SNAPSHOT
"              After Visit Summary   2018    Vijaya Manjarrez    MRN: [id number]           Patient Information     Date Of Birth          1950        Visit Information        Provider Department      2018 10:00 AM Milla Galan MD [facility]        Today's Diagnoses     Left lower quadrant pain    -  1    Postoperative state           Follow-ups after your visit        Who to contact     If you have questions or need follow up information about today's clinic visit or your schedule please contact [facility] directly at [phone].  Normal or non-critical lab and imaging results will be communicated to you by Zapperhart, letter or phone within 4 business days after the clinic has received the results. If you do not hear from us within 7 days, please contact the clinic through Zapperhart or phone. If you have a critical or abnormal lab result, we will notify you by phone as soon as possible.  Submit refill requests through [employer] or call your pharmacy and they will forward the refill request to us. Please allow 3 business days for your refill to be completed.          Additional Information About Your Visit        MyChart Information     [employer] lets you send messages to your doctor, view your test results, renew your prescriptions, schedule appointments and more. To sign up, go to [email or web address] . Click on \"Log in\" on the left side of the screen, which will take you to the Welcome page. Then click on \"Sign up Now\" on the right side of the page.     You will be asked to enter the access code listed below, as well as some personal information. Please follow the directions to create your username and password.     Your access code is: [email or web address]  Expires: 2018  2:01 PM     Your access code will  in 90 days. If you need help or a new code, please call your [facility] or [phone].        Care EveryWhere ID     This is your Care EveryWhere " Patient called stating she is out of medication.   ID. This could be used by other organizations to access your [facility] medical records  [id number]        Your Vitals Were     Pulse Temperature Breastfeeding? BMI (Body Mass Index)          84 98.4  F (36.9  C) (Oral) No 30.13 kg/m2         Blood Pressure from Last 3 Encounters:   07/24/18 140/70   07/02/18 148/90   06/05/18 140/70    Weight from Last 3 Encounters:   07/24/18 204 lb (92.5 kg)   07/02/18 204 lb (92.5 kg)   06/05/18 204 lb (92.5 kg)              Today, you had the following     No orders found for display       Primary Care Provider Office Phone # Fax #    Yayo Mendoza [phone]       [address]  [facility] [address]        Equal Access to Services     SYLVIA GO : Hadii aad ku hadasho Solisandra, waaxda luqadaha, qaybta kaalmada adekim, delia hein . So [facility] [phone].    ATENCIÓN: Si habla español, tiene a carrion disposición servicios gratuitos de asistencia lingüística. Mily al [phone].    We comply with applicable federal civil rights laws and [place] laws. We do not discriminate on the basis of race, color, national origin, age, disability, sex, sexual orientation, or gender identity.            Thank you!     Thank you for choosing [facility]  for your care. Our goal is always to provide you with excellent care. Hearing back from our patients is one way we can continue to improve our services. Please take a few minutes to complete the written survey that you may receive in the mail after your visit with us. Thank you!             Your Updated Medication List - Protect others around you: Learn how to safely use, store and throw away your medicines at www.disposemymeds.org.          This list is accurate as of 7/24/18  8:43 PM.  Always use your most recent med list.                   Brand Name Dispense Instructions for use Diagnosis    albuterol 108 (90 Base) MCG/ACT Inhaler    PROAIR HFA/PROVENTIL HFA/VENTOLIN  HFA    1 Inhaler    Inhale 2 puffs into the lungs every 4 hours as needed for shortness of breath / dyspnea or wheezing    Bronchitis       amLODIPine 5 MG tablet    NORVASC    90 tablet    Take 1 tablet (5 mg) by mouth daily    Benign essential hypertension       atorvastatin 40 MG tablet    LIPITOR    90 tablet    TAKE ONE TABLET BY MOUTH EVERY DAY    Hyperlipidemia LDL goal <100       blood glucose monitoring test strip    no brand specified    100 strip    Use to test blood sugars 1 times daily or as directed, use brand same as previous    Type 2 diabetes mellitus with diabetic neuropathy, without long-term current use of insulin (H)       cetirizine 10 MG tablet    zyrTEC    90 tablet    Take 1 tablet (10 mg) by mouth daily    Allergic rhinitis       cyclobenzaprine 10 MG tablet    FLEXERIL    180 tablet    TAKE ONE TABLET BY MOUTH TWICE A DAY    Muscle spasm       DULoxetine 60 MG EC capsule    CYMBALTA    90 capsule    TAKE ONE CAPSULE BY MOUTH EVERY DAY    Lumbar degenerative disc disease       fluticasone 50 MCG/ACT spray    FLONASE    1 Bottle    Spray 2 sprays into both nostrils daily    Seasonal allergic rhinitis due to pollen, unspecified chronicity       fluticasone-salmeterol 250-50 MCG/DOSE diskus inhaler    ADVAIR    1 Inhaler    Inhale 1 puff into the lungs 2 times daily    Chronic obstructive pulmonary disease, unspecified COPD type (H)       * lidocaine 5 % Patch    LIDODERM    30 patch    Apply up to 3 patches to painful area at once for up to 12 h within a 24 h period.  Remove after 12 hours.    Fibromyalgia       * lidocaine 5 % Patch    LIDODERM    30 patch    Apply up to 3 patches to painful area at once for up to 12 h within a 24 h period.  Remove after 12 hours.    Fibromyalgia       lisinopril 20 MG tablet    PRINIVIL/ZESTRIL    90 tablet    Take 1 tablet (20 mg) by mouth 2 times daily    Benign essential hypertension       metFORMIN 500 MG tablet    GLUCOPHAGE    180 tablet    TAKE ONE  TABLET BY MOUTH TWICE A DAY WITH A MEAL    Type 2 diabetes mellitus with diabetic neuropathy, without long-term current use of insulin (H)       miconazole 2 % cream    MICATIN    57 g    Apply topically 2 times daily    Yeast infection of the skin       montelukast 10 MG tablet    SINGULAIR    90 tablet    Take 1 tablet (10 mg) by mouth At Bedtime    Chronic obstructive pulmonary disease, unspecified COPD type (H)       nystatin 593196 UNIT/GM Powd    MYCOSTATIN    60 g    Apply topically 3 times daily as needed    Skin yeast infection       omeprazole 20 MG CR capsule    priLOSEC    90 capsule    TAKE ONE CAPSULE BY MOUTH EVERY DAY    Gastroesophageal reflux disease without esophagitis       pregabalin 150 MG capsule    LYRICA    270 capsule    Take 1 capsule (150 mg) by mouth 3 times daily    Fibromyalgia, Chronic bilateral low back pain without sciatica       traMADol 50 MG tablet    ULTRAM    180 tablet    Take 2 tablets (100 mg) by mouth 3 times daily    Facet arthropathy, cervical, Facet arthropathy, lumbar, Fibromyalgia       traZODone 100 MG tablet    DESYREL    180 tablet    Take 2 tablets (200 mg) by mouth At Bedtime    Insomnia, unspecified type       TYLENOL ARTHRITIS PAIN 650 MG CR tablet   Generic drug:  acetaminophen      Take 650 mg by mouth 2 times daily        * Notice:  This list has 2 medication(s) that are the same as other medications prescribed for you. Read the directions carefully, and ask your doctor or other care provider to review them with you.

## 2020-10-07 NOTE — TELEPHONE ENCOUNTER
Nyasia called back to say her ride was able to adjust her  time and she will be here by 11:40AM or 11:45AM.

## 2020-10-07 NOTE — PROGRESS NOTES
[facility] Care Coordination Contact    No Letter Received: 60 day tracking of letter complete, no letter received from member. Tracking discontinued.     Cynthia Bell  Care Management Specialist  [facility]  [phone]

## 2020-10-07 NOTE — TELEPHONE ENCOUNTER
Returned Pat's voicemail to confirm her arrival time for tomorrow she was worried it had changed and her medical ride is already set up. I confirmed start time as 1:10PM with an arrival time of 11:40AM. I asked that she check with the medical ride company to see if they could adjust her ride time and provided my direct number [phone] to confirm or make adjustments if need be.

## 2020-10-08 ENCOUNTER — HOSPITAL ENCOUNTER (OUTPATIENT)
Facility: AMBULATORY SURGERY CENTER | Age: 70
Discharge: HOME | End: 2020-10-08
Attending: OPHTHALMOLOGY | Admitting: OPHTHALMOLOGY
Payer: COMMERCIAL

## 2020-10-08 ENCOUNTER — ANESTHESIA (OUTPATIENT)
Dept: SURGERY | Facility: AMBULATORY SURGERY CENTER | Age: 70
End: 2020-10-08

## 2020-10-08 VITALS
TEMPERATURE: 97.7 F | WEIGHT: 200 LBS | BODY MASS INDEX: 29.62 KG/M2 | SYSTOLIC BLOOD PRESSURE: 118 MMHG | OXYGEN SATURATION: 93 % | DIASTOLIC BLOOD PRESSURE: 83 MMHG | RESPIRATION RATE: 16 BRPM | HEART RATE: 52 BPM | HEIGHT: 69 IN

## 2020-10-08 DIAGNOSIS — S02.85XA: ICD-10-CM

## 2020-10-08 LAB — GLUCOSE BLDC GLUCOMTR-MCNC: 151 MG/DL (ref 70–99)

## 2020-10-08 PROCEDURE — 21390 OPN TX ORBIT PERIORBTL IMPLT: CPT | Mod: LT

## 2020-10-08 PROCEDURE — 67414 EXPLR/DECOMPRESS EYE SOCKET: CPT | Mod: 59,LT

## 2020-10-08 PROCEDURE — 21407 OPN TX ORBIT FX W/IMPLANT: CPT | Mod: LT

## 2020-10-08 PROCEDURE — 67875 CLOSURE OF EYELID BY SUTURE: CPT | Mod: LT

## 2020-10-08 PROCEDURE — 67445 EXPLR/DECOMPRESS EYE SOCKET: CPT | Mod: LT

## 2020-10-08 RX ORDER — NEOMYCIN POLYMYXIN B SULFATES AND DEXAMETHASONE 3.5; 10000; 1 MG/ML; [USP'U]/ML; MG/ML
1 SUSPENSION/ DROPS OPHTHALMIC 3 TIMES DAILY
Qty: 5 ML | Refills: 0 | Status: SHIPPED | OUTPATIENT
Start: 2020-10-08 | End: 2021-02-09

## 2020-10-08 RX ORDER — AZITHROMYCIN 250 MG/1
TABLET, FILM COATED ORAL
Qty: 6 TABLET | Refills: 0 | Status: SHIPPED | OUTPATIENT
Start: 2020-10-08 | End: 2020-10-13

## 2020-10-08 RX ORDER — FENTANYL CITRATE 50 UG/ML
25-50 INJECTION, SOLUTION INTRAMUSCULAR; INTRAVENOUS
Status: DISCONTINUED | OUTPATIENT
Start: 2020-10-08 | End: 2020-10-09 | Stop reason: HOSPADM

## 2020-10-08 RX ORDER — PROPOFOL 10 MG/ML
INJECTION, EMULSION INTRAVENOUS PRN
Status: DISCONTINUED | OUTPATIENT
Start: 2020-10-08 | End: 2020-10-08

## 2020-10-08 RX ORDER — LIDOCAINE 40 MG/G
CREAM TOPICAL
Status: DISCONTINUED | OUTPATIENT
Start: 2020-10-08 | End: 2020-10-08 | Stop reason: HOSPADM

## 2020-10-08 RX ORDER — SODIUM CHLORIDE, SODIUM LACTATE, POTASSIUM CHLORIDE, CALCIUM CHLORIDE 600; 310; 30; 20 MG/100ML; MG/100ML; MG/100ML; MG/100ML
INJECTION, SOLUTION INTRAVENOUS CONTINUOUS
Status: DISCONTINUED | OUTPATIENT
Start: 2020-10-08 | End: 2020-10-08 | Stop reason: HOSPADM

## 2020-10-08 RX ORDER — ONDANSETRON 2 MG/ML
4 INJECTION INTRAMUSCULAR; INTRAVENOUS EVERY 30 MIN PRN
Status: DISCONTINUED | OUTPATIENT
Start: 2020-10-08 | End: 2020-10-09 | Stop reason: HOSPADM

## 2020-10-08 RX ORDER — TETRACAINE HYDROCHLORIDE 5 MG/ML
SOLUTION OPHTHALMIC PRN
Status: DISCONTINUED | OUTPATIENT
Start: 2020-10-08 | End: 2020-10-08 | Stop reason: HOSPADM

## 2020-10-08 RX ORDER — NALOXONE HYDROCHLORIDE 0.4 MG/ML
.1-.4 INJECTION, SOLUTION INTRAMUSCULAR; INTRAVENOUS; SUBCUTANEOUS
Status: DISCONTINUED | OUTPATIENT
Start: 2020-10-08 | End: 2020-10-09 | Stop reason: HOSPADM

## 2020-10-08 RX ORDER — SODIUM CHLORIDE, SODIUM LACTATE, POTASSIUM CHLORIDE, CALCIUM CHLORIDE 600; 310; 30; 20 MG/100ML; MG/100ML; MG/100ML; MG/100ML
INJECTION, SOLUTION INTRAVENOUS CONTINUOUS
Status: DISCONTINUED | OUTPATIENT
Start: 2020-10-08 | End: 2020-10-09 | Stop reason: HOSPADM

## 2020-10-08 RX ORDER — PROPOFOL 10 MG/ML
INJECTION, EMULSION INTRAVENOUS CONTINUOUS PRN
Status: DISCONTINUED | OUTPATIENT
Start: 2020-10-08 | End: 2020-10-08

## 2020-10-08 RX ORDER — HYDROCODONE BITARTRATE AND ACETAMINOPHEN 5; 325 MG/1; MG/1
1 TABLET ORAL EVERY 6 HOURS PRN
Qty: 10 TABLET | Refills: 0 | Status: SHIPPED | OUTPATIENT
Start: 2020-10-08 | End: 2020-10-15

## 2020-10-08 RX ORDER — ACETAMINOPHEN 325 MG/1
975 TABLET ORAL ONCE
Status: COMPLETED | OUTPATIENT
Start: 2020-10-08 | End: 2020-10-08

## 2020-10-08 RX ORDER — OXYCODONE HYDROCHLORIDE 5 MG/1
5 TABLET ORAL EVERY 4 HOURS PRN
Status: DISCONTINUED | OUTPATIENT
Start: 2020-10-08 | End: 2020-10-09 | Stop reason: HOSPADM

## 2020-10-08 RX ORDER — LIDOCAINE HYDROCHLORIDE 20 MG/ML
INJECTION, SOLUTION INFILTRATION; PERINEURAL PRN
Status: DISCONTINUED | OUTPATIENT
Start: 2020-10-08 | End: 2020-10-08

## 2020-10-08 RX ORDER — CLINDAMYCIN PHOSPHATE 900 MG/50ML
900 INJECTION, SOLUTION INTRAVENOUS
Status: COMPLETED | OUTPATIENT
Start: 2020-10-08 | End: 2020-10-08

## 2020-10-08 RX ORDER — MEPERIDINE HYDROCHLORIDE 25 MG/ML
12.5 INJECTION INTRAMUSCULAR; INTRAVENOUS; SUBCUTANEOUS
Status: DISCONTINUED | OUTPATIENT
Start: 2020-10-08 | End: 2020-10-09 | Stop reason: HOSPADM

## 2020-10-08 RX ORDER — ERYTHROMYCIN 5 MG/G
OINTMENT OPHTHALMIC PRN
Status: DISCONTINUED | OUTPATIENT
Start: 2020-10-08 | End: 2020-10-08 | Stop reason: HOSPADM

## 2020-10-08 RX ORDER — ONDANSETRON 4 MG/1
4 TABLET, ORALLY DISINTEGRATING ORAL EVERY 30 MIN PRN
Status: DISCONTINUED | OUTPATIENT
Start: 2020-10-08 | End: 2020-10-09 | Stop reason: HOSPADM

## 2020-10-08 RX ORDER — ONDANSETRON 2 MG/ML
INJECTION INTRAMUSCULAR; INTRAVENOUS PRN
Status: DISCONTINUED | OUTPATIENT
Start: 2020-10-08 | End: 2020-10-08

## 2020-10-08 RX ORDER — ERYTHROMYCIN 5 MG/G
0.5 OINTMENT OPHTHALMIC AT BEDTIME
Qty: 3.5 G | Refills: 0 | Status: SHIPPED | OUTPATIENT
Start: 2020-10-08 | End: 2021-01-14

## 2020-10-08 RX ADMIN — SODIUM CHLORIDE, SODIUM LACTATE, POTASSIUM CHLORIDE, CALCIUM CHLORIDE: 600; 310; 30; 20 INJECTION, SOLUTION INTRAVENOUS at 14:17

## 2020-10-08 RX ADMIN — Medication 0.5 MG: at 15:10

## 2020-10-08 RX ADMIN — FENTANYL CITRATE 25 MCG: 50 INJECTION, SOLUTION INTRAMUSCULAR; INTRAVENOUS at 15:51

## 2020-10-08 RX ADMIN — LIDOCAINE HYDROCHLORIDE 80 MG: 20 INJECTION, SOLUTION INFILTRATION; PERINEURAL at 14:24

## 2020-10-08 RX ADMIN — FENTANYL CITRATE 50 MCG: 50 INJECTION, SOLUTION INTRAMUSCULAR; INTRAVENOUS at 15:56

## 2020-10-08 RX ADMIN — PROPOFOL 200 MCG/KG/MIN: 10 INJECTION, EMULSION INTRAVENOUS at 14:28

## 2020-10-08 RX ADMIN — FENTANYL CITRATE 25 MCG: 50 INJECTION, SOLUTION INTRAMUSCULAR; INTRAVENOUS at 15:46

## 2020-10-08 RX ADMIN — PROPOFOL: 10 INJECTION, EMULSION INTRAVENOUS at 14:59

## 2020-10-08 RX ADMIN — ACETAMINOPHEN 975 MG: 325 TABLET ORAL at 12:47

## 2020-10-08 RX ADMIN — PROPOFOL 200 MG: 10 INJECTION, EMULSION INTRAVENOUS at 14:26

## 2020-10-08 RX ADMIN — ONDANSETRON 4 MG: 2 INJECTION INTRAMUSCULAR; INTRAVENOUS at 14:26

## 2020-10-08 RX ADMIN — OXYCODONE HYDROCHLORIDE 5 MG: 5 TABLET ORAL at 16:21

## 2020-10-08 RX ADMIN — ONDANSETRON 4 MG: 2 INJECTION INTRAMUSCULAR; INTRAVENOUS at 16:03

## 2020-10-08 RX ADMIN — CLINDAMYCIN PHOSPHATE 900 MG: 900 INJECTION, SOLUTION INTRAVENOUS at 14:17

## 2020-10-08 ASSESSMENT — MIFFLIN-ST. JEOR: SCORE: 1496.57

## 2020-10-08 NOTE — ANESTHESIA PREPROCEDURE EVALUATION
Anesthesia Pre-Procedure Evaluation    Patient: Vijaya Manjarrez   MRN:     [id number] Gender:   female   Age:    70 year old :      1950        Preoperative Diagnosis: Closed fracture of orbit (H) [S02.85XA]   Procedure(s):  Left Open Reduction Internal Fixation, Fracture, Orbit With Intraoperative Navigation - Left     LABS:  CBC:   Lab Results   Component Value Date    WBC 9.3 2020    WBC 13.7 (H) 2020    HGB 11.4 (L) 2020    HGB 11.9 2020    HCT 37.2 2020    HCT 39.9 2020     2020     2020     BMP:   Lab Results   Component Value Date     2020     2020    POTASSIUM 3.5 2020    POTASSIUM 3.8 2020    CHLORIDE 105 2020    CHLORIDE 108 2020    CO2 25 2020    CO2 28 2020    BUN 10 2020    BUN 12 2020    CR 0.93 2020    CR 0.76 2020     (H) 2020     (H) 2020     COAGS:   Lab Results   Component Value Date    PTT 34 2019    INR 0.97 2019     POC:   Lab Results   Component Value Date     (H) 10/08/2020     OTHER:   Lab Results   Component Value Date    LACT 1.5 2017    A1C 7.3 (H) 2020    RADHA 8.8 2020    MAG 1.6 2020    ALBUMIN 4.4 2020    PROTTOTAL 8.3 2020    ALT 31 2020    AST 26 2020    ALKPHOS 112 2020    BILITOTAL 0.5 2020    LIPASE 167 2020    TSH 1.22 2020        Preop Vitals    BP Readings from Last 3 Encounters:   10/08/20 (!) 170/85   10/03/20 (!) 159/86   10/01/20 98/65    Pulse Readings from Last 3 Encounters:   10/08/20 58   10/03/20 64   10/01/20 96      Resp Readings from Last 3 Encounters:   10/08/20 18   10/03/20 18   10/01/20 18    SpO2 Readings from Last 3 Encounters:   10/08/20 98%   10/03/20 96%   10/01/20 92%      Temp Readings from Last 1 Encounters:   10/08/20 36.9  C (98.4  F) (Oral)    Ht Readings from Last 1 Encounters:  "  10/08/20 1.753 m (5' 9\")      Wt Readings from Last 1 Encounters:   10/08/20 90.7 kg (200 lb)    Estimated body mass index is 29.53 kg/m  as calculated from the following:    Height as of this encounter: 1.753 m (5' 9\").    Weight as of this encounter: 90.7 kg (200 lb).     LDA:  Peripheral IV 10/08/20 Left Wrist (Active)   Site Assessment WDL 10/08/20 1238   Line Status Infusing 10/08/20 1238   Phlebitis Scale 0-->no symptoms 10/08/20 1238   Infiltration Scale 0 10/08/20 1238   Number of days: 0       Right Radial Interventional Procedure Access (Active)   Number of days: 590       Right Radial Interventional Procedure Access (Active)   Number of days: 6       Supraglottic Airway (Active)   Number of days: 0       Supraglottic Airway (Active)   Number of days: 0        Past Medical History:   Diagnosis Date     Anxiety      Cervical spine degeneration 2016    spinal stenosis,      COPD (chronic obstructive pulmonary disease) (H) 2012    mild     Coronary artery disease      Diabetes mellitus, type 2 (H)      Diabetic neuropathy (H)      Facet arthropathy, lumbar      GERD (gastroesophageal reflux disease)      HTN (hypertension)      Hyperlipidemia      Lumbar degenerative disc disease      Migraine headache       Past Surgical History:   Procedure Laterality Date     CHOLECYSTECTOMY, LAPOROSCOPIC      Cholecystectomy, Laparoscopic     CV HEART CATHETERIZATION WITH POSSIBLE INTERVENTION Left 2/26/2019    Procedure: Coronary Angiogram;  Surgeon: Cheo Merida MD;  Location:  HEART CARDIAC CATH LAB     CV HEART CATHETERIZATION WITH POSSIBLE INTERVENTION Bilateral 10/2/2020    Procedure: Heart Catheterization with Possible Intervention;  Surgeon: Linda Sauceda MD;  Location:  HEART CARDIAC CATH LAB     CV LEFT HEART CATH N/A 10/2/2020    Procedure: Left Heart Cath;  Surgeon: Linda Sauceda MD;  Location:  HEART CARDIAC CATH LAB      Allergies   Allergen Reactions     Penicillins Hives         "         PHYSICAL EXAM:   Mental Status/Neuro: A/A/O   Airway: Facies: Feasible  Mallampati: I  Mouth/Opening: Full  TM distance: > 6 cm  Neck ROM: Full   Respiratory:   Resp. Rate: Normal     Resp. Effort: Normal      CV:   Rate: Age appropriate  Edema: None   Comments: Edentulous top, some teeth on bottom     Dental: Endentulous                Assessment:   ASA SCORE: 3    H&P: History and physical reviewed and following examination; no interval change.    NPO Status: NPO Appropriate     Plan:   Anes. Type:  General   Pre-Medication: None   Induction:  IV (Standard)   Airway: LMA   Access/Monitoring: PIV   Maintenance: Balanced     Postop Plan:   Postop Pain: Opioids  Postop Sedation/Airway: Not planned  Disposition: Outpatient     PONV Management:   Adult Risk Factors: Female, Postop Opioids   Prevention: Ondansetron, Dexamethasone     CONSENT: Direct conversation   Plan and risks discussed with: Patient          Comments for Plan/Consent:  Reviewed recent admission for chest pain, negative cath, no troponin elevation. Patient reports no new chest pain, SOB, swelling, palpations.                  Artis Guillaume MD       ANESTHESIA PREOP EVALUATION    PROCEDURE: Procedure(s):  Left Open Reduction Internal Fixation, Fracture, Orbit With Intraoperative Navigation - Left    HPI: Vijaya Manjarrez is a 70 year old female who presents for above procedure.    PAST MEDICAL HISTORY:    Past Medical History:   Diagnosis Date     Anxiety      Cervical spine degeneration 2016    spinal stenosis,      COPD (chronic obstructive pulmonary disease) (H) 2012    mild     Coronary artery disease      Diabetes mellitus, type 2 (H)      Diabetic neuropathy (H)      Facet arthropathy, lumbar      GERD (gastroesophageal reflux disease)      HTN (hypertension)      Hyperlipidemia      Lumbar degenerative disc disease      Migraine headache        PAST SURGICAL HISTORY:    Past Surgical History:   Procedure Laterality Date      CHOLECYSTECTOMY, LAPOROSCOPIC      Cholecystectomy, Laparoscopic     CV HEART CATHETERIZATION WITH POSSIBLE INTERVENTION Left 2/26/2019    Procedure: Coronary Angiogram;  Surgeon: Cheo Merida MD;  Location:  HEART CARDIAC CATH LAB     CV HEART CATHETERIZATION WITH POSSIBLE INTERVENTION Bilateral 10/2/2020    Procedure: Heart Catheterization with Possible Intervention;  Surgeon: Linda Sauceda MD;  Location:  HEART CARDIAC CATH LAB     CV LEFT HEART CATH N/A 10/2/2020    Procedure: Left Heart Cath;  Surgeon: Linda Sauceda MD;  Location:  HEART CARDIAC CATH LAB       SOCIAL HISTORY:       Social History     Tobacco Use     Smoking status: Former Smoker     Packs/day: 1.00     Years: 40.00     Pack years: 40.00     Types: Cigarettes     Quit date: 6/1/2017     Years since quitting: 3.3     Smokeless tobacco: Never Used   Substance Use Topics     Alcohol use: No       ALLERGIES:     Allergies   Allergen Reactions     Penicillins Hives       MEDICATIONS:     (Not in a hospital admission)      Current Outpatient Medications   Medication Sig Dispense Refill     acetaminophen (TYLENOL ARTHRITIS PAIN) 650 MG CR tablet Take 650 mg by mouth 2 times daily as needed        amLODIPine (NORVASC) 5 MG tablet Take 1 tablet (5 mg) by mouth daily 90 tablet 1     atorvastatin (LIPITOR) 80 MG tablet Take 1 tablet (80 mg) by mouth daily Recheck cholesterol in 3 months 90 tablet 1     azithromycin (ZITHROMAX) 250 MG tablet Take 2 tablets (500 mg) by mouth daily for 1 day, THEN 1 tablet (250 mg) daily for 4 days. 6 tablet 0     cetirizine (ZYRTEC) 10 MG tablet TAKE ONE TABLET BY MOUTH ONCE DAILY 90 tablet 3     cyclobenzaprine (FLEXERIL) 10 MG tablet TAKE ONE TABLET BY MOUTH TWICE A  tablet 1     DULoxetine (CYMBALTA) 60 MG capsule Take 1 capsule (60 mg) by mouth daily 90 capsule 3     erythromycin (ROMYCIN) 5 MG/GM ophthalmic ointment Place 0.5 inches Into the left eye At Bedtime 3.5 g 0      HYDROcodone-acetaminophen (NORCO) 5-325 MG tablet Take 1 tablet by mouth every 6 hours as needed for severe pain (moderate to severe pain   ) 10 tablet 0     lisinopril (ZESTRIL) 20 MG tablet Take 1 tablet (20 mg) by mouth 2 times daily 180 tablet 1     metFORMIN (GLUCOPHAGE) 500 MG tablet TAKE ONE TABLET BY MOUTH TWICE A DAY WITH A MEAL 180 tablet 1     metoprolol tartrate 75 MG TABS Take 75 mg by mouth 2 times daily 60 tablet 1     montelukast (SINGULAIR) 10 MG tablet TAKE ONE TABLET BY MOUTH AT BEDTIME 90 tablet 1     Multiple Vitamins-Minerals (MULTIVITAMIN ADULT PO) Take 5 tablets by mouth daily Pro-caps vitamin        neomycin-polymixin-dexamethasone (MAXITROL) 0.1 % ophthalmic suspension Apply 1 drop to eye 3 times daily Instill into operative eye(s) per physician instructions. 5 mL 0     omeprazole (PRILOSEC) 20 MG DR capsule Take 1 capsule (20 mg) by mouth 2 times daily 180 capsule 1     oxyCODONE (ROXICODONE) 5 MG tablet Take 1 tablet (5 mg) by mouth every 6 hours as needed for moderate to severe pain 10 tablet 0     pregabalin (LYRICA) 150 MG capsule Take 1 capsule (150 mg) by mouth 3 times daily 270 capsule 1     tiotropium (SPIRIVA) 18 MCG inhaled capsule Inhale 1 capsule (18 mcg) into the lungs daily 90 capsule 1     traZODone (DESYREL) 100 MG tablet TAKE TWO TABLETS BY MOUTH EVERY NIGHT AT BEDTIME 180 tablet 1     albuterol (PROAIR HFA/PROVENTIL HFA/VENTOLIN HFA) 108 (90 Base) MCG/ACT inhaler Inhale 2 puffs into the lungs every 6 hours (Patient taking differently: Inhale 2 puffs into the lungs every 6 hours as needed ) 18 g 1     aspirin (ASA) 81 MG chewable tablet Take 1 tablet (81 mg) by mouth daily 30 tablet 1     blood glucose monitoring (NO BRAND SPECIFIED) test strip Use to test blood sugars 1 times daily or as directed, use brand same as previous 100 strip 3     cephALEXin (KEFLEX) 500 MG capsule Take 500 mg by mouth 2 times daily For 7 days.       fluticasone (FLONASE) 50 MCG/ACT nasal spray  SPRAY 2 SPRAYS IN EACH NOSTRIL ONCE DAILY 16 g 1     hydrOXYzine (ATARAX) 10 MG tablet TAKE ONE TABLET BY MOUTH THREE TIMES A DAY AS NEEDED FOR ANXIETY 30 tablet 0     ibuprofen (ADVIL/MOTRIN) 600 MG tablet        nitroGLYcerin (NITROSTAT) 0.4 MG sublingual tablet For chest pain place 1 tablet under the tongue every 5 minutes for 3 doses. If symptoms persist 5 minutes after 1st dose call 911. 30 tablet 3     nystatin (MYCOSTATIN) 543714 UNIT/GM external powder APPLY TO AFFECTED AREA(S) TOPICALLY THREE TIMES A DAY AS NEEDED 60 g 0     order for DME Equipment being ordered: glucometer 1 each 0     sodium chloride (OCEAN) 0.65 % nasal spray Apply one spray to nares two times daily (Patient taking differently: Apply one spray to nares two times daily as needed) 15 mL 0       Current Outpatient Medications Ordered in Epic   Medication Sig Dispense Refill     acetaminophen (TYLENOL ARTHRITIS PAIN) 650 MG CR tablet Take 650 mg by mouth 2 times daily as needed        amLODIPine (NORVASC) 5 MG tablet Take 1 tablet (5 mg) by mouth daily 90 tablet 1     atorvastatin (LIPITOR) 80 MG tablet Take 1 tablet (80 mg) by mouth daily Recheck cholesterol in 3 months 90 tablet 1     azithromycin (ZITHROMAX) 250 MG tablet Take 2 tablets (500 mg) by mouth daily for 1 day, THEN 1 tablet (250 mg) daily for 4 days. 6 tablet 0     cetirizine (ZYRTEC) 10 MG tablet TAKE ONE TABLET BY MOUTH ONCE DAILY 90 tablet 3     cyclobenzaprine (FLEXERIL) 10 MG tablet TAKE ONE TABLET BY MOUTH TWICE A  tablet 1     DULoxetine (CYMBALTA) 60 MG capsule Take 1 capsule (60 mg) by mouth daily 90 capsule 3     erythromycin (ROMYCIN) 5 MG/GM ophthalmic ointment Place 0.5 inches Into the left eye At Bedtime 3.5 g 0     HYDROcodone-acetaminophen (NORCO) 5-325 MG tablet Take 1 tablet by mouth every 6 hours as needed for severe pain (moderate to severe pain   ) 10 tablet 0     lisinopril (ZESTRIL) 20 MG tablet Take 1 tablet (20 mg) by mouth 2 times daily 180  tablet 1     metFORMIN (GLUCOPHAGE) 500 MG tablet TAKE ONE TABLET BY MOUTH TWICE A DAY WITH A MEAL 180 tablet 1     metoprolol tartrate 75 MG TABS Take 75 mg by mouth 2 times daily 60 tablet 1     montelukast (SINGULAIR) 10 MG tablet TAKE ONE TABLET BY MOUTH AT BEDTIME 90 tablet 1     Multiple Vitamins-Minerals (MULTIVITAMIN ADULT PO) Take 5 tablets by mouth daily Pro-caps vitamin        neomycin-polymixin-dexamethasone (MAXITROL) 0.1 % ophthalmic suspension Apply 1 drop to eye 3 times daily Instill into operative eye(s) per physician instructions. 5 mL 0     omeprazole (PRILOSEC) 20 MG DR capsule Take 1 capsule (20 mg) by mouth 2 times daily 180 capsule 1     oxyCODONE (ROXICODONE) 5 MG tablet Take 1 tablet (5 mg) by mouth every 6 hours as needed for moderate to severe pain 10 tablet 0     pregabalin (LYRICA) 150 MG capsule Take 1 capsule (150 mg) by mouth 3 times daily 270 capsule 1     tiotropium (SPIRIVA) 18 MCG inhaled capsule Inhale 1 capsule (18 mcg) into the lungs daily 90 capsule 1     traZODone (DESYREL) 100 MG tablet TAKE TWO TABLETS BY MOUTH EVERY NIGHT AT BEDTIME 180 tablet 1     albuterol (PROAIR HFA/PROVENTIL HFA/VENTOLIN HFA) 108 (90 Base) MCG/ACT inhaler Inhale 2 puffs into the lungs every 6 hours (Patient taking differently: Inhale 2 puffs into the lungs every 6 hours as needed ) 18 g 1     aspirin (ASA) 81 MG chewable tablet Take 1 tablet (81 mg) by mouth daily 30 tablet 1     blood glucose monitoring (NO BRAND SPECIFIED) test strip Use to test blood sugars 1 times daily or as directed, use brand same as previous 100 strip 3     cephALEXin (KEFLEX) 500 MG capsule Take 500 mg by mouth 2 times daily For 7 days.       fluticasone (FLONASE) 50 MCG/ACT nasal spray SPRAY 2 SPRAYS IN EACH NOSTRIL ONCE DAILY 16 g 1     hydrOXYzine (ATARAX) 10 MG tablet TAKE ONE TABLET BY MOUTH THREE TIMES A DAY AS NEEDED FOR ANXIETY 30 tablet 0     ibuprofen (ADVIL/MOTRIN) 600 MG tablet        nitroGLYcerin (NITROSTAT)  0.4 MG sublingual tablet For chest pain place 1 tablet under the tongue every 5 minutes for 3 doses. If symptoms persist 5 minutes after 1st dose call 911. 30 tablet 3     nystatin (MYCOSTATIN) 502066 UNIT/GM external powder APPLY TO AFFECTED AREA(S) TOPICALLY THREE TIMES A DAY AS NEEDED 60 g 0     order for DME Equipment being ordered: glucometer 1 each 0     sodium chloride (OCEAN) 0.65 % nasal spray Apply one spray to nares two times daily (Patient taking differently: Apply one spray to nares two times daily as needed) 15 mL 0     Current Facility-Administered Medications Ordered in Epic   Medication Dose Route Frequency Provider Last Rate Last Dose     clindamycin (CLEOCIN) infusion 900 mg  900 mg Intravenous 30 Min Pre-Op George Doll MD         lactated ringers infusion   Intravenous Continuous Artis Guillaume MD         lidocaine (LMX4) kit   Topical Q1H PRN Artis Guilluame MD         lidocaine 1 % 0.1-1 mL  0.1-1 mL Other Q1H PRN Artis Guillaume MD         sodium chloride (PF) 0.9% PF flush 3 mL  3 mL Intracatheter q1 min prn Artis Guillaume MD           PHYSICAL EXAM:    Vitals: T 98.4, P 58, /85, R 18, SpO2 98%, Weight   Wt Readings from Last 2 Encounters:   10/08/20 90.7 kg (200 lb)   10/03/20 77 kg (169 lb 12.8 oz)       See doc flowsheet    NPO STATUS: see doc flowsheet    LABS:    BMP:  Recent Labs   Lab Test 09/30/20  2201      POTASSIUM 3.5   CHLORIDE 105   CO2 25   BUN 10   CR 0.93   *   RADHA 8.8       LFTs:   Recent Labs   Lab Test 08/08/20  1116   PROTTOTAL 8.3   ALBUMIN 4.4   BILITOTAL 0.5   ALKPHOS 112   AST 26   ALT 31       CBC:   Recent Labs   Lab Test 09/30/20  2201   WBC 9.3   RBC 4.34   HGB 11.4*   HCT 37.2   MCV 86   MCH 26.3*   MCHC 30.6*   RDW 16.9*          Coags:  Recent Labs   Lab Test 02/21/19  1429   INR 0.97   PTT 34       Imaging:  No orders to display       Artis Guillaume MD  Anesthesiology Staff  Pager [phone]    10/8/2020  1:30 PM

## 2020-10-08 NOTE — DISCHARGE INSTRUCTIONS
Post-operative Instructions  Ophthalmic Plastic and Reconstructive Surgery    George Doll M.D.     All instructions apply to the operated eye(s) or eyelid(s).    Wound care and personal care  ? If a patch or bandage has been placed, please leave this in place until seen by your physician. Ensure that the bandage does not get wet when you take a shower.  ? Apply ice compresses 15 minutes of every hour while awake for 2 days. As long as there is no further bleeding, after two days, switch to warm water compresses for five minutes, four times a day until seen by your physician. Instead of warm water compresses, you can use a cup of dry uncooked rice in a clean cotton sock. Place sock in microwave for 30 seconds to one minute. Next place the warm sock in a plastic bag, and place it over a clean warm wet washcloth over operated eye.    ? You may shower or wash your hair the day after surgery. Do not go swimming for at least 2 weeks to prevent contamination of your wounds.  ? Do not apply make-up to the eyes or eyelids for 2 weeks after surgery.  ? Expect some swelling, bruising, black eye (even into the lower eyelids and cheeks). Also expect serum caking, crusting and discharge from the eye and/or incisions. A small amount of surface bleeding, and depending on the type of surgery, bleeding from the inside of the eyelid, is normal for the first 48 hours.  ? Avoid straining, bending at the waist, or lifting more than 15 pounds for 10 days. Activities that raise your blood pressure can worsen swelling, cause bleeding, and breaking of sutures. Like wise, sleeping with your head slightly elevated for the first several days can help swelling resolve more quickly.   ? Do continue to ambulate (walk) as you normally would - being sedentary after surgery can cause blood clots.   ? Your eye(s) and eyelid(s) may be painful and tender. This is normal after surgery.      Contact information and follow-up  ? Return to the Eye  Clinic for a follow-up appointment with your physician as scheduled. If no appointment has been scheduled:   - [facility] eye clinic: [phone] for an appointment with Dr. Doll within 1 to 2 weeks from your date of surgery.   -  [facility] eye clinic: [phone] for an appointment with Dr. Doll within 1 to 2 weeks from your date of surgery.   Please email a few photos of your eye(s) or other operative site(s) to [email or web address] prior to your follow up visit.    ? For severe pain, bleeding, or loss of vision, call the [facility] at [phone] or [facility] at [phone].     After hours or on weekends and holidays, call [phone] and ask to speak with the ophthalmologist on call.    An on call person can be reached after hours for concerns. The on call doctor should not call in medication refill requests after hours or on weekends, so please plan accordingly. An effort has been made to provide adequate pain medications following every surgery, and refills will not be provided in most instances. Narcotic pain medications cannot be called in.     Activity restrictions and driving  ? Avoid heavy lifting, bending, exercise or strenuous activity for 1 week after surgery.  You may resume other activities and return to work as tolerated.  ? You may not resume driving if you are using narcotic pain medications (such as Norco, Percocet, Tylenol #3).    Medications  ? Restart all regular home medications and eye drops. If you take Plavix or  Aspirin on a regular basis, wait for 72 hours after your surgery before restarting these in order to decrease the risk of bleeding complications.  ? Avoid aspirin and aspirin-like medications (Motrin, Aleve, Ibuprofen, [place] etc) for 72 hours to reduce the risk of bleeding. You may take Tylenol (acetaminophen) for pain.  ? In addition to your home medications, take the following  post-operative medications as prescribed by your physician.    ? Apply antibiotic ointment into the operated eye(s) at night.  ? Instill eye drops 3 times a day for 10 days.   ? Take antibiotic capsules or tablets as directed.  ? In many cases, postoperative discomfort can be managed with Tylenol alone. If narcotic pain medication was prescribed, take pain pills at prescribed frequency as needed for pain.    ? WARNING: Most prescription pain medications contain Tylenol  (acetaminophen). You must not take more than 4,000 mg of acetaminophen per  24-hour period. This is equivalent to 6 tablets of Darvocet, 12 tablets of Norco, Percocet or Tylenol #3. If you take other over-the-counter medications containing acetaminophen, you must take the amount of acetaminophen into account and reduce the number of prescribed pain pills accordingly.  ? Prescribed narcotic medications may make you drowsy. You must not drive a car, operate heavy machinery or drink alcohol while taking them.  ? Prescribed narcotic pain medications may cause constipation and nausea. Take them with some food to prevent a stomach upset.      [facility]  Home Care Following Anesthesia  For 24 hours after surgery:  1. Get plenty of rest.  A responsible adult must stay with you for at least 24 hours after you leave the surgery center.  2. Do not drive or use heavy equipment.  If you have weakness or tingling, don't drive or use heavy equipment until this feeling goes away.   3. Do not drink alcohol.   4. Avoid strenuous or risky activities.  Ask for help when climbing stairs.  5. You may feel lightheaded.  IF so, sit for a few minutes before standing.  Have someone help you get up.   6. If you have nausea (feel sick to your stomach): Drink only clear liquids such as apple juice, ginger ale, broth or 7-Up.  Rest may also help.  Be sure to drink enough fluids.  Move to a regular diet as you feel able.   7. You may have a  slight fever.  Call the doctor if your fever is over 100 F (37.7 C) (taken under the tongue) or lasts longer than 24 hours.  8. You may have a dry mouth, a sore throat, muscle aches or trouble sleeping. These should go away after 24 hours.  9. Do not make important or legal decisions.               Tips for taking pain medications  To get the best pain relief possible, remember these points:    Take pain medications as directed, before pain becomes severe.    Pain medication can upset your stomach: taking it with food may help.    Constipation is a common side effect of pain medication. Drink plenty of  fluids.    Eat foods high in fiber. Take a stool softener if recommended by your doctor or pharmacist.    Do not drink alcohol, drive or operate machinery while taking pain medications.    Ask about other ways to control pain, such as with heat, ice or relaxation.    Tylenol/Acetaminophen Consumption  To help encourage the safe use of acetaminophen, the makers of TYLENOL  have lowered the maximum daily dose for single-ingredient Extra Strength TYLENOL  (acetaminophen) products sold in the [place] from 8 pills per day (4,000 mg) to 6 pills per day (3,000 mg). The dosing interval has also changed from 2 pills every 4-6 hours to 2 pills every 6 hours.    If you feel your pain relief is insufficient, you may take Tylenol/Acetaminophen in addition to your narcotic pain medication.     Be careful not to exceed 3,000 mg of Tylenol/Acetaminophen in a 24 hour period from all sources.    If you are taking extra strength Tylenol/acetaminophen (500 mg), the maximum dose is 6 tablets in 24 hours.    If you are taking regular strength acetaminophen (325 mg), the maximum dose is 9 tablets in 24 hours.    Call a doctor for any of the followin. Signs of infection (fever, growing tenderness at the surgery site, a large amount of drainage or bleeding, severe pain, foul-smelling drainage, redness, swelling).  2. It has been over 8 to  10 hours since surgery and you are still not able to urinate (pass water).  3. Headache for over 24 hours.  4. Signs of Covid-19 infection (temperature over 100 degrees, shortness of breath, cough, loss of taste/smell, generalized body aches, persistent headache, chills, sore throat, nausea/vomiting/diarrhea)    Your doctor is:       Dr. George Doll, Ophthalmology: [phone]               Or dial [phone] and ask for the resident on call for:  Ophthalmology  For emergency care, call the:  [facility]:  [phone] (TTY for hearing impaired: [phone])

## 2020-10-08 NOTE — ANESTHESIA CARE TRANSFER NOTE
Patient: Vijaya Manjarrez    Procedure(s):  Left Open Reduction Internal Fixation, Fracture, Orbit With Intraoperative Navigation - Left    Diagnosis: Closed fracture of orbit (H) [S02.85XA]  Diagnosis Additional Information: No value filed.    Anesthesia Type:   General     Note:  Airway :Face Mask  Patient transferred to:PACU  Comments: 139/81  98%  97.1-60-20  Handoff Report: Identifed the Patient, Identified the Reponsible Provider, Reviewed the pertinent medical history, Discussed the surgical course, Reviewed Intra-OP anesthesia mangement and issues during anesthesia, Set expectations for post-procedure period and Allowed opportunity for questions and acknowledgement of understanding      Vitals: (Last set prior to Anesthesia Care Transfer)    CRNA VITALS  10/8/2020 1501 - 10/8/2020 1538      10/8/2020             NIBP:  131/74    Pulse:  59    NIBP Mean:  99    SpO2:  99 %    Resp Rate (observed):  16    Resp Rate (set):  10                Electronically Signed By: AMANDA Hernandez CRNA  October 8, 2020  3:38 PM

## 2020-10-08 NOTE — ANESTHESIA POSTPROCEDURE EVALUATION
Anesthesia POST Procedure Evaluation    Patient: Vijaya Manjarrez   MRN:     [id number] Gender:   female   Age:    70 year old :      1950        Preoperative Diagnosis: Closed fracture of orbit (H) [S02.85XA]   Procedure(s):  Left Open Reduction Internal Fixation, Fracture, Orbit With Intraoperative Navigation - Left   Postop Comments: No value filed.     Anesthesia Type: General       Disposition: Outpatient   Postop Pain Control: Uneventful            Sign Out: Well controlled pain   PONV: No   Neuro/Psych: Uneventful            Sign Out: Acceptable/Baseline neuro status   Airway/Respiratory: Uneventful            Sign Out: Acceptable/Baseline resp. status   CV/Hemodynamics: Uneventful            Sign Out: Acceptable CV status   Other NRE: NONE   DID A NON-ROUTINE EVENT OCCUR? No         Last Anesthesia Record Vitals:  CRNA VITALS  10/8/2020 1501 - 10/8/2020 1601      10/8/2020             NIBP:  131/74    Pulse:  59    NIBP Mean:  99    SpO2:  99 %    Resp Rate (observed):  16    Resp Rate (set):  10          Last PACU Vitals:  Vitals Value Taken Time   /73 10/08/20 1600   Temp 36.6  C (97.9  F) 10/08/20 1600   Pulse 53 10/08/20 1606   Resp 7 10/08/20 1606   SpO2 95 % 10/08/20 1606   Temp src     NIBP     Pulse     SpO2     Resp     Temp     Ht Rate     Temp 2     Vitals shown include unvalidated device data.      Electronically Signed By: Hitesh Hendrickson MD, MD, 2020, 4:07 PM

## 2020-10-08 NOTE — OP NOTE
Procedure Date: 10/08/2020      PREOPERATIVE DIAGNOSIS:  Left orbital floor medial wall fracture with diplopia and enophthalmos.      POSTOPERATIVE DIAGNOSIS:  Left orbital floor medial wall fracture with diplopia and enophthalmos.      PROCEDURES PERFORMED:  Left orbitotomy and orbital floor fracture repair with intraoperative navigation.      SURGEON:  George Doll MD      ASSISTANTS:  Catracho Pitts MD      ANESTHESIA:  General with LMA and local infiltration of 50:50 mixture of 2% lidocaine with epinephrine and 0.5% Marcaine.      COMPLICATIONS:  None.      ESTIMATED BLOOD LOSS:  2 mL.      IMPLANTS:  Left small orbital floor and medial wall Cochran-Por porous polyethylene implant.      INDICATIONS FOR PROCEDURE:  Ms. Manjarrez sustained an orbital fracture involving the orbital floor extending into the inferior aspect of her medial orbital wall.  This resulted in enophthalmos and diplopia.  We discussed risks, benefits and alternatives to proposed procedure, and she elected to proceed.      DESCRIPTION OF PROCEDURE:  Ms. Manjarrez was brought to the operating room, placed supine on the operating table.  Under general anesthesia, the inferior fornix was infiltrated with local anesthetic as well.  The intraoperative navigation system was utilized.  We used the [employer] system.  The LAD face mask was applied.  Calibration and registration was performed.  The scan had been previously uploaded to the system.  Once accuracy was verified, a transconjunctival incision was made with monopolar cautery in the inferior fornix down through the conjunctiva, lower eyelid retractors and directly onto the inferior orbital rim.  This was extended along the inferonasal orbital rim.  Elevation of the periorbita was carried out.  First attention was directed to the area that would be lateral to the fracture, and once the periorbita was elevated, the lateral aspect of the fracture was identified.  The orbital tissue was gently  repositioned back into the orbit.  There was old clot within the maxillary sinus, and this was suctioned out as much as possible.  Dissection was carried posteriorly in a subperiosteal, plane and the posterior ledge was identified.  Then, attention was directed at the medial aspect of the fracture and in the subperiosteal plane.  A subperiosteal plane was developed along the medial orbital wall inferiorly.  Once the entirety of the fracture was delineated and all the orbital tissue was reposited back onto the orbital side, a wrap around implant to fix both the floor and medial wall fracture was utilized.  A Cochran-Por implant was utilized.  This was placed within the orbit, then removed and trimmed to size and again placed back into the orbit.  A single 4 mm Bethanie screw was used to secure this to the inferior orbital rim.  Once this was secured to the orbital rim, this was inspected again.  Orbital tissue was not beneath the implant.  The medial wall was also inspected and the implant nicely covered that fracture as well.  Forced ductions were performed and were found to be free.  A single 6-0 plain gut suture was placed through the conjunctiva to reapproximate it.  Erythromycin ophthalmic ointment was placed into the eye.  She tolerated the procedure well and left in good condition.         HUEY SALCIDO MD             D: 10/08/2020   T: 10/08/2020   MT: OSCAR      Name:     CHARLES BRAGA   MRN:      [id number]        Account:        [id number]   :      1950           Procedure Date: 10/08/2020      Document: [id number]

## 2020-10-12 DIAGNOSIS — K21.9 GASTROESOPHAGEAL REFLUX DISEASE WITHOUT ESOPHAGITIS: ICD-10-CM

## 2020-10-12 DIAGNOSIS — M47.812 FACET ARTHROPATHY, CERVICAL: ICD-10-CM

## 2020-10-12 DIAGNOSIS — M79.7 FIBROMYALGIA: ICD-10-CM

## 2020-10-12 DIAGNOSIS — I10 BENIGN ESSENTIAL HYPERTENSION: ICD-10-CM

## 2020-10-12 DIAGNOSIS — M47.816 FACET ARTHROPATHY, LUMBAR: ICD-10-CM

## 2020-10-13 ENCOUNTER — VIRTUAL VISIT (OUTPATIENT)
Dept: OPHTHALMOLOGY | Facility: CLINIC | Age: 70
End: 2020-10-13
Payer: COMMERCIAL

## 2020-10-13 DIAGNOSIS — S02.85XS CLOSED FRACTURE OF ORBIT, SEQUELA (H): Primary | ICD-10-CM

## 2020-10-13 PROCEDURE — 99024 POSTOP FOLLOW-UP VISIT: CPT | Mod: 95 | Performed by: OPHTHALMOLOGY

## 2020-10-13 NOTE — PROGRESS NOTES
"Chief Complaint(s) and History of Present Illness(es)     No visit information to display         Vijaya Manjarrez is status post fracture repair - Postoperative day #5.   Some residual diplopia. Feels things gradually were improving but now at a \"stand still.\"    I have recommended:  * Warm soaks 3-4x daily until all edema and ecchymoses resolve  * Ibuprofen or tylenol as needed for pain  * Sinus precautions  * Artificial tears as needed.     F/u 6 weeks in person, sooner with any new issues.     Attending Physician Attestation:  Complete documentation of historical and exam elements from today's encounter can be found in the full encounter summary report (not reduplicated in this progress note).  I personally obtained the chief complaint(s) and history of present illness.  I confirmed and edited as necessary the review of systems, past medical/surgical history, family history, social history, and examination findings as documented by others; and I examined the patient myself.  I personally reviewed the relevant tests, images, and reports as documented above.  I formulated and edited as necessary the assessment and plan and discussed the findings and management plan with the patient and family. - George Doll MD    "

## 2020-10-15 ENCOUNTER — VIRTUAL VISIT (OUTPATIENT)
Dept: CARDIOLOGY | Facility: CLINIC | Age: 70
End: 2020-10-15
Attending: INTERNAL MEDICINE
Payer: COMMERCIAL

## 2020-10-15 DIAGNOSIS — Q24.8 LEFT VENTRICULAR OUTFLOW OBSTRUCTION: ICD-10-CM

## 2020-10-15 DIAGNOSIS — I25.10 CORONARY ARTERY DISEASE INVOLVING NATIVE CORONARY ARTERY OF NATIVE HEART, ANGINA PRESENCE UNSPECIFIED: ICD-10-CM

## 2020-10-15 DIAGNOSIS — R07.9 CHEST PAIN, UNSPECIFIED TYPE: Primary | ICD-10-CM

## 2020-10-15 PROCEDURE — 99214 OFFICE O/P EST MOD 30 MIN: CPT | Mod: 95 | Performed by: PHYSICIAN ASSISTANT

## 2020-10-15 RX ORDER — AMLODIPINE BESYLATE 5 MG/1
TABLET ORAL
Qty: 90 TABLET | Refills: 2 | Status: SHIPPED | OUTPATIENT
Start: 2020-10-15 | End: 2021-08-03

## 2020-10-15 RX ORDER — LISINOPRIL 20 MG/1
TABLET ORAL
Qty: 180 TABLET | Refills: 2 | Status: SHIPPED | OUTPATIENT
Start: 2020-10-15 | End: 2021-08-03

## 2020-10-15 RX ORDER — METOPROLOL TARTRATE 25 MG/1
25 TABLET, FILM COATED ORAL 2 TIMES DAILY
COMMUNITY
Start: 2020-10-15 | End: 2021-03-12

## 2020-10-15 NOTE — PATIENT INSTRUCTIONS
Thank you for your [facility] visit today. Your provider has recommended the following:  Medication Changes:  No changes.  Recommendations:  Please keep track of your blood pressure and heart rate daily if you can to review at your next follow up visit  Cardiac MRI-If you have not been contacted yet please call the scheduling number below  Follow-up:  See Dr Merida for cardiology follow up after the cardiac MRI.    Reminder:  Please bring in your current medication list or your medication, over the counter supplements and vitamin bottles as we will review these at each office visit.

## 2020-10-15 NOTE — PROGRESS NOTES
"Vijaya Manjarrez is a 70 year old female who is being evaluated via a billable video visit.      The patient has been notified of following:     \"This video visit will be conducted via a call between you and your physician/provider. We have found that certain health care needs can be provided without the need for an in-person physical exam.  This service lets us provide the care you need with a video conversation.  If a prescription is necessary we can send it directly to your pharmacy.  If lab work is needed we can place an order for that and you can then stop by our lab to have the test done at a later time.    Video visits are billed at different rates depending on your insurance coverage.  Please reach out to your insurance provider with any questions.    If during the course of the call the physician/provider feels a video visit is not appropriate, you will not be charged for this service.\"    Patient has given verbal consent for Video visit? Yes  How would you like to obtain your AVS? Mail a copy  If you are dropped from the video visit, the video invite should be resent to: Text to cell phone: [phone]  Will anyone else be joining your video visit? No        Video-Visit Details    Type of service:  Video Visit    Video Start Time: 3:34 PM  Video End Time: 3:54 PM    Originating Location (pt. Location): Home    Distant Location (provider location):  [facility]     Platform used for Video Visit: Doximity    Patient reported vitals:  BP:120/83  Heart rate:51  Weight:200    Review Of Systems  Skin: bruising  Eyes: visual blurring, double vision  Ears/Nose/Throat: negative  Respiratory: Shortness of breath  Cardiovascular: palpitations and chest pain  Gastrointestinal: constipation  Genitourinary: negative  Musculoskeletal: back pain  Neurologic: migraine headaches, headaches and numbness or tingling of feet  Psychiatric: sleep disturbance and " anxiety  Hematologic/Lymphatic/Immunologic: allergies  Endocrine: diabetes    KRISTOPHER Godinez      Service Date: 10/15/2020     Vijaya Manjarrez is a 70 year old female who is being evaluated via a billable video visit.      HISTORY OF PRESENTING COMPLAINT:  Vijaya Manjarrez is a pleasant 70 year old female who is being evaluated via a billable video visit in order to minimize the possibility of exposure due to the current COVID-19 pandemic. This is a post hospital follow up.     Her CV hx includes non-occlusive CAD first noted on angiography in 2019 performed due to LAMB and CP. She recently sustained a L orbital fracture and surgery was recommended. For a pre-op risk assessment a stress echo was ordered, it was cancelled due to baseline echo abnormalities showing an elevated mid ventricular gradient (with peak gradient of 70mm with Valsalva) in the context of hyperdynamic systolic function.  She then presented to the emergency department with chest discomfort. A Tatiana stress test showed concern for moderate inferior wall ischemia. Cor angiography was repeated and showed moderate non-occlusive disease in the RCA (FFR was normal). Metoprolol was increased in an attempt to decrease the dynamic gradient. An outpt cardiac MRI was recommended, but not scheduled.    Since discharge she did have her eye surgery without cardiac complications. She does not feel much improvement in her LAMB or CP with the higher dose of metoprolol. She has a BP cuff at home, but has not been keeping track of her BP or HR. No new symptoms. She does not need a metoprolol refill at this time.      CURRENT CARDIAC MEDICATIONS:   Current Outpatient Medications   Medication Sig Dispense Refill     acetaminophen (TYLENOL ARTHRITIS PAIN) 650 MG CR tablet Take 650 mg by mouth 2 times daily as needed        albuterol (PROAIR HFA/PROVENTIL HFA/VENTOLIN HFA) 108 (90 Base) MCG/ACT inhaler Inhale 2 puffs into the lungs every 6 hours (Patient taking  differently: Inhale 2 puffs into the lungs every 6 hours as needed ) 18 g 1     amLODIPine (NORVASC) 5 MG tablet TAKE ONE TABLET BY MOUTH ONCE DAILY 90 tablet 2     aspirin (ASA) 81 MG chewable tablet Take 1 tablet (81 mg) by mouth daily 30 tablet 1     atorvastatin (LIPITOR) 80 MG tablet Take 1 tablet (80 mg) by mouth daily Recheck cholesterol in 3 months 90 tablet 1     blood glucose monitoring (NO BRAND SPECIFIED) test strip Use to test blood sugars 1 times daily or as directed, use brand same as previous 100 strip 3     cetirizine (ZYRTEC) 10 MG tablet TAKE ONE TABLET BY MOUTH ONCE DAILY 90 tablet 3     cyclobenzaprine (FLEXERIL) 10 MG tablet TAKE ONE TABLET BY MOUTH TWICE A  tablet 1     DULoxetine (CYMBALTA) 60 MG capsule Take 1 capsule (60 mg) by mouth daily 90 capsule 3     erythromycin (ROMYCIN) 5 MG/GM ophthalmic ointment Place 0.5 inches Into the left eye At Bedtime 3.5 g 0     fluticasone (FLONASE) 50 MCG/ACT nasal spray SPRAY 2 SPRAYS IN EACH NOSTRIL ONCE DAILY 16 g 1     ibuprofen (ADVIL/MOTRIN) 600 MG tablet        lisinopril (ZESTRIL) 20 MG tablet TAKE ONE TABLET BY MOUTH TWICE A  tablet 2     metFORMIN (GLUCOPHAGE) 500 MG tablet TAKE ONE TABLET BY MOUTH TWICE A DAY WITH A MEAL 180 tablet 1     metoprolol tartrate (LOPRESSOR) 25 MG tablet Take 3 tablets (75 mg) by mouth 2 times daily       montelukast (SINGULAIR) 10 MG tablet TAKE ONE TABLET BY MOUTH AT BEDTIME 90 tablet 1     Multiple Vitamins-Minerals (MULTIVITAMIN ADULT PO) Take 5 tablets by mouth daily Pro-caps vitamin        nitroGLYcerin (NITROSTAT) 0.4 MG sublingual tablet For chest pain place 1 tablet under the tongue every 5 minutes for 3 doses. If symptoms persist 5 minutes after 1st dose call 911. 30 tablet 3     nystatin (MYCOSTATIN) 144852 UNIT/GM external powder APPLY TO AFFECTED AREA(S) TOPICALLY THREE TIMES A DAY AS NEEDED 60 g 0     omeprazole (PRILOSEC) 20 MG DR capsule TAKE ONE CAPSULE BY MOUTH TWICE A  capsule  2     order for DME Equipment being ordered: glucometer 1 each 0     pregabalin (LYRICA) 150 MG capsule Take 1 capsule (150 mg) by mouth 3 times daily 270 capsule 1     tiotropium (SPIRIVA) 18 MCG inhaled capsule Inhale 1 capsule (18 mcg) into the lungs daily 90 capsule 1     traZODone (DESYREL) 100 MG tablet TAKE TWO TABLETS BY MOUTH EVERY NIGHT AT BEDTIME 180 tablet 1     cephALEXin (KEFLEX) 500 MG capsule Take 500 mg by mouth 2 times daily For 7 days.       hydrOXYzine (ATARAX) 10 MG tablet TAKE ONE TABLET BY MOUTH THREE TIMES A DAY AS NEEDED FOR ANXIETY (Patient not taking: Reported on 10/15/2020) 30 tablet 0     neomycin-polymixin-dexamethasone (MAXITROL) 0.1 % ophthalmic suspension Apply 1 drop to eye 3 times daily Instill into operative eye(s) per physician instructions. (Patient not taking: Reported on 10/15/2020) 5 mL 0       The remainder of the medications, allergies and review of systems were reviewed and as are documented.      PHYSICAL EXAMINATION:   GENERAL: Healthy, alert and no distress  EYES: Eyes grossly normal to inspection.  Some ecchymosis surrounding the L orbit.  RESP: No audible wheeze, cough, or visible cyanosis.  No visible retractions or increased work of breathing.    SKIN: Visible skin clear. No significant rash, abnormal pigmentation or lesions.  NEURO: Cranial nerves grossly intact.  Mentation and speech appropriate for age.  PSYCH: Mentation appears normal, affect normal/bright, judgement and insight intact, normal speech and appearance well-groomed.     ASSESSMENT AND PLAN:  Vijaya Manjarrez is a pleasant 70 year old female with a history of non-occlusive CAD and a recent echo showing a mid LV cavitary gradient (with peak gradient of 70mm with Valsalva) in the setting of hyperdynamic LV function. Her metoprolol has increased during her recent hospital stay, but her symptoms have not improved much if at all. Unfortunately she has not been keeping track of her BP or HR at home. I  encouraged her to do this as we may want to try to increase her BB further or try to decreased her amlodipine/vasodilators.     An outpt cardiac MRI was recommended, but not ordered. I have discussed this with Pat and she is agreeable. She has had other MRIs before and does not feel that she has trouble with claustrophobia.     I will also request a follow up visit with her primary cardiologist Dr Merida to review the results of the cardiac MRI.     An AVS will be mailed to the patient.      Thank you for allowing me to participate in the care of this pleasant patient.  Please do not hesitate to contact us with further questions or concerns.         HERACLIO GONZALEZ PA-C

## 2020-10-15 NOTE — LETTER
"10/15/2020    Vidhi Mario MD  [address] [facility] [address]    RE: Vijaya Manjarrez       Dear Colleague,    I had the pleasure of seeing Vijaya Manjarrez in the [facility].    Vijaya Manjarrez is a 70 year old female who is being evaluated via a billable video visit.      The patient has been notified of following:     \"This video visit will be conducted via a call between you and your physician/provider. We have found that certain health care needs can be provided without the need for an in-person physical exam.  This service lets us provide the care you need with a video conversation.  If a prescription is necessary we can send it directly to your pharmacy.  If lab work is needed we can place an order for that and you can then stop by our lab to have the test done at a later time.    Video visits are billed at different rates depending on your insurance coverage.  Please reach out to your insurance provider with any questions.    If during the course of the call the physician/provider feels a video visit is not appropriate, you will not be charged for this service.\"    Patient has given verbal consent for Video visit? Yes  How would you like to obtain your AVS? Mail a copy  If you are dropped from the video visit, the video invite should be resent to: Text to cell phone: [phone]  Will anyone else be joining your video visit? No        Video-Visit Details    Type of service:  Video Visit    Video Start Time: 3:34 PM  Video End Time: 3:54 PM    Originating Location (pt. Location): Home    Distant Location (provider location):  [facility]     Platform used for Video Visit: Doximity    Patient reported vitals:  BP:120/83  Heart rate:51  Weight:200    Review Of Systems  Skin: bruising  Eyes: visual blurring, double vision  Ears/Nose/Throat: negative  Respiratory: Shortness of breath  Cardiovascular: palpitations " and chest pain  Gastrointestinal: constipation  Genitourinary: negative  Musculoskeletal: back pain  Neurologic: migraine headaches, headaches and numbness or tingling of feet  Psychiatric: sleep disturbance and anxiety  Hematologic/Lymphatic/Immunologic: allergies  Endocrine: diabetes    KRISTOPHER Godinez      Service Date: 10/15/2020     Vijaya Manjarrez is a 70 year old female who is being evaluated via a billable video visit.      HISTORY OF PRESENTING COMPLAINT:  Vijaya Manjarrez is a pleasant 70 year old female who is being evaluated via a billable video visit in order to minimize the possibility of exposure due to the current COVID-19 pandemic. This is a post hospital follow up.     Her CV hx includes non-occlusive CAD first noted on angiography in 2019 performed due to LAMB and CP. She recently sustained a L orbital fracture and surgery was recommended. For a pre-op risk assessment a stress echo was ordered, it was cancelled due to baseline echo abnormalities showing an elevated mid ventricular gradient (with peak gradient of 70mm with Valsalva) in the context of hyperdynamic systolic function.  She then presented to the emergency department with chest discomfort. A Tatiana stress test showed concern for moderate inferior wall ischemia. Cor angiography was repeated and showed moderate non-occlusive disease in the RCA (FFR was normal). Metoprolol was increased in an attempt to decrease the dynamic gradient. An outpt cardiac MRI was recommended, but not scheduled.    Since discharge she did have her eye surgery without cardiac complications. She does not feel much improvement in her LAMB or CP with the higher dose of metoprolol. She has a BP cuff at home, but has not been keeping track of her BP or HR. No new symptoms. She does not need a metoprolol refill at this time.      CURRENT CARDIAC MEDICATIONS:   Current Outpatient Medications   Medication Sig Dispense Refill     acetaminophen (TYLENOL ARTHRITIS PAIN)  650 MG CR tablet Take 650 mg by mouth 2 times daily as needed        albuterol (PROAIR HFA/PROVENTIL HFA/VENTOLIN HFA) 108 (90 Base) MCG/ACT inhaler Inhale 2 puffs into the lungs every 6 hours (Patient taking differently: Inhale 2 puffs into the lungs every 6 hours as needed ) 18 g 1     amLODIPine (NORVASC) 5 MG tablet TAKE ONE TABLET BY MOUTH ONCE DAILY 90 tablet 2     aspirin (ASA) 81 MG chewable tablet Take 1 tablet (81 mg) by mouth daily 30 tablet 1     atorvastatin (LIPITOR) 80 MG tablet Take 1 tablet (80 mg) by mouth daily Recheck cholesterol in 3 months 90 tablet 1     blood glucose monitoring (NO BRAND SPECIFIED) test strip Use to test blood sugars 1 times daily or as directed, use brand same as previous 100 strip 3     cetirizine (ZYRTEC) 10 MG tablet TAKE ONE TABLET BY MOUTH ONCE DAILY 90 tablet 3     cyclobenzaprine (FLEXERIL) 10 MG tablet TAKE ONE TABLET BY MOUTH TWICE A  tablet 1     DULoxetine (CYMBALTA) 60 MG capsule Take 1 capsule (60 mg) by mouth daily 90 capsule 3     erythromycin (ROMYCIN) 5 MG/GM ophthalmic ointment Place 0.5 inches Into the left eye At Bedtime 3.5 g 0     fluticasone (FLONASE) 50 MCG/ACT nasal spray SPRAY 2 SPRAYS IN EACH NOSTRIL ONCE DAILY 16 g 1     ibuprofen (ADVIL/MOTRIN) 600 MG tablet        lisinopril (ZESTRIL) 20 MG tablet TAKE ONE TABLET BY MOUTH TWICE A  tablet 2     metFORMIN (GLUCOPHAGE) 500 MG tablet TAKE ONE TABLET BY MOUTH TWICE A DAY WITH A MEAL 180 tablet 1     metoprolol tartrate (LOPRESSOR) 25 MG tablet Take 3 tablets (75 mg) by mouth 2 times daily       montelukast (SINGULAIR) 10 MG tablet TAKE ONE TABLET BY MOUTH AT BEDTIME 90 tablet 1     Multiple Vitamins-Minerals (MULTIVITAMIN ADULT PO) Take 5 tablets by mouth daily Pro-caps vitamin        nitroGLYcerin (NITROSTAT) 0.4 MG sublingual tablet For chest pain place 1 tablet under the tongue every 5 minutes for 3 doses. If symptoms persist 5 minutes after 1st dose call 911. 30 tablet 3      nystatin (MYCOSTATIN) 582470 UNIT/GM external powder APPLY TO AFFECTED AREA(S) TOPICALLY THREE TIMES A DAY AS NEEDED 60 g 0     omeprazole (PRILOSEC) 20 MG DR capsule TAKE ONE CAPSULE BY MOUTH TWICE A  capsule 2     order for DME Equipment being ordered: glucometer 1 each 0     pregabalin (LYRICA) 150 MG capsule Take 1 capsule (150 mg) by mouth 3 times daily 270 capsule 1     tiotropium (SPIRIVA) 18 MCG inhaled capsule Inhale 1 capsule (18 mcg) into the lungs daily 90 capsule 1     traZODone (DESYREL) 100 MG tablet TAKE TWO TABLETS BY MOUTH EVERY NIGHT AT BEDTIME 180 tablet 1     cephALEXin (KEFLEX) 500 MG capsule Take 500 mg by mouth 2 times daily For 7 days.       hydrOXYzine (ATARAX) 10 MG tablet TAKE ONE TABLET BY MOUTH THREE TIMES A DAY AS NEEDED FOR ANXIETY (Patient not taking: Reported on 10/15/2020) 30 tablet 0     neomycin-polymixin-dexamethasone (MAXITROL) 0.1 % ophthalmic suspension Apply 1 drop to eye 3 times daily Instill into operative eye(s) per physician instructions. (Patient not taking: Reported on 10/15/2020) 5 mL 0       The remainder of the medications, allergies and review of systems were reviewed and as are documented.      PHYSICAL EXAMINATION:   GENERAL: Healthy, alert and no distress  EYES: Eyes grossly normal to inspection.  Some ecchymosis surrounding the L orbit.  RESP: No audible wheeze, cough, or visible cyanosis.  No visible retractions or increased work of breathing.    SKIN: Visible skin clear. No significant rash, abnormal pigmentation or lesions.  NEURO: Cranial nerves grossly intact.  Mentation and speech appropriate for age.  PSYCH: Mentation appears normal, affect normal/bright, judgement and insight intact, normal speech and appearance well-groomed.     ASSESSMENT AND PLAN:  Vijaya Manjarrez is a pleasant 70 year old female with a history of non-occlusive CAD and a recent echo showing a mid LV cavitary gradient (with peak gradient of 70mm with Valsalva) in the  setting of hyperdynamic LV function. Her metoprolol has increased during her recent hospital stay, but her symptoms have not improved much if at all. Unfortunately she has not been keeping track of her BP or HR at home. I encouraged her to do this as we may want to try to increase her BB further or try to decreased her amlodipine/vasodilators.     An outpt cardiac MRI was recommended, but not ordered. I have discussed this with Pat and she is agreeable. She has had other MRIs before and does not feel that she has trouble with claustrophobia.     I will also request a follow up visit with her primary cardiologist Dr Merida to review the results of the cardiac MRI.     An AVS will be mailed to the patient.      Thank you for allowing me to participate in the care of this pleasant patient.  Please do not hesitate to contact us with further questions or concerns.     Sincerely,     Dai Costa PA-C     [facility]

## 2020-10-15 NOTE — TELEPHONE ENCOUNTER
Tramadol discontinued from medication list 10/1/2020 when admitted to the hospital. She has prescription for Norco and Oxycodone on medication list.    Attempted to contact patient. Left voice message to call back.   Please ask patient if she is is using either Norco or Oxycodone and if she has resumed Tramadol.

## 2020-10-15 NOTE — TELEPHONE ENCOUNTER
Patient calls back and is resuming the Tramadol.  Patient states she only used the Norco and Oxycodone because she had eye surgery and is no longer taking.

## 2020-10-16 ENCOUNTER — TELEPHONE (OUTPATIENT)
Dept: OPHTHALMOLOGY | Facility: CLINIC | Age: 70
End: 2020-10-16

## 2020-10-16 RX ORDER — TRAMADOL HYDROCHLORIDE 50 MG/1
TABLET ORAL
Qty: 180 TABLET | Refills: 0 | Status: SHIPPED | OUTPATIENT
Start: 2020-10-16 | End: 2020-12-07

## 2020-10-16 NOTE — TELEPHONE ENCOUNTER
Controlled Substance Refill Request for Tramadol   Problem List Complete:    No     PROVIDER TO CONSIDER COMPLETION OF PROBLEM LIST AND OVERVIEW/CONTROLLED SUBSTANCE AGREEMENT    Last Written Prescription Date:  9/14/20  Last Fill Quantity: 180,   # refills: 0    THE MOST RECENT OFFICE VISIT MUST BE WITHIN THE PAST 3 MONTHS. AT LEAST ONE FACE TO FACE VISIT MUST OCCUR EVERY 6 MONTHS. ADDITIONAL VISITS CAN BE VIRTUAL.     [place] [facility] [place]: 9/29/20    Future Office visit:     Controlled substance agreement:   Encounter-Level CSA - 01/12/2017:    Controlled Substance Agreement - Scan on 1/26/2017 12:28 PM: CONTROLLED SUBSTANCE AGREEMENT     Patient-Level CSA:    Controlled Substance Agreement - Opioid - Scan on 2/6/2019  9:37 AM         Last Urine Drug Screen: No results found for: CDAUT, No results found for: COMDAT,   Cannabinoids (66-wti-2-carboxy-9-THC)   Date Value Ref Range Status   03/03/2018 Not Detected NDET^Not Detected ng/mL Final     Comment:     Cutoff for a negative cannabinoid is 50 ng/mL or less.     Phencyclidine (Phencyclidine)   Date Value Ref Range Status   03/03/2018 Not Detected NDET^Not Detected ng/mL Final     Comment:     Cutoff for a negative PCP is 25 ng/mL or less.     Cocaine (Benzoylecgonine)   Date Value Ref Range Status   03/03/2018 Not Detected NDET^Not Detected ng/mL Final     Comment:     Cutoff for a negative cocaine is 150 ng/ml or less.     Methamphetamine (d-Methamphetamine)   Date Value Ref Range Status   03/03/2018 Not Detected NDET^Not Detected ng/mL Final     Comment:     Cutoff for a negative methamphetamine is 500 ng/ml or less.     Opiates (Morphine)   Date Value Ref Range Status   03/03/2018 Not Detected NDET^Not Detected ng/mL Final     Comment:     Cutoff for a negative opiate is 100 ng/ml or less.     Amphetamine (d-Amphetamine)   Date Value Ref Range Status   03/03/2018 Not Detected NDET^Not Detected ng/mL Final     Comment:     Cutoff  for a negative amphetamine is 500 ng/mL or less.     Benzodiazepines (Nordiazepam)   Date Value Ref Range Status   03/03/2018 Not Detected NDET^Not Detected ng/mL Final     Comment:     Cutoff for a negative benzodiazepine is 150 ng/ml or less.     Tricyclic Antidepressants (Desipramine)   Date Value Ref Range Status   03/03/2018 Detected, Abnormal Result (A) NDET^Not Detected ng/mL Final     Comment:     Cutoff for a positive tricyclic antidepressant is greater than 300 ng/ml.  This is an unconfirmed screening result to be used for medical purposes only.   Order [id number] for confirmation or individual confirmation tests to [facility].       Methadone (Methadone)   Date Value Ref Range Status   03/03/2018 Not Detected NDET^Not Detected ng/mL Final     Comment:     Cutoff for a negative methadone is 200 ng/ml or less.     Barbiturates (Butalbital)   Date Value Ref Range Status   03/03/2018 Not Detected NDET^Not Detected ng/mL Final     Comment:     Cutoff for a negative barbituate is 200 ng/ml or less.     Oxycodone (Oxycodone)   Date Value Ref Range Status   03/03/2018 Not Detected NDET^Not Detected ng/mL Final     Comment:     Cutoff for a negative Oxycodone is 100 ng/mL or less.     Propoxyphene (Norpropoxyphene)   Date Value Ref Range Status   03/03/2018 Not Detected NDET^Not Detected ng/mL Final     Comment:     Cutoff for a negative propoxyphene is 300 ng/ml or less     Buprenorphine (Buprenorphine)   Date Value Ref Range Status   03/03/2018 Not Detected NDET^Not Detected ng/mL Final     Comment:     Cutoff for a negative buprenorphine is 10 ng/ml or less        Processing:  Rx to be electronically transmitted to pharmacy by provider      [email or web address]       checked in past 3 months?  Yes 9/4/20- no concerns     Routing refill request to provider for review/approval because:  Drug not on the [facility] refill protocol

## 2020-10-23 ENCOUNTER — TELEPHONE (OUTPATIENT)
Dept: OPHTHALMOLOGY | Facility: CLINIC | Age: 70
End: 2020-10-23

## 2020-10-23 NOTE — TELEPHONE ENCOUNTER
I called the patient at 4:52 pm.    I discussed with the patient that we would typically recommend waiting 2-3 months prior to obtaining a new Rx for bruising/swelling to resolve. I discussed with the patient that she could obtain an Rx prior to this time if she deemed necessary, but that her Rx may change over time, in that case. The patient voiced understanding of these recommendations and stated that she would likely wait to obtain an updated Rx until after her follow-up appointment at the end of November.    Padmaja Adkins MD  Ophthalmology Resident, PGY-2  Pager: [phone]            S/p orbital fracture repair 10-8-2020    Next f/u in about 6 weeks-- end of November    Pt would like to review when could have updated Rx (ordered new glasses day of injury and told to hold on Rx as likely changed)  Pt would like to have update before end November    Reviewed would wait at least til all bruising/swelling goes away and recommended use of preservative free tears to optimize surface before repeat refraction/glasses check    Stated would forward to oculo-plastics team on opinion of how long to wait before repeat exam--pt aware back in clinic next week.    Pt satisfied with plan    Eddie Esteban RN 4:19 PM 10/23/20    s  [facility]    Phone Message    May a detailed message be left on voicemail: yes     Reason for Call: Other: Pt would like a call back about when she would be able to get her new glasses after surgery  , Please call Pt to discuss  Thank you,    Action Taken: Message routed to:  [facility] Eye    Travel Screening: Not Applicable

## 2020-10-26 ENCOUNTER — PATIENT OUTREACH (OUTPATIENT)
Dept: GERIATRIC MEDICINE | Facility: CLINIC | Age: 70
End: 2020-10-26

## 2020-10-26 NOTE — PROGRESS NOTES
[facility] Care Coordination Contact      [facility] Six-Month Telephone Assessment    6 month telephone assessment completed on 10/26/20. Writer has had a lot of contact with member in recent months due to illness/injury/appointments. Member shares she is feeling okay - needs to wait several more weeks before she can get her new glasses (after injury/surgery) and that is hard. Had follow up with cardiology and needs to schedule MRI - provided cardiology scheduling number. Her sister was in town for a couple days and they had a great time.     ER visits: Yes -  [facility]  Hospitalizations: Yes -  Several (scheduled and unscheduled)  TCU stays: No  Significant health status changes: New cardiac issues - being followed by cardiology. Recovering from fascial fracture and surgery.  Falls/Injuries: Yes: Fall that lead to facial fracture  ADL/IADL changes: No  Changes in services: No    Goals: See POC in chart for goal progress documentation.     Will see member in 6 months for an annual health risk assessment.   Encouraged member to call CC with any questions or concerns in the meantime.     Vanesa Gtz RN  [facility]  [phone]  Fax: [phone]

## 2020-11-11 ENCOUNTER — TELEPHONE (OUTPATIENT)
Dept: INTERNAL MEDICINE | Facility: CLINIC | Age: 70
End: 2020-11-11

## 2020-11-11 NOTE — TELEPHONE ENCOUNTER
Patient believes that she is having side effects from increase in Metoprolol dose made by cardiologist in hospital 10/3/20. Complains of constant churning in stomach, 2-3 loose stools per day, nausea without vomiting, headaches.  She is also having chest pains but states it is unchanged from chest pains that she has  continues with chest pain unchanged from prior to that hospitalization.    Patient denies breathing difficulty, diaphoresis.  Advised patient contact cardiology office now to see what they would like her to do.  There has been mention of additional med changes that could be made and talk of getting an MRI.      Patient asks that message be sent to primary care provider so she knows what is going on.  ARELY Farooq R.N.

## 2020-11-11 NOTE — TELEPHONE ENCOUNTER
Patient calls asking if the increase in the Metoprolol made in the hospital may be causing her to have headaches, loose stools, stomach issues, and chest pain.  Please address and advise.

## 2020-11-18 ENCOUNTER — TELEPHONE (OUTPATIENT)
Dept: OPHTHALMOLOGY | Facility: CLINIC | Age: 70
End: 2020-11-18

## 2020-11-18 ENCOUNTER — PATIENT OUTREACH (OUTPATIENT)
Dept: GERIATRIC MEDICINE | Facility: CLINIC | Age: 70
End: 2020-11-18

## 2020-11-18 NOTE — TELEPHONE ENCOUNTER
Reviewed with pt at 1105 no indication will have dilated exam next week with Dr. Garay    Reviewed would forward to care coordinator for secondary review and reaching out to pt if feel would likely be dilated      Pt verbally demonstrated understanding   Eddie Esteban RN 11:08 AM 11/18/20          [facility]    Phone Message    May a detailed message be left on voicemail: yes     Reason for Call: Other: Pt called regarding her upcoming appt with Dr. Doll next tuesday. Pt was wondering if her eyes will be dilated or not, I tried calling the back line for clarification on this with no answer. Pt just wants to know if she needs to set up a ride or not for the Appt. Please advise, thank you!     Action Taken: Message routed to:  [facility] EYE    Travel Screening: Not Applicable

## 2020-11-18 NOTE — PROGRESS NOTES
[facility] Care Coordination Contact    Arranged transportation thru [facility] for the below appt:  Appt Date & Time: 11/19/2020 10:30am check in time  Clinic Name & Address:  [facility] [address] [place] [id number]  Transportation Provider: [employer] [phone]   time:  9:35-9:55.  Will call return ride.  Pat is riding to this appt by herself.    Arranged transportation thru [facility] for the below appt:  Appt Date & Time: 11/24/2020 1:45pm  Clinic Name & Address:  [facility] [address] [facility] [address]  Transportation Provider: [employer] [phone]   time:  12:45-1:05pm.  Will call return ride.  Pat will have someone ride with her - eyes may be dilated.     Notified member of  times.    Cynthia Bell  Care Management Specialist  [facility]  [phone]

## 2020-11-19 ENCOUNTER — HOSPITAL ENCOUNTER (OUTPATIENT)
Dept: CARDIOLOGY | Facility: CLINIC | Age: 70
Discharge: HOME | End: 2020-11-19
Attending: PHYSICIAN ASSISTANT | Admitting: PHYSICIAN ASSISTANT
Payer: COMMERCIAL

## 2020-11-19 DIAGNOSIS — R07.9 CHEST PAIN, UNSPECIFIED TYPE: ICD-10-CM

## 2020-11-19 DIAGNOSIS — Q24.8 LEFT VENTRICULAR OUTFLOW OBSTRUCTION: ICD-10-CM

## 2020-11-19 LAB
CREAT BLD-MCNC: 0.8 MG/DL (ref 0.52–1.04)
GFR SERPL CREATININE-BSD FRML MDRD: 71 ML/MIN/{1.73_M2}

## 2020-11-19 PROCEDURE — 75561 CARDIAC MRI FOR MORPH W/DYE: CPT

## 2020-11-19 PROCEDURE — A9585 GADOBUTROL INJECTION: HCPCS | Performed by: PHYSICIAN ASSISTANT

## 2020-11-19 PROCEDURE — 75561 CARDIAC MRI FOR MORPH W/DYE: CPT | Mod: 26 | Performed by: INTERNAL MEDICINE

## 2020-11-19 PROCEDURE — 255N000002 HC RX 255 OP 636: Performed by: PHYSICIAN ASSISTANT

## 2020-11-19 PROCEDURE — 82565 ASSAY OF CREATININE: CPT

## 2020-11-19 RX ORDER — DIPHENHYDRAMINE HYDROCHLORIDE 50 MG/ML
25-50 INJECTION INTRAMUSCULAR; INTRAVENOUS
Status: DISCONTINUED | OUTPATIENT
Start: 2020-11-19 | End: 2020-11-20 | Stop reason: HOSPADM

## 2020-11-19 RX ORDER — REGADENOSON 0.08 MG/ML
0.4 INJECTION, SOLUTION INTRAVENOUS ONCE
Status: DISCONTINUED | OUTPATIENT
Start: 2020-11-19 | End: 2020-11-20 | Stop reason: HOSPADM

## 2020-11-19 RX ORDER — AMINOPHYLLINE 25 MG/ML
100 INJECTION, SOLUTION INTRAVENOUS ONCE
Status: DISCONTINUED | OUTPATIENT
Start: 2020-11-19 | End: 2020-11-20 | Stop reason: HOSPADM

## 2020-11-19 RX ORDER — GADOBUTROL 604.72 MG/ML
13 INJECTION INTRAVENOUS ONCE
Status: COMPLETED | OUTPATIENT
Start: 2020-11-19 | End: 2020-11-19

## 2020-11-19 RX ORDER — DIAZEPAM 5 MG
5 TABLET ORAL EVERY 30 MIN PRN
Status: DISCONTINUED | OUTPATIENT
Start: 2020-11-19 | End: 2020-11-20 | Stop reason: HOSPADM

## 2020-11-19 RX ORDER — ACYCLOVIR 200 MG/1
0-1 CAPSULE ORAL
Status: DISCONTINUED | OUTPATIENT
Start: 2020-11-19 | End: 2020-11-20 | Stop reason: HOSPADM

## 2020-11-19 RX ORDER — METHYLPREDNISOLONE SODIUM SUCCINATE 125 MG/2ML
125 INJECTION, POWDER, LYOPHILIZED, FOR SOLUTION INTRAMUSCULAR; INTRAVENOUS
Status: DISCONTINUED | OUTPATIENT
Start: 2020-11-19 | End: 2020-11-20 | Stop reason: HOSPADM

## 2020-11-19 RX ORDER — DIPHENHYDRAMINE HCL 25 MG
25 CAPSULE ORAL
Status: DISCONTINUED | OUTPATIENT
Start: 2020-11-19 | End: 2020-11-20 | Stop reason: HOSPADM

## 2020-11-19 RX ORDER — CAFFEINE CITRATE 20 MG/ML
60 SOLUTION INTRAVENOUS
Status: DISCONTINUED | OUTPATIENT
Start: 2020-11-19 | End: 2020-11-20 | Stop reason: HOSPADM

## 2020-11-19 RX ORDER — ALBUTEROL SULFATE 90 UG/1
2 AEROSOL, METERED RESPIRATORY (INHALATION) EVERY 5 MIN PRN
Status: DISCONTINUED | OUTPATIENT
Start: 2020-11-19 | End: 2020-11-20 | Stop reason: HOSPADM

## 2020-11-19 RX ORDER — ONDANSETRON 2 MG/ML
4 INJECTION INTRAMUSCULAR; INTRAVENOUS
Status: DISCONTINUED | OUTPATIENT
Start: 2020-11-19 | End: 2020-11-20 | Stop reason: HOSPADM

## 2020-11-19 RX ADMIN — GADOBUTROL 13 ML: 604.72 INJECTION INTRAVENOUS at 13:46

## 2020-11-19 NOTE — PROGRESS NOTES
Cardiac Core protocol  3ch stack  Contrast administered per weight based protocol.  Per Dr Morales

## 2020-11-23 ENCOUNTER — OFFICE VISIT (OUTPATIENT)
Dept: CARDIOLOGY | Facility: CLINIC | Age: 70
End: 2020-11-23
Attending: PHYSICIAN ASSISTANT
Payer: COMMERCIAL

## 2020-11-23 ENCOUNTER — TELEPHONE (OUTPATIENT)
Dept: OPHTHALMOLOGY | Facility: CLINIC | Age: 70
End: 2020-11-23

## 2020-11-23 VITALS
WEIGHT: 205 LBS | SYSTOLIC BLOOD PRESSURE: 133 MMHG | DIASTOLIC BLOOD PRESSURE: 79 MMHG | HEIGHT: 69 IN | HEART RATE: 58 BPM | BODY MASS INDEX: 30.36 KG/M2

## 2020-11-23 DIAGNOSIS — I25.10 CORONARY ARTERY DISEASE INVOLVING NATIVE CORONARY ARTERY OF NATIVE HEART, ANGINA PRESENCE UNSPECIFIED: ICD-10-CM

## 2020-11-23 DIAGNOSIS — E78.5 DYSLIPIDEMIA: Primary | ICD-10-CM

## 2020-11-23 DIAGNOSIS — R07.9 CHEST PAIN, UNSPECIFIED TYPE: ICD-10-CM

## 2020-11-23 DIAGNOSIS — E11.40 TYPE 2 DIABETES MELLITUS WITH DIABETIC NEUROPATHY, WITHOUT LONG-TERM CURRENT USE OF INSULIN (H): ICD-10-CM

## 2020-11-23 PROCEDURE — 99214 OFFICE O/P EST MOD 30 MIN: CPT | Performed by: INTERNAL MEDICINE

## 2020-11-23 ASSESSMENT — MIFFLIN-ST. JEOR: SCORE: 1519.25

## 2020-11-23 NOTE — LETTER
11/23/2020    Vidhi Mario MD  [address] [facility] [address]    RE: Vijaya Manjarrez       Dear Colleague,    I had the pleasure of seeing Vijaya Manjarrez in the [facility].    HISTORY OF PRESENT ILLNESS:  MRI normal. Has central chest pain ( HA fatigue chronic)  Nonexertional chronic not episodic.  . Feels like heaviness occasional sharp pains. No Does not like higher dose of metoprolol   On metformin, has fibromyalgia. Still smokes      Orders this Visit:  No orders of the defined types were placed in this encounter.    No orders of the defined types were placed in this encounter.    There are no discontinued medications.    Encounter Diagnoses   Name Primary?     Coronary artery disease involving native coronary artery of native heart, angina presence unspecified      Chest pain, unspecified type      Left ventricular outflow obstruction        CURRENT MEDICATIONS:  Current Outpatient Medications   Medication Sig Dispense Refill     acetaminophen (TYLENOL ARTHRITIS PAIN) 650 MG CR tablet Take 650 mg by mouth 2 times daily as needed        albuterol (PROAIR HFA/PROVENTIL HFA/VENTOLIN HFA) 108 (90 Base) MCG/ACT inhaler Inhale 2 puffs into the lungs every 6 hours (Patient taking differently: Inhale 2 puffs into the lungs every 6 hours as needed ) 18 g 1     amLODIPine (NORVASC) 5 MG tablet TAKE ONE TABLET BY MOUTH ONCE DAILY 90 tablet 2     aspirin (ASA) 81 MG chewable tablet Take 1 tablet (81 mg) by mouth daily 30 tablet 1     atorvastatin (LIPITOR) 80 MG tablet Take 1 tablet (80 mg) by mouth daily Recheck cholesterol in 3 months 90 tablet 1     blood glucose monitoring (NO BRAND SPECIFIED) test strip Use to test blood sugars 1 times daily or as directed, use brand same as previous 100 strip 3     cetirizine (ZYRTEC) 10 MG tablet TAKE ONE TABLET BY MOUTH ONCE DAILY 90 tablet 3     cyclobenzaprine (FLEXERIL) 10 MG tablet TAKE ONE TABLET BY MOUTH TWICE A DAY  180 tablet 1     DULoxetine (CYMBALTA) 60 MG capsule Take 1 capsule (60 mg) by mouth daily 90 capsule 3     fluticasone (FLONASE) 50 MCG/ACT nasal spray SPRAY 2 SPRAYS IN EACH NOSTRIL ONCE DAILY 16 g 1     ibuprofen (ADVIL/MOTRIN) 600 MG tablet        lisinopril (ZESTRIL) 20 MG tablet TAKE ONE TABLET BY MOUTH TWICE A  tablet 2     metFORMIN (GLUCOPHAGE) 500 MG tablet TAKE ONE TABLET BY MOUTH TWICE A DAY WITH A MEAL 180 tablet 1     metoprolol tartrate (LOPRESSOR) 25 MG tablet Take 3 tablets (75 mg) by mouth 2 times daily       montelukast (SINGULAIR) 10 MG tablet TAKE ONE TABLET BY MOUTH AT BEDTIME 90 tablet 1     Multiple Vitamins-Minerals (MULTIVITAMIN ADULT PO) Take 5 tablets by mouth daily Pro-caps vitamin        nitroGLYcerin (NITROSTAT) 0.4 MG sublingual tablet For chest pain place 1 tablet under the tongue every 5 minutes for 3 doses. If symptoms persist 5 minutes after 1st dose call 911. 30 tablet 3     nystatin (MYCOSTATIN) 370805 UNIT/GM external powder APPLY TO AFFECTED AREA(S) TOPICALLY THREE TIMES A DAY AS NEEDED 60 g 0     omeprazole (PRILOSEC) 20 MG DR capsule TAKE ONE CAPSULE BY MOUTH TWICE A  capsule 2     order for DME Equipment being ordered: glucometer 1 each 0     pregabalin (LYRICA) 150 MG capsule Take 1 capsule (150 mg) by mouth 3 times daily 270 capsule 1     tiotropium (SPIRIVA) 18 MCG inhaled capsule Inhale 1 capsule (18 mcg) into the lungs daily 90 capsule 1     traMADol (ULTRAM) 50 MG tablet TAKE TWO TABLETS BY MOUTH THREE TIMES A  tablet 0     traZODone (DESYREL) 100 MG tablet TAKE TWO TABLETS BY MOUTH EVERY NIGHT AT BEDTIME 180 tablet 1     cephALEXin (KEFLEX) 500 MG capsule Take 500 mg by mouth 2 times daily For 7 days.       erythromycin (ROMYCIN) 5 MG/GM ophthalmic ointment Place 0.5 inches Into the left eye At Bedtime (Patient not taking: Reported on 11/23/2020) 3.5 g 0     hydrOXYzine (ATARAX) 10 MG tablet TAKE ONE TABLET BY MOUTH THREE TIMES A DAY AS NEEDED  "FOR ANXIETY (Patient not taking: Reported on 10/15/2020) 30 tablet 0     neomycin-polymixin-dexamethasone (MAXITROL) 0.1 % ophthalmic suspension Apply 1 drop to eye 3 times daily Instill into operative eye(s) per physician instructions. (Patient not taking: Reported on 10/15/2020) 5 mL 0       ALLERGIES     Allergies   Allergen Reactions     Penicillins Hives       PAST MEDICAL, SURGICAL, FAMILY, SOCIAL HISTORY:  History was reviewed and updated as needed, see medical record.    Review of Systems:  A 12-point review of systems was completed, see medical record for detailed review of systems information.    Physical Exam:  Vitals: /79   Pulse 58   Ht 1.753 m (5' 9\")   Wt 93 kg (205 lb)   LMP  (LMP Unknown)   BMI 30.27 kg/m      Constitutional:           Skin:           Head:           Eyes:           ENT:           Neck:           Chest:           Cardiac:                    Abdomen:           Vascular:                                        Extremities and Back:           Neurological:           ASSESSMENT:  Does not need higher dose of metoprolol The findings on TTE lkely due to hyperadrenergic state, no evidence of HCM.Cath shows no CAD.  Pain not typical for angna       RECOMMENDATIONS:   Decrease metoprolol due to fatigue to 25 bid  Follow-up in one year  Stop smoking   Exercise program  Anxiety to be addressed by PMD        Recent Lab Results:  LIPID RESULTS:  Lab Results   Component Value Date    CHOL 164 11/13/2019    HDL 36 (L) 11/13/2019    LDL 83 11/13/2019    TRIG 225 (H) 11/13/2019       LIVER ENZYME RESULTS:  Lab Results   Component Value Date    AST 26 08/08/2020    ALT 31 08/08/2020       CBC RESULTS:  Lab Results   Component Value Date    WBC 9.3 09/30/2020    RBC 4.34 09/30/2020    HGB 11.4 (L) 09/30/2020    HCT 37.2 09/30/2020    MCV 86 09/30/2020    MCH 26.3 (L) 09/30/2020    MCHC 30.6 (L) 09/30/2020    RDW 16.9 (H) 09/30/2020     09/30/2020       BMP RESULTS:  Lab Results "   Component Value Date     09/30/2020    POTASSIUM 3.5 09/30/2020    CHLORIDE 105 09/30/2020    CO2 25 09/30/2020    ANIONGAP 10 09/30/2020     (H) 09/30/2020    BUN 10 09/30/2020    CR 0.93 09/30/2020    GFRESTIMATED 71 11/19/2020    GFRESTBLACK 86 11/19/2020    RADHA 8.8 09/30/2020        A1C RESULTS:  Lab Results   Component Value Date    A1C 7.3 (H) 05/20/2020       INR RESULTS:  Lab Results   Component Value Date    INR 0.97 02/21/2019       We greatly appreciate the opportunity to be involved in the care of your patient, Vijaya Manjarrez.    Sincerely,  Cheo Merida MD      CC  Dai Costa PA-C  [address] [place] [phone]                                                                         Thank you for allowing me to participate in the care of your patient.      Sincerely,     Cheo Merida MD     [facility]    cc:   Dai Costa PA-C  [place] [id number] [place] [address]

## 2020-11-23 NOTE — PROGRESS NOTES
Service Date: 11/23/2020      HISTORY OF PRESENT ILLNESS:  Vijaya Manjarrez, a 70-year-old woman with coronary artery disease, hypertension, tobacco use, dyslipidemia, fibromyalgia/chronic pain syndrome and depression, was seen today at your request for followup of coronary artery disease and a recent cardiac MRI test.      I saw the patient initially in 2019 for atypical chest discomfort.  Diagnostic coronary angiography showed nonobstructive coronary disease.  She was placed on guideline-directed medical therapy, and since then has been seen on 2 subsequent  occasions by our advanced level practitioner, Dai Costa.        The patient sustained a left orbital fracture in September 2020.  As part of a preoperative evaluation, she was scheduled for a stress echocardiogram.  The resting echo study  performed prior to the stress test, however, showed dynamic mid left ventricular obstruction with a peak gradient of 70 mmHg and an ejection fraction was greater than 70%.  Her stress echo was cancelled, and then she underwent a nuclear stress test.  Nuclear stress test suggested a medium-sized region of ischemia in the inferior wall. She underwent repeat diagnostic coronary angiography in early October 2020 that once again demonstrated nonobstructive CAD..  Because of the findings  on her resting echo that suggested possible Hypertrophic cardiomyopathy, metoprolol was increased from 25 mg to 75 mg b.i.d. and a cardiac MRI was scheduled.  Followup was arranged today in our clinic.      Ms. Manjarrez's chronic chest discomfort is unchanged since I last saw her in 2019.  She describes a continuous substernal discomfort that is pressure-like, but at times is sharp.  The discomfort is not episodic and does not have any relationship to physical exertion. The beta blocker has had no effect on her symptoms and she feels more fatigued. Regardless she  is able to perform daily activities without limitation..       Her  blood pressures remain well controlled.  She denies syncope or palpiations     PAST MEDICAL HISTORY:   1.  Coronary artery disease.   a.  Coronary angiogram 10/2020 showing no significant narrowing in left main, 30% narrowing in LAD, 40% narrowing, circumflex, 50% narrowing proximal  Right coronary with normal FFR.   b.  Cardiac  MRI showing normal left ventricular systolic performance.  Normal wall thickness.  No significant valvular abnormalities.  Normal wall motion. No gadolinium enhancement.  2.  Chronic obstructive pulmonary disease, longstanding smoking history.  Still smokes cigarettes.   3.  Noninsulin dependent diabetes mellitus.   4.  Hypertension.   5.  Dyslipidemia.   6.  Depression/chronic pain syndrome/fibromyalgia.      PHYSICAL EXAMINATION:   GENERAL:  Exam today demonstrates a very cooperative and pleasant 70-year-old woman.   VITAL SIGNS:  Her height is 1.75 meters, her weight is 93 kg.  Her BMI is 30.3.   LUNGS:  Clear to percussion and auscultation.   CARDIOVASCULAR:  Shows a normal S1 with a normal S2.  There is no S3.  There is no murmur, rub or click.   EXTREMITIES:  Her pulses are full and symmetrical.      MEDICATIONS:   1.  Amlodipine 5 mg daily.   2.  Albuterol inhaler.   3.  Aspirin 81 mg daily.   4.  Atorvastatin 80 mg daily.   5.  Cetirizine 10 mg daily.   6.  Cyclobenzaprine (Flexeril) 10 mg twice a day.   7.  Duloxetine (Cymbalta) 60 mg daily.   8.  Fluticasone nasal spray 20 mg daily.   9.  Metformin 500 mg twice a day.   10.  Metoprolol 75 mg b.i.d.   11.  Singular 10 mg at bedtime.   12.  Nystatin.     13.  Pregabalin 150 mg 3 times a day.   14.  Spiriva inhaler.      LABORATORY STUDIES:  Her most recent ECG normal sinus rhythm with nonspecific ST-wave changes.  Her most recent echo showed hyperdynamic left ventricular systolic performance with an ejection fraction greater than 75%.  Her most recent cardiac MRI shows a structurally normal heart with normal left ventricular wall  thickness, normal valve function and no evidence of hypertrophic cardiomyopathy.  Recent LDL cholesterol was 83.  Her most recent creatinine is 0.8, hemoglobin is 11.4.      ASSESSMENT:  I am uncertain as to the cause of the patient's chronic chest discomfort, but there is no evidence of obstructive coronary disease or Hypertrophic Cardiomyopathy.  She is on guideline-directed medical therapy with acceptable systolic blood pressure and LDL cholesterol levels.  I do not believe any further cardiac testing would change her management.  It appears to me that her chronic chest discomfort is not cardiac in nature.      Intraventricular gradients can be seen when patients have hyperdynamic systolic performance. It may have been that the  Pain/emotional stress  from her orbital fracture resulted in elevated catecholamines, hyperdynamic LV systolic performance and subsequent intraventricular gradient. The normal cardiac MRI is reassuring as it shows no evidence of hypertrophic cardiomyopathy. She feels fatigued on the higher dose of metoprolol, I would advise we cut the dose back to her original 25 mg bid.     The patient should stop smoking cigarettes.  I have recommended a Mediterranean-style diet and a regular exercise program.      RECOMMENDATIONS:   1.  No further cardiac testing indicated at this time.   2.  Discontinue tobacco use. Mediterranean style diet. Exercise 4 to 7 times per week 40 to  45 minutese  3.  Decrease metoprolol from 75 mg to 25 b.i.d.   4.  Follow up in about 1 year.      We have appreciated the opportunity to care for your patient, Vijaya Braga.      cc:      Vidhi Mario MD    [facility]   [address] [place] [address]         COURT HALL MD             D: 2020   T: 2020   MT: BLAIR      Name:     VIJAYA BRAGA   MRN:      [id number]        Account:      [id number]   :      1950           Service Date: 2020       Document: [id number]

## 2020-11-23 NOTE — TELEPHONE ENCOUNTER
[facility] Call Center    Phone Message    May a detailed message be left on voicemail: yes     Reason for Call: Other: Pt would like a call back to confirm whether or not her appt on 11/24 will include a dilated exam. Reviewed TE response from Eddie Esteban on 11/18 - Pt would like secondary confirmation. OK to leave a message on her cell at [phone]. Thank you     Action Taken: Message routed to:  [facility] EYE    Travel Screening: Not Applicable

## 2020-11-23 NOTE — PROGRESS NOTES
HISTORY OF PRESENT ILLNESS:  MRI normal. Has central chest pain ( HA fatigue chronic)  Nonexertional chronic not episodic.  . Feels like heaviness occasional sharp pains. No Does not like higher dose of metoprolol   On metformin, has fibromyalgia. Still smokes      Orders this Visit:  No orders of the defined types were placed in this encounter.    No orders of the defined types were placed in this encounter.    There are no discontinued medications.    Encounter Diagnoses   Name Primary?     Coronary artery disease involving native coronary artery of native heart, angina presence unspecified      Chest pain, unspecified type      Left ventricular outflow obstruction        CURRENT MEDICATIONS:  Current Outpatient Medications   Medication Sig Dispense Refill     acetaminophen (TYLENOL ARTHRITIS PAIN) 650 MG CR tablet Take 650 mg by mouth 2 times daily as needed        albuterol (PROAIR HFA/PROVENTIL HFA/VENTOLIN HFA) 108 (90 Base) MCG/ACT inhaler Inhale 2 puffs into the lungs every 6 hours (Patient taking differently: Inhale 2 puffs into the lungs every 6 hours as needed ) 18 g 1     amLODIPine (NORVASC) 5 MG tablet TAKE ONE TABLET BY MOUTH ONCE DAILY 90 tablet 2     aspirin (ASA) 81 MG chewable tablet Take 1 tablet (81 mg) by mouth daily 30 tablet 1     atorvastatin (LIPITOR) 80 MG tablet Take 1 tablet (80 mg) by mouth daily Recheck cholesterol in 3 months 90 tablet 1     blood glucose monitoring (NO BRAND SPECIFIED) test strip Use to test blood sugars 1 times daily or as directed, use brand same as previous 100 strip 3     cetirizine (ZYRTEC) 10 MG tablet TAKE ONE TABLET BY MOUTH ONCE DAILY 90 tablet 3     cyclobenzaprine (FLEXERIL) 10 MG tablet TAKE ONE TABLET BY MOUTH TWICE A  tablet 1     DULoxetine (CYMBALTA) 60 MG capsule Take 1 capsule (60 mg) by mouth daily 90 capsule 3     fluticasone (FLONASE) 50 MCG/ACT nasal spray SPRAY 2 SPRAYS IN EACH NOSTRIL ONCE DAILY 16 g 1     ibuprofen (ADVIL/MOTRIN) 600  MG tablet        lisinopril (ZESTRIL) 20 MG tablet TAKE ONE TABLET BY MOUTH TWICE A  tablet 2     metFORMIN (GLUCOPHAGE) 500 MG tablet TAKE ONE TABLET BY MOUTH TWICE A DAY WITH A MEAL 180 tablet 1     metoprolol tartrate (LOPRESSOR) 25 MG tablet Take 3 tablets (75 mg) by mouth 2 times daily       montelukast (SINGULAIR) 10 MG tablet TAKE ONE TABLET BY MOUTH AT BEDTIME 90 tablet 1     Multiple Vitamins-Minerals (MULTIVITAMIN ADULT PO) Take 5 tablets by mouth daily Pro-caps vitamin        nitroGLYcerin (NITROSTAT) 0.4 MG sublingual tablet For chest pain place 1 tablet under the tongue every 5 minutes for 3 doses. If symptoms persist 5 minutes after 1st dose call 911. 30 tablet 3     nystatin (MYCOSTATIN) 482055 UNIT/GM external powder APPLY TO AFFECTED AREA(S) TOPICALLY THREE TIMES A DAY AS NEEDED 60 g 0     omeprazole (PRILOSEC) 20 MG DR capsule TAKE ONE CAPSULE BY MOUTH TWICE A  capsule 2     order for DME Equipment being ordered: glucometer 1 each 0     pregabalin (LYRICA) 150 MG capsule Take 1 capsule (150 mg) by mouth 3 times daily 270 capsule 1     tiotropium (SPIRIVA) 18 MCG inhaled capsule Inhale 1 capsule (18 mcg) into the lungs daily 90 capsule 1     traMADol (ULTRAM) 50 MG tablet TAKE TWO TABLETS BY MOUTH THREE TIMES A  tablet 0     traZODone (DESYREL) 100 MG tablet TAKE TWO TABLETS BY MOUTH EVERY NIGHT AT BEDTIME 180 tablet 1     cephALEXin (KEFLEX) 500 MG capsule Take 500 mg by mouth 2 times daily For 7 days.       erythromycin (ROMYCIN) 5 MG/GM ophthalmic ointment Place 0.5 inches Into the left eye At Bedtime (Patient not taking: Reported on 11/23/2020) 3.5 g 0     hydrOXYzine (ATARAX) 10 MG tablet TAKE ONE TABLET BY MOUTH THREE TIMES A DAY AS NEEDED FOR ANXIETY (Patient not taking: Reported on 10/15/2020) 30 tablet 0     neomycin-polymixin-dexamethasone (MAXITROL) 0.1 % ophthalmic suspension Apply 1 drop to eye 3 times daily Instill into operative eye(s) per physician instructions.  "(Patient not taking: Reported on 10/15/2020) 5 mL 0       ALLERGIES     Allergies   Allergen Reactions     Penicillins Hives       PAST MEDICAL, SURGICAL, FAMILY, SOCIAL HISTORY:  History was reviewed and updated as needed, see medical record.    Review of Systems:  A 12-point review of systems was completed, see medical record for detailed review of systems information.    Physical Exam:  Vitals: /79   Pulse 58   Ht 1.753 m (5' 9\")   Wt 93 kg (205 lb)   LMP  (LMP Unknown)   BMI 30.27 kg/m      Constitutional:           Skin:           Head:           Eyes:           ENT:           Neck:           Chest:           Cardiac:                    Abdomen:           Vascular:                                        Extremities and Back:           Neurological:           ASSESSMENT:  Does not need higher dose of metoprolol The findings on TTE lkely due to hyperadrenergic state, no evidence of HCM.Cath shows no CAD.  Pain not typical for angna       RECOMMENDATIONS:   Decrease metoprolol due to fatigue to 25 bid  Follow-up in one year  Stop smoking   Exercise program  Anxiety to be addressed by PMD        Recent Lab Results:  LIPID RESULTS:  Lab Results   Component Value Date    CHOL 164 11/13/2019    HDL 36 (L) 11/13/2019    LDL 83 11/13/2019    TRIG 225 (H) 11/13/2019       LIVER ENZYME RESULTS:  Lab Results   Component Value Date    AST 26 08/08/2020    ALT 31 08/08/2020       CBC RESULTS:  Lab Results   Component Value Date    WBC 9.3 09/30/2020    RBC 4.34 09/30/2020    HGB 11.4 (L) 09/30/2020    HCT 37.2 09/30/2020    MCV 86 09/30/2020    MCH 26.3 (L) 09/30/2020    MCHC 30.6 (L) 09/30/2020    RDW 16.9 (H) 09/30/2020     09/30/2020       BMP RESULTS:  Lab Results   Component Value Date     09/30/2020    POTASSIUM 3.5 09/30/2020    CHLORIDE 105 09/30/2020    CO2 25 09/30/2020    ANIONGAP 10 09/30/2020     (H) 09/30/2020    BUN 10 09/30/2020    CR 0.93 09/30/2020    GFRESTIMATED 71 " 11/19/2020    GFRESTBLACK 86 11/19/2020    RADHA 8.8 09/30/2020        A1C RESULTS:  Lab Results   Component Value Date    A1C 7.3 (H) 05/20/2020       INR RESULTS:  Lab Results   Component Value Date    INR 0.97 02/21/2019       We greatly appreciate the opportunity to be involved in the care of your patient, Vijaya Manjarrez.    Sincerely,  Cheo Merida MD      CC  MEHNAZ Lee-C  [facility]  [address] [place] [address]

## 2020-11-23 NOTE — LETTER
11/23/2020      Vidhi Mario MD  [address] [facility] [address]      RE: Vijaya Manjarrez       Dear Colleague,    I had the pleasure of seeing Vijaya Manjarrez in the [facility].    Service Date: 11/23/2020      HISTORY OF PRESENT ILLNESS:  Vijaya Manjarrez, a 70-year-old woman with coronary artery disease, hypertension, tobacco use, dyslipidemia, fibromyalgia/chronic pain syndrome and depression, was seen today at your request for followup of coronary artery disease and a recent cardiac MRI test.      I saw the patient initially in 2019 for atypical chest discomfort.  Diagnostic coronary angiography showed nonobstructive coronary disease.  She was placed on guideline-directed medical therapy, and since then has been seen on 2 subsequent  occasions by our advanced level practitioner, Dai Costa.        The patient sustained a left orbital fracture in September 2020.  As part of a preoperative evaluation, she was scheduled for a stress echocardiogram.  The resting echo study  performed prior to the stress test, however, showed dynamic mid left ventricular obstruction with a peak gradient of 70 mmHg and an ejection fraction was greater than 70%.  Her stress echo was cancelled, and then she underwent a nuclear stress test.  Nuclear stress test suggested a medium-sized region of ischemia in the inferior wall. She underwent repeat diagnostic coronary angiography in early October 2020 that once again demonstrated nonobstructive CAD..  Because of the findings  on her resting echo that suggested possible Hypertrophic cardiomyopathy, metoprolol was increased from 25 mg to 75 mg b.i.d. and a cardiac MRI was scheduled.  Followup was arranged today in our clinic.      Ms. Manjarrez's chronic chest discomfort is unchanged since I last saw her in 2019.  She describes a continuous substernal discomfort that is pressure-like, but at times is sharp.  The discomfort  is not episodic and does not have any relationship to physical exertion. The beta blocker has had no effect on her symptoms and she feels more fatigued. Regardless she  is able to perform daily activities without limitation..       Her blood pressures remain well controlled.  She denies syncope or palpiations     PAST MEDICAL HISTORY:   1.  Coronary artery disease.   a.  Coronary angiogram 10/2020 showing no significant narrowing in left main, 30% narrowing in LAD, 40% narrowing, circumflex, 50% narrowing proximal  Right coronary with normal FFR.   b.  Cardiac  MRI showing normal left ventricular systolic performance.  Normal wall thickness.  No significant valvular abnormalities.  Normal wall motion. No gadolinium enhancement.  2.  Chronic obstructive pulmonary disease, longstanding smoking history.  Still smokes cigarettes.   3.  Noninsulin dependent diabetes mellitus.   4.  Hypertension.   5.  Dyslipidemia.   6.  Depression/chronic pain syndrome/fibromyalgia.      PHYSICAL EXAMINATION:   GENERAL:  Exam today demonstrates a very cooperative and pleasant 70-year-old woman.   VITAL SIGNS:  Her height is 1.75 meters, her weight is 93 kg.  Her BMI is 30.3.   LUNGS:  Clear to percussion and auscultation.   CARDIOVASCULAR:  Shows a normal S1 with a normal S2.  There is no S3.  There is no murmur, rub or click.   EXTREMITIES:  Her pulses are full and symmetrical.      MEDICATIONS:   1.  Amlodipine 5 mg daily.   2.  Albuterol inhaler.   3.  Aspirin 81 mg daily.   4.  Atorvastatin 80 mg daily.   5.  Cetirizine 10 mg daily.   6.  Cyclobenzaprine (Flexeril) 10 mg twice a day.   7.  Duloxetine (Cymbalta) 60 mg daily.   8.  Fluticasone nasal spray 20 mg daily.   9.  Metformin 500 mg twice a day.   10.  Metoprolol 75 mg b.i.d.   11.  Singular 10 mg at bedtime.   12.  Nystatin.     13.  Pregabalin 150 mg 3 times a day.   14.  Spiriva inhaler.      LABORATORY STUDIES:  Her most recent ECG normal sinus rhythm with nonspecific  ST-wave changes.  Her most recent echo showed hyperdynamic left ventricular systolic performance with an ejection fraction greater than 75%.  Her most recent cardiac MRI shows a structurally normal heart with normal left ventricular wall thickness, normal valve function and no evidence of hypertrophic cardiomyopathy.  Recent LDL cholesterol was 83.  Her most recent creatinine is 0.8, hemoglobin is 11.4.      ASSESSMENT:  I am uncertain as to the cause of the patient's chronic chest discomfort, but there is no evidence of obstructive coronary disease or Hypertrophic Cardiomyopathy.  She is on guideline-directed medical therapy with acceptable systolic blood pressure and LDL cholesterol levels.  I do not believe any further cardiac testing would change her management.  It appears to me that her chronic chest discomfort is not cardiac in nature.      Intraventricular gradients can be seen when patients have hyperdynamic systolic performance. It may have been that the  Pain/emotional stress  from her orbital fracture resulted in elevated catecholamines, hyperdynamic LV systolic performance and subsequent intraventricular gradient. The normal cardiac MRI is reassuring as it shows no evidence of hypertrophic cardiomyopathy. She feels fatigued on the higher dose of metoprolol, I would advise we cut the dose back to her original 25 mg bid.     The patient should stop smoking cigarettes.  I have recommended a Mediterranean-style diet and a regular exercise program.      RECOMMENDATIONS:   1.  No further cardiac testing indicated at this time.   2.  Discontinue tobacco use. Mediterranean style diet. Exercise 4 to 7 times per week 40 to  45 minutese  3.  Decrease metoprolol from 75 mg to 25 b.i.d.   4.  Follow up in about 1 year.      We have appreciated the opportunity to care for your patient, Vijaya Manjarrez.      cc:      Vidhi Mario MD    [facility]   [address] [place] [address]          COURT HALL MD             D: 2020   T: 2020   MT: BLAIR      Name:     CHARLES BRAGA   MRN:      [id number]        Account:      [id number]   :      1950           Service Date: 2020      Document: [id number]        Outpatient Encounter Medications as of 2020   Medication Sig Dispense Refill     acetaminophen (TYLENOL ARTHRITIS PAIN) 650 MG CR tablet Take 650 mg by mouth 2 times daily as needed        albuterol (PROAIR HFA/PROVENTIL HFA/VENTOLIN HFA) 108 (90 Base) MCG/ACT inhaler Inhale 2 puffs into the lungs every 6 hours (Patient taking differently: Inhale 2 puffs into the lungs every 6 hours as needed ) 18 g 1     amLODIPine (NORVASC) 5 MG tablet TAKE ONE TABLET BY MOUTH ONCE DAILY 90 tablet 2     aspirin (ASA) 81 MG chewable tablet Take 1 tablet (81 mg) by mouth daily 30 tablet 1     atorvastatin (LIPITOR) 80 MG tablet Take 1 tablet (80 mg) by mouth daily Recheck cholesterol in 3 months 90 tablet 1     blood glucose monitoring (NO BRAND SPECIFIED) test strip Use to test blood sugars 1 times daily or as directed, use brand same as previous 100 strip 3     cetirizine (ZYRTEC) 10 MG tablet TAKE ONE TABLET BY MOUTH ONCE DAILY 90 tablet 3     cyclobenzaprine (FLEXERIL) 10 MG tablet TAKE ONE TABLET BY MOUTH TWICE A  tablet 1     DULoxetine (CYMBALTA) 60 MG capsule Take 1 capsule (60 mg) by mouth daily 90 capsule 3     fluticasone (FLONASE) 50 MCG/ACT nasal spray SPRAY 2 SPRAYS IN EACH NOSTRIL ONCE DAILY 16 g 1     ibuprofen (ADVIL/MOTRIN) 600 MG tablet        lisinopril (ZESTRIL) 20 MG tablet TAKE ONE TABLET BY MOUTH TWICE A  tablet 2     metFORMIN (GLUCOPHAGE) 500 MG tablet TAKE ONE TABLET BY MOUTH TWICE A DAY WITH A MEAL 180 tablet 1     metoprolol tartrate (LOPRESSOR) 25 MG tablet Take 25 mg by mouth 2 times daily       montelukast (SINGULAIR) 10 MG tablet TAKE ONE TABLET BY MOUTH AT BEDTIME 90 tablet 1     Multiple Vitamins-Minerals (MULTIVITAMIN  ADULT PO) Take 5 tablets by mouth daily Pro-caps vitamin        nitroGLYcerin (NITROSTAT) 0.4 MG sublingual tablet For chest pain place 1 tablet under the tongue every 5 minutes for 3 doses. If symptoms persist 5 minutes after 1st dose call 911. 30 tablet 3     nystatin (MYCOSTATIN) 690995 UNIT/GM external powder APPLY TO AFFECTED AREA(S) TOPICALLY THREE TIMES A DAY AS NEEDED 60 g 0     omeprazole (PRILOSEC) 20 MG DR capsule TAKE ONE CAPSULE BY MOUTH TWICE A  capsule 2     order for DME Equipment being ordered: glucometer 1 each 0     pregabalin (LYRICA) 150 MG capsule Take 1 capsule (150 mg) by mouth 3 times daily 270 capsule 1     tiotropium (SPIRIVA) 18 MCG inhaled capsule Inhale 1 capsule (18 mcg) into the lungs daily 90 capsule 1     traMADol (ULTRAM) 50 MG tablet TAKE TWO TABLETS BY MOUTH THREE TIMES A  tablet 0     traZODone (DESYREL) 100 MG tablet TAKE TWO TABLETS BY MOUTH EVERY NIGHT AT BEDTIME 180 tablet 1     cephALEXin (KEFLEX) 500 MG capsule Take 500 mg by mouth 2 times daily For 7 days.       erythromycin (ROMYCIN) 5 MG/GM ophthalmic ointment Place 0.5 inches Into the left eye At Bedtime (Patient not taking: Reported on 11/23/2020) 3.5 g 0     hydrOXYzine (ATARAX) 10 MG tablet TAKE ONE TABLET BY MOUTH THREE TIMES A DAY AS NEEDED FOR ANXIETY (Patient not taking: Reported on 10/15/2020) 30 tablet 0     neomycin-polymixin-dexamethasone (MAXITROL) 0.1 % ophthalmic suspension Apply 1 drop to eye 3 times daily Instill into operative eye(s) per physician instructions. (Patient not taking: Reported on 10/15/2020) 5 mL 0     No facility-administered encounter medications on file as of 11/23/2020.        Again, thank you for allowing me to participate in the care of your patient.      Sincerely,    Cheo Merida MD     [facility]

## 2020-11-24 ENCOUNTER — OFFICE VISIT (OUTPATIENT)
Dept: OPHTHALMOLOGY | Facility: CLINIC | Age: 70
End: 2020-11-24
Payer: COMMERCIAL

## 2020-11-24 DIAGNOSIS — S02.85XS CLOSED FRACTURE OF ORBIT, SEQUELA (H): ICD-10-CM

## 2020-11-24 DIAGNOSIS — Z98.890 POSTOPERATIVE EYE STATE: Primary | ICD-10-CM

## 2020-11-24 PROCEDURE — 99024 POSTOP FOLLOW-UP VISIT: CPT | Mod: GC | Performed by: OPHTHALMOLOGY

## 2020-11-24 ASSESSMENT — VISUAL ACUITY
OD_CC+: -1
CORRECTION_TYPE: GLASSES
OS_CC: 20/40
OS_CC+: -1
OD_CC: 20/50
METHOD: SNELLEN - LINEAR

## 2020-11-24 ASSESSMENT — SLIT LAMP EXAM - LIDS
COMMENTS: NORMAL
COMMENTS: NORMAL

## 2020-11-24 ASSESSMENT — TONOMETRY
IOP_METHOD: ICARE
OS_IOP_MMHG: 11
OD_IOP_MMHG: 13

## 2020-11-24 NOTE — PROGRESS NOTES
Chief Complaint(s) and History of Present Illness(es)     Post Op (Ophthalmology) Left Eye     Laterality: left eye    Associated symptoms: double vision (intermittent horizontal diplopia,   goes away when closing an eye, uncertain if it is worse in a specific   gaze. ).  Negative for eye pain (pt notes that she has a sharp pain that   comes and goes, happens about q 3 days, lasts for a few minutes, relaxes   to help it go away and take ibuprofen. )    Pain scale: 0/10    Comments: POW#6 s/p Left Orbitotomy and orbital floor fracture repair   with with intraoperative navigation.               Comments     Pt notes that she is due for a new pair of gls, she is wearing a pair of   gls that is her sisters, VA is blurred due to this, but is better than sc   per pt.     Altagracia Nguyen COT November 24, 2020 1:13 PM                  Assessment & Plan     Vijaya Manjarrez is a 70 year old female with the following diagnoses:   Encounter Diagnoses   Name Primary?     Postoperative eye state - Left Eye Yes     Closed fracture of orbit, sequela (H) - Left Eye      Vijaya Manjarrez is 6 weeks status post left orbital floor fracture repair  The incision(s) is healing well.  The lid(s) is in excellent position.    Has healed nicely after repair with Supor implant.  Marilyn is symmetric today, no hyper or hypoglobus.      I have recommended:  * Ok to get a new MRx now, has healed well  * RTC prn    Catracho Pitts MD/MPH  Oculoplastics Fellow  11/24/2020 at 1:43 PM    Doing very well.   Had been considering blepharoplasty prior to her trauma as she has significant improvement in peripheral vision with raising her eyelids.     Return to clinic in about 8 weeks blepharoplasty evaluation. Then back to Dr. Marinelli for complete exam.     Attending Physician Attestation: Complete documentation of historical and exam elements from today's encounter can be found in the full encounter summary report (not reduplicated in  this progress note). I personally obtained the chief complaint(s) and history of present illness. I confirmed and edited as necessary the review of systems, past medical/surgical history, family history, social history, and examination findings as documented by others; and I examined the patient myself. I personally reviewed the relevant tests, images, and reports as documented above. I formulated and edited as necessary the assessment and plan and discussed the findings and management plan with the patient.  -George Doll MD

## 2020-11-24 NOTE — NURSING NOTE
Chief Complaints and History of Present Illnesses   Patient presents with     Post Op (Ophthalmology) Left Eye     POW#6 s/p Left Orbitotomy and orbital floor fracture repair with with intraoperative navigation.      Chief Complaint(s) and History of Present Illness(es)     Post Op (Ophthalmology) Left Eye     Laterality: left eye    Associated symptoms: double vision (intermittent horizontal diplopia, goes away when closing an eye, uncertain if it is worse in a specific gaze. ).  Negative for eye pain (pt notes that she has a sharp pain that comes and goes, happens about q 3 days, lasts for a few minutes, relaxes to help it go away and take ibuprofen. )    Pain scale: 0/10    Comments: POW#6 s/p Left Orbitotomy and orbital floor fracture repair with with intraoperative navigation.               Comments     Pt notes that she is due for a new pair of gls, she is wearing a pair of gls that is her sisters, VA is blurred due to this, but is better than sc per pt.     Altagracia Nguyen COT November 24, 2020 1:13 PM

## 2020-11-30 ENCOUNTER — TELEPHONE (OUTPATIENT)
Dept: INTERNAL MEDICINE | Facility: CLINIC | Age: 70
End: 2020-11-30

## 2020-11-30 NOTE — TELEPHONE ENCOUNTER
"Patient is already on lyrica and tramadol and hydroxyzine.    Would recommend she follow up with pain clinic.    Dr. Darlin Lucero had mention injections from 8/12/2020 telephone encounter, and I cannot find if she had these performed or not.       \"Called patient to review results of lumbar MRI. She has severe facet arthropathy at multiple levels and moderate to severe bilateral neural foraminal narrowing at L5-S1. She reports that back pain is most bothersome for her. Discussed proceeding with lumbar facet joint injections which she is agreeable to. Order placed.      Jazmine Saeed MD  [facility] \"  "

## 2020-11-30 NOTE — TELEPHONE ENCOUNTER
Call from [facility] clinic of neurology stating patient requested increase of medication for back pain. Dr. Malone's recommended patient follow-up with primary care provider for medication management, also recommended physical therapy and occupational therapy.     Call to patient, patient reports that Dr. Malone recommended that there might be a medication stronger than Tramadol that patient can take for increasing back pain. Per patient back pain is chronic but has increased in the last 2 months due to fall and other health complications. Patient rates pain is 8/10, does not feel like Tramadol is very effective anymore. Pended pharmacy, Please advise,     Thank you

## 2020-12-03 DIAGNOSIS — E11.40 TYPE 2 DIABETES MELLITUS WITH DIABETIC NEUROPATHY, WITHOUT LONG-TERM CURRENT USE OF INSULIN (H): ICD-10-CM

## 2020-12-03 DIAGNOSIS — M47.812 FACET ARTHROPATHY, CERVICAL: ICD-10-CM

## 2020-12-03 DIAGNOSIS — M79.7 FIBROMYALGIA: ICD-10-CM

## 2020-12-03 DIAGNOSIS — E78.5 HYPERLIPIDEMIA LDL GOAL <100: ICD-10-CM

## 2020-12-03 DIAGNOSIS — J44.9 CHRONIC OBSTRUCTIVE PULMONARY DISEASE, UNSPECIFIED COPD TYPE (H): ICD-10-CM

## 2020-12-03 DIAGNOSIS — B37.2 YEAST INFECTION OF THE SKIN: ICD-10-CM

## 2020-12-03 DIAGNOSIS — M47.816 FACET ARTHROPATHY, LUMBAR: ICD-10-CM

## 2020-12-06 RX ORDER — ATORVASTATIN CALCIUM 80 MG/1
80 TABLET, FILM COATED ORAL DAILY
Qty: 90 TABLET | Refills: 0 | Status: SHIPPED | OUTPATIENT
Start: 2020-12-06 | End: 2021-03-12

## 2020-12-06 RX ORDER — NYSTATIN 100000 [USP'U]/G
POWDER TOPICAL
Qty: 60 G | Refills: 0 | Status: SHIPPED | OUTPATIENT
Start: 2020-12-06 | End: 2021-03-12

## 2020-12-06 RX ORDER — MONTELUKAST SODIUM 10 MG/1
TABLET ORAL
Qty: 90 TABLET | Refills: 0 | Status: SHIPPED | OUTPATIENT
Start: 2020-12-06 | End: 2021-03-09

## 2020-12-06 NOTE — TELEPHONE ENCOUNTER
Routing refill request to provider for review/approval because:  Drug not on the [facility] refill protocol   Metformin-historical.  Amina Antunez RN    Last OV-9/29/20  traMADol (ULTRAM) 50 MG tablet 180 tablet 0 10/16/2020

## 2020-12-07 RX ORDER — TRAMADOL HYDROCHLORIDE 50 MG/1
TABLET ORAL
Qty: 180 TABLET | Refills: 0 | Status: SHIPPED | OUTPATIENT
Start: 2020-12-07 | End: 2021-01-06

## 2020-12-08 ENCOUNTER — PATIENT OUTREACH (OUTPATIENT)
Dept: GERIATRIC MEDICINE | Facility: CLINIC | Age: 70
End: 2020-12-08

## 2020-12-08 NOTE — PROGRESS NOTES
[facility] Care Coordination Contact    Arranged transportation thru [facility] for the below appt:  Appt Date & Time: 12/08/2020 2:45pm  Clinic Name & Address:  [facility] [place] [address] [facility] [address], [place] [address]   Transportation Provider: [employer] [phone]   time:  1:45-2:15pm.  Will call return ride.    Notified member of  time.    Cynthia Bell  Care Management Specialist  [facility]  [phone]

## 2021-01-04 DIAGNOSIS — M47.816 FACET ARTHROPATHY, LUMBAR: ICD-10-CM

## 2021-01-04 DIAGNOSIS — M47.812 FACET ARTHROPATHY, CERVICAL: ICD-10-CM

## 2021-01-04 DIAGNOSIS — M79.7 FIBROMYALGIA: ICD-10-CM

## 2021-01-06 RX ORDER — TRAMADOL HYDROCHLORIDE 50 MG/1
TABLET ORAL
Qty: 180 TABLET | Refills: 0 | Status: SHIPPED | OUTPATIENT
Start: 2021-01-06 | End: 2021-03-08 | Stop reason: DRUGHIGH

## 2021-01-06 NOTE — TELEPHONE ENCOUNTER
Controlled Substance Refill Request for Tramadol  Problem List Complete:    No     PROVIDER TO CONSIDER COMPLETION OF PROBLEM LIST AND OVERVIEW/CONTROLLED SUBSTANCE AGREEMENT    Last Written Prescription Date:  12/7/20  Last Fill Quantity: 180,   # refills: 0    [place] [facility] [place]: 9/29/20    Future Office visit:     Controlled substance agreement:   Encounter-Level CSA - 01/12/2017:    Controlled Substance Agreement - Scan on 1/26/2017 12:28 PM: CONTROLLED SUBSTANCE AGREEMENT     Patient-Level CSA:    Controlled Substance Agreement - Opioid - Scan on 2/6/2019  9:37 AM         Last Urine Drug Screen: No results found for: CDAUT, No results found for: COMDAT,   Cannabinoids (98-bko-8-carboxy-9-THC)   Date Value Ref Range Status   03/03/2018 Not Detected NDET^Not Detected ng/mL Final     Comment:     Cutoff for a negative cannabinoid is 50 ng/mL or less.     Phencyclidine (Phencyclidine)   Date Value Ref Range Status   03/03/2018 Not Detected NDET^Not Detected ng/mL Final     Comment:     Cutoff for a negative PCP is 25 ng/mL or less.     Cocaine (Benzoylecgonine)   Date Value Ref Range Status   03/03/2018 Not Detected NDET^Not Detected ng/mL Final     Comment:     Cutoff for a negative cocaine is 150 ng/ml or less.     Methamphetamine (d-Methamphetamine)   Date Value Ref Range Status   03/03/2018 Not Detected NDET^Not Detected ng/mL Final     Comment:     Cutoff for a negative methamphetamine is 500 ng/ml or less.     Opiates (Morphine)   Date Value Ref Range Status   03/03/2018 Not Detected NDET^Not Detected ng/mL Final     Comment:     Cutoff for a negative opiate is 100 ng/ml or less.     Amphetamine (d-Amphetamine)   Date Value Ref Range Status   03/03/2018 Not Detected NDET^Not Detected ng/mL Final     Comment:     Cutoff for a negative amphetamine is 500 ng/mL or less.     Benzodiazepines (Nordiazepam)   Date Value Ref Range Status   03/03/2018 Not Detected NDET^Not Detected ng/mL  Final     Comment:     Cutoff for a negative benzodiazepine is 150 ng/ml or less.     Tricyclic Antidepressants (Desipramine)   Date Value Ref Range Status   03/03/2018 Detected, Abnormal Result (A) NDET^Not Detected ng/mL Final     Comment:     Cutoff for a positive tricyclic antidepressant is greater than 300 ng/ml.  This is an unconfirmed screening result to be used for medical purposes only.   Order [id number] for confirmation or individual confirmation tests to [employer].       Methadone (Methadone)   Date Value Ref Range Status   03/03/2018 Not Detected NDET^Not Detected ng/mL Final     Comment:     Cutoff for a negative methadone is 200 ng/ml or less.     Barbiturates (Butalbital)   Date Value Ref Range Status   03/03/2018 Not Detected NDET^Not Detected ng/mL Final     Comment:     Cutoff for a negative barbituate is 200 ng/ml or less.     Oxycodone (Oxycodone)   Date Value Ref Range Status   03/03/2018 Not Detected NDET^Not Detected ng/mL Final     Comment:     Cutoff for a negative Oxycodone is 100 ng/mL or less.     Propoxyphene (Norpropoxyphene)   Date Value Ref Range Status   03/03/2018 Not Detected NDET^Not Detected ng/mL Final     Comment:     Cutoff for a negative propoxyphene is 300 ng/ml or less     Buprenorphine (Buprenorphine)   Date Value Ref Range Status   03/03/2018 Not Detected NDET^Not Detected ng/mL Final     Comment:     Cutoff for a negative buprenorphine is 10 ng/ml or less        [place] reviewed:  reviewed- recommend provider review    See prescription from 10/8/20 from an outside provider   [place] profile:  [email or web address]

## 2021-01-14 ENCOUNTER — PATIENT OUTREACH (OUTPATIENT)
Dept: GERIATRIC MEDICINE | Facility: CLINIC | Age: 71
End: 2021-01-14

## 2021-01-14 ENCOUNTER — TELEPHONE (OUTPATIENT)
Dept: INTERNAL MEDICINE | Facility: CLINIC | Age: 71
End: 2021-01-14

## 2021-01-14 ENCOUNTER — VIRTUAL VISIT (OUTPATIENT)
Dept: INTERNAL MEDICINE | Facility: CLINIC | Age: 71
End: 2021-01-14
Payer: COMMERCIAL

## 2021-01-14 DIAGNOSIS — J30.2 SEASONAL ALLERGIC RHINITIS, UNSPECIFIED TRIGGER: ICD-10-CM

## 2021-01-14 DIAGNOSIS — R05.9 COUGH: ICD-10-CM

## 2021-01-14 DIAGNOSIS — Z20.822 SUSPECTED COVID-19 VIRUS INFECTION: Primary | ICD-10-CM

## 2021-01-14 DIAGNOSIS — G89.29 CHRONIC BILATERAL LOW BACK PAIN WITHOUT SCIATICA: ICD-10-CM

## 2021-01-14 DIAGNOSIS — M54.50 CHRONIC BILATERAL LOW BACK PAIN WITHOUT SCIATICA: ICD-10-CM

## 2021-01-14 DIAGNOSIS — M79.7 FIBROMYALGIA: ICD-10-CM

## 2021-01-14 DIAGNOSIS — J44.9 CHRONIC OBSTRUCTIVE PULMONARY DISEASE, UNSPECIFIED COPD TYPE (H): ICD-10-CM

## 2021-01-14 PROCEDURE — 99213 OFFICE O/P EST LOW 20 MIN: CPT | Mod: 95 | Performed by: INTERNAL MEDICINE

## 2021-01-14 RX ORDER — CODEINE PHOSPHATE/GUAIFENESIN 10-100MG/5
5 LIQUID (ML) ORAL EVERY 8 HOURS PRN
Qty: 118 ML | Refills: 0 | Status: SHIPPED | OUTPATIENT
Start: 2021-01-14 | End: 2021-09-28

## 2021-01-14 RX ORDER — PREGABALIN 150 MG/1
CAPSULE ORAL
Qty: 270 CAPSULE | Refills: 0 | Status: SHIPPED | OUTPATIENT
Start: 2021-01-14 | End: 2021-06-15

## 2021-01-14 RX ORDER — CETIRIZINE HYDROCHLORIDE 10 MG/1
TABLET ORAL
Qty: 90 TABLET | Refills: 0 | Status: SHIPPED | OUTPATIENT
Start: 2021-01-14 | End: 2021-03-08

## 2021-01-14 ASSESSMENT — ENCOUNTER SYMPTOMS
COUGH: 1
FEVER: 0

## 2021-01-14 NOTE — PATIENT INSTRUCTIONS
Patient Education     Chronic Cough with Uncertain Cause (Adult)    Everyone has had a cough as part of the common cold, flu, or bronchitis. This kind of cough occurs along with an achy feeling, low-grade fever, nasal and sinus congestion, and a scratchy or sore throat. This usually gets better in 2 to 3 weeks. A cough that lasts longer than 3 weeks may be due to other causes. Your healthcare provider may refer to this as a chronic cough.  If your cough does not improve over the next 2 weeks, further testing may be needed. Follow up with your healthcare provider as advised. Cough suppressants may be recommended. Based on your exam today, the exact cause of your cough is not certain. Below are some common causes for persistent cough.  Smoker's cough  Smoker s cough doesn t go away. If you continue to smoke, it only gets worse. The cough is from irritation in the air passages. Talk to your healthcare provider about quitting. Medicines or nicotine-replacement products, like gum or the patch, may make quitting easier.  Postnasal drip  A cough that is worse at night may be due to postnasal drip. Excess mucus in the nose drains from the back of your nose to your throat. This triggers the cough reflex. Postnasal drip may be due to a sinus infection or allergy. Common allergens include dust, tobacco smoke (both inhaled and secondhand smoke), environmental pollutants, pollen, mold, pets, cleaning agents, room deodorizers, and chemical fumes. Over-the-counter antihistamines or decongestants may be helpful for allergies. A sinus infection may requires antibiotic treatment. See your healthcare provider if symptoms continue.  Medicines  Certain prescribed medicines can cause a chronic cough in some people:    ACE inhibitors for high blood pressure. These include benazepril, captopril, enalapril, fosinopril, lisinopril, quinapril, ramipril, and others.    Beta-blockers for high blood pressure and other conditions. These include  Stable at this time with no acute gout attack noted propranolol, atenolol, metoprolol, nadolol, and others.  Let your healthcare provider know if you are taking any of these. The chronic cough may mean your medicine needs to be changed.  Asthma  Cough may be the only sign of mild asthma. You may have tests to find out if asthma is causing your cough. You may also take asthma medicine on a trial basis.  Acid reflux (heartburn, GERD)  The esophagus is the tube that carries food from the mouth to the stomach. A valve at its lower end prevents stomach acids from flowing upward. If this valve does not work properly, acid from the stomach enters the esophagus. This may cause a burning pain in the upper abdomen or lower chest, belching, or cough. Symptoms are often worse when lying flat. Avoid eating or drinking before bedtime. Try using extra pillows to raise your upper body, or place 4-inch blocks under the head of your bed. You may try an over-the-counter (OTC) antacid or an acid-blocking medicine such as famotidine, cimetidine, ranitidine, esomeprazole, lansoprazole, or omeprazole. Stronger medicines for this condition can be prescribed by your healthcare provider. Ask your healthcare provider which OTC medicine to use. Depending on your current medicines, some OTC medicines may cause drug interactions and should be avoided.  Follow-up care  Follow up with your healthcare provider, or as advised, if your cough does not improve. Further testing may be needed.  Note: If an X-ray was taken, a specialist will review it. You will be notified of any new findings that may affect your care.  When to seek medical advice  Call your healthcare provider right away if any of these occur:    Mild wheezing or difficulty breathing    Fever of 100.4 F (38 C) or higher, or as directed by your healthcare provider    Unexpected weight loss    Coughing up large amounts of colored sputum or blood-tinged sputum    Night sweats (sheets and pajamas get soaking wet)  Call 911  Call 911 if any of  these occur:    Coughing up blood    Moderate to severe trouble breathing or wheezing  Bigg last reviewed this educational content on 6/1/2018 2000-2020 [employer]. [address] [place] [address], [place] [address]. All rights reserved. This information is not intended as a substitute for professional medical care. Always follow your healthcare professional's instructions.

## 2021-01-14 NOTE — TELEPHONE ENCOUNTER
Pending Prescriptions:                       Disp   Refills    pregabalin (LYRICA) 150 MG capsule [Pharma*270 ca*0        Sig: TAKE ONE CAPSULE BY MOUTH THREE TIMES A DAY    Routing refill request to provider for review/approval because:  Drug not on the [facility] refill protocol

## 2021-01-14 NOTE — PROGRESS NOTES
[facility] Care Coordination Contact    Member: Vijaya Manjarrez  1950    [facility] ID: [id number]   location: [facility] [address], [place] [address]  Member Phone [phone]  Appointment Date and Time:  2pm  Clinic Name and Address: [facility] [address] [facility], [place] [address]  Transportation Provider: Barrington Hands    time: 1:00pm    Round trip - will call return ride home.  CMS called member to inform of  time.    Cynthia Bell  Care Management Specialist  [facility]  [phone]

## 2021-01-14 NOTE — PROGRESS NOTES
"Nyasia is a 70 year old who is being evaluated via a billable video visit.      How would you like to obtain your AVS? Mail a copy  If the video visit is dropped, the invitation should be resent by: Text to cell phone: [phone]  Will anyone else be joining your video visit? No    Video Start Time: 12:52 PM  Assessment & Plan   Problem List Items Addressed This Visit        Respiratory    COPD (chronic obstructive pulmonary disease) (H)    Relevant Medications    guaiFENesin-codeine (GUAIFENESIN AC) 100-10 MG/5ML syrup      Other Visit Diagnoses     Suspected COVID-19 virus infection    -  Primary    Relevant Orders    Symptomatic COVID-19 Virus (Coronavirus) by PCR    Chronic diastolic congestive heart failure (H)        Cough        Relevant Medications    guaiFENesin-codeine (GUAIFENESIN AC) 100-10 MG/5ML syrup         Covid test ordered for her symptoms.  Cough medicine sent to pharmacy.  It could be beginning of  exacerbation.   Advised to monitor her symptoms and if it worsened, advised to follow-up.  Consider antibiotics and steroid if she doesn't improve.    Lilliam Fernandes MD  [facility]     Nyasia is a 70 year old who presents to clinic today for the following health issues : cough and shortness of breath.    HPI   Yesterday she is feeling well. She started with headache and diarrhea for multiple times. Denies blood with it. Also has mild SOB, wheezing. Denies fever, chills. Today, she feels little better.  She still has sore throat and cough.  Denies Covid 19 exposure.  She had 5 Covid test and is negative last on in 10/2020.  She has COPD, and takes inhalers. Denies nebulizer use. She use rescue inhaler frequently.     Review of Systems   Constitutional: Negative for fever.   HENT: Negative for congestion.    Respiratory: Positive for cough.           Objective     Vitals:  No vitals were obtained today due to virtual visit.    Physical Exam \"GENERAL: Healthy, " "alert and no distress\",\"EYES: Eyes grossly normal to inspection.  No discharge or erythema, or obvious scleral/conjunctival abnormalities.\",\"RESP: No audible wheeze, cough, or visible cyanosis.  No visible retractions or increased work of breathing.  \",\"SKIN: Visible skin clear. No significant rash, abnormal pigmentation or lesions.\",\"NEURO: Cranial nerves grossly intact.  Mentation and speech appropriate for age.\",\"PSYCH: Mentation appears normal, affect normal/bright, judgement and insight intact, normal speech and appearance well-groomed.\"    Video-Visit Details    Type of service:  Video Visit    Video End Time:12:59 PM    Originating Location (pt. Location): Home    Distant Location (provider location):  HOME    Platform used for Video Visit: Chana  "

## 2021-01-14 NOTE — TELEPHONE ENCOUNTER
Pending Prescriptions:                       Disp   Refills    cetirizine (ZYRTEC) 10 MG tablet [Pharmacy*90 tab*0        Sig: TAKE ONE TABLET BY MOUTH ONCE DAILY    Routing refill request to provider for review/approval because:  Patient fails protocol

## 2021-01-29 ENCOUNTER — HOSPITAL ENCOUNTER (OUTPATIENT)
Dept: CT IMAGING | Facility: CLINIC | Age: 71
Discharge: HOME | End: 2021-01-29
Attending: INTERNAL MEDICINE | Admitting: INTERNAL MEDICINE
Payer: COMMERCIAL

## 2021-01-29 DIAGNOSIS — Z87.891 PERSONAL HISTORY OF NICOTINE DEPENDENCE: ICD-10-CM

## 2021-01-29 DIAGNOSIS — Z12.2 ENCOUNTER FOR SCREENING FOR MALIGNANT NEOPLASM OF LUNG: ICD-10-CM

## 2021-01-29 PROCEDURE — 71271 CT THORAX LUNG CANCER SCR C-: CPT

## 2021-02-09 ENCOUNTER — TELEPHONE (OUTPATIENT)
Dept: OPHTHALMOLOGY | Facility: CLINIC | Age: 71
End: 2021-02-09

## 2021-02-09 ENCOUNTER — OFFICE VISIT (OUTPATIENT)
Dept: OPHTHALMOLOGY | Facility: CLINIC | Age: 71
End: 2021-02-09
Payer: COMMERCIAL

## 2021-02-09 DIAGNOSIS — H52.4 PRESBYOPIA: ICD-10-CM

## 2021-02-09 DIAGNOSIS — H52.00 HYPERMETROPIA, UNSPECIFIED LATERALITY: ICD-10-CM

## 2021-02-09 DIAGNOSIS — H02.9 EYELID LESION: ICD-10-CM

## 2021-02-09 DIAGNOSIS — H52.213 IRREGULAR ASTIGMATISM OF BOTH EYES: ICD-10-CM

## 2021-02-09 DIAGNOSIS — H02.403 ACQUIRED INVOLUTIONAL PTOSIS OF BOTH EYELIDS: ICD-10-CM

## 2021-02-09 DIAGNOSIS — H02.831 DERMATOCHALASIS OF BOTH UPPER EYELIDS: ICD-10-CM

## 2021-02-09 DIAGNOSIS — H02.834 DERMATOCHALASIS OF BOTH UPPER EYELIDS: ICD-10-CM

## 2021-02-09 DIAGNOSIS — S02.85XS CLOSED FRACTURE OF ORBIT, SEQUELA (H): Primary | ICD-10-CM

## 2021-02-09 PROCEDURE — 92082 INTERMEDIATE VISUAL FIELD XM: CPT | Performed by: OPHTHALMOLOGY

## 2021-02-09 PROCEDURE — 92285 EXTERNAL OCULAR PHOTOGRAPHY: CPT | Performed by: OPHTHALMOLOGY

## 2021-02-09 PROCEDURE — 99214 OFFICE O/P EST MOD 30 MIN: CPT | Performed by: OPHTHALMOLOGY

## 2021-02-09 ASSESSMENT — REFRACTION_WEARINGRX
OS_AXIS: 170
OD_CYLINDER: +0.75
OD_ADD: +2.25
OD_AXIS: 014
OD_SPHERE: +1.75
OS_SPHERE: +2.00
OS_CYLINDER: +1.00
OS_ADD: +2.25

## 2021-02-09 ASSESSMENT — REFRACTION_MANIFEST
OS_CYLINDER: +0.75
OS_ADD: +2.50
OS_AXIS: 180
OD_SPHERE: +1.75
OD_AXIS: 014
OD_ADD: +2.50
OS_SPHERE: +2.00
OD_CYLINDER: +0.50

## 2021-02-09 ASSESSMENT — SLIT LAMP EXAM - LIDS
COMMENTS: HEAVY DERMATOCHALASIS RESTING ON LASHES, TRUE PTOSIS
COMMENTS: HEAVY DERMATOCHALASIS RESTING ON LASHES, TRUE PTOSIS

## 2021-02-09 ASSESSMENT — VISUAL ACUITY
OS_CC+: -1
CORRECTION_TYPE: GLASSES
OS_CC: 20/50
OD_CC+: -1
OD_CC: 20/50
METHOD: SNELLEN - LINEAR

## 2021-02-09 ASSESSMENT — CONF VISUAL FIELD
OS_SUPERIOR_TEMPORAL_RESTRICTION: 3
OD_SUPERIOR_NASAL_RESTRICTION: 3
OS_SUPERIOR_NASAL_RESTRICTION: 3
OD_SUPERIOR_TEMPORAL_RESTRICTION: 3

## 2021-02-09 ASSESSMENT — TONOMETRY
OS_IOP_MMHG: 8
OD_IOP_MMHG: 10
IOP_METHOD: ICARE

## 2021-02-09 NOTE — PROGRESS NOTES
[facility] New Patient    Patient: Vijaya Manjarrez MRN# [id number]   YOB: 1950 Age: 70 year old   Date of Visit: Feb 9, 2021    CC: Droopy eyelids obstructing vision.              HPI:     Chief Complaint(s) and History of Present Illness(es)     Consult For     Laterality: both eyes    Associated symptoms: eye pain, redness and tearing.  Negative for dryness      Treatments tried: eye drops    Pain scale: 5/10    Comments: Lid Evaluation        Comments     Patient reports she would like to have both upper lids looked. The past 1   year upper lids continue to droop. Would like to have surgery to improve   vision. Feels like the lids get in the way of vision each eye. By days end   its hard to keep each eye open to see anything. each eye feel heavy. Has   been waking with each eye mattered shut every morning the past 4 months.       Status post Left orbitotomy and orbital floor fracture repair with   intraoperative navigation 10/8/20- States still has some eye pain with   left eye but continues to get better. She notices a bump on nasal corner   of upper lids that she believes is a sty may be contributing to   discomfort. This has been present the past 3 week. Has not tried any   compress treatments.     Gilma Stone, COT COT 1:55 PM February 9, 2021          Vijaya Manjarrez is a 70 year old female who has noted gradual onset of droopy eyelids over the past years. The droopy eyelid is interfering with activities of daily living including driving, and reading. The patient denies double vision, variability of the eyelid position, or dry eye symptoms.     EXAM:     MRD1: 1 mm both eyes   Dermatochalasis with excess skin touching eyelashes   Aponeurotic ptosis    VISUAL FIELD:  Right eye untaped:15 degrees Right eye taped:55 degrees  Left eye untaped:10 degrees Left eye taped:55 degrees    Lab Results   Component Value Date    A1C 7.3 05/20/2020    A1C 6.7 11/13/2019    A1C 6.8  06/25/2019    A1C 6.8 03/06/2019    A1C 6.9 03/03/2018       Assessment & Plan     Vijaya Manjarrez is a 70 year old female with the following diagnoses:   1. Closed fracture of orbit, sequela (H) - Left Eye    2. Dermatochalasis of both upper eyelids    3. Hypermetropia, unspecified laterality    4. Irregular astigmatism of both eyes    5. Presbyopia    6. Eyelid lesion    7. Acquired involutional ptosis of both eyelids       Healing well from orbital fracture repair left eye - symmetric on worms eye view.     Will dispense manifest refraction today, discussed may change a bit with lid surgery, but needs glasses as hers broke with trauma.    Eyelid lesion looks like an eyelid margin nevus, but she reports it is new which is unusual at her age - will biopsy at time of ptosis repair    Both upper eyelid blepharoplasty (skin, nf) and Bilateral ptosis repair external levator approach [id number]  Biopsy of right upper eyelid margin lesion.      ANTICOAGULATION:    Aspirin          PHOTOS DEMONSTRATE:    Significant dermatochalasis with lids resting on eyelashes and obstructing visual axis  Blepharoptosis    Attending Physician Attestation: Complete documentation of historical and exam elements from today's encounter can be found in the full encounter summary report (not reduplicated in this progress note). I personally obtained the chief complaint(s) and history of present illness. I confirmed and edited as necessary the review of systems, past medical/surgical history, family history, social history, and examination findings as documented by others; and I examined the patient myself. I personally reviewed the relevant tests, images, and reports as documented above. I formulated and edited as necessary the assessment and plan and discussed the findings and management plan with the patient. George Doll MD      Today with Vijaya Manjarrez I reviewed the indications, risks, benefits, and alternatives of the  proposed surgical procedure including, but not limited to, failure obtain the desired result  and need for additional surgery, bleeding, infection, loss of vision, loss of the eye, and the remote possibility of permanent damage to any organ system or death with the use of anesthesia.  I provided multiple opportunities for the questions, answered all questions to the best of my ability, and confirmed that my answers and my discussion were understood.

## 2021-02-09 NOTE — TELEPHONE ENCOUNTER
Met with patient to schedule surgery with Dr. Doll    Surgery was scheduled on 3/11 at [facility]  Patient will have H&P at [facility]     Patient is aware a COVID-19 test is needed before their procedure. The test should be with-in 4 days of their procedure.   Test Details: Date 3/8 Location [facility]    Post-Op visit was scheduled on 3/18  Patient is aware a / is needed day of surgery.   Surgery packet was mailed 2/9, patient has my direct contact information for any further questions.

## 2021-02-09 NOTE — NURSING NOTE
Chief Complaints and History of Present Illnesses   Patient presents with     Consult For     Lid Evaluation      Chief Complaint(s) and History of Present Illness(es)     Consult For     Laterality: both eyes    Associated symptoms: eye pain, redness and tearing.  Negative for dryness    Treatments tried: eye drops    Pain scale: 5/10    Comments: Lid Evaluation               Comments     Patient reports she would like to have both upper lids looked. The past 1 year upper lids continue to droop. Would like to have surgery to improve vision. Feels like the lids get in the way of vision each eye. By days end its hard to keep each eye open to see anything. each eye feel heavy. Has been waking with each eye mattered shut every morning the past 4 months.       Status post Left orbitotomy and orbital floor fracture repair with intraoperative navigation 10/8/20- States still has some eye pain with left eye but continues to get better. She notices a bump on nasal corner of upper lids that she believes is a sty may be contributing to discomfort. This has been present the past 3 week. Has not tried any compress treatments.     Gilma Stone, COT COT 1:55 PM February 9, 2021

## 2021-02-18 DIAGNOSIS — M47.816 FACET ARTHROPATHY, LUMBAR: ICD-10-CM

## 2021-02-18 DIAGNOSIS — J30.2 SEASONAL ALLERGIC RHINITIS, UNSPECIFIED TRIGGER: Primary | ICD-10-CM

## 2021-02-18 DIAGNOSIS — M79.7 FIBROMYALGIA: ICD-10-CM

## 2021-02-18 DIAGNOSIS — M47.812 FACET ARTHROPATHY, CERVICAL: ICD-10-CM

## 2021-02-18 DIAGNOSIS — J30.1 SEASONAL ALLERGIC RHINITIS DUE TO POLLEN: ICD-10-CM

## 2021-02-18 RX ORDER — ECHINACEA PURPUREA EXTRACT 125 MG
TABLET ORAL
Qty: 15 ML | Refills: 0 | Status: SHIPPED | OUTPATIENT
Start: 2021-02-18 | End: 2021-09-28

## 2021-02-18 NOTE — TELEPHONE ENCOUNTER
Pt requesting deep sea nasal spray 0.65%, was last prescribed by a hospitalist back in sept.    Thank you!  Jory Lua CPhT  [facility]

## 2021-02-19 RX ORDER — FLUTICASONE PROPIONATE 50 MCG
SPRAY, SUSPENSION (ML) NASAL
Qty: 16 G | Refills: 1 | Status: SHIPPED | OUTPATIENT
Start: 2021-02-19 | End: 2021-04-16

## 2021-02-19 NOTE — TELEPHONE ENCOUNTER
Flonase  Prescription approved per [facility] Refill Protocol.    Controlled Substance Refill Request for Tramadol  Problem List Complete:    No     PROVIDER TO CONSIDER COMPLETION OF PROBLEM LIST AND OVERVIEW/CONTROLLED SUBSTANCE AGREEMENT    Last Written Prescription Date:  1/6/21  Last Fill Quantity: 180,   # refills: 0    Last Office Visit with [place] primary care provider: 1/14/21    Future Office visit:   Next 5 appointments (look out 90 days)    Mar 08, 2021  2:00 PM  Pre-Op physical with Vidhi Mario MD  [facility] [address] [place] [address]  [phone]   Mar 08, 2021  3:00 PM  Pre-procedure Covid with [facility]  [facility] [address] [place] [address]  [phone]          Controlled substance agreement:   Encounter-Level CSA - 01/12/2017:    Controlled Substance Agreement - Scan on 1/26/2017 12:28 PM: CONTROLLED SUBSTANCE AGREEMENT     Patient-Level CSA:    Controlled Substance Agreement - Opioid - Scan on 2/6/2019  9:37 AM         Last Urine Drug Screen: No results found for: CDAUT, No results found for: COMDAT,   Cannabinoids (59-upg-7-carboxy-9-THC)   Date Value Ref Range Status   03/03/2018 Not Detected NDET^Not Detected ng/mL Final     Comment:     Cutoff for a negative cannabinoid is 50 ng/mL or less.     Phencyclidine (Phencyclidine)   Date Value Ref Range Status   03/03/2018 Not Detected NDET^Not Detected ng/mL Final     Comment:     Cutoff for a negative PCP is 25 ng/mL or less.     Cocaine (Benzoylecgonine)   Date Value Ref Range Status   03/03/2018 Not Detected NDET^Not Detected ng/mL Final     Comment:     Cutoff for a negative cocaine is 150 ng/ml or less.     Methamphetamine (d-Methamphetamine)   Date Value Ref Range Status   03/03/2018 Not Detected NDET^Not Detected ng/mL Final     Comment:     Cutoff for  a negative methamphetamine is 500 ng/ml or less.     Opiates (Morphine)   Date Value Ref Range Status   03/03/2018 Not Detected NDET^Not Detected ng/mL Final     Comment:     Cutoff for a negative opiate is 100 ng/ml or less.     Amphetamine (d-Amphetamine)   Date Value Ref Range Status   03/03/2018 Not Detected NDET^Not Detected ng/mL Final     Comment:     Cutoff for a negative amphetamine is 500 ng/mL or less.     Benzodiazepines (Nordiazepam)   Date Value Ref Range Status   03/03/2018 Not Detected NDET^Not Detected ng/mL Final     Comment:     Cutoff for a negative benzodiazepine is 150 ng/ml or less.     Tricyclic Antidepressants (Desipramine)   Date Value Ref Range Status   03/03/2018 Detected, Abnormal Result (A) NDET^Not Detected ng/mL Final     Comment:     Cutoff for a positive tricyclic antidepressant is greater than 300 ng/ml.  This is an unconfirmed screening result to be used for medical purposes only.   Order [id number] for confirmation or individual confirmation tests to [employer].       Methadone (Methadone)   Date Value Ref Range Status   03/03/2018 Not Detected NDET^Not Detected ng/mL Final     Comment:     Cutoff for a negative methadone is 200 ng/ml or less.     Barbiturates (Butalbital)   Date Value Ref Range Status   03/03/2018 Not Detected NDET^Not Detected ng/mL Final     Comment:     Cutoff for a negative barbituate is 200 ng/ml or less.     Oxycodone (Oxycodone)   Date Value Ref Range Status   03/03/2018 Not Detected NDET^Not Detected ng/mL Final     Comment:     Cutoff for a negative Oxycodone is 100 ng/mL or less.     Propoxyphene (Norpropoxyphene)   Date Value Ref Range Status   03/03/2018 Not Detected NDET^Not Detected ng/mL Final     Comment:     Cutoff for a negative propoxyphene is 300 ng/ml or less     Buprenorphine (Buprenorphine)   Date Value Ref Range Status   03/03/2018 Not Detected NDET^Not Detected ng/mL Final     Comment:     Cutoff for a negative buprenorphine is 10 ng/ml or less      [place] reviewed:  not reviewed/not due - last done on 1/6/21  [place]:  [email or web address]

## 2021-02-23 DIAGNOSIS — Z11.59 ENCOUNTER FOR SCREENING FOR OTHER VIRAL DISEASES: ICD-10-CM

## 2021-02-26 ENCOUNTER — TELEPHONE (OUTPATIENT)
Dept: INTERNAL MEDICINE | Facility: CLINIC | Age: 71
End: 2021-02-26

## 2021-02-26 DIAGNOSIS — J30.89 SEASONAL ALLERGIC RHINITIS DUE TO OTHER ALLERGIC TRIGGER: Primary | ICD-10-CM

## 2021-02-26 RX ORDER — TRAMADOL HYDROCHLORIDE 50 MG/1
50-100 TABLET ORAL 2 TIMES DAILY PRN
Qty: 120 TABLET | Refills: 0 | Status: SHIPPED | OUTPATIENT
Start: 2021-02-26 | End: 2021-04-20

## 2021-02-26 RX ORDER — TRAMADOL HYDROCHLORIDE 50 MG/1
50 TABLET ORAL EVERY 6 HOURS PRN
Qty: 10 TABLET | Refills: 0 | OUTPATIENT
Start: 2021-02-26 | End: 2021-03-01

## 2021-02-26 RX ORDER — CETIRIZINE HYDROCHLORIDE 10 MG/1
10 TABLET ORAL DAILY
Qty: 90 TABLET | Refills: 0 | Status: SHIPPED | OUTPATIENT
Start: 2021-02-26 | End: 2021-07-05

## 2021-03-02 ENCOUNTER — PATIENT OUTREACH (OUTPATIENT)
Dept: GERIATRIC MEDICINE | Facility: CLINIC | Age: 71
End: 2021-03-02

## 2021-03-02 NOTE — PROGRESS NOTES
[facility] Care Coordination Contact    Called member to schedule annual assessment. Assessment scheduled for 3/5/21 at 2PM.    Vanesa Gtz RN  [facility]  [phone]  Fax: [phone]

## 2021-03-05 ENCOUNTER — PATIENT OUTREACH (OUTPATIENT)
Dept: GERIATRIC MEDICINE | Facility: CLINIC | Age: 71
End: 2021-03-05

## 2021-03-05 ASSESSMENT — ACTIVITIES OF DAILY LIVING (ADL): DEPENDENT_IADLS:: CLEANING;SHOPPING;TRANSPORTATION

## 2021-03-08 ENCOUNTER — OFFICE VISIT (OUTPATIENT)
Dept: INTERNAL MEDICINE | Facility: CLINIC | Age: 71
End: 2021-03-08
Payer: COMMERCIAL

## 2021-03-08 VITALS
HEART RATE: 90 BPM | BODY MASS INDEX: 30.21 KG/M2 | DIASTOLIC BLOOD PRESSURE: 70 MMHG | TEMPERATURE: 98.7 F | RESPIRATION RATE: 12 BRPM | SYSTOLIC BLOOD PRESSURE: 110 MMHG | HEIGHT: 69 IN | OXYGEN SATURATION: 94 % | WEIGHT: 204 LBS

## 2021-03-08 DIAGNOSIS — F41.1 GAD (GENERALIZED ANXIETY DISORDER): ICD-10-CM

## 2021-03-08 DIAGNOSIS — G89.4 CHRONIC PAIN SYNDROME: ICD-10-CM

## 2021-03-08 DIAGNOSIS — D64.9 LOW HEMOGLOBIN: ICD-10-CM

## 2021-03-08 DIAGNOSIS — Z01.818 PREOP GENERAL PHYSICAL EXAM: Primary | ICD-10-CM

## 2021-03-08 DIAGNOSIS — E11.9 CONTROLLED TYPE 2 DIABETES MELLITUS WITHOUT COMPLICATION, WITHOUT LONG-TERM CURRENT USE OF INSULIN (H): ICD-10-CM

## 2021-03-08 DIAGNOSIS — Z11.59 ENCOUNTER FOR SCREENING FOR OTHER VIRAL DISEASES: ICD-10-CM

## 2021-03-08 LAB
BASOPHILS # BLD AUTO: 0.1 10E9/L (ref 0–0.2)
BASOPHILS NFR BLD AUTO: 0.5 %
DIFFERENTIAL METHOD BLD: ABNORMAL
EOSINOPHIL # BLD AUTO: 0.2 10E9/L (ref 0–0.7)
EOSINOPHIL NFR BLD AUTO: 1.4 %
ERYTHROCYTE [DISTWIDTH] IN BLOOD BY AUTOMATED COUNT: 19.7 % (ref 10–15)
HBA1C MFR BLD: 7.1 % (ref 0–5.6)
HCT VFR BLD AUTO: 35.3 % (ref 35–47)
HGB BLD-MCNC: 10.3 G/DL (ref 11.7–15.7)
LYMPHOCYTES # BLD AUTO: 4.4 10E9/L (ref 0.8–5.3)
LYMPHOCYTES NFR BLD AUTO: 37.7 %
MCH RBC QN AUTO: 23.6 PG (ref 26.5–33)
MCHC RBC AUTO-ENTMCNC: 29.2 G/DL (ref 31.5–36.5)
MCV RBC AUTO: 81 FL (ref 78–100)
MONOCYTES # BLD AUTO: 0.7 10E9/L (ref 0–1.3)
MONOCYTES NFR BLD AUTO: 6.1 %
NEUTROPHILS # BLD AUTO: 6.3 10E9/L (ref 1.6–8.3)
NEUTROPHILS NFR BLD AUTO: 54.3 %
PLATELET # BLD AUTO: 371 10E9/L (ref 150–450)
RBC # BLD AUTO: 4.37 10E12/L (ref 3.8–5.2)
SARS-COV-2 RNA RESP QL NAA+PROBE: NORMAL
SPECIMEN SOURCE: NORMAL
WBC # BLD AUTO: 11.6 10E9/L (ref 4–11)

## 2021-03-08 PROCEDURE — U0003 INFECTIOUS AGENT DETECTION BY NUCLEIC ACID (DNA OR RNA); SEVERE ACUTE RESPIRATORY SYNDROME CORONAVIRUS 2 (SARS-COV-2) (CORONAVIRUS DISEASE [COVID-19]), AMPLIFIED PROBE TECHNIQUE, MAKING USE OF HIGH THROUGHPUT TECHNOLOGIES AS DESCRIBED BY CMS-2020-01-R: HCPCS | Performed by: OPHTHALMOLOGY

## 2021-03-08 PROCEDURE — 85025 COMPLETE CBC W/AUTO DIFF WBC: CPT | Performed by: INTERNAL MEDICINE

## 2021-03-08 PROCEDURE — 82043 UR ALBUMIN QUANTITATIVE: CPT | Performed by: INTERNAL MEDICINE

## 2021-03-08 PROCEDURE — 80307 DRUG TEST PRSMV CHEM ANLYZR: CPT | Performed by: INTERNAL MEDICINE

## 2021-03-08 PROCEDURE — 99214 OFFICE O/P EST MOD 30 MIN: CPT | Performed by: INTERNAL MEDICINE

## 2021-03-08 PROCEDURE — 80061 LIPID PANEL: CPT | Performed by: INTERNAL MEDICINE

## 2021-03-08 PROCEDURE — U0005 INFEC AGEN DETEC AMPLI PROBE: HCPCS | Performed by: OPHTHALMOLOGY

## 2021-03-08 PROCEDURE — 83036 HEMOGLOBIN GLYCOSYLATED A1C: CPT | Performed by: INTERNAL MEDICINE

## 2021-03-08 PROCEDURE — 36415 COLL VENOUS BLD VENIPUNCTURE: CPT | Performed by: INTERNAL MEDICINE

## 2021-03-08 PROCEDURE — 80053 COMPREHEN METABOLIC PANEL: CPT | Performed by: INTERNAL MEDICINE

## 2021-03-08 RX ORDER — HYDROXYZINE HYDROCHLORIDE 10 MG/1
TABLET, FILM COATED ORAL
Qty: 30 TABLET | Refills: 0 | Status: SHIPPED | OUTPATIENT
Start: 2021-03-08 | End: 2021-06-11

## 2021-03-08 ASSESSMENT — MIFFLIN-ST. JEOR: SCORE: 1514.72

## 2021-03-08 NOTE — LETTER
[facility]    03/08/21    Patient: Vijaya Manjarrez  YOB: 1950  Medical Record Number: [id number]                                                                  Controlled Substance Agreement  I understand that my care provider has prescribed controlled substances (narcotics, tranquilizers, and/or stimulants) to help manage my condition(s).  I am taking this medicine to help me function or work.  I know that this is strong medicine.  It could have serious side effects and even cause a dependency on the drug.  If I stop these medicines suddenly, I could have severe withdrawal symptoms.    The risks, benefits, and side effects of these medication(s) were explained to me.  I agree that:  1. I will take part in other treatments as advised by my provider.  This may be psychiatry or counseling, physical therapy, behavioral therapy, group treatment, or a referral to a pain clinic.  I will reduce or stop my medicine when my provider tells me to do so.   2. I will take my medicines as prescribed.  I will not change the dose or schedule unless my provider tells me to.  There will be no refills if I  run out early.   I may be contacted at any time without warning and asked to complete a drug test or pill count.   3. I will keep all my appointments at the clinic.  If I miss appointments or fail to follow instructions, my provider may stop my medicine.  4. I will not ask other providers to prescribe controlled substances. And I will not accept controlled substances from other people. If I need another prescribed controlled substance for a new reason, I will notify my provider within one business day.  5. If I enroll in the [place] Medical Marijuana program, I will tell my provider.  I will also sign an agreement to share my medical records with my provider.  6. I will use one pharmacy to fill all of my controlled substance prescriptions.  If my prescription is mailed to my pharmacy,  it may take 5 to 7 days for my medicine to be ready.  7. I understand that my provider, clinic care team, and pharmacy can track controlled substance prescriptions from other providers through a central database (prescription monitoring program).  8. I will bring in my list of medications (or my medicine bottles) each time I come to the clinic.  [id number] REV-  07/2018                                                                                                                                    Page 1 of 2      [facility]    03/08/21    Patient: Vijaya Manjarrez  YOB: 1950  Medical Record Number: [id number]    9. Refills of controlled substances will be made only during office hours.  It is up to me to make sure that I do not run out of my medicines on weekends or holidays.    10. I am responsible for my prescriptions.  If the medicine/prescription is lost or stolen, it will not be replaced.   I also agree not to share these medicines with anyone.  11. I agree to not use ANY illegal or recreational drugs.  This includes marijuana, cocaine, bath salts or other drugs.  I agree not to use alcohol unless my provider says I may.  I agree to give urine samples whenever asked.  If I fail to give a urine sample, the provider may stop my medicine.     12. I will tell my nurse or provider right away if I become pregnant or have a new medical problem treated outside of [facility].  13. I understand that this medicine can affect my thinking and judgment.  It may be unsafe for me to drive, use machinery and do dangerous tasks.  I will not do any of these things until I know how the medicine affects me.  If my dose changes, I will wait to see how it affects me.  I will contact my provider if I have concerns about medicine side effects.  I understand that if I do not follow any of the conditions above, my prescriptions or treatment may be stopped.    I agree that my provider,  clinic care team, and pharmacy may work with any city, state or federal law enforcement agency that investigates the misuse, sale, or other diversion of my controlled medicine. I will allow my provider to discuss my care with or share a copy of this agreement with any other treating provider, pharmacy or emergency room where I receive care.  I agree to give up (waive) any right of privacy or confidentiality with respect to these authorizations.   I have read this agreement and have asked questions about anything I did not understand.   ___________________________________    ___________________________  Patient Signature                                                           Date and Time  ___________________________________     ____________________________  Witness                                                                            Date and Time  ___________________________________  Vidhi Mario MD  [id number] REV-  07/2018                                                                                                                                                   Page 2 of 2

## 2021-03-08 NOTE — NURSING NOTE
"/70   Pulse 90   Temp 98.7  F (37.1  C) (Oral)   Resp 12   Ht 1.753 m (5' 9\")   Wt 92.5 kg (204 lb)   LMP  (LMP Unknown)   SpO2 94%   Breastfeeding No   BMI 30.13 kg/m      "

## 2021-03-08 NOTE — H&P (VIEW-ONLY)
[place] [address]  [facility] [address]  Phone: [phone]  Primary Provider: Vidhi Morgan  Pre-op Performing Provider: VIDHI MORGAN      PREOPERATIVE EVALUATION:  Today's date: 3/8/2021    Vijaya Manjarrez is a 70 year old female who presents for a preoperative evaluation.    Surgical Information:  Surgery/Procedure: Both upper eyelid blepharoplasty and ptosis repair. Right upper eyelid margin lesion biopsy  Surgery Location: [facility]  Surgeon: George Hamm MD  Surgery Date: 3/11/21  Time of Surgery: 855am  Where patient plans to recover: At home with family  Fax number for surgical facility: Note does not need to be faxed, will be available electronically in Epic.    Type of Anesthesia Anticipated: Local with MAC    Assessment & Plan     The proposed surgical procedure is considered LOW risk.    Preop general physical exam      Controlled type 2 diabetes mellitus without complication, without long-term current use of insulin (H)  At goal  - Hemoglobin A1c  - Lipid panel reflex to direct LDL Fasting  - Comprehensive metabolic panel (BMP + Alb, Alk Phos, ALT, AST, Total. Bili, TP)  - CBC with platelets and differential  - Albumin Random Urine Quantitative with Creat Ratio  - Miscellaneous Order for DME - ONLY FOR DME    BRADY (generalized anxiety disorder)    - hydrOXYzine (ATARAX) 10 MG tablet; TAKE ONE TABLET BY MOUTH THREE TIMES A DAY AS NEEDED FOR ANXIETY    Chronic pain syndrome  - Drug Confirmation Panel Urine with Creat    Provider consider completing risk assessment  Link to [employer] Flowsheet :[id number]}      Risks and Recommendations:  The patient has the following additional risks and recommendations for perioperative complications:  Diabetes:  - Patient is not on insulin therapy: regular NPO guidelines can be followed.         RECOMMENDATION:  APPROVAL GIVEN to proceed with proposed procedure, without further diagnostic  evaluation.      17 minutes spent on the date of the encounter doing chart review, patient visit and documentation         Subjective     HPI related to upcoming procedure:     Preop Questions 3/8/2021   1. Have you ever had a heart attack or stroke? No   2. Have you ever had surgery on your heart or blood vessels, such as a stent placement, a coronary artery bypass, or surgery on an artery in your head, neck, heart, or legs? No   3. Do you have chest pain with activity? No   4. Do you have a history of  heart failure? No   5. Do you currently have a cold, bronchitis or symptoms of other infection? No   6. Do you have a cough, shortness of breath, or wheezing? No   7. Do you or anyone in your family have previous history of blood clots? No   8. Do you or does anyone in your family have a serious bleeding problem such as prolonged bleeding following surgeries or cuts? No   9. Have you ever had problems with anemia or been told to take iron pills? No   10. Have you had any abnormal blood loss such as black, tarry or bloody stools, or abnormal vaginal bleeding? No   11. Have you ever had a blood transfusion? No   12. Are you willing to have a blood transfusion if it is medically needed before, during, or after your surgery? Yes   13. Have you or any of your relatives ever had problems with anesthesia? No   14. Do you have sleep apnea, excessive snoring or daytime drowsiness? YES    14a. Do you have a CPAP machine? No   15. Do you have any artifical heart valves or other implanted medical devices like a pacemaker, defibrillator, or continuous glucose monitor? No   16. Do you have artificial joints? No   17. Are you allergic to latex? No       Health Care Directive:  Patient does not have a Health Care Directive or Living Will: Discussed advance care planning with patient; information given to patient to review.    Preoperative Review of :   reviewed - controlled substances reflected in medication list.      Status  of Chronic Conditions:  DEPRESSION - Patient has a long history of Depression of moderate severity requiring medication for control with recent symptoms being unchanged..Current symptoms of depression include depressed mood. No thoughts of self harm.    DIABETES - Patient has a longstanding history of DiabetesType Type II . Patient is being treated with oral agents and denies significant side effects. Control has been good. Complicating factors include but are not limited to: hypertension and hyperlipidemia.     HYPERLIPIDEMIA - Patient has a long history of significant Hyperlipidemia requiring medication for treatment with recent good control. Patient reports no problems or side effects with the medication.     HYPERTENSION - Patient has longstanding history of HTN , currently denies any symptoms referable to elevated blood pressure. Specifically denies chest pain, palpitations, dyspnea, orthopnea, PND or peripheral edema. Blood pressure readings have been in normal range. Current medication regimen is as listed below. Patient denies any side effects of medication.     Chronic pain. On tramadol for neck and spine pain.  She plans on seeing a pain specialist.    Review of Systems  Constitutional, neuro, ENT, endocrine, pulmonary, cardiac, gastrointestinal, genitourinary, musculoskeletal, integument and psychiatric systems are negative, except as otherwise noted.    Patient Active Problem List    Diagnosis Date Noted     Eyelid lesion 02/09/2021     Priority: Medium     Added automatically from request for surgery [id number]       Dermatochalasis of both upper eyelids 02/09/2021     Priority: Medium     Added automatically from request for surgery [id number]       Acquired involutional ptosis of both eyelids 02/09/2021     Priority: Medium     Added automatically from request for surgery [id number]       Closed fracture of orbit (H) 10/05/2020     Priority: Medium     Added automatically from request for surgery [id number]        Chest pain 10/01/2020     Priority: Medium     Smoker 10/01/2020     Priority: Medium     Left Orbit Floor Fracture -- Dx 2020     Priority: Medium     Added automatically from request for surgery [id number]       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 2020     Priority: Medium     Added automatically from request for surgery [id number]       Diplopia 2020     Priority: Medium     Added automatically from request for surgery [id number]       Hypokalemia 2020     Priority: Medium     Coronary artery disease involving native coronary artery of native heart without angina pectoris 2019     Priority: Medium     Status post coronary angiogram 2019     Priority: Medium     Coronary artery disease involving native coronary artery, angina presence unspecified, unspecified whether native or transplanted heart 2019     Priority: Medium     Added automatically from request for surgery [id number]       Dyslipidemia 2019     Priority: Medium     Added automatically from request for surgery [id number]       Mild episode of recurrent major depressive disorder (H) 2018     Priority: Medium     Advanced directives, counseling/discussion 2017     Priority: Medium     Advance Care Planning 2017: [facility] Review of Chart / Resources Provided:  Reviewed chart for advance care plan.  Vijaya Manjarrez has been provided information and resources to begin or update their advance care plan.  Added by Vanesa Gtz           [facility] 07/10/2017     Priority: Medium     [facility]   Vanesa Gtz, RN  [phone]    [facility] CARE PLAN SUMMARY    Client Name:  Vijaya Manjarrez  Address:  [address] [place] [address] [address] [place] [id number] Phone: [phone]    :  1950 Date of Assessment: 2020   Health Plan:  [facility]  Health Plan Number: [id number] Medical Assistance Number: [id number]  Financial Worker:  Rubén  "[phone]  Case #:  [id number]   [facility] :  Vanesa Gtz RN CM Phone:  [phone]  CM Fax:  [phone]   [facility] Enrollment Date: 7/01/2017 Case Mix:  B  Rate Cell:  B  Waiver Type:  EW   Emergency Contact: Luis Manjarrez  Mobile Phone: [phone]  Relation: Son Language:  English  :  No   Health Care Agent/POA:  None Advanced Directives/Living Will:  None   Primary Care Clinic/Phone/Fax:  [facility]/(p) [phone], (f) [phone] Primary Dx:  COPD [J44.9]  Secondary Dx: Chronic Pain Syndrome [G89.4]   Primary Physician:  Dr. Vidhi Mario   Height:  5' 9\"  Weight:  207 lbs   Specialty Physician:   Dr. Galan OB/GYN Audiologist:  Marg[place]   Eye Care Provider:  [facility] Provider:    [facility]: [facility] [phone] or [phone]   Other:        [facility] CURRENT SERVICES SUMMARY  Equipment owned/DME history: Glasses, walker, shower chair  SERVICE TYPE/PROVIDER NAME/PHONE AUTH DATE FREQUENCY Units OR $ Amt DESCRIPTION   Medical Transportation: [facility] Health Ride [phone] 5/1/20-  4/30/21 as needed  Medical rides as needed     Supplies: [facility] Medical Equipment [phone] 5/1/20-  4/30/21 monthly MA Incontinent products monthly     Homemaking: [employer]   Fax: [phone]  NPI: [id number] 8/31/20-  4/30/21 weekly  4 hours/week $335.57              Hyperlipidemia LDL goal <100 01/20/2017     Priority: Medium     Fibromyalgia 01/20/2017     Priority: Medium     Benign essential hypertension 01/20/2017     Priority: Medium     Chronic pain syndrome 01/20/2017     Priority: Medium     Patient is followed by Disha Van MD for ongoing prescription of pain medication.  All refills should only be approved by this provider, or covering partner.    Medication(s): Tramadol.   Maximum quantity per month: 90  Clinic visit frequency required: Q 6  months     Controlled substance agreement:  Encounter-Level CSA:     There are no " encounter-level csa.        Pain Clinic evaluation in the past: Yes       Date/Location:      DIRE Total Score(s):  No flowsheet data found.    Last [facility] website verification:  none   [email or web address]       DM type 2, Hgb A1C 7.3 on 5/20/20      Priority: Medium     Cervical spine degeneration      Priority: Medium     spinal stenosis,        Facet arthropathy, lumbar      Priority: Medium     Lumbar degenerative disc disease      Priority: Medium     Migraine headache      Priority: Medium     Diabetic neuropathy (H)      Priority: Medium     Anxiety      Priority: Medium     GERD (gastroesophageal reflux disease)      Priority: Medium     HCD (health care directive) 01/12/2017     Priority: Medium     Pt received HCD and will return when complete.    IMO Regulatory Load OCT 2020       Controlled substance agreement signed- ok 5/12/20 01/12/2017     Priority: Medium     Patient is followed by Yayo Mendoza MD for ongoing prescription of pain medication.  All refills should only be approved by this provider, or covering partner.    Medication(s): Tramadol.   Maximum quantity per month: 90  Clinic visit frequency required: Q 6  months     Controlled substance agreement:  CSA signed 2/5/2019     Pain Clinic evaluation in the past: Yes       Date/Location:      DIRE Total Score(s):  No flowsheet data found.    Last [facility] website verification:  none   [email or web address]         COPD (chronic obstructive pulmonary disease) (H) 10/18/2012     Priority: Medium     mild        Past Medical History:   Diagnosis Date     Anxiety      Cervical spine degeneration 2016    spinal stenosis,      COPD (chronic obstructive pulmonary disease) (H) 2012    mild     Coronary artery disease      Diabetes mellitus, type 2 (H)      Diabetic neuropathy (H)      Facet arthropathy, lumbar      GERD (gastroesophageal reflux disease)      HTN (hypertension)      Hyperlipidemia      Lumbar degenerative disc disease       Migraine headache      Past Surgical History:   Procedure Laterality Date     CHOLECYSTECTOMY, LAPOROSCOPIC      Cholecystectomy, Laparoscopic     CV HEART CATHETERIZATION WITH POSSIBLE INTERVENTION Left 2/26/2019    Procedure: Coronary Angiogram;  Surgeon: Cheo Merida MD;  Location:  HEART CARDIAC CATH LAB     CV HEART CATHETERIZATION WITH POSSIBLE INTERVENTION Bilateral 10/2/2020    Procedure: Heart Catheterization with Possible Intervention;  Surgeon: Linda Sauceda MD;  Location:  HEART CARDIAC CATH LAB     CV LEFT HEART CATH N/A 10/2/2020    Procedure: Left Heart Cath;  Surgeon: Linda Sauceda MD;  Location:  HEART CARDIAC CATH LAB     OPEN REDUCTION INTERNAL FIXATION ORBIT BLOWOUT Left 10/8/2020    Procedure: Left Open Reduction Internal Fixation, Fracture, Orbit With Intraoperative Navigation - Left;  Surgeon: George Doll MD;  Location: [facility] OR     Current Outpatient Medications   Medication Sig Dispense Refill     acetaminophen (TYLENOL ARTHRITIS PAIN) 650 MG CR tablet Take 650 mg by mouth 2 times daily as needed        albuterol (PROAIR HFA/PROVENTIL HFA/VENTOLIN HFA) 108 (90 Base) MCG/ACT inhaler Inhale 2 puffs into the lungs every 6 hours (Patient taking differently: Inhale 2 puffs into the lungs every 6 hours as needed ) 18 g 1     amLODIPine (NORVASC) 5 MG tablet TAKE ONE TABLET BY MOUTH ONCE DAILY 90 tablet 2     aspirin (ASA) 81 MG chewable tablet Take 1 tablet (81 mg) by mouth daily 30 tablet 1     atorvastatin (LIPITOR) 80 MG tablet Take 1 tablet (80 mg) by mouth daily +++NEED FASTING LABS+++ 90 tablet 0     blood glucose monitoring (NO BRAND SPECIFIED) test strip Use to test blood sugars 1 times daily or as directed, use brand same as previous 100 strip 3     cetirizine (ZYRTEC) 10 MG tablet Take 1 tablet (10 mg) by mouth daily 90 tablet 0     cyclobenzaprine (FLEXERIL) 10 MG tablet TAKE ONE TABLET BY MOUTH TWICE A  tablet 1     DULoxetine (CYMBALTA) 60 MG  capsule Take 1 capsule (60 mg) by mouth daily 90 capsule 3     fluticasone (FLONASE) 50 MCG/ACT nasal spray SPRAY 2 SPRAYS IN EACH NOSTRIL ONCE DAILY 16 g 1     guaiFENesin-codeine (GUAIFENESIN AC) 100-10 MG/5ML syrup Take 5 mLs by mouth every 8 hours as needed for congestion 118 mL 0     hydrOXYzine (ATARAX) 10 MG tablet TAKE ONE TABLET BY MOUTH THREE TIMES A DAY AS NEEDED FOR ANXIETY 30 tablet 0     ibuprofen (ADVIL/MOTRIN) 600 MG tablet        lisinopril (ZESTRIL) 20 MG tablet TAKE ONE TABLET BY MOUTH TWICE A  tablet 2     metFORMIN (GLUCOPHAGE) 500 MG tablet TAKE ONE TABLET BY MOUTH TWICE A DAY WITH A MEAL 180 tablet 0     metoprolol tartrate (LOPRESSOR) 25 MG tablet Take 25 mg by mouth 2 times daily       montelukast (SINGULAIR) 10 MG tablet TAKE ONE TABLET BY MOUTH AT BEDTIME 90 tablet 0     Multiple Vitamins-Minerals (MULTIVITAMIN ADULT PO) Take 5 tablets by mouth daily Pro-caps vitamin        nitroGLYcerin (NITROSTAT) 0.4 MG sublingual tablet For chest pain place 1 tablet under the tongue every 5 minutes for 3 doses. If symptoms persist 5 minutes after 1st dose call 911. 30 tablet 3     nystatin (NYAMYC) 573778 UNIT/GM external powder APPLY TO AFFECTED AREA(S) TOPICALLY THREE TIMES A DAY AS NEEDED 60 g 0     omeprazole (PRILOSEC) 20 MG DR capsule TAKE ONE CAPSULE BY MOUTH TWICE A  capsule 2     order for DME Equipment being ordered: glucometer 1 each 0     pregabalin (LYRICA) 150 MG capsule TAKE ONE CAPSULE BY MOUTH THREE TIMES A  capsule 0     sodium chloride (OCEAN) 0.65 % nasal spray Apply one spray to nares two times daily 15 mL 0     tiotropium (SPIRIVA) 18 MCG inhaled capsule Inhale 1 capsule (18 mcg) into the lungs daily 90 capsule 1     traMADol (ULTRAM) 50 MG tablet Take 1-2 tablets ( mg) by mouth 2 times daily as needed for severe pain 120 tablet 0     traZODone (DESYREL) 100 MG tablet TAKE TWO TABLETS BY MOUTH EVERY NIGHT AT BEDTIME 180 tablet 1       Allergies  "  Allergen Reactions     Penicillins Hives        Social History     Tobacco Use     Smoking status: Former Smoker     Packs/day: 1.00     Years: 40.00     Pack years: 40.00     Types: Cigarettes     Quit date: 6/1/2017     Years since quitting: 3.7     Smokeless tobacco: Never Used   Substance Use Topics     Alcohol use: No       History   Drug Use No         Objective     /70   Pulse 90   Temp 98.7  F (37.1  C) (Oral)   Resp 12   Ht 1.753 m (5' 9\")   Wt 92.5 kg (204 lb)   LMP  (LMP Unknown)   SpO2 94%   Breastfeeding No   BMI 30.13 kg/m      Physical Exam    GENERAL APPEARANCE: healthy, alert and no distress     HENT: ear canals and TM's normal and nose and mouth without ulcers or lesions     NECK: no adenopathy, no asymmetry, masses, or scars and thyroid normal to palpation     RESP: lungs clear to auscultation - no rales, rhonchi or wheezes     CV: regular rates and rhythm, normal S1 S2, no S3 or S4 and no murmur, click or rub     ABDOMEN:  soft, nontender, no HSM or masses and bowel sounds normal     MS: extremities normal- no gross deformities noted, no evidence of inflammation in joints, FROM in all extremities.     SKIN: no suspicious lesions or rashes     NEURO: Normal strength and tone, sensory exam grossly normal, mentation intact and speech normal     PSYCH: mentation appears normal. and affect normal/bright      Recent Labs   Lab Test 09/30/20  2201 09/22/20  1412 05/20/20  1415 05/20/20  1415 11/13/19  1505   HGB 11.4* 11.9   < > 12.0  --     393   < > 294  --     142   < > 141 142   POTASSIUM 3.5 3.8   < > 4.0 4.2   CR 0.93 0.76   < > 0.72 0.79   A1C  --   --   --  7.3* 6.7*    < > = values in this interval not displayed.        Diagnostics:  Labs pending at this time.  Results will be reviewed when available.   No EKG required for low risk surgery (cataract, skin procedure, breast biopsy, etc).    Revised Cardiac Risk Index (RCRI):  The patient has the following serious " cardiovascular risks for perioperative complications:   - No serious cardiac risks = 0 points     RCRI Interpretation: 0 points: Class I (very low risk - 0.4% complication rate)         Signed Electronically by: Vidhi Mario MD  Copy of this evaluation report is provided to requesting physician.    [facility] Preop Guidelines    Revised Cardiac Risk Index

## 2021-03-08 NOTE — PROGRESS NOTES
[facility] Care Coordination Contact    [facility] Home Visit Assessment     Health Risk Assessment with Vijaya Manjarrez completed on March 5, 2021. Remote assessment due to COVID 19.    Type of residence: Independent Senior Living apartment.   Current living arrangement: I live alone     Assessment completed with: Patient    Current Care Plan  Member currently receiving the following home care services: None    Member currently receiving the following community resources: homemaking.    Medication Review  Medication reconciliation completed in Epic: No - medications not present to review.  Medication set-up & administration: Independent and sets up on own weekly.  Self-administers medications.  Medication Risk Assessment Medication (1 or more, place referral to MTM): Difficulty adhering to medication regimen and Recent falls within past yearStrongly encouraged an MTM to review medications. Member has appointment with PCP on 3/8/21 and will discuss this. Member questions if she has side effects from all the medications she takes. Writer offered to make referral if needed.     Mental/Behavioral Health   Depression Screening:   PHQ-2 Total Score (Adult) - Positive if 3 or more points; Administer PHQ-9 if positive: 2. Member reports COVID has not been good for her anxiety. Lack of motivation and follow through due to depression. Member feels overwhelmed with all the medical appointments she has. Offered therapist with [facility] - member will consider.      Mental health DX: Yes     Mental health DX how managed: Medication    Falls Assessment:   Fallen 2 or more times in the past year?: Yes   Any fall with injury in the past year?: Yes - fell in Sept and sustained orbital fracture that required surgical repair. Reports having 3 falls since then without injury. Encouraged daily use of walker.     ADL/IADL Dependencies:   Dependent ADLs: Independent  Dependent IADLs: Cleaning, Shopping,  Transportation.    [facility] sponsored benefits: Shared information re: Silver Sneakers/gym memberships, ASA, Calcium +D. Strongly encouraged Silver Sneakers - [employer] across the street. Discussed swimming as a good low impact exercise. Reviewed how to get membership set up.     PCA Assessment completed at visit: No     Elderly Waiver Eligibility: Yes-will continue on EW.    Care Plan & Recommendations: Continue with weekly homemaking. Will send MM application and can order card if/when ready. Encouraged follow through on pain clinic referral that was placed. Offered [facility] worker again, but has not followed through after last two referrals were made.     See [facility] for detailed assessment information.    Follow-Up Plan: Member informed of future contact, plan to f/u with member with a 6 month telephone assessment.  Contact information shared with member and family, encouraged member to call with any questions or concerns at any time.    [place] care continuum providers: Please refer to Health Care Home on the Epic Problem List to view this patient's [facility] Care Plan Summary.    Vanesa Gzt RN  [facility]  [phone]  Fax: [phone]

## 2021-03-08 NOTE — PROGRESS NOTES
[place] [address]  [facility] [address]  Phone: [phone]  Primary Provider: Vidhi Morgan  Pre-op Performing Provider: VIDHI MORGAN      PREOPERATIVE EVALUATION:  Today's date: 3/8/2021    Vijaya Manjarrez is a 70 year old female who presents for a preoperative evaluation.    Surgical Information:  Surgery/Procedure: Both upper eyelid blepharoplasty and ptosis repair. Right upper eyelid margin lesion biopsy  Surgery Location: [facility]  Surgeon: George Hamm MD  Surgery Date: 3/11/21  Time of Surgery: 855am  Where patient plans to recover: At home with family  Fax number for surgical facility: Note does not need to be faxed, will be available electronically in Epic.    Type of Anesthesia Anticipated: Local with MAC    Assessment & Plan     The proposed surgical procedure is considered LOW risk.    Preop general physical exam      Controlled type 2 diabetes mellitus without complication, without long-term current use of insulin (H)  At goal  - Hemoglobin A1c  - Lipid panel reflex to direct LDL Fasting  - Comprehensive metabolic panel (BMP + Alb, Alk Phos, ALT, AST, Total. Bili, TP)  - CBC with platelets and differential  - Albumin Random Urine Quantitative with Creat Ratio  - Miscellaneous Order for DME - ONLY FOR DME    BRADY (generalized anxiety disorder)    - hydrOXYzine (ATARAX) 10 MG tablet; TAKE ONE TABLET BY MOUTH THREE TIMES A DAY AS NEEDED FOR ANXIETY    Chronic pain syndrome  - Drug Confirmation Panel Urine with Creat    Provider consider completing risk assessment  Link to [employer] Flowsheet :[id number]}      Risks and Recommendations:  The patient has the following additional risks and recommendations for perioperative complications:  Diabetes:  - Patient is not on insulin therapy: regular NPO guidelines can be followed.         RECOMMENDATION:  APPROVAL GIVEN to proceed with proposed procedure, without further diagnostic  evaluation.      17 minutes spent on the date of the encounter doing chart review, patient visit and documentation         Subjective     HPI related to upcoming procedure:     Preop Questions 3/8/2021   1. Have you ever had a heart attack or stroke? No   2. Have you ever had surgery on your heart or blood vessels, such as a stent placement, a coronary artery bypass, or surgery on an artery in your head, neck, heart, or legs? No   3. Do you have chest pain with activity? No   4. Do you have a history of  heart failure? No   5. Do you currently have a cold, bronchitis or symptoms of other infection? No   6. Do you have a cough, shortness of breath, or wheezing? No   7. Do you or anyone in your family have previous history of blood clots? No   8. Do you or does anyone in your family have a serious bleeding problem such as prolonged bleeding following surgeries or cuts? No   9. Have you ever had problems with anemia or been told to take iron pills? No   10. Have you had any abnormal blood loss such as black, tarry or bloody stools, or abnormal vaginal bleeding? No   11. Have you ever had a blood transfusion? No   12. Are you willing to have a blood transfusion if it is medically needed before, during, or after your surgery? Yes   13. Have you or any of your relatives ever had problems with anesthesia? No   14. Do you have sleep apnea, excessive snoring or daytime drowsiness? YES    14a. Do you have a CPAP machine? No   15. Do you have any artifical heart valves or other implanted medical devices like a pacemaker, defibrillator, or continuous glucose monitor? No   16. Do you have artificial joints? No   17. Are you allergic to latex? No       Health Care Directive:  Patient does not have a Health Care Directive or Living Will: Discussed advance care planning with patient; information given to patient to review.    Preoperative Review of :   reviewed - controlled substances reflected in medication list.      Status  of Chronic Conditions:  DEPRESSION - Patient has a long history of Depression of moderate severity requiring medication for control with recent symptoms being unchanged..Current symptoms of depression include depressed mood. No thoughts of self harm.    DIABETES - Patient has a longstanding history of DiabetesType Type II . Patient is being treated with oral agents and denies significant side effects. Control has been good. Complicating factors include but are not limited to: hypertension and hyperlipidemia.     HYPERLIPIDEMIA - Patient has a long history of significant Hyperlipidemia requiring medication for treatment with recent good control. Patient reports no problems or side effects with the medication.     HYPERTENSION - Patient has longstanding history of HTN , currently denies any symptoms referable to elevated blood pressure. Specifically denies chest pain, palpitations, dyspnea, orthopnea, PND or peripheral edema. Blood pressure readings have been in normal range. Current medication regimen is as listed below. Patient denies any side effects of medication.     Chronic pain. On tramadol for neck and spine pain.  She plans on seeing a pain specialist.    Review of Systems  Constitutional, neuro, ENT, endocrine, pulmonary, cardiac, gastrointestinal, genitourinary, musculoskeletal, integument and psychiatric systems are negative, except as otherwise noted.    Patient Active Problem List    Diagnosis Date Noted     Eyelid lesion 02/09/2021     Priority: Medium     Added automatically from request for surgery [id number]       Dermatochalasis of both upper eyelids 02/09/2021     Priority: Medium     Added automatically from request for surgery [id number]       Acquired involutional ptosis of both eyelids 02/09/2021     Priority: Medium     Added automatically from request for surgery [id number]       Closed fracture of orbit (H) 10/05/2020     Priority: Medium     Added automatically from request for surgery [id number]        Chest pain 10/01/2020     Priority: Medium     Smoker 10/01/2020     Priority: Medium     Left Orbit Floor Fracture -- Dx 2020     Priority: Medium     Added automatically from request for surgery [id number]       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 2020     Priority: Medium     Added automatically from request for surgery [id number]       Diplopia 2020     Priority: Medium     Added automatically from request for surgery [id number]       Hypokalemia 2020     Priority: Medium     Coronary artery disease involving native coronary artery of native heart without angina pectoris 2019     Priority: Medium     Status post coronary angiogram 2019     Priority: Medium     Coronary artery disease involving native coronary artery, angina presence unspecified, unspecified whether native or transplanted heart 2019     Priority: Medium     Added automatically from request for surgery [id number]       Dyslipidemia 2019     Priority: Medium     Added automatically from request for surgery [id number]       Mild episode of recurrent major depressive disorder (H) 2018     Priority: Medium     Advanced directives, counseling/discussion 2017     Priority: Medium     Advance Care Planning 2017: ACP Review of Chart / Resources Provided:  Reviewed chart for advance care plan.  Vijaya Manjarrez has been provided information and resources to begin or update their advance care plan.  Added by Vanesa Gtz           [facility] 07/10/2017     Priority: Medium     [facility]   Vanesa Gtz, RN  [phone]    [facility] CARE PLAN SUMMARY    Client Name:  Vijaya Manjarrez  Address:  [address] [place] [address] [address] [place] [id number] Phone: [phone]    :  1950 Date of Assessment: 2020   Health Plan:  [facility]  Health Plan Number: [id number] Medical Assistance Number: [id number]  Financial Worker:  Rubén  "[phone]  Case #:  [id number]   [facility] :  Vanesa Gtz RN CM Phone:  [phone]  CM Fax:  [phone]   [facility] Enrollment Date: 7/01/2017 Case Mix:  B  Rate Cell:  B  Waiver Type:  EW   Emergency Contact: Luis Manjarrez  Mobile Phone: [phone]  Relation: Son Language:  English  :  No   Health Care Agent/POA:  None Advanced Directives/Living Will:  None   Primary Care Clinic/Phone/Fax:  [facility]/(p) [phone], (f) [phone] Primary Dx:  COPD [J44.9]  Secondary Dx: Chronic Pain Syndrome [G89.4]   Primary Physician:  Dr. Vidhi Mario   Height:  5' 9\"  Weight:  207 lbs   Specialty Physician:   Dr. Galan OB/GYN Audiologist:  [place]   Eye Care Provider:  [facility] Provider:    [facility]: [facility] [phone] or [phone]   Other:        [facility] CURRENT SERVICES SUMMARY  Equipment owned/DME history: Glasses, walker, shower chair  SERVICE TYPE/PROVIDER NAME/PHONE AUTH DATE FREQUENCY Units OR $ Amt DESCRIPTION   Medical Transportation: [facility] [phone] 5/1/20-  4/30/21 as needed  Medical rides as needed     Supplies: [facility] [phone] 5/1/20-  4/30/21 monthly MA Incontinent products monthly     Homemaking: [facility] [employer]   Fax: [phone]  NPI: [id number] 8/31/20-  4/30/21 weekly  4 hours/week $335.57              Hyperlipidemia LDL goal <100 01/20/2017     Priority: Medium     Fibromyalgia 01/20/2017     Priority: Medium     Benign essential hypertension 01/20/2017     Priority: Medium     Chronic pain syndrome 01/20/2017     Priority: Medium     Patient is followed by Disha Van MD for ongoing prescription of pain medication.  All refills should only be approved by this provider, or covering partner.    Medication(s): Tramadol.   Maximum quantity per month: 90  Clinic visit frequency required: Q 6  months     Controlled substance agreement:  Encounter-Level CSA:     There are no " encounter-level csa.        Pain Clinic evaluation in the past: Yes       Date/Location:      DIRE Total Score(s):  No flowsheet data found.    Last [facility] website verification:  none   [email or web address]       DM type 2, Hgb A1C 7.3 on 5/20/20      Priority: Medium     Cervical spine degeneration      Priority: Medium     spinal stenosis,        Facet arthropathy, lumbar      Priority: Medium     Lumbar degenerative disc disease      Priority: Medium     Migraine headache      Priority: Medium     Diabetic neuropathy (H)      Priority: Medium     Anxiety      Priority: Medium     GERD (gastroesophageal reflux disease)      Priority: Medium     HCD (health care directive) 01/12/2017     Priority: Medium     Pt received HCD and will return when complete.    IMO Regulatory Load OCT 2020       Controlled substance agreement signed- ok 5/12/20 01/12/2017     Priority: Medium     Patient is followed by Yayo Mendoza MD for ongoing prescription of pain medication.  All refills should only be approved by this provider, or covering partner.    Medication(s): Tramadol.   Maximum quantity per month: 90  Clinic visit frequency required: Q 6  months     Controlled substance agreement:  CSA signed 2/5/2019     Pain Clinic evaluation in the past: Yes       Date/Location:      DIRE Total Score(s):  No flowsheet data found.    Last [facility] website verification:  none   [email or web address]         COPD (chronic obstructive pulmonary disease) (H) 10/18/2012     Priority: Medium     mild        Past Medical History:   Diagnosis Date     Anxiety      Cervical spine degeneration 2016    spinal stenosis,      COPD (chronic obstructive pulmonary disease) (H) 2012    mild     Coronary artery disease      Diabetes mellitus, type 2 (H)      Diabetic neuropathy (H)      Facet arthropathy, lumbar      GERD (gastroesophageal reflux disease)      HTN (hypertension)      Hyperlipidemia      Lumbar degenerative disc disease       Migraine headache      Past Surgical History:   Procedure Laterality Date     CHOLECYSTECTOMY, LAPOROSCOPIC      Cholecystectomy, Laparoscopic     CV HEART CATHETERIZATION WITH POSSIBLE INTERVENTION Left 2/26/2019    Procedure: Coronary Angiogram;  Surgeon: Cheo Merida MD;  Location:  HEART CARDIAC CATH LAB     CV HEART CATHETERIZATION WITH POSSIBLE INTERVENTION Bilateral 10/2/2020    Procedure: Heart Catheterization with Possible Intervention;  Surgeon: Linda Sauceda MD;  Location:  HEART CARDIAC CATH LAB     CV LEFT HEART CATH N/A 10/2/2020    Procedure: Left Heart Cath;  Surgeon: Linda Sauceda MD;  Location:  HEART CARDIAC CATH LAB     OPEN REDUCTION INTERNAL FIXATION ORBIT BLOWOUT Left 10/8/2020    Procedure: Left Open Reduction Internal Fixation, Fracture, Orbit With Intraoperative Navigation - Left;  Surgeon: George Doll MD;  Location: [facility] OR     Current Outpatient Medications   Medication Sig Dispense Refill     acetaminophen (TYLENOL ARTHRITIS PAIN) 650 MG CR tablet Take 650 mg by mouth 2 times daily as needed        albuterol (PROAIR HFA/PROVENTIL HFA/VENTOLIN HFA) 108 (90 Base) MCG/ACT inhaler Inhale 2 puffs into the lungs every 6 hours (Patient taking differently: Inhale 2 puffs into the lungs every 6 hours as needed ) 18 g 1     amLODIPine (NORVASC) 5 MG tablet TAKE ONE TABLET BY MOUTH ONCE DAILY 90 tablet 2     aspirin (ASA) 81 MG chewable tablet Take 1 tablet (81 mg) by mouth daily 30 tablet 1     atorvastatin (LIPITOR) 80 MG tablet Take 1 tablet (80 mg) by mouth daily +++NEED FASTING LABS+++ 90 tablet 0     blood glucose monitoring (NO BRAND SPECIFIED) test strip Use to test blood sugars 1 times daily or as directed, use brand same as previous 100 strip 3     cetirizine (ZYRTEC) 10 MG tablet Take 1 tablet (10 mg) by mouth daily 90 tablet 0     cyclobenzaprine (FLEXERIL) 10 MG tablet TAKE ONE TABLET BY MOUTH TWICE A  tablet 1     DULoxetine (CYMBALTA) 60 MG  capsule Take 1 capsule (60 mg) by mouth daily 90 capsule 3     fluticasone (FLONASE) 50 MCG/ACT nasal spray SPRAY 2 SPRAYS IN EACH NOSTRIL ONCE DAILY 16 g 1     guaiFENesin-codeine (GUAIFENESIN AC) 100-10 MG/5ML syrup Take 5 mLs by mouth every 8 hours as needed for congestion 118 mL 0     hydrOXYzine (ATARAX) 10 MG tablet TAKE ONE TABLET BY MOUTH THREE TIMES A DAY AS NEEDED FOR ANXIETY 30 tablet 0     ibuprofen (ADVIL/MOTRIN) 600 MG tablet        lisinopril (ZESTRIL) 20 MG tablet TAKE ONE TABLET BY MOUTH TWICE A  tablet 2     metFORMIN (GLUCOPHAGE) 500 MG tablet TAKE ONE TABLET BY MOUTH TWICE A DAY WITH A MEAL 180 tablet 0     metoprolol tartrate (LOPRESSOR) 25 MG tablet Take 25 mg by mouth 2 times daily       montelukast (SINGULAIR) 10 MG tablet TAKE ONE TABLET BY MOUTH AT BEDTIME 90 tablet 0     Multiple Vitamins-Minerals (MULTIVITAMIN ADULT PO) Take 5 tablets by mouth daily Pro-caps vitamin        nitroGLYcerin (NITROSTAT) 0.4 MG sublingual tablet For chest pain place 1 tablet under the tongue every 5 minutes for 3 doses. If symptoms persist 5 minutes after 1st dose call 911. 30 tablet 3     nystatin (NYAMYC) 290146 UNIT/GM external powder APPLY TO AFFECTED AREA(S) TOPICALLY THREE TIMES A DAY AS NEEDED 60 g 0     omeprazole (PRILOSEC) 20 MG DR capsule TAKE ONE CAPSULE BY MOUTH TWICE A  capsule 2     order for DME Equipment being ordered: glucometer 1 each 0     pregabalin (LYRICA) 150 MG capsule TAKE ONE CAPSULE BY MOUTH THREE TIMES A  capsule 0     sodium chloride (OCEAN) 0.65 % nasal spray Apply one spray to nares two times daily 15 mL 0     tiotropium (SPIRIVA) 18 MCG inhaled capsule Inhale 1 capsule (18 mcg) into the lungs daily 90 capsule 1     traMADol (ULTRAM) 50 MG tablet Take 1-2 tablets ( mg) by mouth 2 times daily as needed for severe pain 120 tablet 0     traZODone (DESYREL) 100 MG tablet TAKE TWO TABLETS BY MOUTH EVERY NIGHT AT BEDTIME 180 tablet 1       Allergies  "  Allergen Reactions     Penicillins Hives        Social History     Tobacco Use     Smoking status: Former Smoker     Packs/day: 1.00     Years: 40.00     Pack years: 40.00     Types: Cigarettes     Quit date: 6/1/2017     Years since quitting: 3.7     Smokeless tobacco: Never Used   Substance Use Topics     Alcohol use: No       History   Drug Use No         Objective     /70   Pulse 90   Temp 98.7  F (37.1  C) (Oral)   Resp 12   Ht 1.753 m (5' 9\")   Wt 92.5 kg (204 lb)   LMP  (LMP Unknown)   SpO2 94%   Breastfeeding No   BMI 30.13 kg/m      Physical Exam    GENERAL APPEARANCE: healthy, alert and no distress     HENT: ear canals and TM's normal and nose and mouth without ulcers or lesions     NECK: no adenopathy, no asymmetry, masses, or scars and thyroid normal to palpation     RESP: lungs clear to auscultation - no rales, rhonchi or wheezes     CV: regular rates and rhythm, normal S1 S2, no S3 or S4 and no murmur, click or rub     ABDOMEN:  soft, nontender, no HSM or masses and bowel sounds normal     MS: extremities normal- no gross deformities noted, no evidence of inflammation in joints, FROM in all extremities.     SKIN: no suspicious lesions or rashes     NEURO: Normal strength and tone, sensory exam grossly normal, mentation intact and speech normal     PSYCH: mentation appears normal. and affect normal/bright      Recent Labs   Lab Test 09/30/20  2201 09/22/20  1412 05/20/20  1415 05/20/20  1415 11/13/19  1505   HGB 11.4* 11.9   < > 12.0  --     393   < > 294  --     142   < > 141 142   POTASSIUM 3.5 3.8   < > 4.0 4.2   CR 0.93 0.76   < > 0.72 0.79   A1C  --   --   --  7.3* 6.7*    < > = values in this interval not displayed.        Diagnostics:  Labs pending at this time.  Results will be reviewed when available.   No EKG required for low risk surgery (cataract, skin procedure, breast biopsy, etc).    Revised Cardiac Risk Index (RCRI):  The patient has the following serious " cardiovascular risks for perioperative complications:   - No serious cardiac risks = 0 points     RCRI Interpretation: 0 points: Class I (very low risk - 0.4% complication rate)         Signed Electronically by: Vidhi Mario MD  Copy of this evaluation report is provided to requesting physician.    [facility] Preop Guidelines    Revised Cardiac Risk Index

## 2021-03-09 ENCOUNTER — TELEPHONE (OUTPATIENT)
Dept: INTERNAL MEDICINE | Facility: CLINIC | Age: 71
End: 2021-03-09

## 2021-03-09 LAB
ALBUMIN SERPL-MCNC: 3.4 G/DL (ref 3.4–5)
ALP SERPL-CCNC: 82 U/L (ref 40–150)
ALT SERPL W P-5'-P-CCNC: 22 U/L (ref 0–50)
ANION GAP SERPL CALCULATED.3IONS-SCNC: 4 MMOL/L (ref 3–14)
AST SERPL W P-5'-P-CCNC: 17 U/L (ref 0–45)
BILIRUB SERPL-MCNC: 0.2 MG/DL (ref 0.2–1.3)
BUN SERPL-MCNC: 9 MG/DL (ref 7–30)
CALCIUM SERPL-MCNC: 8.6 MG/DL (ref 8.5–10.1)
CHLORIDE SERPL-SCNC: 109 MMOL/L (ref 94–109)
CHOLEST SERPL-MCNC: 147 MG/DL
CO2 SERPL-SCNC: 29 MMOL/L (ref 20–32)
CREAT SERPL-MCNC: 0.64 MG/DL (ref 0.52–1.04)
CREAT UR-MCNC: 322 MG/DL
GFR SERPL CREATININE-BSD FRML MDRD: >90 ML/MIN/{1.73_M2}
GLUCOSE SERPL-MCNC: 103 MG/DL (ref 70–99)
HDLC SERPL-MCNC: 53 MG/DL
LABORATORY COMMENT REPORT: NORMAL
LDLC SERPL CALC-MCNC: 40 MG/DL
MICROALBUMIN UR-MCNC: 99 MG/L
MICROALBUMIN/CREAT UR: 30.71 MG/G CR (ref 0–25)
NONHDLC SERPL-MCNC: 94 MG/DL
POTASSIUM SERPL-SCNC: 4.3 MMOL/L (ref 3.4–5.3)
PROT SERPL-MCNC: 7 G/DL (ref 6.8–8.8)
SARS-COV-2 RNA RESP QL NAA+PROBE: NEGATIVE
SODIUM SERPL-SCNC: 142 MMOL/L (ref 133–144)
SPECIMEN SOURCE: NORMAL
TRIGL SERPL-MCNC: 269 MG/DL

## 2021-03-10 ENCOUNTER — PATIENT OUTREACH (OUTPATIENT)
Dept: GERIATRIC MEDICINE | Facility: CLINIC | Age: 71
End: 2021-03-10

## 2021-03-10 ENCOUNTER — ANESTHESIA EVENT (OUTPATIENT)
Dept: SURGERY | Facility: AMBULATORY SURGERY CENTER | Age: 71
End: 2021-03-10

## 2021-03-10 DIAGNOSIS — G47.00 INSOMNIA, UNSPECIFIED TYPE: ICD-10-CM

## 2021-03-10 DIAGNOSIS — E11.40 TYPE 2 DIABETES MELLITUS WITH DIABETIC NEUROPATHY, WITHOUT LONG-TERM CURRENT USE OF INSULIN (H): ICD-10-CM

## 2021-03-10 DIAGNOSIS — E78.5 HYPERLIPIDEMIA LDL GOAL <100: ICD-10-CM

## 2021-03-10 DIAGNOSIS — B37.2 YEAST INFECTION OF THE SKIN: ICD-10-CM

## 2021-03-10 DIAGNOSIS — I10 BENIGN ESSENTIAL HYPERTENSION: Primary | ICD-10-CM

## 2021-03-10 NOTE — PROGRESS NOTES
[facility] Care Coordination Contact    Received after visit chart from care coordinator.  Completed following tasks: Mailed copy of care plan to client, Updated services in access and Submitted referrals/auths for homemaking.   and Provider Signature - No POC Shared:  Member indicates that they do not want their POC shared with any [facility] providers.     Mailed member a [employer] application.    Member was mailed a copy of the POC signature sheet to sign and return mail with a SASe.  This assessment was completed telephonically due to Covid-19.    Cynthia Bell  Care Management Specialist  [facility]  [phone]

## 2021-03-10 NOTE — LETTER
March 10, 2021      VIJAYA BRAGA  [address] [place]  [place] [place] [facility] [address]      Dear Vijaya:    At [facility], we are dedicated to improving your health and well-being. Enclosed is the Comprehensive Care Plan that we developed with you on 03/05/2021. Please review the Care Plan carefully.    As a reminder, some of the things we discussed at your visit include:    Your physical and mental health    Ways to reduce falls    Health care needs you may have    Don t forget to contact your care coordinator if you:    Have been hospitalized or plan to be hospitalized     Have had a fall     Have experienced a change in physical health    Are experiencing emotional problems     If you do not agree with your Care Plan, have questions about it, or have experienced a change in your needs, please call me at [phone]. If you are hearing impaired, please call the [place] [employer] at 846 or [phone] ([email or web address] relay service).    Sincerely,    Vanesa Gtz RN    E-mail: [email or web address]  Phone: [phone]      [facility] ([facility]) is a health plan that contracts with both Medicare and the [place] Medical Assistance (Medicaid) program to provide benefits of both programs to enrollees. Enrollment in [facility] depends on contract renewal.    [id number])     [id number] (11/18)

## 2021-03-11 ENCOUNTER — HOSPITAL ENCOUNTER (OUTPATIENT)
Facility: AMBULATORY SURGERY CENTER | Age: 71
Discharge: HOME | End: 2021-03-11
Attending: OPHTHALMOLOGY | Admitting: OPHTHALMOLOGY
Payer: COMMERCIAL

## 2021-03-11 ENCOUNTER — ANESTHESIA (OUTPATIENT)
Dept: SURGERY | Facility: AMBULATORY SURGERY CENTER | Age: 71
End: 2021-03-11

## 2021-03-11 VITALS
WEIGHT: 200 LBS | HEIGHT: 69 IN | TEMPERATURE: 98 F | SYSTOLIC BLOOD PRESSURE: 150 MMHG | HEART RATE: 95 BPM | RESPIRATION RATE: 16 BRPM | OXYGEN SATURATION: 94 % | BODY MASS INDEX: 29.62 KG/M2 | DIASTOLIC BLOOD PRESSURE: 88 MMHG

## 2021-03-11 DIAGNOSIS — H02.403 ACQUIRED INVOLUTIONAL PTOSIS OF BOTH EYELIDS: ICD-10-CM

## 2021-03-11 DIAGNOSIS — H02.831 DERMATOCHALASIS OF BOTH UPPER EYELIDS: ICD-10-CM

## 2021-03-11 DIAGNOSIS — H02.9 EYELID LESION: ICD-10-CM

## 2021-03-11 DIAGNOSIS — H02.834 DERMATOCHALASIS OF BOTH UPPER EYELIDS: ICD-10-CM

## 2021-03-11 LAB
CREAT UR-MCNC: 322 MG/DL
GLUCOSE BLDC GLUCOMTR-MCNC: 212 MG/DL (ref 70–99)
N-NORTRAMADOL/CREAT UR CFM: ABNORMAL NG/MG{CREAT}
O-NORTRAMADOL UR CFM-MCNC: ABNORMAL NG/ML
PREGABALIN UR QL CFM: PRESENT
RPT COMMENT: ABNORMAL
TRAMADOL CTO UR CFM-MCNC: ABNORMAL NG/ML
TRAMADOL/CREAT UR: ABNORMAL NG/MG{CREAT}

## 2021-03-11 PROCEDURE — 67840 REMOVE EYELID LESION: CPT | Mod: E3

## 2021-03-11 PROCEDURE — 88305 TISSUE EXAM BY PATHOLOGIST: CPT | Performed by: OPHTHALMOLOGY

## 2021-03-11 PROCEDURE — 88341 IMHCHEM/IMCYTCHM EA ADD ANTB: CPT | Performed by: OPHTHALMOLOGY

## 2021-03-11 PROCEDURE — 67904 REPAIR EYELID DEFECT: CPT | Mod: RT

## 2021-03-11 PROCEDURE — 88342 IMHCHEM/IMCYTCHM 1ST ANTB: CPT | Performed by: OPHTHALMOLOGY

## 2021-03-11 RX ORDER — PROPOFOL 10 MG/ML
INJECTION, EMULSION INTRAVENOUS PRN
Status: DISCONTINUED | OUTPATIENT
Start: 2021-03-11 | End: 2021-03-11

## 2021-03-11 RX ORDER — SODIUM CHLORIDE, SODIUM LACTATE, POTASSIUM CHLORIDE, CALCIUM CHLORIDE 600; 310; 30; 20 MG/100ML; MG/100ML; MG/100ML; MG/100ML
INJECTION, SOLUTION INTRAVENOUS CONTINUOUS
Status: DISCONTINUED | OUTPATIENT
Start: 2021-03-11 | End: 2021-03-12 | Stop reason: HOSPADM

## 2021-03-11 RX ORDER — ONDANSETRON 4 MG/1
4 TABLET, ORALLY DISINTEGRATING ORAL EVERY 30 MIN PRN
Status: DISCONTINUED | OUTPATIENT
Start: 2021-03-11 | End: 2021-03-12 | Stop reason: HOSPADM

## 2021-03-11 RX ORDER — ERYTHROMYCIN 5 MG/G
OINTMENT OPHTHALMIC PRN
Status: DISCONTINUED | OUTPATIENT
Start: 2021-03-11 | End: 2021-03-11 | Stop reason: HOSPADM

## 2021-03-11 RX ORDER — ERYTHROMYCIN 5 MG/G
OINTMENT OPHTHALMIC
Qty: 3.5 G | Refills: 0 | Status: SHIPPED | OUTPATIENT
Start: 2021-03-11 | End: 2021-09-28

## 2021-03-11 RX ORDER — NALOXONE HYDROCHLORIDE 0.4 MG/ML
0.4 INJECTION, SOLUTION INTRAMUSCULAR; INTRAVENOUS; SUBCUTANEOUS
Status: DISCONTINUED | OUTPATIENT
Start: 2021-03-11 | End: 2021-03-12 | Stop reason: HOSPADM

## 2021-03-11 RX ORDER — FENTANYL CITRATE 50 UG/ML
25-50 INJECTION, SOLUTION INTRAMUSCULAR; INTRAVENOUS
Status: DISCONTINUED | OUTPATIENT
Start: 2021-03-11 | End: 2021-03-11 | Stop reason: HOSPADM

## 2021-03-11 RX ORDER — TETRACAINE HYDROCHLORIDE 5 MG/ML
SOLUTION OPHTHALMIC PRN
Status: DISCONTINUED | OUTPATIENT
Start: 2021-03-11 | End: 2021-03-11 | Stop reason: HOSPADM

## 2021-03-11 RX ORDER — HYDROMORPHONE HYDROCHLORIDE 1 MG/ML
.3-.5 INJECTION, SOLUTION INTRAMUSCULAR; INTRAVENOUS; SUBCUTANEOUS EVERY 10 MIN PRN
Status: DISCONTINUED | OUTPATIENT
Start: 2021-03-11 | End: 2021-03-12 | Stop reason: HOSPADM

## 2021-03-11 RX ORDER — ACETAMINOPHEN 325 MG/1
975 TABLET ORAL ONCE
Status: COMPLETED | OUTPATIENT
Start: 2021-03-11 | End: 2021-03-11

## 2021-03-11 RX ORDER — OXYCODONE HYDROCHLORIDE 5 MG/1
5 TABLET ORAL EVERY 4 HOURS PRN
Status: DISCONTINUED | OUTPATIENT
Start: 2021-03-11 | End: 2021-03-12 | Stop reason: HOSPADM

## 2021-03-11 RX ORDER — LIDOCAINE 40 MG/G
CREAM TOPICAL
Status: DISCONTINUED | OUTPATIENT
Start: 2021-03-11 | End: 2021-03-11 | Stop reason: HOSPADM

## 2021-03-11 RX ORDER — NALOXONE HYDROCHLORIDE 0.4 MG/ML
0.2 INJECTION, SOLUTION INTRAMUSCULAR; INTRAVENOUS; SUBCUTANEOUS
Status: DISCONTINUED | OUTPATIENT
Start: 2021-03-11 | End: 2021-03-12 | Stop reason: HOSPADM

## 2021-03-11 RX ORDER — SODIUM CHLORIDE, SODIUM LACTATE, POTASSIUM CHLORIDE, CALCIUM CHLORIDE 600; 310; 30; 20 MG/100ML; MG/100ML; MG/100ML; MG/100ML
INJECTION, SOLUTION INTRAVENOUS CONTINUOUS
Status: DISCONTINUED | OUTPATIENT
Start: 2021-03-11 | End: 2021-03-11 | Stop reason: HOSPADM

## 2021-03-11 RX ORDER — ONDANSETRON 2 MG/ML
4 INJECTION INTRAMUSCULAR; INTRAVENOUS EVERY 30 MIN PRN
Status: DISCONTINUED | OUTPATIENT
Start: 2021-03-11 | End: 2021-03-12 | Stop reason: HOSPADM

## 2021-03-11 RX ORDER — FENTANYL CITRATE 50 UG/ML
INJECTION, SOLUTION INTRAMUSCULAR; INTRAVENOUS PRN
Status: DISCONTINUED | OUTPATIENT
Start: 2021-03-11 | End: 2021-03-11

## 2021-03-11 RX ORDER — LIDOCAINE HYDROCHLORIDE 20 MG/ML
INJECTION, SOLUTION INFILTRATION; PERINEURAL PRN
Status: DISCONTINUED | OUTPATIENT
Start: 2021-03-11 | End: 2021-03-11

## 2021-03-11 RX ADMIN — ACETAMINOPHEN 975 MG: 325 TABLET ORAL at 07:42

## 2021-03-11 RX ADMIN — FENTANYL CITRATE 25 MCG: 50 INJECTION, SOLUTION INTRAMUSCULAR; INTRAVENOUS at 09:30

## 2021-03-11 RX ADMIN — FENTANYL CITRATE 50 MCG: 50 INJECTION, SOLUTION INTRAMUSCULAR; INTRAVENOUS at 08:58

## 2021-03-11 RX ADMIN — LIDOCAINE HYDROCHLORIDE 60 MG: 20 INJECTION, SOLUTION INFILTRATION; PERINEURAL at 09:00

## 2021-03-11 RX ADMIN — PROPOFOL 60 MG: 10 INJECTION, EMULSION INTRAVENOUS at 09:02

## 2021-03-11 RX ADMIN — SODIUM CHLORIDE, SODIUM LACTATE, POTASSIUM CHLORIDE, CALCIUM CHLORIDE: 600; 310; 30; 20 INJECTION, SOLUTION INTRAVENOUS at 08:56

## 2021-03-11 RX ADMIN — FENTANYL CITRATE 25 MCG: 50 INJECTION, SOLUTION INTRAMUSCULAR; INTRAVENOUS at 09:27

## 2021-03-11 ASSESSMENT — MIFFLIN-ST. JEOR: SCORE: 1496.57

## 2021-03-11 NOTE — ANESTHESIA PREPROCEDURE EVALUATION
Anesthesia Pre-Procedure Evaluation    Patient: Vijaya Manjarrez   MRN: [id number] : 1950        Preoperative Diagnosis: Eyelid lesion [H02.9]  Dermatochalasis of both upper eyelids [H02.831, H02.834]  Acquired involutional ptosis of both eyelids [H02.403]   Procedure : Procedure(s):  Both upper eyelid blepharoplasty and ptosis repair. Right upper eyelid margin lesion biopsy     Past Medical History:   Diagnosis Date     Anxiety      Cervical spine degeneration     spinal stenosis,      COPD (chronic obstructive pulmonary disease) (H)     mild     Coronary artery disease      Diabetes mellitus, type 2 (H)      Diabetic neuropathy (H)      Facet arthropathy, lumbar      GERD (gastroesophageal reflux disease)      HTN (hypertension)      Hyperlipidemia      Lumbar degenerative disc disease      Migraine headache       Past Surgical History:   Procedure Laterality Date     CHOLECYSTECTOMY, LAPOROSCOPIC      Cholecystectomy, Laparoscopic     CV HEART CATHETERIZATION WITH POSSIBLE INTERVENTION Left 2019    Procedure: Coronary Angiogram;  Surgeon: Cheo Merida MD;  Location:  HEART CARDIAC CATH LAB     CV HEART CATHETERIZATION WITH POSSIBLE INTERVENTION Bilateral 10/2/2020    Procedure: Heart Catheterization with Possible Intervention;  Surgeon: Linda Sauceda MD;  Location: [facility] CARDIAC CATH LAB     CV LEFT HEART CATH N/A 10/2/2020    Procedure: Left Heart Cath;  Surgeon: Linda Sauceda MD;  Location:  HEART CARDIAC CATH LAB     OPEN REDUCTION INTERNAL FIXATION ORBIT BLOWOUT Left 10/8/2020    Procedure: Left Open Reduction Internal Fixation, Fracture, Orbit With Intraoperative Navigation - Left;  Surgeon: George Doll MD;  Location: [facility] OR      Allergies   Allergen Reactions     Penicillins Hives      Social History     Tobacco Use     Smoking status: Former Smoker     Packs/day: 1.00     Years: 40.00     Pack years: 40.00     Types: Cigarettes     Quit date: 2017      Years since quitting: 3.7     Smokeless tobacco: Never Used   Substance Use Topics     Alcohol use: No      Wt Readings from Last 1 Encounters:   03/08/21 92.5 kg (204 lb)           Physical Exam    Airway        Mallampati: II   TM distance: > 3 FB   Neck ROM: full   Mouth opening: > 3 cm    Respiratory Devices and Support         Dental     Comment: edentulous top, minimal teeth bottom        Cardiovascular   cardiovascular exam normal          Pulmonary   pulmonary exam normal            Other findings: Placement Date: 10/08/20; Placement Time: 1427; Airway Type: 2nd Gen LMA; Mask Ventilation: 0; LMA Size: 4; Airway Brand: I-Gel; Attempts: 1    OUTSIDE LABS:  CBC:   Lab Results   Component Value Date    WBC 11.6 (H) 03/08/2021    WBC 9.3 09/30/2020    HGB 10.3 (L) 03/08/2021    HGB 11.4 (L) 09/30/2020    HCT 35.3 03/08/2021    HCT 37.2 09/30/2020     03/08/2021     09/30/2020     BMP:   Lab Results   Component Value Date     03/08/2021     09/30/2020    POTASSIUM 4.3 03/08/2021    POTASSIUM 3.5 09/30/2020    CHLORIDE 109 03/08/2021    CHLORIDE 105 09/30/2020    CO2 29 03/08/2021    CO2 25 09/30/2020    BUN 9 03/08/2021    BUN 10 09/30/2020    CR 0.64 03/08/2021    CR 0.93 09/30/2020     (H) 03/08/2021     (H) 09/30/2020     COAGS:   Lab Results   Component Value Date    PTT 34 02/21/2019    INR 0.97 02/21/2019     POC:   Lab Results   Component Value Date     (H) 10/08/2020     HEPATIC:   Lab Results   Component Value Date    ALBUMIN 3.4 03/08/2021    PROTTOTAL 7.0 03/08/2021    ALT 22 03/08/2021    AST 17 03/08/2021    ALKPHOS 82 03/08/2021    BILITOTAL 0.2 03/08/2021     OTHER:   Lab Results   Component Value Date    LACT 1.5 07/02/2017    A1C 7.1 (H) 03/08/2021    RADHA 8.6 03/08/2021    MAG 1.6 08/17/2020    LIPASE 167 08/08/2020    TSH 1.22 05/20/2020       Anesthesia Plan    ASA Status:  3   NPO Status:  NPO Appropriate    Anesthesia Type: MAC.     - Reason  for MAC: straight local not clinically adequate   Induction: Intravenous, Propofol.   Maintenance: TIVA.        Consents    Anesthesia Plan(s) and associated risks, benefits, and realistic alternatives discussed. Questions answered and patient/representative(s) expressed understanding.     - Discussed with:  Patient      - Extended Intubation/Ventilatory Support Discussed: No.      - Patient is DNR/DNI Status: No    Use of blood products discussed: No .     Postoperative Care    Pain management: Oral pain medications, IV analgesics, Multi-modal analgesia.   PONV prophylaxis: Dexamethasone or Solumedrol, Ondansetron (or other 5HT-3)     Comments:                Artis Guillaume MD

## 2021-03-11 NOTE — ANESTHESIA CARE TRANSFER NOTE
Patient: Vijaya Manjarrez    Procedure(s):  Bilateral upper eyelid blepharoplasty and ptosis repair. Right upper eyelid margin lesion biopsy    Diagnosis: Eyelid lesion [H02.9]  Dermatochalasis of both upper eyelids [H02.831, H02.834]  Acquired involutional ptosis of both eyelids [H02.403]  Diagnosis Additional Information: No value filed.    Anesthesia Type:   MAC     Note:    Oropharynx: oropharynx clear of all foreign objects  Level of Consciousness: awake  Oxygen Supplementation: room air    Independent Airway: airway patency satisfactory and stable  Dentition: dentition unchanged  Vital Signs Stable: post-procedure vital signs reviewed and stable  Report to RN Given: handoff report given  Patient transferred to: Phase II    Handoff Report: Identifed the Patient, Identified the Reponsible Provider, Reviewed the pertinent medical history, Discussed the surgical course, Reviewed Intra-OP anesthesia mangement and issues during anesthesia, Set expectations for post-procedure period and Allowed opportunity for questions and acknowledgement of understanding      Vitals: (Last set prior to Anesthesia Care Transfer)  CRNA VITALS  3/11/2021 0915 - 3/11/2021 0945      3/11/2021             Pulse:  93    Ht Rate:  93    SpO2:  90 %        Electronically Signed By: AMANDA Colbert CRNA  March 11, 2021  9:45 AM

## 2021-03-11 NOTE — TELEPHONE ENCOUNTER
"Requested Prescriptions   Pending Prescriptions Disp Refills     atorvastatin (LIPITOR) 80 MG tablet [Pharmacy Med Name: ATORVASTATIN CALCIUM 80MG TABS] 90 tablet 0     Sig: TAKE 1 TABLET (80 MG) BY MOUTH DAILY (NEED FASTING LABS)       Statins Protocol Passed - 3/11/2021  8:17 AM        Passed - LDL on file in past 12 months     Recent Labs   Lab Test 03/08/21  1507   LDL 40             Passed - No abnormal creatine kinase in past 12 months     Recent Labs   Lab Test 05/20/20  1415   CKT 70                Passed - Recent (12 mo) or future (30 days) visit within the authorizing provider's specialty     Patient has had an office visit with the authorizing provider or a provider within the authorizing providers department within the previous 12 mos or has a future within next 30 days. See \"Patient Info\" tab in inbasket, or \"Choose Columns\" in Meds & Orders section of the refill encounter.              Passed - Medication is active on med list        Passed - Patient is age 18 or older        Passed - No active pregnancy on record        Passed - No positive pregnancy test in past 12 months           metFORMIN (GLUCOPHAGE) 500 MG tablet [Pharmacy Med Name: METFORMIN HCL 500MG TABS] 180 tablet 0     Sig: TAKE ONE TABLET BY MOUTH TWICE A DAY WITH A MEAL       Biguanide Agents Passed - 3/11/2021  8:17 AM        Passed - Patient is age 10 or older        Passed - Patient has documented A1c within the specified period of time.     If HgbA1C is 8 or greater, it needs to be on file within the past 3 months.  If less than 8, must be on file within the past 6 months.     Recent Labs   Lab Test 03/08/21  1507   A1C 7.1*             Passed - Patient's CR is NOT>1.4 OR Patient's EGFR is NOT<45 within past 12 mos.     Recent Labs   Lab Test 03/08/21  1507   GFRESTIMATED >90   GFRESTBLACK >90       Recent Labs   Lab Test 03/08/21  1507   CR 0.64             Passed - Patient does NOT have a diagnosis of CHF.        Passed - " "Medication is active on med list        Passed - Patient is not pregnant        Passed - Patient has not had a positive pregnancy test within the past 12 mos.         Passed - Recent (6 mo) or future (30 days) visit within the authorizing provider's specialty     Patient had office visit in the last 6 months or has a visit in the next 30 days with authorizing provider or within the authorizing provider's specialty.  See \"Patient Info\" tab in inbasket, or \"Choose Columns\" in Meds & Orders section of the refill encounter.               nystatin (MYCOSTATIN) 839797 UNIT/GM external powder [Pharmacy Med Name: NYSTATIN 031668JCNG/GM POWD]  0     Sig: APPLY TO AFFECTED AREA(S) TOPICALLY THREE TIMES A DAY AS NEEDED       Antifungal Agents Passed - 3/11/2021  8:17 AM        Passed - Recent (12 mo) or future (30 days) visit within the authorizing provider's specialty     Patient has had an office visit with the authorizing provider or a provider within the authorizing providers department within the previous 12 mos or has a future within next 30 days. See \"Patient Info\" tab in inbasket, or \"Choose Columns\" in Meds & Orders section of the refill encounter.              Passed - Not Fluconazole or Terconazole      If oral Fluconazole or Terconazole, may refill if indicated in progress notes.           Passed - Medication is active on med list           traZODone (DESYREL) 100 MG tablet 180 tablet 1       Serotonin Modulators Passed - 3/11/2021  8:17 AM        Passed - Recent (12 mo) or future (30 days) visit within the authorizing provider's specialty     Patient has had an office visit with the authorizing provider or a provider within the authorizing providers department within the previous 12 mos or has a future within next 30 days. See \"Patient Info\" tab in inbasket, or \"Choose Columns\" in Meds & Orders section of the refill encounter.              Passed - Medication is active on med list        Passed - Patient is age 18 " "or older        Passed - No active pregnancy on record        Passed - No positive pregnancy test in past 12 months           metoprolol tartrate (LOPRESSOR) 25 MG tablet       Sig: Take 1 tablet (25 mg) by mouth 2 times daily       Beta-Blockers Protocol Failed - 3/11/2021  8:17 AM        Failed - Blood pressure under 140/90 in past 12 months     BP Readings from Last 3 Encounters:   03/11/21 (!) 150/88   03/08/21 110/70   11/23/20 133/79                 Passed - Patient is age 6 or older        Passed - Recent (12 mo) or future (30 days) visit within the authorizing provider's specialty     Patient has had an office visit with the authorizing provider or a provider within the authorizing providers department within the previous 12 mos or has a future within next 30 days. See \"Patient Info\" tab in inbasket, or \"Choose Columns\" in Meds & Orders section of the refill encounter.              Passed - Medication is active on med list             "

## 2021-03-11 NOTE — ANESTHESIA POSTPROCEDURE EVALUATION
Patient: Vijaya Manjarrez    Procedure(s):  Bilateral upper eyelid blepharoplasty and ptosis repair. Right upper eyelid margin lesion biopsy    Diagnosis:Eyelid lesion [H02.9]  Dermatochalasis of both upper eyelids [H02.831, H02.834]  Acquired involutional ptosis of both eyelids [H02.403]  Diagnosis Additional Information: No value filed.    Anesthesia Type:  MAC    Note:  Disposition: Outpatient   Postop Pain Control: Uneventful            Sign Out: Well controlled pain   PONV:    Neuro/Psych: Uneventful            Sign Out: Acceptable/Baseline neuro status   Airway/Respiratory: Uneventful            Sign Out: Acceptable/Baseline resp. status   CV/Hemodynamics: Uneventful            Sign Out: Acceptable CV status   Other NRE:    DID A NON-ROUTINE EVENT OCCUR?          Last vitals:  Vitals:    03/11/21 0732 03/11/21 0945   BP: (!) 177/93 (!) 150/88   Pulse: 107 95   Resp: 18 16   Temp: 35.9  C (96.7  F) 36.7  C (98  F)   SpO2: 98% 94%       Last vitals prior to Anesthesia Care Transfer:  CRNA VITALS  3/11/2021 0910 - 3/11/2021 1010      3/11/2021             Pulse:  93    Ht Rate:  93    SpO2:  90 %    EKG:  Sinus tachycardia          Electronically Signed By: Artis Guillaume MD  March 11, 2021  3:48 PM

## 2021-03-11 NOTE — DISCHARGE INSTRUCTIONS
Post-operative Instructions  Ophthalmic Plastic and Reconstructive Surgery    George Doll M.D.     All instructions apply to the operated eye(s) or eyelid(s).    Wound care and personal care    ? Apply ice compresses 15 minutes of every hour while awake for 2 days. As long as there is no further bleeding, after two days, switch to warm water compresses for five minutes, four times a day until seen by your physician.   ? You may shower or wash your hair the day after surgery. Do not go swimming for at least 2 weeks to prevent contamination of your wounds.  ? Do not apply make-up to the eyes or eyelids for 2 weeks after surgery.  ? Expect some swelling, bruising, black eye (even into the lower eyelids and cheeks). Also expect serum caking, crusting and discharge from the eye and/or incisions. A small amount of surface bleeding, and depending on the type of surgery, bleeding from the inside of the eyelid, is normal for the first 48 hours.  ? Avoid straining, bending at the waist, or lifting more than 15 pounds for 10 days. Activities that raise your blood pressure can worsen swelling, cause bleeding, and breaking of sutures. Like wise, sleeping with your head slightly elevated for the first several days can help swelling resolve more quickly.   ? Do continue to ambulate (walk) as you normally would - being sedentary after surgery can cause blood clots.   ? Your eye(s) and eyelid(s) may be painful and tender. This is normal after surgery.      Contact information and follow-up  ? Return to the Eye Clinic for a follow-up appointment with your physician as scheduled. If no appointment has been scheduled:   - [facility] eye clinic: [phone] for an appointment with Dr. Doll within 1 to 2 weeks from your date of surgery. If you are scheduled for a phone or video visit for your first postoperative appointment, please e-mail pictures to [email or web address] 1-2 days before your  appointment.   -  [facility] eye clinic: [phone] for an appointment with Dr. Doll within 1 to 2 weeks from your date of surgery.     ? For severe pain, bleeding, or loss of vision, call the [facility] at [phone] or [facility] at [phone].     After hours or on weekends and holidays, call [phone] and ask to speak with the ophthalmologist on call.    An on call person can be reached after hours for concerns. The on call doctor should not call in medication refill requests after hours or on weekends, so please plan accordingly. An effort has been made to provide adequate pain medications following every surgery, and refills will not be provided in most instances.     Activity restrictions and driving  ? Avoid heavy lifting, bending, exercise or strenuous activity for 1 week after surgery.  You may resume other activities and return to work as tolerated.  ? You may not resume driving if you are using narcotic pain medications (such as Norco, Percocet, Tylenol #3).    Medications  ? Restart all regular home medications and eye drops. If you take Plavix or  Aspirin on a regular basis, wait for 72 hours after your surgery before restarting these in order to decrease the risk of bleeding complications.  ? Avoid aspirin and aspirin-like medications (Motrin, Aleve, Ibuprofen, Peggy-Jefferson etc) for 72 hours to reduce the risk of bleeding. You may take Tylenol (acetaminophen) for pain.  ? In addition to your home medications, take the following post-operative medications as prescribed by your physician.    ? Apply antibiotic ointment to all sutures three times a day, and into the operated eye(s) at night.  ? In many cases, postoperative discomfort can be managed with Tylenol alone. If narcotic pain medication was prescribed, take pain pills at prescribed frequency as needed for pain.    [facility]  Home Care  Following Anesthesia  For 24 hours after surgery:  1. Get plenty of rest.  A responsible adult must stay with you for at least 24 hours after you leave the surgery center.  2. Do not drive or use heavy equipment.  If you have weakness or tingling, don't drive or use heavy equipment until this feeling goes away.   3. Do not drink alcohol.   4. Avoid strenuous or risky activities.  Ask for help when climbing stairs.  5. You may feel lightheaded.  IF so, sit for a few minutes before standing.  Have someone help you get up.   6. If you have nausea (feel sick to your stomach): Drink only clear liquids such as apple juice, ginger ale, broth or 7-Up.  Rest may also help.  Be sure to drink enough fluids.  Move to a regular diet as you feel able.   7. You may have a slight fever.  Call the doctor if your fever is over 100 F (37.7 C) (taken under the tongue) or lasts longer than 24 hours.  8. You may have a dry mouth, a sore throat, muscle aches or trouble sleeping. These should go away after 24 hours.  9. Do not make important or legal decisions.               Tips for taking pain medications  To get the best pain relief possible, remember these points:    Take pain medications as directed, before pain becomes severe.    Pain medication can upset your stomach: taking it with food may help.    Constipation is a common side effect of pain medication. Drink plenty of  fluids.    Eat foods high in fiber. Take a stool softener if recommended by your doctor or pharmacist.    Do not drink alcohol, drive or operate machinery while taking pain medications.    Ask about other ways to control pain, such as with heat, ice or relaxation.    Tylenol/Acetaminophen Consumption  To help encourage the safe use of acetaminophen, the makers of TYLENOL  have lowered the maximum daily dose for single-ingredient Extra Strength TYLENOL  (acetaminophen) products sold in the [place] from 8 pills per day (4,000 mg) to 6 pills per day (3,000 mg). The  dosing interval has also changed from 2 pills every 4-6 hours to 2 pills every 6 hours.    If you feel your pain relief is insufficient, you may take Tylenol/Acetaminophen in addition to your narcotic pain medication.     Be careful not to exceed 3,000 mg of Tylenol/Acetaminophen in a 24 hour period from all sources.    If you are taking extra strength Tylenol/acetaminophen (500 mg), the maximum dose is 6 tablets in 24 hours.    If you are taking regular strength acetaminophen (325 mg), the maximum dose is 9 tablets in 24 hours.    Call a doctor for any of the followin. Signs of infection (fever, growing tenderness at the surgery site, a large amount of drainage or bleeding, severe pain, foul-smelling drainage, redness, swelling).  2. It has been over 8 to 10 hours since surgery and you are still not able to urinate (pass water).  3. Headache for over 24 hours.  4. Numbness, tingling or weakness the day after surgery (if you had spinal anesthesia).  5. Signs of Covid-19 infection (temperature over 100 degrees, shortness of breath, cough, loss of taste/smell, generalized body aches, persistent headache, chills, sore throat, nausea/vomiting/diarrhea)  Your doctor is:       Dr. George Doll, Ophthalmology: [phone]               Or dial [phone] and ask for the resident on call for:  Ophthalmology  For emergency care, call the:  [facility]:  [phone] (TTY for hearing impaired: [phone])

## 2021-03-12 ENCOUNTER — TELEPHONE (OUTPATIENT)
Dept: NURSING | Facility: CLINIC | Age: 71
End: 2021-03-12

## 2021-03-12 DIAGNOSIS — H02.834 DERMATOCHALASIS OF BOTH UPPER EYELIDS: Primary | ICD-10-CM

## 2021-03-12 DIAGNOSIS — H02.831 DERMATOCHALASIS OF BOTH UPPER EYELIDS: Primary | ICD-10-CM

## 2021-03-12 RX ORDER — TRAZODONE HYDROCHLORIDE 100 MG/1
TABLET ORAL
Qty: 180 TABLET | Refills: 3 | Status: SHIPPED | OUTPATIENT
Start: 2021-03-12 | End: 2022-04-01

## 2021-03-12 RX ORDER — ATORVASTATIN CALCIUM 80 MG/1
TABLET, FILM COATED ORAL
Qty: 90 TABLET | Refills: 3 | Status: SHIPPED | OUTPATIENT
Start: 2021-03-12 | End: 2022-04-01

## 2021-03-12 RX ORDER — METOPROLOL TARTRATE 25 MG/1
TABLET, FILM COATED ORAL
Qty: 180 TABLET | Refills: 3 | Status: SHIPPED | OUTPATIENT
Start: 2021-03-12 | End: 2022-04-01

## 2021-03-12 RX ORDER — NYSTATIN 100000 [USP'U]/G
POWDER TOPICAL
Qty: 60 G | Refills: 0 | Status: SHIPPED | OUTPATIENT
Start: 2021-03-12 | End: 2021-08-03

## 2021-03-12 RX ORDER — MINERAL OIL, PETROLATUM 425; 573 MG/G; MG/G
1 OINTMENT OPHTHALMIC 2 TIMES DAILY
Qty: 3.5 G | Refills: 1 | Status: SHIPPED | OUTPATIENT
Start: 2021-03-12 | End: 2021-11-02

## 2021-03-12 NOTE — TELEPHONE ENCOUNTER
Atorvastatin, nystatin, & metformin  Prescription approved per [facility] Refill Protocol.    Trazodone  Routing refill request to provider for review/approval because:  Drug interaction warning    Metoprolol  Routing refill request to provider for review/approval because:  BP Readings from Last 3 Encounters:   03/11/21 (!) 150/88   03/08/21 110/70   11/23/20 133/79

## 2021-03-12 NOTE — TELEPHONE ENCOUNTER
".  [facility]: Nurse Triage Note  SITUATION/BACKGROUND                                                      Vijaya Manjarrez is a 70 year old female s/p Bilateral upper eyelid blepharoplasty and ptosis repair. Right upper eyelid margin lesion biopsy procedure on 3/11/21.   Patient calls to report having bilateral eye pain since procedure. Per patient, since the anesthesia wore off.. \"Feels like knives are being shoved into my eyes\".    Rates pain at 8/10 and constant. Taking tylenol 500 mg x 2 tabs every 6-8 hrs. Nurse informed not to exceed 3,000mg/daily.  Applying ice packs and prescribed antibiotic ointment as directed.   Routed to Eye Clinic as High Priority to review and follow up call to patient at [phone] for further assistance...     "

## 2021-03-17 ENCOUNTER — PATIENT OUTREACH (OUTPATIENT)
Dept: GERIATRIC MEDICINE | Facility: CLINIC | Age: 71
End: 2021-03-17

## 2021-03-17 NOTE — PROGRESS NOTES
[facility] [place] Care Coordination Contact    Call from member who shares that she is still struggling after procedure last week. Her eyes are swollen and it is limiting her vision. Member requesting additional homemaking time this week to help her get ready for her annual apartment inspection. Member has already scheduled this with her homemaker. Additional 4 hours have been approved. Requested that CMS submit the auth. Email to [employer].     [facility] will be closing this month and plan to transfer homemakers and clients to [employer]. Member would like to follow her homemaker and will ask her on Monday if she plans to be hired with [employer].     Vanesa Gtz RN  [facility] [place]  [phone]  Fax: [phone]

## 2021-03-18 ENCOUNTER — OFFICE VISIT (OUTPATIENT)
Dept: OPHTHALMOLOGY | Facility: CLINIC | Age: 71
End: 2021-03-18
Payer: COMMERCIAL

## 2021-03-18 DIAGNOSIS — Z98.890 POSTOPERATIVE EYE STATE: Primary | ICD-10-CM

## 2021-03-18 DIAGNOSIS — H02.834 DERMATOCHALASIS OF BOTH UPPER EYELIDS: ICD-10-CM

## 2021-03-18 DIAGNOSIS — S02.85XS CLOSED FRACTURE OF ORBIT, SEQUELA (H): ICD-10-CM

## 2021-03-18 DIAGNOSIS — H02.831 DERMATOCHALASIS OF BOTH UPPER EYELIDS: ICD-10-CM

## 2021-03-18 DIAGNOSIS — H02.9 EYELID LESION: ICD-10-CM

## 2021-03-18 PROCEDURE — 99024 POSTOP FOLLOW-UP VISIT: CPT | Mod: GC | Performed by: OPHTHALMOLOGY

## 2021-03-18 ASSESSMENT — TONOMETRY
OS_IOP_MMHG: 15
IOP_METHOD: ICARE
OD_IOP_MMHG: 17

## 2021-03-18 ASSESSMENT — EXTERNAL EXAM - LEFT EYE: OS_EXAM: PERIORBITAL ECCHYMOSIS

## 2021-03-18 ASSESSMENT — VISUAL ACUITY
CORRECTION_TYPE: GLASSES
OS_PH_CC: 20/80
METHOD: SNELLEN - LINEAR
OD_PH_CC: 20/125
OS_CC: 20/100
OD_CC: 20/200
OD_PH_CC+: -1

## 2021-03-18 ASSESSMENT — EXTERNAL EXAM - RIGHT EYE: OD_EXAM: PERIORBITAL ECCHYMOSIS

## 2021-03-18 NOTE — LETTER
March 18, 2021      Re: Vijaya Manjarrez   1950    To Whom It May Concern:    This is to confirm that the above patient was seen on 3/18/2021.  Vijaya Manjarrez is unable to clean her apartment for two additional weeks.  Patient had surgery on 3/11/2021.     Thank you for your cooperation in this matter.  Please do not hesitate to contact me if you have any further questions.    Sincerely,      HUEY SALCIDO

## 2021-03-18 NOTE — PROGRESS NOTES
Chief Complaint(s) and History of Present Illness(es)     Post Op (Ophthalmology) Both Eyes     Laterality: both eyes    Associated symptoms: itching, discharge, swelling, redness and eye pain    Comments: POW#1 s/p BUL ptosis repair by external levator resection,   BULB, Excisional biopsy of RUL margin lesion           Comments     Pt notes that she has pain in and around eyes, bleeding, itching,   burning, swelling, she notes that her eyes were swollen shut until   yesterday. Was using EES fransisco stopped using a few days ago, was told to   switch to AT fransisco and using 2-3x daily using on incisions and occ inside of   eyes. Pt is using warm compresses 4-5x daily, done with cool compresses.     Altagracia Nguyen COT March 18, 2021 9:48 AM      Assessment & Plan     Vijaya Manjarrez is a 70 year old female with the following diagnoses:    Diagnosis Comments   1. Postoperative eye state  POW#1 s/p BUL ptosis repair by external levator resection, BULB, Excisional biopsy of RUL margin lesion    2. Dermatochalasis of both upper eyelids  See above   3. Eyelid lesion  See above   4. Closed fracture of orbit, sequela (H) - Left Eye  S/p repair     POW#1 s/p BUL ptosis repair by external levator resection, BULB, Excisional biopsy of RUL margin lesion   Pathology results pending  Continue warm compresses/soaks  Continue ointment to eyelids  Recheck in 2 months    Patient disposition:   Return in about 2 months (around 5/18/2021).     Zaheer Wharton MD  Ophthalmology Resident, PGY-4       Attending Physician Attestation: Complete documentation of historical and exam elements from today's encounter can be found in the full encounter summary report (not reduplicated in this progress note). I personally obtained the chief complaint(s) and history of present illness. I confirmed and edited as necessary the review of systems, past medical/surgical history, family history, social history, and examination findings as documented by  others; and I examined the patient myself. I personally reviewed the relevant tests, images, and reports as documented above. I formulated and edited as necessary the assessment and plan and discussed the findings and management plan with the patient.  -George Doll MD

## 2021-03-19 LAB — COPATH REPORT: NORMAL

## 2021-03-22 NOTE — PROGRESS NOTES
[facility] Care Coordination Contact    All necessary information provided to [employer] to transition homemaking services to them. Last day with [facility] is today. Requested that CMS submit new auth to [facility] [employer] starting 3/29/21. Talked with member and this update provided.     Vanesa Gtz RN  [facility]  [phone]  Fax: [phone]

## 2021-03-23 NOTE — PROGRESS NOTES
[facility] Care Coordination Contact    Member Signature - POC Change:  Per CC, member has made a change to their POC.  Care Plan Change Letter mailed to member for signature with a self-addressed return envelope.     Cynthia Bell  Care Management Specialist  [facility]  [phone]

## 2021-04-14 ENCOUNTER — TELEPHONE (OUTPATIENT)
Dept: INTERNAL MEDICINE | Facility: CLINIC | Age: 71
End: 2021-04-14

## 2021-04-14 DIAGNOSIS — J30.89 ALLERGIC RHINITIS DUE TO OTHER ALLERGIC TRIGGER, UNSPECIFIED SEASONALITY: Primary | ICD-10-CM

## 2021-04-14 DIAGNOSIS — M79.7 FIBROMYALGIA: ICD-10-CM

## 2021-04-14 DIAGNOSIS — M47.816 FACET ARTHROPATHY, LUMBAR: ICD-10-CM

## 2021-04-14 NOTE — TELEPHONE ENCOUNTER
Patient calling  cetirizine (ZYRTEC) 10 MG tablet is not working good enough and she would like to try something to better help her with her allergies.   Please advise. Ok to call and mabel [phone]

## 2021-04-14 NOTE — TELEPHONE ENCOUNTER
Called patient and left message to call the clinic back with more information regarding her allergies.

## 2021-04-14 NOTE — TELEPHONE ENCOUNTER
What allergy symptoms she has is more of postnasal drainage and congestion or runny nose.    Any specific allergies?  We can try Flonase or other antihistamine like Allegra.  Based on her symptoms I can recommend either inhaler or oral medication.    Lilliam Fernandes MD

## 2021-04-15 DIAGNOSIS — J30.1 SEASONAL ALLERGIC RHINITIS DUE TO POLLEN: ICD-10-CM

## 2021-04-15 NOTE — TELEPHONE ENCOUNTER
Covering provider for PCP. Dr Mario is reducing tramadol.  reviewed. Will wait for PCP to refill appropriately.    Lilliam Fernandes MD

## 2021-04-16 RX ORDER — FLUTICASONE PROPIONATE 50 MCG
SPRAY, SUSPENSION (ML) NASAL
Qty: 16 G | Refills: 5 | Status: SHIPPED | OUTPATIENT
Start: 2021-04-16 | End: 2021-08-12

## 2021-04-16 NOTE — TELEPHONE ENCOUNTER
Pending Prescriptions:                       Disp   Refills    fluticasone (FLONASE) 50 MCG/ACT nasal sp*16 g   5            Sig: SPRAY 2 SPRAYS IN EACH NOSTRIL ONCE DAILY    Prescription approved per [facility] Refill Protocol.

## 2021-04-20 RX ORDER — FEXOFENADINE HCL 60 MG/1
60 TABLET, FILM COATED ORAL 2 TIMES DAILY
Qty: 20 TABLET | Refills: 0 | Status: SHIPPED | OUTPATIENT
Start: 2021-04-20 | End: 2021-06-15

## 2021-04-20 RX ORDER — TRAMADOL HYDROCHLORIDE 50 MG/1
50 TABLET ORAL EVERY 6 HOURS PRN
Qty: 100 TABLET | Refills: 0 | Status: SHIPPED | OUTPATIENT
Start: 2021-04-20 | End: 2021-06-15

## 2021-04-20 NOTE — TELEPHONE ENCOUNTER
Patient states it has been 6 weeks since she refilled and it has been reduced.  Patient is frustrated and would like refilled.

## 2021-04-20 NOTE — TELEPHONE ENCOUNTER
Patient stated that her allergies are horrible right now and she would like to take once in the am and once in the pm.  Symptoms sinus pressure, congestion, and headache.   No exposure to COVID-19.  Please advise.

## 2021-04-20 NOTE — TELEPHONE ENCOUNTER
Dr. Mario mentioned that she want to reduce her tramadol amount in February visit.   reviewed.    180 pills of tramadol was given on 12/8/2020 and 1/13/2020.  It was reduced to 120 on 2/26/2021.  Would reduce to 100.    She can discuss with Dr. Mario if she has any questions on the frequency of use.    Lilliam Fernandes MD

## 2021-04-27 NOTE — PROGRESS NOTES
[facility] Care Coordination Contact    2nd Attempt: Signed Letter not received from member, resent per process.   Kalpana Ny  Case Management Specialist  [facility]  [phone]

## 2021-05-12 DIAGNOSIS — J44.9 CHRONIC OBSTRUCTIVE PULMONARY DISEASE, UNSPECIFIED COPD TYPE (H): ICD-10-CM

## 2021-05-13 RX ORDER — TIOTROPIUM BROMIDE 18 UG/1
CAPSULE ORAL; RESPIRATORY (INHALATION)
Qty: 90 CAPSULE | Refills: 0 | Status: SHIPPED | OUTPATIENT
Start: 2021-05-13 | End: 2021-09-28

## 2021-05-13 NOTE — TELEPHONE ENCOUNTER
Pending Prescriptions:                       Disp   Refills    SPIRIVA HANDIHALER 18 MCG inhaled capsule *90 cap*0        Sig: INHALE ONE CAPSULE BY MOUTH ONCE DAILY    Routing refill request to provider for review/approval because:  A break in medication

## 2021-05-18 ENCOUNTER — TELEPHONE (OUTPATIENT)
Dept: OPHTHALMOLOGY | Facility: CLINIC | Age: 71
End: 2021-05-18

## 2021-05-18 ENCOUNTER — OFFICE VISIT (OUTPATIENT)
Dept: OPHTHALMOLOGY | Facility: CLINIC | Age: 71
End: 2021-05-18
Payer: COMMERCIAL

## 2021-05-18 DIAGNOSIS — H02.831 DERMATOCHALASIS OF BOTH UPPER EYELIDS: Primary | ICD-10-CM

## 2021-05-18 DIAGNOSIS — H02.403 INVOLUTIONAL PTOSIS, ACQUIRED, BILATERAL: ICD-10-CM

## 2021-05-18 DIAGNOSIS — H02.834 DERMATOCHALASIS OF BOTH UPPER EYELIDS: Primary | ICD-10-CM

## 2021-05-18 DIAGNOSIS — S02.30XA BLOW-OUT FRACTURE OF ORBITAL FLOOR (H): ICD-10-CM

## 2021-05-18 PROCEDURE — 99024 POSTOP FOLLOW-UP VISIT: CPT | Mod: GC | Performed by: OPHTHALMOLOGY

## 2021-05-18 ASSESSMENT — VISUAL ACUITY
OS_CC: 20/60
METHOD: SNELLEN - LINEAR
OD_CC: 20/60
CORRECTION_TYPE: GLASSES

## 2021-05-18 ASSESSMENT — EXTERNAL EXAM - RIGHT EYE: OD_EXAM: NORMAL

## 2021-05-18 ASSESSMENT — REFRACTION_MANIFEST
OS_AXIS: 180
OD_SPHERE: +1.75
OS_SPHERE: +2.25
OD_ADD: +2.75
OS_CYLINDER: +0.75
OD_AXIS: 014
OS_ADD: +2.75
OD_CYLINDER: +0.50

## 2021-05-18 ASSESSMENT — TONOMETRY
IOP_METHOD: ICARE
OS_IOP_MMHG: 17
OD_IOP_MMHG: 17

## 2021-05-18 ASSESSMENT — SLIT LAMP EXAM - LIDS
COMMENTS: NORMAL
COMMENTS: NORMAL

## 2021-05-18 ASSESSMENT — CUP TO DISC RATIO
OD_RATIO: 0.5
OS_RATIO: 0.5

## 2021-05-18 ASSESSMENT — EXTERNAL EXAM - LEFT EYE: OS_EXAM: NORMAL

## 2021-05-18 NOTE — TELEPHONE ENCOUNTER
LVM for patient regarding scheduling a Return for Dr. Glaser cataract evaluation.  Provided Eye Clinic and direct number for scheduling.

## 2021-05-18 NOTE — PROGRESS NOTES
[facility] Clinic Post Op Return     Patient: Vijaya Manjarrez MRN# [id number]   YOB: 1950 Age: 71 year old   Date of Visit: May 18, 2021    Chief Complaint(s) and History of Present Illness(es)     Post Op (Ophthalmology) Both Eyes     Laterality: both eyes    Onset: months ago    Frequency: constantly    Course: stable    Treatments tried: no treatments    Pain scale: 0/10          Comments     S/p Bilateral upper eyelid ptosis repair by external levator resection,   Bilateral upper eyelid blepharoplasty, Excisional biopsy of right upper   eyelid margin lesion on 3/11/2021. Pt states happy with lids. Pt having   headaches from wearing her sisters glasses. They broke- would like new   glasses.    Luis Darnell, COT COT 1:51 PM May 18, 2021        Assessment & Plan     Vijaya Manjarrez is a 71 year old female with the following diagnoses:   No diagnosis found.     Vijaya Manjarrez is 2 months status post LARRY with elevator levator ptosis and R upper eyelid margin lesion.   The incision(s) are healing well.  The lid(s) are in excellent position.  She uses artificial tears daily.     I have recommended:  * Continue moisturizing and massage of scars  * Return to clinic as needed    Dai Gallegos MD  Plastics Resident   Agree with above. Doing very well post bofx repair and bulb ptosis. best corrected visual acuity 20/40 both eyes, dispense manifest refraction but recommend cataract eval. Can see Dr. Glaser.    Attending Physician Attestation: Complete documentation of historical and exam elements from today's encounter can be found in the full encounter summary report (not reduplicated in this progress note). I personally obtained the chief complaint(s) and history of present illness. I confirmed and edited as necessary the review of systems, past medical/surgical history, family history, social history, and examination findings as documented by others; and I examined the patient myself. I  personally reviewed the relevant tests, images, and reports as documented above. I formulated and edited as necessary the assessment and plan and discussed the findings and management plan with the patient. George Doll MD      Today with Vijaya Manjarrez I reviewed the indications, risks, benefits, and alternatives of the proposed surgical procedure including, but not limited to, failure obtain the desired result  and need for additional surgery, bleeding, infection, loss of vision, loss of the eye, and the remote possibility of permanent damage to any organ system or death with the use of anesthesia.  I provided multiple opportunities for the questions, answered all questions to the best of my ability, and confirmed that my answers and my discussion were understood.

## 2021-05-18 NOTE — NURSING NOTE
Chief Complaints and History of Present Illnesses   Patient presents with     Post Op (Ophthalmology) Both Eyes     Chief Complaint(s) and History of Present Illness(es)     Post Op (Ophthalmology) Both Eyes     Laterality: both eyes    Onset: months ago    Frequency: constantly    Course: stable    Treatments tried: no treatments    Pain scale: 0/10              Comments     S/p Bilateral upper eyelid ptosis repair by external levator resection, Bilateral upper eyelid blepharoplasty, Excisional biopsy of right upper eyelid margin lesion on 3/11/2021. Pt states happy with lids. Pt having headaches from wearing her sisters glasses. They broke- would like new glasses.    Luis Darnell, COT COT 1:51 PM May 18, 2021

## 2021-06-02 NOTE — PROGRESS NOTES
[facility] Care Coordination Contact    No Letter Received: 60 day tracking of letter complete, no letter received from member. Tracking discontinued.   Kalpana Ny  Case Management Specialist  [facility]  [phone]

## 2021-06-02 NOTE — TELEPHONE ENCOUNTER
FUTURE VISIT INFORMATION      FUTURE VISIT INFORMATION:    Date: 6/8/21    Time: 1:20pm    Location: [facility]  REFERRAL INFORMATION:    Referring provider:  George Doll MD    Referring providers clinic:  [facility]    Reason for visit/diagnosis  Consult for Cataract SX    RECORDS REQUESTED FROM:       Clinic name Comments Records Status Imaging Status   [facility] OV/referral 5/18/21  Ov/notes 9/22/20-5/18/21 EPIC

## 2021-06-08 ENCOUNTER — OFFICE VISIT (OUTPATIENT)
Dept: OPHTHALMOLOGY | Facility: CLINIC | Age: 71
End: 2021-06-08
Payer: COMMERCIAL

## 2021-06-08 ENCOUNTER — PRE VISIT (OUTPATIENT)
Dept: OPHTHALMOLOGY | Facility: CLINIC | Age: 71
End: 2021-06-08

## 2021-06-08 DIAGNOSIS — Z11.59 ENCOUNTER FOR SCREENING FOR OTHER VIRAL DISEASES: ICD-10-CM

## 2021-06-08 DIAGNOSIS — E11.9 TYPE 2 DIABETES MELLITUS WITHOUT OPHTHALMIC MANIFESTATIONS (H): ICD-10-CM

## 2021-06-08 DIAGNOSIS — H25.13 NUCLEAR SCLEROTIC CATARACT OF BOTH EYES: Primary | ICD-10-CM

## 2021-06-08 DIAGNOSIS — H18.529 ABMD (ANTERIOR BASEMENT MEMBRANE DYSTROPHY): ICD-10-CM

## 2021-06-08 PROCEDURE — 76519 ECHO EXAM OF EYE: CPT | Mod: RT | Performed by: OPHTHALMOLOGY

## 2021-06-08 PROCEDURE — 99214 OFFICE O/P EST MOD 30 MIN: CPT | Mod: 24 | Performed by: OPHTHALMOLOGY

## 2021-06-08 ASSESSMENT — REFRACTION
OD_CYLINDER: +0.75
OD_SPHERE: +1.75
OD_ADD: +2.25
OS_CYLINDER: +1.00
OD_AXIS: 014
OS_AXIS: 170
OS_ADD: +2.25
OS_SPHERE: +1.25

## 2021-06-08 ASSESSMENT — TONOMETRY
IOP_METHOD: TONOPEN
OS_IOP_MMHG: 14
OD_IOP_MMHG: 14

## 2021-06-08 ASSESSMENT — REFRACTION_WEARINGRX
OS_CYLINDER: +1.00
OS_ADD: +2.25
OD_AXIS: 014
OS_SPHERE: +2.00
OD_CYLINDER: +0.75
OS_AXIS: 170
OD_ADD: +2.25
OD_SPHERE: +1.75

## 2021-06-08 ASSESSMENT — SLIT LAMP EXAM - LIDS
COMMENTS: NORMAL
COMMENTS: NORMAL

## 2021-06-08 ASSESSMENT — CUP TO DISC RATIO
OD_RATIO: 0.5
OS_RATIO: 0.5

## 2021-06-08 ASSESSMENT — VISUAL ACUITY
OS_PH_CC: 20/80
OD_PH_CC+: -1
OD_PH_CC: 20/50
CORRECTION_TYPE: GLASSES
OS_CC: 20/125
OS_CC+: -
METHOD: SNELLEN - LINEAR
OD_CC: 20/100

## 2021-06-08 ASSESSMENT — CONF VISUAL FIELD
OD_NORMAL: 1
METHOD: COUNTING FINGERS
OS_NORMAL: 1

## 2021-06-08 ASSESSMENT — EXTERNAL EXAM - LEFT EYE: OS_EXAM: NORMAL

## 2021-06-08 ASSESSMENT — EXTERNAL EXAM - RIGHT EYE: OD_EXAM: NORMAL

## 2021-06-08 NOTE — PROGRESS NOTES
HPI  Vijaya Manjarrez is a 71 year old female referred for cataract evaluation by Dr. Doll.  Patient feels vision getting slowly worse both eyes, for the past 5 months.  Has chronic headache, she has a neurology appointment for this coming up.  No eye burning or stinging.    PMH:    Past Medical History:   Diagnosis Date     Anxiety      Cervical spine degeneration 2016    spinal stenosis,      COPD (chronic obstructive pulmonary disease) (H) 2012    mild     Coronary artery disease      Depression      Diabetes mellitus, type 2 (H)      Diabetic neuropathy (H)      Facet arthropathy, lumbar      GERD (gastroesophageal reflux disease)      HTN (hypertension)      Hyperlipidemia      Lumbar degenerative disc disease      Migraine headache       POH:  Glasses for hypermetropia/astig/presby, cataract, orbital floor fracture left eye, anterior basement membrane dystrophy (ABMD) left eye   Oc Meds:  none      Assessment & Plan      (H25.13) Nuclear sclerotic cataract of both eyes - Both Eyes  (primary encounter diagnosis)  Comment: visually significant   Plan: Case Request: PHACOEMULSIFICATION, CATARACT,         WITH STANDARD INTRAOCULAR LENS IMPLANT         INSERTION, Case Request: PHACOEMULSIFICATION,         CATARACT, WITH STANDARD INTRAOCULAR LENS         IMPLANT INSERTION, IOL Biometry w/ IOL calc         (1st eye - 1st sx) RIGHT EYE  The patient was advised that the current significant symptoms of blurred vision and/or glare were primarily secondary to cataracts.  The risks, benefits, and alternatives to cataract surgery with intraocular lens implant were discussed at length, and informed consent was obtained.  The patient will schedule cataract surgery in the near future.  Pre-operative measurements were taken with the IOL Master to plan appropriate lens power and lens implant options were discussed including monofocal versus multifocal, toric for astigmatism, monovision, and refractive aim. The  patient was advised of the necessity for a pre-operative physical with their primary physician.  The patient was also asked to arrange to have someone at home when returning from surgery.    Plan:  Phacoemulsification with intraocular lens implant right eye.      Surgical plan:  Best corrected visual acuity today is 20/70  2  +NSC      Special equipment/needs:    Anesthesia: MAC/Topical  Able to lie flat:  Yes  Dilation: Good   7mm  Alpha blocker/Flomax: No  Malyugen/Iris expansion: Not needed  Anticoagulants: No  Guttata: No  Diabetes: Yes  Pseudoexfoliation: No pseudoexfoliation  Trauma: No  Trypan Blue: No  Refractive goal:  plano    Cylinder:  1.15 @ 83    (H18.529) ABMD (anterior basement membrane dystrophy) - Left Eye  Comment: mild , asymptomatic   Plan: follow    (E11.9) Type 2 diabetes mellitus without ophthalmic manifestations (H) - Both Eyes  Comment: Diabetes Mellitus 2 w/o retinopathy     Lab Results   Component Value Date    A1C 7.1 03/08/2021    A1C 7.3 05/20/2020    A1C 6.7 11/13/2019    A1C 6.8 06/25/2019    A1C 6.8 03/06/2019      Discussed the importance of tight blood glucose, blood pressure, and cholesterol ontrol in the prevention of diabetic retinopathy.   Plan:  Recommend yearly dilated eye exam.           -----------------------------------------------------------------------------------    Patient disposition:   Return in about 1 month (around 7/8/2021) for post-op cataract sx, POD1 OD. Call for sooner appointment as needed.    Complete documentation of historical and exam elements from today's encounter can be found in the full encounter summary report (not reduplicated in this progress note). I personally obtained the chief complaint(s) and history of present illness.  I have confirmed and edited as necessary the CC, HPI, PMH/PSH, social history, FMH, ROS, and exam/neuro findings as obtained by the technician or others. I have examined this patient myself and I personally viewed the  image(s) and studies listed above and the documentation reflects my findings and interpretation.  I formulated and edited as necessary the assessment and plan and discussed the findings and management plan with the patient and family.     Dai Glaser MD

## 2021-06-08 NOTE — NURSING NOTE
Chief Complaints and History of Present Illnesses   Patient presents with     Consult For     Cataract surgery     Chief Complaint(s) and History of Present Illness(es)     Consult For     Laterality: both eyes    Associated symptoms: eye pain, headache and dryness.  Negative for glare and haloes    Treatments tried: artificial tears    Pain scale: 6/10    Comments: Cataract surgery              Comments     Patient as referred by Dr Doll.  She has noticed decreased vision in both eyes for the past 5 months.  Her vision seems blurred and hazy.      She has had constant eye pain and headaches (neurology appt tomorrow) since her injury.      Fiona Anderson, COT 1:21 PM  June 8, 2021

## 2021-06-09 ENCOUNTER — MEDICAL CORRESPONDENCE (OUTPATIENT)
Dept: HEALTH INFORMATION MANAGEMENT | Facility: CLINIC | Age: 71
End: 2021-06-09

## 2021-06-11 DIAGNOSIS — R51.9 FACIAL PAIN: Primary | ICD-10-CM

## 2021-06-11 DIAGNOSIS — R51.9 HEAD PAIN: ICD-10-CM

## 2021-06-16 ENCOUNTER — TELEPHONE (OUTPATIENT)
Dept: OPHTHALMOLOGY | Facility: CLINIC | Age: 71
End: 2021-06-16

## 2021-06-16 NOTE — TELEPHONE ENCOUNTER
Spoke with patient to schedule right eye surgery with Dr. Glaser.    Surgery was scheduled on 7/9 at [facility]  Patient will have H&P at [facility]     Patient is aware a COVID-19 test is needed before their procedure. The test should be with-in 4 days of their procedure.   Test Details: Date 7/6  Location [facility]    Post-Op visit was scheduled on 7/13 and 7/20  Patient was advised a / is needed day of surgery. As well as, for 24 hours after their surgery procedure.  Surgery packet was mailed 6/16, patient has my direct contact information for any further questions [phone].

## 2021-06-28 ENCOUNTER — PATIENT OUTREACH (OUTPATIENT)
Dept: GERIATRIC MEDICINE | Facility: CLINIC | Age: 71
End: 2021-06-28

## 2021-06-28 NOTE — PROGRESS NOTES
[facility] Care Coordination Contact    Call from member who shares that she is having cataract surgery and needs a pre-op. Her primary clinic can't get her in and she is wondering what to do. Encouraged member to call [facility] main scheduling line and they can see availability at other [facility] clinics as well. Number was provided.     Vanesa Gtz RN  [facility]  [phone]  Fax: [phone]

## 2021-06-28 NOTE — TELEPHONE ENCOUNTER
Called patient to schedule left eye procedure with Dr. Glaser, there was no answer.  Left message with my direct line [phone].

## 2021-06-29 DIAGNOSIS — Z11.59 ENCOUNTER FOR SCREENING FOR OTHER VIRAL DISEASES: ICD-10-CM

## 2021-07-01 ENCOUNTER — HOSPITAL ENCOUNTER (OUTPATIENT)
Dept: MRI IMAGING | Facility: CLINIC | Age: 71
End: 2021-07-01
Attending: PSYCHIATRY & NEUROLOGY
Payer: COMMERCIAL

## 2021-07-01 DIAGNOSIS — R51.9 HEADACHE: ICD-10-CM

## 2021-07-01 PROCEDURE — 72141 MRI NECK SPINE W/O DYE: CPT

## 2021-07-01 PROCEDURE — 70551 MRI BRAIN STEM W/O DYE: CPT

## 2021-07-05 ENCOUNTER — ANCILLARY PROCEDURE (OUTPATIENT)
Dept: MAMMOGRAPHY | Facility: CLINIC | Age: 71
End: 2021-07-05
Payer: COMMERCIAL

## 2021-07-05 ENCOUNTER — OFFICE VISIT (OUTPATIENT)
Dept: FAMILY MEDICINE | Facility: CLINIC | Age: 71
End: 2021-07-05
Payer: COMMERCIAL

## 2021-07-05 VITALS
TEMPERATURE: 98.5 F | WEIGHT: 203 LBS | DIASTOLIC BLOOD PRESSURE: 71 MMHG | SYSTOLIC BLOOD PRESSURE: 114 MMHG | BODY MASS INDEX: 29.98 KG/M2 | OXYGEN SATURATION: 96 % | HEART RATE: 81 BPM

## 2021-07-05 DIAGNOSIS — Z01.818 PREOP GENERAL PHYSICAL EXAM: Primary | ICD-10-CM

## 2021-07-05 DIAGNOSIS — Z12.31 VISIT FOR SCREENING MAMMOGRAM: ICD-10-CM

## 2021-07-05 DIAGNOSIS — D64.9 LOW HEMOGLOBIN: ICD-10-CM

## 2021-07-05 DIAGNOSIS — J44.9 CHRONIC OBSTRUCTIVE PULMONARY DISEASE, UNSPECIFIED COPD TYPE (H): ICD-10-CM

## 2021-07-05 DIAGNOSIS — R51.9 NONINTRACTABLE HEADACHE, UNSPECIFIED CHRONICITY PATTERN, UNSPECIFIED HEADACHE TYPE: ICD-10-CM

## 2021-07-05 DIAGNOSIS — H26.9 CATARACT OF BOTH EYES, UNSPECIFIED CATARACT TYPE: ICD-10-CM

## 2021-07-05 LAB
BASOPHILS # BLD AUTO: 0.1 10E9/L (ref 0–0.2)
BASOPHILS NFR BLD AUTO: 0.5 %
DIFFERENTIAL METHOD BLD: ABNORMAL
EOSINOPHIL # BLD AUTO: 0.2 10E9/L (ref 0–0.7)
EOSINOPHIL NFR BLD AUTO: 1.9 %
ERYTHROCYTE [DISTWIDTH] IN BLOOD BY AUTOMATED COUNT: 16.4 % (ref 10–15)
ERYTHROCYTE [SEDIMENTATION RATE] IN BLOOD BY WESTERGREN METHOD: 19 MM/H (ref 0–30)
HBA1C MFR BLD: 7.2 % (ref 0–5.6)
HCT VFR BLD AUTO: 34.7 % (ref 35–47)
HGB BLD-MCNC: 10.6 G/DL (ref 11.7–15.7)
LYMPHOCYTES # BLD AUTO: 3.9 10E9/L (ref 0.8–5.3)
LYMPHOCYTES NFR BLD AUTO: 30 %
MCH RBC QN AUTO: 23.6 PG (ref 26.5–33)
MCHC RBC AUTO-ENTMCNC: 30.5 G/DL (ref 31.5–36.5)
MCV RBC AUTO: 77 FL (ref 78–100)
MONOCYTES # BLD AUTO: 0.8 10E9/L (ref 0–1.3)
MONOCYTES NFR BLD AUTO: 6.1 %
NEUTROPHILS # BLD AUTO: 8 10E9/L (ref 1.6–8.3)
NEUTROPHILS NFR BLD AUTO: 61.5 %
PLATELET # BLD AUTO: 422 10E9/L (ref 150–450)
RBC # BLD AUTO: 4.49 10E12/L (ref 3.8–5.2)
WBC # BLD AUTO: 13 10E9/L (ref 4–11)

## 2021-07-05 PROCEDURE — 77067 SCR MAMMO BI INCL CAD: CPT | Mod: TC | Performed by: RADIOLOGY

## 2021-07-05 PROCEDURE — 36415 COLL VENOUS BLD VENIPUNCTURE: CPT | Performed by: PSYCHIATRY & NEUROLOGY

## 2021-07-05 PROCEDURE — 86039 ANTINUCLEAR ANTIBODIES (ANA): CPT | Performed by: PSYCHIATRY & NEUROLOGY

## 2021-07-05 PROCEDURE — 83036 HEMOGLOBIN GLYCOSYLATED A1C: CPT | Performed by: PSYCHIATRY & NEUROLOGY

## 2021-07-05 PROCEDURE — 85025 COMPLETE CBC W/AUTO DIFF WBC: CPT | Performed by: PSYCHIATRY & NEUROLOGY

## 2021-07-05 PROCEDURE — 83540 ASSAY OF IRON: CPT | Performed by: PSYCHIATRY & NEUROLOGY

## 2021-07-05 PROCEDURE — 86038 ANTINUCLEAR ANTIBODIES: CPT | Performed by: PSYCHIATRY & NEUROLOGY

## 2021-07-05 PROCEDURE — 99214 OFFICE O/P EST MOD 30 MIN: CPT | Performed by: FAMILY MEDICINE

## 2021-07-05 PROCEDURE — 80053 COMPREHEN METABOLIC PANEL: CPT | Performed by: PSYCHIATRY & NEUROLOGY

## 2021-07-05 PROCEDURE — 77063 BREAST TOMOSYNTHESIS BI: CPT | Mod: TC | Performed by: RADIOLOGY

## 2021-07-05 PROCEDURE — 84443 ASSAY THYROID STIM HORMONE: CPT | Performed by: PSYCHIATRY & NEUROLOGY

## 2021-07-05 PROCEDURE — 85652 RBC SED RATE AUTOMATED: CPT | Performed by: PSYCHIATRY & NEUROLOGY

## 2021-07-05 PROCEDURE — 82728 ASSAY OF FERRITIN: CPT | Performed by: PSYCHIATRY & NEUROLOGY

## 2021-07-05 PROCEDURE — 83550 IRON BINDING TEST: CPT | Performed by: PSYCHIATRY & NEUROLOGY

## 2021-07-05 PROCEDURE — 86140 C-REACTIVE PROTEIN: CPT | Performed by: PSYCHIATRY & NEUROLOGY

## 2021-07-05 ASSESSMENT — ANXIETY QUESTIONNAIRES
1. FEELING NERVOUS, ANXIOUS, OR ON EDGE: NOT AT ALL
5. BEING SO RESTLESS THAT IT IS HARD TO SIT STILL: NOT AT ALL
7. FEELING AFRAID AS IF SOMETHING AWFUL MIGHT HAPPEN: NOT AT ALL
GAD7 TOTAL SCORE: 0
4. TROUBLE RELAXING: NOT AT ALL
GAD7 TOTAL SCORE: 0
GAD7 TOTAL SCORE: 0
3. WORRYING TOO MUCH ABOUT DIFFERENT THINGS: NOT AT ALL
7. FEELING AFRAID AS IF SOMETHING AWFUL MIGHT HAPPEN: NOT AT ALL
6. BECOMING EASILY ANNOYED OR IRRITABLE: NOT AT ALL
2. NOT BEING ABLE TO STOP OR CONTROL WORRYING: NOT AT ALL

## 2021-07-05 ASSESSMENT — PATIENT HEALTH QUESTIONNAIRE - PHQ9: SUM OF ALL RESPONSES TO PHQ QUESTIONS 1-9: 2

## 2021-07-05 NOTE — H&P (VIEW-ONLY)
[facility]  [address] [address] [facility] [address]  Phone: [phone]  Primary Provider: Vidhi Mario  Pre-op Performing Provider: RALPH REYNOSO      PREOPERATIVE EVALUATION:  Today's date: 7/5/2021    Vijaya Manjarrez is a 71 year old female who presents for a preoperative evaluation.    Surgical Information:  Surgery/Procedure: Cataracts   Surgery Location: [facility] [place]  Surgeon: Dai Glaser MD  Surgery Date: Right eye 7/9 Left eye 7/26   Time of Surgery: 7/9/21 at 11:15 7/26/21 at 2:25  Where patient plans to recover: At home with family  Fax number for surgical facility: Note does not need to be faxed, will be available electronically in Epic.    Type of Anesthesia Anticipated: Local with MAC    Assessment & Plan     The proposed surgical procedure is considered LOW risk.    Preop general physical exam      Cataract of both eyes, unspecified cataract type      Chronic obstructive pulmonary disease, unspecified COPD type (H)  -stable     Nonintractable headache, unspecified chronicity pattern, unspecified headache type  - Anti Nuclear Leslie IgG by IFA with Reflex  - CBC with platelets and differential  - Comprehensive metabolic panel (BMP + Alb, Alk Phos, ALT, AST, Total. Bili, TP)  - CRP, inflammation  - ESR: Erythrocyte sedimentation rate  - TSH  - Hemoglobin A1c    Low hemoglobin  - **Iron and iron binding capacity FUTURE anytime  - Ferritin         Risks and Recommendations:  The patient has the following additional risks and recommendations for perioperative complications:   - Consult Hospitalist / IM to assist with post-op medical management    Medication Instructions:   - metformin: HOLD day of surgery.    RECOMMENDATION:  APPROVAL GIVEN to proceed with proposed procedure, without further diagnostic evaluation.        Provider  Link to [facility] Help Grid :[id number]}    Subjective     HPI  related to upcoming procedure:     72 y/o female with PMHX including COPD noted to have bilateral cataracts. Surgical extraction is deemed the next best step in management.     Preop Questions 7/5/2021   1. Have you ever had a heart attack or stroke? No   2. Have you ever had surgery on your heart or blood vessels, such as a stent placement, a coronary artery bypass, or surgery on an artery in your head, neck, heart, or legs? No   3. Do you have chest pain with activity? No   4. Do you have a history of  heart failure? No   5. Do you currently have a cold, bronchitis or symptoms of other infection? No   6. Do you have a cough, shortness of breath, or wheezing? No   7. Do you or anyone in your family have previous history of blood clots? No   8. Do you or does anyone in your family have a serious bleeding problem such as prolonged bleeding following surgeries or cuts? No   9. Have you ever had problems with anemia or been told to take iron pills? No   10. Have you had any abnormal blood loss such as black, tarry or bloody stools, or abnormal vaginal bleeding? No   11. Have you ever had a blood transfusion? No   12. Are you willing to have a blood transfusion if it is medically needed before, during, or after your surgery? Yes   13. Have you or any of your relatives ever had problems with anesthesia? No   14. Do you have sleep apnea, excessive snoring or daytime drowsiness? No   14a. Do you have a CPAP machine? -   15. Do you have any artifical heart valves or other implanted medical devices like a pacemaker, defibrillator, or continuous glucose monitor? No   16. Do you have artificial joints? No   17. Are you allergic to latex? No       Health Care Directive:  Patient does not have a Health Care Directive or Living Will: NO     Preoperative Review of :   reviewed - controlled substances reflected in medication list.      Status of Chronic Conditions:  See problem list for active medical problems.  Problems all  longstanding and stable, except as noted/documented.  See ROS for pertinent symptoms related to these conditions.      Review of Systems  Constitutional, neuro, ENT, endocrine, pulmonary, cardiac, gastrointestinal, genitourinary, musculoskeletal, integument and psychiatric systems are negative, except as otherwise noted.    Patient Active Problem List    Diagnosis Date Noted     Nuclear sclerotic cataract of both eyes 06/08/2021     Priority: Medium     Added automatically from request for surgery [id number]       Eyelid lesion 02/09/2021     Priority: Medium     Added automatically from request for surgery [id number]       Dermatochalasis of both upper eyelids 02/09/2021     Priority: Medium     Added automatically from request for surgery [id number]       Acquired involutional ptosis of both eyelids 02/09/2021     Priority: Medium     Added automatically from request for surgery [id number]       Closed fracture of orbit (H) 10/05/2020     Priority: Medium     Added automatically from request for surgery [id number]       Chest pain 10/01/2020     Priority: Medium     Smoker 10/01/2020     Priority: Medium     Left Orbit Floor Fracture -- Dx 9/25/20 09/25/2020     Priority: Medium     Added automatically from request for surgery [id number]       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 09/25/2020     Priority: Medium     Added automatically from request for surgery [id number]       Diplopia 09/25/2020     Priority: Medium     Added automatically from request for surgery [id number]       Hypokalemia 08/08/2020     Priority: Medium     Coronary artery disease involving native coronary artery of native heart without angina pectoris 03/06/2019     Priority: Medium     Status post coronary angiogram 02/26/2019     Priority: Medium     Coronary artery disease involving native coronary artery, angina presence unspecified, unspecified whether native or transplanted heart 02/21/2019     Priority: Medium     Added automatically from  "request for surgery [id number]       Dyslipidemia 2019     Priority: Medium     Added automatically from request for surgery [id number]       Mild episode of recurrent major depressive disorder (H) 2018     Priority: Medium     Advanced directives, counseling/discussion 2017     Priority: Medium     Advance Care Planning 2017: ACP Review of Chart / Resources Provided:  Reviewed chart for advance care plan.  Vijaya Manjarrez has been provided information and resources to begin or update their advance care plan.  Added by Vanesa Gtz           [facility] 07/10/2017     Priority: Medium     [facility]   Vanesa Gtz RN  [phone]    [facility] CARE PLAN SUMMARY    Client Name:  Vijaya K Loki  Address:  [address] [place] [address] SUSAN [address] [place] [id number] Phone: [phone]    :  1950 Date of Assessment: 2020   Health Plan:  [facility]  Health Plan Number: [id number] Medical Assistance Number: [id number]  Financial Worker:  Rubén [phone]  Case #:  [id number]   [facility] :  Vanesa Gtz RN  Phone:  [phone]  CM Fax:  [phone]   [facility] Enrollment Date: 2017 Case Mix:  B  Rate Cell:  B  Waiver Type:  EW   Emergency Contact: Luis Manjarrez  Mobile Phone: [phone]  Relation: Son Language:  English  :  No   Health Care Agent/POA:  None Advanced Directives/Living Will:  None   Primary Care Clinic/Phone/Fax:  [facility]/(p) [phone], (f) [phone] Primary Dx:  COPD [J44.9]  Secondary Dx: Chronic Pain Syndrome [G89.4]   Primary Physician:  Dr. Vidhi Mario   Height:  5' 9\"  Weight:  207 lbs   Specialty Physician:   Dr. Galan OB/GYN Audiologist:  Declined   Eye Care Provider:  [facility] Provider:    [facility]: [facility] [phone] or [phone]   Other:        [facility] CURRENT SERVICES SUMMARY  Equipment owned/DME history: Glasses, walker, shower " chair  SERVICE TYPE/PROVIDER NAME/PHONE AUTH DATE FREQUENCY Units OR $ Amt DESCRIPTION   Medical Transportation: [employer] [phone] 5/1/20-  4/30/21 as needed  Medical rides as needed     Supplies: [employer] Medical Equipment [phone] 5/1/20-  4/30/21 monthly MA Incontinent products monthly     Homemaking: [employer]   Fax: [phone]  NPI: [id number] 8/31/20-  4/30/21 weekly  4 hours/week $335.57              Hyperlipidemia LDL goal <100 01/20/2017     Priority: Medium     Fibromyalgia 01/20/2017     Priority: Medium     Benign essential hypertension 01/20/2017     Priority: Medium     Chronic pain syndrome 01/20/2017     Priority: Medium     Patient is followed by Disha Van MD for ongoing prescription of pain medication.  All refills should only be approved by this provider, or covering partner.    Medication(s): Tramadol.   Maximum quantity per month: 90  Clinic visit frequency required: Q 6  months     Controlled substance agreement:  Encounter-Level CSA:     There are no encounter-level csa.        Pain Clinic evaluation in the past: Yes       Date/Location:      DIRE Total Score(s):  No flowsheet data found.    Last [facility] website verification:  none   [email or web address]       DM type 2, Hgb A1C 7.3 on 5/20/20      Priority: Medium     Cervical spine degeneration      Priority: Medium     spinal stenosis,        Facet arthropathy, lumbar      Priority: Medium     Lumbar degenerative disc disease      Priority: Medium     Migraine headache      Priority: Medium     Diabetic neuropathy (H)      Priority: Medium     Anxiety      Priority: Medium     GERD (gastroesophageal reflux disease)      Priority: Medium     HCD (health care directive) 01/12/2017     Priority: Medium     Pt received HCD and will return when complete.    [employer] Regulatory Load OCT 2020       Controlled substance agreement signed- ok 5/12/20 01/12/2017     Priority: Medium     Patient is followed by Yayo  MD Reji for ongoing prescription of pain medication.  All refills should only be approved by this provider, or covering partner.    Medication(s): Tramadol.   Maximum quantity per month: 90  Clinic visit frequency required: Q 6  months     Controlled substance agreement:  CSA signed 2/5/2019     Pain Clinic evaluation in the past: Yes       Date/Location:      DIRE Total Score(s):  No flowsheet data found.    Last [facility] website verification:  none   [email or web address]         COPD (chronic obstructive pulmonary disease) (H) 10/18/2012     Priority: Medium     mild        Past Medical History:   Diagnosis Date     Anxiety      Cervical spine degeneration 2016    spinal stenosis,      COPD (chronic obstructive pulmonary disease) (H) 2012    mild     Coronary artery disease      Depression      Diabetes mellitus, type 2 (H)      Diabetic neuropathy (H)      Facet arthropathy, lumbar      GERD (gastroesophageal reflux disease)      HTN (hypertension)      Hyperlipidemia      Lumbar degenerative disc disease      Migraine headache      Past Surgical History:   Procedure Laterality Date     CHOLECYSTECTOMY, LAPOROSCOPIC      Cholecystectomy, Laparoscopic     COMBINED REPAIR PTOSIS WITH BLEPHAROPLASTY BILATERAL Bilateral 3/11/2021    Procedure: Bilateral upper eyelid blepharoplasty and ptosis repair. Right upper eyelid margin lesion biopsy;  Surgeon: George Doll MD;  Location: [facility] OR     CV HEART CATHETERIZATION WITH POSSIBLE INTERVENTION Left 2/26/2019    Procedure: Coronary Angiogram;  Surgeon: Cheo Merida MD;  Location:  HEART CARDIAC CATH LAB     CV HEART CATHETERIZATION WITH POSSIBLE INTERVENTION Bilateral 10/2/2020    Procedure: Heart Catheterization with Possible Intervention;  Surgeon: Linda Sauceda MD;  Location: [facility] CARDIAC CATH LAB     CV LEFT HEART CATH N/A 10/2/2020    Procedure: Left Heart Cath;  Surgeon: Linda Sauceda MD;  Location:  HEART CARDIAC CATH LAB      OPEN REDUCTION INTERNAL FIXATION ORBIT BLOWOUT Left 10/8/2020    Procedure: Left Open Reduction Internal Fixation, Fracture, Orbit With Intraoperative Navigation - Left;  Surgeon: George Doll MD;  Location: [facility] OR     Current Outpatient Medications   Medication Sig Dispense Refill     acetaminophen (TYLENOL ARTHRITIS PAIN) 650 MG CR tablet Take 650 mg by mouth 2 times daily as needed        albuterol (PROAIR HFA/PROVENTIL HFA/VENTOLIN HFA) 108 (90 Base) MCG/ACT inhaler Inhale 2 puffs into the lungs every 6 hours (Patient taking differently: Inhale 2 puffs into the lungs every 6 hours as needed ) 18 g 1     amLODIPine (NORVASC) 5 MG tablet TAKE ONE TABLET BY MOUTH ONCE DAILY 90 tablet 2     aspirin (ASA) 81 MG chewable tablet Take 1 tablet (81 mg) by mouth daily 30 tablet 1     atorvastatin (LIPITOR) 80 MG tablet TAKE 1 TABLET (80 MG) BY MOUTH DAILY (NEED FASTING LABS) 90 tablet 3     blood glucose monitoring (NO BRAND SPECIFIED) test strip Use to test blood sugars 1 times daily or as directed, use brand same as previous 100 strip 3     DULoxetine (CYMBALTA) 60 MG capsule Take 1 capsule (60 mg) by mouth daily 90 capsule 3     erythromycin (ROMYCIN) 5 MG/GM ophthalmic ointment Apply small amount to incision sites three times daily, then apply to inner lower lid of operative eye(s) at bedtime, as directed. 3.5 g 0     fexofenadine (ALLEGRA) 60 MG tablet TAKE ONE TABLET BY MOUTH TWICE A  tablet 0     fluticasone (FLONASE) 50 MCG/ACT nasal spray SPRAY 2 SPRAYS IN EACH NOSTRIL ONCE DAILY 16 g 5     guaiFENesin-codeine (GUAIFENESIN AC) 100-10 MG/5ML syrup Take 5 mLs by mouth every 8 hours as needed for congestion 118 mL 0     hydrOXYzine (ATARAX) 10 MG tablet TAKE ONE TABLET BY MOUTH THREE TIMES A DAY AS NEEDED FOR ANXIETY 30 tablet 3     ibuprofen (ADVIL/MOTRIN) 600 MG tablet        lisinopril (ZESTRIL) 20 MG tablet TAKE ONE TABLET BY MOUTH TWICE A  tablet 2     metFORMIN (GLUCOPHAGE) 500 MG tablet  TAKE ONE TABLET BY MOUTH TWICE A DAY WITH A MEAL 180 tablet 1     metoprolol tartrate (LOPRESSOR) 25 MG tablet Take 25 mg by mouth 2 times daily 180 tablet 3     Multiple Vitamins-Minerals (MULTIVITAMIN ADULT PO) Take 5 tablets by mouth daily Pro-caps vitamin        nitroGLYcerin (NITROSTAT) 0.4 MG sublingual tablet For chest pain place 1 tablet under the tongue every 5 minutes for 3 doses. If symptoms persist 5 minutes after 1st dose call 911. 30 tablet 3     nystatin (MYCOSTATIN) 193896 UNIT/GM external powder APPLY TO AFFECTED AREA(S) TOPICALLY THREE TIMES A DAY AS NEEDED 60 g 0     omeprazole (PRILOSEC) 20 MG DR capsule TAKE ONE CAPSULE BY MOUTH TWICE A  capsule 2     order for DME Equipment being ordered: glucometer 1 each 0     pregabalin (LYRICA) 150 MG capsule TAKE ONE CAPSULE BY MOUTH THREE TIMES A  capsule 0     sodium chloride (OCEAN) 0.65 % nasal spray Apply one spray to nares two times daily 15 mL 0     SPIRIVA HANDIHALER 18 MCG inhaled capsule INHALE ONE CAPSULE BY MOUTH ONCE DAILY 90 capsule 0     traMADol (ULTRAM) 50 MG tablet TAKE 1 TABLET (50 MG) BY MOUTH EVERY 6 HOURS AS NEEDED FOR SEVERE PAIN 100 tablet 0     traZODone (DESYREL) 100 MG tablet TAKE TWO TABLETS BY MOUTH EVERY NIGHT AT BEDTIME 180 tablet 3     White Petrolatum-Mineral Oil (REFRESH P.M.) OINT Apply 3.5 g to eye 2 times daily 3.5 g 1       Allergies   Allergen Reactions     Penicillins Hives        Social History     Tobacco Use     Smoking status: Former Smoker     Packs/day: 1.00     Years: 40.00     Pack years: 40.00     Types: Cigarettes     Quit date: 2017     Years since quittin.0     Smokeless tobacco: Never Used   Substance Use Topics     Alcohol use: No     Family History   Problem Relation Age of Onset     Cancer Mother      Cancer Father         Lung cancer     Cancer Maternal Grandmother         Breast cancer     Glaucoma Maternal Grandmother      Macular Degeneration No family hx of      History    Drug Use No         Objective     /71   Pulse 81   Temp 98.5  F (36.9  C) (Oral)   Wt 92.1 kg (203 lb)   LMP  (LMP Unknown)   SpO2 96%   BMI 29.98 kg/m      Physical Exam    GENERAL APPEARANCE: healthy, alert and no distress     EYES: EOMI, PERRL     HENT: ear canals and TM's normal and nose and mouth without ulcers or lesions     NECK: no adenopathy, no asymmetry, masses, or scars and thyroid normal to palpation     RESP: lungs clear to auscultation - no rales, rhonchi or wheezes     CV: regular rates and rhythm, normal S1 S2, no S3 or S4 and no murmur, click or rub     ABDOMEN:  soft, nontender, no HSM or masses and bowel sounds normal     MS: extremities normal- no gross deformities noted, no evidence of inflammation in joints, FROM in all extremities.     SKIN: no suspicious lesions or rashes     NEURO: Normal strength and tone, sensory exam grossly normal, mentation intact and speech normal     PSYCH: mentation appears normal. and affect normal/bright     LYMPHATICS: No cervical adenopathy    Recent Labs   Lab Test 03/08/21  1507 09/30/20  2201 05/20/20  1415 05/20/20  1415   HGB 10.3* 11.4*   < > 12.0    379   < > 294    140   < > 141   POTASSIUM 4.3 3.5   < > 4.0   CR 0.64 0.93   < > 0.72   A1C 7.1*  --   --  7.3*    < > = values in this interval not displayed.        Diagnostics:  Labs pending at this time.  Results will be reviewed when available.   No EKG required for low risk surgery (cataract, skin procedure, breast biopsy, etc).    Revised Cardiac Risk Index (RCRI):  The patient has the following serious cardiovascular risks for perioperative complications:   - No serious cardiac risks = 0 points     RCRI Interpretation: 0 points: Class I (very low risk - 0.4% complication rate)           Signed Electronically by: Morelia Bean MD  Copy of this evaluation report is provided to requesting physician.

## 2021-07-05 NOTE — PROGRESS NOTES
[facility]  [address] [facility] [address]  Phone: [phone]  Primary Provider: Vidhi Mario  Pre-op Performing Provider: RALPH REYNOSO      PREOPERATIVE EVALUATION:  Today's date: 7/5/2021    Vijaya Manjarrez is a 71 year old female who presents for a preoperative evaluation.    Surgical Information:  Surgery/Procedure: Cataracts   Surgery Location: [facility]  Surgeon: Dai Glaser MD  Surgery Date: Right eye 7/9 Left eye 7/26   Time of Surgery: 7/9/21 at 11:15 7/26/21 at 2:25  Where patient plans to recover: At home with family  Fax number for surgical facility: Note does not need to be faxed, will be available electronically in Epic.    Type of Anesthesia Anticipated: Local with MAC    Assessment & Plan     The proposed surgical procedure is considered LOW risk.    Preop general physical exam      Cataract of both eyes, unspecified cataract type      Chronic obstructive pulmonary disease, unspecified COPD type (H)  -stable     Nonintractable headache, unspecified chronicity pattern, unspecified headache type  - Anti Nuclear Leslie IgG by IFA with Reflex  - CBC with platelets and differential  - Comprehensive metabolic panel (BMP + Alb, Alk Phos, ALT, AST, Total. Bili, TP)  - CRP, inflammation  - ESR: Erythrocyte sedimentation rate  - TSH  - Hemoglobin A1c    Low hemoglobin  - **Iron and iron binding capacity FUTURE anytime  - Ferritin         Risks and Recommendations:  The patient has the following additional risks and recommendations for perioperative complications:   - Consult Hospitalist / IM to assist with post-op medical management    Medication Instructions:   - metformin: HOLD day of surgery.    RECOMMENDATION:  APPROVAL GIVEN to proceed with proposed procedure, without further diagnostic evaluation.        Provider  Link to [facility] Help Grid :[id number]}    Subjective     HPI  related to upcoming procedure:     72 y/o female with PMHX including COPD noted to have bilateral cataracts. Surgical extraction is deemed the next best step in management.     Preop Questions 7/5/2021   1. Have you ever had a heart attack or stroke? No   2. Have you ever had surgery on your heart or blood vessels, such as a stent placement, a coronary artery bypass, or surgery on an artery in your head, neck, heart, or legs? No   3. Do you have chest pain with activity? No   4. Do you have a history of  heart failure? No   5. Do you currently have a cold, bronchitis or symptoms of other infection? No   6. Do you have a cough, shortness of breath, or wheezing? No   7. Do you or anyone in your family have previous history of blood clots? No   8. Do you or does anyone in your family have a serious bleeding problem such as prolonged bleeding following surgeries or cuts? No   9. Have you ever had problems with anemia or been told to take iron pills? No   10. Have you had any abnormal blood loss such as black, tarry or bloody stools, or abnormal vaginal bleeding? No   11. Have you ever had a blood transfusion? No   12. Are you willing to have a blood transfusion if it is medically needed before, during, or after your surgery? Yes   13. Have you or any of your relatives ever had problems with anesthesia? No   14. Do you have sleep apnea, excessive snoring or daytime drowsiness? No   14a. Do you have a CPAP machine? -   15. Do you have any artifical heart valves or other implanted medical devices like a pacemaker, defibrillator, or continuous glucose monitor? No   16. Do you have artificial joints? No   17. Are you allergic to latex? No       Health Care Directive:  Patient does not have a Health Care Directive or Living Will: NO     Preoperative Review of :   reviewed - controlled substances reflected in medication list.      Status of Chronic Conditions:  See problem list for active medical problems.  Problems all  longstanding and stable, except as noted/documented.  See ROS for pertinent symptoms related to these conditions.      Review of Systems  Constitutional, neuro, ENT, endocrine, pulmonary, cardiac, gastrointestinal, genitourinary, musculoskeletal, integument and psychiatric systems are negative, except as otherwise noted.    Patient Active Problem List    Diagnosis Date Noted     Nuclear sclerotic cataract of both eyes 06/08/2021     Priority: Medium     Added automatically from request for surgery [id number]       Eyelid lesion 02/09/2021     Priority: Medium     Added automatically from request for surgery [id number]       Dermatochalasis of both upper eyelids 02/09/2021     Priority: Medium     Added automatically from request for surgery [id number]       Acquired involutional ptosis of both eyelids 02/09/2021     Priority: Medium     Added automatically from request for surgery [id number]       Closed fracture of orbit (H) 10/05/2020     Priority: Medium     Added automatically from request for surgery [id number]       Chest pain 10/01/2020     Priority: Medium     Smoker 10/01/2020     Priority: Medium     Left Orbit Floor Fracture -- Dx 9/25/20 09/25/2020     Priority: Medium     Added automatically from request for surgery [id number]       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 09/25/2020     Priority: Medium     Added automatically from request for surgery [id number]       Diplopia 09/25/2020     Priority: Medium     Added automatically from request for surgery [id number]       Hypokalemia 08/08/2020     Priority: Medium     Coronary artery disease involving native coronary artery of native heart without angina pectoris 03/06/2019     Priority: Medium     Status post coronary angiogram 02/26/2019     Priority: Medium     Coronary artery disease involving native coronary artery, angina presence unspecified, unspecified whether native or transplanted heart 02/21/2019     Priority: Medium     Added automatically from  "request for surgery [id number]       Dyslipidemia 2019     Priority: Medium     Added automatically from request for surgery [id number]       Mild episode of recurrent major depressive disorder (H) 2018     Priority: Medium     Advanced directives, counseling/discussion 2017     Priority: Medium     Advance Care Planning 2017: ACP Review of Chart / Resources Provided:  Reviewed chart for advance care plan.  Vijaya Manjarrez has been provided information and resources to begin or update their advance care plan.  Added by Vanesa Gtz           [facility] 07/10/2017     Priority: Medium     [facility]   Vanesa Gtz RN  [phone]    [facility] CARE PLAN SUMMARY    Client Name:  Vijaya K Loki  Address:  [address] [place] [address] [place] [address] [place] [address] Phone: [phone]    :  1950 Date of Assessment: 2020   Health Plan:  [facility]  Health Plan Number: [id number] Medical Assistance Number: [id number]  Financial Worker:  Rubén [phone]  Case #:  [id number]   [facility] :  Vanesa Gtz RN  Phone:  [phone]  CM Fax:  [phone]   [facility] Enrollment Date: 2017 Case Mix:  B  Rate Cell:  B  Waiver Type:  EW   Emergency Contact: Luis Manjarrez  Mobile Phone: [phone]  Relation: Son Language:  English  :  No   Health Care Agent/POA:  None Advanced Directives/Living Will:  None   Primary Care Clinic/Phone/Fax:  [facility]/(p) [phone], (f) [phone] Primary Dx:  COPD [J44.9]  Secondary Dx: Chronic Pain Syndrome [G89.4]   Primary Physician:  Dr. Vidhi Mario   Height:  5' 9\"  Weight:  207 lbs   Specialty Physician:   Dr. Galan OB/GYN Audiologist:  Declined   Eye Care Provider:  [facility] Provider:    [facility]: [facility] [phone] or [phone]   Other:        [facility] CURRENT SERVICES SUMMARY  Equipment owned/DME history: Glasses, walker, shower " chair  SERVICE TYPE/PROVIDER NAME/PHONE AUTH DATE FREQUENCY Units OR $ Amt DESCRIPTION   Medical Transportation: [employer] [phone] 5/1/20-  4/30/21 as needed  Medical rides as needed     Supplies: [employer] [phone] 5/1/20-  4/30/21 monthly MA Incontinent products monthly     Homemaking: [employer]   Fax: [phone]  NPI: [id number] 8/31/20-  4/30/21 weekly  4 hours/week $335.57              Hyperlipidemia LDL goal <100 01/20/2017     Priority: Medium     Fibromyalgia 01/20/2017     Priority: Medium     Benign essential hypertension 01/20/2017     Priority: Medium     Chronic pain syndrome 01/20/2017     Priority: Medium     Patient is followed by Disha Van MD for ongoing prescription of pain medication.  All refills should only be approved by this provider, or covering partner.    Medication(s): Tramadol.   Maximum quantity per month: 90  Clinic visit frequency required: Q 6  months     Controlled substance agreement:  Encounter-Level CSA:     There are no encounter-level csa.        Pain Clinic evaluation in the past: Yes       Date/Location:      DIRE Total Score(s):  No flowsheet data found.    Last [facility] website verification:  none   [email or web address]       DM type 2, Hgb A1C 7.3 on 5/20/20      Priority: Medium     Cervical spine degeneration      Priority: Medium     spinal stenosis,        Facet arthropathy, lumbar      Priority: Medium     Lumbar degenerative disc disease      Priority: Medium     Migraine headache      Priority: Medium     Diabetic neuropathy (H)      Priority: Medium     Anxiety      Priority: Medium     GERD (gastroesophageal reflux disease)      Priority: Medium     HCD (health care directive) 01/12/2017     Priority: Medium     Pt received HCD and will return when complete.    [employer] Regulatory Load OCT 2020       Controlled substance agreement signed- ok 5/12/20 01/12/2017     Priority: Medium     Patient is followed by Yayo  MD Reji for ongoing prescription of pain medication.  All refills should only be approved by this provider, or covering partner.    Medication(s): Tramadol.   Maximum quantity per month: 90  Clinic visit frequency required: Q 6  months     Controlled substance agreement:  CSA signed 2/5/2019     Pain Clinic evaluation in the past: Yes       Date/Location:      DIRE Total Score(s):  No flowsheet data found.    Last [facility] website verification:  none   [email or web address]         COPD (chronic obstructive pulmonary disease) (H) 10/18/2012     Priority: Medium     mild        Past Medical History:   Diagnosis Date     Anxiety      Cervical spine degeneration 2016    spinal stenosis,      COPD (chronic obstructive pulmonary disease) (H) 2012    mild     Coronary artery disease      Depression      Diabetes mellitus, type 2 (H)      Diabetic neuropathy (H)      Facet arthropathy, lumbar      GERD (gastroesophageal reflux disease)      HTN (hypertension)      Hyperlipidemia      Lumbar degenerative disc disease      Migraine headache      Past Surgical History:   Procedure Laterality Date     CHOLECYSTECTOMY, LAPOROSCOPIC      Cholecystectomy, Laparoscopic     COMBINED REPAIR PTOSIS WITH BLEPHAROPLASTY BILATERAL Bilateral 3/11/2021    Procedure: Bilateral upper eyelid blepharoplasty and ptosis repair. Right upper eyelid margin lesion biopsy;  Surgeon: George Doll MD;  Location: [facility] OR     CV HEART CATHETERIZATION WITH POSSIBLE INTERVENTION Left 2/26/2019    Procedure: Coronary Angiogram;  Surgeon: Cheo Merida MD;  Location:  HEART CARDIAC CATH LAB     CV HEART CATHETERIZATION WITH POSSIBLE INTERVENTION Bilateral 10/2/2020    Procedure: Heart Catheterization with Possible Intervention;  Surgeon: Linda Sauceda MD;  Location: [facility]     CV LEFT HEART CATH N/A 10/2/2020    Procedure: Left Heart Cath;  Surgeon: Linda Sauceda MD;  Location:  HEART CARDIAC CATH LAB      OPEN REDUCTION INTERNAL FIXATION ORBIT BLOWOUT Left 10/8/2020    Procedure: Left Open Reduction Internal Fixation, Fracture, Orbit With Intraoperative Navigation - Left;  Surgeon: George Doll MD;  Location: [facility] OR     Current Outpatient Medications   Medication Sig Dispense Refill     acetaminophen (TYLENOL ARTHRITIS PAIN) 650 MG CR tablet Take 650 mg by mouth 2 times daily as needed        albuterol (PROAIR HFA/PROVENTIL HFA/VENTOLIN HFA) 108 (90 Base) MCG/ACT inhaler Inhale 2 puffs into the lungs every 6 hours (Patient taking differently: Inhale 2 puffs into the lungs every 6 hours as needed ) 18 g 1     amLODIPine (NORVASC) 5 MG tablet TAKE ONE TABLET BY MOUTH ONCE DAILY 90 tablet 2     aspirin (ASA) 81 MG chewable tablet Take 1 tablet (81 mg) by mouth daily 30 tablet 1     atorvastatin (LIPITOR) 80 MG tablet TAKE 1 TABLET (80 MG) BY MOUTH DAILY (NEED FASTING LABS) 90 tablet 3     blood glucose monitoring (NO BRAND SPECIFIED) test strip Use to test blood sugars 1 times daily or as directed, use brand same as previous 100 strip 3     DULoxetine (CYMBALTA) 60 MG capsule Take 1 capsule (60 mg) by mouth daily 90 capsule 3     erythromycin (ROMYCIN) 5 MG/GM ophthalmic ointment Apply small amount to incision sites three times daily, then apply to inner lower lid of operative eye(s) at bedtime, as directed. 3.5 g 0     fexofenadine (ALLEGRA) 60 MG tablet TAKE ONE TABLET BY MOUTH TWICE A  tablet 0     fluticasone (FLONASE) 50 MCG/ACT nasal spray SPRAY 2 SPRAYS IN EACH NOSTRIL ONCE DAILY 16 g 5     guaiFENesin-codeine (GUAIFENESIN AC) 100-10 MG/5ML syrup Take 5 mLs by mouth every 8 hours as needed for congestion 118 mL 0     hydrOXYzine (ATARAX) 10 MG tablet TAKE ONE TABLET BY MOUTH THREE TIMES A DAY AS NEEDED FOR ANXIETY 30 tablet 3     ibuprofen (ADVIL/MOTRIN) 600 MG tablet        lisinopril (ZESTRIL) 20 MG tablet TAKE ONE TABLET BY MOUTH TWICE A  tablet 2     metFORMIN (GLUCOPHAGE) 500 MG tablet  TAKE ONE TABLET BY MOUTH TWICE A DAY WITH A MEAL 180 tablet 1     metoprolol tartrate (LOPRESSOR) 25 MG tablet Take 25 mg by mouth 2 times daily 180 tablet 3     Multiple Vitamins-Minerals (MULTIVITAMIN ADULT PO) Take 5 tablets by mouth daily Pro-caps vitamin        nitroGLYcerin (NITROSTAT) 0.4 MG sublingual tablet For chest pain place 1 tablet under the tongue every 5 minutes for 3 doses. If symptoms persist 5 minutes after 1st dose call 911. 30 tablet 3     nystatin (MYCOSTATIN) 528382 UNIT/GM external powder APPLY TO AFFECTED AREA(S) TOPICALLY THREE TIMES A DAY AS NEEDED 60 g 0     omeprazole (PRILOSEC) 20 MG DR capsule TAKE ONE CAPSULE BY MOUTH TWICE A  capsule 2     order for DME Equipment being ordered: glucometer 1 each 0     pregabalin (LYRICA) 150 MG capsule TAKE ONE CAPSULE BY MOUTH THREE TIMES A  capsule 0     sodium chloride (OCEAN) 0.65 % nasal spray Apply one spray to nares two times daily 15 mL 0     SPIRIVA HANDIHALER 18 MCG inhaled capsule INHALE ONE CAPSULE BY MOUTH ONCE DAILY 90 capsule 0     traMADol (ULTRAM) 50 MG tablet TAKE 1 TABLET (50 MG) BY MOUTH EVERY 6 HOURS AS NEEDED FOR SEVERE PAIN 100 tablet 0     traZODone (DESYREL) 100 MG tablet TAKE TWO TABLETS BY MOUTH EVERY NIGHT AT BEDTIME 180 tablet 3     White Petrolatum-Mineral Oil (REFRESH P.M.) OINT Apply 3.5 g to eye 2 times daily 3.5 g 1       Allergies   Allergen Reactions     Penicillins Hives        Social History     Tobacco Use     Smoking status: Former Smoker     Packs/day: 1.00     Years: 40.00     Pack years: 40.00     Types: Cigarettes     Quit date: 2017     Years since quittin.0     Smokeless tobacco: Never Used   Substance Use Topics     Alcohol use: No     Family History   Problem Relation Age of Onset     Cancer Mother      Cancer Father         Lung cancer     Cancer Maternal Grandmother         Breast cancer     Glaucoma Maternal Grandmother      Macular Degeneration No family hx of      History    Drug Use No         Objective     /71   Pulse 81   Temp 98.5  F (36.9  C) (Oral)   Wt 92.1 kg (203 lb)   LMP  (LMP Unknown)   SpO2 96%   BMI 29.98 kg/m      Physical Exam    GENERAL APPEARANCE: healthy, alert and no distress     EYES: EOMI, PERRL     HENT: ear canals and TM's normal and nose and mouth without ulcers or lesions     NECK: no adenopathy, no asymmetry, masses, or scars and thyroid normal to palpation     RESP: lungs clear to auscultation - no rales, rhonchi or wheezes     CV: regular rates and rhythm, normal S1 S2, no S3 or S4 and no murmur, click or rub     ABDOMEN:  soft, nontender, no HSM or masses and bowel sounds normal     MS: extremities normal- no gross deformities noted, no evidence of inflammation in joints, FROM in all extremities.     SKIN: no suspicious lesions or rashes     NEURO: Normal strength and tone, sensory exam grossly normal, mentation intact and speech normal     PSYCH: mentation appears normal. and affect normal/bright     LYMPHATICS: No cervical adenopathy    Recent Labs   Lab Test 03/08/21  1507 09/30/20  2201 05/20/20  1415 05/20/20  1415   HGB 10.3* 11.4*   < > 12.0    379   < > 294    140   < > 141   POTASSIUM 4.3 3.5   < > 4.0   CR 0.64 0.93   < > 0.72   A1C 7.1*  --   --  7.3*    < > = values in this interval not displayed.        Diagnostics:  Labs pending at this time.  Results will be reviewed when available.   No EKG required for low risk surgery (cataract, skin procedure, breast biopsy, etc).    Revised Cardiac Risk Index (RCRI):  The patient has the following serious cardiovascular risks for perioperative complications:   - No serious cardiac risks = 0 points     RCRI Interpretation: 0 points: Class I (very low risk - 0.4% complication rate)           Signed Electronically by: Morelia Bean MD  Copy of this evaluation report is provided to requesting physician.

## 2021-07-05 NOTE — H&P (VIEW-ONLY)
[facility]  [address] [address]  Phone: [phone]  Primary Provider: Vidhi Mario  Pre-op Performing Provider: RALPH REYNOSO      PREOPERATIVE EVALUATION:  Today's date: 7/5/2021    Vijaya Manjarrez is a 71 year old female who presents for a preoperative evaluation.    Surgical Information:  Surgery/Procedure: Cataracts   Surgery Location: [facility]  Surgeon: Dai Glaser MD  Surgery Date: Right eye 7/9 Left eye 7/26   Time of Surgery: 7/9/21 at 11:15 7/26/21 at 2:25  Where patient plans to recover: At home with family  Fax number for surgical facility: Note does not need to be faxed, will be available electronically in Epic.    Type of Anesthesia Anticipated: Local with MAC    Assessment & Plan     The proposed surgical procedure is considered LOW risk.    Preop general physical exam      Cataract of both eyes, unspecified cataract type      Chronic obstructive pulmonary disease, unspecified COPD type (H)  -stable     Nonintractable headache, unspecified chronicity pattern, unspecified headache type  - Anti Nuclear Leslie IgG by IFA with Reflex  - CBC with platelets and differential  - Comprehensive metabolic panel (BMP + Alb, Alk Phos, ALT, AST, Total. Bili, TP)  - CRP, inflammation  - ESR: Erythrocyte sedimentation rate  - TSH  - Hemoglobin A1c    Low hemoglobin  - **Iron and iron binding capacity FUTURE anytime  - Ferritin         Risks and Recommendations:  The patient has the following additional risks and recommendations for perioperative complications:   - Consult Hospitalist / IM to assist with post-op medical management    Medication Instructions:   - metformin: HOLD day of surgery.    RECOMMENDATION:  APPROVAL GIVEN to proceed with proposed procedure, without further diagnostic evaluation.        Provider  Link to [facility] Help Grid :[id number]}    Subjective     HPI  related to upcoming procedure:     70 y/o female with PMHX including COPD noted to have bilateral cataracts. Surgical extraction is deemed the next best step in management.     Preop Questions 7/5/2021   1. Have you ever had a heart attack or stroke? No   2. Have you ever had surgery on your heart or blood vessels, such as a stent placement, a coronary artery bypass, or surgery on an artery in your head, neck, heart, or legs? No   3. Do you have chest pain with activity? No   4. Do you have a history of  heart failure? No   5. Do you currently have a cold, bronchitis or symptoms of other infection? No   6. Do you have a cough, shortness of breath, or wheezing? No   7. Do you or anyone in your family have previous history of blood clots? No   8. Do you or does anyone in your family have a serious bleeding problem such as prolonged bleeding following surgeries or cuts? No   9. Have you ever had problems with anemia or been told to take iron pills? No   10. Have you had any abnormal blood loss such as black, tarry or bloody stools, or abnormal vaginal bleeding? No   11. Have you ever had a blood transfusion? No   12. Are you willing to have a blood transfusion if it is medically needed before, during, or after your surgery? Yes   13. Have you or any of your relatives ever had problems with anesthesia? No   14. Do you have sleep apnea, excessive snoring or daytime drowsiness? No   14a. Do you have a CPAP machine? -   15. Do you have any artifical heart valves or other implanted medical devices like a pacemaker, defibrillator, or continuous glucose monitor? No   16. Do you have artificial joints? No   17. Are you allergic to latex? No       Health Care Directive:  Patient does not have a Health Care Directive or Living Will: NO     Preoperative Review of :   reviewed - controlled substances reflected in medication list.      Status of Chronic Conditions:  See problem list for active medical problems.  Problems all  longstanding and stable, except as noted/documented.  See ROS for pertinent symptoms related to these conditions.      Review of Systems  Constitutional, neuro, ENT, endocrine, pulmonary, cardiac, gastrointestinal, genitourinary, musculoskeletal, integument and psychiatric systems are negative, except as otherwise noted.    Patient Active Problem List    Diagnosis Date Noted     Nuclear sclerotic cataract of both eyes 06/08/2021     Priority: Medium     Added automatically from request for surgery [id number]       Eyelid lesion 02/09/2021     Priority: Medium     Added automatically from request for surgery [id number]       Dermatochalasis of both upper eyelids 02/09/2021     Priority: Medium     Added automatically from request for surgery [id number]       Acquired involutional ptosis of both eyelids 02/09/2021     Priority: Medium     Added automatically from request for surgery [id number]       Closed fracture of orbit (H) 10/05/2020     Priority: Medium     Added automatically from request for surgery [id number]       Chest pain 10/01/2020     Priority: Medium     Smoker 10/01/2020     Priority: Medium     Left Orbit Floor Fracture -- Dx 9/25/20 09/25/2020     Priority: Medium     Added automatically from request for surgery [id number]       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 09/25/2020     Priority: Medium     Added automatically from request for surgery [id number]       Diplopia 09/25/2020     Priority: Medium     Added automatically from request for surgery [id number]       Hypokalemia 08/08/2020     Priority: Medium     Coronary artery disease involving native coronary artery of native heart without angina pectoris 03/06/2019     Priority: Medium     Status post coronary angiogram 02/26/2019     Priority: Medium     Coronary artery disease involving native coronary artery, angina presence unspecified, unspecified whether native or transplanted heart 02/21/2019     Priority: Medium     Added automatically from  "request for surgery [id number]       Dyslipidemia 2019     Priority: Medium     Added automatically from request for surgery [id number]       Mild episode of recurrent major depressive disorder (H) 2018     Priority: Medium     Advanced directives, counseling/discussion 2017     Priority: Medium     Advance Care Planning 2017: ACP Review of Chart / Resources Provided:  Reviewed chart for advance care plan.  Vijaya Manjarrez has been provided information and resources to begin or update their advance care plan.  Added by Vanesa Gtz           [facility] 07/10/2017     Priority: Medium     [facility]   Vanesa Gtz RN  [phone]    [facility] CARE PLAN SUMMARY    Client Name:  Vijaya K Loki  Address:  [address] [place] [address] SUSAN [address] [place] [address] Phone: [phone]    :  1950 Date of Assessment: 2020   Health Plan:  [facility]  Health Plan Number: [id number] Medical Assistance Number: [id number]  Financial Worker:  Rubén [phone]  Case #:  [id number]   [facility] :  Vanesa Gtz RN  Phone:  [phone]  CM Fax:  [phone]   [facility] Enrollment Date: 2017 Case Mix:  B  Rate Cell:  B  Waiver Type:  EW   Emergency Contact: Luis Manjarrez  Mobile Phone: [phone]  Relation: Son Language:  English  :  No   Health Care Agent/POA:  None Advanced Directives/Living Will:  None   Primary Care Clinic/Phone/Fax:  [facility]/(p) [phone], (f) [phone] Primary Dx:  COPD [J44.9]  Secondary Dx: Chronic Pain Syndrome [G89.4]   Primary Physician:  Dr. Vidhi Mario   Height:  5' 9\"  Weight:  207 lbs   Specialty Physician:   Dr. Galan OB/GYN Audiologist:  Declined   Eye Care Provider:  [facility] Provider:    [facility]: [facility] [phone] or [phone]   Other:        [facility] CURRENT SERVICES SUMMARY  Equipment owned/DME history: Glasses, walker, shower " chair  SERVICE TYPE/PROVIDER NAME/PHONE AUTH DATE FREQUENCY Units OR $ Amt DESCRIPTION   Medical Transportation: [employer] [facility] [phone] 5/1/20-  4/30/21 as needed  Medical rides as needed     Supplies: [employer] [phone] 5/1/20-  4/30/21 monthly [place] Incontinent products monthly     Homemaking: [employer]   Fax: [phone]  NPI: [id number] 8/31/20-  4/30/21 weekly  4 hours/week $335.57              Hyperlipidemia LDL goal <100 01/20/2017     Priority: Medium     Fibromyalgia 01/20/2017     Priority: Medium     Benign essential hypertension 01/20/2017     Priority: Medium     Chronic pain syndrome 01/20/2017     Priority: Medium     Patient is followed by Disha Van MD for ongoing prescription of pain medication.  All refills should only be approved by this provider, or covering partner.    Medication(s): Tramadol.   Maximum quantity per month: 90  Clinic visit frequency required: Q 6  months     Controlled substance agreement:  Encounter-Level CSA:     There are no encounter-level csa.        Pain Clinic evaluation in the past: Yes       Date/Location:      DIRE Total Score(s):  No flowsheet data found.    Last [facility] website verification:  none   [email or web address]       DM type 2, Hgb A1C 7.3 on 5/20/20      Priority: Medium     Cervical spine degeneration      Priority: Medium     spinal stenosis,        Facet arthropathy, lumbar      Priority: Medium     Lumbar degenerative disc disease      Priority: Medium     Migraine headache      Priority: Medium     Diabetic neuropathy (H)      Priority: Medium     Anxiety      Priority: Medium     GERD (gastroesophageal reflux disease)      Priority: Medium     HCD (health care directive) 01/12/2017     Priority: Medium     Pt received HCD and will return when complete.    [employer] Regulatory Load OCT 2020       Controlled substance agreement signed- ok 5/12/20 01/12/2017     Priority: Medium     Patient is followed by Yayo  MD Reji for ongoing prescription of pain medication.  All refills should only be approved by this provider, or covering partner.    Medication(s): Tramadol.   Maximum quantity per month: 90  Clinic visit frequency required: Q 6  months     Controlled substance agreement:  CSA signed 2/5/2019     Pain Clinic evaluation in the past: Yes       Date/Location:      DIRE Total Score(s):  No flowsheet data found.    Last [facility] website verification:  none   [email or web address]         COPD (chronic obstructive pulmonary disease) (H) 10/18/2012     Priority: Medium     mild        Past Medical History:   Diagnosis Date     Anxiety      Cervical spine degeneration 2016    spinal stenosis,      COPD (chronic obstructive pulmonary disease) (H) 2012    mild     Coronary artery disease      Depression      Diabetes mellitus, type 2 (H)      Diabetic neuropathy (H)      Facet arthropathy, lumbar      GERD (gastroesophageal reflux disease)      HTN (hypertension)      Hyperlipidemia      Lumbar degenerative disc disease      Migraine headache      Past Surgical History:   Procedure Laterality Date     CHOLECYSTECTOMY, LAPOROSCOPIC      Cholecystectomy, Laparoscopic     COMBINED REPAIR PTOSIS WITH BLEPHAROPLASTY BILATERAL Bilateral 3/11/2021    Procedure: Bilateral upper eyelid blepharoplasty and ptosis repair. Right upper eyelid margin lesion biopsy;  Surgeon: George Doll MD;  Location: [facility] OR     CV HEART CATHETERIZATION WITH POSSIBLE INTERVENTION Left 2/26/2019    Procedure: Coronary Angiogram;  Surgeon: Cheo Merida MD;  Location:  HEART CARDIAC CATH LAB     CV HEART CATHETERIZATION WITH POSSIBLE INTERVENTION Bilateral 10/2/2020    Procedure: Heart Catheterization with Possible Intervention;  Surgeon: Linda Sauceda MD;  Location: [facility] CARDIAC CATH LAB     CV LEFT HEART CATH N/A 10/2/2020    Procedure: Left Heart Cath;  Surgeon: Linda Sauceda MD;  Location:  HEART CARDIAC CATH LAB      OPEN REDUCTION INTERNAL FIXATION ORBIT BLOWOUT Left 10/8/2020    Procedure: Left Open Reduction Internal Fixation, Fracture, Orbit With Intraoperative Navigation - Left;  Surgeon: George Doll MD;  Location: [facility] OR     Current Outpatient Medications   Medication Sig Dispense Refill     acetaminophen (TYLENOL ARTHRITIS PAIN) 650 MG CR tablet Take 650 mg by mouth 2 times daily as needed        albuterol (PROAIR HFA/PROVENTIL HFA/VENTOLIN HFA) 108 (90 Base) MCG/ACT inhaler Inhale 2 puffs into the lungs every 6 hours (Patient taking differently: Inhale 2 puffs into the lungs every 6 hours as needed ) 18 g 1     amLODIPine (NORVASC) 5 MG tablet TAKE ONE TABLET BY MOUTH ONCE DAILY 90 tablet 2     aspirin (ASA) 81 MG chewable tablet Take 1 tablet (81 mg) by mouth daily 30 tablet 1     atorvastatin (LIPITOR) 80 MG tablet TAKE 1 TABLET (80 MG) BY MOUTH DAILY (NEED FASTING LABS) 90 tablet 3     blood glucose monitoring (NO BRAND SPECIFIED) test strip Use to test blood sugars 1 times daily or as directed, use brand same as previous 100 strip 3     DULoxetine (CYMBALTA) 60 MG capsule Take 1 capsule (60 mg) by mouth daily 90 capsule 3     erythromycin (ROMYCIN) 5 MG/GM ophthalmic ointment Apply small amount to incision sites three times daily, then apply to inner lower lid of operative eye(s) at bedtime, as directed. 3.5 g 0     fexofenadine (ALLEGRA) 60 MG tablet TAKE ONE TABLET BY MOUTH TWICE A  tablet 0     fluticasone (FLONASE) 50 MCG/ACT nasal spray SPRAY 2 SPRAYS IN EACH NOSTRIL ONCE DAILY 16 g 5     guaiFENesin-codeine (GUAIFENESIN AC) 100-10 MG/5ML syrup Take 5 mLs by mouth every 8 hours as needed for congestion 118 mL 0     hydrOXYzine (ATARAX) 10 MG tablet TAKE ONE TABLET BY MOUTH THREE TIMES A DAY AS NEEDED FOR ANXIETY 30 tablet 3     ibuprofen (ADVIL/MOTRIN) 600 MG tablet        lisinopril (ZESTRIL) 20 MG tablet TAKE ONE TABLET BY MOUTH TWICE A  tablet 2     metFORMIN (GLUCOPHAGE) 500 MG tablet  TAKE ONE TABLET BY MOUTH TWICE A DAY WITH A MEAL 180 tablet 1     metoprolol tartrate (LOPRESSOR) 25 MG tablet Take 25 mg by mouth 2 times daily 180 tablet 3     Multiple Vitamins-Minerals (MULTIVITAMIN ADULT PO) Take 5 tablets by mouth daily Pro-caps vitamin        nitroGLYcerin (NITROSTAT) 0.4 MG sublingual tablet For chest pain place 1 tablet under the tongue every 5 minutes for 3 doses. If symptoms persist 5 minutes after 1st dose call 911. 30 tablet 3     nystatin (MYCOSTATIN) 539647 UNIT/GM external powder APPLY TO AFFECTED AREA(S) TOPICALLY THREE TIMES A DAY AS NEEDED 60 g 0     omeprazole (PRILOSEC) 20 MG DR capsule TAKE ONE CAPSULE BY MOUTH TWICE A  capsule 2     order for DME Equipment being ordered: glucometer 1 each 0     pregabalin (LYRICA) 150 MG capsule TAKE ONE CAPSULE BY MOUTH THREE TIMES A  capsule 0     sodium chloride (OCEAN) 0.65 % nasal spray Apply one spray to nares two times daily 15 mL 0     SPIRIVA HANDIHALER 18 MCG inhaled capsule INHALE ONE CAPSULE BY MOUTH ONCE DAILY 90 capsule 0     traMADol (ULTRAM) 50 MG tablet TAKE 1 TABLET (50 MG) BY MOUTH EVERY 6 HOURS AS NEEDED FOR SEVERE PAIN 100 tablet 0     traZODone (DESYREL) 100 MG tablet TAKE TWO TABLETS BY MOUTH EVERY NIGHT AT BEDTIME 180 tablet 3     White Petrolatum-Mineral Oil (REFRESH P.M.) OINT Apply 3.5 g to eye 2 times daily 3.5 g 1       Allergies   Allergen Reactions     Penicillins Hives        Social History     Tobacco Use     Smoking status: Former Smoker     Packs/day: 1.00     Years: 40.00     Pack years: 40.00     Types: Cigarettes     Quit date: 2017     Years since quittin.0     Smokeless tobacco: Never Used   Substance Use Topics     Alcohol use: No     Family History   Problem Relation Age of Onset     Cancer Mother      Cancer Father         Lung cancer     Cancer Maternal Grandmother         Breast cancer     Glaucoma Maternal Grandmother      Macular Degeneration No family hx of      History    Drug Use No         Objective     /71   Pulse 81   Temp 98.5  F (36.9  C) (Oral)   Wt 92.1 kg (203 lb)   LMP  (LMP Unknown)   SpO2 96%   BMI 29.98 kg/m      Physical Exam    GENERAL APPEARANCE: healthy, alert and no distress     EYES: EOMI, PERRL     HENT: ear canals and TM's normal and nose and mouth without ulcers or lesions     NECK: no adenopathy, no asymmetry, masses, or scars and thyroid normal to palpation     RESP: lungs clear to auscultation - no rales, rhonchi or wheezes     CV: regular rates and rhythm, normal S1 S2, no S3 or S4 and no murmur, click or rub     ABDOMEN:  soft, nontender, no HSM or masses and bowel sounds normal     MS: extremities normal- no gross deformities noted, no evidence of inflammation in joints, FROM in all extremities.     SKIN: no suspicious lesions or rashes     NEURO: Normal strength and tone, sensory exam grossly normal, mentation intact and speech normal     PSYCH: mentation appears normal. and affect normal/bright     LYMPHATICS: No cervical adenopathy    Recent Labs   Lab Test 03/08/21  1507 09/30/20  2201 05/20/20  1415 05/20/20  1415   HGB 10.3* 11.4*   < > 12.0    379   < > 294    140   < > 141   POTASSIUM 4.3 3.5   < > 4.0   CR 0.64 0.93   < > 0.72   A1C 7.1*  --   --  7.3*    < > = values in this interval not displayed.        Diagnostics:  Labs pending at this time.  Results will be reviewed when available.   No EKG required for low risk surgery (cataract, skin procedure, breast biopsy, etc).    Revised Cardiac Risk Index (RCRI):  The patient has the following serious cardiovascular risks for perioperative complications:   - No serious cardiac risks = 0 points     RCRI Interpretation: 0 points: Class I (very low risk - 0.4% complication rate)           Signed Electronically by: Morelia Bean MD  Copy of this evaluation report is provided to requesting physician.

## 2021-07-05 NOTE — PATIENT INSTRUCTIONS
At your visit today, we discussed your risk for falls and preventive options.    Fall Prevention  Falls often occur due to slipping, tripping or losing your balance. Millions of people fall every year and injure themselves. Here are ways to reduce your risk of falling again.     Think about your fall, was there anything that caused your fall that can be fixed, removed, or replaced?    Make your home safe by keeping walkways clear of objects you may trip over, such as electric cords.    Use non-slip pads under rugs. Don't use area rugs or small throw rugs.    Use non-slip mats in bathtubs and showers.    Install handrails and lights on staircases. The handrails should be on both sides of the stairs.    Don't walk in poorly lit areas.    Don't stand on chairs or wobbly ladders.    Use caution when reaching overhead or looking upward. This position can cause a loss of balance.    Be sure your shoes fit properly, have non-slip bottoms and are in good condition.     Wear shoes both inside and out. Don't go barefoot or wear slippers.    Be cautious when going up and down stairs, curbs, and when walking on uneven sidewalks.    If your balance is poor, consider using a cane or walker.    If your fall was related to alcohol use, stop or limit alcohol intake.     If your fall was related to use of sleeping medicines, talk to your healthcare provider about this. You may need to reduce your dosage at bedtime if you awaken during the night to go to the bathroom.      To reduce the need for nighttime bathroom trips:  ? Don't drink fluids for several hours before going to bed  ? Empty your bladder before going to bed  ? Men can keep a urinal at the bedside    Stay as active as you can. Balance, flexibility, strength, and endurance all come from exercise. They all play a role in preventing falls. Ask your healthcare provider which types of activity are right for you.    Get your vision checked on a regular basis.    If you have  pets, know where they are before you stand up or walk so you don't trip over them.    Use night lights.    Go over all your medicines with a pharmacist or other healthcare provider to see if any of them could make you more likely to fall.  [employer] last reviewed this educational content on 2018-2021 [employer]. All rights reserved. This information is not intended as a substitute for professional medical care. Always follow your healthcare professional's instructions.        Preparing for Your Surgery  Getting started  A nurse will call you to review your health history and instructions. They will give you an arrival time based on your scheduled surgery time.  Please be ready to share the following:    Your doctor's clinic name and phone number    Your medical, surgical and anesthesia history    A list of allergies and sensitivities    A list of medicines, including herbal treatments and over-the-counter drugs    Whether the patient has a legal guardian (ask how to send us the papers in advance)  If you have a child who's having surgery, please ask for a copy of Preparing for Your Child's Surgery.    Preparing for surgery    Within 30 days of surgery: Have a pre-op exam (sometimes called an H&P, or History and Physical). This can be done at a clinic or pre-operative center.  ? If you're having a , you may not need this exam. Talk to your care team    At your pre-op exam, talk to your care team about all medicines you take. If you need to stop any medicines before surgery, ask when to start taking them again.  ? We do this for your safety. Many medicines can make you bleed too much during surgery. Some change how well surgery (anesthesia) drugs work.    Call your insurance company to let them know you're having surgery. (If you don't have insurance, call [phone].)    Call your clinic if there's any change in your health. This includes signs of a cold or flu (sore throat, runny  nose, cough, rash, fever). It also includes a scrape or scratch near the surgery site.    If you have questions on the day of surgery, call your hospital or surgery center.  Eating and drinking guidelines  For your safety: Unless your surgeon tells you otherwise, follow the guidelines below.    Eat and drink as usual until 8 hours before surgery. After that, no food or milk.    Drink clear liquids until 2 hours before surgery. These are liquids you can see through, like water, Gatorade and Propel Water. You may also have black coffee and tea (no cream or milk).    Nothing by mouth within 2 hours of surgery. This includes gum, candy and breath mints.    If you drink, stop drinking alcohol the night before surgery.    If your care team tells you to take medicine on the morning of surgery, it's okay to take it with a sip of water.  Preventing infection    Shower or bathe the night before and morning of your surgery. Follow the instructions your clinic gave you. (If no instructions, use regular soap.)    Don't shave or clip hair near your surgery site. We'll remove the hair if needed.    Don't smoke or vape the morning of surgery. You may chew nicotine gum up to 2 hours before surgery. A nicotine patch is okay.  ? Note: Some surgeries require you to completely quit smoking and nicotine. Check with your surgeon.    Your care team will make every effort to keep you safe from infection. We will:  ? Clean our hands often with soap and water (or an alcohol-based hand rub).  ? Clean the skin at your surgery site with a special soap that kills germs.  ? Give you a special gown to keep you warm. (Cold raises the risk of infection.)  ? Wear special hair covers, masks, gowns and gloves during surgery.  ? Give antibiotic medicine, if prescribed. Not all surgeries need antibiotics.  What to bring on the day of surgery    Photo ID and insurance card    Copy of your health care directive, if you have one    Glasses and hearing aides  (bring cases)  ? You can't wear contacts during surgery    Inhaler and eye drops, if you use them (tell us about these when you arrive)    CPAP machine or breathing device, if you use them    A few personal items, if spending the night    If you have . . .  ? A pacemaker or ICD (cardiac defibrillator): Bring the ID card.  ? An implanted stimulator: Bring the remote control.  ? A legal guardian: Bring a copy of the certified (court-stamped) guardianship papers.  Please remove any jewelry, including body piercings. Leave jewelry and other valuables at home.  If you're going home the day of surgery  Important: If you don't follow the rules below, we must cancel your surgery.     Arrange for someone to drive you home after surgery. You may not drive, take a taxi or take public transportation by yourself (unless you'll have local anesthesia only).    Arrange for a responsible adult to stay with you overnight. If you don't, we may keep you in the hospital overnight, and you may need to pay the costs yourself.  Questions?   If you have any questions for your care team, list them here: _________________________________________________________________________________________________________________________________________________________________________________________________________________________________________________________________________________________________________________________  For informational purposes only. Not to replace the advice of your health care provider. Copyright   2003, 2019 [facility]. All rights reserved. Clinically reviewed by Maryam Tavarez MD. [employer] [id number] - REV 4/20.

## 2021-07-06 DIAGNOSIS — Z11.59 ENCOUNTER FOR SCREENING FOR OTHER VIRAL DISEASES: ICD-10-CM

## 2021-07-06 LAB
ALBUMIN SERPL-MCNC: 3.9 G/DL (ref 3.4–5)
ALP SERPL-CCNC: 82 U/L (ref 40–150)
ALT SERPL W P-5'-P-CCNC: 25 U/L (ref 0–50)
ANION GAP SERPL CALCULATED.3IONS-SCNC: 9 MMOL/L (ref 3–14)
AST SERPL W P-5'-P-CCNC: 20 U/L (ref 0–45)
BILIRUB SERPL-MCNC: 0.3 MG/DL (ref 0.2–1.3)
BUN SERPL-MCNC: 9 MG/DL (ref 7–30)
CALCIUM SERPL-MCNC: 9 MG/DL (ref 8.5–10.1)
CHLORIDE SERPL-SCNC: 107 MMOL/L (ref 94–109)
CO2 SERPL-SCNC: 27 MMOL/L (ref 20–32)
CREAT SERPL-MCNC: 0.84 MG/DL (ref 0.52–1.04)
CRP SERPL-MCNC: <2.9 MG/L (ref 0–8)
FERRITIN SERPL-MCNC: 5 NG/ML (ref 8–252)
GFR SERPL CREATININE-BSD FRML MDRD: 71 ML/MIN/{1.73_M2}
GLUCOSE SERPL-MCNC: 100 MG/DL (ref 70–99)
IRON SATN MFR SERPL: 5 % (ref 15–46)
IRON SERPL-MCNC: 22 UG/DL (ref 35–180)
POTASSIUM SERPL-SCNC: 3.8 MMOL/L (ref 3.4–5.3)
PROT SERPL-MCNC: 7.5 G/DL (ref 6.8–8.8)
SARS-COV-2 RNA RESP QL NAA+PROBE: NORMAL
SODIUM SERPL-SCNC: 143 MMOL/L (ref 133–144)
SPECIMEN SOURCE: NORMAL
TIBC SERPL-MCNC: 467 UG/DL (ref 240–430)
TSH SERPL DL<=0.005 MIU/L-ACNC: 0.5 MU/L (ref 0.4–4)

## 2021-07-06 PROCEDURE — U0003 INFECTIOUS AGENT DETECTION BY NUCLEIC ACID (DNA OR RNA); SEVERE ACUTE RESPIRATORY SYNDROME CORONAVIRUS 2 (SARS-COV-2) (CORONAVIRUS DISEASE [COVID-19]), AMPLIFIED PROBE TECHNIQUE, MAKING USE OF HIGH THROUGHPUT TECHNOLOGIES AS DESCRIBED BY CMS-2020-01-R: HCPCS | Performed by: OPHTHALMOLOGY

## 2021-07-06 PROCEDURE — U0005 INFEC AGEN DETEC AMPLI PROBE: HCPCS | Performed by: OPHTHALMOLOGY

## 2021-07-06 ASSESSMENT — ANXIETY QUESTIONNAIRES: GAD7 TOTAL SCORE: 0

## 2021-07-07 LAB
ANA PAT SER IF-IMP: ABNORMAL
ANA SER QL IF: POSITIVE
ANA TITR SER IF: ABNORMAL {TITER}
LABORATORY COMMENT REPORT: NORMAL
SARS-COV-2 RNA RESP QL NAA+PROBE: NEGATIVE
SPECIMEN SOURCE: NORMAL

## 2021-07-08 ENCOUNTER — ANESTHESIA EVENT (OUTPATIENT)
Dept: SURGERY | Facility: AMBULATORY SURGERY CENTER | Age: 71
End: 2021-07-08
Payer: COMMERCIAL

## 2021-07-09 ENCOUNTER — HOSPITAL ENCOUNTER (OUTPATIENT)
Facility: AMBULATORY SURGERY CENTER | Age: 71
End: 2021-07-09
Attending: OPHTHALMOLOGY
Payer: COMMERCIAL

## 2021-07-09 ENCOUNTER — ANESTHESIA (OUTPATIENT)
Dept: SURGERY | Facility: AMBULATORY SURGERY CENTER | Age: 71
End: 2021-07-09
Payer: COMMERCIAL

## 2021-07-09 VITALS
SYSTOLIC BLOOD PRESSURE: 131 MMHG | HEIGHT: 69 IN | TEMPERATURE: 96.8 F | DIASTOLIC BLOOD PRESSURE: 62 MMHG | OXYGEN SATURATION: 96 % | HEART RATE: 74 BPM | BODY MASS INDEX: 28.88 KG/M2 | WEIGHT: 195 LBS | RESPIRATION RATE: 16 BRPM

## 2021-07-09 DIAGNOSIS — H25.13 NUCLEAR SCLEROTIC CATARACT OF BOTH EYES: ICD-10-CM

## 2021-07-09 DIAGNOSIS — H25.13 NUCLEAR SCLEROTIC CATARACT OF BOTH EYES: Primary | ICD-10-CM

## 2021-07-09 LAB
GLUCOSE BLDC GLUCOMTR-MCNC: 136 MG/DL (ref 70–99)
GLUCOSE BLDC GLUCOMTR-MCNC: 160 MG/DL (ref 70–99)

## 2021-07-09 PROCEDURE — 66984 XCAPSL CTRC RMVL W/O ECP: CPT | Mod: RT

## 2021-07-09 DEVICE — EYE IMP IOL AMO PCL TECNIS ZCB00 22.0: Type: IMPLANTABLE DEVICE | Site: EYE | Status: FUNCTIONAL

## 2021-07-09 RX ORDER — MOXIFLOXACIN IN NACL,ISO-OS/PF 0.3MG/0.3
SYRINGE (ML) INTRAOCULAR PRN
Status: DISCONTINUED | OUTPATIENT
Start: 2021-07-09 | End: 2021-07-09 | Stop reason: HOSPADM

## 2021-07-09 RX ORDER — ONDANSETRON 2 MG/ML
4 INJECTION INTRAMUSCULAR; INTRAVENOUS EVERY 30 MIN PRN
Status: DISCONTINUED | OUTPATIENT
Start: 2021-07-09 | End: 2021-07-10 | Stop reason: HOSPADM

## 2021-07-09 RX ORDER — NALOXONE HYDROCHLORIDE 0.4 MG/ML
0.2 INJECTION, SOLUTION INTRAMUSCULAR; INTRAVENOUS; SUBCUTANEOUS
Status: DISCONTINUED | OUTPATIENT
Start: 2021-07-09 | End: 2021-07-10 | Stop reason: HOSPADM

## 2021-07-09 RX ORDER — LIDOCAINE HYDROCHLORIDE 10 MG/ML
INJECTION, SOLUTION EPIDURAL; INFILTRATION; INTRACAUDAL; PERINEURAL PRN
Status: DISCONTINUED | OUTPATIENT
Start: 2021-07-09 | End: 2021-07-09 | Stop reason: HOSPADM

## 2021-07-09 RX ORDER — SODIUM CHLORIDE, SODIUM LACTATE, POTASSIUM CHLORIDE, CALCIUM CHLORIDE 600; 310; 30; 20 MG/100ML; MG/100ML; MG/100ML; MG/100ML
500 INJECTION, SOLUTION INTRAVENOUS CONTINUOUS
Status: DISCONTINUED | OUTPATIENT
Start: 2021-07-09 | End: 2021-07-09 | Stop reason: HOSPADM

## 2021-07-09 RX ORDER — DICLOFENAC SODIUM 1 MG/ML
1 SOLUTION/ DROPS OPHTHALMIC
Status: COMPLETED | OUTPATIENT
Start: 2021-07-09 | End: 2021-07-09

## 2021-07-09 RX ORDER — NALOXONE HYDROCHLORIDE 0.4 MG/ML
0.4 INJECTION, SOLUTION INTRAMUSCULAR; INTRAVENOUS; SUBCUTANEOUS
Status: DISCONTINUED | OUTPATIENT
Start: 2021-07-09 | End: 2021-07-10 | Stop reason: HOSPADM

## 2021-07-09 RX ORDER — LIDOCAINE 40 MG/G
CREAM TOPICAL
Status: DISCONTINUED | OUTPATIENT
Start: 2021-07-09 | End: 2021-07-09 | Stop reason: HOSPADM

## 2021-07-09 RX ORDER — CYCLOPENTOLAT/TROPIC/PHENYLEPH 1%-1%-2.5%
1 DROPS (EA) OPHTHALMIC (EYE)
Status: COMPLETED | OUTPATIENT
Start: 2021-07-09 | End: 2021-07-09

## 2021-07-09 RX ORDER — ERYTHROMYCIN 5 MG/G
OINTMENT OPHTHALMIC PRN
Status: DISCONTINUED | OUTPATIENT
Start: 2021-07-09 | End: 2021-07-09 | Stop reason: HOSPADM

## 2021-07-09 RX ORDER — SODIUM CHLORIDE, SODIUM LACTATE, POTASSIUM CHLORIDE, CALCIUM CHLORIDE 600; 310; 30; 20 MG/100ML; MG/100ML; MG/100ML; MG/100ML
INJECTION, SOLUTION INTRAVENOUS CONTINUOUS
Status: DISCONTINUED | OUTPATIENT
Start: 2021-07-09 | End: 2021-07-10 | Stop reason: HOSPADM

## 2021-07-09 RX ORDER — FENTANYL CITRATE 50 UG/ML
INJECTION, SOLUTION INTRAMUSCULAR; INTRAVENOUS PRN
Status: DISCONTINUED | OUTPATIENT
Start: 2021-07-09 | End: 2021-07-09

## 2021-07-09 RX ORDER — PROPARACAINE HYDROCHLORIDE 5 MG/ML
1 SOLUTION/ DROPS OPHTHALMIC ONCE
Status: COMPLETED | OUTPATIENT
Start: 2021-07-09 | End: 2021-07-09

## 2021-07-09 RX ORDER — BALANCED SALT SOLUTION 6.4; .75; .48; .3; 3.9; 1.7 MG/ML; MG/ML; MG/ML; MG/ML; MG/ML; MG/ML
SOLUTION OPHTHALMIC PRN
Status: DISCONTINUED | OUTPATIENT
Start: 2021-07-09 | End: 2021-07-09 | Stop reason: HOSPADM

## 2021-07-09 RX ORDER — KETAMINE HYDROCHLORIDE 10 MG/ML
INJECTION INTRAMUSCULAR; INTRAVENOUS PRN
Status: DISCONTINUED | OUTPATIENT
Start: 2021-07-09 | End: 2021-07-09

## 2021-07-09 RX ORDER — PREDNISOLONE ACETATE 10 MG/ML
1 SUSPENSION/ DROPS OPHTHALMIC 4 TIMES DAILY
Qty: 5 ML | Refills: 1 | Status: SHIPPED | OUTPATIENT
Start: 2021-07-09 | End: 2021-09-28

## 2021-07-09 RX ORDER — MEPERIDINE HYDROCHLORIDE 25 MG/ML
12.5 INJECTION INTRAMUSCULAR; INTRAVENOUS; SUBCUTANEOUS
Status: DISCONTINUED | OUTPATIENT
Start: 2021-07-09 | End: 2021-07-10 | Stop reason: HOSPADM

## 2021-07-09 RX ORDER — KETOROLAC TROMETHAMINE 5 MG/ML
1 SOLUTION OPHTHALMIC 4 TIMES DAILY
Qty: 5 ML | Refills: 1 | Status: SHIPPED | OUTPATIENT
Start: 2021-07-09 | End: 2021-09-28

## 2021-07-09 RX ORDER — MOXIFLOXACIN 5 MG/ML
1 SOLUTION/ DROPS OPHTHALMIC
Status: COMPLETED | OUTPATIENT
Start: 2021-07-09 | End: 2021-07-09

## 2021-07-09 RX ORDER — PREDNISOLONE ACETATE 1 %
SUSPENSION, DROPS(FINAL DOSAGE FORM)(ML) OPHTHALMIC (EYE) PRN
Status: DISCONTINUED | OUTPATIENT
Start: 2021-07-09 | End: 2021-07-09 | Stop reason: HOSPADM

## 2021-07-09 RX ORDER — ACETAMINOPHEN 325 MG/1
975 TABLET ORAL ONCE
Status: COMPLETED | OUTPATIENT
Start: 2021-07-09 | End: 2021-07-09

## 2021-07-09 RX ORDER — TETRACAINE HYDROCHLORIDE 5 MG/ML
SOLUTION OPHTHALMIC PRN
Status: DISCONTINUED | OUTPATIENT
Start: 2021-07-09 | End: 2021-07-09 | Stop reason: HOSPADM

## 2021-07-09 RX ORDER — ONDANSETRON 4 MG/1
4 TABLET, ORALLY DISINTEGRATING ORAL EVERY 30 MIN PRN
Status: DISCONTINUED | OUTPATIENT
Start: 2021-07-09 | End: 2021-07-10 | Stop reason: HOSPADM

## 2021-07-09 RX ADMIN — MOXIFLOXACIN 1 DROP: 5 SOLUTION/ DROPS OPHTHALMIC at 10:06

## 2021-07-09 RX ADMIN — Medication 1 DROP: at 10:15

## 2021-07-09 RX ADMIN — SODIUM CHLORIDE, SODIUM LACTATE, POTASSIUM CHLORIDE, CALCIUM CHLORIDE 500 ML: 600; 310; 30; 20 INJECTION, SOLUTION INTRAVENOUS at 10:16

## 2021-07-09 RX ADMIN — FENTANYL CITRATE 50 MCG: 50 INJECTION, SOLUTION INTRAMUSCULAR; INTRAVENOUS at 11:00

## 2021-07-09 RX ADMIN — MOXIFLOXACIN 1 DROP: 5 SOLUTION/ DROPS OPHTHALMIC at 10:20

## 2021-07-09 RX ADMIN — KETAMINE HYDROCHLORIDE 10 MG: 10 INJECTION INTRAMUSCULAR; INTRAVENOUS at 11:03

## 2021-07-09 RX ADMIN — DICLOFENAC SODIUM 1 DROP: 1 SOLUTION/ DROPS OPHTHALMIC at 10:06

## 2021-07-09 RX ADMIN — Medication 1 DROP: at 10:20

## 2021-07-09 RX ADMIN — Medication 1 DROP: at 10:07

## 2021-07-09 RX ADMIN — PROPARACAINE HYDROCHLORIDE 1 DROP: 5 SOLUTION/ DROPS OPHTHALMIC at 10:06

## 2021-07-09 RX ADMIN — ACETAMINOPHEN 975 MG: 325 TABLET ORAL at 10:00

## 2021-07-09 RX ADMIN — MOXIFLOXACIN 1 DROP: 5 SOLUTION/ DROPS OPHTHALMIC at 10:14

## 2021-07-09 RX ADMIN — DICLOFENAC SODIUM 1 DROP: 1 SOLUTION/ DROPS OPHTHALMIC at 10:15

## 2021-07-09 RX ADMIN — DICLOFENAC SODIUM 1 DROP: 1 SOLUTION/ DROPS OPHTHALMIC at 10:20

## 2021-07-09 ASSESSMENT — COPD QUESTIONNAIRES: COPD: 1

## 2021-07-09 ASSESSMENT — LIFESTYLE VARIABLES: TOBACCO_USE: 1

## 2021-07-09 ASSESSMENT — MIFFLIN-ST. JEOR: SCORE: 1468.89

## 2021-07-09 NOTE — ANESTHESIA POSTPROCEDURE EVALUATION
Patient: Vijaya Manjarrez    Procedure(s):  PHACOEMULSIFICATION, CATARACT, WITH STANDARD INTRAOCULAR LENS IMPLANT INSERTION RIGHT    Diagnosis:Nuclear sclerotic cataract of both eyes [H25.13]  Diagnosis Additional Information: No value filed.    Anesthesia Type:  MAC    Note:  Disposition: Outpatient   Postop Pain Control: Uneventful            Sign Out: Well controlled pain   PONV: No   Neuro/Psych: Uneventful            Sign Out: Acceptable/Baseline neuro status   Airway/Respiratory: Uneventful            Sign Out: Acceptable/Baseline resp. status   CV/Hemodynamics: Uneventful            Sign Out: Acceptable CV status; No obvious hypovolemia; No obvious fluid overload   Other NRE: NONE   DID A NON-ROUTINE EVENT OCCUR? No           Last vitals:  Vitals:    07/09/21 0916   BP: 135/78   Pulse: 74   Resp: 18   Temp: 36.6  C (97.8  F)   SpO2: 96%       Last vitals prior to Anesthesia Care Transfer:  CRNA VITALS  7/9/2021 1106 - 7/9/2021 1144      7/9/2021             NIBP:  141/79    Pulse:  70    NIBP Mean:  103    Ht Rate:  70    SpO2:  100 %    Resp Rate (set):  10          Electronically Signed By: LASHAWN SIMON MD  July 9, 2021  11:44 AM

## 2021-07-09 NOTE — DISCHARGE INSTRUCTIONS
[facility]  Home Care Following Anesthesia  For 24 hours after surgery:  1. Get plenty of rest.  A responsible adult must stay with you for at least 24 hours after you leave the surgery center.  2. Do not drive or use heavy equipment.  If you have weakness or tingling, don't drive or use heavy equipment until this feeling goes away.   3. Do not drink alcohol.   4. Avoid strenuous or risky activities.  Ask for help when climbing stairs.  5. You may feel lightheaded.  IF so, sit for a few minutes before standing.  Have someone help you get up.   6. If you have nausea (feel sick to your stomach): Drink only clear liquids such as apple juice, ginger ale, broth or 7-Up.  Rest may also help.  Be sure to drink enough fluids.  Move to a regular diet as you feel able.   7. You may have a slight fever.  Call the doctor if your fever is over 100 F (37.7 C) (taken under the tongue) or lasts longer than 24 hours.  8. You may have a dry mouth, a sore throat, muscle aches or trouble sleeping. These should go away after 24 hours.  9. Do not make important or legal decisions.   10. It is recommended to avoid smoking.               Tips for taking pain medications  To get the best pain relief possible, remember these points:    Take pain medications as directed, before pain becomes severe.    Pain medication can upset your stomach: taking it with food may help.    Constipation is a common side effect of pain medication. Drink plenty of  fluids.    Eat foods high in fiber. Take a stool softener if recommended by your doctor or pharmacist.    Do not drink alcohol, drive or operate machinery while taking pain medications.    Ask about other ways to control pain, such as with heat, ice or relaxation.    Tylenol/Acetaminophen Consumption  To help encourage the safe use of acetaminophen, the makers of TYLENOL  have lowered the maximum daily dose for single-ingredient Extra Strength TYLENOL   (acetaminophen) products sold in the [place] from 8 pills per day (4,000 mg) to 6 pills per day (3,000 mg). The dosing interval has also changed from 2 pills every 4-6 hours to 2 pills every 6 hours.    If you feel your pain relief is insufficient, you may take Tylenol/Acetaminophen in addition to your narcotic pain medication.     Be careful not to exceed 3,000 mg of Tylenol/Acetaminophen in a 24 hour period from all sources.    If you are taking extra strength Tylenol/acetaminophen (500 mg), the maximum dose is 6 tablets in 24 hours.    If you are taking regular strength acetaminophen (325 mg), the maximum dose is 9 tablets in 24 hours.  975 mg of Tylenol given at 10:00 am next dose may be given after 4:00 pm    Call a doctor for any of the followin. Signs of infection (fever, growing tenderness at the surgery site, a large amount of drainage or bleeding, severe pain, foul-smelling drainage, redness, swelling).  2. It has been over 8 to 10 hours since surgery and you are still not able to urinate (pass water).  3. Headache for over 24 hours.  4. Signs of Covid-19 infection (temperature over 100 degrees, shortness of breath, cough, loss of taste/smell, generalized body aches, persistent headache, chills, sore throat, nausea/vomiting/diarrhea)  Your doctor is:  Dr. Dai Glaser, Ophthalmology: [phone]               Or dial [phone] and ask for the resident on call for:  Ophthalmology  For emergency care, call the:  [facility]:  [phone] (TTY for hearing impaired: [phone])

## 2021-07-09 NOTE — ANESTHESIA PREPROCEDURE EVALUATION
Anesthesia Pre-Procedure Evaluation    Patient: Vijaya Manjarrez   MRN: [id number] : 1950        Preoperative Diagnosis: Nuclear sclerotic cataract of both eyes [H25.13]   Procedure : Procedure(s):  PHACOEMULSIFICATION, CATARACT, WITH STANDARD INTRAOCULAR LENS IMPLANT INSERTION RIGHT     Past Medical History:   Diagnosis Date     Anxiety      Cervical spine degeneration     spinal stenosis,      COPD (chronic obstructive pulmonary disease) (H) 2012    mild     Coronary artery disease      Depression      Diabetes mellitus, type 2 (H)      Diabetic neuropathy (H)      Facet arthropathy, lumbar      GERD (gastroesophageal reflux disease)      HTN (hypertension)      Hyperlipidemia      Lumbar degenerative disc disease      Migraine headache       Past Surgical History:   Procedure Laterality Date     CHOLECYSTECTOMY, LAPOROSCOPIC      Cholecystectomy, Laparoscopic     COMBINED REPAIR PTOSIS WITH BLEPHAROPLASTY BILATERAL Bilateral 3/11/2021    Procedure: Bilateral upper eyelid blepharoplasty and ptosis repair. Right upper eyelid margin lesion biopsy;  Surgeon: George Doll MD;  [place] [facility]     CV HEART CATHETERIZATION WITH POSSIBLE INTERVENTION Left 2019    Procedure: Coronary Angiogram;  Surgeon: Cheo Merida MD;  Location:  HEART CARDIAC CATH LAB     CV HEART CATHETERIZATION WITH POSSIBLE INTERVENTION Bilateral 10/2/2020    Procedure: Heart Catheterization with Possible Intervention;  Surgeon: Linda Sauceda MD;  Location: [facility] CARDIAC CATH LAB     CV LEFT HEART CATH N/A 10/2/2020    Procedure: Left Heart Cath;  Surgeon: Linda Sauceda MD;  Location:  HEART CARDIAC CATH LAB     OPEN REDUCTION INTERNAL FIXATION ORBIT BLOWOUT Left 10/8/2020    Procedure: Left Open Reduction Internal Fixation, Fracture, Orbit With Intraoperative Navigation - Left;  Surgeon: George Doll MD;  [place] [facility] [place]      Allergies   Allergen Reactions     Penicillins Hives       Social History     Tobacco Use     Smoking status: Former Smoker     Packs/day: 1.00     Years: 40.00     Pack years: 40.00     Types: Cigarettes     Quit date: 2017     Years since quittin.1     Smokeless tobacco: Never Used   Substance Use Topics     Alcohol use: No      Wt Readings from Last 1 Encounters:   21 88.5 kg (195 lb)        Anesthesia Evaluation            ROS/MED HX  ENT/Pulmonary:     (+) tobacco use, Past use, COPD,     Neurologic:     (+) peripheral neuropathy, migraines,     Cardiovascular:     (+) Dyslipidemia hypertension--CAD ---    METS/Exercise Tolerance:     Hematologic:       Musculoskeletal:       GI/Hepatic:     (+) GERD,     Renal/Genitourinary:       Endo:     (+) type II DM,     Psychiatric/Substance Use:     (+) psychiatric history anxiety and depression     Infectious Disease:       Malignancy:       Other:            Physical Exam    Airway             Respiratory Devices and Support         Dental           Cardiovascular   cardiovascular exam normal          Pulmonary   pulmonary exam normal                OUTSIDE LABS:  CBC:   Lab Results   Component Value Date    WBC 13.0 (H) 2021    WBC 11.6 (H) 2021    HGB 10.6 (L) 2021    HGB 10.3 (L) 2021    HCT 34.7 (L) 2021    HCT 35.3 2021     2021     2021     BMP:   Lab Results   Component Value Date     2021     2021    POTASSIUM 3.8 2021    POTASSIUM 4.3 2021    CHLORIDE 107 2021    CHLORIDE 109 2021    CO2 27 2021    CO2 29 2021    BUN 9 2021    BUN 9 2021    CR 0.84 2021    CR 0.64 2021     (H) 2021     (H) 2021     COAGS:   Lab Results   Component Value Date    PTT 34 2019    INR 0.97 2019     POC:   Lab Results   Component Value Date     (H) 2021     HEPATIC:   Lab Results   Component Value Date    ALBUMIN 3.9 2021     PROTTOTAL 7.5 07/05/2021    ALT 25 07/05/2021    AST 20 07/05/2021    ALKPHOS 82 07/05/2021    BILITOTAL 0.3 07/05/2021     OTHER:   Lab Results   Component Value Date    LACT 1.5 07/02/2017    A1C 7.2 (H) 07/05/2021    RADHA 9.0 07/05/2021    MAG 1.6 08/17/2020    LIPASE 167 08/08/2020    TSH 0.50 07/05/2021    CRP <2.9 07/05/2021    SED 19 07/05/2021       Anesthesia Plan    ASA Status:  3   NPO Status:  NPO Appropriate    Anesthesia Type: MAC.     - Reason for MAC: chronic cardiopulmonary disease, straight local not clinically adequate   Induction: Intravenous.   Maintenance: TIVA.        Consents    Anesthesia Plan(s) and associated risks, benefits, and realistic alternatives discussed. Questions answered and patient/representative(s) expressed understanding.     - Discussed with:  Patient      - Extended Intubation/Ventilatory Support Discussed: No.      - Patient is DNR/DNI Status: No    Use of blood products discussed: No .     Postoperative Care    Pain management: Multi-modal analgesia.        Comments:                LASHAWN SIMON MD

## 2021-07-09 NOTE — PROCEDURES
SURGEON: Dai Glaser M.D.    INDICATIONS: The patient Vijaya Manjarrez presented to the eye clinic with decreased vision secondary to cataract in the Right eye. The risks, benefits and alternatives to cataract extraction were discussed. The patient elected to proceed. All questions were answered to the patient's satisfaction.    IMPLANT:    Implant Name Type Inv. Item Serial No.  Lot No. LRB No. Used Action   EYE IMP IOL ASIF PCL TECNIS ZCB00 22.0 - [id number] Lens/Eye Implant EYE IMP IOL ASIF PCL TECNIS ZCB00 22.0 [id number] [employer]  Right 1 Implanted        DESCRIPTION OF PROCEDURE:   Prior to the procedure, appropriate cardiac and respiratory monitors were applied to the patient. The patient was brought to the operating room where a drop of topical tetracaine was given followed by lidocaine gel followed by povidone iodine.  A surgical pause was carried out to identify with all members of the surgical team the correct surgical site.  With adequate anesthesia, the patient was placed in the supine position. Right eye was prepped and draped in the usual sterile fashion. A lid speculum was placed, and the operating microscope was rotated into position.  A paracentesis was created to the left of the planned temporal incision.  Through this limbal paracentesis, the anterior chamber was filled with preservative-free lidocaine followed by dispersive viscoelastic.  A temporal wound was created at the limbus using a 2.5 mm keratome blade.  A capsulorrhexis was initiated using a bent 25-gauge needle and was completed in continuous and circular fashion using the capsulorrhexis forceps. The lens nucleus was hydrodissected using balanced salt solution.  The lens nucleus was rotated and removed using phacoemulsification.  Residual cortical material was removed using irrigation-aspiration.  The capsular bag was reinflated to its maximal extent with cohesive viscoelastic.  An intraocular  lens implant of the above listed power was inserted into the capsular bag.  The lens power was reviewed using the preoperative intraocular lens power measurements to confirm that the correct lens was used for the desired post-operative refractive state.  A second keratome incision was made with the rule to reduce post operative astigmatism, then the residual viscoelastic was removed in its entirety, the wounds were hydrated and found to be self-sealing.  A dose of 0.1mg of intracameral moxifloxacin was administered through the paracentesis.  Tactile pressure was confirmed to be in a normal range.  The lid speculum was removed and a drop of prednisolone acetate 1% was given before a shield was applied.  The patient tolerated the procedure well, and there were no complications.    PLAN: The patient will be discharged to home and will follow up tomorrow morning in the eye clinic.  EBL:  None  Complications:  None

## 2021-07-09 NOTE — ANESTHESIA CARE TRANSFER NOTE
Patient: Vijaya Manjarrez    Procedure(s):  PHACOEMULSIFICATION, CATARACT, WITH STANDARD INTRAOCULAR LENS IMPLANT INSERTION RIGHT    Diagnosis: Nuclear sclerotic cataract of both eyes [H25.13]  Diagnosis Additional Information: No value filed.    Anesthesia Type:   MAC     Note:    Oropharynx: spontaneously breathing  Level of Consciousness: awake  Oxygen Supplementation: room air    Independent Airway: airway patency satisfactory and stable  Dentition: dentition unchanged  Vital Signs Stable: post-procedure vital signs reviewed and stable  Report to RN Given: handoff report given  Patient transferred to: Phase II    Handoff Report: Identifed the Patient, Identified the Reponsible Provider, Reviewed the pertinent medical history, Discussed the surgical course, Reviewed Intra-OP anesthesia mangement and issues during anesthesia, Set expectations for post-procedure period and Allowed opportunity for questions and acknowledgement of understanding      Vitals: (Last set prior to Anesthesia Care Transfer)  CRNA VITALS  7/9/2021 1106 - 7/9/2021 1139      7/9/2021             NIBP:  141/79    Pulse:  70    NIBP Mean:  103    Ht Rate:  70    SpO2:  100 %    Resp Rate (set):  10        Electronically Signed By: AMANDA Grajeda CRNA  July 9, 2021  11:39 AM

## 2021-07-11 NOTE — PROGRESS NOTES
Post op day #0, status post cataract surgery, right eye     HPI:  No complaints, feeling well.  Denies eye pain.  Vision improved and brighter, still dilated from surgery.       Exam right eye :    10' snellen card without correction:  20/63    IOP 23 tonopen right   SLE:    LL: normal   C/s:  White  Cornea:  Inferior Superficial punctate epithelial erosions, trace diffuse edema wounds watertight  AC:  Deep 1-2+ cell  Lens: posterior chamber intraocular lens (PCIOL) in bag centered     Assessment/Plan:  Pseudophakia,right eye    Comment:  Postoperative day #0, good post-operative appearance.  Wounds water-tight and IOP reasonable.     Plan:   Operative eye:     Prednisolone (PredForte- white) 4 times daily   Ketorolac (Acular- gray) 4 times daily  Patient received intracameral Moxifloxacin in surgery     Instructions for patient reviewed:  Eye protection at all times and eye shield at night for 1 week.  Limited activities with no exercise or heavy lifting for 1 week.  Instructed patient to contact immediately for decreasing vision, eye pain, increased  redness, new floaters or flashes of light, or other concerning symptoms.     Written instructions given and questions answered.  Patient will follow-up in clinic in a week, sooner as needed.         Complete documentation of historical and exam elements from today's encounter can be found in the full encounter summary report (not reduplicated in this progress note). I personally obtained the chief complaint(s) and history of present illness.  I have confirmed and edited as necessary the CC, HPI, PMH/PSH, social history, FMH, ROS, and exam/neuro findings as obtained by the technician or others. I have examined this patient myself and I personally viewed the image(s) and studies listed above and the documentation reflects my findings and interpretation.  I formulated and edited as necessary the assessment and plan and discussed the findings and management plan with the  patient and family.     Dai Glaser MD

## 2021-07-12 ENCOUNTER — TELEPHONE (OUTPATIENT)
Dept: OPHTHALMOLOGY | Facility: CLINIC | Age: 71
End: 2021-07-12

## 2021-07-12 DIAGNOSIS — Z96.1 PSEUDOPHAKIA OF RIGHT EYE: ICD-10-CM

## 2021-07-12 DIAGNOSIS — H57.11 PAIN AROUND RIGHT EYE: Primary | ICD-10-CM

## 2021-07-12 RX ORDER — BRIMONIDINE TARTRATE 1 MG/ML
1 SOLUTION/ DROPS OPHTHALMIC 3 TIMES DAILY
Qty: 5 ML | Refills: 1 | Status: SHIPPED | OUTPATIENT
Start: 2021-07-12 | End: 2021-11-02

## 2021-07-12 NOTE — TELEPHONE ENCOUNTER
"Patient called regarding right eye blurry and diffuse headache.  Right eye is \"foggy\" but the eye is not painful or red.  Says same symptoms were worse over the weekend but only slightly better now.    Using her eye drops as directed from surgery.     Recommend patient be seen today to check the eye and that I am suspecting elevated intraocular pressure which needs treatment.  I told her I cannot be sure of this diagnosis over the phone.  Infection does not seem likely but I also cannot be sure over the phone.    Patient cannot come today and is unsure her medical transport can bring her tomorrow. She can have someone pickup a prescription for her.  She will call to see if she can get emergency transportation tomorrow for appointment.     Prescription sent to her pharmacy for brimonidine 0.1% right eye three times a day to start now for presumed elevated eye pressure after cataract extraction.  Ok to take tylenol.  She will continue prednisolone acetate 1% and ketorolac.  We discussed that if vision changes, worse pain develops, or new eye redness or discharge develops she has to seek emergency appointment as soon as possible.     Appointment in place in clinic for tomorrow and I told her whatever time she can get here (early is ok) we will see her right away.    Dai Glaser MD   2:21 PM   7/12/2021       "

## 2021-07-13 ENCOUNTER — OFFICE VISIT (OUTPATIENT)
Dept: OPHTHALMOLOGY | Facility: CLINIC | Age: 71
End: 2021-07-13
Payer: COMMERCIAL

## 2021-07-13 DIAGNOSIS — Z96.1 PSEUDOPHAKIA OF RIGHT EYE: Primary | ICD-10-CM

## 2021-07-13 PROCEDURE — 99024 POSTOP FOLLOW-UP VISIT: CPT | Performed by: OPHTHALMOLOGY

## 2021-07-13 ASSESSMENT — EXTERNAL EXAM - RIGHT EYE: OD_EXAM: NORMAL

## 2021-07-13 ASSESSMENT — VISUAL ACUITY
OD_SC: 20/125
METHOD: SNELLEN - LINEAR
OD_PH_SC: 20/70
OS_SC: 20/300

## 2021-07-13 ASSESSMENT — TONOMETRY
OS_IOP_MMHG: 12
OD_IOP_MMHG: 14
IOP_METHOD: TONOPEN

## 2021-07-13 ASSESSMENT — SLIT LAMP EXAM - LIDS
COMMENTS: NORMAL
COMMENTS: NORMAL

## 2021-07-13 ASSESSMENT — REFRACTION_MANIFEST
OD_CYLINDER: +0.75
OD_AXIS: 070
OD_SPHERE: PLANO

## 2021-07-13 ASSESSMENT — CUP TO DISC RATIO: OD_RATIO: 0.5

## 2021-07-13 ASSESSMENT — EXTERNAL EXAM - LEFT EYE: OS_EXAM: NORMAL

## 2021-07-13 NOTE — PROGRESS NOTES
Post op day #3, status post cataract surgery, right eye    HPI:  Denies eye pain.  Vision blurry right eye but somewhat improved from over the weekend.  No foreign body sensation.  No flashes/floaters.     Oc medications:  Right eye   Prednisolone (white cap, milky drop) 4 times daily   Ketorolac (gray cap) 4 times daily  Brimonidine  (purple cap)   3 times daily    Assessment/Plan:  Pseudophakia, RIGHT eye    Comment:  Postoperative day #3, good post-operative appearance with mild cornea edema.  Suspect increased intraocular pressure with secondary corneal edema over the weekend that patient did not call about until yesterday.  Cornea edema/vision are improving now and intraocular pressure normal on brimonidine that was called in and started yesterday.      Plan:   Operative eye:    Prednisolone (white cap, milky drop) 3 times daily   Ketorolac (gray cap) 3 times daily  Brimonidine  (purple cap)   3 times daily-  Stop on Sunday 7/18 prior to next appointment on Tuesday 7/20      Instructions for patient reviewed:  Eye protection at all times and eye shield at night for 1 week.  Limited activities with no exercise or heavy lifting for 1 week.  Instructed patient to contact immediately for decreasing vision, eye pain, increasedredness, new floaters or flashes of light, or other concerning symptoms.    Return in about 1 week (around 7/20/2021) for post-op cataract sx, POW  1 OD, pachy OU, MAC OCT OU.        Complete documentation of historical and exam elements from today's encounter can be found in the full encounter summary report (not reduplicated in this progress note). I personally obtained the chief complaint(s) and history of present illness.  I have confirmed and edited as necessary the CC, HPI, PMH/PSH, social history, FMH, ROS, and exam/neuro findings as obtained by the technician or others. I have examined this patient myself and I personally viewed the image(s) and studies listed above and the  documentation reflects my findings and interpretation.  I formulated and edited as necessary the assessment and plan and discussed the findings and management plan with the patient and family.     Dai Glaser MD

## 2021-07-13 NOTE — NURSING NOTE
Chief Complaints and History of Present Illnesses   Patient presents with     Post Op (Ophthalmology) Right Eye     Chief Complaint(s) and History of Present Illness(es)     Post Op (Ophthalmology) Right Eye     Laterality: right eye    Onset: 4 days ago    Quality: blurred vision    Frequency: intermittently    Course: gradually worsening    Associated symptoms: eye pain    Treatments tried: eye drops    Pain scale: 3/10              Comments     S/p PHACOEMULSIFICATION, CATARACT, WITH STANDARD INTRAOCULAR LENS IMPLANT INSERTION RIGHT on 7/9/2021. Pt states vision seems blurry, occasional sharp pain in the right eye. Using both drops QID right eye. Constant pain of 3/10.    Luis Darnell, COT COT 1:49 PM July 13, 2021

## 2021-07-19 ENCOUNTER — TELEPHONE (OUTPATIENT)
Dept: INTERNAL MEDICINE | Facility: CLINIC | Age: 71
End: 2021-07-19

## 2021-07-19 NOTE — TELEPHONE ENCOUNTER
Please advise pt low iron levels and anemia.  Recommend BID OTC ferrous sulfate 325 mg and recheck labs with new PCP in 4-6 weeks.  Jenny Barker CNP

## 2021-07-19 NOTE — LETTER
[facility] [address], [address]  [place], [place]  [address]                                            TEL:[phone]  FAX:[phone]      Vijaya Manjarrez  [address] [place] [address] [address] DAVIS [address] [place] [id number]      July 22, 2021    Dear Vijaya       Your labs show low iron levels and anemia.  Recommend you start taking ferrous sulfate (Iron) 325 mg 1 tablet twice a day with food (this is available over the counter from any pharmacy). You will also want to increase the amount of water you drink as Iron can cause constipation. Please make an appointment to establish care with a new provider in 4-6 weeks and recheck labs at that time.       Sincerely,      Jenny Barker C.N.P.

## 2021-07-20 ENCOUNTER — OFFICE VISIT (OUTPATIENT)
Dept: OPHTHALMOLOGY | Facility: CLINIC | Age: 71
End: 2021-07-20
Payer: COMMERCIAL

## 2021-07-20 DIAGNOSIS — H18.20 CORNEAL EDEMA: Primary | ICD-10-CM

## 2021-07-20 DIAGNOSIS — H53.8 BLURRED VISION, BILATERAL: ICD-10-CM

## 2021-07-20 PROCEDURE — 92134 CPTRZ OPH DX IMG PST SGM RTA: CPT | Performed by: OPHTHALMOLOGY

## 2021-07-20 PROCEDURE — 99024 POSTOP FOLLOW-UP VISIT: CPT | Performed by: OPHTHALMOLOGY

## 2021-07-20 RX ORDER — SILICONE ADHESIVE 1.5" X 3"
1-2 SHEET (EA) TOPICAL 2 TIMES DAILY
Qty: 15 ML | Refills: 1 | Status: SHIPPED | OUTPATIENT
Start: 2021-07-20 | End: 2021-09-28

## 2021-07-20 ASSESSMENT — EXTERNAL EXAM - RIGHT EYE: OD_EXAM: NORMAL

## 2021-07-20 ASSESSMENT — SLIT LAMP EXAM - LIDS
COMMENTS: NORMAL
COMMENTS: NORMAL

## 2021-07-20 ASSESSMENT — TONOMETRY
OD_IOP_MMHG: 13
OS_IOP_MMHG: 12
IOP_METHOD: TONOPEN

## 2021-07-20 ASSESSMENT — REFRACTION_MANIFEST
OD_SPHERE: PLANO
OD_AXIS: 070
OD_CYLINDER: +0.50

## 2021-07-20 ASSESSMENT — VISUAL ACUITY
METHOD: SNELLEN - LINEAR
OS_CC: 20/200
OD_SC: 20/60
OD_PH_SC: 20/40

## 2021-07-20 ASSESSMENT — CONF VISUAL FIELD
OD_NORMAL: 1
METHOD: COUNTING FINGERS
OS_NORMAL: 1

## 2021-07-20 ASSESSMENT — CUP TO DISC RATIO: OD_RATIO: 0.5

## 2021-07-20 ASSESSMENT — EXTERNAL EXAM - LEFT EYE: OS_EXAM: NORMAL

## 2021-07-20 NOTE — PROGRESS NOTES
Post op day #8, status post cataract surgery, right eye    HPI:  Denies eye pain.  Vision still blurry right eye and slightly achy and irritated.      Oc medications:  Right eye   Prednisolone (white cap, milky drop) 3 times daily   Ketorolac (gray cap) 3 times daily    Assessment/Plan:  Pseudophakia, RIGHT eye    Comment:  Postoperative day #8, good post-operative appearance with only subtle corneal edema remaining.  Intraocular pressure normal off brimonidine.  Macula oct (NO CHARGE) normal both eyes today    Plan:   Operative eye:    Prednisolone (white cap, milky drop) 2 times daily   Ketorolac (gray cap) 2 times daily  barber drops right eye twice a day for possible cornea edema    Instructions for patient reviewed:  Discontinue shield and resume normal activities   Instructed patient to contact immediately for decreasing vision, eye pain, increased redness, new floaters or flashes of light, or other concerning symptoms.  Will decide if wanting to proceed with cataract extraction left eye based on the remainder of the week.  I told her there is no problem to postpone left eye if we need more time for right eye to improve.   No contraindications to surgery as the healing appears to be going well but would prefer better visual acuity right eye prior to surgery left eye. I will call her in 2 days.  Return in about 1 week (around 7/27/2021) for post-op cataract sx, POD  1 OS.        Complete documentation of historical and exam elements from today's encounter can be found in the full encounter summary report (not reduplicated in this progress note). I personally obtained the chief complaint(s) and history of present illness.  I have confirmed and edited as necessary the CC, HPI, PMH/PSH, social history, FMH, ROS, and exam/neuro findings as obtained by the technician or others. I have examined this patient myself and I personally viewed the image(s) and studies listed above and the documentation reflects my findings  and interpretation.  I formulated and edited as necessary the assessment and plan and discussed the findings and management plan with the patient and family.     Dai Glaser MD       Addendum  7/22/21: called patient and discussed her preference for whether to proceed or postpone cataract extraction right eye next week. She said the Andree drops seem to be helping slightly although it is still a little blurry right eye.  She does want to proceed with cataract extraction left eye.  We reviewed that since the left eye had trauma there could be delayed healing due to higher risk of complications and or surgical time.  She feels she will be able to meet activities of daily living with the vision she has right eye while the left eye heals.  She will call clinic for appointment Monday if anything changes prior to surgery arrival time.    Dai Glaser MD

## 2021-07-20 NOTE — NURSING NOTE
Chief Complaints and History of Present Illnesses   Patient presents with     Post Op (Ophthalmology) Right Eye     Chief Complaint(s) and History of Present Illness(es)     Post Op (Ophthalmology) Right Eye     Laterality: right eye    Onset: 1 week ago    Quality: blurred vision    Course: gradually improving    Associated symptoms: eye pain (comes and goes), foreign body sensation (last 4 days) and redness (mild redness).  Negative for itching, discharge, swelling, flashes and floaters    Treatments tried: eye drops    Pain scale: 3/10              Comments     PROCEDURE:   PHACOEMULSIFICATION, CATARACT, WITH STANDARD INTRAOCULAR LENS IMPLANT INSERTION - RE 7/09/21    Pt here today for 1 week post operative visit after RE cataract surgery.  States RE feels achy, vision still slightly blurry, but drops going well.  Reports still using ketorolac & prednisolone  1 gtts Q TID.    Bal Ruiz COA, KALINA 2:53 PM 07/20/2021

## 2021-07-22 ENCOUNTER — LAB (OUTPATIENT)
Dept: LAB | Facility: CLINIC | Age: 71
End: 2021-07-22
Payer: COMMERCIAL

## 2021-07-22 DIAGNOSIS — Z11.59 ENCOUNTER FOR SCREENING FOR OTHER VIRAL DISEASES: ICD-10-CM

## 2021-07-22 PROCEDURE — U0003 INFECTIOUS AGENT DETECTION BY NUCLEIC ACID (DNA OR RNA); SEVERE ACUTE RESPIRATORY SYNDROME CORONAVIRUS 2 (SARS-COV-2) (CORONAVIRUS DISEASE [COVID-19]), AMPLIFIED PROBE TECHNIQUE, MAKING USE OF HIGH THROUGHPUT TECHNOLOGIES AS DESCRIBED BY CMS-2020-01-R: HCPCS

## 2021-07-22 PROCEDURE — U0005 INFEC AGEN DETEC AMPLI PROBE: HCPCS

## 2021-07-22 NOTE — TELEPHONE ENCOUNTER
Patient walked into clinic, stated she could not find the medication she needed. Letter printed for patient with Jenny's recommendations and that  staff will advise patient that any pharmacist can help her find the medication.

## 2021-07-23 ENCOUNTER — ANESTHESIA EVENT (OUTPATIENT)
Dept: SURGERY | Facility: AMBULATORY SURGERY CENTER | Age: 71
End: 2021-07-23
Payer: COMMERCIAL

## 2021-07-23 LAB — SARS-COV-2 RNA RESP QL NAA+PROBE: NEGATIVE

## 2021-07-23 RX ORDER — LIDOCAINE 40 MG/G
CREAM TOPICAL
Status: DISCONTINUED | OUTPATIENT
Start: 2021-07-23 | End: 2021-07-27 | Stop reason: HOSPADM

## 2021-07-25 DIAGNOSIS — H25.13 NUCLEAR SCLEROTIC CATARACT OF BOTH EYES: Primary | ICD-10-CM

## 2021-07-25 RX ORDER — KETOROLAC TROMETHAMINE 5 MG/ML
1 SOLUTION OPHTHALMIC 4 TIMES DAILY
Qty: 5 ML | Refills: 1 | Status: SHIPPED | OUTPATIENT
Start: 2021-07-26 | End: 2021-09-28

## 2021-07-25 RX ORDER — PREDNISOLONE ACETATE 10 MG/ML
1 SUSPENSION/ DROPS OPHTHALMIC 4 TIMES DAILY
Qty: 5 ML | Refills: 1 | Status: SHIPPED | OUTPATIENT
Start: 2021-07-26 | End: 2021-09-28

## 2021-07-26 ENCOUNTER — ANESTHESIA (OUTPATIENT)
Dept: SURGERY | Facility: AMBULATORY SURGERY CENTER | Age: 71
End: 2021-07-26
Payer: COMMERCIAL

## 2021-07-26 ENCOUNTER — HOSPITAL ENCOUNTER (OUTPATIENT)
Facility: AMBULATORY SURGERY CENTER | Age: 71
End: 2021-07-26
Attending: OPHTHALMOLOGY
Payer: COMMERCIAL

## 2021-07-26 VITALS
OXYGEN SATURATION: 93 % | BODY MASS INDEX: 29.62 KG/M2 | DIASTOLIC BLOOD PRESSURE: 69 MMHG | RESPIRATION RATE: 18 BRPM | HEIGHT: 69 IN | HEART RATE: 69 BPM | TEMPERATURE: 97.5 F | SYSTOLIC BLOOD PRESSURE: 130 MMHG | WEIGHT: 200 LBS

## 2021-07-26 DIAGNOSIS — H25.13 NUCLEAR SCLEROTIC CATARACT OF BOTH EYES: Primary | ICD-10-CM

## 2021-07-26 LAB — GLUCOSE BLDC GLUCOMTR-MCNC: 121 MG/DL (ref 70–99)

## 2021-07-26 PROCEDURE — 66984 XCAPSL CTRC RMVL W/O ECP: CPT | Mod: LT

## 2021-07-26 DEVICE — EYE IMP IOL AMO PCL TECNIS ZCB00 22.5: Type: IMPLANTABLE DEVICE | Site: EYE | Status: FUNCTIONAL

## 2021-07-26 RX ORDER — FENTANYL CITRATE 50 UG/ML
25-50 INJECTION, SOLUTION INTRAMUSCULAR; INTRAVENOUS EVERY 5 MIN PRN
Status: DISCONTINUED | OUTPATIENT
Start: 2021-07-26 | End: 2021-07-26 | Stop reason: HOSPADM

## 2021-07-26 RX ORDER — DICLOFENAC SODIUM 1 MG/ML
1 SOLUTION/ DROPS OPHTHALMIC
Status: COMPLETED | OUTPATIENT
Start: 2021-07-26 | End: 2021-07-26

## 2021-07-26 RX ORDER — ACETAMINOPHEN 325 MG/1
975 TABLET ORAL ONCE
Status: DISCONTINUED | OUTPATIENT
Start: 2021-07-26 | End: 2021-07-26 | Stop reason: HOSPADM

## 2021-07-26 RX ORDER — FENTANYL CITRATE 50 UG/ML
25-50 INJECTION, SOLUTION INTRAMUSCULAR; INTRAVENOUS EVERY 5 MIN PRN
Status: ACTIVE | OUTPATIENT
Start: 2021-07-26 | End: 2021-07-26

## 2021-07-26 RX ORDER — MEPERIDINE HYDROCHLORIDE 25 MG/ML
12.5 INJECTION INTRAMUSCULAR; INTRAVENOUS; SUBCUTANEOUS
Status: DISCONTINUED | OUTPATIENT
Start: 2021-07-26 | End: 2021-07-27 | Stop reason: HOSPADM

## 2021-07-26 RX ORDER — TRAMADOL HYDROCHLORIDE 50 MG/1
50 TABLET ORAL EVERY 6 HOURS PRN
COMMUNITY
End: 2021-08-10

## 2021-07-26 RX ORDER — DIAZEPAM 10 MG/2ML
2.5 INJECTION, SOLUTION INTRAMUSCULAR; INTRAVENOUS
Status: DISCONTINUED | OUTPATIENT
Start: 2021-07-26 | End: 2021-07-26 | Stop reason: HOSPADM

## 2021-07-26 RX ORDER — PREDNISOLONE ACETATE 1 %
SUSPENSION, DROPS(FINAL DOSAGE FORM)(ML) OPHTHALMIC (EYE) PRN
Status: DISCONTINUED | OUTPATIENT
Start: 2021-07-26 | End: 2021-07-26 | Stop reason: HOSPADM

## 2021-07-26 RX ORDER — CYCLOPENTOLAT/TROPIC/PHENYLEPH 1%-1%-2.5%
1 DROPS (EA) OPHTHALMIC (EYE)
Status: COMPLETED | OUTPATIENT
Start: 2021-07-26 | End: 2021-07-26

## 2021-07-26 RX ORDER — MOXIFLOXACIN IN NACL,ISO-OS/PF 0.3MG/0.3
SYRINGE (ML) INTRAOCULAR PRN
Status: DISCONTINUED | OUTPATIENT
Start: 2021-07-26 | End: 2021-07-26 | Stop reason: HOSPADM

## 2021-07-26 RX ORDER — LABETALOL HYDROCHLORIDE 5 MG/ML
10 INJECTION, SOLUTION INTRAVENOUS
Status: DISCONTINUED | OUTPATIENT
Start: 2021-07-26 | End: 2021-07-26 | Stop reason: HOSPADM

## 2021-07-26 RX ORDER — PROPARACAINE HYDROCHLORIDE 5 MG/ML
1 SOLUTION/ DROPS OPHTHALMIC ONCE
Status: COMPLETED | OUTPATIENT
Start: 2021-07-26 | End: 2021-07-26

## 2021-07-26 RX ORDER — LIDOCAINE 40 MG/G
CREAM TOPICAL
Status: DISCONTINUED | OUTPATIENT
Start: 2021-07-26 | End: 2021-07-26 | Stop reason: HOSPADM

## 2021-07-26 RX ORDER — SODIUM CHLORIDE, SODIUM LACTATE, POTASSIUM CHLORIDE, CALCIUM CHLORIDE 600; 310; 30; 20 MG/100ML; MG/100ML; MG/100ML; MG/100ML
500 INJECTION, SOLUTION INTRAVENOUS CONTINUOUS
Status: DISCONTINUED | OUTPATIENT
Start: 2021-07-26 | End: 2021-07-26 | Stop reason: HOSPADM

## 2021-07-26 RX ORDER — HYDROMORPHONE HYDROCHLORIDE 1 MG/ML
.2-.4 INJECTION, SOLUTION INTRAMUSCULAR; INTRAVENOUS; SUBCUTANEOUS EVERY 5 MIN PRN
Status: DISCONTINUED | OUTPATIENT
Start: 2021-07-26 | End: 2021-07-26 | Stop reason: HOSPADM

## 2021-07-26 RX ORDER — OXYCODONE HYDROCHLORIDE 5 MG/1
5-10 TABLET ORAL EVERY 4 HOURS PRN
Status: DISCONTINUED | OUTPATIENT
Start: 2021-07-26 | End: 2021-07-27 | Stop reason: HOSPADM

## 2021-07-26 RX ORDER — ALBUTEROL SULFATE 0.83 MG/ML
2.5 SOLUTION RESPIRATORY (INHALATION) EVERY 4 HOURS PRN
Status: DISCONTINUED | OUTPATIENT
Start: 2021-07-26 | End: 2021-07-26 | Stop reason: HOSPADM

## 2021-07-26 RX ORDER — TETRACAINE HYDROCHLORIDE 5 MG/ML
SOLUTION OPHTHALMIC PRN
Status: DISCONTINUED | OUTPATIENT
Start: 2021-07-26 | End: 2021-07-26 | Stop reason: HOSPADM

## 2021-07-26 RX ORDER — BALANCED SALT SOLUTION 6.4; .75; .48; .3; 3.9; 1.7 MG/ML; MG/ML; MG/ML; MG/ML; MG/ML; MG/ML
SOLUTION OPHTHALMIC PRN
Status: DISCONTINUED | OUTPATIENT
Start: 2021-07-26 | End: 2021-07-26 | Stop reason: HOSPADM

## 2021-07-26 RX ORDER — LIDOCAINE HYDROCHLORIDE 10 MG/ML
INJECTION, SOLUTION EPIDURAL; INFILTRATION; INTRACAUDAL; PERINEURAL PRN
Status: DISCONTINUED | OUTPATIENT
Start: 2021-07-26 | End: 2021-07-26 | Stop reason: HOSPADM

## 2021-07-26 RX ORDER — ONDANSETRON 2 MG/ML
4 INJECTION INTRAMUSCULAR; INTRAVENOUS EVERY 30 MIN PRN
Status: DISCONTINUED | OUTPATIENT
Start: 2021-07-26 | End: 2021-07-27 | Stop reason: HOSPADM

## 2021-07-26 RX ORDER — ONDANSETRON 2 MG/ML
INJECTION INTRAMUSCULAR; INTRAVENOUS PRN
Status: DISCONTINUED | OUTPATIENT
Start: 2021-07-26 | End: 2021-07-26

## 2021-07-26 RX ORDER — SODIUM CHLORIDE, SODIUM LACTATE, POTASSIUM CHLORIDE, CALCIUM CHLORIDE 600; 310; 30; 20 MG/100ML; MG/100ML; MG/100ML; MG/100ML
INJECTION, SOLUTION INTRAVENOUS CONTINUOUS
Status: DISCONTINUED | OUTPATIENT
Start: 2021-07-26 | End: 2021-07-26 | Stop reason: HOSPADM

## 2021-07-26 RX ORDER — ONDANSETRON 4 MG/1
4 TABLET, ORALLY DISINTEGRATING ORAL EVERY 30 MIN PRN
Status: DISCONTINUED | OUTPATIENT
Start: 2021-07-26 | End: 2021-07-27 | Stop reason: HOSPADM

## 2021-07-26 RX ORDER — SODIUM CHLORIDE, SODIUM LACTATE, POTASSIUM CHLORIDE, CALCIUM CHLORIDE 600; 310; 30; 20 MG/100ML; MG/100ML; MG/100ML; MG/100ML
INJECTION, SOLUTION INTRAVENOUS CONTINUOUS
Status: DISCONTINUED | OUTPATIENT
Start: 2021-07-26 | End: 2021-07-27 | Stop reason: HOSPADM

## 2021-07-26 RX ORDER — ACETAZOLAMIDE 500 MG/1
500 CAPSULE, EXTENDED RELEASE ORAL ONCE
Status: COMPLETED | OUTPATIENT
Start: 2021-07-26 | End: 2021-07-26

## 2021-07-26 RX ORDER — FENTANYL CITRATE 50 UG/ML
INJECTION, SOLUTION INTRAMUSCULAR; INTRAVENOUS PRN
Status: DISCONTINUED | OUTPATIENT
Start: 2021-07-26 | End: 2021-07-26

## 2021-07-26 RX ORDER — DEXAMETHASONE SODIUM PHOSPHATE 10 MG/ML
4 INJECTION, SOLUTION INTRAMUSCULAR; INTRAVENOUS EVERY 10 MIN PRN
Status: DISCONTINUED | OUTPATIENT
Start: 2021-07-26 | End: 2021-07-27 | Stop reason: HOSPADM

## 2021-07-26 RX ORDER — MOXIFLOXACIN 5 MG/ML
1 SOLUTION/ DROPS OPHTHALMIC
Status: COMPLETED | OUTPATIENT
Start: 2021-07-26 | End: 2021-07-26

## 2021-07-26 RX ADMIN — ONDANSETRON 4 MG: 2 INJECTION INTRAMUSCULAR; INTRAVENOUS at 14:46

## 2021-07-26 RX ADMIN — DICLOFENAC SODIUM 1 DROP: 1 SOLUTION/ DROPS OPHTHALMIC at 12:42

## 2021-07-26 RX ADMIN — Medication 1 DROP: at 12:35

## 2021-07-26 RX ADMIN — Medication 1 DROP: at 12:29

## 2021-07-26 RX ADMIN — MOXIFLOXACIN 1 DROP: 5 SOLUTION/ DROPS OPHTHALMIC at 12:29

## 2021-07-26 RX ADMIN — MOXIFLOXACIN 1 DROP: 5 SOLUTION/ DROPS OPHTHALMIC at 12:42

## 2021-07-26 RX ADMIN — ACETAZOLAMIDE 500 MG: 500 CAPSULE, EXTENDED RELEASE ORAL at 15:41

## 2021-07-26 RX ADMIN — ACETAMINOPHEN 975 MG: 325 TABLET ORAL at 15:46

## 2021-07-26 RX ADMIN — Medication 1 DROP: at 12:42

## 2021-07-26 RX ADMIN — MOXIFLOXACIN 1 DROP: 5 SOLUTION/ DROPS OPHTHALMIC at 12:35

## 2021-07-26 RX ADMIN — FENTANYL CITRATE 50 MCG: 50 INJECTION, SOLUTION INTRAMUSCULAR; INTRAVENOUS at 14:52

## 2021-07-26 RX ADMIN — PROPARACAINE HYDROCHLORIDE 1 DROP: 5 SOLUTION/ DROPS OPHTHALMIC at 12:29

## 2021-07-26 RX ADMIN — SODIUM CHLORIDE, SODIUM LACTATE, POTASSIUM CHLORIDE, CALCIUM CHLORIDE: 600; 310; 30; 20 INJECTION, SOLUTION INTRAVENOUS at 14:43

## 2021-07-26 RX ADMIN — DICLOFENAC SODIUM 1 DROP: 1 SOLUTION/ DROPS OPHTHALMIC at 12:35

## 2021-07-26 RX ADMIN — DICLOFENAC SODIUM 1 DROP: 1 SOLUTION/ DROPS OPHTHALMIC at 12:29

## 2021-07-26 RX ADMIN — FENTANYL CITRATE 50 MCG: 50 INJECTION, SOLUTION INTRAMUSCULAR; INTRAVENOUS at 14:45

## 2021-07-26 ASSESSMENT — MIFFLIN-ST. JEOR: SCORE: 1491.57

## 2021-07-26 ASSESSMENT — COPD QUESTIONNAIRES: COPD: 1

## 2021-07-26 ASSESSMENT — LIFESTYLE VARIABLES: TOBACCO_USE: 1

## 2021-07-26 NOTE — DISCHARGE INSTRUCTIONS
[facility]  Home Care Following Anesthesia  For 24 hours after surgery:  1. Get plenty of rest.  A responsible adult must stay with you for at least 24 hours after you leave the surgery center.  2. Do not drive or use heavy equipment.  If you have weakness or tingling, don't drive or use heavy equipment until this feeling goes away.   3. Do not drink alcohol.   4. Avoid strenuous or risky activities.  Ask for help when climbing stairs.  5. You may feel lightheaded.  IF so, sit for a few minutes before standing.  Have someone help you get up.   6. If you have nausea (feel sick to your stomach): Drink only clear liquids such as apple juice, ginger ale, broth or 7-Up.  Rest may also help.  Be sure to drink enough fluids.  Move to a regular diet as you feel able.   7. You may have a slight fever.  Call the doctor if your fever is over 100 F (37.7 C) (taken under the tongue) or lasts longer than 24 hours.  8. You may have a dry mouth, a sore throat, muscle aches or trouble sleeping. These should go away after 24 hours.  9. Do not make important or legal decisions.   10. It is recommended to avoid smoking.               Tips for taking pain medications  To get the best pain relief possible, remember these points:    Take pain medications as directed, before pain becomes severe.    Pain medication can upset your stomach: taking it with food may help.    Constipation is a common side effect of pain medication. Drink plenty of  fluids.    Eat foods high in fiber. Take a stool softener if recommended by your doctor or pharmacist.    Do not drink alcohol, drive or operate machinery while taking pain medications.    Ask about other ways to control pain, such as with heat, ice or relaxation.    Tylenol/Acetaminophen Consumption  To help encourage the safe use of acetaminophen, the makers of TYLENOL  have lowered the maximum daily dose for single-ingredient Extra Strength TYLENOL   (acetaminophen) products sold in the [place] from 8 pills per day (4,000 mg) to 6 pills per day (3,000 mg). The dosing interval has also changed from 2 pills every 4-6 hours to 2 pills every 6 hours.    If you feel your pain relief is insufficient, you may take Tylenol/Acetaminophen in addition to your narcotic pain medication.     Be careful not to exceed 3,000 mg of Tylenol/Acetaminophen in a 24 hour period from all sources.    If you are taking extra strength Tylenol/acetaminophen (500 mg), the maximum dose is 6 tablets in 24 hours.    If you are taking regular strength acetaminophen (325 mg), the maximum dose is 9 tablets in 24 hours.    Call a doctor for any of the followin. Signs of infection (fever, growing tenderness at the surgery site, a large amount of drainage or bleeding, severe pain, foul-smelling drainage, redness, swelling).  2. It has been over 8 to 10 hours since surgery and you are still not able to urinate (pass water).  3. Headache for over 24 hours.  4. Numbness, tingling or weakness the day after surgery (if you had spinal anesthesia).  5. Signs of Covid-19 infection (temperature over 100 degrees, shortness of breath, cough, loss of taste/smell, generalized body aches, persistent headache, chills, sore throat, nausea/vomiting/diarrhea)  Your doctor is:  Dr. Dai Glaser, Ophthalmology: [phone]                    Or dial [phone] and ask for the resident on call for:  Ophthalmology  For emergency care, call the:  [facility]:  [phone] (TTY for hearing impaired: [phone])

## 2021-07-26 NOTE — ANESTHESIA POSTPROCEDURE EVALUATION
Patient: Vijaya Manjarrez    Procedure(s):  PHACOEMULSIFICATION, CATARACT, WITH STANDARD INTRAOCULAR LENS IMPLANT INSERTION    Diagnosis:Nuclear sclerotic cataract of both eyes [H25.13]  Diagnosis Additional Information: No value filed.    Anesthesia Type:  MAC    Note:  Disposition: Outpatient   Postop Pain Control: Uneventful            Sign Out: Well controlled pain   PONV: No   Neuro/Psych: Uneventful            Sign Out: Acceptable/Baseline neuro status   Airway/Respiratory: Uneventful            Sign Out: Acceptable/Baseline resp. status   CV/Hemodynamics: Uneventful            Sign Out: Acceptable CV status   Other NRE: NONE   DID A NON-ROUTINE EVENT OCCUR? No           Last vitals:  Vitals Value Taken Time   BP     Temp     Pulse     Resp     SpO2         Electronically Signed By: Luis Pickens DO  July 26, 2021  3:30 PM

## 2021-07-26 NOTE — ANESTHESIA CARE TRANSFER NOTE
Patient: Vijaya Manjarrez    Procedure(s):  PHACOEMULSIFICATION, CATARACT, WITH STANDARD INTRAOCULAR LENS IMPLANT INSERTION    Diagnosis: Nuclear sclerotic cataract of both eyes [H25.13]  Diagnosis Additional Information: No value filed.    Anesthesia Type:   MAC     Note:    Oropharynx: oropharynx clear of all foreign objects  Level of Consciousness: awake  Oxygen Supplementation: room air    Independent Airway: airway patency satisfactory and stable  Dentition: dentition unchanged  Vital Signs Stable: post-procedure vital signs reviewed and stable  Report to RN Given: handoff report given  Patient transferred to: Phase II  Comments: Vital signs per nursing documentation.     Handoff Report: Identifed the Patient, Identified the Reponsible Provider, Reviewed the pertinent medical history, Discussed the surgical course, Reviewed Intra-OP anesthesia mangement and issues during anesthesia, Set expectations for post-procedure period and Allowed opportunity for questions and acknowledgement of understanding      Vitals:  Vitals Value Taken Time   BP     Temp     Pulse     Resp     SpO2         Electronically Signed By: AMANDA Ellis CRNA  July 26, 2021  3:22 PM

## 2021-07-26 NOTE — PROCEDURES
SURGEON: Dai Glaser M.D.    INDICATIONS: The patient Vijaya Manjarrez presented to the eye clinic with decreased vision secondary to cataract in the Left eye.  She had history of orbital fracture in the left eye prior to development of the cataract. The risks, benefits and alternatives to cataract extraction were discussed. The patient elected to proceed. All questions were answered to the patient's satisfaction.    IMPLANT:    Implant Name Type Inv. Item Serial No.  Lot No. LRB No. Used Action   EYE IMP IOL ASIF PCL TECNIS ZCB00 22.5 - [id number] Lens/Eye Implant EYE IMP IOL ASIF PCL TECNIS ZCB00 22.5 [id number] [employer]  Left 1 Implanted        DESCRIPTION OF PROCEDURE:   Prior to the procedure, appropriate cardiac and respiratory monitors were applied to the patient. The patient was brought to the operating room where a drop of topical tetracaine was given followed by lidocaine gel followed by povidone iodine.  A surgical pause was carried out to identify with all members of the surgical team the correct surgical site.  With adequate anesthesia, the Left eye was prepped and draped in the usual sterile fashion. A lid speculum was placed, and the operating microscope was rotated into position.  A paracentesis was created to the left of the planned temporal incision.  Through this limbal paracentesis, the anterior chamber was filled with preservative-free lidocaine followed by dispersive viscoelastic.  A temporal wound was created at the limbus using a 2.5 mm keratome blade.  A capsulorrhexis was initiated using a bent 25-gauge needle and was completed in continuous and circular fashion using the capsulorrhexis forceps. The lens nucleus was hydrodissected using balanced salt solution.  The lens nucleus was rotated and removed using phacoemulsification.  Residual cortical material was removed using irrigation-aspiration.  The capsular bag was reinflated to its maximal extent  with cohesive viscoelastic.  An intraocular lens implant of the above listed power was inserted into the capsular bag.  The lens power was reviewed using the preoperative intraocular lens power measurements to confirm that the correct lens was used for the desired post-operative refractive state.  The residual viscoelastic was removed in its entirety, the wound were hydrated and found to be self-sealing.  A dose of 0.1mg of intracameral moxifloxacin was administered through the paracentesis.  Tactile pressure was confirmed to be in a normal range.  The lid speculum was removed and a drop of prednisolone acetate 1% was given before a shield was applied.  The patient tolerated the procedure well, and there was no indication of zonular laxity due to prior blunt eye trauma.  There were no complications.    PLAN: The patient will be discharged to home and will follow up tomorrow morning in the eye clinic.  EBL:  None  Complications:  None

## 2021-07-26 NOTE — ANESTHESIA PREPROCEDURE EVALUATION
Anesthesia Pre-Procedure Evaluation    Patient: Vijaya Manjarrez   MRN: [id number] : 1950        Preoperative Diagnosis: Nuclear sclerotic cataract of both eyes [H25.13]   Procedure : Procedure(s):  PHACOEMULSIFICATION, CATARACT, WITH STANDARD INTRAOCULAR LENS IMPLANT INSERTION LEFT     Past Medical History:   Diagnosis Date     Anxiety      Cervical spine degeneration     spinal stenosis,      COPD (chronic obstructive pulmonary disease) (H)     mild     Coronary artery disease      Depression      Diabetes mellitus, type 2 (H)      Diabetic neuropathy (H)      Facet arthropathy, lumbar      GERD (gastroesophageal reflux disease)      HTN (hypertension)      Hyperlipidemia      Lumbar degenerative disc disease      Migraine headache       Past Surgical History:   Procedure Laterality Date     CHOLECYSTECTOMY, LAPOROSCOPIC      Cholecystectomy, Laparoscopic     COMBINED REPAIR PTOSIS WITH BLEPHAROPLASTY BILATERAL Bilateral 3/11/2021    Procedure: Bilateral upper eyelid blepharoplasty and ptosis repair. Right upper eyelid margin lesion biopsy;  Surgeon: George Doll MD;  Location: [facility] OR     CV HEART CATHETERIZATION WITH POSSIBLE INTERVENTION Left 2019    Procedure: Coronary Angiogram;  Surgeon: Cheo Merida MD;  Location:  HEART CARDIAC CATH LAB     CV HEART CATHETERIZATION WITH POSSIBLE INTERVENTION Bilateral 10/2/2020    Procedure: Heart Catheterization with Possible Intervention;  Surgeon: Linda Sauceda MD;  Location: [facility] CARDIAC CATH LAB     CV LEFT HEART CATH N/A 10/2/2020    Procedure: Left Heart Cath;  Surgeon: Linda Sauceda MD;  Location:  HEART CARDIAC CATH LAB     OPEN REDUCTION INTERNAL FIXATION ORBIT BLOWOUT Left 10/8/2020    Procedure: Left Open Reduction Internal Fixation, Fracture, Orbit With Intraoperative Navigation - Left;  Surgeon: George Doll MD;  Location: [facility] OR     PHACOEMULSIFICATION CLEAR CORNEA WITH STANDARD INTRAOCULAR LENS  IMPLANT  2021          PHACOEMULSIFICATION WITH STANDARD INTRAOCULAR LENS IMPLANT Right 2021    Procedure: PHACOEMULSIFICATION, CATARACT, WITH STANDARD INTRAOCULAR LENS IMPLANT INSERTION RIGHT;  Surgeon: Dai Glaser MD;  Location: [facility] OR      Allergies   Allergen Reactions     Penicillins Hives      Social History     Tobacco Use     Smoking status: Former Smoker     Packs/day: 1.00     Years: 40.00     Pack years: 40.00     Types: Cigarettes     Quit date: 2017     Years since quittin.1     Smokeless tobacco: Never Used   Substance Use Topics     Alcohol use: No      Wt Readings from Last 1 Encounters:   21 90.7 kg (200 lb)        Anesthesia Evaluation            ROS/MED HX  ENT/Pulmonary:     (+) tobacco use, Past use, COPD,     Neurologic:     (+) peripheral neuropathy, migraines,     Cardiovascular:     (+) Dyslipidemia hypertension--CAD ---    METS/Exercise Tolerance:     Hematologic:       Musculoskeletal:       GI/Hepatic:     (+) GERD,     Renal/Genitourinary:       Endo:     (+) type II DM,     Psychiatric/Substance Use:     (+) psychiatric history anxiety and depression     Infectious Disease:       Malignancy:       Other:            Physical Exam    Airway             Respiratory Devices and Support         Dental           Cardiovascular   cardiovascular exam normal          Pulmonary   pulmonary exam normal                OUTSIDE LABS:  CBC:   Lab Results   Component Value Date    WBC 13.0 (H) 2021    WBC 11.6 (H) 2021    HGB 10.6 (L) 2021    HGB 10.3 (L) 2021    HCT 34.7 (L) 2021    HCT 35.3 2021     2021     2021     BMP:   Lab Results   Component Value Date     2021     2021    POTASSIUM 3.8 2021    POTASSIUM 4.3 2021    CHLORIDE 107 2021    CHLORIDE 109 2021    CO2 27 2021    CO2 29 2021    BUN 9 2021    BUN 9 2021    CR 0.84  07/05/2021    CR 0.64 03/08/2021     (H) 07/26/2021     (H) 07/05/2021     COAGS:   Lab Results   Component Value Date    PTT 34 02/21/2019    INR 0.97 02/21/2019     POC:   Lab Results   Component Value Date     (H) 07/09/2021     HEPATIC:   Lab Results   Component Value Date    ALBUMIN 3.9 07/05/2021    PROTTOTAL 7.5 07/05/2021    ALT 25 07/05/2021    AST 20 07/05/2021    ALKPHOS 82 07/05/2021    BILITOTAL 0.3 07/05/2021     OTHER:   Lab Results   Component Value Date    LACT 1.5 07/02/2017    A1C 7.2 (H) 07/05/2021    RADHA 9.0 07/05/2021    MAG 1.6 08/17/2020    LIPASE 167 08/08/2020    TSH 0.50 07/05/2021    CRP <2.9 07/05/2021    SED 19 07/05/2021       Anesthesia Plan    ASA Status:  3   NPO Status:  NPO Appropriate    Anesthesia Type: MAC.     - Reason for MAC: chronic cardiopulmonary disease, straight local not clinically adequate   Induction: Intravenous.   Maintenance: TIVA.        Consents    Anesthesia Plan(s) and associated risks, benefits, and realistic alternatives discussed. Questions answered and patient/representative(s) expressed understanding.     - Discussed with:  Patient      - Extended Intubation/Ventilatory Support Discussed: No.      - Patient is DNR/DNI Status: No    Use of blood products discussed: No .     Postoperative Care    Pain management: Multi-modal analgesia.        Comments:                    LASHAWN SIMON MD

## 2021-07-27 ENCOUNTER — OFFICE VISIT (OUTPATIENT)
Dept: OPHTHALMOLOGY | Facility: CLINIC | Age: 71
End: 2021-07-27
Payer: COMMERCIAL

## 2021-07-27 DIAGNOSIS — Z96.1 PSEUDOPHAKIA OF RIGHT EYE: ICD-10-CM

## 2021-07-27 DIAGNOSIS — Z96.1 PSEUDOPHAKIA OF LEFT EYE: Primary | ICD-10-CM

## 2021-07-27 PROCEDURE — 99024 POSTOP FOLLOW-UP VISIT: CPT | Performed by: OPHTHALMOLOGY

## 2021-07-27 ASSESSMENT — VISUAL ACUITY
OD_PH_SC+: -1
OD_PH_SC: 20/40
METHOD: SNELLEN - LINEAR
OS_SC: 20/125
OS_PH_SC+: -1
OS_PH_SC: 20/60
OS_SC: 20/400
OD_SC: J7
OD_SC: 20/80

## 2021-07-27 ASSESSMENT — SLIT LAMP EXAM - LIDS
COMMENTS: NORMAL
COMMENTS: NORMAL

## 2021-07-27 ASSESSMENT — CUP TO DISC RATIO
OD_RATIO: 0.5
OS_RATIO: 0.5

## 2021-07-27 ASSESSMENT — TONOMETRY
OS_IOP_MMHG: 9
OS_IOP_MMHG: 11
IOP_METHOD: TONOPEN
IOP_METHOD: ICARE
OD_IOP_MMHG: 10
OD_IOP_MMHG: 6

## 2021-07-27 ASSESSMENT — EXTERNAL EXAM - LEFT EYE: OS_EXAM: NORMAL

## 2021-07-27 ASSESSMENT — EXTERNAL EXAM - RIGHT EYE: OD_EXAM: NORMAL

## 2021-07-27 NOTE — PATIENT INSTRUCTIONS
Please use the following drops until your doctor discontinues.  When giving drops, allow 1 minute between drops.  Do not touch the bottle to the eye.  Drops may sting momentarily      Prednisolone (milky, white cap) 1 time daily in the right eye and 3 times daily in the left eye   Ketorolac (gray) 1 time daily in the right eye and 3 times daily in the left eye    Stop brimonidine (purple) and stop Andree

## 2021-07-27 NOTE — NURSING NOTE
Chief Complaints and History of Present Illnesses   Patient presents with     Follow Up     1 day post Cataract surgery left eye and right eye done on 7/9/21.     Chief Complaint(s) and History of Present Illness(es)     Follow Up     Laterality: both eyes    Comments: 1 day post Cataract surgery left eye and right eye done on 7/9/21.              Comments     left eye some pain today and vision is a little blurry yet today. Not noticing any improvement with right eye being blurry too. Reports having nasty head ache that does not want to go away. Has tried to use Acetaminophen and Tramadol.     Right eye is still not seeing well yet. Some pain with right eye but has gotten less.     Good compliance with drops.     Pred/Acular QID each eye  Alphagan TID right eye   Refresh oint BID each eye    HUNTER Roy COT 2:10 PM July 27, 2021

## 2021-08-06 ENCOUNTER — TELEPHONE (OUTPATIENT)
Dept: OPHTHALMOLOGY | Facility: CLINIC | Age: 71
End: 2021-08-06

## 2021-08-06 NOTE — TELEPHONE ENCOUNTER
Spoke with patient regarding rescheduling missed POST-OP appointment. Rescheduled for next available in the afternoon as patient requested. Sent appointment reminder letter to confirmed email address.-Per Patient

## 2021-08-09 ENCOUNTER — TELEPHONE (OUTPATIENT)
Dept: INTERNAL MEDICINE | Facility: CLINIC | Age: 71
End: 2021-08-09
Payer: COMMERCIAL

## 2021-08-10 ENCOUNTER — OFFICE VISIT (OUTPATIENT)
Dept: OPHTHALMOLOGY | Facility: CLINIC | Age: 71
End: 2021-08-10
Payer: COMMERCIAL

## 2021-08-10 DIAGNOSIS — Z96.1 PSEUDOPHAKIA, BOTH EYES: Primary | ICD-10-CM

## 2021-08-10 PROCEDURE — 99024 POSTOP FOLLOW-UP VISIT: CPT | Performed by: OPHTHALMOLOGY

## 2021-08-10 ASSESSMENT — VISUAL ACUITY
OD_SC: J10
OS_SC: J10
OD_SC+: -1
OS_SC+: -2
OD_SC: 20/25
OS_SC: 20/25
METHOD: SNELLEN - LINEAR

## 2021-08-10 ASSESSMENT — EXTERNAL EXAM - LEFT EYE: OS_EXAM: NORMAL

## 2021-08-10 ASSESSMENT — REFRACTION_MANIFEST
OS_ADD: +2.50
OD_AXIS: 060
OS_AXIS: 120
OS_CYLINDER: +0.25
OS_SPHERE: PLANO
OD_ADD: +2.50
OD_SPHERE: PLANO
OD_CYLINDER: +0.50

## 2021-08-10 ASSESSMENT — CUP TO DISC RATIO
OS_RATIO: 0.5
OD_RATIO: 0.5

## 2021-08-10 ASSESSMENT — EXTERNAL EXAM - RIGHT EYE: OD_EXAM: NORMAL

## 2021-08-10 ASSESSMENT — TONOMETRY
OD_IOP_MMHG: 12
IOP_METHOD: TONOPEN
OS_IOP_MMHG: 12

## 2021-08-10 ASSESSMENT — CONF VISUAL FIELD
OS_NORMAL: 1
OD_NORMAL: 1
METHOD: COUNTING FINGERS

## 2021-08-10 ASSESSMENT — SLIT LAMP EXAM - LIDS
COMMENTS: NORMAL
COMMENTS: NORMAL

## 2021-08-10 NOTE — PROGRESS NOTES
status post cataract surgery, right eye  7/9/21  status post cataract surgery, left eye  7/26/21    HPI:   Vision improved.  Eyes comfortable, just some redness and tearing some mornings.    Oc medications:   Prednisolone (milky, white cap) 3 times daily in the left eye   Ketorolac (gray)  3 times daily in the left eye  Refresh ointment     Assessment/Plan:  (Z96.1) Pseudophakia, both eyes  (primary encounter diagnosis)  Comment: good post-operative appearance    Plan: taper drops  Prednisolone (milky, white cap) 2 times daily in the left eye for a week then daily for a week then stop  Ketorolac (gray) 2 times daily in the left eye for a week then daily for a week then stop    Instructions for patient reviewed:  Instructed patient to contact immediately for decreasing vision, eye pain, increased redness, new floaters or flashes of light, or other concerning symptoms.    Return in about 6 months (around 2/10/2022) for Comprehensive Exam, OCT macula OU.        Complete documentation of historical and exam elements from today's encounter can be found in the full encounter summary report (not reduplicated in this progress note). I personally obtained the chief complaint(s) and history of present illness.  I have confirmed and edited as necessary the CC, HPI, PMH/PSH, social history, FMH, ROS, and exam/neuro findings as obtained by the technician or others. I have examined this patient myself and I personally viewed the image(s) and studies listed above and the documentation reflects my findings and interpretation.  I formulated and edited as necessary the assessment and plan and discussed the findings and management plan with the patient and family.     Dai Glaser MD

## 2021-08-10 NOTE — NURSING NOTE
Chief Complaints and History of Present Illnesses   Patient presents with     Follow Up     Status post Cataract surgery OS on 7/27/2021 and right eye surgery 7/9/21.     Chief Complaint(s) and History of Present Illness(es)     Follow Up     Laterality: both eyes    Associated symptoms: redness and tearing.  Negative for eye pain    Pain scale: 0/10    Comments: Status post Cataract surgery OS on 7/27/2021 and right eye surgery 7/9/21.              Comments     Patient states that vision left eye is pretty good. Vision right eye is not as good and left eye with distance.   Some red and tearing in the morning.     Pred. And Ketoralac TID left eye used today at 12 noon.      HUNTER Roy COT 3:14 PM August 10, 2021

## 2021-08-11 DIAGNOSIS — J30.1 SEASONAL ALLERGIC RHINITIS DUE TO POLLEN: ICD-10-CM

## 2021-08-12 RX ORDER — FLUTICASONE PROPIONATE 50 MCG
SPRAY, SUSPENSION (ML) NASAL
Qty: 16 G | Refills: 5 | Status: SHIPPED | OUTPATIENT
Start: 2021-08-12 | End: 2022-12-20

## 2021-08-12 NOTE — TELEPHONE ENCOUNTER
Pending Prescriptions:                       Disp   Refills    fluticasone (FLONASE) 50 MCG/ACT nasal sp*16 g   5            Sig: SPRAY 2 SPRAYS IN EACH NOSTRIL ONCE DAILY    Prescription approved per [facility] Refill Protocol.

## 2021-08-17 ENCOUNTER — PATIENT OUTREACH (OUTPATIENT)
Dept: GERIATRIC MEDICINE | Facility: CLINIC | Age: 71
End: 2021-08-17

## 2021-08-17 NOTE — PROGRESS NOTES
[facility] Care Coordination Contact    [facility] Six-Month Telephone Assessment    6 month telephone assessment completed on 8/17/21. Member shares that she is almost done with all her eye appointments at the [facility] and should finally be able to get new glasses next week. She will next focus on dental appointments. Encouraged to call the Dental Dental coordinator with any coverage questions. Member interested in SNAP benefits. Provided number for [facility] [employer] and encouraged a call to discuss eligibility.     ER visits: No  Hospitalizations: No  TCU stays: No  Significant health status changes: No  Falls/Injuries: No  ADL/IADL changes: No  Changes in services: No - offered to restart home delivered meals - member will consider.     Goals: See POC in chart for goal progress documentation.     Will see member in 6 months for an annual health risk assessment.   Encouraged member to call CC with any questions or concerns in the meantime.     Vanesa Gtz RN  [facility]  [phone]  Fax: [phone]

## 2021-08-30 DIAGNOSIS — I25.10 CORONARY ARTERY DISEASE INVOLVING NATIVE CORONARY ARTERY, ANGINA PRESENCE UNSPECIFIED, UNSPECIFIED WHETHER NATIVE OR TRANSPLANTED HEART: ICD-10-CM

## 2021-09-01 ENCOUNTER — TELEPHONE (OUTPATIENT)
Dept: INTERNAL MEDICINE | Facility: CLINIC | Age: 71
End: 2021-09-01

## 2021-09-01 DIAGNOSIS — G89.29 CHRONIC BILATERAL LOW BACK PAIN WITHOUT SCIATICA: ICD-10-CM

## 2021-09-01 DIAGNOSIS — M54.50 CHRONIC BILATERAL LOW BACK PAIN WITHOUT SCIATICA: ICD-10-CM

## 2021-09-01 RX ORDER — TRAMADOL HYDROCHLORIDE 50 MG/1
50 TABLET ORAL EVERY 6 HOURS PRN
Qty: 100 TABLET | Refills: 0 | Status: SHIPPED | OUTPATIENT
Start: 2021-09-08 | End: 2021-09-28

## 2021-09-01 RX ORDER — NITROGLYCERIN 0.4 MG/1
TABLET SUBLINGUAL
Qty: 25 TABLET | Refills: 0 | Status: SHIPPED | OUTPATIENT
Start: 2021-09-01 | End: 2022-02-04

## 2021-09-01 NOTE — TELEPHONE ENCOUNTER
Reason for Call:  Medication or medication refill:    Do you use a [facility]?  Name of the pharmacy and phone number for the current request:  [facility] - [place], [place] - [address]    Name of the medication requested: Tramadol     Other request: pt will need Tramadol filled in 2 wks. Pt has an appt to est care with Genoveva Carroll 9/28. Pt is wondering when she needs a refill if Genoveva will give her enough meds to last until she is seen? Please call.    Can we leave a detailed message on this number? YES    Phone number patient can be reached at: Home number on file [phone] (home)    Best Time:     Call taken on 9/1/2021 at 2:13 PM by Elyssa Ivey

## 2021-09-01 NOTE — TELEPHONE ENCOUNTER
[facility]  Medication is being filled for 1 time refill only due to:  Patient needs to be seen because needs to establish care.

## 2021-09-02 NOTE — TELEPHONE ENCOUNTER
Spoke with patient today.  appt made 9/28/21 240pm for wellness/est primary/lab/ med review with Carly

## 2021-09-14 ENCOUNTER — NURSE TRIAGE (OUTPATIENT)
Dept: INTERNAL MEDICINE | Facility: CLINIC | Age: 71
End: 2021-09-14

## 2021-09-14 NOTE — TELEPHONE ENCOUNTER
Patient calls, she cut her left middle finger at the first knuckle by fingernail with a kitchen knife. She thinks the cut was deep and had a hard time getting the bleeding to stop, cleansed with hydrogen peroxide and covered with Neosporin and band-aid. She states she bled through 3 band-aids before the bleeding stopped. The cut has healed over at this time with no drainage present, but the finger is red, swollen and painful, she is able to bend it, but it is painful to this. Pain is slightly better today, but patient is concerned she may have cut a tendon or ligament in her finger.    Future appointment scheduled.   Next 5 appointments (look out 90 days)    Sep 16, 2021  1:00 PM  SHORT with Seymour Garcia MD  [facility] ([facility] ) [address] [place] [address]  [phone]   Sep 28, 2021  2:40 PM  PHYSICAL with AMANDA Baptiste CNP  [facility] ([facility] ) [address] [place] [address]  [phone]           Wendi Adkins RN  [facility]     Reason for Disposition    Injury is still painful or swollen after 2 weeks    Additional Information    Negative: Injury and pain has not improved after 3 days    Negative: Injury interferes with work or school    Negative: Last tetanus shot > 5 years ago and DIRTY cut or scrape    Negative: Last tetanus shot >10 years ago and CLEAN cut or scrape    Negative: Suspicious history for the injury    Negative: SEVERE pain (e.g., excruciating)    Negative: MODERATE-SEVERE pain and blood present under the nail (usually > 50% of nail bed)    Negative: Finger joint can't be opened (straightened) or closed (bent) completely    Negative: No prior tetanus shots (or is not fully vaccinated) and any wound (e.g., cut or scrape)    Negative: HIV positive or severe immunodeficiency (severely weak immune  system) and DIRTY cut or scrape    Negative: Patient wants to be seen    Protocols used: FINGER INJURY-A-[place]

## 2021-09-16 ENCOUNTER — OFFICE VISIT (OUTPATIENT)
Dept: INTERNAL MEDICINE | Facility: CLINIC | Age: 71
End: 2021-09-16
Payer: COMMERCIAL

## 2021-09-16 VITALS
OXYGEN SATURATION: 95 % | RESPIRATION RATE: 18 BRPM | DIASTOLIC BLOOD PRESSURE: 83 MMHG | SYSTOLIC BLOOD PRESSURE: 134 MMHG | TEMPERATURE: 97.8 F | BODY MASS INDEX: 30.75 KG/M2 | HEIGHT: 69 IN | HEART RATE: 76 BPM | WEIGHT: 207.6 LBS

## 2021-09-16 DIAGNOSIS — M20.012 MALLET FINGER OF LEFT HAND: Primary | ICD-10-CM

## 2021-09-16 PROCEDURE — 99213 OFFICE O/P EST LOW 20 MIN: CPT | Performed by: FAMILY MEDICINE

## 2021-09-16 ASSESSMENT — MIFFLIN-ST. JEOR: SCORE: 1526.05

## 2021-09-16 NOTE — PROGRESS NOTES
"    (M20.012) Mallet finger of left hand  (primary encounter diagnosis)  Comment:   Laceration about 2 weeks ago.  Now has a drop finger deformity of the left middle finger.  Does not appear infected presently.  Plan:   Informed her that this is an injury to the extensor tendon.  I suggested the orthopedic walk-in clinic at [facility].  She is agreeable with this plan.        Subjective     Patient is being to discuss pain following cut 2 weeks ago.    HPI     Patient was cutting up meat and had a laceration on the dorsum of the left middle finger at the level of the DIP joint.  She cleaned this up and applied antibiotic ointment and bandages.  The wound has basically healed but she has a finger deformity.  Not having a great deal of pain.      Review of Systems     Unremarkable except as above.        Objective    /83   Pulse 76   Temp 97.8  F (36.6  C) (Tympanic)   Resp 18   Ht 1.753 m (5' 9\")   Wt 94.2 kg (207 lb 9.6 oz)   LMP  (LMP Unknown)   SpO2 95%   BMI 30.66 kg/m    There is no height or weight on file to calculate BMI.  Physical Exam     Left middle finger -mallet finger deformity at DIPJ.  Does not appear unusually red or fluctuant and initial laceration appears fairly well-healed.        "

## 2021-09-28 ENCOUNTER — OFFICE VISIT (OUTPATIENT)
Dept: INTERNAL MEDICINE | Facility: CLINIC | Age: 71
End: 2021-09-28
Payer: COMMERCIAL

## 2021-09-28 VITALS
DIASTOLIC BLOOD PRESSURE: 71 MMHG | RESPIRATION RATE: 16 BRPM | BODY MASS INDEX: 30.32 KG/M2 | TEMPERATURE: 97.7 F | HEIGHT: 69 IN | OXYGEN SATURATION: 98 % | SYSTOLIC BLOOD PRESSURE: 135 MMHG | HEART RATE: 82 BPM | WEIGHT: 204.7 LBS

## 2021-09-28 DIAGNOSIS — Z79.899 CONTROLLED SUBSTANCE AGREEMENT SIGNED: ICD-10-CM

## 2021-09-28 DIAGNOSIS — Z00.00 ROUTINE GENERAL MEDICAL EXAMINATION AT A HEALTH CARE FACILITY: Primary | ICD-10-CM

## 2021-09-28 DIAGNOSIS — I50.32 CHRONIC DIASTOLIC CONGESTIVE HEART FAILURE (H): ICD-10-CM

## 2021-09-28 DIAGNOSIS — F33.0 MILD EPISODE OF RECURRENT MAJOR DEPRESSIVE DISORDER (H): ICD-10-CM

## 2021-09-28 DIAGNOSIS — M79.7 FIBROMYALGIA: ICD-10-CM

## 2021-09-28 DIAGNOSIS — I25.118 ATHEROSCLEROTIC HEART DISEASE OF NATIVE CORONARY ARTERY WITH OTHER FORMS OF ANGINA PECTORIS (H): ICD-10-CM

## 2021-09-28 DIAGNOSIS — D50.9 IRON DEFICIENCY ANEMIA, UNSPECIFIED IRON DEFICIENCY ANEMIA TYPE: ICD-10-CM

## 2021-09-28 DIAGNOSIS — N32.81 OVERACTIVE BLADDER: ICD-10-CM

## 2021-09-28 DIAGNOSIS — R32 URINARY INCONTINENCE, UNSPECIFIED TYPE: ICD-10-CM

## 2021-09-28 DIAGNOSIS — M54.10 BACK PAIN WITH RADICULOPATHY: ICD-10-CM

## 2021-09-28 DIAGNOSIS — M62.838 MUSCLE SPASM: ICD-10-CM

## 2021-09-28 DIAGNOSIS — M54.6 CHRONIC BILATERAL THORACIC BACK PAIN: ICD-10-CM

## 2021-09-28 DIAGNOSIS — E11.40 TYPE 2 DIABETES MELLITUS WITH DIABETIC NEUROPATHY, WITHOUT LONG-TERM CURRENT USE OF INSULIN (H): ICD-10-CM

## 2021-09-28 DIAGNOSIS — J31.0 CHRONIC RHINITIS: ICD-10-CM

## 2021-09-28 DIAGNOSIS — J30.89 ALLERGIC RHINITIS DUE TO OTHER ALLERGIC TRIGGER, UNSPECIFIED SEASONALITY: ICD-10-CM

## 2021-09-28 DIAGNOSIS — J44.9 CHRONIC OBSTRUCTIVE PULMONARY DISEASE, UNSPECIFIED COPD TYPE (H): ICD-10-CM

## 2021-09-28 DIAGNOSIS — F41.1 GAD (GENERALIZED ANXIETY DISORDER): ICD-10-CM

## 2021-09-28 DIAGNOSIS — Z23 NEED FOR PROPHYLACTIC VACCINATION AND INOCULATION AGAINST INFLUENZA: ICD-10-CM

## 2021-09-28 DIAGNOSIS — G89.29 CHRONIC BILATERAL THORACIC BACK PAIN: ICD-10-CM

## 2021-09-28 DIAGNOSIS — G89.29 CHRONIC BILATERAL LOW BACK PAIN WITHOUT SCIATICA: ICD-10-CM

## 2021-09-28 DIAGNOSIS — M54.50 CHRONIC BILATERAL LOW BACK PAIN WITHOUT SCIATICA: ICD-10-CM

## 2021-09-28 LAB
BASOPHILS # BLD AUTO: 0 10E3/UL (ref 0–0.2)
BASOPHILS NFR BLD AUTO: 0 %
EOSINOPHIL # BLD AUTO: 0.2 10E3/UL (ref 0–0.7)
EOSINOPHIL NFR BLD AUTO: 2 %
ERYTHROCYTE [DISTWIDTH] IN BLOOD BY AUTOMATED COUNT: 20.6 % (ref 10–15)
HBA1C MFR BLD: 6.9 % (ref 0–5.6)
HCT VFR BLD AUTO: 35.3 % (ref 35–47)
HGB BLD-MCNC: 11.1 G/DL (ref 11.7–15.7)
LYMPHOCYTES # BLD AUTO: 4.5 10E3/UL (ref 0.8–5.3)
LYMPHOCYTES NFR BLD AUTO: 43 %
MCH RBC QN AUTO: 24.7 PG (ref 26.5–33)
MCHC RBC AUTO-ENTMCNC: 31.4 G/DL (ref 31.5–36.5)
MCV RBC AUTO: 79 FL (ref 78–100)
MONOCYTES # BLD AUTO: 0.7 10E3/UL (ref 0–1.3)
MONOCYTES NFR BLD AUTO: 7 %
NEUTROPHILS # BLD AUTO: 5 10E3/UL (ref 1.6–8.3)
NEUTROPHILS NFR BLD AUTO: 48 %
PLATELET # BLD AUTO: 302 10E3/UL (ref 150–450)
RBC # BLD AUTO: 4.49 10E6/UL (ref 3.8–5.2)
WBC # BLD AUTO: 10.4 10E3/UL (ref 4–11)

## 2021-09-28 PROCEDURE — 80307 DRUG TEST PRSMV CHEM ANLYZR: CPT | Performed by: NURSE PRACTITIONER

## 2021-09-28 PROCEDURE — G0008 ADMIN INFLUENZA VIRUS VAC: HCPCS | Performed by: NURSE PRACTITIONER

## 2021-09-28 PROCEDURE — 36416 COLLJ CAPILLARY BLOOD SPEC: CPT | Performed by: NURSE PRACTITIONER

## 2021-09-28 PROCEDURE — 82728 ASSAY OF FERRITIN: CPT | Performed by: NURSE PRACTITIONER

## 2021-09-28 PROCEDURE — 90662 IIV NO PRSV INCREASED AG IM: CPT | Performed by: NURSE PRACTITIONER

## 2021-09-28 PROCEDURE — 85025 COMPLETE CBC W/AUTO DIFF WBC: CPT | Performed by: NURSE PRACTITIONER

## 2021-09-28 PROCEDURE — 82570 ASSAY OF URINE CREATININE: CPT | Performed by: NURSE PRACTITIONER

## 2021-09-28 PROCEDURE — 83550 IRON BINDING TEST: CPT | Performed by: NURSE PRACTITIONER

## 2021-09-28 PROCEDURE — 80053 COMPREHEN METABOLIC PANEL: CPT | Performed by: NURSE PRACTITIONER

## 2021-09-28 PROCEDURE — 99213 OFFICE O/P EST LOW 20 MIN: CPT | Mod: 25 | Performed by: NURSE PRACTITIONER

## 2021-09-28 PROCEDURE — G0438 PPPS, INITIAL VISIT: HCPCS | Performed by: NURSE PRACTITIONER

## 2021-09-28 PROCEDURE — 83036 HEMOGLOBIN GLYCOSYLATED A1C: CPT | Performed by: NURSE PRACTITIONER

## 2021-09-28 RX ORDER — CYCLOBENZAPRINE HCL 10 MG
10 TABLET ORAL 2 TIMES DAILY
Qty: 180 TABLET | Refills: 3 | Status: ON HOLD | OUTPATIENT
Start: 2021-09-28 | End: 2021-12-08

## 2021-09-28 RX ORDER — HYDROXYZINE HYDROCHLORIDE 10 MG/1
TABLET, FILM COATED ORAL
Qty: 270 TABLET | Refills: 3 | Status: SHIPPED | OUTPATIENT
Start: 2021-09-28

## 2021-09-28 RX ORDER — PREGABALIN 150 MG/1
CAPSULE ORAL
Qty: 270 CAPSULE | Refills: 3 | Status: SHIPPED | OUTPATIENT
Start: 2021-09-28

## 2021-09-28 RX ORDER — FEXOFENADINE HCL 60 MG/1
60 TABLET, FILM COATED ORAL 2 TIMES DAILY
Qty: 180 TABLET | Refills: 3 | Status: SHIPPED | OUTPATIENT
Start: 2021-09-28 | End: 2022-10-21

## 2021-09-28 RX ORDER — MIRABEGRON 25 MG/1
25 TABLET, FILM COATED, EXTENDED RELEASE ORAL DAILY
Qty: 90 TABLET | Refills: 1 | Status: SHIPPED | OUTPATIENT
Start: 2021-09-28 | End: 2022-04-01

## 2021-09-28 RX ORDER — IPRATROPIUM BROMIDE 42 UG/1
2 SPRAY, METERED NASAL 4 TIMES DAILY
Qty: 15 ML | Refills: 11 | Status: SHIPPED | OUTPATIENT
Start: 2021-09-28 | End: 2021-11-02

## 2021-09-28 RX ORDER — TIOTROPIUM BROMIDE 18 UG/1
CAPSULE ORAL; RESPIRATORY (INHALATION)
Qty: 90 CAPSULE | Refills: 3 | Status: SHIPPED | OUTPATIENT
Start: 2021-09-28

## 2021-09-28 RX ORDER — ALBUTEROL SULFATE 90 UG/1
2 AEROSOL, METERED RESPIRATORY (INHALATION) EVERY 6 HOURS
Qty: 18 G | Refills: 11 | Status: SHIPPED | OUTPATIENT
Start: 2021-09-28 | End: 2022-12-19

## 2021-09-28 RX ORDER — TRAMADOL HYDROCHLORIDE 50 MG/1
50 TABLET ORAL EVERY 6 HOURS PRN
Qty: 100 TABLET | Refills: 0 | Status: SHIPPED | OUTPATIENT
Start: 2021-09-30 | End: 2021-11-12

## 2021-09-28 ASSESSMENT — ENCOUNTER SYMPTOMS
DIARRHEA: 0
BREAST MASS: 0
FEVER: 0
COUGH: 0
FREQUENCY: 1
HEARTBURN: 0
NAUSEA: 0
NERVOUS/ANXIOUS: 0
HEADACHES: 1
CHILLS: 0
PALPITATIONS: 0
PARESTHESIAS: 0
SHORTNESS OF BREATH: 0
ABDOMINAL PAIN: 0
MYALGIAS: 0
DIZZINESS: 0
WEAKNESS: 1
SORE THROAT: 0
CONSTIPATION: 0
DYSURIA: 0
JOINT SWELLING: 0
ARTHRALGIAS: 1
HEMATURIA: 0
HEMATOCHEZIA: 0
EYE PAIN: 1

## 2021-09-28 ASSESSMENT — ACTIVITIES OF DAILY LIVING (ADL)
CURRENT_FUNCTION: HOUSEWORK REQUIRES ASSISTANCE
CURRENT_FUNCTION: SHOPPING REQUIRES ASSISTANCE
CURRENT_FUNCTION: TRANSPORTATION REQUIRES ASSISTANCE

## 2021-09-28 ASSESSMENT — MIFFLIN-ST. JEOR: SCORE: 1504.95

## 2021-09-28 NOTE — LETTER
Opioid / Opioid Plus Controlled Substance Agreement    This is an agreement between you and your provider about the safe and appropriate use of controlled substance/opioids prescribed by your care team. Controlled substances are medicines that can cause physical and mental dependence (abuse).    There are strict laws about having and using these medicines. We here at [facility] are committing to working with you in your efforts to get better. To support you in this work, we ll help you schedule regular office appointments for medicine refills. If we must cancel or change your appointment for any reason, we ll make sure you have enough medicine to last until your next appointment.     As a Provider, I will:    Listen carefully to your concerns and treat you with respect.     Recommend a treatment plan that I believe is in your best interest. This plan may involve therapies other than opioid pain medication.     Talk with you often about the possible benefits, and the risk of harm of any medicine that we prescribe for you.     Provide a plan on how to taper (discontinue or go off) using this medicine if the decision is made to stop its use.    As a Patient, I understand that opioid(s):     Are a controlled substance prescribed by my care team to help me function or work and manage my condition(s).     Are strong medicines and can cause serious side effects such as:    Drowsiness, which can seriously affect my driving ability    A lower breathing rate, enough to cause death    Harm to my thinking ability     Depression     Abuse of and addiction to this medicine    Need to be taken exactly as prescribed. Combining opioids with certain medicines or chemicals (such as illegal drugs, sedatives, sleeping pills, and benzodiazepines) can be dangerous or even fatal. If I stop opioids suddenly, I may have severe withdrawal symptoms.    Do not work for all types of pain nor for all patients. If they re not helpful, I may  be asked to stop them.        The risks, benefits and side effects of these medicine(s) were explained to me. I agree that:  1. I will take part in other treatments as advised by my care team. This may be psychiatry or counseling, physical therapy, behavioral therapy, group treatment or a referral to a specialist.     2. I will keep all my appointments. I understand that this is part of the monitoring of opioids. My care team may require an office visit for EVERY opioid/controlled substance refill. If I miss appointments or don t follow instructions, my care team may stop my medicine.    3. I will take my medicines as prescribed. I will not change the dose or schedule unless my care team tells me to. There will be no refills if I run out early.     4. I may be asked to come to the clinic and complete a urine drug test or complete a pill count at any time. If I don t give a urine sample or participate in a pill count, the care team may stop my medicine.    5. I will only receive prescriptions from this clinic for chronic pain. If I am treated by another provider for acute pain issues, I will tell them that I am taking opioid pain medication for chronic pain and that I have a treatment agreement with this provider. I will inform my [facility] care team within one business day if I am given a prescription for any pain medication by another healthcare provider. My [facility] care team can contact other providers and pharmacists about my use of any medicines.    6. It is up to me to make sure that I don t run out of my medicines on weekends or holidays. If my care team is willing to refill my opioid prescription without a visit, I must request refills only during office hours. Refills may take up to 3 business days to process. I will use one pharmacy to fill all my opioid and other controlled substance prescriptions. I will notify the clinic about any changes to my insurance or medication  availability.    7. I am responsible for my prescriptions. If the medicine/prescription is lost, stolen or destroyed, it will not be replaced. I also agree not to share controlled substance medicines with anyone.    8. I am aware I should not use any illegal or recreational drugs. I agree not to drink alcohol unless my care team says I can.       9. If I enroll in the [place] [facility] program, I will tell my care team prior to my next refill.     10. I will tell my care team right away if I become pregnant, have a new medical problem treated outside of my regular clinic, or have a change in my medications.    11. I understand that this medicine can affect my thinking, judgment and reaction time. Alcohol and drugs affect the brain and body, which can affect the safety of my driving. Being under the influence of alcohol or drugs can affect my decision-making, behaviors, personal safety, and the safety of others. Driving while impaired (DWI) can occur if a person is driving, operating, or in physical control of a car, motorcycle, boat, snowmobile, ATV, motorbike, off-road vehicle, or any other motor vehicle ([place] Statute 169A.20). I understand the risk if I choose to drive or operate any vehicle or machinery.    I understand that if I do not follow any of the conditions above, my prescriptions or treatment may be stopped or changed.          Opioids  What You Need to Know    What are opioids?   Opioids are pain medicines that must be prescribed by a doctor. They are also known as narcotics.     Examples are:   1. morphine (MS Contin, Tere)  2. oxycodone (Oxycontin)  3. oxycodone and acetaminophen (Percocet)  4. hydrocodone and acetaminophen (Vicodin, Norco)   5. fentanyl patch (Duragesic)   6. hydromorphone (Dilaudid)   7. methadone  8. codeine (Tylenol #3)     What do opioids do well?   Opioids are best for severe short-term pain such as after a surgery or injury. They may work well for cancer pain. They may  help some people with long-lasting (chronic) pain.     What do opioids NOT do well?   Opioids never get rid of pain entirely, and they don t work well for most patients with chronic pain. Opioids don t reduce swelling, one of the causes of pain.                                    Other ways to manage chronic pain and improve function include:       Treat the health problem that may be causing pain    Anti-inflammation medicines, which reduce swelling and tenderness, such as ibuprofen (Advil, Motrin) or naproxen (Aleve)    Acetaminophen (Tylenol)    Antidepressants and anti-seizure medicines, especially for nerve pain    Topical treatments such as patches or creams    Injections or nerve blocks    Chiropractic or osteopathic treatment    Acupuncture, massage, deep breathing, meditation, visual imagery, aromatherapy    Use heat or ice at the pain site    Physical therapy     Exercise    Stop smoking    Take part in therapy       Risks and side effects     Talk to your doctor before you start or decide to keep taking opioids. Possible side effects include:      Lowering your breathing rate enough to cause death    Overdose, including death, especially if taking higher than prescribed doses    Worse depression symptoms; less pleasure in things you usually enjoy    Feeling tired or sluggish    Slower thoughts or cloudy thinking    Being more sensitive to pain over time; pain is harder to control    Trouble sleeping or restless sleep    Changes in hormone levels (for example, less testosterone)    Changes in sex drive or ability to have sex    Constipation    Unsafe driving    Itching and sweating    Dizziness    Nausea, throwing up and dry mouth    What else should I know about opioids?    Opioids may lead to dependence, tolerance, or addiction.      Dependence means that if you stop or reduce the medicine too quickly, you will have withdrawal symptoms. These include loose poop (diarrhea), jitters, flu-like symptoms,  nervousness and tremors. Dependence is not the same as addiction.                       Tolerance means needing higher doses over time to get the same effect. This may increase the chance of serious side effects.      Addiction is when people improperly use a substance that harms their body, their mind or their relations with others. Use of opiates can cause a relapse of addiction if you have a history of drug or alcohol abuse.      People who have used opioids for a long time may have a lower quality of life, worse depression, higher levels of pain and more visits to doctors.    You can overdose on opioids. Take these steps to lower your risk of overdose:    1. Recognize the signs:  Signs of overdose include decrease or loss of consciousness (blackout), slowed breathing, trouble waking up and blue lips. If someone is worried about overdose, they should call 911.    2. Talk to your doctor about Narcan (naloxone).   If you are at risk for overdose, you may be given a prescription for Narcan. This medicine very quickly reverses the effects of opioids.   If you overdose, a friend or family member can give you Narcan while waiting for the ambulance. They need to know the signs of overdose and how to give Narcan.     3. Don't use alcohol or street drugs.   Taking them with opioids can cause death.    4. Do not take any of these medicines unless your doctor says it s OK. Taking these with opioids can cause death:    Benzodiazepines, such as lorazepam (Ativan), alprazolam (Xanax) or diazepam (Valium)    Muscle relaxers, such as cyclobenzaprine (Flexeril)    Sleeping pills like zolpidem (Ambien)     Other opioids      How to keep you and other people safe while taking opioids:    1. Never share your opioids with others.  Opioid medicines are regulated by the Drug Enforcement Agency ([place]). Selling or sharing medications is a criminal act.    2. Be sure to store opioids in a secure place, locked up if possible. Young children  can easily swallow them and overdose.    3. When you are traveling with your medicines, keep them in the original bottles. If you use a pill box, be sure you also carry a copy of your medicine list from your clinic or pharmacy.    4. Safe disposal of opioids    Most pharmacies have places to get rid of medicine, called disposal kiosks. Medicine disposal options are also available in every [facility]. Search your county and  medication disposal  to find more options. You can find more details at:  [email or web address]-[email or web address]-medications     I agree that my provider, clinic care team, and pharmacy may work with any city, state or federal law enforcement agency that investigates the misuse, sale, or other diversion of my controlled medicine. I will allow my provider to discuss my care with, or share a copy of, this agreement with any other treating provider, pharmacy or emergency room where I receive care.    I have read this agreement and have asked questions about anything I did not understand.    _______________________________________________________  Patient Signature - Vijaya Manjarrez _____________________                   Date     _______________________________________________________  Provider Signature - AMANDA Baptiste CNP   _____________________                   Date     _______________________________________________________  Witness Signature (required if provider not present while patient signing)   _____________________                   Date

## 2021-09-28 NOTE — PROGRESS NOTES
"SUBJECTIVE:   Vijaya Manjarrez is a 71 year old female who presents for Preventive Visit.    Fasting. Discuss ongoing back problems.    Patient has been advised of split billing requirements and indicates understanding: Yes   Are you in the first 12 months of your Medicare coverage?  No    Healthy Habits:     In general, how would you rate your overall health?  Fair    Frequency of exercise:  None    Do you usually eat at least 4 servings of fruit and vegetables a day, include whole grains    & fiber and avoid regularly eating high fat or \"junk\" foods?  No    Taking medications regularly:  No    Medication side effects:  Other    Ability to successfully perform activities of daily living:  Transportation requires assistance, shopping requires assistance and housework requires assistance    Home Safety:  Throw rugs in the hallway    Hearing Impairment:  Difficulty understanding speech on the telephone    In the past 6 months, have you been bothered by leaking of urine? Yes    In general, how would you rate your overall mental or emotional health?  Fair      PHQ-2 Total Score: 0    Additional concerns today:  No    Do you feel safe in your environment? Yes    Have you ever done Advance Care Planning? (For example, a Health Directive, POLST, or a discussion with a medical provider or your loved ones about your wishes): No, advance care planning information given to patient to review.  Patient declined advance care planning discussion at this time.       Fall risk  Fallen 2 or more times in the past year?: No  Any fall with injury in the past year?: Yes  Timed Up and Go Test (>13.5 is fall risk; contact physician) : 11    Cognitive Screening   1) Repeat 3 items (Leader, Season, Table)    2) Clock draw: NORMAL  3) 3 item recall: Recalls 2 objects   Results: NORMAL clock, 1-2 items recalled: COGNITIVE IMPAIRMENT LESS LIKELY    Mini-CogTM Copyright DELROY Arias. Licensed by the author for use in [facility]; " reprinted with permission ([email or web address].[email or web address]). All rights reserved.      Do you have sleep apnea, excessive snoring or daytime drowsiness?: no    Reviewed and updated as needed this visit by clinical staff  Tobacco  Allergies  Meds   Med Hx  Surg Hx  Fam Hx  Soc Hx        Reviewed and updated as needed this visit by Provider   Allergies              Social History     Tobacco Use     Smoking status: Former Smoker     Packs/day: 1.00     Years: 40.00     Pack years: 40.00     Types: Cigarettes     Quit date: 2017     Years since quittin.3     Smokeless tobacco: Never Used   Substance Use Topics     Alcohol use: No     If you drink alcohol do you typically have >3 drinks per day or >7 drinks per week? No    Alcohol Use 2021   Prescreen: >3 drinks/day or >7 drinks/week? Not Applicable   Prescreen: >3 drinks/day or >7 drinks/week? -       Current providers sharing in care for this patient include:   Patient Care Team:  No Ref-Primary, Physician as PCP - Vanesa Dorsey RN as Lead Care Coordinator  Elise Ventura as Case Management Specialist (Primary Care - CC)  Kailyn Willis as Case Management Specialist (Primary Care - CC)  Cheo Merida MD as Assigned Heart and Vascular Provider  George Doll MD as MD (Ophthalmology)  Morelia Bean MD as Assigned PCP  Dai Glaser MD as Assigned Surgical Provider    The following health maintenance items are reviewed in Epic and correct as of today:  Health Maintenance Due   Topic Date Due     HF ACTION PLAN  Never done     DEPRESSION ACTION PLAN  Never done     ZOSTER IMMUNIZATION (2 of 3) 2015     DIABETIC FOOT EXAM  03/10/2019     INFLUENZA VACCINE (1) 2021               Review of Systems   Constitutional: Negative for chills and fever.   HENT: Positive for congestion. Negative for ear pain, hearing loss and sore throat.    Eyes: Positive for pain and visual disturbance.   Respiratory: Negative for  "cough and shortness of breath.    Cardiovascular: Negative for chest pain, palpitations and peripheral edema.   Gastrointestinal: Negative for abdominal pain, constipation, diarrhea, heartburn, hematochezia and nausea.   Breasts:  Negative for tenderness, breast mass and discharge.   Genitourinary: Positive for frequency, urgency and vaginal discharge. Negative for dysuria, genital sores, hematuria, pelvic pain and vaginal bleeding.   Musculoskeletal: Positive for arthralgias. Negative for joint swelling and myalgias.   Skin: Negative for rash.   Neurological: Positive for weakness and headaches. Negative for dizziness and paresthesias.   Psychiatric/Behavioral: Negative for mood changes. The patient is not nervous/anxious.      Drippy nose   Allegra is not working   Will try atrovent    Allegra not working well     Sees eye  Doctor  Pain - feels like something is in her eye     Urine frequency - worse - cannot pee if she wants   Gets up 4-5 times at night to go to bathroom    Will try myrbetriq     Headaches off and on   Back issues may be causing   Tylenol and ibuprofen as needed     Fire middle of back to low and then down legs and makes numb and cannot walk           OBJECTIVE:   /71 (BP Location: Left arm, Patient Position: Chair, Cuff Size: Adult Large)   Pulse 82   Temp 97.7  F (36.5  C) (Oral)   Resp 16   Ht 1.74 m (5' 8.5\")   Wt 92.9 kg (204 lb 11.2 oz)   LMP  (LMP Unknown)   SpO2 98%   Breastfeeding No   BMI 30.67 kg/m   Estimated body mass index is 30.67 kg/m  as calculated from the following:    Height as of this encounter: 1.74 m (5' 8.5\").    Weight as of this encounter: 92.9 kg (204 lb 11.2 oz).  Physical Exam  GENERAL: alert and no distress  RESP: lungs clear to auscultation - no rales, rhonchi or wheezes  CV: regular rate and rhythm  ABDOMEN: soft, nontender, and bowel sounds normal  MS: no gross musculoskeletal defects noted, no edema  MS: chronic back pain and neck pain  - using " cane for mobility   PSYCH: mentation appears normal, affect normal/bright    Diagnostic Test Results:  Labs reviewed in Epic  Lab     ASSESSMENT / PLAN:   (Z00.00) Routine general medical examination at a health care facility  (primary encounter diagnosis)  Comment:   Plan: stable     (I50.32) Chronic diastolic congestive heart failure (H)  Comment:   Plan: CBC with platelets and differential,         Comprehensive metabolic panel (BMP + Alb, Alk         Phos, ALT, AST, Total. Bili, TP)            (F33.0) Mild episode of recurrent major depressive disorder (H)  Comment: in stable range on medication   Plan: CBC with platelets and differential,         Comprehensive metabolic panel (BMP + Alb, Alk         Phos, ALT, AST, Total. Bili, TP)            (E11.40) Type 2 diabetes mellitus with diabetic neuropathy, without long-term current use of insulin (H)  Comment: controlled   Lab Results   Component Value Date    A1C 7.2 07/05/2021    A1C 7.1 03/08/2021    A1C 7.3 05/20/2020    A1C 6.7 11/13/2019    A1C 6.8 06/25/2019       Plan: CBC with platelets and differential, Hemoglobin        A1c, Comprehensive metabolic panel (BMP + Alb,         Alk Phos, ALT, AST, Total. Bili, TP), blood         glucose (NO BRAND SPECIFIED) test strip            (I25.118) Atherosclerotic heart disease of native coronary artery with other forms of angina pectoris (H)  Comment:   Plan: CBC with platelets and differential,         Comprehensive metabolic panel (BMP + Alb, Alk         Phos, ALT, AST, Total. Bili, TP)            (D50.9) Iron deficiency anemia, unspecified iron deficiency anemia type  Comment: needs recheck   Did not tolerate oral iron otc   willing to try prescription   If not tolerating would have her see hematology for iron infusions possibly   Plan: Iron and iron binding capacity, Ferritin, CBC         with platelets and differential            (J31.0) Chronic rhinitis  Comment: discussed   Plan: ipratropium (ATROVENT) 0.06 %  nasal spray            (J44.9) Chronic obstructive pulmonary disease, unspecified COPD type (H)  Comment: stable on medication   Plan: albuterol (PROAIR HFA/PROVENTIL HFA/VENTOLIN         HFA) 108 (90 Base) MCG/ACT inhaler, tiotropium         (SPIRIVA HANDIHALER) 18 MCG inhaled capsule            (J30.89) Allergic rhinitis due to other allergic trigger, unspecified seasonality  Comment:   Plan: fexofenadine (ALLEGRA) 60 MG tablet            (F41.1) BRADY (generalized anxiety disorder)  Comment: uses as needed   Plan: hydrOXYzine (ATARAX) 10 MG tablet            (M79.7) Fibromyalgia  Comment:   Plan: Drug Confirmation Panel Urine with Creat - lab         collect, Neurosurgery Referral, pregabalin         (LYRICA) 150 MG capsule, traMADol (ULTRAM) 50         MG tablet            (M54.5,  G89.29) Chronic bilateral low back pain without sciatica  Comment: worse   Plan: Drug Confirmation Panel Urine with Creat - lab         collect, Neurosurgery Referral, pregabalin         (LYRICA) 150 MG capsule, traMADol (ULTRAM) 50         MG tablet            (M62.838) Muscle spasm  Comment:   Plan: cyclobenzaprine (FLEXERIL) 10 MG tablet, Drug         Confirmation Panel Urine with Creat - lab         collect, Neurosurgery Referral, pregabalin         (LYRICA) 150 MG capsule, traMADol (ULTRAM) 50         MG tablet            (N32.81) Overactive bladder  Comment: discussed   Plan: mirabegron (MYRBETRIQ) 25 MG 24 hr tablet            (R32) Urinary incontinence, unspecified type  Comment:   Plan: mirabegron (MYRBETRIQ) 25 MG 24 hr tablet,         Neurosurgery Referral            (M54.6,  G89.29) Chronic bilateral thoracic back pain  Comment:   Plan: Neurosurgery Referral, traMADol (ULTRAM) 50 MG         tablet            (M54.10) Back pain with radiculopathy  Comment: worse lately   Plan: Neurosurgery Referral, traMADol (ULTRAM) 50 MG         tablet            (Z79.899) Controlled substance agreement signed- ok 5/12/20  Comment:  "  Plan: signed     Patient has been advised of split billing requirements and indicates understanding:   COUNSELING:  Reviewed preventive health counseling, as reflected in patient instructions       Regular exercise       Healthy diet/nutrition       Bladder control       Immunizations    Vaccinated for: Influenza             Osteoporosis prevention/bone health    Estimated body mass index is 30.67 kg/m  as calculated from the following:    Height as of this encounter: 1.74 m (5' 8.5\").    Weight as of this encounter: 92.9 kg (204 lb 11.2 oz).    Weight management plan: Discussed healthy diet and exercise guidelines    She reports that she quit smoking about 4 years ago. Her smoking use included cigarettes. She has a 40.00 pack-year smoking history. She has never used smokeless tobacco.      Appropriate preventive services were discussed with this patient, including applicable screening as appropriate for cardiovascular disease, diabetes, osteopenia/osteoporosis, and glaucoma.  As appropriate for age/gender, discussed screening for colorectal cancer, prostate cancer, breast cancer, and cervical cancer. Checklist reviewing preventive services available has been given to the patient.    Reviewed patients plan of care and provided an AVS. The Basic Care Plan (routine screening as documented in Health Maintenance) for Vijaya meets the Care Plan requirement. This Care Plan has been established and reviewed with the Patient.    Counseling Resources:  ATP IV Guidelines  Pooled Cohorts Equation Calculator  Breast Cancer Risk Calculator  Breast Cancer: Medication to Reduce Risk  FRAX Risk Assessment  ICSI Preventive Guidelines  Dietary Guidelines for Americans, 2010  USDA's MyPlate  ASA Prophylaxis  Lung CA Screening    AMANDA Baptiste CNP  [facility]    Identified Health Risks:  "

## 2021-09-28 NOTE — PATIENT INSTRUCTIONS
Lab in [address]    Medication refills sent in     Atrovent as needed for runny nose     Myrbetriq once daily for bladder control     Neurosurgery referral

## 2021-09-29 LAB — CREAT UR-MCNC: 365 MG/DL

## 2021-09-30 LAB
ALBUMIN SERPL-MCNC: 3.9 G/DL (ref 3.4–5)
ALP SERPL-CCNC: 76 U/L (ref 40–150)
ALT SERPL W P-5'-P-CCNC: 30 U/L (ref 0–50)
ANION GAP SERPL CALCULATED.3IONS-SCNC: 4 MMOL/L (ref 3–14)
AST SERPL W P-5'-P-CCNC: 21 U/L (ref 0–45)
BILIRUB SERPL-MCNC: 0.4 MG/DL (ref 0.2–1.3)
BUN SERPL-MCNC: 9 MG/DL (ref 7–30)
CALCIUM SERPL-MCNC: 8.6 MG/DL (ref 8.5–10.1)
CHLORIDE BLD-SCNC: 109 MMOL/L (ref 94–109)
CO2 SERPL-SCNC: 29 MMOL/L (ref 20–32)
CREAT SERPL-MCNC: 0.75 MG/DL (ref 0.52–1.04)
FERRITIN SERPL-MCNC: 7 NG/ML (ref 8–252)
GFR SERPL CREATININE-BSD FRML MDRD: 81 ML/MIN/1.73M2
GLUCOSE BLD-MCNC: 76 MG/DL (ref 70–99)
IRON SATN MFR SERPL: 21 % (ref 15–46)
IRON SERPL-MCNC: 88 UG/DL (ref 35–180)
POTASSIUM BLD-SCNC: 4.4 MMOL/L (ref 3.4–5.3)
PROT SERPL-MCNC: 7.5 G/DL (ref 6.8–8.8)
SODIUM SERPL-SCNC: 142 MMOL/L (ref 133–144)
TIBC SERPL-MCNC: 424 UG/DL (ref 240–430)

## 2021-10-01 DIAGNOSIS — D50.9 IRON DEFICIENCY ANEMIA, UNSPECIFIED IRON DEFICIENCY ANEMIA TYPE: Primary | ICD-10-CM

## 2021-10-01 RX ORDER — FERROUS SULFATE 325(65) MG
325 TABLET, DELAYED RELEASE (ENTERIC COATED) ORAL DAILY
Qty: 90 TABLET | Refills: 1 | Status: SHIPPED | OUTPATIENT
Start: 2021-10-01 | End: 2021-11-02

## 2021-10-02 LAB
CREATININE URINE MG/DL  (SYNCED VALUE): 365 MG/DL
N-NORTRAMADOL/CREAT UR CFM: ABNORMAL NG/MG{CREAT}
O-NORTRAMADOL UR CFM-MCNC: ABNORMAL NG/ML
PREGABALIN UR QL CFM: PRESENT
TRAMADOL CTO UR CFM-MCNC: ABNORMAL NG/ML
TRAMADOL/CREAT UR: ABNORMAL

## 2021-10-06 ENCOUNTER — OFFICE VISIT (OUTPATIENT)
Dept: NEUROSURGERY | Facility: CLINIC | Age: 71
End: 2021-10-06
Attending: NURSE PRACTITIONER
Payer: COMMERCIAL

## 2021-10-06 ENCOUNTER — ANCILLARY PROCEDURE (OUTPATIENT)
Dept: GENERAL RADIOLOGY | Facility: CLINIC | Age: 71
End: 2021-10-06
Attending: PHYSICIAN ASSISTANT
Payer: COMMERCIAL

## 2021-10-06 VITALS — HEART RATE: 66 BPM | DIASTOLIC BLOOD PRESSURE: 75 MMHG | OXYGEN SATURATION: 95 % | SYSTOLIC BLOOD PRESSURE: 118 MMHG

## 2021-10-06 DIAGNOSIS — G89.29 CHRONIC BILATERAL LOW BACK PAIN WITHOUT SCIATICA: ICD-10-CM

## 2021-10-06 DIAGNOSIS — M62.838 MUSCLE SPASM: ICD-10-CM

## 2021-10-06 DIAGNOSIS — M54.10 BACK PAIN WITH RADICULOPATHY: ICD-10-CM

## 2021-10-06 DIAGNOSIS — R32 URINARY INCONTINENCE, UNSPECIFIED TYPE: ICD-10-CM

## 2021-10-06 DIAGNOSIS — G89.29 CHRONIC BILATERAL THORACIC BACK PAIN: ICD-10-CM

## 2021-10-06 DIAGNOSIS — M79.7 FIBROMYALGIA: ICD-10-CM

## 2021-10-06 DIAGNOSIS — M54.2 CERVICAL SPINE PAIN: Primary | ICD-10-CM

## 2021-10-06 DIAGNOSIS — M54.2 CERVICAL SPINE PAIN: ICD-10-CM

## 2021-10-06 DIAGNOSIS — M54.50 CHRONIC BILATERAL LOW BACK PAIN WITHOUT SCIATICA: ICD-10-CM

## 2021-10-06 DIAGNOSIS — M54.6 CHRONIC BILATERAL THORACIC BACK PAIN: ICD-10-CM

## 2021-10-06 PROCEDURE — 72050 X-RAY EXAM NECK SPINE 4/5VWS: CPT | Performed by: RADIOLOGY

## 2021-10-06 PROCEDURE — 99204 OFFICE O/P NEW MOD 45 MIN: CPT | Performed by: PHYSICIAN ASSISTANT

## 2021-10-06 PROCEDURE — G0463 HOSPITAL OUTPT CLINIC VISIT: HCPCS

## 2021-10-06 ASSESSMENT — PAIN SCALES - GENERAL: PAINLEVEL: SEVERE PAIN (7)

## 2021-10-06 NOTE — LETTER
"    10/6/2021         RE: Vijaya Manjarrez  [address]  [place] [address]        Dear Colleague,    Thank you for referring your patient, Vijaya Manjarrez, to the [facility] [place]. Please see a copy of my visit note below.    Vijaya Manjarrez is a 71 year old female who presents for:  Chief Complaint   Patient presents with     Back Pain        Vitals:    Vitals:    10/06/21 1349   BP: 118/75   Pulse: 66   SpO2: 95%       BMI:  Estimated body mass index is 30.67 kg/m  as calculated from the following:    Height as of 9/28/21: 5' 8.5\" (1.74 m).    Weight as of 9/28/21: 204 lb 11.2 oz (92.9 kg).    Pain Score:  Severe Pain (7)        [facility]      NEUROSURGERY CLINIC CONSULT NOTE     DATE OF VISIT: 10/6/2021     SUBJECTIVE:     Vijaya Manjarrez is a pleasant 71 year old female who presents to the clinic today for consultation on lumbar spine pain. She is referred to the Neurosurgery Clinic by Dr. Carroll in Primary Care.   Today, she reports a long standing history of symptoms. She describes constant, sharp, aching, burning pain that initiates in the midline low lumbar region and radiates distally, bilaterally in no specific distribution. This pain is accompanied by paresthesia, numbness and perceived weakness, in fact she seems to lose her balance quite easily. Really any type of mobility seems to aggravate the symptoms, while alleviation is obtained by sitting down.     After further questioning, we started to focus a bit more on her cervical spine. She tells us that she has been experiencing severe headaches, has been dropping objects frequently, feels unstable on her feet and has been noting more frequent paresthesia like symptoms. Overall, she feels quite unstable.     Neither a traumatic or provocative mechanism can be appreciated. There are  no bowel or bladder changes. No other concerns are voiced.      Current Outpatient " Medications:      acetaminophen (TYLENOL ARTHRITIS PAIN) 650 MG CR tablet, Take 650 mg by mouth 2 times daily as needed , Disp: , Rfl:      albuterol (PROAIR HFA/PROVENTIL HFA/VENTOLIN HFA) 108 (90 Base) MCG/ACT inhaler, Inhale 2 puffs into the lungs every 6 hours, Disp: 18 g, Rfl: 11     amLODIPine (NORVASC) 5 MG tablet, TAKE ONE TABLET BY MOUTH ONCE DAILY, Disp: 90 tablet, Rfl: 2     aspirin (ASA) 81 MG chewable tablet, Take 1 tablet (81 mg) by mouth daily, Disp: 30 tablet, Rfl: 1     atorvastatin (LIPITOR) 80 MG tablet, TAKE 1 TABLET (80 MG) BY MOUTH DAILY (NEED FASTING LABS), Disp: 90 tablet, Rfl: 3     blood glucose (NO BRAND SPECIFIED) test strip, Use to test blood sugars 1 times daily or as directed, use brand same as previous, Disp: 100 strip, Rfl: 3     brimonidine (ALPHAGAN P) 0.1 % ophthalmic solution, Place 1 drop into the right eye 3 times daily, Disp: 5 mL, Rfl: 1     cyclobenzaprine (FLEXERIL) 10 MG tablet, Take 1 tablet (10 mg) by mouth 2 times daily, Disp: 180 tablet, Rfl: 3     DULoxetine (CYMBALTA) 60 MG capsule, TAKE ONE CAPSULE BY MOUTH ONCE DAILY, Disp: 90 capsule, Rfl: 2     ferrous sulfate (FE TABS) 325 (65 Fe) MG EC tablet, Take 1 tablet (325 mg) by mouth daily, Disp: 90 tablet, Rfl: 1     fexofenadine (ALLEGRA) 60 MG tablet, Take 1 tablet (60 mg) by mouth 2 times daily, Disp: 180 tablet, Rfl: 3     fluticasone (FLONASE) 50 MCG/ACT nasal spray, SPRAY 2 SPRAYS IN EACH NOSTRIL ONCE DAILY, Disp: 16 g, Rfl: 5     hydrOXYzine (ATARAX) 10 MG tablet, TAKE ONE TABLET BY MOUTH THREE TIMES A DAY AS NEEDED FOR ANXIETY, Disp: 270 tablet, Rfl: 3     ibuprofen (ADVIL/MOTRIN) 600 MG tablet, , Disp: , Rfl:      ipratropium (ATROVENT) 0.06 % nasal spray, Spray 2 sprays into both nostrils 4 times daily, Disp: 15 mL, Rfl: 11     lisinopril (ZESTRIL) 20 MG tablet, TAKE ONE TABLET BY MOUTH TWICE A DAY, Disp: 180 tablet, Rfl: 2     metFORMIN (GLUCOPHAGE) 500 MG tablet, TAKE ONE TABLET BY MOUTH TWICE A DAY WITH A  MEAL, Disp: 180 tablet, Rfl: 4     metoprolol tartrate (LOPRESSOR) 25 MG tablet, Take 25 mg by mouth 2 times daily, Disp: 180 tablet, Rfl: 3     mirabegron (MYRBETRIQ) 25 MG 24 hr tablet, Take 1 tablet (25 mg) by mouth daily, Disp: 90 tablet, Rfl: 1     Multiple Vitamins-Minerals (MULTIVITAMIN ADULT PO), Take 5 tablets by mouth daily Pro-caps vitamin , Disp: , Rfl:      nitroGLYcerin (NITROSTAT) 0.4 MG sublingual tablet, FOR CHEST PAIN PLACE 1 TABLET UNDER THE TONGUE EVERY 5 MINUTES FOR 3 DOSES IF NEEDED. IF SYMPTOMS PERSIST 5 MINUTES AFTER FIRST DOSE CALL 911., Disp: 25 tablet, Rfl: 0     nystatin (MYCOSTATIN) 066759 UNIT/GM external powder, APPLY TO AFFECTED AREA(S) TOPICALLY THREE TIMES A DAY AS NEEDED, Disp: 30 g, Rfl: 3     omeprazole (PRILOSEC) 20 MG DR capsule, TAKE ONE CAPSULE BY MOUTH TWICE A DAY, Disp: 180 capsule, Rfl: 2     order for DME, Equipment being ordered: glucometer, Disp: 1 each, Rfl: 0     pregabalin (LYRICA) 150 MG capsule, TAKE ONE CAPSULE BY MOUTH THREE TIMES A DAY, Disp: 270 capsule, Rfl: 3     tiotropium (SPIRIVA HANDIHALER) 18 MCG inhaled capsule, INHALE ONE CAPSULE BY MOUTH ONCE DAILY, Disp: 90 capsule, Rfl: 3     traMADol (ULTRAM) 50 MG tablet, Take 1 tablet (50 mg) by mouth every 6 hours as needed for severe pain Due for appt in September., Disp: 100 tablet, Rfl: 0     traZODone (DESYREL) 100 MG tablet, TAKE TWO TABLETS BY MOUTH EVERY NIGHT AT BEDTIME, Disp: 180 tablet, Rfl: 3     White Petrolatum-Mineral Oil (REFRESH P.M.) OINT, Apply 3.5 g to eye 2 times daily, Disp: 3.5 g, Rfl: 1     Allergies   Allergen Reactions     Penicillins Hives        Past Medical History:   Diagnosis Date     Anxiety      Cervical spine degeneration 2016    spinal stenosis,      COPD (chronic obstructive pulmonary disease) (H) 2012    mild     Coronary artery disease      Depression      Diabetes mellitus, type 2 (H)      Diabetic neuropathy (H)      Facet arthropathy, lumbar      GERD (gastroesophageal  reflux disease)      HTN (hypertension)      Hyperlipidemia      Lumbar degenerative disc disease      Migraine headache         ROS: 10 point review of symptoms are negative other than the symptoms noted above in the HPI.     Family History has been reviewed with the patient, there are no pertinent findings to presenting concern.     Past Surgical History:   Procedure Laterality Date     CHOLECYSTECTOMY, LAPOROSCOPIC      Cholecystectomy, Laparoscopic     COMBINED REPAIR PTOSIS WITH BLEPHAROPLASTY BILATERAL Bilateral 3/11/2021    Procedure: Bilateral upper eyelid blepharoplasty and ptosis repair. Right upper eyelid margin lesion biopsy;  Surgeon: George Doll MD;  Location: [facility] OR     CV HEART CATHETERIZATION WITH POSSIBLE INTERVENTION Left 2/26/2019    Procedure: Coronary Angiogram;  Surgeon: Cheo Merida MD;  Location:  HEART CARDIAC CATH LAB     CV HEART CATHETERIZATION WITH POSSIBLE INTERVENTION Bilateral 10/2/2020    Procedure: Heart Catheterization with Possible Intervention;  Surgeon: Linda Sauceda MD;  Location:  HEART CARDIAC CATH LAB     CV LEFT HEART CATH N/A 10/2/2020    Procedure: Left Heart Cath;  Surgeon: Linda Sauceda MD;  Location:  HEART CARDIAC CATH LAB     OPEN REDUCTION INTERNAL FIXATION ORBIT BLOWOUT Left 10/8/2020    Procedure: Left Open Reduction Internal Fixation, Fracture, Orbit With Intraoperative Navigation - Left;  Surgeon: George Doll MD;  Location: [facility] OR     PHACOEMULSIFICATION CLEAR CORNEA WITH STANDARD INTRAOCULAR LENS IMPLANT  7/9/2021          PHACOEMULSIFICATION CLEAR CORNEA WITH STANDARD INTRAOCULAR LENS IMPLANT  7/26/2021          PHACOEMULSIFICATION WITH STANDARD INTRAOCULAR LENS IMPLANT Right 7/9/2021    Procedure: PHACOEMULSIFICATION, CATARACT, WITH STANDARD INTRAOCULAR LENS IMPLANT INSERTION RIGHT;  Surgeon: Dai Glaser MD;  Location: [facility] OR     PHACOEMULSIFICATION WITH STANDARD INTRAOCULAR LENS IMPLANT Left 7/26/2021     Procedure: PHACOEMULSIFICATION, CATARACT, WITH STANDARD INTRAOCULAR LENS IMPLANT INSERTION;  Surgeon: Dai Glaser MD;  Location: [facility] OR        Social History     Tobacco Use     Smoking status: Former Smoker     Packs/day: 1.00     Years: 40.00     Pack years: 40.00     Types: Cigarettes     Quit date: 2017     Years since quittin.3     Smokeless tobacco: Never Used   Vaping Use     Vaping Use: Never used   Substance Use Topics     Alcohol use: No     Drug use: No        OBJECTIVE:   /75   Pulse 66   LMP  (LMP Unknown)   SpO2 95%    There is no height or weight on file to calculate BMI.     Imaging:     Findings, per radiologist read, notable for:      MR CERVICAL SPINE WITHOUT CONTRAST  2021 1:50 PM    1.  Multilevel degenerative changes in the cervical spine most  pronounced C4-C5 to C6-C7 not substantially changed versus 2017.  2.  Up to moderate spinal canal stenosis at C4-C5, C5-C6 and C6-C7.  3.  Neural foraminal narrowing remains severe at C4-C5, C5-C6 and  moderate to severe at C6-C7.   4.  Less pronounced neural foraminal narrowing elsewhere. Moderate to  severe right neural foraminal narrowing at C3-C4. Mild to moderate  foraminal narrowing at C7-T1.    MRI OF THE LUMBAR SPINE WITHOUT CONTRAST 2020 3:04 PM    1. Diffuse degenerative changes of the lumbar spine as detailed above.  2. Severe facet arthropathy throughout the lumbar spine.  3. No spinal canal stenosis at any level of the lumbar spine.  4. Moderate-severe bilateral neural foraminal stenosis at the L5-S1  level. No other significant neural foraminal stenosis of the lumbar  spine.     Full radiological report in chart. Imaging was reviewed with with patient today.     Exam:     Patient appears comfortable, conversational, and in no apparent distress.   Head: Normocephalic, without obvious abnormality, atraumatic, no facial asymmetry.   Eyes: conjunctivae/corneas clear. PERRL, EOM's intact.    Throat: lips, mucosa, and tongue normal; teeth and gums normal.   Neck: supple, symmetrical, trachea midline, no adenopathy and thyroid: not enlarged, symmetric, no tenderness/mass/nodules.   Lungs: clear to auscultation bilaterally.   Heart: regular rate and rhythm.   Abdomen: soft, non-tender; bowel sounds normal; no masses, no organomegaly.   Pulses: 2+ and symmetric.   Skin: Skin color, texture, turgor normal. No rashes or lesions.     CN II-XII grossly intact, alert and appropriate with conversation and following commands.   Gait is non-antalgic. Able to tandem walk, unsteady. Unable to walk on toes and heels without difficulty.   Cervical spine is non tender to palpation. Appropriate range of motion of neck, not concerning for lhermitte's phenomenon.   Bilateral bicep 2/4 and tricep reflexes 1/4. Sensation intact throughout upper extremities.     UE muscle strength  Right  Left    Deltoid  5/5  5/5    Biceps  5/5  5/5    Triceps  5/5  5/5    Hand intrinsics  5/5  5/5    Hand grasp  5/5  5/5    Auguste signs  neg  neg      Lumbar spine is non tender to palpation.  Intact sensation throughout lower extremities.   Bilateral patellar 2/4 and achilles reflex 1/4. Negative for pain with straight leg raise.     LE muscle strength  Right  Left    Iliopsoas (hip flexion)  5/5  5/5    Quad (knee extension)  5/5  5/5    Hamstring (knee flexion)  5/5  5/5    Gastrocnemius (PF)  5/5  5/5    Tibialis Ant. (DF)  5/5  5/5    EHL  5/5  5/5      Negative Babinski bilaterally. Negative for clonus.   Calves are soft and non-tender bilaterally.     ASSESSMENT/PLAN:     Vijaya Manjarrez is a 71 year old female who presents to the clinic for consultation on lumbar spine symptoms that she describes as a constant, sharp, aching, burning pain that initiates in the midline low lumbar region and radiates distally, bilaterally in no specific distribution and is accompanied by paresthesia, numbness and perceived weakness, we turned  our attention to her cervical spine when she admits that she had been experiencing severe headaches, has been dropping objects frequently, feels unstable on her feet and has been noting more frequent paresthesia like symptoms. Overall, she feels quite unstable.    Her most recent cervical spine imaging was reviewed with her today. It was explained that images show multilevel degenerative changes most pronounced C4-C5 to C6-C7 with moderate spinal canal stenosis at C4-C5, C5-C6 and C6-C7 and severe neural foraminal narrowing at C4-C5, C5-C6 and C6-C7. On exam, she is noted to have appropriate strength, sensation and range of motion, but an unsteady gait.     Her lumbar MRI shows diffuse degenerative changes of the lumbar spine with severe facet arthropathy throughout the lumbar spine, but no spinal canal stenosis at any level of the lumbar spine.    We do feel that it would be in her best interest to obtain updated cervical spine x-rays to include ap/lat/flex/ext views to further assess our concern for cervical stenosis or other concerning pathology. We will then have her follow up with Dr. Betancur to further discuss possible surgical interventions or other conservative therapies.     We also discussed signs of a worsening problem that she should seek being evaluated.        Respectfully,     ARVIND King PA-C  [facility]  [facility]  Tel: [phone]      Exam, imaging, and plan reviewed by Dr. Betancur.       Again, thank you for allowing me to participate in the care of your patient.        Sincerely,        Tony Feliciano PA-C

## 2021-10-06 NOTE — PROGRESS NOTES
NEUROSURGERY CLINIC CONSULT NOTE     DATE OF VISIT: 10/6/2021     SUBJECTIVE:     Vijaya Manjarrez is a pleasant 71 year old female who presents to the clinic today for consultation on lumbar spine pain. She is referred to the Neurosurgery Clinic by Dr. Carroll in Primary Care.   Today, she reports a long standing history of symptoms. She describes constant, sharp, aching, burning pain that initiates in the midline low lumbar region and radiates distally, bilaterally in no specific distribution. This pain is accompanied by paresthesia, numbness and perceived weakness, in fact she seems to lose her balance quite easily. Really any type of mobility seems to aggravate the symptoms, while alleviation is obtained by sitting down.     After further questioning, we started to focus a bit more on her cervical spine. She tells us that she has been experiencing severe headaches, has been dropping objects frequently, feels unstable on her feet and has been noting more frequent paresthesia like symptoms. Overall, she feels quite unstable.     Neither a traumatic or provocative mechanism can be appreciated. There are  no bowel or bladder changes. No other concerns are voiced.      Current Outpatient Medications:      acetaminophen (TYLENOL ARTHRITIS PAIN) 650 MG CR tablet, Take 650 mg by mouth 2 times daily as needed , Disp: , Rfl:      albuterol (PROAIR HFA/PROVENTIL HFA/VENTOLIN HFA) 108 (90 Base) MCG/ACT inhaler, Inhale 2 puffs into the lungs every 6 hours, Disp: 18 g, Rfl: 11     amLODIPine (NORVASC) 5 MG tablet, TAKE ONE TABLET BY MOUTH ONCE DAILY, Disp: 90 tablet, Rfl: 2     aspirin (ASA) 81 MG chewable tablet, Take 1 tablet (81 mg) by mouth daily, Disp: 30 tablet, Rfl: 1     atorvastatin (LIPITOR) 80 MG tablet, TAKE 1 TABLET (80 MG) BY MOUTH DAILY (NEED FASTING LABS), Disp: 90 tablet, Rfl: 3     blood glucose (NO BRAND SPECIFIED) test strip, Use to test blood sugars 1 times daily or as directed, use brand same as  previous, Disp: 100 strip, Rfl: 3     brimonidine (ALPHAGAN P) 0.1 % ophthalmic solution, Place 1 drop into the right eye 3 times daily, Disp: 5 mL, Rfl: 1     cyclobenzaprine (FLEXERIL) 10 MG tablet, Take 1 tablet (10 mg) by mouth 2 times daily, Disp: 180 tablet, Rfl: 3     DULoxetine (CYMBALTA) 60 MG capsule, TAKE ONE CAPSULE BY MOUTH ONCE DAILY, Disp: 90 capsule, Rfl: 2     ferrous sulfate (FE TABS) 325 (65 Fe) MG EC tablet, Take 1 tablet (325 mg) by mouth daily, Disp: 90 tablet, Rfl: 1     fexofenadine (ALLEGRA) 60 MG tablet, Take 1 tablet (60 mg) by mouth 2 times daily, Disp: 180 tablet, Rfl: 3     fluticasone (FLONASE) 50 MCG/ACT nasal spray, SPRAY 2 SPRAYS IN EACH NOSTRIL ONCE DAILY, Disp: 16 g, Rfl: 5     hydrOXYzine (ATARAX) 10 MG tablet, TAKE ONE TABLET BY MOUTH THREE TIMES A DAY AS NEEDED FOR ANXIETY, Disp: 270 tablet, Rfl: 3     ibuprofen (ADVIL/MOTRIN) 600 MG tablet, , Disp: , Rfl:      ipratropium (ATROVENT) 0.06 % nasal spray, Spray 2 sprays into both nostrils 4 times daily, Disp: 15 mL, Rfl: 11     lisinopril (ZESTRIL) 20 MG tablet, TAKE ONE TABLET BY MOUTH TWICE A DAY, Disp: 180 tablet, Rfl: 2     metFORMIN (GLUCOPHAGE) 500 MG tablet, TAKE ONE TABLET BY MOUTH TWICE A DAY WITH A MEAL, Disp: 180 tablet, Rfl: 4     metoprolol tartrate (LOPRESSOR) 25 MG tablet, Take 25 mg by mouth 2 times daily, Disp: 180 tablet, Rfl: 3     mirabegron (MYRBETRIQ) 25 MG 24 hr tablet, Take 1 tablet (25 mg) by mouth daily, Disp: 90 tablet, Rfl: 1     Multiple Vitamins-Minerals (MULTIVITAMIN ADULT PO), Take 5 tablets by mouth daily Pro-caps vitamin , Disp: , Rfl:      nitroGLYcerin (NITROSTAT) 0.4 MG sublingual tablet, FOR CHEST PAIN PLACE 1 TABLET UNDER THE TONGUE EVERY 5 MINUTES FOR 3 DOSES IF NEEDED. IF SYMPTOMS PERSIST 5 MINUTES AFTER FIRST DOSE CALL 911., Disp: 25 tablet, Rfl: 0     nystatin (MYCOSTATIN) 986716 UNIT/GM external powder, APPLY TO AFFECTED AREA(S) TOPICALLY THREE TIMES A DAY AS NEEDED, Disp: 30 g, Rfl:  3     omeprazole (PRILOSEC) 20 MG DR capsule, TAKE ONE CAPSULE BY MOUTH TWICE A DAY, Disp: 180 capsule, Rfl: 2     order for DME, Equipment being ordered: glucometer, Disp: 1 each, Rfl: 0     pregabalin (LYRICA) 150 MG capsule, TAKE ONE CAPSULE BY MOUTH THREE TIMES A DAY, Disp: 270 capsule, Rfl: 3     tiotropium (SPIRIVA HANDIHALER) 18 MCG inhaled capsule, INHALE ONE CAPSULE BY MOUTH ONCE DAILY, Disp: 90 capsule, Rfl: 3     traMADol (ULTRAM) 50 MG tablet, Take 1 tablet (50 mg) by mouth every 6 hours as needed for severe pain Due for appt in September., Disp: 100 tablet, Rfl: 0     traZODone (DESYREL) 100 MG tablet, TAKE TWO TABLETS BY MOUTH EVERY NIGHT AT BEDTIME, Disp: 180 tablet, Rfl: 3     White Petrolatum-Mineral Oil (REFRESH P.M.) OINT, Apply 3.5 g to eye 2 times daily, Disp: 3.5 g, Rfl: 1     Allergies   Allergen Reactions     Penicillins Hives        Past Medical History:   Diagnosis Date     Anxiety      Cervical spine degeneration 2016    spinal stenosis,      COPD (chronic obstructive pulmonary disease) (H) 2012    mild     Coronary artery disease      Depression      Diabetes mellitus, type 2 (H)      Diabetic neuropathy (H)      Facet arthropathy, lumbar      GERD (gastroesophageal reflux disease)      HTN (hypertension)      Hyperlipidemia      Lumbar degenerative disc disease      Migraine headache         ROS: 10 point review of symptoms are negative other than the symptoms noted above in the HPI.     Family History has been reviewed with the patient, there are no pertinent findings to presenting concern.     Past Surgical History:   Procedure Laterality Date     CHOLECYSTECTOMY, LAPOROSCOPIC      Cholecystectomy, Laparoscopic     COMBINED REPAIR PTOSIS WITH BLEPHAROPLASTY BILATERAL Bilateral 3/11/2021    Procedure: Bilateral upper eyelid blepharoplasty and ptosis repair. Right upper eyelid margin lesion biopsy;  Surgeon: George Doll MD;  Location: [facility] OR     CV HEART CATHETERIZATION WITH  POSSIBLE INTERVENTION Left 2019    Procedure: Coronary Angiogram;  Surgeon: Cheo Merida MD;  Location:  HEART CARDIAC CATH LAB     CV HEART CATHETERIZATION WITH POSSIBLE INTERVENTION Bilateral 10/2/2020    Procedure: Heart Catheterization with Possible Intervention;  Surgeon: Linda Sauceda MD;  Location:  HEART CARDIAC CATH LAB     CV LEFT HEART CATH N/A 10/2/2020    Procedure: Left Heart Cath;  Surgeon: Linda Sauceda MD;  Location:  HEART CARDIAC CATH LAB     OPEN REDUCTION INTERNAL FIXATION ORBIT BLOWOUT Left 10/8/2020    Procedure: Left Open Reduction Internal Fixation, Fracture, Orbit With Intraoperative Navigation - Left;  Surgeon: George Doll MD;  Location: [facility] OR     PHACOEMULSIFICATION CLEAR CORNEA WITH STANDARD INTRAOCULAR LENS IMPLANT  2021          PHACOEMULSIFICATION CLEAR CORNEA WITH STANDARD INTRAOCULAR LENS IMPLANT  2021          PHACOEMULSIFICATION WITH STANDARD INTRAOCULAR LENS IMPLANT Right 2021    Procedure: PHACOEMULSIFICATION, CATARACT, WITH STANDARD INTRAOCULAR LENS IMPLANT INSERTION RIGHT;  Surgeon: Dai Glaser MD;  Location: [facility] OR     PHACOEMULSIFICATION WITH STANDARD INTRAOCULAR LENS IMPLANT Left 2021    Procedure: PHACOEMULSIFICATION, CATARACT, WITH STANDARD INTRAOCULAR LENS IMPLANT INSERTION;  Surgeon: Dai Glaser MD;  Location: [facility] OR        Social History     Tobacco Use     Smoking status: Former Smoker     Packs/day: 1.00     Years: 40.00     Pack years: 40.00     Types: Cigarettes     Quit date: 2017     Years since quittin.3     Smokeless tobacco: Never Used   Vaping Use     Vaping Use: Never used   Substance Use Topics     Alcohol use: No     Drug use: No        OBJECTIVE:   /75   Pulse 66   LMP  (LMP Unknown)   SpO2 95%    There is no height or weight on file to calculate BMI.     Imaging:     Findings, per radiologist read, notable for:      MR CERVICAL SPINE WITHOUT  CONTRAST  7/1/2021 1:50 PM    1.  Multilevel degenerative changes in the cervical spine most  pronounced C4-C5 to C6-C7 not substantially changed versus 6/27/2017.  2.  Up to moderate spinal canal stenosis at C4-C5, C5-C6 and C6-C7.  3.  Neural foraminal narrowing remains severe at C4-C5, C5-C6 and  moderate to severe at C6-C7.   4.  Less pronounced neural foraminal narrowing elsewhere. Moderate to  severe right neural foraminal narrowing at C3-C4. Mild to moderate  foraminal narrowing at C7-T1.    MRI OF THE LUMBAR SPINE WITHOUT CONTRAST 8/11/2020 3:04 PM    1. Diffuse degenerative changes of the lumbar spine as detailed above.  2. Severe facet arthropathy throughout the lumbar spine.  3. No spinal canal stenosis at any level of the lumbar spine.  4. Moderate-severe bilateral neural foraminal stenosis at the L5-S1  level. No other significant neural foraminal stenosis of the lumbar  spine.     Full radiological report in chart. Imaging was reviewed with with patient today.     Exam:     Patient appears comfortable, conversational, and in no apparent distress.   Head: Normocephalic, without obvious abnormality, atraumatic, no facial asymmetry.   Eyes: conjunctivae/corneas clear. PERRL, EOM's intact.   Throat: lips, mucosa, and tongue normal; teeth and gums normal.   Neck: supple, symmetrical, trachea midline, no adenopathy and thyroid: not enlarged, symmetric, no tenderness/mass/nodules.   Lungs: clear to auscultation bilaterally.   Heart: regular rate and rhythm.   Abdomen: soft, non-tender; bowel sounds normal; no masses, no organomegaly.   Pulses: 2+ and symmetric.   Skin: Skin color, texture, turgor normal. No rashes or lesions.     CN II-XII grossly intact, alert and appropriate with conversation and following commands.   Gait is non-antalgic. Able to tandem walk, unsteady. Unable to walk on toes and heels without difficulty.   Cervical spine is non tender to palpation. Appropriate range of motion of neck, not  concerning for lhermitte's phenomenon.   Bilateral bicep 2/4 and tricep reflexes 1/4. Sensation intact throughout upper extremities.     UE muscle strength  Right  Left    Deltoid  5/5  5/5    Biceps  5/5  5/5    Triceps  5/5  5/5    Hand intrinsics  5/5  5/5    Hand grasp  5/5  5/5    Auguste signs  neg  neg      Lumbar spine is non tender to palpation.  Intact sensation throughout lower extremities.   Bilateral patellar 2/4 and achilles reflex 1/4. Negative for pain with straight leg raise.     LE muscle strength  Right  Left    Iliopsoas (hip flexion)  5/5  5/5    Quad (knee extension)  5/5  5/5    Hamstring (knee flexion)  5/5  5/5    Gastrocnemius (PF)  5/5  5/5    Tibialis Ant. (DF)  5/5  5/5    EHL  5/5  5/5      Negative Babinski bilaterally. Negative for clonus.   Calves are soft and non-tender bilaterally.     ASSESSMENT/PLAN:     Vijaya Manjarrez is a 71 year old female who presents to the clinic for consultation on lumbar spine symptoms that she describes as a constant, sharp, aching, burning pain that initiates in the midline low lumbar region and radiates distally, bilaterally in no specific distribution and is accompanied by paresthesia, numbness and perceived weakness, we turned our attention to her cervical spine when she admits that she had been experiencing severe headaches, has been dropping objects frequently, feels unstable on her feet and has been noting more frequent paresthesia like symptoms. Overall, she feels quite unstable.    Her most recent cervical spine imaging was reviewed with her today. It was explained that images show multilevel degenerative changes most pronounced C4-C5 to C6-C7 with moderate spinal canal stenosis at C4-C5, C5-C6 and C6-C7 and severe neural foraminal narrowing at C4-C5, C5-C6 and C6-C7. On exam, she is noted to have appropriate strength, sensation and range of motion, but an unsteady gait.     Her lumbar MRI shows diffuse degenerative changes of the lumbar  spine with severe facet arthropathy throughout the lumbar spine, but no spinal canal stenosis at any level of the lumbar spine.    We do feel that it would be in her best interest to obtain updated cervical spine x-rays to include ap/lat/flex/ext views to further assess our concern for cervical stenosis or other concerning pathology. We will then have her follow up with Dr. Betancur to further discuss possible surgical interventions or other conservative therapies.     We also discussed signs of a worsening problem that she should seek being evaluated.        Respectfully,     ARVIND King, PATONEY  [facility]  [facility]  Tel: [phone]      Exam, imaging, and plan reviewed by Dr. Betancur.

## 2021-10-06 NOTE — PROGRESS NOTES
"Vijaya Manjarrez is a 71 year old female who presents for:  Chief Complaint   Patient presents with     Back Pain        Vitals:    Vitals:    10/06/21 1349   BP: 118/75   Pulse: 66   SpO2: 95%       BMI:  Estimated body mass index is 30.67 kg/m  as calculated from the following:    Height as of 9/28/21: 5' 8.5\" (1.74 m).    Weight as of 9/28/21: 204 lb 11.2 oz (92.9 kg).    Pain Score:  Severe Pain (7)        Amendo Phorn    "

## 2021-10-20 ENCOUNTER — HOSPITAL ENCOUNTER (INPATIENT)
Facility: CLINIC | Age: 71
Setting detail: SURGERY ADMIT
End: 2021-10-20
Attending: STUDENT IN AN ORGANIZED HEALTH CARE EDUCATION/TRAINING PROGRAM | Admitting: STUDENT IN AN ORGANIZED HEALTH CARE EDUCATION/TRAINING PROGRAM
Payer: COMMERCIAL

## 2021-10-20 ENCOUNTER — OFFICE VISIT (OUTPATIENT)
Dept: NEUROSURGERY | Facility: CLINIC | Age: 71
End: 2021-10-20
Attending: STUDENT IN AN ORGANIZED HEALTH CARE EDUCATION/TRAINING PROGRAM
Payer: COMMERCIAL

## 2021-10-20 VITALS — DIASTOLIC BLOOD PRESSURE: 87 MMHG | SYSTOLIC BLOOD PRESSURE: 156 MMHG | HEART RATE: 100 BPM | OXYGEN SATURATION: 89 %

## 2021-10-20 DIAGNOSIS — E11.40 TYPE 2 DIABETES MELLITUS WITH DIABETIC NEUROPATHY, WITHOUT LONG-TERM CURRENT USE OF INSULIN (H): ICD-10-CM

## 2021-10-20 DIAGNOSIS — Z01.818 PREOP TESTING: ICD-10-CM

## 2021-10-20 DIAGNOSIS — M47.12 CERVICAL SPONDYLOSIS WITH MYELOPATHY: Primary | ICD-10-CM

## 2021-10-20 PROCEDURE — G0463 HOSPITAL OUTPT CLINIC VISIT: HCPCS

## 2021-10-20 PROCEDURE — 99214 OFFICE O/P EST MOD 30 MIN: CPT | Performed by: STUDENT IN AN ORGANIZED HEALTH CARE EDUCATION/TRAINING PROGRAM

## 2021-10-20 ASSESSMENT — PAIN SCALES - GENERAL: PAINLEVEL: SEVERE PAIN (7)

## 2021-10-20 NOTE — NURSING NOTE
Reviewed pre- and post-operative instructions as outlined in the After Visit Summary/Patient Instructions with patient.   Surgery folder was given to patient    Patient Education Topic: Procedure with Dr. Betancur     Learner(s): Patient    Knowledge Level: Basic    Readiness to Learn: Ready    Method:  Verbal explanation    Outcome: Able to verbalize instructions    Barriers to Learning: No barrier    Covid Testing: patient educated they will need to have a test for the novel Coronavirus, COVID-19.The test needs to be completed within 4 days (96) hours of surgery. Order Placed.    Scheduling Number: [phone]    Patient had the opportunity for questions to be answered. Advised Patient to call clinic with any questions/concerns. Gave patient antibacterial soap for pre-surgery skin preparation.

## 2021-10-20 NOTE — LETTER
10/20/2021         RE: Vijaya Manjarrez  [address]  [place] [address]        Dear Colleague,    Thank you for referring your patient, Vijaya Manjarrez, to the [facility] [place]. Please see a copy of my visit note below.    HPI:  71-year-old female who has had chronic neck pain for the past few years.  This has been worsening over the last few months.  She is doing over-the-counter medications without any significant benefit.  Upon further questioning she does have difficulty with fine motor tasks including holding onto things and she avoids small objects like buttons.  She does note some difficulty with her balance as well.  She denies any significant pain down her arms.  She denies any bowel or bladder control problems.  She has never had neck surgery before.  She does have other medical comorbidities but most seem to be well controlled according to her.    Current Outpatient Medications   Medication     acetaminophen (TYLENOL ARTHRITIS PAIN) 650 MG CR tablet     albuterol (PROAIR HFA/PROVENTIL HFA/VENTOLIN HFA) 108 (90 Base) MCG/ACT inhaler     amLODIPine (NORVASC) 5 MG tablet     aspirin (ASA) 81 MG chewable tablet     atorvastatin (LIPITOR) 80 MG tablet     blood glucose (NO BRAND SPECIFIED) test strip     brimonidine (ALPHAGAN P) 0.1 % ophthalmic solution     cyclobenzaprine (FLEXERIL) 10 MG tablet     DULoxetine (CYMBALTA) 60 MG capsule     ferrous sulfate (FE TABS) 325 (65 Fe) MG EC tablet     fexofenadine (ALLEGRA) 60 MG tablet     fluticasone (FLONASE) 50 MCG/ACT nasal spray     hydrOXYzine (ATARAX) 10 MG tablet     ibuprofen (ADVIL/MOTRIN) 600 MG tablet     ipratropium (ATROVENT) 0.06 % nasal spray     lisinopril (ZESTRIL) 20 MG tablet     metFORMIN (GLUCOPHAGE) 500 MG tablet     metoprolol tartrate (LOPRESSOR) 25 MG tablet     mirabegron (MYRBETRIQ) 25 MG 24 hr tablet     Multiple Vitamins-Minerals (MULTIVITAMIN ADULT PO)      "nitroGLYcerin (NITROSTAT) 0.4 MG sublingual tablet     nystatin (MYCOSTATIN) 463056 UNIT/GM external powder     omeprazole (PRILOSEC) 20 MG DR capsule     order for DME     pregabalin (LYRICA) 150 MG capsule     tiotropium (SPIRIVA HANDIHALER) 18 MCG inhaled capsule     traMADol (ULTRAM) 50 MG tablet     traZODone (DESYREL) 100 MG tablet     White Petrolatum-Mineral Oil (REFRESH P.M.) OINT     No current facility-administered medications for this visit.      Physical Exam:  Vital signs:      BP: (!) 156/87 Pulse: 100     SpO2: (!) 89 %          Estimated body mass index is 30.67 kg/m  as calculated from the following:    Height as of 9/28/21: 1.74 m (5' 8.5\").    Weight as of 9/28/21: 92.9 kg (204 lb 11.2 oz).   She is full strength in her bilateral upper and lower extremities.  Sensation is intact to light touch throughout.  There is no León sign present bilaterally.  Bicep and patellar reflexes are 2+ bilaterally.  Results Reviewed:  I personally reviewed an MRI from 7/1/2021 showing multilevel spondylosis with central canal stenosis most significantly at C4-5, C5-6 and C6-7.  There is also anterolisthesis of C4 on C5.  Flexion-extension x-rays were reviewed showing no evidence of dynamic instability.  There is cervical kyphosis most significantly at C4-5.  Assessment:  71 year old female with cervical spondylosis with myelopathy.  Plan:  We discussed her current symptoms as they relate to cervical myelopathy and the central canal stenosis present.  We discussed the natural history of cervical myelopathy and that her symptoms will likely progressively get worse over time and she is at slightly high risk for cord contusion from trauma.  Given her symptoms and imaging findings I am recommending a 3 level ACDF from C4-C7 to decompress the central canal as well as fix her anterolisthesis at C4-5.  She does need a new primary care physician so we will refer her to a new PCP for work-up prior to her surgery.  She " would also like to wait first week of December for the procedure at that time.    Javi Betancur MD      Again, thank you for allowing me to participate in the care of your patient.        Sincerely,        Javi Betancur MD

## 2021-10-20 NOTE — PROGRESS NOTES
HPI:  71-year-old female who has had chronic neck pain for the past few years.  This has been worsening over the last few months.  She is doing over-the-counter medications without any significant benefit.  Upon further questioning she does have difficulty with fine motor tasks including holding onto things and she avoids small objects like buttons.  She does note some difficulty with her balance as well.  She denies any significant pain down her arms.  She denies any bowel or bladder control problems.  She has never had neck surgery before.  She does have other medical comorbidities but most seem to be well controlled according to her.    Current Outpatient Medications   Medication     acetaminophen (TYLENOL ARTHRITIS PAIN) 650 MG CR tablet     albuterol (PROAIR HFA/PROVENTIL HFA/VENTOLIN HFA) 108 (90 Base) MCG/ACT inhaler     amLODIPine (NORVASC) 5 MG tablet     aspirin (ASA) 81 MG chewable tablet     atorvastatin (LIPITOR) 80 MG tablet     blood glucose (NO BRAND SPECIFIED) test strip     brimonidine (ALPHAGAN P) 0.1 % ophthalmic solution     cyclobenzaprine (FLEXERIL) 10 MG tablet     DULoxetine (CYMBALTA) 60 MG capsule     ferrous sulfate (FE TABS) 325 (65 Fe) MG EC tablet     fexofenadine (ALLEGRA) 60 MG tablet     fluticasone (FLONASE) 50 MCG/ACT nasal spray     hydrOXYzine (ATARAX) 10 MG tablet     ibuprofen (ADVIL/MOTRIN) 600 MG tablet     ipratropium (ATROVENT) 0.06 % nasal spray     lisinopril (ZESTRIL) 20 MG tablet     metFORMIN (GLUCOPHAGE) 500 MG tablet     metoprolol tartrate (LOPRESSOR) 25 MG tablet     mirabegron (MYRBETRIQ) 25 MG 24 hr tablet     Multiple Vitamins-Minerals (MULTIVITAMIN ADULT PO)     nitroGLYcerin (NITROSTAT) 0.4 MG sublingual tablet     nystatin (MYCOSTATIN) 974788 UNIT/GM external powder     omeprazole (PRILOSEC) 20 MG DR capsule     order for DME     pregabalin (LYRICA) 150 MG capsule     tiotropium (SPIRIVA HANDIHALER) 18 MCG inhaled capsule     traMADol (ULTRAM) 50 MG tablet  "    traZODone (DESYREL) 100 MG tablet     White Petrolatum-Mineral Oil (REFRESH P.M.) OINT     No current facility-administered medications for this visit.      Physical Exam:  Vital signs:      BP: (!) 156/87 Pulse: 100     SpO2: (!) 89 %          Estimated body mass index is 30.67 kg/m  as calculated from the following:    Height as of 9/28/21: 1.74 m (5' 8.5\").    Weight as of 9/28/21: 92.9 kg (204 lb 11.2 oz).   She is full strength in her bilateral upper and lower extremities.  Sensation is intact to light touch throughout.  There is no León sign present bilaterally.  Bicep and patellar reflexes are 2+ bilaterally.  Results Reviewed:  I personally reviewed an MRI from 7/1/2021 showing multilevel spondylosis with central canal stenosis most significantly at C4-5, C5-6 and C6-7.  There is also anterolisthesis of C4 on C5.  Flexion-extension x-rays were reviewed showing no evidence of dynamic instability.  There is cervical kyphosis most significantly at C4-5.  Assessment:  71 year old female with cervical spondylosis with myelopathy.  Plan:  We discussed her current symptoms as they relate to cervical myelopathy and the central canal stenosis present.  We discussed the natural history of cervical myelopathy and that her symptoms will likely progressively get worse over time and she is at slightly high risk for cord contusion from trauma.  Given her symptoms and imaging findings I am recommending a 3 level ACDF from C4-C7 to decompress the central canal as well as fix her anterolisthesis at C4-5.  She does need a new primary care physician so we will refer her to a new PCP for work-up prior to her surgery.  She would also like to wait first week of December for the procedure at that time.    Javi Betancur MD  "

## 2021-10-20 NOTE — PATIENT INSTRUCTIONS
- To establish a primary care physician, please call [facility] in [place] at [phone]      Patient Instructions    Surgery scheduled at [facility] for C4-7 ACDF with Dr. Betancur    Pre-Operative    Surgical risks: blood clots in the leg or lung, problems urinating, nerve damage, drainage from the incision, infection,stiffness    Pre-operative physical with primary care physician within 30 days of surgical date.     2-3 night hospitalization stay     Please obtain Hgb A1C lab test    Shower procedure  o Please shower with antimicrobial soap the night before surgery and morning of surgery. Please refer to showering instruction sheet in folder.    Eating/Drinking  o Stop all solid foods 8 hours before surgery.  o Keep drinking clear liquids until 4 hours before surgery  - Clear liquids include water, clear juice, black coffee, or clear tea without milk, Gatorade, clear soda.     Medications  o Hold Aspirin, NSAIDs (Advil/Ibuprofen, Indocin, Naproxen,Nuprin,Relafen/Nabumetone, Diclofenac,Meloxicam, Aleve, Celebrex) x 7 days prior to surgical date   o You can take Tylenol (Acetaminophen) for pain, 1000 mg  - Do not exceed 3,000 mg per day   o Any other medications prescribed, please discuss with your primary care provider at your pre-operative physical     As part of preparation for your upcoming procedure you are required to have a test for the novel Coronavirus, COVID-19.  o Please call the drive-up testing number to schedule your appointment. The test needs to be completed within 4 days (96) hours of surgery.   o Scheduling Number: [phone]  o You may NOT receive the COVID-19 vaccine 72 hours before or after surgery.    Pain Management    Dealing with pain  o As your body heals, you might feel a stabbing, burning, or aching pain. You may also have some numbness.  o Everyone feels pain differently, we may ask you to rate your pain using a pain scale. This will let us know how  much pain you feel.   o Keep in mind that medicine won't take away all of your pain. It helps to try other ways to relax and ease pain.     Things to help with pain  o After surgery, we will give you medicine for your pain. These medications work well, but they can make you drowsy, itchy, or sick to your stomach. If we give you narcotics for pain, try to take the pills with food.   o For mild to moderate pain, you can take medication such as Tylenol. These can be used with narcotics, but make sure that your narcotic does not contain Tylenol.   - Do NOT drive while taking narcotic pain medication  - Do NOT drink alcohol while using any pain medication  o You can utilize ice as needed (no longer than 20 minutes at one time)    You may resume taking NSAIDs (ex. Ibuprofen, aleve, naproxen) 2 weeks after surgery.    Incision Care    No submerging incision in water such as pools, hot tubs, baths for at least 8 weeks or until incision is healed    You may get your incision wet in the shower. Allow water to run over incision, and gently pat dry.     Remove dressing as instructed upon discharge    Watch for signs of infection  o Redness, swelling, warmth, drainage, and fever of 101 degrees or higher  o Notify clinic [phone].    Activity Restrictions    For the first 6-8 weeks, no lifting > 10 pounds, no bending, twisting, or overhead reaching.    Take stairs in moderation     Ok to walk as tolerated, take short frequent walks. You may gradually increase the distance as tolerated.     Avoid bed rest and prolonged sitting for longer than 30 minutes (change positions frequently while awake)    No contact sports until after follow up visit    No high impact activities such as; running/jogging, snowmobile or 4 smith riding or any other recreational vehicles    Please call the clinic if you develop any of the following symptoms:  o Swelling and/or warmth in one or both legs  o Pain or tenderness in your leg, ankle, foot, or  arm   o Red or discolored skin     Post-Op Follow Up Appointments    2 week staple/suture removal with RN    6 week post op with xray prior with Dr. Betancur    3 months post op with xray prior with Dr. Betancur    6 months with xray prior with [facility]    1 year post op with xray prior with [facility]    2 year post op with xray prior with [facility]    Please call to schedule follow up and xray appointments at [phone]    Resources    If you are currently employed, you will need to be off work for 4-6 weeks for post op recovery and healing.    Please fax any FMLA/short term disability paperwork to [phone]    You may call our clinic when you'd like to return to work and we can provide a work letter    To allow staff adequate time to complete paperwork, please send as soon as possible

## 2021-10-26 DIAGNOSIS — Z11.59 ENCOUNTER FOR SCREENING FOR OTHER VIRAL DISEASES: ICD-10-CM

## 2021-11-04 ENCOUNTER — TELEPHONE (OUTPATIENT)
Dept: INTERNAL MEDICINE | Facility: CLINIC | Age: 71
End: 2021-11-04

## 2021-11-04 NOTE — TELEPHONE ENCOUNTER
Patient calling  She has surgery 11/15/2021 and wants her pre op with Genoveva Carroll. Can she be worked in or use a virtual spot?\  Please advise. Ok to call and mabel [phone]

## 2021-11-05 NOTE — TELEPHONE ENCOUNTER
Patient advised, no open appointments in [place] prior to surgery, call transferred to scheduling to make appointment at another office.    Wendi Adkins RN  [facility]

## 2021-11-08 DIAGNOSIS — Z11.59 ENCOUNTER FOR SCREENING FOR OTHER VIRAL DISEASES: ICD-10-CM

## 2021-11-09 ENCOUNTER — PATIENT OUTREACH (OUTPATIENT)
Dept: GERIATRIC MEDICINE | Facility: CLINIC | Age: 71
End: 2021-11-09
Payer: COMMERCIAL

## 2021-11-09 ENCOUNTER — TELEPHONE (OUTPATIENT)
Dept: NEUROSURGERY | Facility: CLINIC | Age: 71
End: 2021-11-09
Payer: COMMERCIAL

## 2021-11-09 DIAGNOSIS — G89.29 CHRONIC BILATERAL LOW BACK PAIN WITHOUT SCIATICA: ICD-10-CM

## 2021-11-09 DIAGNOSIS — G89.29 CHRONIC BILATERAL THORACIC BACK PAIN: ICD-10-CM

## 2021-11-09 DIAGNOSIS — M54.6 CHRONIC BILATERAL THORACIC BACK PAIN: ICD-10-CM

## 2021-11-09 DIAGNOSIS — M54.10 BACK PAIN WITH RADICULOPATHY: ICD-10-CM

## 2021-11-09 DIAGNOSIS — M79.7 FIBROMYALGIA: ICD-10-CM

## 2021-11-09 DIAGNOSIS — M54.50 CHRONIC BILATERAL LOW BACK PAIN WITHOUT SCIATICA: ICD-10-CM

## 2021-11-09 DIAGNOSIS — M62.838 MUSCLE SPASM: ICD-10-CM

## 2021-11-09 NOTE — PROGRESS NOTES
[facility] Care Coordination Contact    VM from member who shares that she is having surgery on 11/15. Call back to member and VM was left requesting a return call.     Call back from member - discussed upcoming neck surgery. Member shared that she will need surgery to her finger as well - has not scheduled this yet. Member is hopeful she can return home after the procedure, but may need rehab. Discussed the benefits of rehab, and encouraged member to strongly consider if recommended. Discussed the difficulty of finding services in the community at this time, so added help at home would be unlikely. Writer will follow transitions in Epic and follow up as appropriate.     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 Writer encouraged member to call her financial worker with [facility] which she has already done and is awaiting a call back. Also encouraged her to talk with someone at homemaking agency.     Vanesa Gtz RN  [facility]  [phone]  Fax: [phone]

## 2021-11-09 NOTE — TELEPHONE ENCOUNTER
Routing refill request to provider for review/approval because:  Drug not on the [facility] refill protocol   Stanford Dunham RN, BSN

## 2021-11-09 NOTE — TELEPHONE ENCOUNTER
Patient is having questions about surgery, she would like a return call, she can be reached at [phone].

## 2021-11-09 NOTE — TELEPHONE ENCOUNTER
Last Visit: 10/20/21    Next Visit: 11/15/21 surgery Anterior Cervical Discectomy and Fusion Cervical 4-7    Name of Provider:  Dr. Betancur    Assessment: Pt is calling regarding questions of upcoming surgery. All questions answered.

## 2021-11-11 ENCOUNTER — OFFICE VISIT (OUTPATIENT)
Dept: PEDIATRICS | Facility: CLINIC | Age: 71
End: 2021-11-11
Payer: COMMERCIAL

## 2021-11-11 VITALS
HEART RATE: 74 BPM | RESPIRATION RATE: 16 BRPM | OXYGEN SATURATION: 94 % | SYSTOLIC BLOOD PRESSURE: 118 MMHG | TEMPERATURE: 97.1 F | DIASTOLIC BLOOD PRESSURE: 58 MMHG | BODY MASS INDEX: 31.12 KG/M2 | WEIGHT: 210.1 LBS | HEIGHT: 69 IN

## 2021-11-11 DIAGNOSIS — M47.12 CERVICAL SPONDYLOSIS WITH MYELOPATHY: ICD-10-CM

## 2021-11-11 DIAGNOSIS — I10 BENIGN ESSENTIAL HYPERTENSION: ICD-10-CM

## 2021-11-11 DIAGNOSIS — F41.9 ANXIETY: ICD-10-CM

## 2021-11-11 DIAGNOSIS — F17.200 SMOKER: ICD-10-CM

## 2021-11-11 DIAGNOSIS — Z01.818 PREOP GENERAL PHYSICAL EXAM: Primary | ICD-10-CM

## 2021-11-11 DIAGNOSIS — I50.32 CHRONIC DIASTOLIC CONGESTIVE HEART FAILURE (H): ICD-10-CM

## 2021-11-11 DIAGNOSIS — F33.0 MILD EPISODE OF RECURRENT MAJOR DEPRESSIVE DISORDER (H): ICD-10-CM

## 2021-11-11 DIAGNOSIS — G89.4 CHRONIC PAIN SYNDROME: ICD-10-CM

## 2021-11-11 DIAGNOSIS — J44.9 CHRONIC OBSTRUCTIVE PULMONARY DISEASE, UNSPECIFIED COPD TYPE (H): ICD-10-CM

## 2021-11-11 DIAGNOSIS — E11.9 TYPE 2 DIABETES MELLITUS WITHOUT COMPLICATION, WITHOUT LONG-TERM CURRENT USE OF INSULIN (H): ICD-10-CM

## 2021-11-11 LAB
BASOPHILS # BLD AUTO: 0 10E3/UL (ref 0–0.2)
BASOPHILS NFR BLD AUTO: 0 %
EOSINOPHIL # BLD AUTO: 0.1 10E3/UL (ref 0–0.7)
EOSINOPHIL NFR BLD AUTO: 1 %
ERYTHROCYTE [DISTWIDTH] IN BLOOD BY AUTOMATED COUNT: 17.5 % (ref 10–15)
HBA1C MFR BLD: 7.3 % (ref 0–5.6)
HCT VFR BLD AUTO: 35.5 % (ref 35–47)
HGB BLD-MCNC: 11 G/DL (ref 11.7–15.7)
LYMPHOCYTES # BLD AUTO: 4 10E3/UL (ref 0.8–5.3)
LYMPHOCYTES NFR BLD AUTO: 35 %
MCH RBC QN AUTO: 25.1 PG (ref 26.5–33)
MCHC RBC AUTO-ENTMCNC: 31 G/DL (ref 31.5–36.5)
MCV RBC AUTO: 81 FL (ref 78–100)
MONOCYTES # BLD AUTO: 0.7 10E3/UL (ref 0–1.3)
MONOCYTES NFR BLD AUTO: 7 %
NEUTROPHILS # BLD AUTO: 6.5 10E3/UL (ref 1.6–8.3)
NEUTROPHILS NFR BLD AUTO: 57 %
PLATELET # BLD AUTO: 338 10E3/UL (ref 150–450)
RBC # BLD AUTO: 4.39 10E6/UL (ref 3.8–5.2)
WBC # BLD AUTO: 11.4 10E3/UL (ref 4–11)

## 2021-11-11 PROCEDURE — 80048 BASIC METABOLIC PNL TOTAL CA: CPT | Performed by: NURSE PRACTITIONER

## 2021-11-11 PROCEDURE — 99214 OFFICE O/P EST MOD 30 MIN: CPT | Performed by: NURSE PRACTITIONER

## 2021-11-11 PROCEDURE — 85025 COMPLETE CBC W/AUTO DIFF WBC: CPT | Performed by: NURSE PRACTITIONER

## 2021-11-11 PROCEDURE — 83036 HEMOGLOBIN GLYCOSYLATED A1C: CPT | Performed by: NURSE PRACTITIONER

## 2021-11-11 PROCEDURE — 36415 COLL VENOUS BLD VENIPUNCTURE: CPT | Performed by: NURSE PRACTITIONER

## 2021-11-11 PROCEDURE — 93000 ELECTROCARDIOGRAM COMPLETE: CPT | Performed by: NURSE PRACTITIONER

## 2021-11-11 SDOH — HEALTH STABILITY: PHYSICAL HEALTH: ON AVERAGE, HOW MANY DAYS PER WEEK DO YOU ENGAGE IN MODERATE TO STRENUOUS EXERCISE (LIKE A BRISK WALK)?: 0 DAYS

## 2021-11-11 SDOH — ECONOMIC STABILITY: FOOD INSECURITY: WITHIN THE PAST 12 MONTHS, THE FOOD YOU BOUGHT JUST DIDN'T LAST AND YOU DIDN'T HAVE MONEY TO GET MORE.: OFTEN TRUE

## 2021-11-11 SDOH — ECONOMIC STABILITY: INCOME INSECURITY: IN THE LAST 12 MONTHS, WAS THERE A TIME WHEN YOU WERE NOT ABLE TO PAY THE MORTGAGE OR RENT ON TIME?: PATIENT REFUSED

## 2021-11-11 SDOH — ECONOMIC STABILITY: INCOME INSECURITY: HOW HARD IS IT FOR YOU TO PAY FOR THE VERY BASICS LIKE FOOD, HOUSING, MEDICAL CARE, AND HEATING?: HARD

## 2021-11-11 SDOH — ECONOMIC STABILITY: TRANSPORTATION INSECURITY
IN THE PAST 12 MONTHS, HAS THE LACK OF TRANSPORTATION KEPT YOU FROM MEDICAL APPOINTMENTS OR FROM GETTING MEDICATIONS?: NO

## 2021-11-11 SDOH — ECONOMIC STABILITY: FOOD INSECURITY: WITHIN THE PAST 12 MONTHS, YOU WORRIED THAT YOUR FOOD WOULD RUN OUT BEFORE YOU GOT MONEY TO BUY MORE.: OFTEN TRUE

## 2021-11-11 SDOH — HEALTH STABILITY: PHYSICAL HEALTH: ON AVERAGE, HOW MANY MINUTES DO YOU ENGAGE IN EXERCISE AT THIS LEVEL?: 0 MIN

## 2021-11-11 ASSESSMENT — SOCIAL DETERMINANTS OF HEALTH (SDOH)
DO YOU BELONG TO ANY CLUBS OR ORGANIZATIONS SUCH AS CHURCH GROUPS UNIONS, FRATERNAL OR ATHLETIC GROUPS, OR SCHOOL GROUPS?: NO
HOW OFTEN DO YOU ATTEND CHURCH OR RELIGIOUS SERVICES?: MORE THAN 4 TIMES PER YEAR
IN A TYPICAL WEEK, HOW MANY TIMES DO YOU TALK ON THE PHONE WITH FAMILY, FRIENDS, OR NEIGHBORS?: MORE THAN THREE TIMES A WEEK
HOW OFTEN DO YOU GET TOGETHER WITH FRIENDS OR RELATIVES?: ONCE A WEEK

## 2021-11-11 ASSESSMENT — LIFESTYLE VARIABLES
HOW OFTEN DO YOU HAVE A DRINK CONTAINING ALCOHOL: NEVER
HOW MANY STANDARD DRINKS CONTAINING ALCOHOL DO YOU HAVE ON A TYPICAL DAY: PATIENT DECLINED
HOW OFTEN DO YOU HAVE SIX OR MORE DRINKS ON ONE OCCASION: NEVER

## 2021-11-11 ASSESSMENT — MIFFLIN-ST. JEOR: SCORE: 1529.45

## 2021-11-11 NOTE — PROGRESS NOTES
[facility]  [address]  [address]  [place] [address]  Phone: [phone]  Fax: [phone]  Primary Provider: No Ref-Primary, Physician  Pre-op Performing Provider: RON COOPER      PREOPERATIVE EVALUATION:  Today's date: 11/11/2021    Vijaya Manjarrez is a 71 year old female who presents for a preoperative evaluation.    Surgical Information:  Surgery/Procedure: Anterior Cervical Discectomy and Fusion Cervical 4-7  Surgery Location: [facility]  Surgeon: Javi Betancur MD  Surgery Date: 11/15/21  Time of Surgery: 9:35am  Where patient plans to recover: At home with family  Fax number for surgical facility: Note does not need to be faxed, will be available electronically in Epic.    Type of Anesthesia Anticipated: General    Assessment & Plan     The proposed surgical procedure is considered INTERMEDIATE risk.    Preop general physical exam  Cleared for procedure  - Basic metabolic panel  (Ca, Cl, CO2, Creat, Gluc, K, Na, BUN)  - CBC with platelets and differential  - EKG 12-lead complete w/read - Clinics    Cervical spondylosis with myelopathy  Followed by neurosurgery.    Type 2 diabetes mellitus without complication, without long-term current use of insulin (H)  Controlled.  Last A1C 7.3.  Hold metformin   - Hemoglobin A1c    Chronic pain syndrome  Managed by Internal Medicine     Chronic obstructive pulmonary disease, unspecified COPD type (H)  Stable.  Continue with medicaiton    Benign essential hypertension  Stable.  Continue medication.    Chronic diastolic congestive heart failure (H)  Stable.  Continue medication and follow-up with IM.    Anxiety  Stable.  Continue with medication    Mild episode of recurrent major depressive disorder (H)  Stable.  Continue with medication    Smoker  Discussed smoking cessation.  Not interested at this time.      Risks and Recommendations:  The patient has the following additional risks and recommendations for  perioperative complications:  Cardiovascular:   - Patient has a history of CHF and CAD.  Medication instructions given.  Diabetes:  - Patient is not on insulin therapy: regular NPO guidelines can be followed.   Social and Substance:    - Active nicotine user, advised smoking cessation    Medication Instructions:   - ACE/ARB: May be continued on the day of surgery.    - Beta Blockers: Continue taking on the day of surgery.   - Calcium Channel Blockers: May be continued on the day of surgery.     - Statins: Continue taking on the day of surgery.    - metformin: HOLD day of surgery.   - pregabalin, gabapentin: Continue without modification.   - SSRIs, SNRIs, TCAs, Antipsychotics: Continue without modification.    - LABA, inhaled corticosteroid, long-acting anticholinergics: Continue without modification.   - rescue Inhaler: Continue PRN. Bring to hospital on the day of surgery.   - cyclobenzaprine: may take the night before surgery if needed.    RECOMMENDATION:  APPROVAL GIVEN to proceed with proposed procedure, without further diagnostic evaluation.      Subjective     HPI related to upcoming procedure: Neck pain for several years.  History of fibromyalgia.    Preop Questions 11/11/2021   1. Have you ever had a heart attack or stroke? No   2. Have you ever had surgery on your heart or blood vessels, such as a stent placement, a coronary artery bypass, or surgery on an artery in your head, neck, heart, or legs? No   3. Do you have chest pain with activity? No   4. Do you have a history of  heart failure? No   5. Do you currently have a cold, bronchitis or symptoms of other infection? No   6. Do you have a cough, shortness of breath, or wheezing? YES -COPD   7. Do you or anyone in your family have previous history of blood clots? No   8. Do you or does anyone in your family have a serious bleeding problem such as prolonged bleeding following surgeries or cuts? No   9. Have you ever had problems with anemia or been told  to take iron pills? YES    10. Have you had any abnormal blood loss such as black, tarry or bloody stools, or abnormal vaginal bleeding? No   11. Have you ever had a blood transfusion? No   12. Are you willing to have a blood transfusion if it is medically needed before, during, or after your surgery? Yes   13. Have you or any of your relatives ever had problems with anesthesia? No   14. Do you have sleep apnea, excessive snoring or daytime drowsiness? No   14a. Do you have a CPAP machine? No   15. Do you have any artifical heart valves or other implanted medical devices like a pacemaker, defibrillator, or continuous glucose monitor? No   16. Do you have artificial joints? No   17. Are you allergic to latex? No       Health Care Directive:  Patient does not have a Health Care Directive or Living Will: Discussed advance care planning with patient; however, patient declined at this time.    Preoperative Review of :   reviewed - controlled substances reflected in medication list.      Status of Chronic Conditions:  See problem list for active medical problems.  Problems all longstanding and stable, except as noted/documented.  See ROS for pertinent symptoms related to these conditions.      Review of Systems  CONSTITUTIONAL: NEGATIVE for fever, chills, change in weight  INTEGUMENTARY/SKIN: NEGATIVE for worrisome rashes, moles or lesions  EYES: NEGATIVE for vision changes or irritation  ENT/MOUTH: NEGATIVE for ear, mouth and throat problems  RESP:POSITIVE for Hx COPD  CV: NEGATIVE for chest pain, palpitations or peripheral edema  GI: NEGATIVE for nausea, abdominal pain, heartburn, or change in bowel habits  : NEGATIVE for frequency, dysuria, or hematuria  MUSCULOSKELETAL:POSITIVE  for neck pain  NEURO: NEGATIVE for weakness, dizziness or paresthesias  ENDOCRINE: NEGATIVE for temperature intolerance, skin/hair changes  HEME: NEGATIVE for bleeding problems  PSYCHIATRIC: NEGATIVE for changes in mood or  affect    Patient Active Problem List    Diagnosis Date Noted     Chronic diastolic congestive heart failure (H) 09/28/2021     Priority: Medium     Nuclear sclerotic cataract of both eyes 06/08/2021     Priority: Medium     Added automatically from request for surgery [id number]       Eyelid lesion 02/09/2021     Priority: Medium     Added automatically from request for surgery [id number]       Dermatochalasis of both upper eyelids 02/09/2021     Priority: Medium     Added automatically from request for surgery [id number]       Acquired involutional ptosis of both eyelids 02/09/2021     Priority: Medium     Added automatically from request for surgery [id number]       Closed fracture of orbit (H) 10/05/2020     Priority: Medium     Added automatically from request for surgery [id number]       Chest pain 10/01/2020     Priority: Medium     Smoker 10/01/2020     Priority: Medium     Left Orbit Floor Fracture -- Dx 9/25/20 09/25/2020     Priority: Medium     Added automatically from request for surgery [id number]       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 09/25/2020     Priority: Medium     Added automatically from request for surgery [id number]       Diplopia 09/25/2020     Priority: Medium     Added automatically from request for surgery [id number]       Hypokalemia 08/08/2020     Priority: Medium     Atherosclerotic heart disease of native coronary artery with other forms of angina pectoris (H) 03/06/2019     Priority: Medium     Status post coronary angiogram 02/26/2019     Priority: Medium     Coronary artery disease involving native coronary artery, angina presence unspecified, unspecified whether native or transplanted heart 02/21/2019     Priority: Medium     Added automatically from request for surgery [id number]  Replacing diagnoses that were inactivated after the 10/1/2021 regulatory import.       Dyslipidemia 02/21/2019     Priority: Medium     Added automatically from request for surgery [id number]       Mild episode of  "recurrent major depressive disorder (H) 2018     Priority: Medium     Advanced directives, counseling/discussion 2017     Priority: Medium     Advance Care Planning 2017: ACP Review of Chart / Resources Provided:  Reviewed chart for advance care plan.  Vijaya Manjarrez has been provided information and resources to begin or update their advance care plan.  Added by Vanesa Gtz           [facility] 07/10/2017     Priority: Medium     [facility]   Vanesa Gtz RN  [phone]    [facility] CARE PLAN SUMMARY    Client Name:  Vijaya Manjarrez  Address:  [id number] [place] [address] [address] [place] [address] Phone: [phone]    :  1950 Date of Assessment: 2020   Health Plan:  [facility]  Health Plan Number: [id number] Medical Assistance Number: [id number]  Financial Worker:  Rubén [phone]  Case #:  [id number]   [facility] :  Vanesa Gtz RN  Phone:  [phone]  CM Fax:  [phone]   [facility] Enrollment Date: 2017 Case Mix:  B  Rate Cell:  B  Waiver Type:  EW   Emergency Contact: Luis Manjarrez  Mobile Phone: [phone]  Relation: Son Language:  English  :  No   Health Care Agent/POA:  None Advanced Directives/Living Will:  None   Primary Care Clinic/Phone/Fax:  [facility]/(p) [phone], (f) [phone] Primary Dx:  COPD [J44.9]  Secondary Dx: Chronic Pain Syndrome [G89.4]   Primary Physician:  Dr. Vidhi aMrio   Height:  5' 9\"  Weight:  207 lbs   Specialty Physician:   Dr. Galan OB/GYN Audiologist:  Margoth   Eye Care Provider:  [facility] Care Provider:    [facility]: [facility] [phone] or [phone]   Other:        [facility] CURRENT SERVICES SUMMARY  Equipment owned/DME history: Glasses, walker, shower chair  SERVICE TYPE/PROVIDER NAME/PHONE AUTH DATE FREQUENCY Units OR $ Amt DESCRIPTION   Medical Transportation: [facility] Health Ride [phone] 20-  21 " as needed  Medical rides as needed     Supplies: [facility] [phone] 5/1/20-  4/30/21 monthly MA Incontinent products monthly     Homemaking: [employer]   Fax: [phone]  NPI: [id number] 8/31/20-  4/30/21 weekly  4 hours/week $335.57              Hyperlipidemia LDL goal <100 01/20/2017     Priority: Medium     Fibromyalgia 01/20/2017     Priority: Medium     Benign essential hypertension 01/20/2017     Priority: Medium     Chronic pain syndrome 01/20/2017     Priority: Medium     Patient is followed by Disha Van MD for ongoing prescription of pain medication.  All refills should only be approved by this provider, or covering partner.    Medication(s): Tramadol.   Maximum quantity per month: 90  Clinic visit frequency required: Q 6  months     Controlled substance agreement:  Encounter-Level CSA:     There are no encounter-level csa.        Pain Clinic evaluation in the past: Yes       Date/Location:      DIRE Total Score(s):  No flowsheet data found.    Last [facility] website verification:  none   [email or web address]       DM type 2, Hgb A1C 7.3 on 5/20/20      Priority: Medium     Cervical spine degeneration      Priority: Medium     spinal stenosis,        Facet arthropathy, lumbar      Priority: Medium     Lumbar degenerative disc disease      Priority: Medium     Migraine headache      Priority: Medium     Diabetic neuropathy (H)      Priority: Medium     Anxiety      Priority: Medium     GERD (gastroesophageal reflux disease)      Priority: Medium     HCD (health care directive) 01/12/2017     Priority: Medium     Pt received HCD and will return when complete.    [place] Regulatory Load OCT 2020       Controlled substance agreement signed- ok 5/12/20 01/12/2017     Priority: Medium     Patient is followed by Genoveva Carroll CNP for ongoing prescription of pain medication.  All refills should only be approved by this provider, or covering partner.    Medication(s): Tramadol.    Maximum quantity per month: 100  Clinic visit frequency required: Q 3 months     Controlled substance agreement:  CSA signed 2/5/2019 - re sign 9/28/2021    Pain Clinic evaluation in the past: Yes       Date/Location:        Last [facility] website verification:  none done 9/28/2021   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) 01/31/2016     Priority: Medium     COPD (chronic obstructive pulmonary disease) (H) 10/18/2012     Priority: Medium     mild       Candidal intertrigo 04/11/2011     Priority: Medium      Past Medical History:   Diagnosis Date     Anxiety      Cervical spine degeneration 2016    spinal stenosis,      COPD (chronic obstructive pulmonary disease) (H) 2012    mild     Coronary artery disease      Depression      Diabetes mellitus, type 2 (H)      Diabetic neuropathy (H)      Facet arthropathy, lumbar      GERD (gastroesophageal reflux disease)      HTN (hypertension)      Hyperlipidemia      Lumbar degenerative disc disease      Migraine headache      Past Surgical History:   Procedure Laterality Date     CHOLECYSTECTOMY, LAPOROSCOPIC      Cholecystectomy, Laparoscopic     COMBINED REPAIR PTOSIS WITH BLEPHAROPLASTY BILATERAL Bilateral 3/11/2021    Procedure: Bilateral upper eyelid blepharoplasty and ptosis repair. Right upper eyelid margin lesion biopsy;  Surgeon: George Doll MD;  Location[facility]     CV HEART CATHETERIZATION WITH POSSIBLE INTERVENTION Left 2/26/2019    Procedure: Coronary Angiogram;  Surgeon: Cheo Mreida MD;  Location:  HEART CARDIAC CATH LAB     CV HEART CATHETERIZATION WITH POSSIBLE INTERVENTION Bilateral 10/2/2020    Procedure: Heart Catheterization with Possible Intervention;  Surgeon: Linda Sauceda MD;  Location:  HEART CARDIAC CATH LAB     CV LEFT HEART CATH N/A 10/2/2020    Procedure: Left Heart Cath;  Surgeon: Linda Sauceda MD;  Location:   HEART CARDIAC CATH LAB     OPEN REDUCTION INTERNAL FIXATION ORBIT BLOWOUT Left 10/8/2020    Procedure: Left Open Reduction Internal Fixation, Fracture, Orbit With Intraoperative Navigation - Left;  Surgeon: George Doll MD;  Location: [facility] OR     PHACOEMULSIFICATION CLEAR CORNEA WITH STANDARD INTRAOCULAR LENS IMPLANT  7/9/2021          PHACOEMULSIFICATION CLEAR CORNEA WITH STANDARD INTRAOCULAR LENS IMPLANT  7/26/2021          PHACOEMULSIFICATION WITH STANDARD INTRAOCULAR LENS IMPLANT Right 7/9/2021    Procedure: PHACOEMULSIFICATION, CATARACT, WITH STANDARD INTRAOCULAR LENS IMPLANT INSERTION RIGHT;  Surgeon: Dai Glaser MD;  Location: [facility] OR     PHACOEMULSIFICATION WITH STANDARD INTRAOCULAR LENS IMPLANT Left 7/26/2021    Procedure: PHACOEMULSIFICATION, CATARACT, WITH STANDARD INTRAOCULAR LENS IMPLANT INSERTION;  Surgeon: Dai Glaser MD;  Location: [facility] OR     Current Outpatient Medications   Medication Sig Dispense Refill     acetaminophen (TYLENOL ARTHRITIS PAIN) 650 MG CR tablet Take 650 mg by mouth 2 times daily as needed        albuterol (PROAIR HFA/PROVENTIL HFA/VENTOLIN HFA) 108 (90 Base) MCG/ACT inhaler Inhale 2 puffs into the lungs every 6 hours 18 g 11     amLODIPine (NORVASC) 5 MG tablet TAKE ONE TABLET BY MOUTH ONCE DAILY 90 tablet 2     atorvastatin (LIPITOR) 80 MG tablet TAKE 1 TABLET (80 MG) BY MOUTH DAILY (NEED FASTING LABS) 90 tablet 3     blood glucose (NO BRAND SPECIFIED) test strip Use to test blood sugars 1 times daily or as directed, use brand same as previous 100 strip 3     cyclobenzaprine (FLEXERIL) 10 MG tablet Take 1 tablet (10 mg) by mouth 2 times daily 180 tablet 3     DULoxetine (CYMBALTA) 60 MG capsule TAKE ONE CAPSULE BY MOUTH ONCE DAILY 90 capsule 2     fexofenadine (ALLEGRA) 60 MG tablet Take 1 tablet (60 mg) by mouth 2 times daily 180 tablet 3     fluticasone (FLONASE) 50 MCG/ACT nasal spray SPRAY 2 SPRAYS IN EACH NOSTRIL ONCE DAILY 16 g 5      hydrOXYzine (ATARAX) 10 MG tablet TAKE ONE TABLET BY MOUTH THREE TIMES A DAY AS NEEDED FOR ANXIETY 270 tablet 3     lisinopril (ZESTRIL) 20 MG tablet TAKE ONE TABLET BY MOUTH TWICE A  tablet 2     metFORMIN (GLUCOPHAGE) 500 MG tablet TAKE ONE TABLET BY MOUTH TWICE A DAY WITH A MEAL 180 tablet 4     metoprolol tartrate (LOPRESSOR) 25 MG tablet Take 25 mg by mouth 2 times daily 180 tablet 3     mirabegron (MYRBETRIQ) 25 MG 24 hr tablet Take 1 tablet (25 mg) by mouth daily 90 tablet 1     Multiple Vitamins-Minerals (MULTIVITAMIN ADULT PO) Take 5 tablets by mouth daily Pro-caps vitamin        nitroGLYcerin (NITROSTAT) 0.4 MG sublingual tablet FOR CHEST PAIN PLACE 1 TABLET UNDER THE TONGUE EVERY 5 MINUTES FOR 3 DOSES IF NEEDED. IF SYMPTOMS PERSIST 5 MINUTES AFTER FIRST DOSE CALL 911. 25 tablet 0     nystatin (MYCOSTATIN) 127580 UNIT/GM external powder APPLY TO AFFECTED AREA(S) TOPICALLY THREE TIMES A DAY AS NEEDED 30 g 3     omeprazole (PRILOSEC) 20 MG DR capsule TAKE ONE CAPSULE BY MOUTH TWICE A  capsule 2     order for DME Equipment being ordered: glucometer 1 each 0     pregabalin (LYRICA) 150 MG capsule TAKE ONE CAPSULE BY MOUTH THREE TIMES A  capsule 3     tiotropium (SPIRIVA HANDIHALER) 18 MCG inhaled capsule INHALE ONE CAPSULE BY MOUTH ONCE DAILY 90 capsule 3     traMADol (ULTRAM) 50 MG tablet Take 1 tablet (50 mg) by mouth every 6 hours as needed for severe pain Due for appt in September. 100 tablet 0     traZODone (DESYREL) 100 MG tablet TAKE TWO TABLETS BY MOUTH EVERY NIGHT AT BEDTIME 180 tablet 3       Allergies   Allergen Reactions     Penicillins Hives        Social History     Tobacco Use     Smoking status: Light Tobacco Smoker     Packs/day: 1.00     Years: 40.00     Pack years: 40.00     Types: Cigarettes     Last attempt to quit: 2017     Years since quittin.4     Smokeless tobacco: Never Used   Substance Use Topics     Alcohol use: No     Family History   Problem Relation  "Age of Onset     Cancer Mother      Cancer Father         Lung cancer     Cancer Maternal Grandmother         Breast cancer     Glaucoma Maternal Grandmother      Macular Degeneration No family hx of      History   Drug Use No         Objective     /58 (BP Location: Right arm, Cuff Size: Adult Regular)   Pulse 74   Temp 97.1  F (36.2  C) (Tympanic)   Resp 16   Ht 1.74 m (5' 8.5\")   Wt 95.3 kg (210 lb 1.6 oz)   LMP  (LMP Unknown)   SpO2 94%   BMI 31.48 kg/m        Physical Exam    GENERAL APPEARANCE: healthy, alert and no distress     EYES: EOMI, PERRL     HENT: ear canals and TM's normal and nose and mouth without ulcers or lesions     NECK: no adenopathy, no asymmetry, masses, or scars and thyroid normal to palpation     RESP: lungs clear to auscultation - no rales, rhonchi or wheezes     CV: regular rates and rhythm, normal S1 S2, no S3 or S4 and no murmur, click or rub     ABDOMEN:  soft, nontender, no HSM or masses and bowel sounds normal     MS: extremities normal- no gross deformities noted, no evidence of inflammation in joints, FROM in all extremities.     SKIN: no suspicious lesions or rashes     NEURO: Normal strength and tone, sensory exam grossly normal, mentation intact and speech normal     PSYCH: mentation appears normal. and affect normal/bright     LYMPHATICS: No cervical adenopathy    Recent Labs   Lab Test 09/28/21  1613 07/05/21  1517   HGB 11.1* 10.6*    422    143   POTASSIUM 4.4 3.8   CR 0.75 0.84   A1C 6.9* 7.2*        Diagnostics:  Recent Results (from the past 240 hour(s))   Basic metabolic panel  (Ca, Cl, CO2, Creat, Gluc, K, Na, BUN)    Collection Time: 11/11/21 11:43 AM   Result Value Ref Range    Sodium 141 133 - 144 mmol/L    Potassium 4.2 3.4 - 5.3 mmol/L    Chloride 109 94 - 109 mmol/L    Carbon Dioxide (CO2) 29 20 - 32 mmol/L    Anion Gap 3 3 - 14 mmol/L    Urea Nitrogen 8 7 - 30 mg/dL    Creatinine 0.62 0.52 - 1.04 mg/dL    Calcium 8.3 (L) 8.5 - 10.1 " mg/dL    Glucose 96 70 - 99 mg/dL    GFR Estimate >90 >60 mL/min/1.73m2   Hemoglobin A1c    Collection Time: 11/11/21 11:43 AM   Result Value Ref Range    Hemoglobin A1C 7.3 (H) 0.0 - 5.6 %   CBC with platelets and differential    Collection Time: 11/11/21 11:43 AM   Result Value Ref Range    WBC Count 11.4 (H) 4.0 - 11.0 10e3/uL    RBC Count 4.39 3.80 - 5.20 10e6/uL    Hemoglobin 11.0 (L) 11.7 - 15.7 g/dL    Hematocrit 35.5 35.0 - 47.0 %    MCV 81 78 - 100 fL    MCH 25.1 (L) 26.5 - 33.0 pg    MCHC 31.0 (L) 31.5 - 36.5 g/dL    RDW 17.5 (H) 10.0 - 15.0 %    Platelet Count 338 150 - 450 10e3/uL    % Neutrophils 57 %    % Lymphocytes 35 %    % Monocytes 7 %    % Eosinophils 1 %    % Basophils 0 %    Absolute Neutrophils 6.5 1.6 - 8.3 10e3/uL    Absolute Lymphocytes 4.0 0.8 - 5.3 10e3/uL    Absolute Monocytes 0.7 0.0 - 1.3 10e3/uL    Absolute Eosinophils 0.1 0.0 - 0.7 10e3/uL    Absolute Basophils 0.0 0.0 - 0.2 10e3/uL      EKG: Normal Sinus Rhythm, unchanged from previous tracings    Revised Cardiac Risk Index (RCRI):  The patient has the following serious cardiovascular risks for perioperative complications:   - Coronary Artery Disease (MI, positive stress test, angina, Qs on EKG) = 1 point   - Congestive Heart Failure (pulmonary edema, PND, s3 haja, CXR with pulmonary congestion, basilar rales) = 1 point     RCRI Interpretation: 2 points: Class III (moderate risk - 6.6% complication rate)     Estimated Functional Capacity: Performs 4 METS exercise without symptoms (e.g., light housework, stairs, 4 mph walk, 7 mph bike, slow step dance)           Signed Electronically by: Reyna Del Toro NP  Copy of this evaluation report is provided to requesting physician.

## 2021-11-11 NOTE — PATIENT INSTRUCTIONS
It was nice seeing you today.    Hold metformin the day of surgery.    Please let me know if you have any questions regarding today's visit!    Take careJOSE JUAN, DONNA  Family Medicine          Preparing for Your Surgery  Getting started  A nurse will call you to review your health history and instructions. They will give you an arrival time based on your scheduled surgery time.  Please be ready to share the following:    Your doctor's clinic name and phone number    Your medical, surgical and anesthesia history    A list of allergies and sensitivities    A list of medicines, including herbal treatments and over-the-counter drugs    Whether the patient has a legal guardian (ask how to send us the papers in advance)  If you have a child who's having surgery, please ask for a copy of Preparing for Your Child's Surgery.    Preparing for surgery    Within 30 days of surgery: Have a pre-op exam (sometimes called an H&P, or History and Physical). This can be done at a clinic or pre-operative center.  ? If you're having a , you may not need this exam. Talk to your care team    At your pre-op exam, talk to your care team about all medicines you take. If you need to stop any medicines before surgery, ask when to start taking them again.  ? We do this for your safety. Many medicines can make you bleed too much during surgery. Some change how well surgery (anesthesia) drugs work.    Call your insurance company to let them know you're having surgery. (If you don't have insurance, call [phone].)    Call your clinic if there's any change in your health. This includes signs of a cold or flu (sore throat, runny nose, cough, rash, fever). It also includes a scrape or scratch near the surgery site.    If you have questions on the day of surgery, call your hospital or surgery center.  Eating and drinking guidelines  For your safety: Unless your surgeon tells you otherwise, follow the guidelines below.    Eat and  drink as usual until 8 hours before surgery. After that, no food or milk.    Drink clear liquids until 2 hours before surgery. These are liquids you can see through, like water, Gatorade and Propel Water. You may also have black coffee and tea (no cream or milk).    Nothing by mouth within 2 hours of surgery. This includes gum, candy and breath mints.    If you drink, stop drinking alcohol the night before surgery.    If your care team tells you to take medicine on the morning of surgery, it's okay to take it with a sip of water.  Preventing infection    Shower or bathe the night before and morning of your surgery. Follow the instructions your clinic gave you. (If no instructions, use regular soap.)    Don't shave or clip hair near your surgery site. We'll remove the hair if needed.    Don't smoke or vape the morning of surgery. You may chew nicotine gum up to 2 hours before surgery. A nicotine patch is okay.  ? Note: Some surgeries require you to completely quit smoking and nicotine. Check with your surgeon.    Your care team will make every effort to keep you safe from infection. We will:  ? Clean our hands often with soap and water (or an alcohol-based hand rub).  ? Clean the skin at your surgery site with a special soap that kills germs.  ? Give you a special gown to keep you warm. (Cold raises the risk of infection.)  ? Wear special hair covers, masks, gowns and gloves during surgery.  ? Give antibiotic medicine, if prescribed. Not all surgeries need antibiotics.  What to bring on the day of surgery    Photo ID and insurance card    Copy of your health care directive, if you have one    Glasses and hearing aides (bring cases)  ? You can't wear contacts during surgery    Inhaler and eye drops, if you use them (tell us about these when you arrive)    CPAP machine or breathing device, if you use them    A few personal items, if spending the night    If you have . . .  ? A pacemaker or ICD (cardiac defibrillator):  Bring the ID card.  ? An implanted stimulator: Bring the remote control.  ? A legal guardian: Bring a copy of the certified (court-stamped) guardianship papers.  Please remove any jewelry, including body piercings. Leave jewelry and other valuables at home.  If you're going home the day of surgery  Important: If you don't follow the rules below, we must cancel your surgery.     Arrange for someone to drive you home after surgery. You may not drive, take a taxi or take public transportation by yourself (unless you'll have local anesthesia only).    Arrange for a responsible adult to stay with you overnight. If you don't, we may keep you in the hospital overnight, and you may need to pay the costs yourself.  Questions?   If you have any questions for your care team, list them here: _________________________________________________________________________________________________________________________________________________________________________________________________________________________________________________________________________________________________________________________  For informational purposes only. Not to replace the advice of your health care provider. Copyright   2003, 2019 [employer] [facility]. All rights reserved. Clinically reviewed by Maryam Tavarez MD. [employer] [id number] - REV 4/20.

## 2021-11-11 NOTE — LETTER
My Depression Action Plan  Name: Vijaya Manjarrez   Date of Birth 1950  Date: 11/11/2021    My doctor: No Ref-Primary, Physician   My clinic: [facility]  [address]  [address]  [place] [address] [phone]          GREEN    ZONE   Good Control    What it looks like:     Things are going generally well. You have normal ups and downs. You may even feel depressed from time to time, but bad moods usually last less than a day.   What you need to do:  1. Continue to care for yourself (see self care plan)  2. Check your depression survival kit and update it as needed  3. Follow your physician s recommendations including any medication.  4. Do not stop taking medication unless you consult with your physician first.           YELLOW         ZONE Getting Worse    What it looks like:     Depression is starting to interfere with your life.     It may be hard to get out of bed; you may be starting to isolate yourself from others.    Symptoms of depression are starting to last most all day and this has happened for several days.     You may have suicidal thoughts but they are not constant.   What you need to do:     1. Call your care team. Your response to treatment will improve if you keep your care team informed of your progress. Yellow periods are signs an adjustment may need to be made.     2. Continue your self-care.  Just get dressed and ready for the day.  Don't give yourself time to talk yourself out of it.    3. Talk to someone in your support network.    4. Open up your Depression Self-Care Plan/Wellness Kit.           RED    ZONE Medical Alert - Get Help    What it looks like:     Depression is seriously interfering with your life.     You may experience these or other symptoms: You can t get out of bed most days, can t work or engage in other necessary activities, you have trouble taking care of basic hygiene, or basic responsibilities, thoughts of  suicide or death that will not go away, self-injurious behavior.     What you need to do:  1. Call your care team and request a same-day appointment. If they are not available (weekends or after hours) call your local crisis line, emergency room or 911.          Depression Self-Care Plan / Wellness Kit    Many people find that medication and therapy are helpful treatments for managing depression. In addition, making small changes to your everyday life can help to boost your mood and improve your wellbeing. Below are some tips for you to consider. Be sure to talk with your medical provider and/or behavioral health consultant if your symptoms are worsening or not improving.     Sleep   Sleep hygiene  means all of the habits that support good, restful sleep. It includes maintaining a consistent bedtime and wake time, using your bedroom only for sleeping or sex, and keeping the bedroom dark and free of distractions like a computer, smartphone, or television.     Develop a Healthy Routine  Maintain good hygiene. Get out of bed in the morning, make your bed, brush your teeth, take a shower, and get dressed. Don t spend too much time viewing media that makes you feel stressed. Find time to relax each day.    Exercise  Get some form of exercise every day. This will help reduce pain and release endorphins, the  feel good  chemicals in your brain. It can be as simple as just going for a walk or doing some gardening, anything that will get you moving.      Diet  Strive to eat healthy foods, including fruits and vegetables. Drink plenty of water. Avoid excessive sugar, caffeine, alcohol, and other mood-altering substances.     Stay Connected with Others  Stay in touch with friends and family members.    Manage Your Mood  Try deep breathing, massage therapy, biofeedback, or meditation. Take part in fun activities when you can. Try to find something to smile about each day.     Psychotherapy  Be open to working with a therapist  if your provider recommends it.     Medication  Be sure to take your medication as prescribed. Most anti-depressants need to be taken every day. It usually takes several weeks for medications to work. Not all medicines work for all people. It is important to follow-up with your provider to make sure you have a treatment plan that is working for you. Do not stop your medication abruptly without first discussing it with your provider.    Crisis Resources   These hotlines are for both adults and children. They and are open 24 hours a day, 7 days a week unless noted otherwise.      National Suicide Prevention Lifeline   6-874-494-TALK (1790)      Crisis Text Line    www.crisistextline.org  Text HOME to 754449 from anywhere in the [place], anytime, about any type of crisis. A live, trained crisis counselor will receive the text and respond quickly.      [facility] Lifeline for LGBTQ Youth  A national crisis intervention and suicide lifeline for LGBTQ youth under 25. Provides a safe place to talk without judgement. Call 1-929.437.1641; text START to 342819 or visit www.thetrevorproject.org to talk to a trained counselor.      For [facility] crisis numbers, visit the [facility] website at:  https://[place].gov/dhs/people-we-serve/adults/health-care/mental-health/resources/crisis-contacts.jsp

## 2021-11-12 DIAGNOSIS — Z11.59 ENCOUNTER FOR SCREENING FOR OTHER VIRAL DISEASES: ICD-10-CM

## 2021-11-12 LAB
ANION GAP SERPL CALCULATED.3IONS-SCNC: 3 MMOL/L (ref 3–14)
BUN SERPL-MCNC: 8 MG/DL (ref 7–30)
CALCIUM SERPL-MCNC: 8.3 MG/DL (ref 8.5–10.1)
CHLORIDE BLD-SCNC: 109 MMOL/L (ref 94–109)
CO2 SERPL-SCNC: 29 MMOL/L (ref 20–32)
CREAT SERPL-MCNC: 0.62 MG/DL (ref 0.52–1.04)
GFR SERPL CREATININE-BSD FRML MDRD: >90 ML/MIN/1.73M2
GLUCOSE BLD-MCNC: 96 MG/DL (ref 70–99)
POTASSIUM BLD-SCNC: 4.2 MMOL/L (ref 3.4–5.3)
SODIUM SERPL-SCNC: 141 MMOL/L (ref 133–144)

## 2021-11-12 RX ORDER — TRAMADOL HYDROCHLORIDE 50 MG/1
TABLET ORAL
Qty: 100 TABLET | Refills: 0 | Status: ON HOLD | OUTPATIENT
Start: 2021-11-12 | End: 2021-12-08

## 2021-11-29 ENCOUNTER — APPOINTMENT (OUTPATIENT)
Dept: CT IMAGING | Facility: CLINIC | Age: 71
End: 2021-11-29
Attending: EMERGENCY MEDICINE
Payer: COMMERCIAL

## 2021-11-29 ENCOUNTER — HOSPITAL ENCOUNTER (EMERGENCY)
Facility: CLINIC | Age: 71
Discharge: HOME | End: 2021-11-29
Attending: EMERGENCY MEDICINE | Admitting: EMERGENCY MEDICINE
Payer: COMMERCIAL

## 2021-11-29 ENCOUNTER — NURSE TRIAGE (OUTPATIENT)
Dept: INTERNAL MEDICINE | Facility: CLINIC | Age: 71
End: 2021-11-29

## 2021-11-29 VITALS
RESPIRATION RATE: 20 BRPM | SYSTOLIC BLOOD PRESSURE: 139 MMHG | OXYGEN SATURATION: 96 % | DIASTOLIC BLOOD PRESSURE: 85 MMHG | TEMPERATURE: 98.1 F | HEART RATE: 82 BPM

## 2021-11-29 DIAGNOSIS — R19.7 DIARRHEA, UNSPECIFIED TYPE: ICD-10-CM

## 2021-11-29 DIAGNOSIS — R10.84 DIFFUSE ABDOMINAL PAIN: ICD-10-CM

## 2021-11-29 LAB
ALBUMIN SERPL-MCNC: 3.6 G/DL (ref 3.4–5)
ALP SERPL-CCNC: 77 U/L (ref 40–150)
ALT SERPL W P-5'-P-CCNC: 22 U/L (ref 0–50)
ANION GAP SERPL CALCULATED.3IONS-SCNC: 7 MMOL/L (ref 3–14)
AST SERPL W P-5'-P-CCNC: 17 U/L (ref 0–45)
BASOPHILS # BLD AUTO: 0.1 10E3/UL (ref 0–0.2)
BASOPHILS NFR BLD AUTO: 1 %
BILIRUB DIRECT SERPL-MCNC: 0.1 MG/DL (ref 0–0.2)
BILIRUB SERPL-MCNC: 0.4 MG/DL (ref 0.2–1.3)
BUN SERPL-MCNC: 8 MG/DL (ref 7–30)
CALCIUM SERPL-MCNC: 8.3 MG/DL (ref 8.5–10.1)
CHLORIDE BLD-SCNC: 109 MMOL/L (ref 94–109)
CO2 SERPL-SCNC: 28 MMOL/L (ref 20–32)
CREAT SERPL-MCNC: 0.7 MG/DL (ref 0.52–1.04)
EOSINOPHIL # BLD AUTO: 0.1 10E3/UL (ref 0–0.7)
EOSINOPHIL NFR BLD AUTO: 1 %
ERYTHROCYTE [DISTWIDTH] IN BLOOD BY AUTOMATED COUNT: 16.9 % (ref 10–15)
GFR SERPL CREATININE-BSD FRML MDRD: 88 ML/MIN/1.73M2
GLUCOSE BLD-MCNC: 109 MG/DL (ref 70–99)
HCT VFR BLD AUTO: 36.2 % (ref 35–47)
HGB BLD-MCNC: 10.9 G/DL (ref 11.7–15.7)
HOLD SPECIMEN: NORMAL
IMM GRANULOCYTES # BLD: 0 10E3/UL
IMM GRANULOCYTES NFR BLD: 0 %
LACTATE SERPL-SCNC: 1.3 MMOL/L (ref 0.7–2)
LIPASE SERPL-CCNC: 107 U/L (ref 73–393)
LYMPHOCYTES # BLD AUTO: 3.7 10E3/UL (ref 0.8–5.3)
LYMPHOCYTES NFR BLD AUTO: 34 %
MCH RBC QN AUTO: 25.2 PG (ref 26.5–33)
MCHC RBC AUTO-ENTMCNC: 30.1 G/DL (ref 31.5–36.5)
MCV RBC AUTO: 84 FL (ref 78–100)
MONOCYTES # BLD AUTO: 0.6 10E3/UL (ref 0–1.3)
MONOCYTES NFR BLD AUTO: 6 %
NEUTROPHILS # BLD AUTO: 6.3 10E3/UL (ref 1.6–8.3)
NEUTROPHILS NFR BLD AUTO: 58 %
NRBC # BLD AUTO: 0 10E3/UL
NRBC BLD AUTO-RTO: 0 /100
PLATELET # BLD AUTO: 319 10E3/UL (ref 150–450)
POTASSIUM BLD-SCNC: 3.7 MMOL/L (ref 3.4–5.3)
PROT SERPL-MCNC: 7.5 G/DL (ref 6.8–8.8)
RBC # BLD AUTO: 4.33 10E6/UL (ref 3.8–5.2)
SODIUM SERPL-SCNC: 144 MMOL/L (ref 133–144)
WBC # BLD AUTO: 10.8 10E3/UL (ref 4–11)

## 2021-11-29 PROCEDURE — 85025 COMPLETE CBC W/AUTO DIFF WBC: CPT | Performed by: EMERGENCY MEDICINE

## 2021-11-29 PROCEDURE — 83690 ASSAY OF LIPASE: CPT | Performed by: EMERGENCY MEDICINE

## 2021-11-29 PROCEDURE — 82248 BILIRUBIN DIRECT: CPT | Performed by: EMERGENCY MEDICINE

## 2021-11-29 PROCEDURE — 258N000003 HC RX IP 258 OP 636: Performed by: EMERGENCY MEDICINE

## 2021-11-29 PROCEDURE — 83605 ASSAY OF LACTIC ACID: CPT | Performed by: EMERGENCY MEDICINE

## 2021-11-29 PROCEDURE — 99285 EMERGENCY DEPT VISIT HI MDM: CPT | Mod: 25

## 2021-11-29 PROCEDURE — 250N000011 HC RX IP 250 OP 636: Performed by: EMERGENCY MEDICINE

## 2021-11-29 PROCEDURE — 80048 BASIC METABOLIC PNL TOTAL CA: CPT | Performed by: EMERGENCY MEDICINE

## 2021-11-29 PROCEDURE — 36415 COLL VENOUS BLD VENIPUNCTURE: CPT | Performed by: EMERGENCY MEDICINE

## 2021-11-29 PROCEDURE — 74177 CT ABD & PELVIS W/CONTRAST: CPT

## 2021-11-29 PROCEDURE — 250N000009 HC RX 250: Performed by: EMERGENCY MEDICINE

## 2021-11-29 PROCEDURE — 96360 HYDRATION IV INFUSION INIT: CPT | Mod: 59

## 2021-11-29 PROCEDURE — 96361 HYDRATE IV INFUSION ADD-ON: CPT

## 2021-11-29 RX ORDER — IOPAMIDOL 755 MG/ML
500 INJECTION, SOLUTION INTRAVASCULAR ONCE
Status: COMPLETED | OUTPATIENT
Start: 2021-11-29 | End: 2021-11-29

## 2021-11-29 RX ADMIN — SODIUM CHLORIDE 1000 ML: 9 INJECTION, SOLUTION INTRAVENOUS at 19:24

## 2021-11-29 RX ADMIN — IOPAMIDOL 100 ML: 755 INJECTION, SOLUTION INTRAVENOUS at 19:48

## 2021-11-29 RX ADMIN — SODIUM CHLORIDE 65 ML: 9 INJECTION, SOLUTION INTRAVENOUS at 19:48

## 2021-11-29 ASSESSMENT — ENCOUNTER SYMPTOMS
ABDOMINAL PAIN: 1
BLOOD IN STOOL: 0
BACK PAIN: 1
DIARRHEA: 1
FEVER: 0
APPETITE CHANGE: 1
VOMITING: 0

## 2021-11-29 NOTE — ED TRIAGE NOTES
Diarrhea x4 days. Large amounts of diarrhea between 3-5 times a day. Abdominal pain, back pain. No recent antibiotic usage.

## 2021-11-29 NOTE — TELEPHONE ENCOUNTER
"S-(situation): diarrhea    B-(background): Diarrhea onset 11/26/21.  Patient is supposed to be having cervical fusion 12/6/21, states it was already canceled once and is fearful of having it canceled again.    A-(assessment): 3-5 loose stools per day, black in color. She did take Pepto Bismol but states stools were black before she even took Pepto.  She has abdominal pain and cramping throughout entire abdomen and a sharper epigastric pain.  She rates pain 7-8 out of 10. States she is not eating but has been drinking water and Sprite. Last urinated about 3:30 p.m. today.  She denies nausea, vomiting, fever, dysuria, frequency or visible blood in stool.  She does report she is having dizziness, weakness, dry mouth.      R-(recommendations): Present to ER for suspected dehydration and black stools.  Patient will need to find a ride to ER.  ARELY Farooq R.N.      Reason for Disposition    SEVERE abdominal pain and age > 60    Bloody, black, or tarry bowel movements (Exception: chronic-unchanged black-grey bowel movements and is taking iron pills or Pepto-bismol)    Additional Information    Negative: Shock suspected (e.g., cold/pale/clammy skin, too weak to stand, low BP, rapid pulse)    Negative: Difficult to awaken or acting confused (e.g., disoriented, slurred speech)    Negative: Sounds like a life-threatening emergency to the triager    Negative: Vomiting also present and worse than the diarrhea    Negative: Blood in stool and without diarrhea    Negative: SEVERE abdominal pain (e.g., excruciating) and present > 1 hour    Answer Assessment - Initial Assessment Questions  1. DIARRHEA SEVERITY: \"How bad is the diarrhea?\" \"How many extra stools have you had in the past 24 hours than normal?\"     - NO DIARRHEA (SCALE 0)    - MILD (SCALE 1-3): Few loose or mushy BMs; increase of 1-3 stools over normal daily number of stools; mild increase in ostomy output.    -  MODERATE (SCALE 4-7): Increase of 4-6 stools daily over " "normal; moderate increase in ostomy output.  * SEVERE (SCALE 8-10; OR 'WORST POSSIBLE'): Increase of 7 or more stools daily over normal; moderate increase in ostomy output; incontinence.      3 per day but then adds some days it may have been 5 times per day  2. ONSET: \"When did the diarrhea begin?\"       Fri. 11/26  3. BM CONSISTENCY: \"How loose or watery is the diarrhea?\"       loose  4. VOMITING: \"Are you also vomiting?\" If so, ask: \"How many times in the past 24 hours?\"       none  5. ABDOMINAL PAIN: \"Are you having any abdominal pain?\" If yes: \"What does it feel like?\" (e.g., crampy, dull, intermittent, constant)       Crampy, dull pain throughout abdomen.  Also epigastric pain  6. ABDOMINAL PAIN SEVERITY: If present, ask: \"How bad is the pain?\"  (e.g., Scale 1-10; mild, moderate, or severe)    - MILD (1-3): doesn't interfere with normal activities, abdomen soft and not tender to touch     - MODERATE (4-7): interferes with normal activities or awakens from sleep, tender to touch     - SEVERE (8-10): excruciating pain, doubled over, unable to do any normal activities        Rates pain 7-8 out of 10.  Pain is constant but worse before a stool  7. ORAL INTAKE: If vomiting, \"Have you been able to drink liquids?\" \"How much fluids have you had in the past 24 hours?\"      Yes, 48 oz. Fluid in past 24 hours  8. HYDRATION: \"Any signs of dehydration?\" (e.g., dry mouth [not just dry lips], too weak to stand, dizziness, new weight loss) \"When did you last urinate?\"      States she has dry mouth, weakness, dizziness.  Last urinated about 3:30 p.m. today.  9. EXPOSURE: \"Have you traveled to a foreign country recently?\" \"Have you been exposed to anyone with diarrhea?\" \"Could you have eaten any food that was spoiled?\"      No travel, no contacts with similar symptoms, nothing eaten that would have been tainted.  10. ANTIBIOTIC USE: \"Are you taking antibiotics now or have you taken antibiotics in the past 2 months?\"        No " "and no.  11. OTHER SYMPTOMS: \"Do you have any other symptoms?\" (e.g., fever, blood in stool)        Denies, see documentation  12. PREGNANCY: \"Is there any chance you are pregnant?\" \"When was your last menstrual period?\"        No    Protocols used: DIARRHEA-A-OH      "

## 2021-11-30 ENCOUNTER — LAB (OUTPATIENT)
Dept: LAB | Facility: CLINIC | Age: 71
End: 2021-11-30
Payer: COMMERCIAL

## 2021-11-30 ENCOUNTER — PATIENT OUTREACH (OUTPATIENT)
Dept: GERIATRIC MEDICINE | Facility: CLINIC | Age: 71
End: 2021-11-30
Payer: COMMERCIAL

## 2021-11-30 DIAGNOSIS — R19.7 DIARRHEA, UNSPECIFIED TYPE: ICD-10-CM

## 2021-11-30 DIAGNOSIS — R10.84 DIFFUSE ABDOMINAL PAIN: ICD-10-CM

## 2021-11-30 LAB — C DIFF TOX B STL QL: NEGATIVE

## 2021-11-30 PROCEDURE — 87506 IADNA-DNA/RNA PROBE TQ 6-11: CPT

## 2021-11-30 PROCEDURE — 87493 C DIFF AMPLIFIED PROBE: CPT | Mod: XU

## 2021-11-30 NOTE — ED PROVIDER NOTES
History   Chief Complaint:  Diarrhea       The history is provided by the patient.      Vijaya Manjarrez is a 71 year old female with history of HTN and NIDDM who presents with diarrhea. This started 4 days ago with 3-5 episodes of slimy, brown, non-bloody diarrhea per day. She has had diffuse, sharp abdominal pain for the last 4 days, but grew concerned today when she started to have lower back pain as well. She has not eaten very much for the last 4 days due to the diarrhea. She denies decreased urine output, vomiting, and fever. She has not had any recent hospitalizations or antibiotic use.    Of note, she wishes to get a clean bill of health as she is scheduled for a cervical discectomy and fusion next week.    Review of Systems   Constitutional: Positive for appetite change (decreased). Negative for fever.   Gastrointestinal: Positive for abdominal pain and diarrhea. Negative for blood in stool, constipation and vomiting.   Genitourinary: Negative for decreased urine volume.   Musculoskeletal: Positive for back pain (lower).   All other systems reviewed and are negative.    Allergies:  Penicillins    Medications:  Albuterol  Norvasc  Lipitor  Flexeril  Cymbalta  Allegra  Flonase  Atarax  Zestril  Glucophage  Lopressor  Myrbetriq  Nitrostat  Mycostatin  Prilosec  Lyrica  Spiriva handihaler  Ultram  Desyrel    Past Medical History:     Anxiety  Cervical spine degeneration  COPD  CAD  Depression  Diabetes mellitus type II  Diabetic neuropathy  Facet arthropathy  Fibromyalgia  GERD  Hypertension  Lumbar DDD  Migraines  Dyslipidemia  Closed orbital fracture  Eyelid lesion  Candidal intertrigo  Bilateral cataract  Chronic low back pain  CHF      Past Surgical History:    Cholecystectomy  Bilateral combined repair ptosis with blepharoplasty  CV heart catheterization x2  Orbit ORIF  Cataract surgery     Family History:    Cancer    Social History:  Presents to the ED alone.    Physical Exam     Patient Vitals for  the past 24 hrs:   BP Temp Temp src Pulse Resp SpO2   11/29/21 2120 139/85 -- -- 82 -- 96 %   11/29/21 1900 102/67 -- -- 75 -- 93 %   11/29/21 1850 (!) 146/77 -- -- -- -- 96 %   11/29/21 1742 100/77 98.1  F (36.7  C) Temporal 76 20 96 %       Physical Exam  General: Well-developed and well-nourished. Well appearing elderly  woman. Cooperative.  Head:  Atraumatic.  Eyes:  Conjunctivae, lids, and sclerae are normal.  ENT:    Normal nose. Moist mucous membranes.  Neck:  Supple. Normal range of motion.  CV:  Regular rate and rhythm. Normal heart sounds with no murmurs, rubs, or gallops detected.  Resp:  No respiratory distress. Clear to auscultation bilaterally without decreased breath sounds, wheezing, rales, or rhonchi.  GI:  Soft. Non-distended. Mild left abdominal tenderness.    MS:  Normal ROM.  Skin:  Warm. Non-diaphoretic. No pallor.  Neuro:  Awake. A&Ox3. Normal strength.  Psych: Normal mood and affect. Normal speech.  Vitals reviewed.    Emergency Department Course     Imaging:  CT Abdomen Pelvis w Contrast   Final Result   IMPRESSION:    1.  No acute cause for abdominal pain demonstrated.        Report per radiology    Laboratory:  Labs Ordered and Resulted from Time of ED Arrival to Time of ED Departure   BASIC METABOLIC PANEL - Abnormal       Result Value    Sodium 144      Potassium 3.7      Chloride 109      Carbon Dioxide (CO2) 28      Anion Gap 7      Urea Nitrogen 8      Creatinine 0.70      Calcium 8.3 (*)     Glucose 109 (*)     GFR Estimate 88     CBC WITH PLATELETS AND DIFFERENTIAL - Abnormal    WBC Count 10.8      RBC Count 4.33      Hemoglobin 10.9 (*)     Hematocrit 36.2      MCV 84      MCH 25.2 (*)     MCHC 30.1 (*)     RDW 16.9 (*)     Platelet Count 319      % Neutrophils 58      % Lymphocytes 34      % Monocytes 6      % Eosinophils 1      % Basophils 1      % Immature Granulocytes 0      NRBCs per 100 WBC 0      Absolute Neutrophils 6.3      Absolute Lymphocytes 3.7      Absolute  Monocytes 0.6      Absolute Eosinophils 0.1      Absolute Basophils 0.1      Absolute Immature Granulocytes 0.0      Absolute NRBCs 0.0     HEPATIC FUNCTION PANEL - Normal    Bilirubin Total 0.4      Bilirubin Direct 0.1      Protein Total 7.5      Albumin 3.6      Alkaline Phosphatase 77      AST 17      ALT 22     LIPASE - Normal    Lipase 107     LACTIC ACID WHOLE BLOOD - Normal    Lactic Acid 1.3          Emergency Department Course:  Reviewed:  I reviewed nursing notes, vitals, and past medical history.    Assessments:  1908 I obtained history and examined the patient as noted above.   2105 I rechecked the patient who had eaten and voided but not had a bowel movement.     Interventions:  1924 NS 1L IV Bolus    Disposition:  The patient was discharged home.     Impression & Plan   Medical Decision Making:  Nyasia is a 71-year-old woman who has had diffuse abdominal pain and diarrhea for 4 days estimating 3-5 episodes per day. Today, she also developed back pain which prompted her concern. She has not had fever, vomiting, or other symptoms. She has not had recent hospitalization or antibiotic use. She appears well on exam with some mild left-sided abdominal tenderness. Given her age with several days of diarrhea and abdominal pain, I felt it prudent to proceed with CT. Fortunately, CT of the abdomen and pelvis does not reveal acute findings, including diverticulitis or colitis. Similarly, her labs are very reassuring without pancreatitis, hepatitis, biliary obstruction, leukocytosis, kidney injury, electrolyte derangements, or lactic acidosis. She was given IV fluids. I also PO challenged her as she told me the last time she ate, she immediately had diarrhea so she has not been eating the last several days. She tolerated PO without issue but did not have a bowel movement. She was making appropriate urine while in the ER. At this point, there is no evidence of dehydration or other findings that would necessitate  further emergent work-up or hospitalization. As such, she is appropriate for discharge. I will send her home with a stool collection kit for enteric panel and C. difficile PCR. She understands to return these to help rule out an infectious cause for her diarrhea. In the meantime, I recommended she use Tylenol for pain and Imodium for diarrhea with dosing discussed in detail. I encouraged her to follow-up with her primary care provider but she does understand return precautions. All questions answered. Amenable to discharge.    Diagnosis:    ICD-10-CM    1. Diarrhea, unspecified type  R19.7 Enteric Bacteria and Virus Panel by [facility] Stool     Clostridium difficile toxin B   2. Diffuse abdominal pain  R10.84 Enteric Bacteria and Virus Panel by [facility] Stool     Clostridium difficile toxin B     Scribe Disclosure:  I, Luz Maira Gerardo, am serving as a scribe at 7:08 PM on 11/29/2021 to document services personally performed by Sarah Gage MD based on my observations and the provider's statements to me.          Sarah Gage MD  12/01/21 [id number]

## 2021-11-30 NOTE — H&P (VIEW-ONLY)
History   Chief Complaint:  Diarrhea       The history is provided by the patient.      Vijaya Manjarrez is a 71 year old female with history of HTN and NIDDM who presents with diarrhea. This started 4 days ago with 3-5 episodes of slimy, brown, non-bloody diarrhea per day. She has had diffuse, sharp abdominal pain for the last 4 days, but grew concerned today when she started to have lower back pain as well. She has not eaten very much for the last 4 days due to the diarrhea. She denies decreased urine output, vomiting, and fever. She has not had any recent hospitalizations or antibiotic use.    Of note, she wishes to get a clean bill of health as she is scheduled for a cervical discectomy and fusion next week.    Review of Systems   Constitutional: Positive for appetite change (decreased). Negative for fever.   Gastrointestinal: Positive for abdominal pain and diarrhea. Negative for blood in stool, constipation and vomiting.   Genitourinary: Negative for decreased urine volume.   Musculoskeletal: Positive for back pain (lower).   All other systems reviewed and are negative.    Allergies:  Penicillins    Medications:  Albuterol  Norvasc  Lipitor  Flexeril  Cymbalta  Allegra  Flonase  Atarax  Zestril  Glucophage  Lopressor  Myrbetriq  Nitrostat  Mycostatin  Prilosec  Lyrica  Spiriva handihaler  Ultram  Desyrel    Past Medical History:     Anxiety  Cervical spine degeneration  COPD  CAD  Depression  Diabetes mellitus type II  Diabetic neuropathy  Facet arthropathy  Fibromyalgia  GERD  Hypertension  Lumbar DDD  Migraines  Dyslipidemia  Closed orbital fracture  Eyelid lesion  Candidal intertrigo  Bilateral cataract  Chronic low back pain  CHF      Past Surgical History:    Cholecystectomy  Bilateral combined repair ptosis with blepharoplasty  CV heart catheterization x2  Orbit ORIF  Cataract surgery     Family History:    Cancer    Social History:  Presents to the ED alone.    Physical Exam     Patient Vitals for  the past 24 hrs:   BP Temp Temp src Pulse Resp SpO2   11/29/21 2120 139/85 -- -- 82 -- 96 %   11/29/21 1900 102/67 -- -- 75 -- 93 %   11/29/21 1850 (!) 146/77 -- -- -- -- 96 %   11/29/21 1742 100/77 98.1  F (36.7  C) Temporal 76 20 96 %       Physical Exam  General: Well-developed and well-nourished. Well appearing elderly  woman. Cooperative.  Head:  Atraumatic.  Eyes:  Conjunctivae, lids, and sclerae are normal.  ENT:    Normal nose. Moist mucous membranes.  Neck:  Supple. Normal range of motion.  CV:  Regular rate and rhythm. Normal heart sounds with no murmurs, rubs, or gallops detected.  Resp:  No respiratory distress. Clear to auscultation bilaterally without decreased breath sounds, wheezing, rales, or rhonchi.  GI:  Soft. Non-distended. Mild left abdominal tenderness.    MS:  Normal ROM.  Skin:  Warm. Non-diaphoretic. No pallor.  Neuro:  Awake. A&Ox3. Normal strength.  Psych: Normal mood and affect. Normal speech.  Vitals reviewed.    Emergency Department Course     Imaging:  CT Abdomen Pelvis w Contrast   Final Result   IMPRESSION:    1.  No acute cause for abdominal pain demonstrated.        Report per radiology    Laboratory:  Labs Ordered and Resulted from Time of ED Arrival to Time of ED Departure   BASIC METABOLIC PANEL - Abnormal       Result Value    Sodium 144      Potassium 3.7      Chloride 109      Carbon Dioxide (CO2) 28      Anion Gap 7      Urea Nitrogen 8      Creatinine 0.70      Calcium 8.3 (*)     Glucose 109 (*)     GFR Estimate 88     CBC WITH PLATELETS AND DIFFERENTIAL - Abnormal    WBC Count 10.8      RBC Count 4.33      Hemoglobin 10.9 (*)     Hematocrit 36.2      MCV 84      MCH 25.2 (*)     MCHC 30.1 (*)     RDW 16.9 (*)     Platelet Count 319      % Neutrophils 58      % Lymphocytes 34      % Monocytes 6      % Eosinophils 1      % Basophils 1      % Immature Granulocytes 0      NRBCs per 100 WBC 0      Absolute Neutrophils 6.3      Absolute Lymphocytes 3.7      Absolute  Monocytes 0.6      Absolute Eosinophils 0.1      Absolute Basophils 0.1      Absolute Immature Granulocytes 0.0      Absolute NRBCs 0.0     HEPATIC FUNCTION PANEL - Normal    Bilirubin Total 0.4      Bilirubin Direct 0.1      Protein Total 7.5      Albumin 3.6      Alkaline Phosphatase 77      AST 17      ALT 22     LIPASE - Normal    Lipase 107     LACTIC ACID WHOLE BLOOD - Normal    Lactic Acid 1.3          Emergency Department Course:  Reviewed:  I reviewed nursing notes, vitals, and past medical history.    Assessments:  1908 I obtained history and examined the patient as noted above.   2105 I rechecked the patient who had eaten and voided but not had a bowel movement.     Interventions:  1924 NS 1L IV Bolus    Disposition:  The patient was discharged home.     Impression & Plan   Medical Decision Making:  Nyasia is a 71-year-old woman who has had diffuse abdominal pain and diarrhea for 4 days estimating 3-5 episodes per day. Today, she also developed back pain which prompted her concern. She has not had fever, vomiting, or other symptoms. She has not had recent hospitalization or antibiotic use. She appears well on exam with some mild left-sided abdominal tenderness. Given her age with several days of diarrhea and abdominal pain, I felt it prudent to proceed with CT. Fortunately, CT of the abdomen and pelvis does not reveal acute findings, including diverticulitis or colitis. Similarly, her labs are very reassuring without pancreatitis, hepatitis, biliary obstruction, leukocytosis, kidney injury, electrolyte derangements, or lactic acidosis. She was given IV fluids. I also PO challenged her as she told me the last time she ate, she immediately had diarrhea so she has not been eating the last several days. She tolerated PO without issue but did not have a bowel movement. She was making appropriate urine while in the ER. At this point, there is no evidence of dehydration or other findings that would necessitate  further emergent work-up or hospitalization. As such, she is appropriate for discharge. I will send her home with a stool collection kit for enteric panel and C. difficile PCR. She understands to return these to help rule out an infectious cause for her diarrhea. In the meantime, I recommended she use Tylenol for pain and Imodium for diarrhea with dosing discussed in detail. I encouraged her to follow-up with her primary care provider but she does understand return precautions. All questions answered. Amenable to discharge.    Diagnosis:    ICD-10-CM    1. Diarrhea, unspecified type  R19.7 Enteric Bacteria and Virus Panel by ARLENE Stool     Clostridium difficile toxin B   2. Diffuse abdominal pain  R10.84 Enteric Bacteria and Virus Panel by ARLENE Stool     Clostridium difficile toxin B     Scribe Disclosure:  I, Luz Maria Gerardo, am serving as a scribe at 7:08 PM on 11/29/2021 to document services personally performed by Sarah Gage MD based on my observations and the provider's statements to me.          Sarah Gage MD  12/01/21 7205

## 2021-11-30 NOTE — DISCHARGE INSTRUCTIONS
Collect your stool at home and return this as instructed.  That this will help rule out other viruses and bacteria as possible cause for your diarrhea.  You can use Imodium as needed.  Take 2 tablets after your first episode of diarrhea with an additional 1 tablet after each additional episode of diarrhea for maximum of 8 tablets in 24 hours.  You can use Tylenol as needed for pain.  Return if you have severe pain, evidence of dehydration (decreased urine, dizziness, etc.), bloody stools, or any other new or concerning symptoms.  Otherwise, please follow-up with your primary care provider.

## 2021-11-30 NOTE — PROGRESS NOTES
[facility] Care Coordination Contact  CC received notification of Emergency Room visit.  ER visit occurred on 11/29/21 at [facility] with Dx of diarrhea.    CC contacted member and reviewed discharge summary. Member shares that she is feeling somewhat better today. She was mostly worried that her surgery on Monday may be canceled so she wanted to be checked out. Still concerned surgery will be canceled again due to hospital capacity.    Member has a follow-up appointment with PCP: No: Offered Assistance with setting up a follow up appointment. Has surgery on Monday and will see after that.   Member has had a change in condition: No  New referrals placed: No  Home Visit Needed: No  Care plan reviewed and updated.  PCP notified of ED visit via EMR.    Vanesa Gtz RN  [facility]  [phone]

## 2021-12-01 ASSESSMENT — ENCOUNTER SYMPTOMS: CONSTIPATION: 0

## 2021-12-01 NOTE — TELEPHONE ENCOUNTER
"Call to patient. Patient reports she did not go to the ED. Patient was reluctant to speak to this RN.     Patient reports she is \"much better.\" \"A little bit of stomach problems- upset stomach.\"     Patient reports she is no longer having black stool- last black stool was yesterday. Patient reports she is eating and drinking now and denies nausea, vomiting, fever.     Advised patient to call clinic right away or go to ED/urgent care if she develops fever, black stools, any signs/symptoms of dehydration, or has questions/concerns. Patient verbalized understanding.     Routing to NP Genoveva Carroll as FYI.     Thank you!    Clara MAURICE RN   [facility]            "

## 2021-12-02 ENCOUNTER — LAB (OUTPATIENT)
Dept: LAB | Facility: CLINIC | Age: 71
End: 2021-12-02
Attending: STUDENT IN AN ORGANIZED HEALTH CARE EDUCATION/TRAINING PROGRAM
Payer: COMMERCIAL

## 2021-12-02 DIAGNOSIS — Z11.59 ENCOUNTER FOR SCREENING FOR OTHER VIRAL DISEASES: ICD-10-CM

## 2021-12-02 PROCEDURE — U0005 INFEC AGEN DETEC AMPLI PROBE: HCPCS

## 2021-12-02 PROCEDURE — U0003 INFECTIOUS AGENT DETECTION BY NUCLEIC ACID (DNA OR RNA); SEVERE ACUTE RESPIRATORY SYNDROME CORONAVIRUS 2 (SARS-COV-2) (CORONAVIRUS DISEASE [COVID-19]), AMPLIFIED PROBE TECHNIQUE, MAKING USE OF HIGH THROUGHPUT TECHNOLOGIES AS DESCRIBED BY CMS-2020-01-R: HCPCS

## 2021-12-03 LAB — SARS-COV-2 RNA RESP QL NAA+PROBE: NEGATIVE

## 2021-12-06 ENCOUNTER — APPOINTMENT (OUTPATIENT)
Dept: GENERAL RADIOLOGY | Facility: CLINIC | Age: 71
DRG: 473 | End: 2021-12-06
Attending: STUDENT IN AN ORGANIZED HEALTH CARE EDUCATION/TRAINING PROGRAM
Payer: COMMERCIAL

## 2021-12-06 ENCOUNTER — ANESTHESIA (OUTPATIENT)
Dept: SURGERY | Facility: CLINIC | Age: 71
DRG: 473 | End: 2021-12-06
Payer: COMMERCIAL

## 2021-12-06 ENCOUNTER — SURGERY (OUTPATIENT)
Age: 71
End: 2021-12-06
Payer: COMMERCIAL

## 2021-12-06 ENCOUNTER — ANESTHESIA EVENT (OUTPATIENT)
Dept: SURGERY | Facility: CLINIC | Age: 71
DRG: 473 | End: 2021-12-06
Payer: COMMERCIAL

## 2021-12-06 ENCOUNTER — HOSPITAL ENCOUNTER (INPATIENT)
Facility: CLINIC | Age: 71
LOS: 2 days | Discharge: HOME | DRG: 473 | End: 2021-12-08
Attending: STUDENT IN AN ORGANIZED HEALTH CARE EDUCATION/TRAINING PROGRAM | Admitting: STUDENT IN AN ORGANIZED HEALTH CARE EDUCATION/TRAINING PROGRAM
Payer: COMMERCIAL

## 2021-12-06 DIAGNOSIS — Z41.9 SURGERY, ELECTIVE: Primary | ICD-10-CM

## 2021-12-06 LAB
ABO/RH(D): NORMAL
ANTIBODY SCREEN: NEGATIVE
APTT PPP: 27 SECONDS (ref 22–38)
BASOPHILS # BLD AUTO: 0.1 10E3/UL (ref 0–0.2)
BASOPHILS NFR BLD AUTO: 1 %
EOSINOPHIL # BLD AUTO: 0 10E3/UL (ref 0–0.7)
EOSINOPHIL NFR BLD AUTO: 0 %
ERYTHROCYTE [DISTWIDTH] IN BLOOD BY AUTOMATED COUNT: 17.1 % (ref 10–15)
GLUCOSE BLDC GLUCOMTR-MCNC: 168 MG/DL (ref 70–99)
GLUCOSE BLDC GLUCOMTR-MCNC: 180 MG/DL (ref 70–99)
GLUCOSE BLDC GLUCOMTR-MCNC: 197 MG/DL (ref 70–99)
GLUCOSE BLDC GLUCOMTR-MCNC: 218 MG/DL (ref 70–99)
HCT VFR BLD AUTO: 37 % (ref 35–47)
HGB BLD-MCNC: 11.4 G/DL (ref 11.7–15.7)
HOLD SPECIMEN: NORMAL
IMM GRANULOCYTES # BLD: 0.1 10E3/UL
IMM GRANULOCYTES NFR BLD: 1 %
INR PPP: 0.96 (ref 0.85–1.15)
LYMPHOCYTES # BLD AUTO: 2.4 10E3/UL (ref 0.8–5.3)
LYMPHOCYTES NFR BLD AUTO: 18 %
MCH RBC QN AUTO: 25.3 PG (ref 26.5–33)
MCHC RBC AUTO-ENTMCNC: 30.8 G/DL (ref 31.5–36.5)
MCV RBC AUTO: 82 FL (ref 78–100)
MONOCYTES # BLD AUTO: 0.7 10E3/UL (ref 0–1.3)
MONOCYTES NFR BLD AUTO: 6 %
NEUTROPHILS # BLD AUTO: 9.7 10E3/UL (ref 1.6–8.3)
NEUTROPHILS NFR BLD AUTO: 74 %
NRBC # BLD AUTO: 0 10E3/UL
NRBC BLD AUTO-RTO: 0 /100
PLATELET # BLD AUTO: 322 10E3/UL (ref 150–450)
RBC # BLD AUTO: 4.5 10E6/UL (ref 3.8–5.2)
SPECIMEN EXPIRATION DATE: NORMAL
WBC # BLD AUTO: 13 10E3/UL (ref 4–11)

## 2021-12-06 PROCEDURE — 250N000009 HC RX 250: Performed by: NURSE ANESTHETIST, CERTIFIED REGISTERED

## 2021-12-06 PROCEDURE — 250N000013 HC RX MED GY IP 250 OP 250 PS 637: Performed by: PHYSICIAN ASSISTANT

## 2021-12-06 PROCEDURE — 360N000084 HC SURGERY LEVEL 4 W/ FLUORO, PER MIN: Performed by: STUDENT IN AN ORGANIZED HEALTH CARE EDUCATION/TRAINING PROGRAM

## 2021-12-06 PROCEDURE — 0RG20A0 FUSION OF 2 OR MORE CERVICAL VERTEBRAL JOINTS WITH INTERBODY FUSION DEVICE, ANTERIOR APPROACH, ANTERIOR COLUMN, OPEN APPROACH: ICD-10-PCS | Performed by: STUDENT IN AN ORGANIZED HEALTH CARE EDUCATION/TRAINING PROGRAM

## 2021-12-06 PROCEDURE — 250N000011 HC RX IP 250 OP 636: Performed by: NURSE ANESTHETIST, CERTIFIED REGISTERED

## 2021-12-06 PROCEDURE — 258N000003 HC RX IP 258 OP 636: Performed by: ANESTHESIOLOGY

## 2021-12-06 PROCEDURE — 250N000011 HC RX IP 250 OP 636: Performed by: PHYSICIAN ASSISTANT

## 2021-12-06 PROCEDURE — 22853 INSJ BIOMECHANICAL DEVICE: CPT | Mod: AS | Performed by: PHYSICIAN ASSISTANT

## 2021-12-06 PROCEDURE — 250N000005 HC OR RX SURGIFLO HEMOSTATIC MATRIX 10ML 199102S OPNP: Performed by: STUDENT IN AN ORGANIZED HEALTH CARE EDUCATION/TRAINING PROGRAM

## 2021-12-06 PROCEDURE — C1762 CONN TISS, HUMAN(INC FASCIA): HCPCS | Performed by: STUDENT IN AN ORGANIZED HEALTH CARE EDUCATION/TRAINING PROGRAM

## 2021-12-06 PROCEDURE — 120N000001 HC R&B MED SURG/OB

## 2021-12-06 PROCEDURE — 86900 BLOOD TYPING SEROLOGIC ABO: CPT | Performed by: PHYSICIAN ASSISTANT

## 2021-12-06 PROCEDURE — 710N000009 HC RECOVERY PHASE 1, LEVEL 1, PER MIN: Performed by: STUDENT IN AN ORGANIZED HEALTH CARE EDUCATION/TRAINING PROGRAM

## 2021-12-06 PROCEDURE — 999N000141 HC STATISTIC PRE-PROCEDURE NURSING ASSESSMENT: Performed by: STUDENT IN AN ORGANIZED HEALTH CARE EDUCATION/TRAINING PROGRAM

## 2021-12-06 PROCEDURE — 22552 ARTHRD ANT NTRBD CERVICAL EA: CPT | Mod: AS | Performed by: PHYSICIAN ASSISTANT

## 2021-12-06 PROCEDURE — 20930 SP BONE ALGRFT MORSEL ADD-ON: CPT | Mod: AS | Performed by: PHYSICIAN ASSISTANT

## 2021-12-06 PROCEDURE — 82565 ASSAY OF CREATININE: CPT | Performed by: STUDENT IN AN ORGANIZED HEALTH CARE EDUCATION/TRAINING PROGRAM

## 2021-12-06 PROCEDURE — 258N000003 HC RX IP 258 OP 636: Performed by: NURSE ANESTHETIST, CERTIFIED REGISTERED

## 2021-12-06 PROCEDURE — 85004 AUTOMATED DIFF WBC COUNT: CPT | Performed by: PHYSICIAN ASSISTANT

## 2021-12-06 PROCEDURE — 250N000013 HC RX MED GY IP 250 OP 250 PS 637: Performed by: ANESTHESIOLOGY

## 2021-12-06 PROCEDURE — C1713 ANCHOR/SCREW BN/BN,TIS/BN: HCPCS | Performed by: STUDENT IN AN ORGANIZED HEALTH CARE EDUCATION/TRAINING PROGRAM

## 2021-12-06 PROCEDURE — 272N000001 HC OR GENERAL SUPPLY STERILE: Performed by: STUDENT IN AN ORGANIZED HEALTH CARE EDUCATION/TRAINING PROGRAM

## 2021-12-06 PROCEDURE — 22551 ARTHRD ANT NTRBDY CERVICAL: CPT | Mod: AS | Performed by: PHYSICIAN ASSISTANT

## 2021-12-06 PROCEDURE — 999N000179 XR SURGERY CARM FLUORO LESS THAN 5 MIN W STILLS: Mod: TC

## 2021-12-06 PROCEDURE — 85730 THROMBOPLASTIN TIME PARTIAL: CPT | Performed by: PHYSICIAN ASSISTANT

## 2021-12-06 PROCEDURE — 85610 PROTHROMBIN TIME: CPT | Performed by: PHYSICIAN ASSISTANT

## 2021-12-06 PROCEDURE — 0RT30ZZ RESECTION OF CERVICAL VERTEBRAL DISC, OPEN APPROACH: ICD-10-PCS | Performed by: STUDENT IN AN ORGANIZED HEALTH CARE EDUCATION/TRAINING PROGRAM

## 2021-12-06 PROCEDURE — 250N000011 HC RX IP 250 OP 636: Performed by: ANESTHESIOLOGY

## 2021-12-06 PROCEDURE — 370N000017 HC ANESTHESIA TECHNICAL FEE, PER MIN: Performed by: STUDENT IN AN ORGANIZED HEALTH CARE EDUCATION/TRAINING PROGRAM

## 2021-12-06 PROCEDURE — 36415 COLL VENOUS BLD VENIPUNCTURE: CPT | Performed by: PHYSICIAN ASSISTANT

## 2021-12-06 PROCEDURE — 250N000009 HC RX 250: Performed by: STUDENT IN AN ORGANIZED HEALTH CARE EDUCATION/TRAINING PROGRAM

## 2021-12-06 PROCEDURE — 22846 INSERT SPINE FIXATION DEVICE: CPT | Mod: AS | Performed by: PHYSICIAN ASSISTANT

## 2021-12-06 DEVICE — GRAFT BONE PUTTY DBX 05ML 038050: Type: IMPLANTABLE DEVICE | Site: SPINE CERVICAL | Status: FUNCTIONAL

## 2021-12-06 DEVICE — IMPLANTABLE DEVICE: Type: IMPLANTABLE DEVICE | Site: SPINE CERVICAL | Status: FUNCTIONAL

## 2021-12-06 RX ORDER — FLUTICASONE PROPIONATE 50 MCG
2 SPRAY, SUSPENSION (ML) NASAL DAILY
Status: DISCONTINUED | OUTPATIENT
Start: 2021-12-06 | End: 2021-12-08 | Stop reason: HOSPADM

## 2021-12-06 RX ORDER — NALOXONE HYDROCHLORIDE 0.4 MG/ML
0.2 INJECTION, SOLUTION INTRAMUSCULAR; INTRAVENOUS; SUBCUTANEOUS
Status: DISCONTINUED | OUTPATIENT
Start: 2021-12-06 | End: 2021-12-08 | Stop reason: HOSPADM

## 2021-12-06 RX ORDER — LABETALOL 20 MG/4 ML (5 MG/ML) INTRAVENOUS SYRINGE
PRN
Status: DISCONTINUED | OUTPATIENT
Start: 2021-12-06 | End: 2021-12-06

## 2021-12-06 RX ORDER — FENTANYL CITRATE 50 UG/ML
25 INJECTION, SOLUTION INTRAMUSCULAR; INTRAVENOUS
Status: DISCONTINUED | OUTPATIENT
Start: 2021-12-06 | End: 2021-12-06 | Stop reason: HOSPADM

## 2021-12-06 RX ORDER — LIDOCAINE HYDROCHLORIDE 10 MG/ML
INJECTION, SOLUTION EPIDURAL; INFILTRATION; INTRACAUDAL; PERINEURAL PRN
Status: DISCONTINUED | OUTPATIENT
Start: 2021-12-06 | End: 2021-12-06

## 2021-12-06 RX ORDER — CLINDAMYCIN PHOSPHATE 900 MG/50ML
900 INJECTION, SOLUTION INTRAVENOUS
Status: COMPLETED | OUTPATIENT
Start: 2021-12-06 | End: 2021-12-06

## 2021-12-06 RX ORDER — POLYETHYLENE GLYCOL 3350 17 G/17G
17 POWDER, FOR SOLUTION ORAL DAILY
Status: DISCONTINUED | OUTPATIENT
Start: 2021-12-07 | End: 2021-12-08 | Stop reason: HOSPADM

## 2021-12-06 RX ORDER — METOPROLOL TARTRATE 1 MG/ML
1-2 INJECTION, SOLUTION INTRAVENOUS EVERY 5 MIN PRN
Status: DISCONTINUED | OUTPATIENT
Start: 2021-12-06 | End: 2021-12-06 | Stop reason: HOSPADM

## 2021-12-06 RX ORDER — ONDANSETRON 2 MG/ML
4 INJECTION INTRAMUSCULAR; INTRAVENOUS EVERY 6 HOURS PRN
Status: DISCONTINUED | OUTPATIENT
Start: 2021-12-06 | End: 2021-12-08 | Stop reason: HOSPADM

## 2021-12-06 RX ORDER — HYDROMORPHONE HCL IN WATER/PF 6 MG/30 ML
0.2 PATIENT CONTROLLED ANALGESIA SYRINGE INTRAVENOUS EVERY 5 MIN PRN
Status: DISCONTINUED | OUTPATIENT
Start: 2021-12-06 | End: 2021-12-06 | Stop reason: HOSPADM

## 2021-12-06 RX ORDER — LIDOCAINE 40 MG/G
CREAM TOPICAL
Status: DISCONTINUED | OUTPATIENT
Start: 2021-12-06 | End: 2021-12-06 | Stop reason: HOSPADM

## 2021-12-06 RX ORDER — BISACODYL 10 MG
10 SUPPOSITORY, RECTAL RECTAL DAILY PRN
Status: DISCONTINUED | OUTPATIENT
Start: 2021-12-06 | End: 2021-12-08 | Stop reason: HOSPADM

## 2021-12-06 RX ORDER — OXYCODONE HYDROCHLORIDE 5 MG/1
10 TABLET ORAL EVERY 4 HOURS PRN
Status: DISCONTINUED | OUTPATIENT
Start: 2021-12-06 | End: 2021-12-08 | Stop reason: HOSPADM

## 2021-12-06 RX ORDER — HYDROMORPHONE HCL IN WATER/PF 6 MG/30 ML
0.4 PATIENT CONTROLLED ANALGESIA SYRINGE INTRAVENOUS
Status: DISCONTINUED | OUTPATIENT
Start: 2021-12-06 | End: 2021-12-08 | Stop reason: HOSPADM

## 2021-12-06 RX ORDER — PROPOFOL 10 MG/ML
INJECTION, EMULSION INTRAVENOUS PRN
Status: DISCONTINUED | OUTPATIENT
Start: 2021-12-06 | End: 2021-12-06

## 2021-12-06 RX ORDER — DULOXETIN HYDROCHLORIDE 60 MG/1
60 CAPSULE, DELAYED RELEASE ORAL DAILY
Status: DISCONTINUED | OUTPATIENT
Start: 2021-12-07 | End: 2021-12-08 | Stop reason: HOSPADM

## 2021-12-06 RX ORDER — KETOROLAC TROMETHAMINE 15 MG/ML
15 INJECTION, SOLUTION INTRAMUSCULAR; INTRAVENOUS EVERY 6 HOURS PRN
Status: DISCONTINUED | OUTPATIENT
Start: 2021-12-06 | End: 2021-12-06 | Stop reason: HOSPADM

## 2021-12-06 RX ORDER — NALOXONE HYDROCHLORIDE 0.4 MG/ML
0.4 INJECTION, SOLUTION INTRAMUSCULAR; INTRAVENOUS; SUBCUTANEOUS
Status: DISCONTINUED | OUTPATIENT
Start: 2021-12-06 | End: 2021-12-08 | Stop reason: HOSPADM

## 2021-12-06 RX ORDER — CLINDAMYCIN PHOSPHATE 900 MG/50ML
900 INJECTION, SOLUTION INTRAVENOUS SEE ADMIN INSTRUCTIONS
Status: DISCONTINUED | OUTPATIENT
Start: 2021-12-06 | End: 2021-12-06 | Stop reason: HOSPADM

## 2021-12-06 RX ORDER — NEOSTIGMINE METHYLSULFATE 1 MG/ML
VIAL (ML) INJECTION PRN
Status: DISCONTINUED | OUTPATIENT
Start: 2021-12-06 | End: 2021-12-06

## 2021-12-06 RX ORDER — ONDANSETRON 4 MG/1
4 TABLET, ORALLY DISINTEGRATING ORAL EVERY 30 MIN PRN
Status: DISCONTINUED | OUTPATIENT
Start: 2021-12-06 | End: 2021-12-06 | Stop reason: HOSPADM

## 2021-12-06 RX ORDER — MIRABEGRON 25 MG/1
25 TABLET, FILM COATED, EXTENDED RELEASE ORAL DAILY
Status: DISCONTINUED | OUTPATIENT
Start: 2021-12-06 | End: 2021-12-08 | Stop reason: HOSPADM

## 2021-12-06 RX ORDER — BUPIVACAINE HYDROCHLORIDE AND EPINEPHRINE 2.5; 5 MG/ML; UG/ML
INJECTION, SOLUTION EPIDURAL; INFILTRATION; INTRACAUDAL; PERINEURAL PRN
Status: DISCONTINUED | OUTPATIENT
Start: 2021-12-06 | End: 2021-12-06 | Stop reason: HOSPADM

## 2021-12-06 RX ORDER — HYDRALAZINE HYDROCHLORIDE 20 MG/ML
2.5-5 INJECTION INTRAMUSCULAR; INTRAVENOUS EVERY 10 MIN PRN
Status: DISCONTINUED | OUTPATIENT
Start: 2021-12-06 | End: 2021-12-06 | Stop reason: HOSPADM

## 2021-12-06 RX ORDER — SODIUM CHLORIDE, SODIUM LACTATE, POTASSIUM CHLORIDE, CALCIUM CHLORIDE 600; 310; 30; 20 MG/100ML; MG/100ML; MG/100ML; MG/100ML
INJECTION, SOLUTION INTRAVENOUS CONTINUOUS
Status: DISCONTINUED | OUTPATIENT
Start: 2021-12-06 | End: 2021-12-06 | Stop reason: HOSPADM

## 2021-12-06 RX ORDER — SENNOSIDES 8.6 MG
650 CAPSULE ORAL 2 TIMES DAILY PRN
Status: DISCONTINUED | OUTPATIENT
Start: 2021-12-06 | End: 2021-12-06

## 2021-12-06 RX ORDER — NICOTINE POLACRILEX 4 MG
15-30 LOZENGE BUCCAL
Status: DISCONTINUED | OUTPATIENT
Start: 2021-12-06 | End: 2021-12-08 | Stop reason: HOSPADM

## 2021-12-06 RX ORDER — NITROGLYCERIN 0.4 MG/1
0.4 TABLET SUBLINGUAL EVERY 5 MIN PRN
Status: DISCONTINUED | OUTPATIENT
Start: 2021-12-06 | End: 2021-12-08 | Stop reason: HOSPADM

## 2021-12-06 RX ORDER — DEXTROSE MONOHYDRATE 25 G/50ML
25-50 INJECTION, SOLUTION INTRAVENOUS
Status: DISCONTINUED | OUTPATIENT
Start: 2021-12-06 | End: 2021-12-08 | Stop reason: HOSPADM

## 2021-12-06 RX ORDER — DEXAMETHASONE SODIUM PHOSPHATE 4 MG/ML
INJECTION, SOLUTION INTRA-ARTICULAR; INTRALESIONAL; INTRAMUSCULAR; INTRAVENOUS; SOFT TISSUE PRN
Status: DISCONTINUED | OUTPATIENT
Start: 2021-12-06 | End: 2021-12-06

## 2021-12-06 RX ORDER — ONDANSETRON 2 MG/ML
INJECTION INTRAMUSCULAR; INTRAVENOUS PRN
Status: DISCONTINUED | OUTPATIENT
Start: 2021-12-06 | End: 2021-12-06

## 2021-12-06 RX ORDER — BUPIVACAINE HYDROCHLORIDE AND EPINEPHRINE 2.5; 5 MG/ML; UG/ML
30 INJECTION, SOLUTION EPIDURAL; INFILTRATION; INTRACAUDAL; PERINEURAL ONCE
Status: DISCONTINUED | OUTPATIENT
Start: 2021-12-06 | End: 2021-12-06 | Stop reason: HOSPADM

## 2021-12-06 RX ORDER — HYDROXYZINE HYDROCHLORIDE 10 MG/1
10 TABLET, FILM COATED ORAL 3 TIMES DAILY PRN
Status: DISCONTINUED | OUTPATIENT
Start: 2021-12-06 | End: 2021-12-08 | Stop reason: HOSPADM

## 2021-12-06 RX ORDER — AMOXICILLIN 250 MG
1 CAPSULE ORAL 2 TIMES DAILY
Status: DISCONTINUED | OUTPATIENT
Start: 2021-12-06 | End: 2021-12-08 | Stop reason: HOSPADM

## 2021-12-06 RX ORDER — ONDANSETRON 4 MG/1
4 TABLET, ORALLY DISINTEGRATING ORAL EVERY 6 HOURS PRN
Status: DISCONTINUED | OUTPATIENT
Start: 2021-12-06 | End: 2021-12-08 | Stop reason: HOSPADM

## 2021-12-06 RX ORDER — AMLODIPINE BESYLATE 5 MG/1
5 TABLET ORAL DAILY
Status: DISCONTINUED | OUTPATIENT
Start: 2021-12-06 | End: 2021-12-08 | Stop reason: HOSPADM

## 2021-12-06 RX ORDER — OXYCODONE HYDROCHLORIDE 5 MG/1
5 TABLET ORAL EVERY 4 HOURS PRN
Status: DISCONTINUED | OUTPATIENT
Start: 2021-12-06 | End: 2021-12-06 | Stop reason: HOSPADM

## 2021-12-06 RX ORDER — PREGABALIN 50 MG/1
150 CAPSULE ORAL 3 TIMES DAILY
Status: DISCONTINUED | OUTPATIENT
Start: 2021-12-06 | End: 2021-12-08 | Stop reason: HOSPADM

## 2021-12-06 RX ORDER — TRAZODONE HYDROCHLORIDE 100 MG/1
100 TABLET ORAL AT BEDTIME
Status: DISCONTINUED | OUTPATIENT
Start: 2021-12-06 | End: 2021-12-08 | Stop reason: HOSPADM

## 2021-12-06 RX ORDER — FEXOFENADINE HCL 60 MG/1
60 TABLET, FILM COATED ORAL 2 TIMES DAILY
Status: DISCONTINUED | OUTPATIENT
Start: 2021-12-06 | End: 2021-12-08 | Stop reason: HOSPADM

## 2021-12-06 RX ORDER — METHOCARBAMOL 750 MG/1
750 TABLET, FILM COATED ORAL EVERY 6 HOURS
Status: DISCONTINUED | OUTPATIENT
Start: 2021-12-06 | End: 2021-12-08 | Stop reason: HOSPADM

## 2021-12-06 RX ORDER — LISINOPRIL 20 MG/1
20 TABLET ORAL 2 TIMES DAILY
Status: DISCONTINUED | OUTPATIENT
Start: 2021-12-06 | End: 2021-12-08 | Stop reason: HOSPADM

## 2021-12-06 RX ORDER — HALOPERIDOL 5 MG/ML
1 INJECTION INTRAMUSCULAR
Status: DISCONTINUED | OUTPATIENT
Start: 2021-12-06 | End: 2021-12-06 | Stop reason: HOSPADM

## 2021-12-06 RX ORDER — LIDOCAINE 40 MG/G
CREAM TOPICAL
Status: DISCONTINUED | OUTPATIENT
Start: 2021-12-06 | End: 2021-12-08 | Stop reason: HOSPADM

## 2021-12-06 RX ORDER — MULTIPLE VITAMINS W/ MINERALS TAB 9MG-400MCG
1 TAB ORAL DAILY
Status: DISCONTINUED | OUTPATIENT
Start: 2021-12-06 | End: 2021-12-08 | Stop reason: HOSPADM

## 2021-12-06 RX ORDER — ALBUTEROL SULFATE 90 UG/1
2 AEROSOL, METERED RESPIRATORY (INHALATION) EVERY 6 HOURS
Status: DISCONTINUED | OUTPATIENT
Start: 2021-12-06 | End: 2021-12-07

## 2021-12-06 RX ORDER — ACETAMINOPHEN 325 MG/1
975 TABLET ORAL EVERY 8 HOURS
Status: DISCONTINUED | OUTPATIENT
Start: 2021-12-06 | End: 2021-12-08 | Stop reason: HOSPADM

## 2021-12-06 RX ORDER — KETAMINE HYDROCHLORIDE 10 MG/ML
INJECTION INTRAMUSCULAR; INTRAVENOUS PRN
Status: DISCONTINUED | OUTPATIENT
Start: 2021-12-06 | End: 2021-12-06

## 2021-12-06 RX ORDER — FENTANYL CITRATE 50 UG/ML
INJECTION, SOLUTION INTRAMUSCULAR; INTRAVENOUS PRN
Status: DISCONTINUED | OUTPATIENT
Start: 2021-12-06 | End: 2021-12-06

## 2021-12-06 RX ORDER — PROCHLORPERAZINE MALEATE 5 MG
5 TABLET ORAL EVERY 6 HOURS PRN
Status: DISCONTINUED | OUTPATIENT
Start: 2021-12-06 | End: 2021-12-08 | Stop reason: HOSPADM

## 2021-12-06 RX ORDER — CALCIUM CARBONATE 500 MG/1
500 TABLET, CHEWABLE ORAL 4 TIMES DAILY PRN
Status: DISCONTINUED | OUTPATIENT
Start: 2021-12-06 | End: 2021-12-08 | Stop reason: HOSPADM

## 2021-12-06 RX ORDER — ONDANSETRON 2 MG/ML
4 INJECTION INTRAMUSCULAR; INTRAVENOUS EVERY 30 MIN PRN
Status: DISCONTINUED | OUTPATIENT
Start: 2021-12-06 | End: 2021-12-06 | Stop reason: HOSPADM

## 2021-12-06 RX ORDER — HYDROMORPHONE HCL IN WATER/PF 6 MG/30 ML
0.2 PATIENT CONTROLLED ANALGESIA SYRINGE INTRAVENOUS
Status: DISCONTINUED | OUTPATIENT
Start: 2021-12-06 | End: 2021-12-08 | Stop reason: HOSPADM

## 2021-12-06 RX ORDER — OXYCODONE HYDROCHLORIDE 5 MG/1
5 TABLET ORAL EVERY 4 HOURS PRN
Status: DISCONTINUED | OUTPATIENT
Start: 2021-12-06 | End: 2021-12-08 | Stop reason: HOSPADM

## 2021-12-06 RX ORDER — FENTANYL CITRATE 50 UG/ML
25 INJECTION, SOLUTION INTRAMUSCULAR; INTRAVENOUS EVERY 5 MIN PRN
Status: DISCONTINUED | OUTPATIENT
Start: 2021-12-06 | End: 2021-12-06 | Stop reason: HOSPADM

## 2021-12-06 RX ORDER — ACETAMINOPHEN 325 MG/1
650 TABLET ORAL EVERY 4 HOURS PRN
Status: DISCONTINUED | OUTPATIENT
Start: 2021-12-09 | End: 2021-12-08 | Stop reason: HOSPADM

## 2021-12-06 RX ORDER — GLYCOPYRROLATE 0.2 MG/ML
INJECTION, SOLUTION INTRAMUSCULAR; INTRAVENOUS PRN
Status: DISCONTINUED | OUTPATIENT
Start: 2021-12-06 | End: 2021-12-06

## 2021-12-06 RX ORDER — METOPROLOL TARTRATE 25 MG/1
25 TABLET, FILM COATED ORAL 2 TIMES DAILY
Status: DISCONTINUED | OUTPATIENT
Start: 2021-12-06 | End: 2021-12-08 | Stop reason: HOSPADM

## 2021-12-06 RX ORDER — MEPERIDINE HYDROCHLORIDE 25 MG/ML
12.5 INJECTION INTRAMUSCULAR; INTRAVENOUS; SUBCUTANEOUS
Status: DISCONTINUED | OUTPATIENT
Start: 2021-12-06 | End: 2021-12-06 | Stop reason: HOSPADM

## 2021-12-06 RX ORDER — ATORVASTATIN CALCIUM 40 MG/1
80 TABLET, FILM COATED ORAL DAILY
Status: DISCONTINUED | OUTPATIENT
Start: 2021-12-06 | End: 2021-12-08 | Stop reason: HOSPADM

## 2021-12-06 RX ADMIN — FENTANYL CITRATE 25 MCG: 50 INJECTION INTRAMUSCULAR; INTRAVENOUS at 16:17

## 2021-12-06 RX ADMIN — DEXAMETHASONE SODIUM PHOSPHATE 4 MG: 4 INJECTION, SOLUTION INTRA-ARTICULAR; INTRALESIONAL; INTRAMUSCULAR; INTRAVENOUS; SOFT TISSUE at 08:10

## 2021-12-06 RX ADMIN — ONDANSETRON HYDROCHLORIDE 4 MG: 2 INJECTION, SOLUTION INTRAVENOUS at 11:22

## 2021-12-06 RX ADMIN — MULTIPLE VITAMINS W/ MINERALS TAB 1 TABLET: TAB at 19:29

## 2021-12-06 RX ADMIN — CLINDAMYCIN PHOSPHATE 900 MG: 900 INJECTION, SOLUTION INTRAVENOUS at 06:40

## 2021-12-06 RX ADMIN — GLYCOPYRROLATE 0.2 MG: 0.2 INJECTION, SOLUTION INTRAMUSCULAR; INTRAVENOUS at 08:10

## 2021-12-06 RX ADMIN — MIDAZOLAM 2 MG: 1 INJECTION INTRAMUSCULAR; INTRAVENOUS at 08:01

## 2021-12-06 RX ADMIN — ATORVASTATIN CALCIUM 80 MG: 40 TABLET, FILM COATED ORAL at 19:29

## 2021-12-06 RX ADMIN — BUPIVACAINE HYDROCHLORIDE AND EPINEPHRINE BITARTRATE 10 ML: 2.5; .0091 INJECTION, SOLUTION EPIDURAL; INFILTRATION; INTRACAUDAL; PERINEURAL at 11:22

## 2021-12-06 RX ADMIN — Medication 1 KIT: at 09:35

## 2021-12-06 RX ADMIN — SODIUM CHLORIDE, POTASSIUM CHLORIDE, SODIUM LACTATE AND CALCIUM CHLORIDE: 600; 310; 30; 20 INJECTION, SOLUTION INTRAVENOUS at 09:21

## 2021-12-06 RX ADMIN — PHENYLEPHRINE HYDROCHLORIDE 100 MCG: 10 INJECTION INTRAVENOUS at 09:12

## 2021-12-06 RX ADMIN — LISINOPRIL 20 MG: 20 TABLET ORAL at 22:00

## 2021-12-06 RX ADMIN — SODIUM CHLORIDE, POTASSIUM CHLORIDE, SODIUM LACTATE AND CALCIUM CHLORIDE: 600; 310; 30; 20 INJECTION, SOLUTION INTRAVENOUS at 06:45

## 2021-12-06 RX ADMIN — ROCURONIUM BROMIDE 10 MG: 50 INJECTION, SOLUTION INTRAVENOUS at 09:32

## 2021-12-06 RX ADMIN — METHOCARBAMOL 750 MG: 750 TABLET ORAL at 19:28

## 2021-12-06 RX ADMIN — AMLODIPINE BESYLATE 5 MG: 5 TABLET ORAL at 19:28

## 2021-12-06 RX ADMIN — LABETALOL 20 MG/4 ML (5 MG/ML) INTRAVENOUS SYRINGE 10 MG: at 08:49

## 2021-12-06 RX ADMIN — PREGABALIN 150 MG: 50 CAPSULE ORAL at 21:57

## 2021-12-06 RX ADMIN — GLYCOPYRROLATE 0.6 MG: 0.2 INJECTION, SOLUTION INTRAMUSCULAR; INTRAVENOUS at 11:28

## 2021-12-06 RX ADMIN — METOPROLOL TARTRATE 25 MG: 25 TABLET, FILM COATED ORAL at 21:57

## 2021-12-06 RX ADMIN — ROCURONIUM BROMIDE 50 MG: 50 INJECTION, SOLUTION INTRAVENOUS at 08:10

## 2021-12-06 RX ADMIN — OXYCODONE HYDROCHLORIDE 5 MG: 5 TABLET ORAL at 14:57

## 2021-12-06 RX ADMIN — OXYCODONE HYDROCHLORIDE 5 MG: 5 TABLET ORAL at 21:58

## 2021-12-06 RX ADMIN — HYDROMORPHONE HYDROCHLORIDE 0.5 MG: 1 INJECTION, SOLUTION INTRAMUSCULAR; INTRAVENOUS; SUBCUTANEOUS at 08:45

## 2021-12-06 RX ADMIN — MIRABEGRON 25 MG: 25 TABLET, FILM COATED, EXTENDED RELEASE ORAL at 19:28

## 2021-12-06 RX ADMIN — NEOSTIGMINE METHYLSULFATE 4 MG: 1 INJECTION, SOLUTION INTRAVENOUS at 11:28

## 2021-12-06 RX ADMIN — LIDOCAINE HYDROCHLORIDE 50 MG: 10 INJECTION, SOLUTION EPIDURAL; INFILTRATION; INTRACAUDAL; PERINEURAL at 08:10

## 2021-12-06 RX ADMIN — HYDROMORPHONE HYDROCHLORIDE 0.5 MG: 1 INJECTION, SOLUTION INTRAMUSCULAR; INTRAVENOUS; SUBCUTANEOUS at 11:48

## 2021-12-06 RX ADMIN — PROPOFOL 150 MG: 10 INJECTION, EMULSION INTRAVENOUS at 08:10

## 2021-12-06 RX ADMIN — HYDROMORPHONE HYDROCHLORIDE 0.5 MG: 1 INJECTION, SOLUTION INTRAMUSCULAR; INTRAVENOUS; SUBCUTANEOUS at 11:42

## 2021-12-06 RX ADMIN — FENTANYL CITRATE 100 MCG: 50 INJECTION, SOLUTION INTRAMUSCULAR; INTRAVENOUS at 08:10

## 2021-12-06 RX ADMIN — PROPOFOL 50 MG: 10 INJECTION, EMULSION INTRAVENOUS at 09:31

## 2021-12-06 RX ADMIN — TRAZODONE HYDROCHLORIDE 100 MG: 100 TABLET ORAL at 22:00

## 2021-12-06 RX ADMIN — THROMBIN, TOPICAL (BOVINE) 5000 UNITS: KIT at 09:35

## 2021-12-06 RX ADMIN — DEXMEDETOMIDINE HYDROCHLORIDE 0.5 MCG/KG/HR: 100 INJECTION, SOLUTION INTRAVENOUS at 08:20

## 2021-12-06 RX ADMIN — HYDROMORPHONE HYDROCHLORIDE 0.5 MG: 1 INJECTION, SOLUTION INTRAMUSCULAR; INTRAVENOUS; SUBCUTANEOUS at 08:35

## 2021-12-06 RX ADMIN — ACETAMINOPHEN 975 MG: 325 TABLET, FILM COATED ORAL at 19:29

## 2021-12-06 RX ADMIN — Medication 20 MG: at 10:14

## 2021-12-06 RX ADMIN — FENTANYL CITRATE 25 MCG: 50 INJECTION INTRAMUSCULAR; INTRAVENOUS at 16:44

## 2021-12-06 RX ADMIN — METFORMIN HYDROCHLORIDE 500 MG: 500 TABLET, FILM COATED ORAL at 19:29

## 2021-12-06 RX ADMIN — FENTANYL CITRATE 25 MCG: 50 INJECTION INTRAMUSCULAR; INTRAVENOUS at 16:02

## 2021-12-06 RX ADMIN — FEXOFENADINE HYDROCHLORIDE 60 MG: 60 TABLET ORAL at 22:01

## 2021-12-06 RX ADMIN — Medication 30 MG: at 08:32

## 2021-12-06 ASSESSMENT — ACTIVITIES OF DAILY LIVING (ADL)
ADLS_ACUITY_SCORE: 8
ADLS_ACUITY_SCORE: 4
ADLS_ACUITY_SCORE: 9
ADLS_ACUITY_SCORE: 6
ADLS_ACUITY_SCORE: 8
ADLS_ACUITY_SCORE: 4
ADLS_ACUITY_SCORE: 4
ADLS_ACUITY_SCORE: 6
ADLS_ACUITY_SCORE: 8
ADLS_ACUITY_SCORE: 4
ADLS_ACUITY_SCORE: 8
ADLS_ACUITY_SCORE: 8
ADLS_ACUITY_SCORE: 4
ADLS_ACUITY_SCORE: 8
ADLS_ACUITY_SCORE: 4
ADLS_ACUITY_SCORE: 6
ADLS_ACUITY_SCORE: 6
ADLS_ACUITY_SCORE: 4

## 2021-12-06 ASSESSMENT — COPD QUESTIONNAIRES
COPD: 1
CAT_SEVERITY: MILD

## 2021-12-06 ASSESSMENT — MIFFLIN-ST. JEOR: SCORE: 1497.3

## 2021-12-06 NOTE — ANESTHESIA PROCEDURE NOTES
Airway       Patient location during procedure: OR       Procedure Start/Stop Times: 12/6/2021 8:12 AM  Staff -        Anesthesiologist:  Virgil Mondragon MD       CRNA: Jose Antonio Jarrell APRN CRNA       Performed By: CRNA  Consent for Airway        Urgency: elective  Indications and Patient Condition       Indications for airway management: edgar-procedural       Induction type:intravenous       Mask difficulty assessment: 2 - vent by mask + OA or adjuvant +/- NMBA    Final Airway Details       Final airway type: endotracheal airway       Successful airway: ETT - single  Endotracheal Airway Details        ETT size (mm): 7.0       Cuffed: yes       Cuff volume (mL): 6       Successful intubation technique: video laryngoscopy (DL not attempted; Glidescope used d/t patient having c-spine surgery)       VL Blade Size: Glidescope 3       Grade View of Cords: 1       Adjucts: stylet       Position: Left       Measured from: lips       Secured at (cm): 22       Bite block used: None    Post intubation assessment        Placement verified by: capnometry        Number of attempts at approach: 1       Number of other approaches attempted: 0       Secured with: plastic tape       Ease of procedure: easy       Dentition: Intact and Unchanged

## 2021-12-06 NOTE — ANESTHESIA CARE TRANSFER NOTE
Patient: Vijaya Manjarrez    Procedure: Procedure(s):  Anterior cervical discectomy and fusion, Cervical 4-7       Diagnosis: Cervical spondylosis with myelopathy [M47.12]  Diagnosis Additional Information: No value filed.    Anesthesia Type:   General     Note:    Oropharynx: oropharynx clear of all foreign objects  Level of Consciousness: awake  Oxygen Supplementation: face mask  Level of Supplemental Oxygen (L/min / FiO2): 6 lpm  Independent Airway: airway patency satisfactory and stable  Dentition: dentition unchanged  Vital Signs Stable: post-procedure vital signs reviewed and stable  Report to RN Given: handoff report given  Patient transferred to: PACU  Comments: Patient oral suctioned. Patient with spontaneous respirations and adequate tidal volumes. Patient awake and responsive. Extubated in OR to 6L facemask. To PACU ventilating well. VSS. Report given.  Handoff Report: Identifed the Patient, Identified the Reponsible Provider, Reviewed the pertinent medical history, Discussed the surgical course, Reviewed Intra-OP anesthesia mangement and issues during anesthesia, Set expectations for post-procedure period and Allowed opportunity for questions and acknowledgement of understanding      Vitals:  Vitals Value Taken Time   /56 12/06/21 1200   Temp     Pulse 84 12/06/21 1205   Resp 0 12/06/21 1205   SpO2 97 % 12/06/21 1205   Vitals shown include unvalidated device data.    Electronically Signed By: AMANDA Umana CRNA  December 6, 2021  12:06 PM

## 2021-12-06 NOTE — BRIEF OP NOTE
[facility]    Brief Operative Note    Pre-operative diagnosis: Cervical spondylosis with myelopathy [M47.12]  Post-operative diagnosis Same as pre-operative diagnosis    Procedure: Procedure(s):  Anterior cervical discectomy and fusion, Cervical 4-7  Surgeon: Surgeon(s) and Role:     * Javi Betancur MD - Primary     * Iris Bazan PA-C - Assisting  Anesthesia: General   Estimated Blood Loss: Less than 50 ml    Drains: Ernie-Patel  Specimens: * No specimens in log *  Findings:   None.  Complications: None.  Implants:   Implant Name Type Inv. Item Serial No.  Lot No. LRB No. Used Action   GRAFT BONE PUTTY DBX [id number] - [id number] Bone/Tissue/Biologic GRAFT BONE PUTTY DBX 05ML [id number] MUSCULOSKELETAL NGUYEN  N/A 1 Implanted   Endoskeleton TC Implant, 6 deg - MED (16 x 14mm), 5mm Metallic Hardware/[place]   [employer] [id number] N/A 1 Implanted   Endoskeleton TC Implant, 6 deg - MED (16 x 14mm), 5mm Metallic Hardware/[place]   MED[employer] [id number] N/A 1 Implanted   Endoskeleton TC Implant, 6 deg - MED (16 x 14mm), 6mm    [employer] [id number] N/A 1 Implanted   55mm Zevo Plate    MEDTRONIC 8002  [id number] N/A 1 Implanted   3.5 x 17mm Self Tap Screw    MEDTRONIC 8002  [id number] N/A 8 Implanted     Javi Betancur MD

## 2021-12-06 NOTE — OP NOTE
Location: Main OR  Attending surgeon: Javi Betancur MD  Assistant surgeon: MEHNAZ Burnett  Preprocedure diagnosis: Cervical spondylosis with myelopathy  Postprocedure diagnosis: Same  Procedure:  1.  Anterior cervical discectomy and fusion with use of allograft for fusion at C4-5, C5-6 and C6-7  2.  Placement of interbody graft at C4-5, C5-6 and C6-7 filled with allograft  3.  Anterior cervical plating at C4, C5, C6 and C7.  4.  Use of intraoperative fluoroscopy  5.  Use of intraoperative microscope  Indications: The patient is a 71-year-old female with symptoms consistent with cervical myelopathy.  On imaging she has cervical stenosis as well as anterolisthesis of C4 and 5 which moved slightly on flexion-extension films.  She is indicated for anterior cervical fusion and discectomy at this time.  Procedure details:  Patient was brought to the main operating, intubated and placed under general esthesia.  She placed in the operating table supine position with her head on a gel donut and a roll under her shoulders.  Her arms were tucked at her side with all pressure points padded.  An incision over the right anterior neck was marked out.  She was cleaned and prepped in sterile fashion.  Local anesthetic was injected following a timeout.  Using a 10 blade incision was made through the skin.  We then used scissors to dissect down to the platysma and then incised the platysma in a transverse fashion.  Blunt dissection was then used to dissect down to the anterior surface of the spine through an avascular plane medial to the carotid sheath.  We then placed retractors into the wound and verified her location at C4-5 with fluoroscopy.  The longus coli and other soft tissue over the anterior spine was removed using bipolar cautery.  We then placed [place] pins into the C4 and 5 vertebral bodies and then incised the annulus using a 15 blade.  Disc material and cartilaginous material over the endplates was removed using  curettes.  We then remove the posterior longitudinal ligament and any further osteophytes with punch rongeurs.  This was done in the central canal as well as into the neuroforamen and afterward verify that there is no further stenosis with a ball-tipped probe into the bilateral neuroforamen.  We then moved to the next level at C5-6 once again placing [place] pins at each level and incising the anterior longitudinal ligament with a 15 blade.  Significant osteophytes were present at this level and a high-speed drill was used to remove the osteophytes until we are able to reach the posterior longitudinal ligament.  This was then incised with punch rongeurs once again and then this was removed out to the neuroforamen verifying that there is no further compression using a ball-tipped probe once again.  The same procedure was also done at C6-7 using a drill as well as punch rongeurs to remove the osteophytes and posterior longitudinal ligament.  The end of the discectomy at each level no further central canal or foraminal stenosis was noted.  We then placed a 5 mm Titan cage into the C6-7 interbody space filled with DBM allograft.  We then did the same procedure at C5-6 placing a 5 mm Titan cage filled with allograft followed by a 6 mm Titan cage at C4-5 filled with allograft.  We then placed a 55 mm Zevo plate from C4-C7 securing it at each level with 17 mm screws.  We verified the hardware location using fluoroscopy.  A VAHE drain was then placed in the wound and tunneled out inferiorly.  We then coagulated any further areas of bleeding and irrigated the wound with normal saline.  The platysma and subcutaneous layers were closed in 3-0 Vicryl sutures.  The dermal layer was closed using 4-0 Monocryl suture with skin glue.  All counts were correct and the procedure.  The patient tolerated the procedure well.  She was extubated and transferred the PACU in stable condition.  I was present for the entire procedure except for  final closure.    Javi Betancur MD

## 2021-12-06 NOTE — ANESTHESIA POSTPROCEDURE EVALUATION
Patient: Vijaya Manjarrez    Procedure: Procedure(s):  Anterior cervical discectomy and fusion, Cervical 4-7       Diagnosis:Cervical spondylosis with myelopathy [M47.12]  Diagnosis Additional Information: No value filed.    Anesthesia Type:  General    Note:     Postop Pain Control: Uneventful            Sign Out: Well controlled pain   PONV: No   Neuro/Psych: Uneventful            Sign Out: Acceptable/Baseline neuro status   Airway/Respiratory: Uneventful            Sign Out: Acceptable/Baseline resp. status   CV/Hemodynamics: Uneventful            Sign Out: Acceptable CV status; No obvious hypovolemia; No obvious fluid overload   Other NRE: NONE   DID A NON-ROUTINE EVENT OCCUR? No           Last vitals:  Vitals Value Taken Time   /71 12/06/21 1600   Temp 98.1  F (36.7  C) 12/06/21 1600   Pulse 80 12/06/21 1618   Resp 10 12/06/21 1618   SpO2 97 % 12/06/21 1618   Vitals shown include unvalidated device data.    Electronically Signed By: Virgil Mondragon MD  December 6, 2021  4:19 PM

## 2021-12-06 NOTE — PROGRESS NOTES
POST OP NOTE    POD0 s/p ACDF C4-7 with Dr. Betancur. No intraoperative complications. EBL <50ml.     PLAN:   Admit   Hospital consult to assist with managing medical conditions   Pain management. No NSAIDs x 2 weeks post op   Bowel regimen  Activity as tolerated. Brace on at all times, OK to remove while eating   [place] in place until up and moving   VAHE drain in place- monitor output. Plans for removal once <50ml/8 hour output   Plans reviewed with Dr. Betancur who was in agreement with plans   Call or page on call RITA with questions

## 2021-12-06 NOTE — ANESTHESIA PREPROCEDURE EVALUATION
Anesthesia Pre-Procedure Evaluation    Patient: Vijaya Manjarrez   MRN: [id number] : 1950        Preoperative Diagnosis: Cervical spondylosis with myelopathy [M47.12]    Procedure : Procedure(s):  Anterior cervical discectomy and fusion, Cervical 4-7          Past Medical History:   Diagnosis Date     Anxiety      Cervical spine degeneration     spinal stenosis,      COPD (chronic obstructive pulmonary disease) (H)     mild     Coronary artery disease      Depression      Diabetes mellitus, type 2 (H)      Diabetic neuropathy (H)      Facet arthropathy, lumbar      Fibromyalgia      GERD (gastroesophageal reflux disease)      HTN (hypertension)      Hyperlipidemia      Lumbar degenerative disc disease      Migraine headache       Past Surgical History:   Procedure Laterality Date     CHOLECYSTECTOMY, LAPOROSCOPIC      Cholecystectomy, Laparoscopic     COMBINED REPAIR PTOSIS WITH BLEPHAROPLASTY BILATERAL Bilateral 3/11/2021    Procedure: Bilateral upper eyelid blepharoplasty and ptosis repair. Right upper eyelid margin lesion biopsy;  Surgeon: George Doll MD;  Location: [facility] OR     CV HEART CATHETERIZATION WITH POSSIBLE INTERVENTION Left 2019    Procedure: Coronary Angiogram;  Surgeon: Cheo Merida MD;  Location:  HEART CARDIAC CATH LAB     CV HEART CATHETERIZATION WITH POSSIBLE INTERVENTION Bilateral 10/2/2020    Procedure: Heart Catheterization with Possible Intervention;  Surgeon: Linda Sauceda MD;  Location: [facility] CARDIAC CATH LAB     CV LEFT HEART CATH N/A 10/2/2020    Procedure: Left Heart Cath;  Surgeon: Linda Sauceda MD;  Location:  HEART CARDIAC CATH LAB     OPEN REDUCTION INTERNAL FIXATION ORBIT BLOWOUT Left 10/8/2020    Procedure: Left Open Reduction Internal Fixation, Fracture, Orbit With Intraoperative Navigation - Left;  Surgeon: George Doll MD;  Location: [facility] OR     PHACOEMULSIFICATION CLEAR CORNEA WITH STANDARD INTRAOCULAR LENS IMPLANT   7/9/2021          PHACOEMULSIFICATION CLEAR CORNEA WITH STANDARD INTRAOCULAR LENS IMPLANT  7/26/2021          PHACOEMULSIFICATION WITH STANDARD INTRAOCULAR LENS IMPLANT Right 7/9/2021    Procedure: PHACOEMULSIFICATION, CATARACT, WITH STANDARD INTRAOCULAR LENS IMPLANT INSERTION RIGHT;  Surgeon: Dai Glaser MD;  Location: [facility] OR     PHACOEMULSIFICATION WITH STANDARD INTRAOCULAR LENS IMPLANT Left 7/26/2021    Procedure: PHACOEMULSIFICATION, CATARACT, WITH STANDARD INTRAOCULAR LENS IMPLANT INSERTION;  Surgeon: Dai Glaser MD;  Location: [facility] OR      Allergies   Allergen Reactions     Penicillins Hives      Social History     Tobacco Use     Smoking status: Light Tobacco Smoker     Packs/day: 0.25     Years: 40.00     Pack years: 10.00     Types: Cigarettes     Smokeless tobacco: Never Used   Substance Use Topics     Alcohol use: No      Wt Readings from Last 1 Encounters:   12/06/21 92.1 kg (203 lb)        Anesthesia Evaluation   Pt has had prior anesthetic. Type: General.        ROS/MED HX  ENT/Pulmonary:     (+) mild,  COPD,     Neurologic: Comment: radiculopathy      Cardiovascular:     (+) Dyslipidemia hypertension--CAD angina-with excertion. --CHF     METS/Exercise Tolerance:     Hematologic: Comments: Lab Test        11/29/21 11/11/21 09/28/21 05/20/20 02/21/19                       1820          1143          1613          1415          1429          WBC          10.8         11.4*        10.4           < >        9.3           HGB          10.9*        11.0*        11.1*          < >        13.2          MCV          84           81           79             < >        88            PLT          319          338          302            < >        299           INR           --           --           --           --          0.97           < > = values in this interval not displayed.                  Lab Test        12/06/21 11/29/21 11/11/21 09/28/21                        0615          1820          1143          1613          NA            --          144          141          142           POTASSIUM     --          3.7          4.2          4.4           CHLORIDE      --          109          109          109           CO2           --          28 29 29            BUN           --          8            8            9             CR            --          0.70         0.62         0.75          ANIONGAP      --          7            3            4             RADHA           --          8.3*         8.3*         8.6           GLC          180*         109*         96           76             - neg hematologic  ROS     Musculoskeletal:  - neg musculoskeletal ROS     GI/Hepatic:     (+) GERD, Asymptomatic on medication,     Renal/Genitourinary:  - neg Renal ROS     Endo:     (+) type II DM, Not using insulin, - not using insulin pump. Diabetic complications: neuropathy.     Psychiatric/Substance Use:  - neg psychiatric ROS     Infectious Disease:  - neg infectious disease ROS     Malignancy:  - neg malignancy ROS     Other:  - neg other ROS          Physical Exam    Airway        Mallampati: II   TM distance: > 3 FB   Neck ROM: full   Mouth opening: > 3 cm    Respiratory Devices and Support         Dental  no notable dental history         Cardiovascular   cardiovascular exam normal          Pulmonary   pulmonary exam normal                OUTSIDE LABS:  CBC:   Lab Results   Component Value Date    WBC 10.8 11/29/2021    WBC 11.4 (H) 11/11/2021    HGB 10.9 (L) 11/29/2021    HGB 11.0 (L) 11/11/2021    HCT 36.2 11/29/2021    HCT 35.5 11/11/2021     11/29/2021     11/11/2021     BMP:   Lab Results   Component Value Date     11/29/2021     11/11/2021    POTASSIUM 3.7 11/29/2021    POTASSIUM 4.2 11/11/2021    CHLORIDE 109 11/29/2021    CHLORIDE 109 11/11/2021    CO2 28 11/29/2021    CO2 29 11/11/2021    BUN 8 11/29/2021     BUN 8 11/11/2021    CR 0.70 11/29/2021    CR 0.62 11/11/2021     (H) 12/06/2021     (H) 11/29/2021     COAGS:   Lab Results   Component Value Date    PTT 34 02/21/2019    INR 0.97 02/21/2019     POC:   Lab Results   Component Value Date     (H) 07/09/2021     HEPATIC:   Lab Results   Component Value Date    ALBUMIN 3.6 11/29/2021    PROTTOTAL 7.5 11/29/2021    ALT 22 11/29/2021    AST 17 11/29/2021    ALKPHOS 77 11/29/2021    BILITOTAL 0.4 11/29/2021     OTHER:   Lab Results   Component Value Date    LACT 1.3 11/29/2021    A1C 7.3 (H) 11/11/2021    RADHA 8.3 (L) 11/29/2021    MAG 1.6 08/17/2020    LIPASE 107 11/29/2021    TSH 0.50 07/05/2021    CRP <2.9 07/05/2021    SED 19 07/05/2021       Anesthesia Plan    ASA Status:  3      Anesthesia Type: General.     - Airway: ETT   Induction: Intravenous, Propofol.   Maintenance: Balanced.   Techniques and Equipment:     - Airway: Video-Laryngoscope         Consents    Anesthesia Plan(s) and associated risks, benefits, and realistic alternatives discussed. Questions answered and patient/representative(s) expressed understanding.    - Discussed:     - Discussed with:  Patient      - Extended Intubation/Ventilatory Support Discussed: No.      - Patient is DNR/DNI Status: No    Use of blood products discussed: Yes.     - Discussed with: Patient.     - Consented: consented to blood products            Reason for refusal: other.     Postoperative Care    Pain management: IV analgesics.   PONV prophylaxis: Ondansetron (or other 5HT-3), Dexamethasone or Solumedrol     Comments:                Virgil Mondragon MD

## 2021-12-07 ENCOUNTER — APPOINTMENT (OUTPATIENT)
Dept: PHYSICAL THERAPY | Facility: CLINIC | Age: 71
DRG: 473 | End: 2021-12-07
Attending: PHYSICIAN ASSISTANT
Payer: COMMERCIAL

## 2021-12-07 ENCOUNTER — APPOINTMENT (OUTPATIENT)
Dept: OCCUPATIONAL THERAPY | Facility: CLINIC | Age: 71
DRG: 473 | End: 2021-12-07
Attending: PHYSICIAN ASSISTANT
Payer: COMMERCIAL

## 2021-12-07 ENCOUNTER — PATIENT OUTREACH (OUTPATIENT)
Dept: GERIATRIC MEDICINE | Facility: CLINIC | Age: 71
End: 2021-12-07
Payer: COMMERCIAL

## 2021-12-07 ENCOUNTER — APPOINTMENT (OUTPATIENT)
Dept: GENERAL RADIOLOGY | Facility: CLINIC | Age: 71
DRG: 473 | End: 2021-12-07
Attending: PHYSICIAN ASSISTANT
Payer: COMMERCIAL

## 2021-12-07 LAB
GLUCOSE BLDC GLUCOMTR-MCNC: 118 MG/DL (ref 70–99)
GLUCOSE BLDC GLUCOMTR-MCNC: 131 MG/DL (ref 70–99)
GLUCOSE BLDC GLUCOMTR-MCNC: 132 MG/DL (ref 70–99)
GLUCOSE BLDC GLUCOMTR-MCNC: 144 MG/DL (ref 70–99)
GLUCOSE BLDC GLUCOMTR-MCNC: 149 MG/DL (ref 70–99)
GLUCOSE BLDC GLUCOMTR-MCNC: 154 MG/DL (ref 70–99)
HGB BLD-MCNC: 11 G/DL (ref 11.7–15.7)

## 2021-12-07 PROCEDURE — 97535 SELF CARE MNGMENT TRAINING: CPT | Mod: GO

## 2021-12-07 PROCEDURE — 97116 GAIT TRAINING THERAPY: CPT | Mod: GP | Performed by: PHYSICAL THERAPIST

## 2021-12-07 PROCEDURE — 97161 PT EVAL LOW COMPLEX 20 MIN: CPT | Mod: GP | Performed by: PHYSICAL THERAPIST

## 2021-12-07 PROCEDURE — 97530 THERAPEUTIC ACTIVITIES: CPT | Mod: GP | Performed by: PHYSICAL THERAPIST

## 2021-12-07 PROCEDURE — 999N000065 XR CERVICAL SPINE 2/3 VWS

## 2021-12-07 PROCEDURE — 36415 COLL VENOUS BLD VENIPUNCTURE: CPT | Performed by: PHYSICIAN ASSISTANT

## 2021-12-07 PROCEDURE — 97165 OT EVAL LOW COMPLEX 30 MIN: CPT | Mod: GO

## 2021-12-07 PROCEDURE — 120N000001 HC R&B MED SURG/OB

## 2021-12-07 PROCEDURE — 250N000012 HC RX MED GY IP 250 OP 636 PS 637: Performed by: INTERNAL MEDICINE

## 2021-12-07 PROCEDURE — 250N000013 HC RX MED GY IP 250 OP 250 PS 637: Performed by: PHYSICIAN ASSISTANT

## 2021-12-07 PROCEDURE — 85018 HEMOGLOBIN: CPT | Performed by: PHYSICIAN ASSISTANT

## 2021-12-07 RX ORDER — ALBUTEROL SULFATE 90 UG/1
2 AEROSOL, METERED RESPIRATORY (INHALATION) EVERY 6 HOURS PRN
Status: DISCONTINUED | OUTPATIENT
Start: 2021-12-07 | End: 2021-12-08 | Stop reason: HOSPADM

## 2021-12-07 RX ADMIN — INSULIN ASPART 1 UNITS: 100 INJECTION, SOLUTION INTRAVENOUS; SUBCUTANEOUS at 18:06

## 2021-12-07 RX ADMIN — MIRABEGRON 25 MG: 25 TABLET, FILM COATED, EXTENDED RELEASE ORAL at 07:54

## 2021-12-07 RX ADMIN — PREGABALIN 150 MG: 50 CAPSULE ORAL at 20:35

## 2021-12-07 RX ADMIN — FLUTICASONE PROPIONATE 2 SPRAY: 50 SPRAY, METERED NASAL at 09:01

## 2021-12-07 RX ADMIN — OXYCODONE HYDROCHLORIDE 10 MG: 5 TABLET ORAL at 14:33

## 2021-12-07 RX ADMIN — AMLODIPINE BESYLATE 5 MG: 5 TABLET ORAL at 07:53

## 2021-12-07 RX ADMIN — LISINOPRIL 20 MG: 20 TABLET ORAL at 20:35

## 2021-12-07 RX ADMIN — ALBUTEROL SULFATE 2 PUFF: 90 AEROSOL, METERED RESPIRATORY (INHALATION) at 12:28

## 2021-12-07 RX ADMIN — ACETAMINOPHEN 975 MG: 325 TABLET, FILM COATED ORAL at 10:30

## 2021-12-07 RX ADMIN — DULOXETINE HYDROCHLORIDE 60 MG: 60 CAPSULE, DELAYED RELEASE ORAL at 07:53

## 2021-12-07 RX ADMIN — ACETAMINOPHEN 975 MG: 325 TABLET, FILM COATED ORAL at 02:00

## 2021-12-07 RX ADMIN — OMEPRAZOLE 20 MG: 20 CAPSULE, DELAYED RELEASE ORAL at 06:21

## 2021-12-07 RX ADMIN — ACETAMINOPHEN 975 MG: 325 TABLET, FILM COATED ORAL at 18:06

## 2021-12-07 RX ADMIN — FEXOFENADINE HYDROCHLORIDE 60 MG: 60 TABLET ORAL at 07:53

## 2021-12-07 RX ADMIN — ALBUTEROL SULFATE 2 PUFF: 90 AEROSOL, METERED RESPIRATORY (INHALATION) at 06:24

## 2021-12-07 RX ADMIN — FEXOFENADINE HYDROCHLORIDE 60 MG: 60 TABLET ORAL at 20:35

## 2021-12-07 RX ADMIN — SENNOSIDES AND DOCUSATE SODIUM 1 TABLET: 50; 8.6 TABLET ORAL at 20:35

## 2021-12-07 RX ADMIN — TRAZODONE HYDROCHLORIDE 100 MG: 100 TABLET ORAL at 21:52

## 2021-12-07 RX ADMIN — METHOCARBAMOL 750 MG: 750 TABLET ORAL at 00:42

## 2021-12-07 RX ADMIN — ATORVASTATIN CALCIUM 80 MG: 40 TABLET, FILM COATED ORAL at 07:53

## 2021-12-07 RX ADMIN — METFORMIN HYDROCHLORIDE 500 MG: 500 TABLET, FILM COATED ORAL at 07:54

## 2021-12-07 RX ADMIN — METFORMIN HYDROCHLORIDE 500 MG: 500 TABLET, FILM COATED ORAL at 18:06

## 2021-12-07 RX ADMIN — OMEPRAZOLE 20 MG: 20 CAPSULE, DELAYED RELEASE ORAL at 15:48

## 2021-12-07 RX ADMIN — OXYCODONE HYDROCHLORIDE 5 MG: 5 TABLET ORAL at 01:59

## 2021-12-07 RX ADMIN — PREGABALIN 150 MG: 50 CAPSULE ORAL at 08:59

## 2021-12-07 RX ADMIN — OXYCODONE HYDROCHLORIDE 10 MG: 5 TABLET ORAL at 10:32

## 2021-12-07 RX ADMIN — METHOCARBAMOL 750 MG: 750 TABLET ORAL at 18:06

## 2021-12-07 RX ADMIN — LISINOPRIL 20 MG: 20 TABLET ORAL at 12:09

## 2021-12-07 RX ADMIN — OXYCODONE HYDROCHLORIDE 5 MG: 5 TABLET ORAL at 21:52

## 2021-12-07 RX ADMIN — MULTIPLE VITAMINS W/ MINERALS TAB 1 TABLET: TAB at 07:54

## 2021-12-07 RX ADMIN — OXYCODONE HYDROCHLORIDE 10 MG: 5 TABLET ORAL at 18:06

## 2021-12-07 RX ADMIN — METHOCARBAMOL 750 MG: 750 TABLET ORAL at 12:09

## 2021-12-07 RX ADMIN — PREGABALIN 150 MG: 50 CAPSULE ORAL at 14:33

## 2021-12-07 RX ADMIN — ALBUTEROL SULFATE 2 PUFF: 90 AEROSOL, METERED RESPIRATORY (INHALATION) at 18:40

## 2021-12-07 RX ADMIN — METOPROLOL TARTRATE 25 MG: 25 TABLET, FILM COATED ORAL at 07:54

## 2021-12-07 RX ADMIN — METHOCARBAMOL 750 MG: 750 TABLET ORAL at 06:22

## 2021-12-07 RX ADMIN — OXYCODONE HYDROCHLORIDE 5 MG: 5 TABLET ORAL at 06:22

## 2021-12-07 RX ADMIN — METOPROLOL TARTRATE 25 MG: 25 TABLET, FILM COATED ORAL at 20:35

## 2021-12-07 ASSESSMENT — ACTIVITIES OF DAILY LIVING (ADL)
ADLS_ACUITY_SCORE: 11
ADLS_ACUITY_SCORE: 9
ADLS_ACUITY_SCORE: 11
ADLS_ACUITY_SCORE: 9
DEPENDENT_IADLS:: CLEANING;SHOPPING
ADLS_ACUITY_SCORE: 9
ADLS_ACUITY_SCORE: 9
ADLS_ACUITY_SCORE: 11
ADLS_ACUITY_SCORE: 9

## 2021-12-07 NOTE — PHARMACY-ADMISSION MEDICATION HISTORY
Medication reconciliation completed by pre-admitting.    Prior to Admission medications    Medication Sig Last Dose Taking? Auth Provider   acetaminophen (TYLENOL ARTHRITIS PAIN) 650 MG CR tablet Take 650 mg by mouth 2 times daily as needed  12/5/2021 at Unknown time Yes Reported, Patient   albuterol (PROAIR HFA/PROVENTIL HFA/VENTOLIN HFA) 108 (90 Base) MCG/ACT inhaler Inhale 2 puffs into the lungs every 6 hours Past Week at Unknown time Yes Genoveva Carroll APRN CNP   amLODIPine (NORVASC) 5 MG tablet TAKE ONE TABLET BY MOUTH ONCE DAILY 12/2/2021 Yes Vidhi Mario MD   atorvastatin (LIPITOR) 80 MG tablet TAKE 1 TABLET (80 MG) BY MOUTH DAILY (NEED FASTING LABS) 12/2/2021 Yes Vidhi Mario MD   blood glucose (NO BRAND SPECIFIED) test strip Use to test blood sugars 1 times daily or as directed, use brand same as previous  Yes Genoveva Carroll APRN CNP   cyclobenzaprine (FLEXERIL) 10 MG tablet Take 1 tablet (10 mg) by mouth 2 times daily 12/2/2021 Yes Genoveva Carroll APRN CNP   DULoxetine (CYMBALTA) 60 MG capsule TAKE ONE CAPSULE BY MOUTH ONCE DAILY 12/2/2021 Yes Vidhi Mario MD   fexofenadine (ALLEGRA) 60 MG tablet Take 1 tablet (60 mg) by mouth 2 times daily 12/2/2021 Yes Genoveva Carroll APRN CNP   fluticasone (FLONASE) 50 MCG/ACT nasal spray SPRAY 2 SPRAYS IN EACH NOSTRIL ONCE DAILY Past Week at Unknown time Yes Vidhi Mario MD   hydrOXYzine (ATARAX) 10 MG tablet TAKE ONE TABLET BY MOUTH THREE TIMES A DAY AS NEEDED FOR ANXIETY 12/5/2021 at Unknown time Yes Genoveva Carroll APRN CNP   lisinopril (ZESTRIL) 20 MG tablet TAKE ONE TABLET BY MOUTH TWICE A DAY 12/6/2021 at Unknown time Yes Vidhi Mario MD   metFORMIN (GLUCOPHAGE) 500 MG tablet TAKE ONE TABLET BY MOUTH TWICE A DAY WITH A MEAL Past Week at Unknown time Yes Vidhi Mario MD   metoprolol tartrate (LOPRESSOR) 25 MG tablet Take 25 mg by mouth 2 times daily 12/6/2021 at Unknown time Yes Shelbi,  Vidhi Spencer MD   mirabegron (MYRBETRIQ) 25 MG 24 hr tablet Take 1 tablet (25 mg) by mouth daily 12/2/2021 Yes Genoveva Carroll APRN CNP   Multiple Vitamins-Minerals (MULTIVITAMIN ADULT PO) Take 5 tablets by mouth daily Pro-caps vitamin  12/2/2021 Yes Unknown, Entered By History   nitroGLYcerin (NITROSTAT) 0.4 MG sublingual tablet FOR CHEST PAIN PLACE 1 TABLET UNDER THE TONGUE EVERY 5 MINUTES FOR 3 DOSES IF NEEDED. IF SYMPTOMS PERSIST 5 MINUTES AFTER FIRST DOSE CALL 911.  Yes Vidhi Mario MD   nystatin (MYCOSTATIN) 787229 UNIT/GM external powder APPLY TO AFFECTED AREA(S) TOPICALLY THREE TIMES A DAY AS NEEDED Past Week at Unknown time Yes Vidhi Mario MD   omeprazole (PRILOSEC) 20 MG DR capsule TAKE ONE CAPSULE BY MOUTH TWICE A DAY 12/2/2021 Yes Vidhi Mario MD   pregabalin (LYRICA) 150 MG capsule TAKE ONE CAPSULE BY MOUTH THREE TIMES A DAY 12/2/2021 Yes Genoveva Carroll APRN CNP   tiotropium (SPIRIVA HANDIHALER) 18 MCG inhaled capsule INHALE ONE CAPSULE BY MOUTH ONCE DAILY 12/5/2021 at Unknown time Yes Genoveva Carroll APRN CNP   traMADol (ULTRAM) 50 MG tablet TAKE 1 TABLET (50 MG) BY MOUTH EVERY 6 HOURS AS NEEDED FOR SEVERE PAIN DUE FOR APPOINTMENT IN SEPTEMBER. 12/5/2021 at Unknown time Yes Genoveva Carroll APRN CNP   traZODone (DESYREL) 100 MG tablet TAKE TWO TABLETS BY MOUTH EVERY NIGHT AT BEDTIME 12/2/2021 Yes Vidhi Mario MD   order for DME Equipment being ordered: glucometer   Vidhi Mario MD

## 2021-12-07 NOTE — PROGRESS NOTES
TRANSITIONS OF CARE (NEO) LOG   NEO tasks should be completed by the CC within one (1) business day of notification of each transition. Follow up contact with member is required after return to their usual care setting.  Note:  If CC finds out about the transitions fifteen (15) days or more after the member has returned to their usual care setting, no NEO log is needed. However, the CC should check in with the member to discuss the transition process, any changes needed to the care plan and document it in a case note.    Member Name:  [facility]ijaya Manjarrez [facility] Member [facility]#:    Product: [facility] Care Coordinator Contact:  Vanesa Gtz RN Agency/County/Care System: [facility]   Transition Communication Actions from Care Management Contact   Transition #1   Notification Date: 12/6/21 Transition Date: 12/6/21 Transition From: Home     Is this the member s usual care setting?               yes Transition To: Hospital, [facility]   Transition Type:  Planned  Reason for Admission/Comments:  CC received notification of Hospital admission. Scheduled procedure on 12/6/21.  CC contacted Hospital /discharge - Transition Hand In sent via EMR.  Previously discussed this hospitalization with member. She is aware writer will follow through any additional transitions.   Reviewed and update care plan as needed.     Transition log initiated.   PCP notified of hospitalization via EMR.  Vanesa Gtz RN  [facility]  [phone]    12/7/21 Spoke with TREVON Sandoval who shared that recommendation is for member to return home. Discussed that member has limited support in the home. Home Care (RN, PT and potentially HHA) will be ordered.   12/8/21 Call back from Lori - member has improved and home care will not be ordered. Member will discharge today. Writer will call and check in tomorrow.      Shared CC contact info, care plan/services with receiving setting--Date completed:  12/7/21    Notified PCP of transition--Date completed: 12/6/21     via  EMR   Transition #2   Transition #3  (if applicable)   Notification Date: 12/8/21         Transition To:  Home  Transition Date: 12/8/21     Transition Type:    Planned  Notified PCP -- Date completed: 12/8/21              Shared CC contact info, care plan/services with receiving setting or, if applicable, home care agency--Date completed: N/A  *Complete additional tasks below, if this transition is a return to usual care setting.      Comments:    CC received notification of discharge to home.   CC contacted member and reviewed discharge summary. Member shares that she is doing well. Mobility is good and pain is well controlled.   Member has a follow-up appointment with PCP in 7 days: Scheduled for Neurosurgery follow up on 12/20/21   Member has had a change in condition: No - return to baseline.  Home visit needed: No  Care plan reviewed and updated.  The following home based services Homemaking were resumed.  New referrals placed: No  Transition log completed.     Vanesa Gtz RN  [facility]  [phone]        *Complete tasks below when the member is discharging TO their usual care setting within one (1) business day of notification.  For situations where the Care Coordinator is notified of the discharge prior to the date of discharge, the Care Coordinator must follow up with the member or designated representative to confirm that discharge actually occurred and discuss required NEO tasks as outlined in the NEO Instructions.  (This includes situations where it may be a  new  usual care setting for the member. (i.e., a community member who decides upon permanent nursing home placement following hospitalization and rehab).    Date completed: 12/9/21 Communicated with member or their designated representative about the following:  care transition process; about changes to the member s health status; plan of care updates; education  about transitions and how to prevent unplanned transitions/readmissions  Four Pillars for Optimal Transition:    Check  Yes  - if the member, family member and/or SNF/facility staff manages the following:    If  No  provide explanation in the comments section.          [x]  Yes     []  No     Does the member have a follow-up appointment scheduled with primary care or specialist? (Mental health hospitalizations--the appt. should be w/in 7 days)   [x]  Yes     []  No     Can the member manage their medications or is there a system in place to manage medications (e.g. home care set-up)?         [x]  Yes     []  No     Can the member verbalize warning signs and symptoms to watch for and how to respond?         [x]  Yes     []  No     Does the member use a Personal Health Care Record?  Check  Yes  if visit summary, discharge summary, and/or healthcare summary are being used as a PHR.                                                                                                                                                                                    [x] Yes      [] No      Have you updated the member s care plan?  If  No  provide explanation in comments.   Comments:  Per hospital, back to baseline so no home care was needed. No change in services.

## 2021-12-07 NOTE — PLAN OF CARE
3pm-11pm RN    Patient VS are at baseline: Yes BP up but BP medications given  Patient able to ambulate as they were prior to admission or with assist devices provided by therapy during their stay: No stood at bedside took few steps but stated she was light headed and had backache  Patient must void prior to discharge: No has a jennings catheter  Patient able to tolerate oral intake: Yes but had poor appetite  Patient has adequate pain control using oral analgesics: Yes    Patient came up to the room at approximately 1815 on a cart, transferred to bed using a hover mat. Patient A&Ox4. Has a VAHE drain which put out 20ml. Has a jennings catheter which is patent and is draining. Stood up at the bedside, took a few side steps but stated she was light headed and has a back ache. Incision on neck is CDI and open to air. Will continue to monitor.

## 2021-12-07 NOTE — CONSULTS
Care Management Initial Consult    General Information  Assessment completed with: Patient,    Type of CM/SW Visit: Initial Assessment    Primary Care Provider verified and updated as needed: Yes   Readmission within the last 30 days:        Reason for Consult: care coordination/care conference,discharge planning  Advance Care Planning:            Communication Assessment  Patient's communication style: spoken language (English or Bilingual)    Hearing Difficulty or Deaf: no   Wear Glasses or Blind: yes    Cognitive  Cognitive/Neuro/Behavioral: WDL  Level of Consciousness: alert  Arousal Level: opens eyes spontaneously                Living Environment:   People in home: alone     Current living Arrangements: apartment      Able to return to prior arrangements: yes       Family/Social Support:  Care provided by: self,other (see comments) ([facility] through [facility])  Provides care for:    Marital Status: Single  Children          Description of Support System: Supportive,Involved    Support Assessment: Adequate family and caregiver support    Current Resources:   Patient receiving home care services: No     Community Resources: [facility] Worker  Equipment currently used at home: grab bar, toilet,grab bar, tub/shower,shower chair,walker, rolling  Supplies currently used at home:      Employment/Financial:  Employment Status: retired        Financial Concerns: No concerns identified           Lifestyle & Psychosocial Needs:  Social Determinants of Health     Tobacco Use: High Risk     Smoking Tobacco Use: Light Tobacco Smoker     Smokeless Tobacco Use: Never Used   Alcohol Use: Not At Risk     Frequency of Alcohol Consumption: Never     Average Number of Drinks: Patient refused     Frequency of Binge Drinking: Never   Financial Resource Strain: High Risk     Difficulty of Paying Living Expenses: Hard   Food Insecurity: Food Insecurity Present     Worried About Running Out of Food in the Last Year: Often true     Ran Out of Food  in the Last Year: Often true   Transportation Needs: Unknown     Lack of Transportation (Medical): No     Lack of Transportation (Non-Medical): Not on file   Physical Activity: Inactive     Days of Exercise per Week: 0 days     Minutes of Exercise per Session: 0 min   Stress: Stress Concern Present     Feeling of Stress : To some extent   Social Connections: Moderately Isolated     Frequency of Communication with Friends and Family: More than three times a week     Frequency of Social Gatherings with Friends and Family: Once a week     Attends [employer] Services: More than 4 times per year     Active Member of Clubs or Organizations: No     Attends Club or Organization Meetings: Not on file     Marital Status:    Intimate Partner Violence: Not on file   Depression: Not at risk     PHQ-2 Score: 0   Housing Stability: Unknown     Unable to Pay for Housing in the Last Year: Patient refused     Number of Places Lived in the Last Year: 1     Unstable Housing in the Last Year: No       Functional Status:  Prior to admission patient needed assistance:   Dependent ADLs:: Independent  Dependent IADLs:: Cleaning,Shopping       Mental Health Status:  Mental Health Status: No Current Concerns       Chemical Dependency Status:  Chemical Dependency Status: No Current Concerns             Values/Beliefs:  Spiritual, Cultural Beliefs, [employer] Practices, Values that affect care: no               Additional Information:  Pt had an anterior cervical discectomy and fusion on 12/6. PT recommending home with HC.  Met with pt at bedside to discuss discharge planning.  Pt live alone in [facility] [place] [facility], does have a life alert pin.  She ambulate independently, set up own meds, cooks meals.  She does received 3 hours/week of housekeeping services .  She has an Elderly Waiver.  KAUSHIK spoke with JEFFREY Alexis [facility] [phone] who confirmed services. Vanesa believes pt would benefit from RN and PT services at discharge. HC referral sent to [facility]  [facility]. Pt will have friend transport home at discharge.    Lori Babb RN, BSN, PHN, CCM  Care Coordinator  [facility]  [phone]

## 2021-12-07 NOTE — PROGRESS NOTES
[facility]    Neurosurgery  Daily Note    Assessment & Plan   Procedure(s):  1.  Anterior cervical discectomy and fusion with use of allograft for fusion at C4-5, C5-6 and C6-7 2.  Placement of interbody graft at C4-5, C5-6 and C6-7 filled with allograft 3.  Anterior cervical plating at C4, C5, C6 and C7   1 Day Post-Op  Nyasia admits pain is well controlled and at 4/10. She denies new paresthesias, weakness, bowel/bladder changes. She ate breakfast without difficulty. VAHE output 15/8 hours. Incision C/D/I.     Plan:  -Advance activity as tolerated  -Continue supportive and symptomatic treatment  -Start or continue physical therapy  -Pain management. No NSAIDs x 2 weeks post op   -Bowel regimen  -Activity as tolerated. Brace on at all times, OK to remove while eating   -Remove  VAHE drain today  -Plans reviewed with Dr. Betancur who was in agreement with plans   -Call or page on call RITA with questions    Active Problems:    Surgery, elective      Iris Bazan    Interval History   STABLE.    Physical Exam   Temp: 98.4  F (36.9  C) Temp src: Temporal BP: (!) 147/71 Pulse: 70   Resp: 18 SpO2: 93 % O2 Device: None (Room air) Oxygen Delivery: 2 LPM  Vitals:    12/06/21 0605   Weight: 203 lb (92.1 kg)     Vital Signs with Ranges  Temp:  [97  F (36.1  C)-98.6  F (37  C)] 98.4  F (36.9  C)  Pulse:  [70-92] 70  Resp:  [8-24] 18  BP: (119-168)/(56-94) 147/71  SpO2:  [89 %-99 %] 93 %  I/O last 3 completed shifts:  In: 2520 [P.O.:520; I.V.:2000]  Out: 3010 [Urine:2920; Drains:65; Blood:25]    Awake, alert, upright in chair, NAD  Anterior cervical incision c/d/i   c collar in place  VAHE drain intact   Symmetric strength BUE and BLE   negative geovanny sign     Medications        acetaminophen  975 mg Oral Q8H     albuterol  2 puff Inhalation Q6H     amLODIPine  5 mg Oral Daily     atorvastatin  80 mg Oral Daily     DULoxetine  60 mg Oral Daily     fexofenadine  60 mg Oral BID     fluticasone  2 spray Both  Nostrils Daily     insulin aspart  1-7 Units Subcutaneous TID AC     insulin aspart  1-5 Units Subcutaneous At Bedtime     lisinopril  20 mg Oral BID     metFORMIN  500 mg Oral BID w/meals     methocarbamol  750 mg Oral Q6H     metoprolol tartrate  25 mg Oral BID     mirabegron  25 mg Oral Daily     multivitamin w/minerals  1 tablet Oral Daily     omeprazole  20 mg Oral BID AC     polyethylene glycol  17 g Oral Daily     pregabalin  150 mg Oral TID     senna-docusate  1 tablet Oral BID     sodium chloride (PF)  3 mL Intracatheter Q8H     traZODone  100 mg Oral At Bedtime     umeclidinium  1 puff Inhalation Daily       Plans discussed with Dr. Betancur who was in agreement with plans    Iris CORBIN [facility] Neurosurgery  [address] [address]    Tel [phone]  Pager [phone]

## 2021-12-07 NOTE — PROGRESS NOTES
12/07/21 1522   Quick Adds   Type of Visit Initial Occupational Therapy Evaluation   Living Environment   People in home alone   Current Living Arrangements apartment;assisted living   Home Accessibility no concerns   Transportation Anticipated family or friend will provide   Living Environment Comments Pt lives alone in senior apartment, no environmental concerns, walk in shower with shower chair, grab bars and RTS.    Self-Care   Usual Activity Tolerance good   Current Activity Tolerance moderate   Regular Exercise Other (see comments)   Equipment Currently Used at Home grab bar, toilet;grab bar, tub/shower;shower chair;walker, rolling   Disability/Function   Fall history within last six months no   Change in Functional Status Since Onset of Current Illness/Injury yes   General Information   Onset of Illness/Injury or Date of Surgery 12/06/21   Referring Physician Iris Bazan PA-C   Patient/Family Therapy Goal Statement (OT) Pt's goal is to d/c home    Additional Occupational Profile Info/Pertinent History of Current Problem Per chart: Pt is a 71 year old female s/p ACDF C4-7.    Performance Patterns (Routines, Roles, Habits) Pt ambulated primarily without a device but occasionally uses a 4WW when she feels her legs might give out on her..  Pt's friend drives her to go grocery shopping.  Pt has a  every week. Has assist 3 hours/week for household tasks (dishes, cleaning, laundry, etc).    Existing Precautions/Restrictions spinal  (brace on at all times; )   Cognitive Status Examination   Orientation Status orientation to person, place and time   Visual Perception   Visual Impairment/Limitations corrective lenses full-time   Pain Assessment   Patient Currently in Pain Yes, see Vital Sign flowsheet  (3/10 pain; headache, low back pain )   Range of Motion Comprehensive   Comment, General Range of Motion BUEs WNL   Strength Comprehensive (MMT)   Comment, General Manual Muscle Testing (MMT)  Assessment UE strength NT   Bed Mobility   Bed Mobility sit-supine   Sit-Supine [place] (Bed Mobility) supervision   Transfers   Transfers sit-stand transfer;toilet transfer   Sit-Stand Transfer   Sit-Stand [place] (Transfers) supervision   Assistive Device (Sit-Stand Transfers) walker, front-wheeled   Toilet Transfer   [place] Level (Toilet Transfer) supervision   Assistive Device (Toilet Transfer) walker, front-wheeled   Balance   Balance Comments SBA while in stance FWW level    Activities of Daily Living   BADL Assessment toileting;lower body dressing   Lower Body Dressing Assessment   [place] Level (Lower Body Dressing) set up   Toileting   [place] Level (Toileting) supervision   Clinical Impression   Criteria for Skilled Therapeutic Interventions Met (OT) yes;meets criteria;skilled treatment is necessary   OT Diagnosis Impaired ADLs, IADLs and mobility tasks    OT Problem List-Impairments impacting ADL problems related to;activity tolerance impaired;pain;strength;post-surgical precautions   ADL comments/analysis Pt below baseline level of functioning in daily tasks   Assessment of Occupational Performance 5 or more Performance Deficits   Identified Performance Deficits Bathing, dressing, grooming, toileting, transfers   Planned Therapy Interventions (OT) ADL retraining;IADL retraining;strengthening;transfer training   Clinical Decision Making Complexity (OT) low complexity   Therapy Frequency (OT) 1x eval and treat   Risk & Benefits of therapy have been explained evaluation/treatment results reviewed;care plan/treatment goals reviewed;current/potential barriers reviewed;risks/benefits reviewed;participants voiced agreement with care plan;participants included;patient   OT Discharge Planning    OT Discharge Recommendation (DC Rec) Home with assist   OT Rationale for DC Rec Pt doing well. No further questions for OT. Anticipate will require continued assist for heavy IADLs (laundry,  cleaning, etc), which pt has assist for at baseline.    Total Evaluation Time (Minutes)   Total Evaluation Time (Minutes) 8

## 2021-12-07 NOTE — PLAN OF CARE
A&O x4. VSS. Lungs clear. On oxygen 1.5 L NC. CMS intact with baseline numbness and tingling in extremities. Due to void, output 25 mL. Last BM 12/4, refused laxatives. Neck brace on. Surgical incision cleaned and new dressing applied. VAHE drain taken out, output 5 mL. Neck and head pain managed with Tylenol, robaxin, and oxycodone. A1, GB and walker. Pt ambulated to bathroom and sat in chair today. Saw PT and OT. Tolerating regular diet. Continue to manage pain and cares as tolerated. 1443 unable to wean from oxygen. Remains on 1.5L per NC. Voided in toilet. Missed hat. Pain controlled. Plan is home tomorrow.

## 2021-12-07 NOTE — PLAN OF CARE
Vss. Afebrile. Lungs clr-O2 2L mid 90s. Is done to 1200. Hypoactive bs/no gas, no nausea. Tolerating diet. Last bm 12/04/21. [place] removed at 0630-pt dtv from this time.Information given to receiving Day shift RN. Saline locked. Drsg-cdi. Cms-(baseline) numb/tingling to hands/feet. [place] patent. Pain managed with Oxycodone/Tylenol/Robaxin. Cervical collar in place. Skin has redness under breasts & groin area. Tolerating ice. Repositioning self. No other significant issues noted overnight.

## 2021-12-07 NOTE — PROGRESS NOTES
[facility]   Due to a high volume of referrals [facility] has requested this patient's home care episode be transferred to their deferral partner, [facility] [phone].  Care team notified.

## 2021-12-07 NOTE — PROGRESS NOTES
[facility] Care Coordination Contact    [facility]  Ambulatory Care Coordination to Inpatient Care Management   Hand-In Communication    Date:  December 7, 2021  Name: Vijaya Manjarrez is enrolled in [facility] Care Coordination program and I am the Lead Care Coordinator.  CC Contact Information: Vanesa Gtz [phone].  Payor Source: Payor: [facility] / Plan: [employer] / Product Type: HMO /   Current services in place:    Nyasia lives alone in a senior living complex. She receives weekly assistance with homemaking, but that is her only formal service. She is independent with ADLs at baseline. Her family lives out of the state, so she does not have strong informal support available. We discussed the possibility of a TCU stay after the procedure if she is needing more assistance. She tends to refuse additional support (even when really needed) and has struggled at home in the past when in pain or ill.     I will follow this admission in Epic. Please feel free to contact me with questions or for further collaboration in discharge planning.    Vanesa Gtz RN  [facility]  [phone]

## 2021-12-07 NOTE — PLAN OF CARE
Occupational Therapy Discharge Summary    Reason for therapy discharge:    All goals and outcomes met, no further needs identified.    Progress towards therapy goal(s). See goals on Care Plan in [facility] electronic health record for goal details.  Goals met    Therapy recommendation(s):    No further skilled OT needs identified.

## 2021-12-08 ENCOUNTER — APPOINTMENT (OUTPATIENT)
Dept: PHYSICAL THERAPY | Facility: CLINIC | Age: 71
DRG: 473 | End: 2021-12-08
Attending: STUDENT IN AN ORGANIZED HEALTH CARE EDUCATION/TRAINING PROGRAM
Payer: COMMERCIAL

## 2021-12-08 VITALS
DIASTOLIC BLOOD PRESSURE: 60 MMHG | HEART RATE: 70 BPM | WEIGHT: 203 LBS | SYSTOLIC BLOOD PRESSURE: 113 MMHG | OXYGEN SATURATION: 92 % | BODY MASS INDEX: 30.07 KG/M2 | TEMPERATURE: 97.5 F | HEIGHT: 69 IN | RESPIRATION RATE: 16 BRPM

## 2021-12-08 LAB
CREAT SERPL-MCNC: 0.64 MG/DL (ref 0.52–1.04)
GFR SERPL CREATININE-BSD FRML MDRD: >90 ML/MIN/1.73M2
GLUCOSE BLDC GLUCOMTR-MCNC: 126 MG/DL (ref 70–99)
GLUCOSE BLDC GLUCOMTR-MCNC: 129 MG/DL (ref 70–99)
HGB BLD-MCNC: 11 G/DL (ref 11.7–15.7)
HOLD SPECIMEN: NORMAL

## 2021-12-08 PROCEDURE — 97116 GAIT TRAINING THERAPY: CPT | Mod: GP

## 2021-12-08 PROCEDURE — 97530 THERAPEUTIC ACTIVITIES: CPT | Mod: GP

## 2021-12-08 PROCEDURE — 36415 COLL VENOUS BLD VENIPUNCTURE: CPT | Performed by: PHYSICIAN ASSISTANT

## 2021-12-08 PROCEDURE — 85018 HEMOGLOBIN: CPT | Performed by: PHYSICIAN ASSISTANT

## 2021-12-08 PROCEDURE — 99222 1ST HOSP IP/OBS MODERATE 55: CPT | Performed by: HOSPITALIST

## 2021-12-08 PROCEDURE — 250N000013 HC RX MED GY IP 250 OP 250 PS 637: Performed by: PHYSICIAN ASSISTANT

## 2021-12-08 PROCEDURE — 99207 PR CONSULT E&M CHANGED TO INITIAL LEVEL: CPT | Performed by: HOSPITALIST

## 2021-12-08 RX ORDER — METHOCARBAMOL 750 MG/1
750 TABLET, FILM COATED ORAL EVERY 6 HOURS
Qty: 40 TABLET | Refills: 0 | Status: SHIPPED | OUTPATIENT
Start: 2021-12-08 | End: 2021-12-20

## 2021-12-08 RX ORDER — AMOXICILLIN 250 MG
1 CAPSULE ORAL 2 TIMES DAILY PRN
Qty: 40 TABLET | Refills: 0 | Status: SHIPPED | OUTPATIENT
Start: 2021-12-08 | End: 2021-12-31

## 2021-12-08 RX ORDER — OXYCODONE HYDROCHLORIDE 5 MG/1
5 TABLET ORAL EVERY 4 HOURS PRN
Qty: 40 TABLET | Refills: 0 | Status: SHIPPED | OUTPATIENT
Start: 2021-12-08 | End: 2021-12-20

## 2021-12-08 RX ADMIN — ACETAMINOPHEN 975 MG: 325 TABLET, FILM COATED ORAL at 01:08

## 2021-12-08 RX ADMIN — OXYCODONE HYDROCHLORIDE 10 MG: 5 TABLET ORAL at 07:45

## 2021-12-08 RX ADMIN — AMLODIPINE BESYLATE 5 MG: 5 TABLET ORAL at 07:44

## 2021-12-08 RX ADMIN — OXYCODONE HYDROCHLORIDE 10 MG: 5 TABLET ORAL at 11:37

## 2021-12-08 RX ADMIN — FEXOFENADINE HYDROCHLORIDE 60 MG: 60 TABLET ORAL at 07:44

## 2021-12-08 RX ADMIN — METFORMIN HYDROCHLORIDE 500 MG: 500 TABLET, FILM COATED ORAL at 07:44

## 2021-12-08 RX ADMIN — PREGABALIN 150 MG: 50 CAPSULE ORAL at 07:45

## 2021-12-08 RX ADMIN — MIRABEGRON 25 MG: 25 TABLET, FILM COATED, EXTENDED RELEASE ORAL at 07:44

## 2021-12-08 RX ADMIN — METHOCARBAMOL 750 MG: 750 TABLET ORAL at 11:37

## 2021-12-08 RX ADMIN — DULOXETINE HYDROCHLORIDE 60 MG: 60 CAPSULE, DELAYED RELEASE ORAL at 07:45

## 2021-12-08 RX ADMIN — ACETAMINOPHEN 975 MG: 325 TABLET, FILM COATED ORAL at 10:31

## 2021-12-08 RX ADMIN — METHOCARBAMOL 750 MG: 750 TABLET ORAL at 01:05

## 2021-12-08 RX ADMIN — METHOCARBAMOL 750 MG: 750 TABLET ORAL at 06:41

## 2021-12-08 RX ADMIN — OMEPRAZOLE 20 MG: 20 CAPSULE, DELAYED RELEASE ORAL at 07:44

## 2021-12-08 RX ADMIN — FLUTICASONE PROPIONATE 2 SPRAY: 50 SPRAY, METERED NASAL at 07:48

## 2021-12-08 RX ADMIN — OXYCODONE HYDROCHLORIDE 5 MG: 5 TABLET ORAL at 01:08

## 2021-12-08 RX ADMIN — METOPROLOL TARTRATE 25 MG: 25 TABLET, FILM COATED ORAL at 07:44

## 2021-12-08 RX ADMIN — SENNOSIDES AND DOCUSATE SODIUM 1 TABLET: 50; 8.6 TABLET ORAL at 07:45

## 2021-12-08 RX ADMIN — ATORVASTATIN CALCIUM 80 MG: 40 TABLET, FILM COATED ORAL at 07:44

## 2021-12-08 RX ADMIN — MULTIPLE VITAMINS W/ MINERALS TAB 1 TABLET: TAB at 07:44

## 2021-12-08 RX ADMIN — UMECLIDINIUM 1 PUFF: 62.5 AEROSOL, POWDER ORAL at 07:49

## 2021-12-08 ASSESSMENT — ACTIVITIES OF DAILY LIVING (ADL)
ADLS_ACUITY_SCORE: 9

## 2021-12-08 NOTE — PROGRESS NOTES
Care Management Follow Up    Length of Stay (days): 2    Expected Discharge Date: 12/08/2021     Concerns to be Addressed: discharge planning     Patient plan of care discussed at interdisciplinary rounds: Yes    Anticipated Discharge Disposition: Home Care     Anticipated Discharge Services: School Services  Anticipated Discharge DME: None      Additional Information:  CM notified by [facility] that they could not accept pt, but found [facility]. HC.  Discharge instructions updated.    Lori Babb RN, BSN, PHN, [facility]  Care Coordinator  [facility]  [phone]    Addendum 7721  Per pt bedside nurse, pt no longer needs HC at discharge.  No HC was order.  CM removed HC information from discharge instructions and called [facility] and declined referral. Spoke with JEFFREY Alexis [facility] [phone]  to inform her of change to discharge plan.  Vanesa will call her tomorrow to follow up.

## 2021-12-08 NOTE — PLAN OF CARE
Vss. Afebrile. Lungs clr-O2 1.5L mid 90s. Is done. Hypoactive bs/little gas/belching, no nausea. Tolerating diet. Last bm 12/04/21. Voiding. Saline locked. Drsg-cdi. Cms-(baseline) numb/tingling to hands/feet. Pain managed with Oxycodone/Tylenol/Robaxin. Cervical collar in place. Skin has redness under breasts. Tolerating ice. Repositioning self. No other significant issues noted overnight.

## 2021-12-08 NOTE — PLAN OF CARE
Vital signs stable, sats on 1.5 lpm nasal cannula 90's.Continuous pulse oixmetry.  Cervical collar on, dressing intact, dry, ice pack applied.  Lungs clear, diminished in bases, encouraged inspirometer use.  Voiding.   CMS, baseline numbness /tingling in hands and feet.  Airway patent, swallowing intact, speech clear.Tolerated soft diet.  Blood glucose monitoring.  Pain controlled with tylenol and oxycodone.  Care plan reviewed with pt.

## 2021-12-08 NOTE — PROGRESS NOTES
"[facility] RCAT      Date:  12/7/21  Admission Dx:  Cervical fusion  Pulmonary History  COPD   Home Nebulizer/MDI Use:  Albuterol MD   Acuity Level (RCAT flow sheet):  5  Tobacco history:   Tobacco Use      Smoking status: Light Tobacco Smoker        Packs/day: 0.25        Years: 40.00        Pack years: 10        Types: Cigarettes      Smokeless tobacco: Never Used       Aerosol Therapy initiated:  MDI Q6 prn      Pulmonary Hygiene initiated:  NA      Volume Expansion initiated:  NA      Current Oxygen Requirements:  1.5L/min   Current SpO2:  93%    Re-evaluation date:  12/10/21    Patient Education:  Will educate on use and mechanism of MDI      See \"RT Assessments\" flow sheet for patient assessment scoring and Acuity Level Details.    Zuleima Stuart, RT         "

## 2021-12-08 NOTE — PLAN OF CARE
Physical Therapy Discharge Summary    Reason for therapy discharge:    All goals and outcomes met, no further needs identified.    Progress towards therapy goal(s). See goals on Care Plan in [facility] electronic health record for goal details.  Goals met    Therapy recommendation(s):    Continue home exercise program. Recommend use of 4WW with all mobility and walking program.

## 2021-12-08 NOTE — CONSULTS
[facility]  Hospitalist Consult Note    Name: Vijaya Manjarrez      MRN: [id number]  YOB: 1950    Age: 71 year old  Date of admission: 12/6/2021  Primary care provider: [facility]     Requesting Physician:  Dr Betancur.  Reason for consultation:  Post-operative medical management         Assessment and Plan:   Vijaya Manjarrez is a 71 year old female with a history of COPD, HTN, CAD, CHF, HLP, GERD, DM2 who was admitted for cervical spine discectomy and fusion.    1. Cervical spondylosis with myelopathy s/p anterior cervical discectomy and fusion on 12/6: The patient is doing well, currently has well controlled pain and is hemodynamically stable. The hospitalist service will defer diet, activity, DVT prophylaxis, and pain control to the surgical service. Currently the patient is on PCDs for DVT prophylaxis. We agree with continued physical and occupational therapy. Social work may be consulted for possible placement needs. Please continue incentive spirometry and check routine follow up hemoglobin to evaluate for surgical blood loss and the potential need for transfusion.     2.  Hypertension: Blood pressure reasonable.  Continue prior to admission amlodipine 5 mg daily, lisinopril 20 mg twice daily, metoprolol 25 mg twice daily.  Parameters set.    3.  COPD: Lungs clear on exam.  Continue prior to admission inhaler regimen.    4.  Type 2 diabetes mellitus: Blood sugars reasonably controlled.  Continue prior to admission Metformin.  Medium sliding scale insulin available.    5.  GERD: Continue prior to admission Prilosec.    Code status: FULL.  Prophylaxis: Currently on PCDs, ultimately deferred to the primary service.  Disposition: Deferred to the primary service    Thank you for the consultation, we will continue to follow along during the hospitalization. Please page with any questions or concerns.         History of Present Illness:   Vijaya Manjarrez is a  71 year old female who is being hospitalized for cervical spine discectomy and fusion. Pre-operative note was fully reviewed and recommendations acknowledged. Op note and anesthesia notes and flow sheets were also reviewed.     The patient had no complications related to the procedure and has had an unremarkable post-operative course to this point. Currently pain is adequately controlled. No nausea, vomiting, diarrhea or constipation. No fevers, chills, diaphoresis. No chest pain, palpitations, dyspnea. Tolerating oral intake. No excessive somnolence and patient is fully alert and oriented. The patient has no other complaints at this time.                Past Medical History:     Past Medical History:   Diagnosis Date     Anxiety      Cervical spine degeneration 2016    spinal stenosis,      COPD (chronic obstructive pulmonary disease) (H) 2012    mild     Coronary artery disease      Depression      Diabetes mellitus, type 2 (H)      Diabetic neuropathy (H)      Facet arthropathy, lumbar      Fibromyalgia      GERD (gastroesophageal reflux disease)      HTN (hypertension)      Hyperlipidemia      Lumbar degenerative disc disease      Migraine headache              Past Surgical History:     Past Surgical History:   Procedure Laterality Date     CHOLECYSTECTOMY, LAPOROSCOPIC      Cholecystectomy, Laparoscopic     COMBINED REPAIR PTOSIS WITH BLEPHAROPLASTY BILATERAL Bilateral 3/11/2021    Procedure: Bilateral upper eyelid blepharoplasty and ptosis repair. Right upper eyelid margin lesion biopsy;  Surgeon: George Doll MD;  Location: [facility] OR     CV HEART CATHETERIZATION WITH POSSIBLE INTERVENTION Left 2/26/2019    Procedure: Coronary Angiogram;  Surgeon: Cheo Merida MD;  Location:  HEART CARDIAC CATH LAB     CV HEART CATHETERIZATION WITH POSSIBLE INTERVENTION Bilateral 10/2/2020    Procedure: Heart Catheterization with Possible Intervention;  Surgeon: Linda Sauceda MD;  Location: [facility] CARDIAC  CATH LAB     CV LEFT HEART CATH N/A 10/2/2020    Procedure: Left Heart Cath;  Surgeon: Linda Sauceda MD;  Location:  HEART CARDIAC CATH LAB     DECOMPRESSION, FUSION CERVICAL ANTERIOR THREE+ LEVELS, COMBINED N/A 12/6/2021    Procedure: 1.  Anterior cervical discectomy and fusion with use of allograft for fusion at C4-5, C5-6 and C6-7 2.  Placement of interbody graft at C4-5, C5-6 and C6-7 filled with allograft 3.  Anterior cervical plating at C4, C5, C6 and C7;  Surgeon: Javi Betancur MD;  Location: [facility] OR     OPEN REDUCTION INTERNAL FIXATION ORBIT BLOWOUT Left 10/8/2020    Procedure: Left Open Reduction Internal Fixation, Fracture, Orbit With Intraoperative Navigation - Left;  Surgeon: George Doll MD;  Location: [facility] OR     PHACOEMULSIFICATION CLEAR CORNEA WITH STANDARD INTRAOCULAR LENS IMPLANT  7/9/2021          PHACOEMULSIFICATION CLEAR CORNEA WITH STANDARD INTRAOCULAR LENS IMPLANT  7/26/2021          PHACOEMULSIFICATION WITH STANDARD INTRAOCULAR LENS IMPLANT Right 7/9/2021    Procedure: PHACOEMULSIFICATION, CATARACT, WITH STANDARD INTRAOCULAR LENS IMPLANT INSERTION RIGHT;  Surgeon: Dai Glaser MD;  Location: [facility] OR     PHACOEMULSIFICATION WITH STANDARD INTRAOCULAR LENS IMPLANT Left 7/26/2021    Procedure: PHACOEMULSIFICATION, CATARACT, WITH STANDARD INTRAOCULAR LENS IMPLANT INSERTION;  Surgeon: Dai Glaser MD;  Location: [facility] OR               Social History:     Social History     Tobacco Use     Smoking status: Light Tobacco Smoker     Packs/day: 0.25     Years: 40.00     Pack years: 10.00     Types: Cigarettes     Smokeless tobacco: Never Used   Substance Use Topics     Alcohol use: No             Family History:   Family history was fully reviewed and non-contributory in this case.          Allergies:     Allergies   Allergen Reactions     Penicillins Hives             Medications:     Prior to Admission medications    Medication Sig Last Dose Taking? Auth  Provider   acetaminophen (TYLENOL ARTHRITIS PAIN) 650 MG CR tablet Take 650 mg by mouth 2 times daily as needed  12/5/2021 at Unknown time Yes Reported, Patient   albuterol (PROAIR HFA/PROVENTIL HFA/VENTOLIN HFA) 108 (90 Base) MCG/ACT inhaler Inhale 2 puffs into the lungs every 6 hours Past Week at Unknown time Yes Genoveva Carroll APRN CNP   amLODIPine (NORVASC) 5 MG tablet TAKE ONE TABLET BY MOUTH ONCE DAILY 12/2/2021 Yes Vidhi Mario MD   atorvastatin (LIPITOR) 80 MG tablet TAKE 1 TABLET (80 MG) BY MOUTH DAILY (NEED FASTING LABS) 12/2/2021 Yes Vidhi Mario MD   blood glucose (NO BRAND SPECIFIED) test strip Use to test blood sugars 1 times daily or as directed, use brand same as previous  Yes Genoveva Carroll APRN CNP   cyclobenzaprine (FLEXERIL) 10 MG tablet Take 1 tablet (10 mg) by mouth 2 times daily 12/2/2021 Yes Genoveva Carroll APRN CNP   DULoxetine (CYMBALTA) 60 MG capsule TAKE ONE CAPSULE BY MOUTH ONCE DAILY 12/2/2021 Yes Vidhi Mario MD   fexofenadine (ALLEGRA) 60 MG tablet Take 1 tablet (60 mg) by mouth 2 times daily 12/2/2021 Yes Genoveva Carroll APRN CNP   fluticasone (FLONASE) 50 MCG/ACT nasal spray SPRAY 2 SPRAYS IN EACH NOSTRIL ONCE DAILY Past Week at Unknown time Yes Vidhi Mario MD   hydrOXYzine (ATARAX) 10 MG tablet TAKE ONE TABLET BY MOUTH THREE TIMES A DAY AS NEEDED FOR ANXIETY 12/5/2021 at Unknown time Yes Genoveva Carroll APRN CNP   lisinopril (ZESTRIL) 20 MG tablet TAKE ONE TABLET BY MOUTH TWICE A DAY 12/6/2021 at Unknown time Yes Vidhi Mario MD   metFORMIN (GLUCOPHAGE) 500 MG tablet TAKE ONE TABLET BY MOUTH TWICE A DAY WITH A MEAL Past Week at Unknown time Yes Vidhi Mario MD   metoprolol tartrate (LOPRESSOR) 25 MG tablet Take 25 mg by mouth 2 times daily 12/6/2021 at Unknown time Yes Vidhi Mario MD   mirabegron (MYRBETRIQ) 25 MG 24 hr tablet Take 1 tablet (25 mg) by mouth daily 12/2/2021 Yes Genoveva Carroll,  "APRN CNP   Multiple Vitamins-Minerals (MULTIVITAMIN ADULT PO) Take 5 tablets by mouth daily Pro-caps vitamin  12/2/2021 Yes Unknown, Entered By History   nitroGLYcerin (NITROSTAT) 0.4 MG sublingual tablet FOR CHEST PAIN PLACE 1 TABLET UNDER THE TONGUE EVERY 5 MINUTES FOR 3 DOSES IF NEEDED. IF SYMPTOMS PERSIST 5 MINUTES AFTER FIRST DOSE CALL 911.  Yes Vidhi Mario MD   nystatin (MYCOSTATIN) 362248 UNIT/GM external powder APPLY TO AFFECTED AREA(S) TOPICALLY THREE TIMES A DAY AS NEEDED Past Week at Unknown time Yes Vidhi Mario MD   omeprazole (PRILOSEC) 20 MG DR capsule TAKE ONE CAPSULE BY MOUTH TWICE A DAY 12/2/2021 Yes Vidhi Mario MD   pregabalin (LYRICA) 150 MG capsule TAKE ONE CAPSULE BY MOUTH THREE TIMES A DAY 12/2/2021 Yes Genoveva Carroll APRN CNP   tiotropium (SPIRIVA HANDIHALER) 18 MCG inhaled capsule INHALE ONE CAPSULE BY MOUTH ONCE DAILY 12/5/2021 at Unknown time Yes Genoveva Carroll APRN CNP   traMADol (ULTRAM) 50 MG tablet TAKE 1 TABLET (50 MG) BY MOUTH EVERY 6 HOURS AS NEEDED FOR SEVERE PAIN DUE FOR APPOINTMENT IN SEPTEMBER. 12/5/2021 at Unknown time Yes Genoveva Carroll APRN CNP   traZODone (DESYREL) 100 MG tablet TAKE TWO TABLETS BY MOUTH EVERY NIGHT AT BEDTIME 12/2/2021 Yes Vidhi Mario MD   order for DME Equipment being ordered: glucometer   Vidhi Mario MD       Current hospital administered medication list (MAR) also reviewed.          Review of Systems:   A comprehensive greater than 10 system review of systems was carried out.  Pertinent positives and negatives are noted above.  Otherwise negative for contributory info.            Physical Exam:   Blood pressure 126/67, pulse 70, temperature 97.5  F (36.4  C), temperature source Temporal, resp. rate 16, height 1.74 m (5' 8.5\"), weight 92.1 kg (203 lb), SpO2 91 %, not currently breastfeeding.  Exam:  GENERAL: No apparent distress. Awake, alert, and fully oriented.  HEENT: Normocephalic, " atraumatic. Extraocular movements intact.  CARDIOVASCULAR: Regular rate and rhythm without murmurs or rubs. No S3.  PULMONARY: Clear bilaterally.  ABDOMINAL: Soft, non-tender, non-distended. Bowel sounds normoactive. No hepatosplenomegaly.  EXTREMITIES: No cyanosis or clubbing. No edema.  NEUROLOGICAL: CN 2-12 grossly intact, no focal neurological deficits.  DERMATOLOGICAL: No rash, ulcer, ecchymoses, jaundice.         Data:   EKG/Telemetry:  Personally reviewed available data.    Laboratory: Personally reviewed available data.  Recent Labs   Lab 12/08/21  0809 12/07/21  0617 12/06/21  0722   WBC  --   --  13.0*   HGB 11.0* 11.0* 11.4*   HCT  --   --  37.0   MCV  --   --  82   PLT  --   --  322     Recent Labs   Lab 12/08/21  0736 12/08/21 0217 12/07/21 2127   * 129* 118*     Recent Labs   Lab 12/08/21  0736 12/08/21  0217 12/07/21 2127 12/07/21  1712 12/07/21  1208   * 129* 118* 154* 131*     No results for input(s): CULT in the last 168 hours.    Imaging: Personally reviewed available data.  Recent Results (from the past 24 hour(s))   XR Cervical Spine 2/3 Views    Narrative    CERVICAL SPINE TWO TO THREE VIEWS  12/7/2021 3:15 PM     COMPARISON: Two-view cervical spine 10/6/2021.    HISTORY: Postop fusion C4-C7.      Impression    IMPRESSION: There have been interval ACDF changes from C4 down to C7.  Minimal degenerative anterolisthesis of C2 upon C3 and C3 upon C4  again noted. Alignment otherwise normal. No fractures.    MONE JACOBS MD         SYSTEM ID:  [id number]         Jaguar Dickens DO, MPH  [facility] Hospitalist  [address] [place] [address]  Pager: [phone]  12/08/2021

## 2021-12-08 NOTE — PROGRESS NOTES
[facility]    Neurosurgery  Daily Note    Assessment & Plan   Procedure(s):  1.  Anterior cervical discectomy and fusion with use of allograft for fusion at C4-5, C5-6 and C6-7 2.  Placement of interbody graft at C4-5, C5-6 and C6-7 filled with allograft 3.  Anterior cervical plating at C4, C5, C6 and C7   2 Days Post-Op   Increased pain this morning because she missed her pain med earlier. Denies new paresthesias, weakness. Incision C/D/I. VAHE removed yesterday 12/7.     Plan:  -Advance activity as tolerated  -Continue supportive and symptomatic treatment  -Start or continue physical therapy  -Pain management. No NSAIDs x 2 weeks post op   -Bowel regimen  -Activity as tolerated. Brace on at all times, OK to remove while eating   -OK to remove dressing today  -Plans reviewed with Dr. Betancur who was in agreement with plans   -Call or page on call RITA with questions    Active Problems:    Surgery, elective      Iris GASTON Trimblevicenta    Interval History   Stable.    Physical Exam   Temp: 97.5  F (36.4  C) Temp src: Temporal BP: 126/67 Pulse: 70   Resp: 16 SpO2: 96 % O2 Device: Nasal cannula Oxygen Delivery: 1.5 LPM  Vitals:    12/06/21 0605   Weight: 203 lb (92.1 kg)     Vital Signs with Ranges  Temp:  [97.5  F (36.4  C)-98.1  F (36.7  C)] 97.5  F (36.4  C)  Pulse:  [62-74] 70  Resp:  [14-16] 16  BP: (115-131)/(54-72) 126/67  SpO2:  [87 %-97 %] 96 %  I/O last 3 completed shifts:  In: 1455 [P.O.:1455]  Out: 30 [Urine:25; Drains:5]    Awake, alert, NAD  Anterior cervical incision c/d/i, dressing in place is dry  Symmetric strength throughout   Negative clonus       Medications        acetaminophen  975 mg Oral Q8H     amLODIPine  5 mg Oral Daily     atorvastatin  80 mg Oral Daily     DULoxetine  60 mg Oral Daily     fexofenadine  60 mg Oral BID     fluticasone  2 spray Both Nostrils Daily     insulin aspart  1-7 Units Subcutaneous TID AC     insulin aspart  1-5 Units Subcutaneous At Bedtime     lisinopril   20 mg Oral BID     metFORMIN  500 mg Oral BID w/meals     methocarbamol  750 mg Oral Q6H     metoprolol tartrate  25 mg Oral BID     mirabegron  25 mg Oral Daily     multivitamin w/minerals  1 tablet Oral Daily     omeprazole  20 mg Oral BID AC     polyethylene glycol  17 g Oral Daily     pregabalin  150 mg Oral TID     senna-docusate  1 tablet Oral BID     sodium chloride (PF)  3 mL Intracatheter Q8H     traZODone  100 mg Oral At Bedtime     umeclidinium  1 puff Inhalation Daily       Plans discussed with Dr. Rangel who was in agreement with disha CORBIN [facility] Neurosurgery  [address]  [address] [place] [address], [place] [address]    Tel [phone]  Pager [phone]

## 2021-12-08 NOTE — PROGRESS NOTES
Patient vital signs are at baseline: yes. Room air  Patient able to ambulate as they were prior to admission or with assist devices provided by therapies during their stay:  Yes. Stand by assist, brace collar on, GB, walker. Moving well  Patient MUST void prior to discharge:  yes  Patient able to tolerate oral intake:  Yes. Food and fluids well  Pain has adequate pain control using Oral analgesics:  Yes. Oxycodone.   Pain controlled.    Plan is home today, no home care. Following plan of care. In agreement of home today. Moving well. Pleasant. Passed therapy. Protocol met for discharge home. Incision open to air. Brace on. verbalized understanding to discharge instructions. No edema. Voiding. CMS at baseline.

## 2021-12-08 NOTE — DISCHARGE SUMMARY
[facility]    Discharge Summary   [facility] Neurosurgery    Date of Admission:  12/6/2021  Date of Discharge:  12/8/2021  Discharging Provider: Iris Bazan  Date of Service (when I saw the patient): 12/08/21    Discharge Diagnoses   Active Problems:    Surgery, elective      History of Present Illness   Vijaya Manjarrez is an 71 year old female presented with symptoms consistent with cervical myelopathy.  On imaging she has cervical stenosis as well as anterolisthesis of C4 and 5 which moved slightly on flexion-extension films.  She is indicated for anterior cervical fusion and discectomy at this time.    Hospital Course   Vijaya Manjarrez was admitted on 12/6/2021.  The patient is a 71-year-old female with symptoms consistent with cervical myelopathy.  On imaging she has cervical stenosis as well as anterolisthesis of C4 and 5 which moved slightly on flexion-extension films.  She is indicated for anterior cervical fusion and discectomy at this time.    On 12/6/2021, patient underwent anterior cervical discectomy and fusion with use of allograft for fusion at C4-5, C5-6 and C6-7, placement of interbody graft at C4-5, C5-6 and C6-7 filled with allograft, anterior cervical plating at C4, C5, C6 and C7 with Dr Betancur. No intraoperative complications. Patient admitted for pain management, neurologic monitoring, wound care. Patient meeting all discharge on 12/8/2021. Patient was cleared by Neurosurgery for discharge to home in stable condition.     I have discussed the following assessment and plan with Dr. Betancur who is in agreement.    Iris Bazan PA-C  [facility] Neurosurgery  [address] [address]    Tel [phone]  Pager [phone]    Significant Results and Procedures   n/a    Pending Results   These results will be followed up by n/a  Unresulted Labs Ordered in the Past 30 Days of this Admission      Date and Time Order Name Status Description    12/8/2021  9:25 AM Creatinine In process           Code Status   Full Code    Primary Care Physician   [facility]    Physical Exam   Temp: 97.5  F (36.4  C) Temp src: Temporal BP: 113/60 Pulse: 70   Resp: 16 SpO2: 92 % O2 Device: None (Room air) Oxygen Delivery: 1.5 LPM  Vitals:    12/06/21 0605   Weight: 203 lb (92.1 kg)     Vital Signs with Ranges  Temp:  [97.5  F (36.4  C)-98.1  F (36.7  C)] 97.5  F (36.4  C)  Pulse:  [62-74] 70  Resp:  [14-16] 16  BP: (113-131)/(60-72) 113/60  SpO2:  [88 %-97 %] 92 %  I/O last 3 completed shifts:  In: 1455 [P.O.:1455]  Out: 30 [Urine:25; Drains:5]    Awake, alert, NAD  Anterior cervical incision c/d/i, dressing in place is dry  Symmetric strength throughout   Negative clonus     Time Spent on this Encounter   IIris PA-C, personally saw the patient today and spent less than or equal to 30 minutes discharging this patient.    Discharge Disposition   Discharged to home  Condition at discharge: Stable    Consultations This Hospital Stay   OCCUPATIONAL THERAPY ADULT IP CONSULT  PHYSICAL THERAPY ADULT IP CONSULT  HOSPITALIST IP CONSULT  CARE MANAGEMENT / SOCIAL WORK IP CONSULT    Discharge Orders      Reason for your hospital stay    Anterior cervical discectomy and fusion with use of allograft for fusion at C4-5, C5-6 and C6-7 2.  Placement of interbody graft at C4-5, C5-6 and C6-7 filled with allograft 3.  Anterior cervical plating at C4, C5, C6 and C7     Follow-up and recommended labs and tests     Your Neurosurgical follow up appointments have been scheduled. You may call [phone] to make, confirm or change your follow-up Neurosurgery appointment dates and/or times.     Activity    Your activity upon discharge: Please limit your lifting to no more that ten pounds and avoid excessive bending, twisting and turning at the lumbar spine. You should also avoid excessive jostling and jarring activities.      Wound care and dressings    Instructions to care for your wound at home: ice to area for comfort, keep wound clean and dry, and may get incision wet in shower today 12/8 but do not soak or scrub.     Diet    Follow this diet upon discharge: Regular     Discharge Medications   Current Discharge Medication List      START taking these medications    Details   methocarbamol (ROBAXIN) 750 MG tablet Take 1 tablet (750 mg) by mouth every 6 hours  Qty: 40 tablet, Refills: 0    Associated Diagnoses: Surgery, elective      oxyCODONE (ROXICODONE) 5 MG tablet Take 1 tablet (5 mg) by mouth every 4 hours as needed for moderate to severe pain  Qty: 40 tablet, Refills: 0    Associated Diagnoses: Surgery, elective      senna-docusate (SENOKOT-S/PERICOLACE) 8.6-50 MG tablet Take 1 tablet by mouth 2 times daily as needed for constipation (take while on narcotics)  Qty: 40 tablet, Refills: 0    Associated Diagnoses: Surgery, elective         CONTINUE these medications which have NOT CHANGED    Details   acetaminophen (TYLENOL ARTHRITIS PAIN) 650 MG CR tablet Take 650 mg by mouth 2 times daily as needed       albuterol (PROAIR HFA/PROVENTIL HFA/VENTOLIN HFA) 108 (90 Base) MCG/ACT inhaler Inhale 2 puffs into the lungs every 6 hours  Qty: 18 g, Refills: 11    Comments: Pharmacy may dispense brand covered by insurance (Proair, or proventil or ventolin or generic albuterol inhaler)  Associated Diagnoses: Chronic obstructive pulmonary disease, unspecified COPD type (H)      amLODIPine (NORVASC) 5 MG tablet TAKE ONE TABLET BY MOUTH ONCE DAILY  Qty: 90 tablet, Refills: 2    Associated Diagnoses: Benign essential hypertension      atorvastatin (LIPITOR) 80 MG tablet TAKE 1 TABLET (80 MG) BY MOUTH DAILY (NEED FASTING LABS)  Qty: 90 tablet, Refills: 3    Associated Diagnoses: Hyperlipidemia LDL goal <100      blood glucose (NO BRAND SPECIFIED) test strip Use to test blood sugars 1 times daily or as directed, use brand same as previous  Qty:  100 strip, Refills: 3    Associated Diagnoses: Type 2 diabetes mellitus with diabetic neuropathy, without long-term current use of insulin (H)      DULoxetine (CYMBALTA) 60 MG capsule TAKE ONE CAPSULE BY MOUTH ONCE DAILY  Qty: 90 capsule, Refills: 2    Associated Diagnoses: Lumbar degenerative disc disease      fexofenadine (ALLEGRA) 60 MG tablet Take 1 tablet (60 mg) by mouth 2 times daily  Qty: 180 tablet, Refills: 3    Associated Diagnoses: Allergic rhinitis due to other allergic trigger, unspecified seasonality      fluticasone (FLONASE) 50 MCG/ACT nasal spray SPRAY 2 SPRAYS IN EACH NOSTRIL ONCE DAILY  Qty: 16 g, Refills: 5    Associated Diagnoses: Seasonal allergic rhinitis due to pollen      hydrOXYzine (ATARAX) 10 MG tablet TAKE ONE TABLET BY MOUTH THREE TIMES A DAY AS NEEDED FOR ANXIETY  Qty: 270 tablet, Refills: 3    Associated Diagnoses: BRADY (generalized anxiety disorder)      lisinopril (ZESTRIL) 20 MG tablet TAKE ONE TABLET BY MOUTH TWICE A DAY  Qty: 180 tablet, Refills: 2    Associated Diagnoses: Benign essential hypertension      metFORMIN (GLUCOPHAGE) 500 MG tablet TAKE ONE TABLET BY MOUTH TWICE A DAY WITH A MEAL  Qty: 180 tablet, Refills: 4    Associated Diagnoses: Type 2 diabetes mellitus with diabetic neuropathy, without long-term current use of insulin (H)      metoprolol tartrate (LOPRESSOR) 25 MG tablet Take 25 mg by mouth 2 times daily  Qty: 180 tablet, Refills: 3    Associated Diagnoses: Benign essential hypertension      mirabegron (MYRBETRIQ) 25 MG 24 hr tablet Take 1 tablet (25 mg) by mouth daily  Qty: 90 tablet, Refills: 1    Associated Diagnoses: Overactive bladder; Urinary incontinence, unspecified type      Multiple Vitamins-Minerals (MULTIVITAMIN ADULT PO) Take 5 tablets by mouth daily Pro-caps vitamin       nitroGLYcerin (NITROSTAT) 0.4 MG sublingual tablet FOR CHEST PAIN PLACE 1 TABLET UNDER THE TONGUE EVERY 5 MINUTES FOR 3 DOSES IF NEEDED. IF SYMPTOMS PERSIST 5 MINUTES AFTER FIRST  DOSE CALL 911.  Qty: 25 tablet, Refills: 0    Comments: Medication is being filled for 1 time refill only due to:  Patient needs to be seen because need to establish care with a new provider.  Associated Diagnoses: Coronary artery disease involving native coronary artery, angina presence unspecified, unspecified whether native or transplanted heart      nystatin (MYCOSTATIN) 908425 UNIT/GM external powder APPLY TO AFFECTED AREA(S) TOPICALLY THREE TIMES A DAY AS NEEDED  Qty: 30 g, Refills: 3    Associated Diagnoses: Yeast infection of the skin      omeprazole (PRILOSEC) 20 MG DR capsule TAKE ONE CAPSULE BY MOUTH TWICE A DAY  Qty: 180 capsule, Refills: 2    Associated Diagnoses: Gastroesophageal reflux disease without esophagitis      pregabalin (LYRICA) 150 MG capsule TAKE ONE CAPSULE BY MOUTH THREE TIMES A DAY  Qty: 270 capsule, Refills: 3    Associated Diagnoses: Fibromyalgia; Chronic bilateral low back pain without sciatica; Muscle spasm      tiotropium (SPIRIVA HANDIHALER) 18 MCG inhaled capsule INHALE ONE CAPSULE BY MOUTH ONCE DAILY  Qty: 90 capsule, Refills: 3    Associated Diagnoses: Chronic obstructive pulmonary disease, unspecified COPD type (H)      traZODone (DESYREL) 100 MG tablet TAKE TWO TABLETS BY MOUTH EVERY NIGHT AT BEDTIME  Qty: 180 tablet, Refills: 3    Associated Diagnoses: Insomnia, unspecified type      order for DME Equipment being ordered: glucometer  Qty: 1 each, Refills: 0    Associated Diagnoses: Type 2 diabetes mellitus with diabetic neuropathy, without long-term current use of insulin (H)         STOP taking these medications       cyclobenzaprine (FLEXERIL) 10 MG tablet Comments:   Reason for Stopping:         traMADol (ULTRAM) 50 MG tablet Comments:   Reason for Stopping:             Allergies   Allergies   Allergen Reactions     Penicillins Hives

## 2021-12-20 ENCOUNTER — OFFICE VISIT (OUTPATIENT)
Dept: NEUROSURGERY | Facility: CLINIC | Age: 71
End: 2021-12-20
Attending: NURSE PRACTITIONER
Payer: COMMERCIAL

## 2021-12-20 VITALS — OXYGEN SATURATION: 97 % | SYSTOLIC BLOOD PRESSURE: 163 MMHG | DIASTOLIC BLOOD PRESSURE: 83 MMHG | HEART RATE: 93 BPM

## 2021-12-20 DIAGNOSIS — Z98.1 S/P CERVICAL SPINAL FUSION: Primary | ICD-10-CM

## 2021-12-20 PROCEDURE — 99024 POSTOP FOLLOW-UP VISIT: CPT | Performed by: NURSE PRACTITIONER

## 2021-12-20 PROCEDURE — G0463 HOSPITAL OUTPT CLINIC VISIT: HCPCS

## 2021-12-20 RX ORDER — METHOCARBAMOL 750 MG/1
750 TABLET, FILM COATED ORAL EVERY 6 HOURS
Qty: 40 TABLET | Refills: 0 | Status: SHIPPED | OUTPATIENT
Start: 2021-12-20

## 2021-12-20 RX ORDER — OXYCODONE HYDROCHLORIDE 5 MG/1
5 TABLET ORAL EVERY 4 HOURS PRN
Qty: 40 TABLET | Refills: 0 | Status: SHIPPED | OUTPATIENT
Start: 2021-12-20 | End: 2021-12-31

## 2021-12-20 NOTE — PATIENT INSTRUCTIONS
-Continue with routine post operative care plan at this time.  -Keep your incision clean and dry. Okay to shower and gently pat dry. No swimming or submerging under water until 6 weeks post op.   -No lifting greater than 10-15 pounds. Limit bending and twisting.   -Continue to wear cervical collar until 6 weeks post op.   -Follow up in 6 weeks with xray prior, as scheduled    -Call the clinic or go to the Emergency Room if you develop any new pain, weakness, incision drainage/swelling, or fever.

## 2021-12-20 NOTE — LETTER
12/20/2021         RE: Vijaya Manjarrez  [address]  [place] [address]        Dear Colleague,    Thank you for referring your patient, Vijaya Manjarrez, to the [facility] [place]. Please see a copy of my visit note below.    [facility]surgery Follow-Up:     HPI: 2 weeks s/p C4-7 ACDF with Dr. Betancur. Presents today for incision check. Denies any incisional concerns. She reports posterior neck pain and intermittent hand numbness, same as pre op. Denies any weakness. Currently using Robaxin and Oxycodone for pain management. She continues to wear her cervical collar and follows activity restrictions. Denies post op concerns today.     Current pain: 7/10    Exam:  Constitutional:  Alert, well nourished, NAD.  HEENT: Normocephalic, atraumatic.   Pulm:  Without shortness of breath   CV:  No pitting edema of BLE.     Vital Signs: BP (!) 163/83   Pulse 93   LMP  (LMP Unknown)   SpO2 97%     Neurological:  Awake  Alert  Oriented x 3  Speech clear  Cranial nerves II - XII grossly intact  PERRL  EOMI  Motor exam:   Shoulder Abduction:  Right:  5/5   Left:  5/5  Biceps:                      Right:  5/5   Left:  5/5  Triceps:                     Right:  5/5   Left:  5/5  Wrist Extensors:        Right:  5/5   Left:  5/5  Wrist Flexors:            Right:  5/5   Left:  5/5  Intrinsics:                   Right:  5/5   Left:  5/5    Sensation normal to upper and lower extremities bilaterally     Gait: Able to stand from a seated position. Normal non-antalgic, non-myelopathic gait.     Incision: Incision is clean, dry, intact. No drainage, dehiscence, erythema, warmth, or swelling noted.     Assessment/Plan:   2 weeks s/p C4-7 ACDF     -Continue with routine post operative care plan at this time.  -Keep your incision clean and dry. Okay to shower and gently pat dry. No swimming or submerging under water until 6 weeks post op.   -No lifting greater than  10-15 pounds. Limit bending and twisting.   -Continue to wear cervical collar until 6 weeks post op.   -Oxycodone and Robaxin refills sent to pharmacy   -Follow up in 6 weeks with xray prior, as scheduled    -Call the clinic or go to the Emergency Room if you develop any new pain, weakness, incision drainage/swelling, or fever.     Discussed red flag symptoms and advised to seek medical attention if these develop. Patient voiced understanding and agreement.      Evelia Patel CNP  [facility] Neurosurgery  [address] [address]  Tel [phone]  Pager [phone]          Again, thank you for allowing me to participate in the care of your patient.        Sincerely,        Evelia Patel, NP

## 2021-12-20 NOTE — NURSING NOTE
"Vijaya Manjarrez is a 71 year old female who presents for:  Chief Complaint   Patient presents with     Surgical Followup     Cervical 2 week incision check; DOS 12/06/21 ACDF        Initial Vitals:  BP (!) 163/83   Pulse 93   LMP  (LMP Unknown)   SpO2 97%  Estimated body mass index is 30.41 kg/m  as calculated from the following:    Height as of 12/6/21: 5' 8.5\" (1.74 m).    Weight as of 12/6/21: 203 lb (92.1 kg).. There is no height or weight on file to calculate BSA. BP completed using cuff size: regular  Data Unavailable    Best Ohara MA    "

## 2021-12-20 NOTE — PROGRESS NOTES
[facility] Neurosurgery Follow-Up:     HPI: 2 weeks s/p C4-7 ACDF with Dr. Betancur. Presents today for incision check. Denies any incisional concerns. She reports posterior neck pain and intermittent hand numbness, same as pre op. Denies any weakness. Currently using Robaxin and Oxycodone for pain management. She continues to wear her cervical collar and follows activity restrictions. Denies post op concerns today.     Current pain: 7/10    Exam:  Constitutional:  Alert, well nourished, NAD.  HEENT: Normocephalic, atraumatic.   Pulm:  Without shortness of breath   CV:  No pitting edema of BLE.     Vital Signs: BP (!) 163/83   Pulse 93   LMP  (LMP Unknown)   SpO2 97%     Neurological:  Awake  Alert  Oriented x 3  Speech clear  Cranial nerves II - XII grossly intact  PERRL  EOMI  Motor exam:   Shoulder Abduction:  Right:  5/5   Left:  5/5  Biceps:                      Right:  5/5   Left:  5/5  Triceps:                     Right:  5/5   Left:  5/5  Wrist Extensors:        Right:  5/5   Left:  5/5  Wrist Flexors:            Right:  5/5   Left:  5/5  Intrinsics:                   Right:  5/5   Left:  5/5    Sensation normal to upper and lower extremities bilaterally     Gait: Able to stand from a seated position. Normal non-antalgic, non-myelopathic gait.     Incision: Incision is clean, dry, intact. No drainage, dehiscence, erythema, warmth, or swelling noted.     Assessment/Plan:   2 weeks s/p C4-7 ACDF     -Continue with routine post operative care plan at this time.  -Keep your incision clean and dry. Okay to shower and gently pat dry. No swimming or submerging under water until 6 weeks post op.   -No lifting greater than 10-15 pounds. Limit bending and twisting.   -Continue to wear cervical collar until 6 weeks post op.   -Oxycodone and Robaxin refills sent to pharmacy   -Follow up in 6 weeks with xray prior, as scheduled    -Call the clinic or go to the Emergency Room if you develop any new pain,  weakness, incision drainage/swelling, or fever.     Discussed red flag symptoms and advised to seek medical attention if these develop. Patient voiced understanding and agreement.      Evelia Patel CNP  [facility]surgery  [address] [address]  Tel [phone]  Pager [phone]

## 2021-12-31 ENCOUNTER — VIRTUAL VISIT (OUTPATIENT)
Dept: INTERNAL MEDICINE | Facility: CLINIC | Age: 71
End: 2021-12-31
Payer: COMMERCIAL

## 2021-12-31 DIAGNOSIS — G89.4 CHRONIC PAIN SYNDROME: Primary | ICD-10-CM

## 2021-12-31 PROCEDURE — 99442 PR PHYSICIAN TELEPHONE EVALUATION 11-20 MIN: CPT | Mod: 95 | Performed by: INTERNAL MEDICINE

## 2021-12-31 RX ORDER — TRAMADOL HYDROCHLORIDE 50 MG/1
50 TABLET ORAL EVERY 6 HOURS PRN
Qty: 100 TABLET | Refills: 0 | Status: SHIPPED | OUTPATIENT
Start: 2021-12-31 | End: 2022-01-03

## 2021-12-31 ASSESSMENT — ENCOUNTER SYMPTOMS
GASTROINTESTINAL NEGATIVE: 1
NEUROLOGICAL NEGATIVE: 1
ARTHRALGIAS: 1
RESPIRATORY NEGATIVE: 1
CARDIOVASCULAR NEGATIVE: 1
CONSTITUTIONAL NEGATIVE: 1
BACK PAIN: 1

## 2021-12-31 ASSESSMENT — ANXIETY QUESTIONNAIRES
6. BECOMING EASILY ANNOYED OR IRRITABLE: NOT AT ALL
2. NOT BEING ABLE TO STOP OR CONTROL WORRYING: NOT AT ALL
7. FEELING AFRAID AS IF SOMETHING AWFUL MIGHT HAPPEN: NOT AT ALL
1. FEELING NERVOUS, ANXIOUS, OR ON EDGE: NOT AT ALL
IF YOU CHECKED OFF ANY PROBLEMS ON THIS QUESTIONNAIRE, HOW DIFFICULT HAVE THESE PROBLEMS MADE IT FOR YOU TO DO YOUR WORK, TAKE CARE OF THINGS AT HOME, OR GET ALONG WITH OTHER PEOPLE: NOT DIFFICULT AT ALL
GAD7 TOTAL SCORE: 0
5. BEING SO RESTLESS THAT IT IS HARD TO SIT STILL: NOT AT ALL
3. WORRYING TOO MUCH ABOUT DIFFERENT THINGS: NOT AT ALL

## 2021-12-31 ASSESSMENT — PATIENT HEALTH QUESTIONNAIRE - PHQ9
SUM OF ALL RESPONSES TO PHQ QUESTIONS 1-9: 6
5. POOR APPETITE OR OVEREATING: NOT AT ALL

## 2021-12-31 NOTE — PROGRESS NOTES
Pat is a 71 year old who is being evaluated via a billable telephone visit.      What phone number would you like to be contacted at? [phone]    How would you like to obtain your AVS? Mail a copy    Assessment & Plan     Chronic pain syndrome  At this time, patient appears to have relatively good control of her pain with her regimen of Lyrica, tramadol, and Cymbalta.  Patient is requesting refill of her tramadol prescription at this time.  Review of the [place] prescription monitoring service does not reveal any evidence suggestive of overuse or misuse of her medications.  We did briefly review side effects associated with tramadol use.  Patient will receive 1 prescription for tramadol 50 mg tablets with instructions take 1 tab by mouth every 6 hours as needed for pain.  She will receive 100 tablets with no additional refills.  Patient had no further questions or concerns at this time.  - traMADol (ULTRAM) 50 MG tablet  Dispense: 100 tablet; Refill: 0      Prescription drug management       Tobacco Cessation:   reports that she has been smoking cigarettes. She has a 10.00 pack-year smoking history. She has never used smokeless tobacco.  Tobacco Cessation Action Plan: Information offered: Patient not interested at this time    See Patient Instructions    No follow-ups on file.    Errol Dill MD  [facility]   Pat is a 71 year old who participates in a virtual visit for a medication follow-up visit.    Patient is a 71-year-old  female who participates in a virtual visit for a medication follow-up visit.  She has a history of chronic pain syndrome secondary to fibromyalgia, and she has been utilizing tramadol 50 mg tablet for management of her pain.  She reports that she takes at least 2 tablets/day, but she has never utilized more than 4 tablets in a 24-hour period.  Patient reports that this medication has been quite helpful for her in managing her  discomfort.  She also takes Lyrica and Cymbalta as part of her pain medication regimen.  Patient is requesting a refill of her tramadol at this time.  She typically receives 100 tablets approximately every 30 days.  Patient does not report any side effects related to her tramadol use.  She has no other major concerns or issues at this time.  Of note, she did recently have a cervical spinal fusion in early December, and her pain has been more pronounced over the past 1 month.  She has been using her tramadol typically 3-4 times per day.       Diabetes Follow-up    How often are you checking your blood sugar? A few times a month  What time of day are you checking your blood sugars (select all that apply)?  Before meals  Have you had any blood sugars above 200?  No  Have you had any blood sugars below 70?  No    What symptoms do you notice when your blood sugar is low?  None    What concerns do you have today about your diabetes? None     Do you have any of these symptoms? (Select all that apply)  Numbness in feet and Burning in feet        Hyperlipidemia Follow-Up      Are you regularly taking any medication or supplement to lower your cholesterol?   Yes- statin    Are you having muscle aches or other side effects that you think could be caused by your cholesterol lowering medication?  No    Hypertension Follow-up      Do you check your blood pressure regularly outside of the clinic? Yes     Are you following a low salt diet? Yes    Are your blood pressures ever more than 140 on the top number (systolic) OR more   than 90 on the bottom number (diastolic), for example 140/90? Yes    BP Readings from Last 2 Encounters:   12/20/21 (!) 163/83   12/08/21 113/60     Hemoglobin A1C POCT (%)   Date Value   07/05/2021 7.2 (H)   03/08/2021 7.1 (H)     Hemoglobin A1C (%)   Date Value   11/11/2021 7.3 (H)   09/28/2021 6.9 (H)     LDL Cholesterol Calculated (mg/dL)   Date Value   03/08/2021 40   11/13/2019 83       Depression and  Anxiety Follow-Up    How are you doing with your depression since your last visit? No change    How are you doing with your anxiety since your last visit?  No change    Are you having other symptoms that might be associated with depression or anxiety? No    Have you had a significant life event? No     Do you have any concerns with your use of alcohol or other drugs? No    Social History     Tobacco Use     Smoking status: Light Tobacco Smoker     Packs/day: 0.25     Years: 40.00     Pack years: 10.00     Types: Cigarettes     Smokeless tobacco: Never Used   Vaping Use     Vaping Use: Never used   Substance Use Topics     Alcohol use: No     Drug use: No     PHQ 2/6/2019 5/15/2020 7/5/2021   PHQ-9 Total Score 16 11 2   Q9: Thoughts of better off dead/self-harm past 2 weeks Not at all Not at all Not at all   F/U: Thoughts of suicide or self-harm - - -   F/U: Safety concerns - - -     BRADY-7 SCORE 2/5/2019 5/15/2020 7/5/2021   Total Score 11 (moderate anxiety) - 0 (minimal anxiety)   Total Score 11 7 0     Last PHQ-9 7/5/2021   1.  Little interest or pleasure in doing things 0   2.  Feeling down, depressed, or hopeless 0   3.  Trouble falling or staying asleep, or sleeping too much 0   4.  Feeling tired or having little energy 0   5.  Poor appetite or overeating 1   6.  Feeling bad about yourself 0   7.  Trouble concentrating 1   8.  Moving slowly or restless 0   Q9: Thoughts of better off dead/self-harm past 2 weeks 0   PHQ-9 Total Score 2   Difficulty at work, home, or with people Not difficult at all   In the past two weeks have you had thoughts of suicide or self harm? -   Do you have concerns about your personal safety or the safety of others? -     BRADY-7  7/5/2021   1. Feeling nervous, anxious, or on edge 0   2. Not being able to stop or control worrying 0   3. Worrying too much about different things 0   4. Trouble relaxing 0   5. Being so restless that it is hard to sit still 0   6. Becoming easily annoyed or  irritable 0   7. Feeling afraid, as if something awful might happen 0   BRADY-7 Total Score 0   If you checked any problems, how difficult have they made it for you to do your work, take care of things at home, or get along with other people? -       Suicide Assessment Five-step Evaluation and Treatment (SAFE-T)      How many servings of fruits and vegetables do you eat daily?  2-3    On average, how many sweetened beverages do you drink each day (Examples: soda, juice, sweet tea, etc.  Do NOT count diet or artificially sweetened beverages)?   1    How many days per week do you exercise enough to make your heart beat faster? 3 or less    How many minutes a day do you exercise enough to make your heart beat faster? 9 or less    How many days per week do you miss taking your medication? 0        Review of Systems   Constitutional: Negative.    HENT: Negative.    Respiratory: Negative.    Cardiovascular: Negative.    Gastrointestinal: Negative.    Musculoskeletal: Positive for arthralgias and back pain.   Neurological: Negative.             Objective           Vitals:  No vitals were obtained today due to virtual visit.    Physical Exam   healthy, alert and no distress  PSYCH: Alert and oriented times 3; coherent speech, normal   rate and volume, able to articulate logical thoughts, able   to abstract reason, no tangential thoughts, no hallucinations   or delusions  Her affect is normal  RESP: No cough, no audible wheezing, able to talk in full sentences  Remainder of exam unable to be completed due to telephone visits            Phone call duration: 20 minutes

## 2022-01-01 ASSESSMENT — ANXIETY QUESTIONNAIRES: GAD7 TOTAL SCORE: 0

## 2022-01-17 ENCOUNTER — PATIENT OUTREACH (OUTPATIENT)
Dept: GERIATRIC MEDICINE | Facility: CLINIC | Age: 72
End: 2022-01-17
Payer: COMMERCIAL

## 2022-01-17 NOTE — PROGRESS NOTES
[facility] Care Coordination Contact    Call from member who shares that she had a known COVID exposure on Wednesday and is now symptomatic. She is looking for an at home COVID test. Sent links for [facility] test (per [facility] site) and also for [facility]. [facility] is a drive up testing site - unsure if member would have a ride. Unable to find tests for online purchase from [employer], [employer], [employer], or [employer]. Member is aware there will be tests available to order online on Wednesday. Checking with co-workers for other possible resources and will update member as appropriate.     Vanesa Gtz RN  [facility]  [phone]  Fax: [phone]

## 2022-01-18 ENCOUNTER — NURSE TRIAGE (OUTPATIENT)
Dept: INTERNAL MEDICINE | Facility: CLINIC | Age: 72
End: 2022-01-18
Payer: COMMERCIAL

## 2022-01-18 NOTE — TELEPHONE ENCOUNTER
"  Reason for Disposition    Chest pain present when not coughing    Additional Information    Negative: Bluish (or gray) lips or face    Negative: Severe difficulty breathing (e.g., struggling for each breath, speaks in single words)    Negative: Rapid onset of cough and has hives    Negative: Coughing started suddenly after medicine, an allergic food or bee sting    Negative: Difficulty breathing after exposure to flames, smoke, or fumes    Negative: Sounds like a life-threatening emergency to the triager    Negative: Previous asthma attacks and this feels like asthma attack    Answer Assessment - Initial Assessment Questions  1. ONSET: \"When did the cough begin?\"       1/14/2022  2. SEVERITY: \"How bad is the cough today?\"       Severe per patient \"Once it starts, I can't get it to stop.\"  3. RESPIRATORY DISTRESS: \"Describe your breathing.\"       \"A little bit of trouble, but not too bad.\" Denies wheezing.   4. FEVER: \"Do you have a fever?\" If so, ask: \"What is your temperature, how was it measured, and when did it start?\"      Denies fever  5. HEMOPTYSIS: \"Are you coughing up any blood?\" If so ask: \"How much?\" (flecks, streaks, tablespoons, etc.)      Patient denies.  6. TREATMENT: \"What have you done so far to treat the cough?\" (e.g., meds, fluids, humidifier)      Tried humidification and increased fluid intake. Tried Tylenol.   7. CARDIAC HISTORY: \"Do you have any history of heart disease?\" (e.g., heart attack, congestive heart failure)       No per pt  8. LUNG HISTORY: \"Do you have any history of lung disease?\"  (e.g., pulmonary embolus, asthma, emphysema)      No per pt  9. PE RISK FACTORS: \"Do you have a history of blood clots?\" (or: recent major surgery, recent prolonged travel, bedridden)      No blood clots per patient. Patient reports she had surgery 12/6/2021. \"About 1 month ago.\" Patient reports this was a spinal fusion surgery.  10. OTHER SYMPTOMS: \"Do you have any other symptoms? (e.g., runny nose, " "wheezing, chest pain)       Runny nose, fatigue, body aches (\"but I have fibromyalgia\"), chest pain.   11. PREGNANCY: \"Is there any chance you are pregnant?\" \"When was your last menstrual period?\"        N/A  12. TRAVEL: \"Have you traveled out of the country in the last month?\" (e.g., travel history, exposures)        No per pt    Protocols used: JERALD-MARIS-OC MAURICE RN   [facility]    "

## 2022-01-27 ENCOUNTER — APPOINTMENT (OUTPATIENT)
Dept: CT IMAGING | Facility: CLINIC | Age: 72
End: 2022-01-27
Attending: EMERGENCY MEDICINE
Payer: COMMERCIAL

## 2022-01-27 ENCOUNTER — HOSPITAL ENCOUNTER (EMERGENCY)
Facility: CLINIC | Age: 72
Discharge: HOME | End: 2022-01-27
Attending: EMERGENCY MEDICINE | Admitting: EMERGENCY MEDICINE
Payer: COMMERCIAL

## 2022-01-27 VITALS
SYSTOLIC BLOOD PRESSURE: 115 MMHG | TEMPERATURE: 98.2 F | HEART RATE: 58 BPM | RESPIRATION RATE: 23 BRPM | OXYGEN SATURATION: 97 % | DIASTOLIC BLOOD PRESSURE: 80 MMHG

## 2022-01-27 DIAGNOSIS — R07.9 CHEST PAIN, UNSPECIFIED TYPE: ICD-10-CM

## 2022-01-27 DIAGNOSIS — R91.8 PULMONARY NODULES: ICD-10-CM

## 2022-01-27 DIAGNOSIS — R73.9 HYPERGLYCEMIA: ICD-10-CM

## 2022-01-27 LAB
ANION GAP SERPL CALCULATED.3IONS-SCNC: 5 MMOL/L (ref 3–14)
BASOPHILS # BLD AUTO: 0.1 10E3/UL (ref 0–0.2)
BASOPHILS NFR BLD AUTO: 1 %
BUN SERPL-MCNC: 10 MG/DL (ref 7–30)
CALCIUM SERPL-MCNC: 8.3 MG/DL (ref 8.5–10.1)
CHLORIDE BLD-SCNC: 107 MMOL/L (ref 94–109)
CO2 SERPL-SCNC: 29 MMOL/L (ref 20–32)
CREAT SERPL-MCNC: 0.63 MG/DL (ref 0.52–1.04)
D DIMER PPP FEU-MCNC: 0.74 UG/ML FEU (ref 0–0.5)
EOSINOPHIL # BLD AUTO: 0.1 10E3/UL (ref 0–0.7)
EOSINOPHIL NFR BLD AUTO: 1 %
ERYTHROCYTE [DISTWIDTH] IN BLOOD BY AUTOMATED COUNT: 16.5 % (ref 10–15)
FLUAV RNA SPEC QL NAA+PROBE: NEGATIVE
FLUBV RNA RESP QL NAA+PROBE: NEGATIVE
GFR SERPL CREATININE-BSD FRML MDRD: >90 ML/MIN/1.73M2
GLUCOSE BLD-MCNC: 170 MG/DL (ref 70–99)
HCT VFR BLD AUTO: 35.5 % (ref 35–47)
HGB BLD-MCNC: 10.9 G/DL (ref 11.7–15.7)
IMM GRANULOCYTES # BLD: 0 10E3/UL
IMM GRANULOCYTES NFR BLD: 0 %
LYMPHOCYTES # BLD AUTO: 3.1 10E3/UL (ref 0.8–5.3)
LYMPHOCYTES NFR BLD AUTO: 32 %
MCH RBC QN AUTO: 25.2 PG (ref 26.5–33)
MCHC RBC AUTO-ENTMCNC: 30.7 G/DL (ref 31.5–36.5)
MCV RBC AUTO: 82 FL (ref 78–100)
MONOCYTES # BLD AUTO: 0.5 10E3/UL (ref 0–1.3)
MONOCYTES NFR BLD AUTO: 5 %
NEUTROPHILS # BLD AUTO: 5.8 10E3/UL (ref 1.6–8.3)
NEUTROPHILS NFR BLD AUTO: 61 %
NRBC # BLD AUTO: 0 10E3/UL
NRBC BLD AUTO-RTO: 0 /100
NT-PROBNP SERPL-MCNC: 471 PG/ML (ref 0–900)
PLATELET # BLD AUTO: 308 10E3/UL (ref 150–450)
POTASSIUM BLD-SCNC: 3.4 MMOL/L (ref 3.4–5.3)
RBC # BLD AUTO: 4.33 10E6/UL (ref 3.8–5.2)
SARS-COV-2 RNA RESP QL NAA+PROBE: NEGATIVE
SODIUM SERPL-SCNC: 141 MMOL/L (ref 133–144)
TROPONIN I SERPL HS-MCNC: 6 NG/L
TROPONIN I SERPL HS-MCNC: 6 NG/L
WBC # BLD AUTO: 9.6 10E3/UL (ref 4–11)

## 2022-01-27 PROCEDURE — 250N000009 HC RX 250: Performed by: EMERGENCY MEDICINE

## 2022-01-27 PROCEDURE — 36415 COLL VENOUS BLD VENIPUNCTURE: CPT | Performed by: EMERGENCY MEDICINE

## 2022-01-27 PROCEDURE — 83880 ASSAY OF NATRIURETIC PEPTIDE: CPT | Performed by: EMERGENCY MEDICINE

## 2022-01-27 PROCEDURE — 82310 ASSAY OF CALCIUM: CPT | Performed by: EMERGENCY MEDICINE

## 2022-01-27 PROCEDURE — 250N000011 HC RX IP 250 OP 636: Performed by: EMERGENCY MEDICINE

## 2022-01-27 PROCEDURE — 71275 CT ANGIOGRAPHY CHEST: CPT

## 2022-01-27 PROCEDURE — 84484 ASSAY OF TROPONIN QUANT: CPT | Performed by: EMERGENCY MEDICINE

## 2022-01-27 PROCEDURE — 87636 SARSCOV2 & INF A&B AMP PRB: CPT | Performed by: EMERGENCY MEDICINE

## 2022-01-27 PROCEDURE — 96374 THER/PROPH/DIAG INJ IV PUSH: CPT | Mod: 59

## 2022-01-27 PROCEDURE — 85025 COMPLETE CBC W/AUTO DIFF WBC: CPT | Performed by: EMERGENCY MEDICINE

## 2022-01-27 PROCEDURE — 93005 ELECTROCARDIOGRAM TRACING: CPT

## 2022-01-27 PROCEDURE — 85379 FIBRIN DEGRADATION QUANT: CPT | Performed by: EMERGENCY MEDICINE

## 2022-01-27 PROCEDURE — C9803 HOPD COVID-19 SPEC COLLECT: HCPCS

## 2022-01-27 PROCEDURE — 99285 EMERGENCY DEPT VISIT HI MDM: CPT | Mod: 25

## 2022-01-27 RX ORDER — MORPHINE SULFATE 2 MG/ML
2 INJECTION, SOLUTION INTRAMUSCULAR; INTRAVENOUS ONCE
Status: COMPLETED | OUTPATIENT
Start: 2022-01-27 | End: 2022-01-27

## 2022-01-27 RX ORDER — IOPAMIDOL 755 MG/ML
500 INJECTION, SOLUTION INTRAVASCULAR ONCE
Status: COMPLETED | OUTPATIENT
Start: 2022-01-27 | End: 2022-01-27

## 2022-01-27 RX ADMIN — IOPAMIDOL 80 ML: 755 INJECTION, SOLUTION INTRAVENOUS at 14:10

## 2022-01-27 RX ADMIN — MORPHINE SULFATE 2 MG: 2 INJECTION, SOLUTION INTRAMUSCULAR; INTRAVENOUS at 13:23

## 2022-01-27 RX ADMIN — SODIUM CHLORIDE 97 ML: 9 INJECTION, SOLUTION INTRAVENOUS at 14:10

## 2022-01-27 ASSESSMENT — ENCOUNTER SYMPTOMS
NAUSEA: 1
SHORTNESS OF BREATH: 1
CHEST TIGHTNESS: 1

## 2022-01-27 NOTE — ED TRIAGE NOTES
Pt arrives via EMS, EMS reports that pt comes from [facility]. PT reports CP that started 2 hours PTA, pt took 2 nitroglycerin with no relief. PT VSS and ABC's intact

## 2022-01-27 NOTE — ED PROVIDER NOTES
History   Chief Complaint:  Chest Pain       The history is provided by the patient and the EMS personnel.      Vijaya Manjarrez is a 71 year old female s/p heart cath 10/2020 with history of coronary artery disease, hypertension, hyperlipidemia and Diabetes Mellitus Type 2 who presents with chest pain. Per EMS personnel, symptoms began this morning with associated nausea and shortness of breath. They gave her 324mg aspirin.     Per the patient, symptoms began at 0930 and progressively worsened. She describes her pain as chest heaviness with associated shortness of breath. She took 2 nitro with no improvement (she states it is not ). She does not take daily estrogen. Denies past medical history of DVT/PE, cancer, recent immobilization, hemoptysis or MI. She mentions her brother recently had open heart surgery at the age of 78.     Review of Systems   Respiratory: Positive for chest tightness and shortness of breath.    Cardiovascular: Positive for chest pain.   Gastrointestinal: Positive for nausea.   All other systems reviewed and are negative.    Allergies:  Penicillins    Medications:  albuterol   amlodipine  atorvastatin   duloxetine   fexofenadine  hydroxyzine   lisinopril   metformin   metoprolol tartrate   mirabegron   nitroglycerin   nystatin   omeprazole   pregabalin   tiotropium   trazodone     Past Medical History:     Anxiety   Cervical spine degeneration   COPD (chronic obstructive pulmonary disease)   Coronary artery disease   Depression   Diabetes mellitus, type 2 (H)   Diabetic neuropathy    Facet arthropathy, lumbar   Fibromyalgia   GERD (gastroesophageal reflux disease)   HTN (hypertension)   Hyperlipidemia   Lumbar degenerative disc disease   Migraine headache   Facet arthropathy, lumbar  Chronic pain syndrome   Dyslipidemia   Hypokalemia  Left Orbit Floor Fracture   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  Diplopia  Closed fracture of orbit   Eyelid lesion  Dermatochalasis  of both upper eyelids  Acquired involutional ptosis of both eyelids  Nuclear sclerotic cataract of both eyes  Candidal intertrigo    Past Surgical History:    Cholecystectomy   Combined repair ptosis with blepharoplasty, bilateral   CV heart cath   Decompression, fusion cervical anterior three + levels combined   Open reduction internal fixation orbit blowout   Phacoemulsification clear cornea with standard intraocular lens implant bilateral x2    Family History:    Mother: cancer  Father: lung cancer     Social History:  Arrival via EMS.   PCP: [facility]      Physical Exam     Patient Vitals for the past 24 hrs:   BP Temp Temp src Pulse Resp SpO2   01/27/22 1500 120/69 -- -- 53 20 92 %   01/27/22 1445 118/72 -- -- 55 12 95 %   01/27/22 1430 119/62 -- -- 56 18 93 %   01/27/22 1400 111/64 -- -- 58 18 94 %   01/27/22 1345 110/66 -- -- 64 24 --   01/27/22 1330 113/75 -- -- 63 18 95 %   01/27/22 1315 -- -- -- -- -- 97 %   01/27/22 1312 122/85 98.2  F (36.8  C) Oral 63 18 97 %       Physical Exam      Eyes:    Conjunctiva normal  Neck:    Supple, no meningismus.     CV:     Regular rate and rhythm.      No murmurs, rubs or gallops.       No unilateral leg swelling.       2+ radial pulses bilateral.       No lower extremity edema.  PULM:    Clear to auscultation bilateral.       No respiratory distress.      Good air exchange.     No rales or wheezing.     Chest wall tenderness that reproducible and exacerbates her pain  ABD:    Soft, non-tender, non-distended.       No pulsatile masses.       No rebound, guarding or rigidity.  MSK:     No gross deformity to all four extremities.   LYMPH:   No cervical lymphadenopathy.  NEURO:   Alert. Good muscle tone, no atrophy.  Skin:    Warm, dry and intact.    Psych:    Mood is good and affect is appropriate.        Emergency Department Course   ECG:  ECG taken at 1318, ECG read at 1321  Sinus rhythm with premature atrial complexes  Low voltage QRS  Borderline  ECG  No significant change when compared to EKG dated 11/11/21.   Rate 62 bpm. ND interval 174 ms. QRS duration 92 ms. QT/QTc 430/436 ms. P-R-T axes 13 36 39.    Imaging:  CT Chest Pulmonary Embolism w Contrast   Preliminary Result   IMPRESSION:   1.  No evidence for pulmonary embolism.   2.  Mild ground-glass opacities may be mild edema.   3.  Coronary artery calcifications.   4.  Pulmonary nodules identified. One of these is new at the right   upper lobe mid chest. Recommended follow-up short interval CT chest in   three months for surveillance. Other nodules are stable.        Report per radiology    Laboratory:  Labs Ordered and Resulted from Time of ED Arrival to Time of ED Departure   BASIC METABOLIC PANEL - Abnormal       Result Value    Sodium 141      Potassium 3.4      Chloride 107      Carbon Dioxide (CO2) 29      Anion Gap 5      Urea Nitrogen 10      Creatinine 0.63      Calcium 8.3 (*)     Glucose 170 (*)     GFR Estimate >90     D DIMER QUANTITATIVE - Abnormal    D-Dimer Quantitative 0.74 (*)    CBC WITH PLATELETS AND DIFFERENTIAL - Abnormal    WBC Count 9.6      RBC Count 4.33      Hemoglobin 10.9 (*)     Hematocrit 35.5      MCV 82      MCH 25.2 (*)     MCHC 30.7 (*)     RDW 16.5 (*)     Platelet Count 308      % Neutrophils 61      % Lymphocytes 32      % Monocytes 5      % Eosinophils 1      % Basophils 1      % Immature Granulocytes 0      NRBCs per 100 WBC 0      Absolute Neutrophils 5.8      Absolute Lymphocytes 3.1      Absolute Monocytes 0.5      Absolute Eosinophils 0.1      Absolute Basophils 0.1      Absolute Immature Granulocytes 0.0      Absolute NRBCs 0.0     TROPONIN I - Normal    Troponin I High Sensitivity 6     INFLUENZA A/B & SARS-COV2 PCR MULTIPLEX - Normal    Influenza A PCR Negative      Influenza B PCR Negative      SARS CoV2 PCR Negative     NT PROBNP INPATIENT - Normal    N terminal Pro BNP Inpatient 471     TROPONIN I - Normal    Troponin I High Sensitivity 6           Emergency Department Course:             Reviewed:  I reviewed nursing notes, vitals, past medical history and Care Everywhere    Assessments:  1310  EMS arrives and reports.  1412 I examined the patient as noted above.   1550 I rechecked the patient and explained findings.       Interventions:  1323 Morphine 2 mg IV     Disposition:  The patient was discharged to home.     Impression & Plan     Medical Decision Makin-year-old female presented to the ED with atypical chest pain.  EKG without ischemic changes.  Initial troponin was within normal limits.  A 2-hour delta troponin was taken (6 hours after onset) and remains unchanged.  She had a coronary angiogram in 2020 with 30-50% lesions.  No indication for serial enzymes or provocative testing.    I considered pulmonary embolism but overall low suspicion.  D-dimer was elevated thus underwent CT scan of her chest which has ruled out PE.  No acute intrathoracic pathology to account for symptoms.  She had reproducible chest wall tenderness drawing concern for musculoskeletal source including costochondritis or intercostal muscle strain.  Patient safe for discharge home with close follow-up with PCP.    Patient was incidentally noted to have a pulmonary nodule and recommended to follow-up with primary care physician.  She requested COVID test for a mild cough.  COVIDswab negative.  Patient discharged home.    Diagnosis:    ICD-10-CM    1. Chest pain, unspecified type  R07.9    2. Pulmonary nodules  R91.8    3. Hyperglycemia  R73.9        Discharge Medications:  New Prescriptions    No medications on file       Scribe Disclosure:  I, Nader Garcia, am serving as a scribe at 1:15 PM on 2022 to document services personally performed by Bunny Epperson MD based on my observations and the provider's statements to me.          Bunny Epperson MD  [phone]

## 2022-01-27 NOTE — DISCHARGE INSTRUCTIONS
On your CT scan, there was a nodule on your right lung.  This will need to be followed by your primary care doctor with either x-ray or CT scan.    Discharge Instructions  Chest Pain    You have been seen today for chest pain or discomfort.  At this time, your provider has found no signs that your chest pain is due to a serious or life-threatening condition, (or you have declined more testing and/or admission to the hospital). However, sometimes there is a serious problem that does not show up right away. Your evaluation today may not be complete and you may need further testing and evaluation.     Generally, every Emergency Department visit should have a follow-up clinic visit with either a primary or a specialty clinic/provider. Please follow-up as instructed by your emergency provider today.  Return to the Emergency Department if:  Your chest pain changes, gets worse, starts to happen more often, or comes with less activity.  You are newly short of breath.  You get very weak or tired.  You pass out or faint.  You have any new symptoms, like fever, cough, numb legs, or you cough up blood.  You have anything else that worries you.    Until you follow-up with your regular provider, please do the following:  Take one aspirin daily unless you have an allergy or are told not to by your provider.  If a stress test appointment has been made, go to the appointment.  If you have questions, contact your regular provider.  Follow-up with your regular provider/clinic as directed; this is very important.    If you were given a prescription for medicine here today, be sure to read all of the information (including the package insert) that comes with your prescription.  This will include important information about the medicine, its side effects, and any warnings that you need to know about.  The pharmacist who fills the prescription can provide more information and answer questions you may have about the medicine.  If you have  questions or concerns that the pharmacist cannot address, please call or return to the Emergency Department.       Remember that you can always come back to the Emergency Department if you are not able to see your regular provider in the amount of time listed above, if you get any new symptoms, or if there is anything that worries you.  Discharge Instructions  Incidental Findings    An incidental finding is something unexpected that was found while you were being treated and is felt to not be related to the reason that you came to the Emergency Department.  While this finding is not an emergency, you need to follow up with your primary provider (or occasionally a specialist) to determine if anything should be done about it.    These findings can come from:  Checking your vital signs (example: high blood pressure).  Taking your history (example: unexplained weight loss).  The physical exam (example: a heart murmur).  Laboratory study (example: anemia or low blood count).  X-rays/ultrasound/CT or other imaging (example: an unexplained mass).    Generally, every Emergency Department visit should have a follow-up clinic visit with either a primary or a specialty clinic/provider. Please follow-up as instructed by your emergency provider today.    Return to the Emergency Department if:  Your condition worsens.  You develop unexpected pain.  You now develop new symptoms or have new concerns.  If you were given a prescription for medicine here today, be sure to read all of the information (including the package insert) that comes with your prescription.  This will include important information about the medicine, its side effects, and any warnings that you need to know about.  The pharmacist who fills the prescription can provide more information and answer questions you may have about the medicine.  If you have questions or concerns that the pharmacist cannot address, please call or return to the Emergency Department.    Remember that you can always come back to the Emergency Department if you are not able to see your regular provider in the amount of time listed above, if you get any new symptoms, or if there is anything that worries you.

## 2022-01-28 ENCOUNTER — PATIENT OUTREACH (OUTPATIENT)
Dept: GERIATRIC MEDICINE | Facility: CLINIC | Age: 72
End: 2022-01-28
Payer: COMMERCIAL

## 2022-01-28 LAB
ATRIAL RATE - MUSE: 62 BPM
DIASTOLIC BLOOD PRESSURE - MUSE: NORMAL MMHG
INTERPRETATION ECG - MUSE: NORMAL
P AXIS - MUSE: 13 DEGREES
PR INTERVAL - MUSE: 174 MS
QRS DURATION - MUSE: 92 MS
QT - MUSE: 430 MS
QTC - MUSE: 436 MS
R AXIS - MUSE: 36 DEGREES
SYSTOLIC BLOOD PRESSURE - MUSE: NORMAL MMHG
T AXIS - MUSE: 39 DEGREES
VENTRICULAR RATE- MUSE: 62 BPM

## 2022-01-28 NOTE — PROGRESS NOTES
[facility] Care Coordination Contact    CC received notification of Emergency Room visit.  ER visit occurred on 1/28/22 at [facility] with Dx of chest pain. Chest pain was not thought to be cardiac related which member is thankful for. She reports some improvement today.   CC contacted member and reviewed discharge summary.  Member has a follow-up appointment with PCP: Yes: scheduled on 2/3/22  Member has had a change in condition: No  New referrals placed: No  Home Visit Needed: No. Discussed that she is due for her annual assessment next month.   Care plan reviewed and updated.  PCP notified of ED visit via EMR.    Member shared that [facility] has been sending Prevail #4 pads, and she wants Prevail #3. Email sent to [facility] requesting this change.   Update: Email back from [facility] that Prevail #3 is no longer covered by insurance. Will change order to Nicole #3 which shoud be very comparable.   Vanesa Gtz RN  [facility]  [phone]

## 2022-02-02 ENCOUNTER — ANCILLARY PROCEDURE (OUTPATIENT)
Dept: GENERAL RADIOLOGY | Facility: CLINIC | Age: 72
End: 2022-02-02
Attending: NURSE PRACTITIONER
Payer: COMMERCIAL

## 2022-02-02 ENCOUNTER — OFFICE VISIT (OUTPATIENT)
Dept: NEUROSURGERY | Facility: CLINIC | Age: 72
End: 2022-02-02
Attending: STUDENT IN AN ORGANIZED HEALTH CARE EDUCATION/TRAINING PROGRAM
Payer: COMMERCIAL

## 2022-02-02 VITALS
DIASTOLIC BLOOD PRESSURE: 76 MMHG | HEART RATE: 84 BPM | SYSTOLIC BLOOD PRESSURE: 136 MMHG | OXYGEN SATURATION: 97 % | HEIGHT: 68 IN | BODY MASS INDEX: 30.87 KG/M2

## 2022-02-02 DIAGNOSIS — Z98.1 S/P CERVICAL SPINAL FUSION: Primary | ICD-10-CM

## 2022-02-02 DIAGNOSIS — Z98.1 S/P CERVICAL SPINAL FUSION: ICD-10-CM

## 2022-02-02 PROCEDURE — G0463 HOSPITAL OUTPT CLINIC VISIT: HCPCS

## 2022-02-02 PROCEDURE — 72040 X-RAY EXAM NECK SPINE 2-3 VW: CPT | Performed by: RADIOLOGY

## 2022-02-02 PROCEDURE — 99024 POSTOP FOLLOW-UP VISIT: CPT | Performed by: STUDENT IN AN ORGANIZED HEALTH CARE EDUCATION/TRAINING PROGRAM

## 2022-02-02 ASSESSMENT — PAIN SCALES - GENERAL: PAINLEVEL: SEVERE PAIN (6)

## 2022-02-02 NOTE — PROGRESS NOTES
"HPI:  71-year-old female status post C4-7 ACDF for cervical myelopathy.  Postoperatively she has been doing well with pain stable to relatively improved.  She has noticed no worsening of her myelopathy symptoms.  She has no swallowing problems.  Overall she is doing well postoperatively.  Current Outpatient Medications   Medication     acetaminophen (TYLENOL ARTHRITIS PAIN) 650 MG CR tablet     albuterol (PROAIR HFA/PROVENTIL HFA/VENTOLIN HFA) 108 (90 Base) MCG/ACT inhaler     amLODIPine (NORVASC) 5 MG tablet     atorvastatin (LIPITOR) 80 MG tablet     blood glucose (NO BRAND SPECIFIED) test strip     DULoxetine (CYMBALTA) 60 MG capsule     fexofenadine (ALLEGRA) 60 MG tablet     fluticasone (FLONASE) 50 MCG/ACT nasal spray     hydrOXYzine (ATARAX) 10 MG tablet     lisinopril (ZESTRIL) 20 MG tablet     metFORMIN (GLUCOPHAGE) 500 MG tablet     methocarbamol (ROBAXIN) 750 MG tablet     metoprolol tartrate (LOPRESSOR) 25 MG tablet     mirabegron (MYRBETRIQ) 25 MG 24 hr tablet     Multiple Vitamins-Minerals (MULTIVITAMIN ADULT PO)     nitroGLYcerin (NITROSTAT) 0.4 MG sublingual tablet     nystatin (MYCOSTATIN) 339342 UNIT/GM external powder     omeprazole (PRILOSEC) 20 MG DR capsule     order for DME     pregabalin (LYRICA) 150 MG capsule     tiotropium (SPIRIVA HANDIHALER) 18 MCG inhaled capsule     traZODone (DESYREL) 100 MG tablet     No current facility-administered medications for this visit.      Physical Exam:  Vital signs:      BP: 136/76 Pulse: 84     SpO2: 97 %     Height: 172.7 cm (5' 8\")    Estimated body mass index is 30.87 kg/m  as calculated from the following:    Height as of this encounter: 1.727 m (5' 8\").    Weight as of 12/6/21: 92.1 kg (203 lb).   Full strength in her bilateral upper and lower extremities.  Sensation intact to light touch throughout.  Incision is well-healed.  Results Reviewed:  Personally reviewed images of the cervical spine showing intact hardware with no known " complications  Assessment:  71-year-old female status post C4-7 ACDF for cervical myelopathy  Plan:  Follow up 6 more weeks as scheduled.  Also sent referral for physical therapy to start.  We will see her back and discuss her low back pain at the next clinic appointment as well.  She can increase her activity to lifting up to 25 pounds as tolerated.    Javi Betancur MD

## 2022-02-02 NOTE — PROGRESS NOTES
"Vijaya Manjarrez is a 71 year old female who presents for:  Chief Complaint   Patient presents with     Surgical Followup     cervical 8 wk post op f/u         Initial Vitals:  /76   Pulse 84   Ht 5' 8\" (1.727 m)   LMP  (LMP Unknown)   SpO2 97%   BMI 30.87 kg/m   Estimated body mass index is 30.87 kg/m  as calculated from the following:    Height as of this encounter: 5' 8\" (1.727 m).    Weight as of 12/6/21: 203 lb (92.1 kg).. Body surface area is 2.1 meters squared. BP completed using cuff size: X-large  Severe Pain (6)    Nursing Comments:     Camille Pineda MA    "

## 2022-02-02 NOTE — LETTER
"    2/2/2022         RE: Vijaya Manjarrez  [address]  [place] [address]        Dear Colleague,    Thank you for referring your patient, Vijaya Manjarrez, to the [facility] [place]. Please see a copy of my visit note below.    Vijaya Manjarrez is a 71 year old female who presents for:  Chief Complaint   Patient presents with     Surgical Followup     cervical 8 wk post op f/u         Initial Vitals:  /76   Pulse 84   Ht 5' 8\" (1.727 m)   LMP  (LMP Unknown)   SpO2 97%   BMI 30.87 kg/m   Estimated body mass index is 30.87 kg/m  as calculated from the following:    Height as of this encounter: 5' 8\" (1.727 m).    Weight as of 12/6/21: 203 lb (92.1 kg).. Body surface area is 2.1 meters squared. BP completed using cuff size: X-large  Severe Pain (6)    Nursing Comments:     Camille Pineda MA      HPI:  71-year-old female status post C4-7 ACDF for cervical myelopathy.  Postoperatively she has been doing well with pain stable to relatively improved.  She has noticed no worsening of her myelopathy symptoms.  She has no swallowing problems.  Overall she is doing well postoperatively.  Current Outpatient Medications   Medication     acetaminophen (TYLENOL ARTHRITIS PAIN) 650 MG CR tablet     albuterol (PROAIR HFA/PROVENTIL HFA/VENTOLIN HFA) 108 (90 Base) MCG/ACT inhaler     amLODIPine (NORVASC) 5 MG tablet     atorvastatin (LIPITOR) 80 MG tablet     blood glucose (NO BRAND SPECIFIED) test strip     DULoxetine (CYMBALTA) 60 MG capsule     fexofenadine (ALLEGRA) 60 MG tablet     fluticasone (FLONASE) 50 MCG/ACT nasal spray     hydrOXYzine (ATARAX) 10 MG tablet     lisinopril (ZESTRIL) 20 MG tablet     metFORMIN (GLUCOPHAGE) 500 MG tablet     methocarbamol (ROBAXIN) 750 MG tablet     metoprolol tartrate (LOPRESSOR) 25 MG tablet     mirabegron (MYRBETRIQ) 25 MG 24 hr tablet     Multiple Vitamins-Minerals (MULTIVITAMIN ADULT PO)     " "nitroGLYcerin (NITROSTAT) 0.4 MG sublingual tablet     nystatin (MYCOSTATIN) 133957 UNIT/GM external powder     omeprazole (PRILOSEC) 20 MG DR capsule     order for DME     pregabalin (LYRICA) 150 MG capsule     tiotropium (SPIRIVA HANDIHALER) 18 MCG inhaled capsule     traZODone (DESYREL) 100 MG tablet     No current facility-administered medications for this visit.      Physical Exam:  Vital signs:      BP: 136/76 Pulse: 84     SpO2: 97 %     Height: 172.7 cm (5' 8\")    Estimated body mass index is 30.87 kg/m  as calculated from the following:    Height as of this encounter: 1.727 m (5' 8\").    Weight as of 12/6/21: 92.1 kg (203 lb).   Full strength in her bilateral upper and lower extremities.  Sensation intact to light touch throughout.  Incision is well-healed.  Results Reviewed:  Personally reviewed images of the cervical spine showing intact hardware with no known complications  Assessment:  71-year-old female status post C4-7 ACDF for cervical myelopathy  Plan:  Follow up 6 more weeks as scheduled.  Also sent referral for physical therapy to start.  We will see her back and discuss her low back pain at the next clinic appointment as well.  She can increase her activity to lifting up to 25 pounds as tolerated.    Javi Betancur MD      Again, thank you for allowing me to participate in the care of your patient.        Sincerely,        Javi Betancur MD    "

## 2022-02-03 ENCOUNTER — OFFICE VISIT (OUTPATIENT)
Dept: INTERNAL MEDICINE | Facility: CLINIC | Age: 72
End: 2022-02-03
Payer: COMMERCIAL

## 2022-02-03 VITALS
RESPIRATION RATE: 18 BRPM | BODY MASS INDEX: 31.45 KG/M2 | HEIGHT: 68 IN | OXYGEN SATURATION: 98 % | SYSTOLIC BLOOD PRESSURE: 127 MMHG | TEMPERATURE: 98 F | DIASTOLIC BLOOD PRESSURE: 77 MMHG | HEART RATE: 83 BPM | WEIGHT: 207.5 LBS

## 2022-02-03 DIAGNOSIS — R07.9 CHEST PAIN, UNSPECIFIED TYPE: Primary | ICD-10-CM

## 2022-02-03 DIAGNOSIS — Z98.1 HISTORY OF SPINAL FUSION: ICD-10-CM

## 2022-02-03 DIAGNOSIS — J44.9 CHRONIC OBSTRUCTIVE PULMONARY DISEASE, UNSPECIFIED COPD TYPE (H): ICD-10-CM

## 2022-02-03 DIAGNOSIS — R91.1 LUNG NODULE: ICD-10-CM

## 2022-02-03 DIAGNOSIS — E11.9 TYPE 2 DIABETES MELLITUS WITHOUT COMPLICATION, WITHOUT LONG-TERM CURRENT USE OF INSULIN (H): ICD-10-CM

## 2022-02-03 DIAGNOSIS — I25.118 ATHEROSCLEROTIC HEART DISEASE OF NATIVE CORONARY ARTERY WITH OTHER FORMS OF ANGINA PECTORIS (H): ICD-10-CM

## 2022-02-03 PROCEDURE — 99214 OFFICE O/P EST MOD 30 MIN: CPT | Performed by: FAMILY MEDICINE

## 2022-02-03 RX ORDER — TRAMADOL HYDROCHLORIDE 50 MG/1
50 TABLET ORAL 3 TIMES DAILY PRN
Qty: 90 TABLET | Refills: 0 | Status: SHIPPED | OUTPATIENT
Start: 2022-02-03 | End: 2022-03-16

## 2022-02-03 ASSESSMENT — MIFFLIN-ST. JEOR: SCORE: 1509.71

## 2022-02-03 NOTE — PROGRESS NOTES
(R07.9) Chest pain, unspecified type  (primary encounter diagnosis)  Comment:   Episode of level 8/10 substernal chest pain 1 week ago.  Has not recurred.  Multiple risk factors including tobacco use, diabetes, previous evidence of CAD.  I will have her follow-up with cardiology.  Has not been seen in 2 years.  Plan: Adult Cardiology Eval Referral            (I25.118) Atherosclerotic heart disease of native coronary artery with other forms of angina pectoris (H)  Comment:   Plan: Adult Cardiology Eval Referral            (E11.9) Type 2 diabetes mellitus without complication, without long-term current use of insulin (H)  Comment: Last A1c in November 2021.  Was 7.3.  Plan:       (J44.9) Chronic obstructive pulmonary disease, unspecified COPD type (H)  Comment:   Patient also a persistent smoker.  Plan:       (R91.1) Lung nodule  Comment:   Small 4 to 5 mm nodule.  Upper right lung.  I will schedule a follow-up CT in 3 months as requested.  Plan: CT Chest w/o Contrast              (Z98.1) History of spinal fusion  Comment: A refill.  Plan: traMADol (ULTRAM) 50 MG tablet        Suggested doses 2 to 3/day.        Subjective     Patient is being seen for an ER follow up.  HPI     ED/UC Followup:    Facility:  [facility]    Date of visit: 1/27/2022 (3 hours    Reason for visit: Clinical Impressions       Chest pain, unspecified type     Pulmonary nodules     Hyperglycemia      Current Status: Patient states that she is feeling better.       Patient had at least a 20-minute episode of heaviness and sharp pain in the substernal region.  Unrelieved by 2 nitroglycerin.  She states level of pain was about 8 out of 10.  She has not had any recurrences.    Patient has a known history of diabetes and she is a smoker.      She has been seen previously by cardiology but no repeat visit in the last 2 years.    A small lung nodule in the right upper lobe was also seen on a CT scan done at the time of  "her ER visit.  This will need follow-up.  Suggestion is in 3 months.      Review of Systems     No fever or chills.  Has a known cough with minimal sputum production.  Currently no chest pain or palpitations.  No edema.  No nausea or abdominal pain.  No focal weakness.        Objective    Pulse 83   Temp 98  F (36.7  C) (Tympanic)   Resp 18   Ht 1.727 m (5' 8\")   Wt 94.1 kg (207 lb 8 oz)   LMP  (LMP Unknown)   SpO2 98%   BMI 31.55 kg/m    Body mass index is 31.55 kg/m .  Physical Exam     Alert, NAD.  HEENT unremarkable.  Neck no mass or thyromegaly.  Chest basically clear.  Nonlabored breathing.  Cardiac RSR, 2/6 systolic ejection murmur upper sternal borders  Abdomen nontender.  Extremities no edema.    "

## 2022-02-04 ENCOUNTER — OFFICE VISIT (OUTPATIENT)
Dept: INTERNAL MEDICINE | Facility: CLINIC | Age: 72
End: 2022-02-04
Payer: COMMERCIAL

## 2022-02-04 VITALS
SYSTOLIC BLOOD PRESSURE: 110 MMHG | HEART RATE: 90 BPM | BODY MASS INDEX: 31.22 KG/M2 | DIASTOLIC BLOOD PRESSURE: 70 MMHG | OXYGEN SATURATION: 94 % | TEMPERATURE: 98.4 F | RESPIRATION RATE: 20 BRPM | WEIGHT: 206 LBS | HEIGHT: 68 IN

## 2022-02-04 DIAGNOSIS — M20.012 MALLET FINGER OF LEFT HAND: ICD-10-CM

## 2022-02-04 DIAGNOSIS — J44.9 CHRONIC OBSTRUCTIVE PULMONARY DISEASE, UNSPECIFIED COPD TYPE (H): ICD-10-CM

## 2022-02-04 DIAGNOSIS — Z01.818 PRE-OP EXAM: Primary | ICD-10-CM

## 2022-02-04 DIAGNOSIS — M51.369 LUMBAR DEGENERATIVE DISC DISEASE: ICD-10-CM

## 2022-02-04 DIAGNOSIS — E11.9 TYPE 2 DIABETES MELLITUS WITHOUT COMPLICATION, WITHOUT LONG-TERM CURRENT USE OF INSULIN (H): ICD-10-CM

## 2022-02-04 DIAGNOSIS — I25.118 ATHEROSCLEROTIC HEART DISEASE OF NATIVE CORONARY ARTERY WITH OTHER FORMS OF ANGINA PECTORIS (H): ICD-10-CM

## 2022-02-04 PROCEDURE — 99214 OFFICE O/P EST MOD 30 MIN: CPT | Performed by: FAMILY MEDICINE

## 2022-02-04 RX ORDER — DULOXETIN HYDROCHLORIDE 60 MG/1
CAPSULE, DELAYED RELEASE ORAL
Qty: 90 CAPSULE | Refills: 2 | Status: SHIPPED | OUTPATIENT
Start: 2022-02-04

## 2022-02-04 RX ORDER — NITROGLYCERIN 0.4 MG/1
TABLET SUBLINGUAL
Qty: 25 TABLET | Refills: 0 | Status: SHIPPED | OUTPATIENT
Start: 2022-02-04 | End: 2022-04-01

## 2022-02-04 ASSESSMENT — MIFFLIN-ST. JEOR: SCORE: 1502.91

## 2022-02-04 NOTE — PROGRESS NOTES
[place] [address]  [facility] [address]  Phone: [phone]  Primary Provider: [facility]  Pre-op Performing Provider: TAI SANDERS      PREOPERATIVE EVALUATION:  Today's date: 2/4/2022    Vijaya Manjarrez is a 71 year old female who presents for a preoperative evaluation.    Surgical Information:  Surgery/Procedure: tendon repair left middle finger.  Surgery Location: [facility]   Surgeon: Susy   Surgery Date: 2/9/22  Time of Surgery:   Where patient plans to recover: At home with family  Fax number for surgical facility: [phone]    Type of Anesthesia Anticipated:  Sedation, regional block    Assessment & Plan     The proposed surgical procedure is considered LOW risk.    Problem List Items Addressed This Visit     DM type 2, Hgb A1C 7.3 on 5/20/20    COPD (chronic obstructive pulmonary disease) (H)    Lumbar degenerative disc disease    Relevant Medications    DULoxetine (CYMBALTA) 60 MG capsule    Atherosclerotic heart disease of native coronary artery with other forms of angina pectoris (H)    Relevant Medications    nitroGLYcerin (NITROSTAT) 0.4 MG sublingual tablet      Other Visit Diagnoses     Pre-op exam    -  Primary    Mallet finger of left hand                   Risks and Recommendations:  The patient has the following additional risks and recommendations for perioperative complications:   - No identified additional risk factors other than previously addressed      Medications -   Take Amlodipine, Lisinopril, Metformin, Metoprolol on AM of procedure.  Use Spiriva.      RECOMMENDATION:      APPROVAL GIVEN to proceed with proposed procedure, without further diagnostic evaluation.      [id number]}  This was a 25-minute visit with time spent reviewing medical record, interviewing and examining patient, reviewing labs and EKG, evaluating heart and lung disease picture, preparing medical record.        Subjective     HPI  related to upcoming procedure:     She has an old injury - cut finger with knife.  Now has a drop finger L middle.    Patient has a history of heart disease, a somewhat long-term stable angina and diastolic dysfunction picture.  She has COPD.  She has diabetes.  Her last A1c was 7.3 and she is not on a great deal of medication for this.  She is a persistent smoker but claims to smoke minimally.    She does not have a history of anesthesia complications.    This is a minor surgery.  She would like to get her finger fixed.          Preop Questions 2/4/2022   1. Have you ever had a heart attack or stroke? No   2. Have you ever had surgery on your heart or blood vessels, such as a stent placement, a coronary artery bypass, or surgery on an artery in your head, neck, heart, or legs? No   3. Do you have chest pain with activity? YES -    4. Do you have a history of  heart failure? No   5. Do you currently have a cold, bronchitis or symptoms of other infection? No   6. Do you have a cough, shortness of breath, or wheezing? No   7. Do you or anyone in your family have previous history of blood clots? No   8. Do you or does anyone in your family have a serious bleeding problem such as prolonged bleeding following surgeries or cuts? No   9. Have you ever had problems with anemia or been told to take iron pills? YES -    10. Have you had any abnormal blood loss such as black, tarry or bloody stools, or abnormal vaginal bleeding? No   11. Have you ever had a blood transfusion? No   12. Are you willing to have a blood transfusion if it is medically needed before, during, or after your surgery? Yes   13. Have you or any of your relatives ever had problems with anesthesia? No   14. Do you have sleep apnea, excessive snoring or daytime drowsiness? No   14a. Do you have a CPAP machine? -   15. Do you have any artifical heart valves or other implanted medical devices like a pacemaker, defibrillator, or continuous glucose monitor? No    16. Do you have artificial joints? No   17. Are you allergic to latex? No       Health Care Directive:  Patient does not have a Health Care Directive or Living Will:       Preoperative Review of :   reviewed - Tramadol 50 mg 2-3 per day.        Review of Systems    No fevers or chills.  No sore throat or head congestion.  Some long-term shortness of breath and cough.  Some mild chest pain with exertion on chronic basis.  No nausea or abdominal pain.  No lower extremity swelling.  No localized numbness or weakness.    Patient Active Problem List    Diagnosis Date Noted     Surgery, elective 12/06/2021     Priority: Medium     Chronic diastolic congestive heart failure (H) 09/28/2021     Priority: Medium     Nuclear sclerotic cataract of both eyes 06/08/2021     Priority: Medium     Added automatically from request for surgery [id number]       Eyelid lesion 02/09/2021     Priority: Medium     Added automatically from request for surgery [id number]       Dermatochalasis of both upper eyelids 02/09/2021     Priority: Medium     Added automatically from request for surgery [id number]       Acquired involutional ptosis of both eyelids 02/09/2021     Priority: Medium     Added automatically from request for surgery [id number]       Closed fracture of orbit (H) 10/05/2020     Priority: Medium     Added automatically from request for surgery [id number]       Chest pain 10/01/2020     Priority: Medium     Smoker 10/01/2020     Priority: Medium     Left Orbit Floor Fracture -- Dx 9/25/20 09/25/2020     Priority: Medium     Added automatically from request for surgery [id number]       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 09/25/2020     Priority: Medium     Added automatically from request for surgery [id number]       Diplopia 09/25/2020     Priority: Medium     Added automatically from request for surgery [id number]       Hypokalemia 08/08/2020     Priority: Medium     Atherosclerotic heart disease of native coronary artery with  other forms of angina pectoris (H) 2019     Priority: Medium     Status post coronary angiogram 2019     Priority: Medium     Coronary artery disease involving native coronary artery, angina presence unspecified, unspecified whether native or transplanted heart 2019     Priority: Medium     Added automatically from request for surgery [id number]  Replacing diagnoses that were inactivated after the 10/1/2021 regulatory import.       Dyslipidemia 2019     Priority: Medium     Added automatically from request for surgery [id number]       Mild episode of recurrent major depressive disorder (H) 2018     Priority: Medium     Advanced directives, counseling/discussion 2017     Priority: Medium     Advance Care Planning 2017: ACP Review of Chart / Resources Provided:  Reviewed chart for advance care plan.  Vijaya Manjarrez has been provided information and resources to begin or update their advance care plan.  Added by Vanesa Gtz           [facility] 07/10/2017     Priority: Medium     [facility]   Vanesa Gtz RN  [phone]    [facility] CARE PLAN SUMMARY    Client Name:  Vijaya Manjarrez  Address:  [address] [place] [address] [place] [address] [place] [address] Phone: [phone]    :  1950 Date of Assessment: 2020   Health Plan:  [facility]  Health Plan Number: [id number] Medical Assistance Number: [id number]  Financial Worker:  Rubén [phone]  Case #:  [id number]   [facility] :  Vanesa Gtz RN CM Phone:  [phone]  CM Fax:  [phone]   [facility] Enrollment Date: 2017 Case Mix:  B  Rate Cell:  B  Waiver Type:  EW   Emergency Contact: Luis Manjarrez  Mobile Phone: [phone]  Relation: Son Language:  English  :  No   Health Care Agent/POA:  None Advanced Directives/Living Will:  None   Primary Care Clinic/Phone/Fax:  [facility] [address]/(p) [phone], (f) [phone] Primary Dx:  COPD  "[J44.9]  Secondary Dx: Chronic Pain Syndrome [G89.4]   Primary Physician:  Dr. Vidhi Mario   Height:  5' 9\"  Weight:  207 lbs   Specialty Physician:   Dr. Galan OB/GYN Audiologist:  Margoth   Eye Care Provider:  [facility] Care Provider:    [facility]: [facility] [phone] or [phone]   Other:        [facility] CURRENT SERVICES SUMMARY  Equipment owned/DME history: Glasses, walker, shower chair  SERVICE TYPE/PROVIDER NAME/PHONE AUTH DATE FREQUENCY Units OR $ Amt DESCRIPTION   Medical Transportation: [facility] [phone] 5/1/20-  4/30/21 as needed  Medical rides as needed     Supplies: [facility] [phone] 5/1/20-  4/30/21 monthly MA Incontinent products monthly     Homemaking: [facility] [employer]   Fax: [phone]  NPI: [id number] 8/31/20-  4/30/21 weekly  4 hours/week $335.57              Hyperlipidemia LDL goal <100 01/20/2017     Priority: Medium     Fibromyalgia 01/20/2017     Priority: Medium     Benign essential hypertension 01/20/2017     Priority: Medium     Chronic pain syndrome 01/20/2017     Priority: Medium     Patient is followed by Disha Van MD for ongoing prescription of pain medication.  All refills should only be approved by this provider, or covering partner.    Medication(s): Tramadol.   Maximum quantity per month: 90  Clinic visit frequency required: Q 6  months     Controlled substance agreement:  Encounter-Level CSA:     There are no encounter-level csa.        Pain Clinic evaluation in the past: Yes       Date/Location:      DIRE Total Score(s):  No flowsheet data found.    Last [facility] website verification:  none   [email or web address]       DM type 2, Hgb A1C 7.3 on 5/20/20      Priority: Medium     Cervical spine degeneration      Priority: Medium     spinal stenosis,        Facet arthropathy, lumbar      Priority: Medium     Lumbar degenerative disc disease      Priority: Medium     Migraine headache      Priority: " Medium     Diabetic neuropathy (H)      Priority: Medium     Anxiety      Priority: Medium     GERD (gastroesophageal reflux disease)      Priority: Medium     HCD (health care directive) 01/12/2017     Priority: Medium     Pt received HCD and will return when complete.    [employer] Regulatory Load OCT 2020       Controlled substance agreement signed- ok 5/12/20 01/12/2017     Priority: Medium     Patient is followed by Genoveva Carroll CNP for ongoing prescription of pain medication.  All refills should only be approved by this provider, or covering partner.    Medication(s): Tramadol.   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) 01/31/2016     Priority: Medium     COPD (chronic obstructive pulmonary disease) (H) 10/18/2012     Priority: Medium     mild       Candidal intertrigo 04/11/2011     Priority: Medium      Past Medical History:   Diagnosis Date     Anxiety      Cervical spine degeneration 2016    spinal stenosis,      COPD (chronic obstructive pulmonary disease) (H) 2012    mild     Coronary artery disease      Depression      Diabetes mellitus, type 2 (H)      Diabetic neuropathy (H)      Facet arthropathy, lumbar      Fibromyalgia      GERD (gastroesophageal reflux disease)      HTN (hypertension)      Hyperlipidemia      Lumbar degenerative disc disease      Migraine headache      Past Surgical History:   Procedure Laterality Date     CHOLECYSTECTOMY, LAPOROSCOPIC      Cholecystectomy, Laparoscopic     COMBINED REPAIR PTOSIS WITH BLEPHAROPLASTY BILATERAL Bilateral 3/11/2021    Procedure: Bilateral upper eyelid blepharoplasty and  ptosis repair. Right upper eyelid margin lesion biopsy;  Surgeon: George Doll MD;  Location: [facility] OR     CV HEART CATHETERIZATION WITH POSSIBLE INTERVENTION Left 2/26/2019    Procedure: Coronary Angiogram;  Surgeon: Cheo Merida MD;  Location:  HEART CARDIAC CATH LAB     CV HEART CATHETERIZATION WITH POSSIBLE INTERVENTION Bilateral 10/2/2020    Procedure: Heart Catheterization with Possible Intervention;  Surgeon: Linda Sauceda MD;  Location:  HEART CARDIAC CATH LAB     CV LEFT HEART CATH N/A 10/2/2020    Procedure: Left Heart Cath;  Surgeon: Linda Sauceda MD;  Location:  HEART CARDIAC CATH LAB     DECOMPRESSION, FUSION CERVICAL ANTERIOR THREE+ LEVELS, COMBINED N/A 12/6/2021    Procedure: 1.  Anterior cervical discectomy and fusion with use of allograft for fusion at C4-5, C5-6 and C6-7 2.  Placement of interbody graft at C4-5, C5-6 and C6-7 filled with allograft 3.  Anterior cervical plating at C4, C5, C6 and C7;  Surgeon: Javi Betancur MD;  Location:  OR     OPEN REDUCTION INTERNAL FIXATION ORBIT BLOWOUT Left 10/8/2020    Procedure: Left Open Reduction Internal Fixation, Fracture, Orbit With Intraoperative Navigation - Left;  Surgeon: George Doll MD;  [facility]     PHACOEMULSIFICATION CLEAR CORNEA WITH STANDARD INTRAOCULAR LENS IMPLANT  7/9/2021          PHACOEMULSIFICATION CLEAR CORNEA WITH STANDARD INTRAOCULAR LENS IMPLANT  7/26/2021          PHACOEMULSIFICATION WITH STANDARD INTRAOCULAR LENS IMPLANT Right 7/9/2021    Procedure: PHACOEMULSIFICATION, CATARACT, WITH STANDARD INTRAOCULAR LENS IMPLANT INSERTION RIGHT;  Surgeon: Dai Glaser MD;  Location[facility]     PHACOEMULSIFICATION WITH STANDARD INTRAOCULAR LENS IMPLANT Left 7/26/2021    Procedure: PHACOEMULSIFICATION, CATARACT, WITH STANDARD INTRAOCULAR LENS IMPLANT INSERTION;  Surgeon: Dai Glasre MD;  Location: [facility]     Current Outpatient Medications   Medication Sig  Dispense Refill     acetaminophen (TYLENOL ARTHRITIS PAIN) 650 MG CR tablet Take 650 mg by mouth 2 times daily as needed        albuterol (PROAIR HFA/PROVENTIL HFA/VENTOLIN HFA) 108 (90 Base) MCG/ACT inhaler Inhale 2 puffs into the lungs every 6 hours 18 g 11     amLODIPine (NORVASC) 5 MG tablet TAKE ONE TABLET BY MOUTH ONCE DAILY 90 tablet 2     atorvastatin (LIPITOR) 80 MG tablet TAKE 1 TABLET (80 MG) BY MOUTH DAILY (NEED FASTING LABS) 90 tablet 3     blood glucose (NO BRAND SPECIFIED) test strip Use to test blood sugars 1 times daily or as directed, use brand same as previous 100 strip 3     DULoxetine (CYMBALTA) 60 MG capsule TAKE ONE CAPSULE BY MOUTH ONCE DAILY 90 capsule 2     fexofenadine (ALLEGRA) 60 MG tablet Take 1 tablet (60 mg) by mouth 2 times daily 180 tablet 3     fluticasone (FLONASE) 50 MCG/ACT nasal spray SPRAY 2 SPRAYS IN EACH NOSTRIL ONCE DAILY 16 g 5     hydrOXYzine (ATARAX) 10 MG tablet TAKE ONE TABLET BY MOUTH THREE TIMES A DAY AS NEEDED FOR ANXIETY 270 tablet 3     lisinopril (ZESTRIL) 20 MG tablet TAKE ONE TABLET BY MOUTH TWICE A  tablet 2     metFORMIN (GLUCOPHAGE) 500 MG tablet TAKE ONE TABLET BY MOUTH TWICE A DAY WITH A MEAL 180 tablet 4     methocarbamol (ROBAXIN) 750 MG tablet Take 1 tablet (750 mg) by mouth every 6 hours 40 tablet 0     metoprolol tartrate (LOPRESSOR) 25 MG tablet Take 25 mg by mouth 2 times daily 180 tablet 3     mirabegron (MYRBETRIQ) 25 MG 24 hr tablet Take 1 tablet (25 mg) by mouth daily 90 tablet 1     Multiple Vitamins-Minerals (MULTIVITAMIN ADULT PO) Take 5 tablets by mouth daily Pro-caps vitamin        nitroGLYcerin (NITROSTAT) 0.4 MG sublingual tablet FOR CHEST PAIN PLACE 1 TABLET UNDER THE TONGUE EVERY 5 MINUTES FOR 3 DOSES IF NEEDED. IF SYMPTOMS PERSIST 5 MINUTES AFTER FIRST DOSE CALL 911. 25 tablet 0     nystatin (MYCOSTATIN) 190240 UNIT/GM external powder APPLY TO AFFECTED AREA(S) TOPICALLY THREE TIMES A DAY AS NEEDED 30 g 3     omeprazole  "(PRILOSEC) 20 MG DR capsule TAKE ONE CAPSULE BY MOUTH TWICE A  capsule 2     order for DME Equipment being ordered: glucometer 1 each 0     pregabalin (LYRICA) 150 MG capsule TAKE ONE CAPSULE BY MOUTH THREE TIMES A  capsule 3     tiotropium (SPIRIVA HANDIHALER) 18 MCG inhaled capsule INHALE ONE CAPSULE BY MOUTH ONCE DAILY 90 capsule 3     traMADol (ULTRAM) 50 MG tablet Take 1 tablet (50 mg) by mouth 3 times daily as needed for severe pain 90 tablet 0     traZODone (DESYREL) 100 MG tablet TAKE TWO TABLETS BY MOUTH EVERY NIGHT AT BEDTIME 180 tablet 3       Allergies   Allergen Reactions     Penicillins Hives        Social History     Tobacco Use     Smoking status: Light Tobacco Smoker     Packs/day: 0.25     Years: 40.00     Pack years: 10.00     Types: Cigarettes     Smokeless tobacco: Never Used   Substance Use Topics     Alcohol use: No     History   Drug Use No         Objective     /70   Pulse 90   Temp 98.4  F (36.9  C) (Oral)   Resp 20   Ht 1.727 m (5' 8\")   Wt 93.4 kg (206 lb)   LMP  (LMP Unknown)   SpO2 94%   BMI 31.32 kg/m      Physical Exam    Alert, NAD.  HEENT unremarkable.  Neck no mass or thyromegaly.  Chest clear.  Nonlabored breathing at rest.  Cardiac RSR, no murmur, normal rate.  Abdomen nontender.  Lower extremities no edema.  Left middle finger has flexion deformity at DIP joint.  Speech, motor, gait WNL.        Recent Labs   Lab Test 01/27/22  1318 12/08/21  0809 12/07/21  0617 12/06/21  0722 12/06/21  0718 11/29/21  1820 11/11/21  1143 09/28/21  1613   HGB 10.9* 11.0*   < > 11.4*  --  10.9* 11.0* 11.1*     --   --  322  --  319 338 302   INR  --   --   --   --  0.96  --   --   --      --   --   --   --  144 141 142   POTASSIUM 3.4  --   --   --   --  3.7 4.2 4.4   CR 0.63  --   --  0.64  --  0.70 0.62 0.75   A1C  --   --   --   --   --   --  7.3* 6.9*    < > = values in this interval not displayed.        Diagnostics:    Recent labs reviewed.    EKG from " 1-27-22.  RSR with occasional PAC., rate 62.  Conduction normal.  Axis normal.  ST segments and T waves normal.  No scar.  Borderline EKG without acute changes.    Revised Cardiac Risk Index (RCRI):  The patient has the following serious cardiovascular risks for perioperative complications:   - No serious cardiac risks = 0 points     RCRI Interpretation: 1 point: Class II (low risk - 0.9% complication rate)           Signed Electronically by: Seymour Garcia MD  Copy of this evaluation report is provided to requesting physician.

## 2022-02-07 PROBLEM — Z76.89 HEALTH CARE HOME: Status: RESOLVED | Noted: 2017-07-10 | Resolved: 2022-02-07

## 2022-02-15 ENCOUNTER — PATIENT OUTREACH (OUTPATIENT)
Dept: GERIATRIC MEDICINE | Facility: CLINIC | Age: 72
End: 2022-02-15

## 2022-02-15 NOTE — PROGRESS NOTES
[facility] Care Coordination Contact    Called member to schedule annual HRA home visit. HRA has been scheduled for 2/17/22 at 2PM.     Member's procedure for last week was canceled as she has been experiencing diarrhea - has not rescheduled yet. She was encouraged to schedule within 30 days if possible so her pre-op is still valid. Encouraged follow up about diarrhea if this is still going on. Has not been on a recent ABX and states it is not food related.     Member expressed frustration that her PCPs keep leaving the clinic. She has had trouble establishing with someone long term. She thinks she will try to establish with Dr. Dill.     Vanesa Gtz RN  [facility]  [phone]

## 2022-02-17 ENCOUNTER — PATIENT OUTREACH (OUTPATIENT)
Dept: GERIATRIC MEDICINE | Facility: CLINIC | Age: 72
End: 2022-02-17
Payer: COMMERCIAL

## 2022-02-17 PROBLEM — Z71.89 OTHER SPECIFIED COUNSELING: Status: ACTIVE | Noted: 2017-01-12

## 2022-02-22 ASSESSMENT — ACTIVITIES OF DAILY LIVING (ADL): DEPENDENT_IADLS:: CLEANING;SHOPPING;TRANSPORTATION

## 2022-02-22 NOTE — PROGRESS NOTES
[facility] Care Coordination Contact    [facility] Home Visit Assessment     Health Risk Assessment with Vijaya Manjarrez completed on February 17, 2022. Remote assessment due to COVID 19.    Member considering PCP options - frustrated that several providers she has established with have left the clinic. Intends to remain at the [facility] due to how close it is to her home. Reviewed available options.     Type of residence: Independent Senior Living  Current living arrangement: I live alone     Assessment completed with: Patient    Current Care Plan  Member currently receiving the following home care services: None    Member currently receiving the following community resources: homemaking.     Medication Review  Medication reconciliation completed in Epic: Yes  Medication set-up & administration: Independent and sets up on own weekly.  Self-administers medications.  MTM Referral Placed: No: Discussed MTM - offered to make a referral, but she would like to wait at this time as she has other appointments that take priority.  Made a referral last year as she feels she has side effects from medications, but she did not follow through when they called to schedule.     Mental/Behavioral Health   Depression Screening:   PHQ-2 Total Score (Adult) - Positive if 3 or more points; Administer PHQ-9 if positive: 1    Mental health DX: Yes    Mental health DX how managed: Medication    Falls Assessment:   Fallen 2 or more times in the past year?: No   Any fall with injury in the past year?: No    ADL/IADL Dependencies:   Dependent ADLs: Independent  Dependent IADLs: Cleaning, Shopping, Transportation    [facility] Health Plan sponsored benefits: Shared information re: Silver Sneakers/gym memberships, ASA, Calcium +D.    PCA Assessment completed at visit: No     Elderly Waiver Eligibility: Yes-will continue on EW.    Care Plan & Recommendations: Continue with weekly homemaking. Potentially interested in  restarting home delivered meals - will send menus for review. Requesting a hold on incontinent products for 2 months - email sent to [facility].     See [facility] for detailed assessment information.    Follow-Up Plan: Member informed of future contact, plan to f/u with member with a 6 month telephone assessment.  Contact information shared with member and family, encouraged member to call with any questions or concerns at any time.    [facility] continuum providers: Please refer to Health Care Home on the Epic Problem List to view this patient's [facility] Care Plan Summary.    Vanesa Gtz RN  [facility]  [phone]

## 2022-02-23 ENCOUNTER — PATIENT OUTREACH (OUTPATIENT)
Dept: GERIATRIC MEDICINE | Facility: CLINIC | Age: 72
End: 2022-02-23
Payer: COMMERCIAL

## 2022-02-23 NOTE — TELEPHONE ENCOUNTER
[facility] Care Coordination Contact    Received after visit chart from care coordinator.  Completed following tasks: Mailed copy of care plan to client, Updated services in access and Submitted referrals/auths for homemaking.   and Provider Signature - No POC Shared:  Member indicates that they do not want their POC shared with any EW providers.     Sent [facility] safe medication disposal form, [facility] quick contact sheet, and [facility] eye provider list.    Member was mailed a copy of the POC signature sheet to sign and return mail with a SASE.  This assessment was completed telephonically due to Covid-19.    Cynthia Bell  Care Management Specialist  [facility]  [phone]

## 2022-02-23 NOTE — LETTER
February 23, 2022      VIJAYA BRAGA  [address]  [place] [address]      Dear Vijaya:    At [facility], we are dedicated to improving your health and well-being. Enclosed is the Comprehensive Care Plan that we developed with you on 02/17/2022. Please review the Care Plan carefully.    As a reminder, some of the things we discussed at your visit include:    Your physical and mental health    Ways to reduce falls    Health care needs you may have    Don t forget to contact your care coordinator if you:    Have been hospitalized or plan to be hospitalized     Have had a fall     Have experienced a change in physical health    Are experiencing emotional problems     If you do not agree with your Care Plan, have questions about it, or have experienced a change in your needs, please call me at [phone]. If you are hearing impaired, please call the [place] Relay at 356 or [phone] ([email or web address] relay service).    Sincerely,    Vanesa Gtz RN    E-mail: [email or web address]  Phone: [phone]      [facility] ([facility]) is a health plan that contracts with both Medicare and the [place] Medical Assistance (Medicaid) program to provide benefits of both programs to enrollees. Enrollment in [facility] depends on contract renewal.    [id number])     [id number] (11/18)

## 2022-03-02 ENCOUNTER — OFFICE VISIT (OUTPATIENT)
Dept: NEUROSURGERY | Facility: CLINIC | Age: 72
End: 2022-03-02
Attending: STUDENT IN AN ORGANIZED HEALTH CARE EDUCATION/TRAINING PROGRAM
Payer: COMMERCIAL

## 2022-03-02 VITALS — HEART RATE: 96 BPM | DIASTOLIC BLOOD PRESSURE: 76 MMHG | SYSTOLIC BLOOD PRESSURE: 135 MMHG | OXYGEN SATURATION: 97 %

## 2022-03-02 DIAGNOSIS — M54.10 BACK PAIN WITH RADICULOPATHY: Primary | ICD-10-CM

## 2022-03-02 DIAGNOSIS — M48.061 LUMBAR FORAMINAL STENOSIS: ICD-10-CM

## 2022-03-02 PROCEDURE — G0463 HOSPITAL OUTPT CLINIC VISIT: HCPCS

## 2022-03-02 PROCEDURE — 99024 POSTOP FOLLOW-UP VISIT: CPT | Performed by: STUDENT IN AN ORGANIZED HEALTH CARE EDUCATION/TRAINING PROGRAM

## 2022-03-02 RX ORDER — MELOXICAM 7.5 MG/1
7.5 TABLET ORAL DAILY
Qty: 60 TABLET | Refills: 3 | Status: SHIPPED | OUTPATIENT
Start: 2022-03-02

## 2022-03-02 RX ORDER — METHOCARBAMOL 500 MG/1
500 TABLET, FILM COATED ORAL 4 TIMES DAILY PRN
Qty: 90 TABLET | Refills: 3 | Status: SHIPPED | OUTPATIENT
Start: 2022-03-02 | End: 2022-12-17

## 2022-03-02 NOTE — LETTER
3/2/2022         RE: Vijaya Manjarrez  [address]  [place] [address]        Dear Colleague,    Thank you for referring your patient, Vijaya Manjarrez, to the [facility] [place]. Please see a copy of my visit note below.    HPI:  8-year-old female status post C4-7 ACDF for cervical myelopathy.  She notes no neck pain at this time.  She has not noticed any significant improvement in her myelopathy symptoms but they have definitely not worsened.  She has no swallowing problems right now.  Her main complaint at this time is her low back pain radiating to her bilateral lower extremities.  She notes the pain through both legs encompassing the entire leg.  It gets worse with standing and increased activity improves with rest.  She is taking over-the-counter medications for the pain.  She has done physical therapy and injections in the distant past but nothing recently.  She is a continued smoker right now.  Current Outpatient Medications   Medication     meloxicam (MOBIC) 7.5 MG tablet     methocarbamol (ROBAXIN) 500 MG tablet     acetaminophen (TYLENOL ARTHRITIS PAIN) 650 MG CR tablet     albuterol (PROAIR HFA/PROVENTIL HFA/VENTOLIN HFA) 108 (90 Base) MCG/ACT inhaler     amLODIPine (NORVASC) 5 MG tablet     atorvastatin (LIPITOR) 80 MG tablet     blood glucose (NO BRAND SPECIFIED) test strip     DULoxetine (CYMBALTA) 60 MG capsule     fexofenadine (ALLEGRA) 60 MG tablet     fluticasone (FLONASE) 50 MCG/ACT nasal spray     hydrOXYzine (ATARAX) 10 MG tablet     lisinopril (ZESTRIL) 20 MG tablet     metFORMIN (GLUCOPHAGE) 500 MG tablet     methocarbamol (ROBAXIN) 750 MG tablet     metoprolol tartrate (LOPRESSOR) 25 MG tablet     mirabegron (MYRBETRIQ) 25 MG 24 hr tablet     Multiple Vitamins-Minerals (MULTIVITAMIN ADULT PO)     nitroGLYcerin (NITROSTAT) 0.4 MG sublingual tablet     nystatin (MYCOSTATIN) 435280 UNIT/GM external powder     omeprazole  "(PRILOSEC) 20 MG DR capsule     order for DME     pregabalin (LYRICA) 150 MG capsule     tiotropium (SPIRIVA HANDIHALER) 18 MCG inhaled capsule     traZODone (DESYREL) 100 MG tablet     No current facility-administered medications for this visit.      Physical Exam:  Vital signs:      BP: 135/76 Pulse: 96     SpO2: 97 %          Estimated body mass index is 31.32 kg/m  as calculated from the following:    Height as of 2/4/22: 1.727 m (5' 8\").    Weight as of 2/4/22: 93.4 kg (206 lb).   Full strength in her bilateral upper and lower extremities.  Sensation is intact light touch throughout.  Patella and biceps reflexes are 2+ bilaterally.  Results Reviewed:  I personally reviewed an MRI of the lumbar spine today showing significant disc degeneration at L5-S1 with bilateral foraminal stenosis without severe central canal stenosis.  There is also a bulging disc at L4-5 without any foraminal or central canal stenosis noted.  All other levels look okay at this time.  Assessment:  71-year-old female status post C4-7 ACDF for cervical myelopathy with lumbar spondylosis and low back pain with radiculopathy.  Plan:  Doing well at this point from her cervical fusion.  Her main complaint now is her low back pain.  She is a continued smoker and given her severe foraminal stenosis and loss of disc height I believe a transforaminal lumbar interbody fusion would be necessary for any surgical intervention.  She will need to be stopping smoking for 3 months before we could consider this type of surgery.  She is not however done any conservative management recently so we will refer her to physical therapy and injections via an WADE versus transforaminal injection at L5-S1.  I also sent prescriptions for Robaxin as she said that helped her in the past and will try meloxicam for her pain as well.  I will have her come back and see me in 3 months with standing films and flexion-extension films to see how conservative management has fared " for her.    Javi Betancur MD      Again, thank you for allowing me to participate in the care of your patient.        Sincerely,        Javi Betancur MD

## 2022-03-02 NOTE — PATIENT INSTRUCTIONS
Patient Next Steps:      Order placed for epidural steroid injection with Dr. Morales  o The steroid can take 10-14 days to reach max effect  o Please call our clinic if symptoms persist after this timeframe.  o You can call to schedule injection at [phone]      Order placed for physical therapy. You can call the phone number highlighted in the order to schedule your appointment. Please call our clinic if symptoms persist after your course of physical therapy.      Order placed for lumbar scoli and lumbar flexion/extension x-rays to be done in 3 [facility], prior to follow-up visit with Dr. Betancur. Central scheduling will call you to make the appointment. If you do not hear from them within 1-2 business days you can call the numbers below.   o [phone]       Dr. Betancur would like to see you back in the clinic for follow up in 3 month(s). Please call the number below to schedule.     Please call us if you have any further questions or concerns.    [facility]   Phone: [phone]  Fax: [phone]        How to Quit Smoking  Smoking is a hard habit to break. About 50% of all people who have ever smoked have been able to quit. Most people who still smoke want to quit. Here are some of the best ways to stop smoking.     Keep in mind the health benefits of quitting  The health benefits of quitting start right away. They keep improving the longer you go without smoking. Knowing this can help inspire you to stay on track. These benefits occur at any age. If you are 17 or 70, quitting is a good choice. Some of the health benefits after your last cigarette include:     After 20 minutes: Your blood pressure and pulse return to normal.    After 8 hours: Your oxygen levels return to normal.    After 2 days: Your ability to smell and taste start to improve as damaged nerves regrow.    After 2 to 3 weeks: Your circulation and lung function improve.    After 1 to 9 months: Your coughing,  congestion, and shortness of breath decrease. Your tiredness decreases.    After 1 year: Your risk of heart attack decreases by 50%.    After 5 years: Your risk of lung cancer decreases by 50%. Your risk of stroke becomes the same as a nonsmoker s.  Go cold turkey  Most former smokers quit cold turkey. This means stopping all at once. Trying to cut back slowly often doesn't work as well. This may be because it continues the habit of smoking. Also you may inhale more smoke while smoking fewer cigarettes. This leads to the same amount of nicotine in your body.   Get support  Support programs can be a big help, especially for heavy smokers. These groups offer lectures, ways to change behavior, and peer support. Here are some ways to find a support program:     Free national quitline 800-QUIT-NOW (302-466-7822)    [facility] quit-smoking programs    American Lung Association 760-658-9680    American Cancer Society 747-279-1063  Support at home is important too. Family and friends can offer praise and reassurance. If the smoker in your life finds it hard to quit, encourage them to keep trying.   Try over-the-counter medicine  Nicotine replacement therapy may make it easier to quit. Some aids are available without a prescription. These include a nicotine patch, gum, and lozenges. But it's best to use these under the care of your healthcare provider. The skin patch gives a steady supply of nicotine. Nicotine gum and lozenges give short-time doses of low levels of nicotine. Both methods reduce the craving for cigarettes. If you have nausea, vomiting, dizziness, weakness, or a fast heartbeat, stop using these products. See your provider.   Ask about prescription medicine  After reviewing your smoking patterns and past attempts to quit, your healthcare provider may offer a prescription medicine such as bupropion, varenicline, a nicotine inhaler, or nasal spray. Each has advantages and side effects. Your provider can review  these with you.   Keep trying  Most smokers make many attempts at quitting before they succeed. It s important not to give up.   To learn more  For more on how to quit smoking, try these resources:     www.cdc.gov/tobacco/quit_smoking/ 800-QUIT-NOW (629-935-2982)    www.smokefree.gov 877-44U-QUIT (162-046-0705)    www.lung.org/stop-smoking/ 800-LUNGUSA (277-359-2757)  Bigg last reviewed this educational content on 12/1/2019 2000-2021 [employer]. All rights reserved. This information is not intended as a substitute for professional medical care. Always follow your healthcare professional's instructions.

## 2022-03-02 NOTE — PROGRESS NOTES
HPI:  8-year-old female status post C4-7 ACDF for cervical myelopathy.  She notes no neck pain at this time.  She has not noticed any significant improvement in her myelopathy symptoms but they have definitely not worsened.  She has no swallowing problems right now.  Her main complaint at this time is her low back pain radiating to her bilateral lower extremities.  She notes the pain through both legs encompassing the entire leg.  It gets worse with standing and increased activity improves with rest.  She is taking over-the-counter medications for the pain.  She has done physical therapy and injections in the distant past but nothing recently.  She is a continued smoker right now.  Current Outpatient Medications   Medication     meloxicam (MOBIC) 7.5 MG tablet     methocarbamol (ROBAXIN) 500 MG tablet     acetaminophen (TYLENOL ARTHRITIS PAIN) 650 MG CR tablet     albuterol (PROAIR HFA/PROVENTIL HFA/VENTOLIN HFA) 108 (90 Base) MCG/ACT inhaler     amLODIPine (NORVASC) 5 MG tablet     atorvastatin (LIPITOR) 80 MG tablet     blood glucose (NO BRAND SPECIFIED) test strip     DULoxetine (CYMBALTA) 60 MG capsule     fexofenadine (ALLEGRA) 60 MG tablet     fluticasone (FLONASE) 50 MCG/ACT nasal spray     hydrOXYzine (ATARAX) 10 MG tablet     lisinopril (ZESTRIL) 20 MG tablet     metFORMIN (GLUCOPHAGE) 500 MG tablet     methocarbamol (ROBAXIN) 750 MG tablet     metoprolol tartrate (LOPRESSOR) 25 MG tablet     mirabegron (MYRBETRIQ) 25 MG 24 hr tablet     Multiple Vitamins-Minerals (MULTIVITAMIN ADULT PO)     nitroGLYcerin (NITROSTAT) 0.4 MG sublingual tablet     nystatin (MYCOSTATIN) 498222 UNIT/GM external powder     omeprazole (PRILOSEC) 20 MG DR capsule     order for DME     pregabalin (LYRICA) 150 MG capsule     tiotropium (SPIRIVA HANDIHALER) 18 MCG inhaled capsule     traZODone (DESYREL) 100 MG tablet     No current facility-administered medications for this visit.      Physical Exam:  Vital signs:      BP: 135/76  "Pulse: 96     SpO2: 97 %          Estimated body mass index is 31.32 kg/m  as calculated from the following:    Height as of 2/4/22: 1.727 m (5' 8\").    Weight as of 2/4/22: 93.4 kg (206 lb).   Full strength in her bilateral upper and lower extremities.  Sensation is intact light touch throughout.  Patella and biceps reflexes are 2+ bilaterally.  Results Reviewed:  I personally reviewed an MRI of the lumbar spine today showing significant disc degeneration at L5-S1 with bilateral foraminal stenosis without severe central canal stenosis.  There is also a bulging disc at L4-5 without any foraminal or central canal stenosis noted.  All other levels look okay at this time.  Assessment:  71-year-old female status post C4-7 ACDF for cervical myelopathy with lumbar spondylosis and low back pain with radiculopathy.  Plan:  Doing well at this point from her cervical fusion.  Her main complaint now is her low back pain.  She is a continued smoker and given her severe foraminal stenosis and loss of disc height I believe a transforaminal lumbar interbody fusion would be necessary for any surgical intervention.  She will need to be stopping smoking for 3 months before we could consider this type of surgery.  She is not however done any conservative management recently so we will refer her to physical therapy and injections via an WADE versus transforaminal injection at L5-S1.  I also sent prescriptions for Robaxin as she said that helped her in the past and will try meloxicam for her pain as well.  I will have her come back and see me in 3 months with standing films and flexion-extension films to see how conservative management has fared for her.    Javi Betancur MD  "

## 2022-03-02 NOTE — NURSING NOTE
"Vijaya Manjarrez is a 71 year old female who presents for:  Chief Complaint   Patient presents with     Surgical Followup     Post op follow up; DOS 12/6/21        Initial Vitals:  /76   Pulse 96   LMP  (LMP Unknown)   SpO2 97%  Estimated body mass index is 31.32 kg/m  as calculated from the following:    Height as of 2/4/22: 5' 8\" (1.727 m).    Weight as of 2/4/22: 206 lb (93.4 kg).. There is no height or weight on file to calculate BSA. BP completed using cuff size: large  Data Unavailable    Best Ohara MA    "

## 2022-03-04 ENCOUNTER — PATIENT OUTREACH (OUTPATIENT)
Dept: GERIATRIC MEDICINE | Facility: CLINIC | Age: 72
End: 2022-03-04
Payer: COMMERCIAL

## 2022-03-04 NOTE — PROGRESS NOTES
[facility] Care Coordination Contact    Call from member who shares that she reviewed menus, and would like to try [employer]. Authorization form completed for 5 meals per week and emailed to [employer].     Member requesting electric toothbrush provided by [facility]. Will ask CMS to complete form.     Vanesa Gtz RN  [facility]  [phone]

## 2022-03-16 DIAGNOSIS — Z98.1 HISTORY OF SPINAL FUSION: ICD-10-CM

## 2022-03-16 RX ORDER — TRAMADOL HYDROCHLORIDE 50 MG/1
50 TABLET ORAL 3 TIMES DAILY PRN
Qty: 90 TABLET | Refills: 0 | Status: SHIPPED | OUTPATIENT
Start: 2022-03-16 | End: 2022-04-22

## 2022-03-16 NOTE — TELEPHONE ENCOUNTER
Tramadol    Last Written Prescription Date:  02/03/22  Last Fill Quantity: 90,   # refills: 0  Last Office Visit: 02/04/22  Future Office visit:          no

## 2022-03-17 ENCOUNTER — PATIENT OUTREACH (OUTPATIENT)
Dept: GERIATRIC MEDICINE | Facility: CLINIC | Age: 72
End: 2022-03-17
Payer: COMMERCIAL

## 2022-03-17 NOTE — PROGRESS NOTES
[facility] Care Coordination Contact    3/16/22 Call from member who shares that her lease is being terminated on 4/30/22. She had her annual inspection, and  suspects she has been smoking in her apartment. There was a similar incident in 2019 and  gave her another chance at that time. Asked member to clarify with  if this will leave an eviction on her record as this will limit housing options even further. Discussed the reality of how challenging finding low income housing is. CDA wait list is typically 1.5-2 years. Questioned if member has informal support - friends or family member she could live with - but she states she does not. Discussed assisted living as an option, but member does not think this will work financially as you only retain $111/month. Discussed finding a room for rent on housing sites, but member does not like this idea. Writer reached out to JONATHAN Dave who specializes in housing. She will be in touch with member to review options and help provide resources.     Vanesa Gtz RN  [facility]  [phone]

## 2022-03-24 ENCOUNTER — TELEPHONE (OUTPATIENT)
Dept: NEUROSURGERY | Facility: CLINIC | Age: 72
End: 2022-03-24
Payer: COMMERCIAL

## 2022-03-24 DIAGNOSIS — M51.379 DDD (DEGENERATIVE DISC DISEASE), LUMBOSACRAL: ICD-10-CM

## 2022-03-24 DIAGNOSIS — M54.17 LUMBOSACRAL RADICULOPATHY: Primary | ICD-10-CM

## 2022-03-24 NOTE — TELEPHONE ENCOUNTER
As per patient, insurance states injection is ordered wrong and thus not approved. Patient did not have any details about what was wrong with order.   Injection not scheduled yet. Will look in to it, will reach out to Dr Morales and his team to see if there is anything wrong with order from their perspective.

## 2022-03-24 NOTE — TELEPHONE ENCOUNTER
Patient called regarding her injection order. She said she is unable to get it scheduled because it is not 'written' correctly for insurance to approve. Please call her for details at [phone]. Thank you~

## 2022-03-24 NOTE — PROGRESS NOTES
[facility] Care Coordination Contact    Call from member who shares that she will be moving to [place] to live with her sister. She is unsure of a move date at this time, but thinks it will be later in April. Her son will help with moving expenses, and her ex- will likely help with packing/driving if needed.     Member shares she rescheduled her finger procedure so that this is done prior to the move. Has a pre-op scheduled for 4/1. Encouraged her to request medication refills at this appointment if needed so she has enough to get by until she reestablishes with a new provider.     Member requested that [employer] be canceled. 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     Vanesa Gtz RN  [facility]  [phone]  Fax: [phone]

## 2022-03-25 ENCOUNTER — THERAPY VISIT (OUTPATIENT)
Dept: PHYSICAL THERAPY | Facility: CLINIC | Age: 72
End: 2022-03-25
Payer: COMMERCIAL

## 2022-03-25 DIAGNOSIS — M54.10 BACK PAIN WITH RADICULOPATHY: ICD-10-CM

## 2022-03-25 PROCEDURE — 97163 PT EVAL HIGH COMPLEX 45 MIN: CPT | Mod: GP | Performed by: PHYSICAL THERAPIST

## 2022-03-25 NOTE — PROGRESS NOTES
[facility]: Physical Therapy Initial Evaluation   Mar 25, 2022    Subjective:   Chief Complaint: bilateral low back pain and bilateral lower extremity pain   Pain: across the low back and into both legs (the whole thing not just the front/back/side/etc)   Numbness/Tingling: in both lower extremities   Weakness: in both lower extremities   Stiffness: in the back and down the lower extremities  New/Recurrent/Chronic: Chronic  DOI/onset: 6+ months   Referral Date: 3/2/2022 - Javi Betancur MD  Mechanism of onset: building over time,   PMH/surgical history/trauma:    - Concussion/dizziness   - Diabetes ; diabetic neuropathy   - fibromyalgia   - migraines/headaches   - COPD   - CAD   - Depression   - HTN   - Chronic Pain Syndrome   - Anxiety   - Surgical History: Cervical Fusion (C4-7) ; see chart  Medications: Anti-depressants, Pain (tramadol),   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)  Job duties: lifting/carrying, prolonged sitting, repetitive tasks,   Previous Treatment (Effect): tramadol (helps a little bit) ; muscle relaxer (helps a little) ;   Imaging: no recent imaging on file  AM/PM: gets worse as the day goes on  Quality of Pain: sharp and burn  Pain: 8/10 at present, 8/10 at worst  Worse: laundry, walking, standing,   Better: unknown  Progression of Symptoms since onset: getting worse   Current Functional Status: SEE GOALS FLOWSHEET  - Sleeping: hard to get to sleep, wakes her 3-4 times per night, takes 1-2 hours to fall back to sleep   - walking - can walk maybe 10 minutes   - standing - can stand about 6 minutes   - lifting - can lift light things without additional pain  Current HEP/exercise regimen: None  Transportation to Therapy: Can drive, but does not have a car  Live with Others: Lives alone, 1 cat,     Patient's goal(s): To be able to walk and stand and be out of pain, or at least lessen the pain.       Objective:    Posture: poor sitting posture. Standing  "posture rounded shoulders    Gait: slow gait, short stride, ankle instability visible.     Lumbar AROM: (Major, Moderate, Minimal or Nil loss)  Movement Loss Caesar Mod Min Nil Comments   Flexion x x   cc   Extension x x   cc   Sidebending L  x   B Pain   Sidebenging R  x   B Pain     Repeated movement testing:   (During: produces, abolishes, increases, decreases, no effect, centralizing, peripheralizing; After: better, worse, no better, no worse, no effect, centralized, peripheralized)    Pre-test Symptoms Sittin/10   Symptoms During Symptoms After ROM increased ROM decreased Notes   FISitting worse same      Rep FIS worse same   Does not actually move back, stays flexed and bends at the hip       Lumbar Mobility/Spring Testing: sensitivity with PA pressure throughout, muscle twitch in response to pressure.     Palpation: Tenderness with palpation.     Other:   - worse with lower trunk rotation in hooklying.   - Feels \"not good\" with supine posterior pelvic tilt. Feels the pain is increasing with this activity.             Therapist Impression/Differential Diagnosis:   Patient presents to physical therapy with a primary complaint of low back pain. She had very poor tolerance of physical therapy testing today and is a poor candidate at this time. She sees her primary care this upcoming Friday. Recommend that she discuss strategies to reduce pain and allow better participation in PT before continuing. Ultimately, PT will be good for her, but with her current state, it would not be well tolerated and ultimately not helpful until her pain is reduced.       Assessment/Plan:    Patient is a 71 year old female with lumbar complaints.    Patient has the following significant findings with corresponding treatment plan.                Referring Diagnosis: Back pain with radiculopathy ; Lumbar foraminal stenosis     Pain -  hot/cold therapy, manual therapy, splint/taping/bracing/orthotics, self management, education, " directional preference exercise and home program  Decreased ROM/flexibility - manual therapy, therapeutic exercise, therapeutic activity and home program  Decreased joint mobility - manual therapy, therapeutic exercise, therapeutic activity and home program  Impaired gait - gait training and home program  Decreased function - therapeutic activities and home program  Impaired posture - neuro re-education, therapeutic activities and home program        Therapy Evaluation Codes:   1) History comprised of:   Personal factors that impact the plan of care:      Anxiety and Coping style.    Comorbidity factors that impact the plan of care are:      Diabetes, Depression, Fibromyalgia and COPD, Coronary Artery Disease, Chronic Pain Syndrome.     Medications impacting care: Pain.  2) Examination of Body Systems comprised of:   Body structures and functions that impact the plan of care:      Lumbar spine.   Activity limitations that impact the plan of care are:      Lifting, Standing, Walking and Sleeping.  3) Clinical presentation characteristics are:   Unstable/Unpredictable.  4) Decision-Making    High complexity using standardized patient assessment instrument and/or measureable assessment of functional outcome.  Cumulative Therapy Evaluation is: High complexity.    Previous and current functional limitations:  (See Goal Flow Sheet for this information)    Short term and Long term goals: (See Goal Flow Sheet for this information)     Communication ability:  Patient appears to be able to clearly communicate and understand verbal and written communication and follow directions correctly.  Treatment Explanation - The following has been discussed with the patient:   RX ordered/plan of care  Anticipated outcomes  Possible risks and side effects  PT intervention is not appropriate at this time. Will be a good candidate once movement is more tolerable.    Rehab potential is questionable.    Frequency:  1 X week, once  daily  Duration:  for 8 weeks once ready/able to particpate  Discharge Plan:  Achieve all LTG.  Independent in home treatment program.  Reach maximal therapeutic benefit.    Please refer to the daily flowsheet for treatment today, total treatment time and time spent performing 1:1 timed codes.

## 2022-03-25 NOTE — CONFIDENTIAL NOTE
Per Javi Betancur MD (neurosurgery), ordered an L5-S1 interlaminar epidural corticosteroid injection on Ms. Vijaya CARLY Manjarrez.    Cheo Morales MD

## 2022-03-28 NOTE — TELEPHONE ENCOUNTER
As per Dr Morales, his team has tried to reach out to patient to schedule injeection but have been unable to reach her.   From Dr Morales's standpoint, there is nothing wrong with injection ordered and patient should call and schedule when able.   Called patient to provide phone number to scheduling. [phone].  Attempted to reach out to patient, no answer. Left voice message for patient to call clinic back to further discuss.

## 2022-03-30 PROBLEM — M54.10 BACK PAIN WITH RADICULOPATHY: Status: ACTIVE | Noted: 2022-03-30

## 2022-03-30 NOTE — PROGRESS NOTES
[facility]    OUTPATIENT Physical Therapy ORTHOPEDIC EVALUATION  PLAN OF TREATMENT FOR OUTPATIENT REHABILITATION  (COMPLETE FOR INITIAL CLAIMS ONLY)  Patient's Last Name, First Name, M.I.  YOB: 1950  Vijaya Manjarrez    Provider s Name:  [facility]   Medical Record No.  [id number]   Start of Care Date:  03/25/22   Onset Date:   03/02/22   Type:     _X__PT   ___OT Medical Diagnosis:    Encounter Diagnosis   Name Primary?     Back pain with radiculopathy         Treatment Diagnosis:  LBP        Goals:     03/25/22 0500   Body Part   Goals listed below are for Low Back   Goal #1   Goal #1 sleeping   Current Functional Level 3-5 hours without sleep per night   STG Target Performance 1-2 hours without sleep per night   Rationale to establish restorative sleep pattern   Due Date 05/11/22   LTG Target Performance Less than 1 hour without sleep per night   Rationale to establish restorative sleep pattern   Due Date 06/08/22   Goal #2   Goal #2 standing   Current Functional Level Minutes patient can stand   Performance level about 6 minutes   STG Target Performance Minutes patient will be able to stand   Performance level 15   Rationale for housekeeping tasks such as vacuuming, bed making, mowing, gardening;for personal hygiene;for meal preparation;for safe household ambulation;for safe community ambulation   Due date 05/11/22   LTG Target Performance Minutes patient will be able to stand   Performance Level 30   Rationale for housekeeping tasks such as vacuuming, bed making, mowing;for personal hygiene;for meal preparation;for safe household ambulation;for safe community ambulation   Goal #3   Goal #3 lifting/carrying   Performance level can only lift light things without additional pain   STG Target Performance Lift an item to counter height weighing   Performance  level 15 LBs without additional pain   Rationale for grocery shopping;for meal preparation;for housework such as laundry, emptying garbage, use of    Due date 05/11/22   LTG Target Performance Lift an item overhead weighing   Performance Level 15 Lbs without pain   Rationale for housework such as laundry, emptying garbage, use of    Due date 06/08/22       Therapy Frequency:  1x per week  Predicted Duration of Therapy Intervention:  8 weeks    Geovani Zayas I CERTIFY THE NEED FOR THESE SERVICES FURNISHED UNDER        THIS PLAN OF TREATMENT AND WHILE UNDER MY CARE     (Physician attestation of this document indicates review and certification of the therapy plan).                       Certification Date From:  03/25/22   Certification Date To:  06/15/22    Referring Provider:  Javi Betancur    Initial Assessment        See Epic Evaluation SOC Date: 03/25/22

## 2022-04-01 ENCOUNTER — OFFICE VISIT (OUTPATIENT)
Dept: INTERNAL MEDICINE | Facility: CLINIC | Age: 72
End: 2022-04-01
Payer: COMMERCIAL

## 2022-04-01 VITALS
HEIGHT: 69 IN | BODY MASS INDEX: 30.96 KG/M2 | OXYGEN SATURATION: 96 % | RESPIRATION RATE: 14 BRPM | SYSTOLIC BLOOD PRESSURE: 122 MMHG | TEMPERATURE: 98.1 F | HEART RATE: 82 BPM | WEIGHT: 209 LBS | DIASTOLIC BLOOD PRESSURE: 70 MMHG

## 2022-04-01 DIAGNOSIS — M79.7 FIBROMYALGIA: ICD-10-CM

## 2022-04-01 DIAGNOSIS — E11.40 TYPE 2 DIABETES MELLITUS WITH DIABETIC NEUROPATHY, WITHOUT LONG-TERM CURRENT USE OF INSULIN (H): ICD-10-CM

## 2022-04-01 DIAGNOSIS — I50.32 CHRONIC DIASTOLIC CONGESTIVE HEART FAILURE (H): ICD-10-CM

## 2022-04-01 DIAGNOSIS — Z79.899 ENCOUNTER FOR LONG-TERM (CURRENT) USE OF MEDICATIONS: ICD-10-CM

## 2022-04-01 DIAGNOSIS — F33.0 MILD EPISODE OF RECURRENT MAJOR DEPRESSIVE DISORDER (H): ICD-10-CM

## 2022-04-01 DIAGNOSIS — R32 URINARY INCONTINENCE, UNSPECIFIED TYPE: ICD-10-CM

## 2022-04-01 DIAGNOSIS — D64.9 ANEMIA, UNSPECIFIED TYPE: ICD-10-CM

## 2022-04-01 DIAGNOSIS — Z01.818 PRE-OP EXAM: Primary | ICD-10-CM

## 2022-04-01 DIAGNOSIS — I25.10 CORONARY ARTERY DISEASE INVOLVING NATIVE CORONARY ARTERY OF NATIVE HEART WITHOUT ANGINA PECTORIS: ICD-10-CM

## 2022-04-01 DIAGNOSIS — G89.4 CHRONIC PAIN SYNDROME: ICD-10-CM

## 2022-04-01 DIAGNOSIS — E78.5 HYPERLIPIDEMIA LDL GOAL <100: ICD-10-CM

## 2022-04-01 DIAGNOSIS — I25.118 ATHEROSCLEROTIC HEART DISEASE OF NATIVE CORONARY ARTERY WITH OTHER FORMS OF ANGINA PECTORIS (H): ICD-10-CM

## 2022-04-01 DIAGNOSIS — I10 BENIGN ESSENTIAL HYPERTENSION: ICD-10-CM

## 2022-04-01 DIAGNOSIS — N32.81 OVERACTIVE BLADDER: ICD-10-CM

## 2022-04-01 DIAGNOSIS — G47.00 INSOMNIA, UNSPECIFIED TYPE: ICD-10-CM

## 2022-04-01 DIAGNOSIS — K21.9 GASTROESOPHAGEAL REFLUX DISEASE WITHOUT ESOPHAGITIS: ICD-10-CM

## 2022-04-01 DIAGNOSIS — S69.92XS INJURY OF FINGER OF LEFT HAND, SEQUELA: ICD-10-CM

## 2022-04-01 DIAGNOSIS — R53.83 OTHER FATIGUE: ICD-10-CM

## 2022-04-01 DIAGNOSIS — F41.9 ANXIETY: ICD-10-CM

## 2022-04-01 PROBLEM — S02.85XA: Status: RESOLVED | Noted: 2020-09-25 | Resolved: 2022-04-01

## 2022-04-01 PROBLEM — S02.85XA CLOSED FRACTURE OF ORBIT (H): Status: RESOLVED | Noted: 2020-10-05 | Resolved: 2022-04-01

## 2022-04-01 LAB
ALBUMIN UR-MCNC: 30 MG/DL
APPEARANCE UR: CLEAR
BACTERIA #/AREA URNS HPF: ABNORMAL /HPF
BASOPHILS # BLD AUTO: 0.1 10E3/UL (ref 0–0.2)
BASOPHILS NFR BLD AUTO: 1 %
BILIRUB UR QL STRIP: NEGATIVE
COLOR UR AUTO: YELLOW
EOSINOPHIL # BLD AUTO: 0.3 10E3/UL (ref 0–0.7)
EOSINOPHIL NFR BLD AUTO: 2 %
ERYTHROCYTE [DISTWIDTH] IN BLOOD BY AUTOMATED COUNT: 16.3 % (ref 10–15)
GLUCOSE UR STRIP-MCNC: 100 MG/DL
HBA1C MFR BLD: 8.1 % (ref 0–5.6)
HCT VFR BLD AUTO: 34.7 % (ref 35–47)
HGB BLD-MCNC: 10.5 G/DL (ref 11.7–15.7)
HGB UR QL STRIP: NEGATIVE
HYALINE CASTS #/AREA URNS LPF: ABNORMAL /LPF
KETONES UR STRIP-MCNC: NEGATIVE MG/DL
LEUKOCYTE ESTERASE UR QL STRIP: NEGATIVE
LYMPHOCYTES # BLD AUTO: 2.9 10E3/UL (ref 0.8–5.3)
LYMPHOCYTES NFR BLD AUTO: 28 %
MCH RBC QN AUTO: 23.8 PG (ref 26.5–33)
MCHC RBC AUTO-ENTMCNC: 30.3 G/DL (ref 31.5–36.5)
MCV RBC AUTO: 79 FL (ref 78–100)
MONOCYTES # BLD AUTO: 0.5 10E3/UL (ref 0–1.3)
MONOCYTES NFR BLD AUTO: 5 %
MUCOUS THREADS #/AREA URNS LPF: PRESENT /LPF
NEUTROPHILS # BLD AUTO: 6.7 10E3/UL (ref 1.6–8.3)
NEUTROPHILS NFR BLD AUTO: 65 %
NITRATE UR QL: NEGATIVE
PH UR STRIP: 6 [PH] (ref 5–7)
PLATELET # BLD AUTO: 355 10E3/UL (ref 150–450)
RBC # BLD AUTO: 4.41 10E6/UL (ref 3.8–5.2)
RBC #/AREA URNS AUTO: ABNORMAL /HPF
SP GR UR STRIP: 1.02 (ref 1–1.03)
SQUAMOUS #/AREA URNS AUTO: ABNORMAL /LPF
UROBILINOGEN UR STRIP-ACNC: 0.2 E.U./DL
WBC # BLD AUTO: 10.3 10E3/UL (ref 4–11)
WBC #/AREA URNS AUTO: ABNORMAL /HPF

## 2022-04-01 PROCEDURE — 80053 COMPREHEN METABOLIC PANEL: CPT | Performed by: NURSE PRACTITIONER

## 2022-04-01 PROCEDURE — 82306 VITAMIN D 25 HYDROXY: CPT | Performed by: NURSE PRACTITIONER

## 2022-04-01 PROCEDURE — 84443 ASSAY THYROID STIM HORMONE: CPT | Performed by: NURSE PRACTITIONER

## 2022-04-01 PROCEDURE — 82043 UR ALBUMIN QUANTITATIVE: CPT | Performed by: NURSE PRACTITIONER

## 2022-04-01 PROCEDURE — 80061 LIPID PANEL: CPT | Performed by: NURSE PRACTITIONER

## 2022-04-01 PROCEDURE — 81001 URINALYSIS AUTO W/SCOPE: CPT | Performed by: NURSE PRACTITIONER

## 2022-04-01 PROCEDURE — 36415 COLL VENOUS BLD VENIPUNCTURE: CPT | Performed by: NURSE PRACTITIONER

## 2022-04-01 PROCEDURE — 83550 IRON BINDING TEST: CPT | Performed by: NURSE PRACTITIONER

## 2022-04-01 PROCEDURE — 83036 HEMOGLOBIN GLYCOSYLATED A1C: CPT | Performed by: NURSE PRACTITIONER

## 2022-04-01 PROCEDURE — 82728 ASSAY OF FERRITIN: CPT | Performed by: NURSE PRACTITIONER

## 2022-04-01 PROCEDURE — 99214 OFFICE O/P EST MOD 30 MIN: CPT | Performed by: NURSE PRACTITIONER

## 2022-04-01 PROCEDURE — 85025 COMPLETE CBC W/AUTO DIFF WBC: CPT | Performed by: NURSE PRACTITIONER

## 2022-04-01 RX ORDER — METOPROLOL TARTRATE 25 MG/1
TABLET, FILM COATED ORAL
Qty: 180 TABLET | Refills: 1 | Status: SHIPPED | OUTPATIENT
Start: 2022-04-01 | End: 2022-10-21

## 2022-04-01 RX ORDER — TRAZODONE HYDROCHLORIDE 100 MG/1
TABLET ORAL
Qty: 180 TABLET | Refills: 3 | Status: SHIPPED | OUTPATIENT
Start: 2022-04-01

## 2022-04-01 RX ORDER — LISINOPRIL 20 MG/1
20 TABLET ORAL 2 TIMES DAILY
Qty: 180 TABLET | Refills: 1 | Status: SHIPPED | OUTPATIENT
Start: 2022-04-01 | End: 2022-10-21

## 2022-04-01 RX ORDER — MIRABEGRON 25 MG/1
25 TABLET, FILM COATED, EXTENDED RELEASE ORAL DAILY
Qty: 90 TABLET | Refills: 1 | Status: SHIPPED | OUTPATIENT
Start: 2022-04-01 | End: 2022-10-21

## 2022-04-01 RX ORDER — NITROGLYCERIN 0.4 MG/1
TABLET SUBLINGUAL
Qty: 25 TABLET | Refills: 1 | Status: SHIPPED | OUTPATIENT
Start: 2022-04-01

## 2022-04-01 RX ORDER — ATORVASTATIN CALCIUM 80 MG/1
TABLET, FILM COATED ORAL
Qty: 90 TABLET | Refills: 3 | Status: SHIPPED | OUTPATIENT
Start: 2022-04-01 | End: 2023-04-14

## 2022-04-01 RX ORDER — AMLODIPINE BESYLATE 5 MG/1
5 TABLET ORAL DAILY
Qty: 90 TABLET | Refills: 1 | Status: SHIPPED | OUTPATIENT
Start: 2022-04-01 | End: 2022-10-21

## 2022-04-01 ASSESSMENT — ANXIETY QUESTIONNAIRES
1. FEELING NERVOUS, ANXIOUS, OR ON EDGE: SEVERAL DAYS
7. FEELING AFRAID AS IF SOMETHING AWFUL MIGHT HAPPEN: NOT AT ALL
GAD7 TOTAL SCORE: 4
7. FEELING AFRAID AS IF SOMETHING AWFUL MIGHT HAPPEN: NOT AT ALL
6. BECOMING EASILY ANNOYED OR IRRITABLE: NOT AT ALL
5. BEING SO RESTLESS THAT IT IS HARD TO SIT STILL: NOT AT ALL
3. WORRYING TOO MUCH ABOUT DIFFERENT THINGS: SEVERAL DAYS
4. TROUBLE RELAXING: SEVERAL DAYS
GAD7 TOTAL SCORE: 4
GAD7 TOTAL SCORE: 4
2. NOT BEING ABLE TO STOP OR CONTROL WORRYING: SEVERAL DAYS

## 2022-04-01 ASSESSMENT — PATIENT HEALTH QUESTIONNAIRE - PHQ9
SUM OF ALL RESPONSES TO PHQ QUESTIONS 1-9: 9
SUM OF ALL RESPONSES TO PHQ QUESTIONS 1-9: 9
10. IF YOU CHECKED OFF ANY PROBLEMS, HOW DIFFICULT HAVE THESE PROBLEMS MADE IT FOR YOU TO DO YOUR WORK, TAKE CARE OF THINGS AT HOME, OR GET ALONG WITH OTHER PEOPLE: NOT DIFFICULT AT ALL

## 2022-04-01 ASSESSMENT — PAIN SCALES - GENERAL: PAINLEVEL: SEVERE PAIN (6)

## 2022-04-01 NOTE — LETTER
April 4, 2022      Vijaya Manjarrez  [address]  [place] [address]        Dear Vijaya,    Vit d low normal   Vit d 2000 units daily     A1c elevated   Would increase metformin to 1000 mg twice daily   Recheck a1c 3 months     Hgb low   Will check iron         Sincerely,      Genoveva Carroll CNP      Results for orders placed or performed in visit on 04/01/22   TSH with free T4 reflex     Status: Normal   Result Value Ref Range    TSH 0.99 0.40 - 4.00 mU/L   Comprehensive metabolic panel (BMP + Alb, Alk Phos, ALT, AST, Total. Bili, TP)     Status: Abnormal   Result Value Ref Range    Sodium 139 133 - 144 mmol/L    Potassium 3.6 3.4 - 5.3 mmol/L    Chloride 105 94 - 109 mmol/L    Carbon Dioxide (CO2) 27 20 - 32 mmol/L    Anion Gap 7 3 - 14 mmol/L    Urea Nitrogen 8 7 - 30 mg/dL    Creatinine 0.80 0.52 - 1.04 mg/dL    Calcium 8.9 8.5 - 10.1 mg/dL    Glucose 213 (H) 70 - 99 mg/dL    Alkaline Phosphatase 108 40 - 150 U/L    AST 14 0 - 45 U/L    ALT 24 0 - 50 U/L    Protein Total 7.5 6.8 - 8.8 g/dL    Albumin 3.6 3.4 - 5.0 g/dL    Bilirubin Total 0.4 0.2 - 1.3 mg/dL    GFR Estimate 79 >60 mL/min/1.73m2   UA Macro with Reflex to Micro and Culture - lab collect     Status: Abnormal    Specimen: Urine, Midstream   Result Value Ref Range    Color Urine Yellow Colorless, Straw, Light Yellow, Yellow    Appearance Urine Clear Clear    Glucose Urine 100  (A) Negative mg/dL    Bilirubin Urine Negative Negative    Ketones Urine Negative Negative mg/dL    Specific Gravity Urine 1.025 1.003 - 1.035    Blood Urine Negative Negative    pH Urine 6.0 5.0 - 7.0    Protein Albumin Urine 30  (A) Negative mg/dL    Urobilinogen Urine 0.2 0.2, 1.0 E.U./dL    Nitrite Urine Negative Negative    Leukocyte Esterase Urine Negative Negative   Hemoglobin A1c     Status: Abnormal   Result Value Ref Range    Hemoglobin A1C 8.1 (H) 0.0 - 5.6 %   Vitamin D Deficiency     Status: Normal   Result Value Ref Range    Vitamin  D, Total (25-Hydroxy) 28 20 - 75 ug/L    Narrative    Season, race, dietary intake, and treatment affect the concentration of 25-hydroxy-Vitamin D. Values may decrease during winter months and increase during summer months. Values 20-29 ug/L may indicate Vitamin D insufficiency and values <20 ug/L may indicate Vitamin D deficiency.    Vitamin D determination is routinely performed by an immunoassay specific for 25 hydroxyvitamin D3.  If an individual is on vitamin D2(ergocalciferol) supplementation, please specify 25 OH vitamin D2 and D3 level determination by LCMSMS test [id number].     Lipid panel reflex to direct LDL Non-fasting     Status: Abnormal   Result Value Ref Range    Cholesterol 157 <200 mg/dL    Triglycerides 213 (H) <150 mg/dL    Direct Measure HDL 48 (L) >=50 mg/dL    LDL Cholesterol Calculated 66 <=100 mg/dL    Non HDL Cholesterol 109 <130 mg/dL    Patient Fasting > 8hrs? No     Narrative    Cholesterol  Desirable:  <200 mg/dL    Triglycerides  Normal:  Less than 150 mg/dL  Borderline High:  150-199 mg/dL  High:  200-499 mg/dL  Very High:  Greater than or equal to 500 mg/dL    Direct Measure HDL  Female:  Greater than or equal to 50 mg/dL   Male:  Greater than or equal to 40 mg/dL    LDL Cholesterol  Desirable:  <100mg/dL  Above Desirable:  100-129 mg/dL   Borderline High:  130-159 mg/dL   High:  160-189 mg/dL   Very High:  >= 190 mg/dL    Non HDL Cholesterol  Desirable:  130 mg/dL  Above Desirable:  130-159 mg/dL  Borderline High:  160-189 mg/dL  High:  190-219 mg/dL  Very High:  Greater than or equal to 220 mg/dL   Albumin Random Urine Quantitative with Creat Ratio     Status: Abnormal   Result Value Ref Range    Creatinine Urine mg/dL 213 mg/dL    Albumin Urine mg/L 147 mg/L    Albumin Urine mg/g Cr 69.01 (H) 0.00 - 25.00 mg/g Cr   CBC with platelets and differential     Status: Abnormal   Result Value Ref Range    WBC Count 10.3 4.0 - 11.0 10e3/uL    RBC Count 4.41 3.80 - 5.20 10e6/uL    Hemoglobin  10.5 (L) 11.7 - 15.7 g/dL    Hematocrit 34.7 (L) 35.0 - 47.0 %    MCV 79 78 - 100 fL    MCH 23.8 (L) 26.5 - 33.0 pg    MCHC 30.3 (L) 31.5 - 36.5 g/dL    RDW 16.3 (H) 10.0 - 15.0 %    Platelet Count 355 150 - 450 10e3/uL    % Neutrophils 65 %    % Lymphocytes 28 %    % Monocytes 5 %    % Eosinophils 2 %    % Basophils 1 %    Absolute Neutrophils 6.7 1.6 - 8.3 10e3/uL    Absolute Lymphocytes 2.9 0.8 - 5.3 10e3/uL    Absolute Monocytes 0.5 0.0 - 1.3 10e3/uL    Absolute Eosinophils 0.3 0.0 - 0.7 10e3/uL    Absolute Basophils 0.1 0.0 - 0.2 10e3/uL   Urine Microscopic     Status: Abnormal   Result Value Ref Range    Bacteria Urine Moderate (A) None Seen /HPF    RBC Urine 0-2 0-2 /HPF /HPF    WBC Urine 0-5 0-5 /HPF /HPF    Squamous Epithelials Urine Many (A) None Seen /LPF    Mucus Urine Present (A) None Seen /LPF    Hyaline Casts Urine 5-10 (A) None Seen /LPF    Narrative    Urine Culture not indicated   Ferritin     Status: Abnormal   Result Value Ref Range    Ferritin 5 (L) 8 - 252 ng/mL   Iron and iron binding capacity     Status: Abnormal   Result Value Ref Range    Iron 32 (L) 35 - 180 ug/dL    Iron Binding Capacity 458 (H) 240 - 430 ug/dL    Iron Sat Index 7 (L) 15 - 46 %   CBC with platelets and differential     Status: Abnormal    Narrative    The following orders were created for panel order CBC with platelets and differential.  Procedure                               Abnormality         Status                     ---------                               -----------         ------                     CBC with platelets and d...[[id number]]  Abnormal            Final result                 Please view results for these tests on the individual orders.

## 2022-04-01 NOTE — PROGRESS NOTES
Answers for HPI/ROS submitted by the patient on 4/1/2022  If you checked off any problems, how difficult have these problems made it for you to do your work, take care of things at home, or get along with other people?: Not difficult at all  PHQ9 TOTAL SCORE: 9  BRADY 7 TOTAL SCORE: 4    [place] [address] [facility] [address]  Phone: [phone]  Primary Provider: Genoveva Carroll        PREOPERATIVE EVALUATION:  Today's date: 4/1/2022    Vijaya Manjarrez is a 71 year old female who presents for a preoperative evaluation.    Surgical Information:  Surgery/Procedure: left, middle finger  Surgery Location: [facility]; [facility]  Surgeon: Beverly  Surgery Date: 4/06/2022  Time of Surgery: 1030  Where patient plans to recover: At home alone  Fax number for surgical facility: [phone]    Type of Anesthesia Anticipated: Local with MAC    Assessment & Plan     The proposed surgical procedure is considered LOW risk.    Pre-op exam      Injury of finger of left hand, sequela  Needs repair     Chronic diastolic congestive heart failure (H)  Stable     Mild episode of recurrent major depressive disorder (H)  Stable     Type 2 diabetes mellitus with diabetic neuropathy, without long-term current use of insulin (H)  Stable     Chronic pain syndrome  On pain medication     Fibromyalgia  On pain med     Anxiety  Stable     Coronary artery disease involving native coronary artery of native heart without angina pectoris  Stable     Benign essential hypertension  In good range     Other fatigue  Lab     Encounter for long-term (current) use of medications             Risks and Recommendations:  The patient has the following additional risks and recommendations for perioperative complications:   - No identified additional risk factors other than previously addressed    Medication Instructions:  Patient is to take all scheduled medications on the day of surgery EXCEPT for  modifications listed below:     Morning of surgery DO NOT TAKE   Lisinopril   meloxicam   Metformin    May take   Inhalers - spiriva and albuterol   Amlodipine   cymbalta   metorpolol    RECOMMENDATION:  APPROVAL GIVEN to proceed with proposed procedure, without further diagnostic evaluation.              30 minutes spent on the date of the encounter doing chart review, history and exam, documentation and further activities per the note        Subjective     HPI related to upcoming procedure: left middle finger     Preop Questions 4/1/2022   1. Have you ever had a heart attack or stroke? No   2. Have you ever had surgery on your heart or blood vessels, such as a stent placement, a coronary artery bypass, or surgery on an artery in your head, neck, heart, or legs? No   3. Do you have chest pain with activity? No   4. Do you have a history of  heart failure? No   5. Do you currently have a cold, bronchitis or symptoms of other infection? No   6. Do you have a cough, shortness of breath, or wheezing? No   7. Do you or anyone in your family have previous history of blood clots? No   8. Do you or does anyone in your family have a serious bleeding problem such as prolonged bleeding following surgeries or cuts? No   9. Have you ever had problems with anemia or been told to take iron pills? YES - not currently    10. Have you had any abnormal blood loss such as black, tarry or bloody stools, or abnormal vaginal bleeding? No   11. Have you ever had a blood transfusion? No   12. Are you willing to have a blood transfusion if it is medically needed before, during, or after your surgery? Yes   13. Have you or any of your relatives ever had problems with anesthesia? No   14. Do you have sleep apnea, excessive snoring or daytime drowsiness? No   14a. Do you have a CPAP machine? -   15. Do you have any artifical heart valves or other implanted medical devices like a pacemaker, defibrillator, or continuous glucose monitor? No   16.  Do you have artificial joints? No   17. Are you allergic to latex? No       Health Care Directive:  Patient does not have a Health Care Directive or Living Will: Discussed advance care planning with patient; however, patient declined at this time.    Preoperative Review of :   reviewed - controlled substances reflected in medication list.      Status of Chronic Conditions:  CAD - Patient has a longstanding history of moderate-severe CAD. Patient denies recent chest pain or NTG use, denies exercise induced dyspnea or PND. Last Stress test 10/2020 with cardiac cath , EKG nsr  11/11/2021.     DEPRESSION - Patient has a long history of Depression of moderate severity requiring medication for control with recent symptoms being stable.    DIABETES - Patient has a longstanding history of DiabetesType Type II . Patient is being treated with oral agents and denies significant side effects. Control has been good.   Lab Results   Component Value Date    A1C 7.3 11/11/2021    A1C 6.9 09/28/2021    A1C 7.2 07/05/2021    A1C 7.1 03/08/2021    A1C 7.3 05/20/2020    A1C 6.7 11/13/2019    A1C 6.8 06/25/2019         HYPERTENSION - Patient has longstanding history of HTN , currently denies any symptoms referable to elevated blood pressure. Specifically denies chest pain, palpitations, dyspnea, orthopnea, PND or peripheral edema. Blood pressure readings have been in normal range. Current medication regimen is as listed below. Patient denies any side effects of medication.       Review of Systems  CONSTITUTIONAL: NEGATIVE for fever, chills, change in weight  INTEGUMENTARY/SKIN: NEGATIVE for worrisome rashes, moles or lesions  EYES: NEGATIVE for vision changes or irritation  ENT/MOUTH: NEGATIVE for ear, mouth and throat problems  RESP: NEGATIVE for significant cough or SOB  CV: NEGATIVE for chest pain, palpitations or peripheral edema  GI: NEGATIVE for nausea, abdominal pain, heartburn, or change in bowel habits  : NEGATIVE for  frequency, dysuria, or hematuria  MUSCULOSKELETAL:left middle finger deformity   NEURO: NEGATIVE for weakness, dizziness or paresthesias  ENDOCRINE: NEGATIVE for temperature intolerance, skin/hair changes  HEME: NEGATIVE for bleeding problems  PSYCHIATRIC: NEGATIVE for changes in mood or affect    Patient Active Problem List    Diagnosis Date Noted     Back pain with radiculopathy 03/30/2022     Priority: Medium     Surgery, elective 12/06/2021     Priority: Medium     Chronic diastolic congestive heart failure (H) 09/28/2021     Priority: Medium     Nuclear sclerotic cataract of both eyes 06/08/2021     Priority: Medium     Added automatically from request for surgery [id number]       Eyelid lesion 02/09/2021     Priority: Medium     Added automatically from request for surgery [id number]       Dermatochalasis of both upper eyelids 02/09/2021     Priority: Medium     Added automatically from request for surgery [id number]       Acquired involutional ptosis of both eyelids 02/09/2021     Priority: Medium     Added automatically from request for surgery [id number]       Closed fracture of orbit (H) 10/05/2020     Priority: Medium     Added automatically from request for surgery [id number]       Chest pain 10/01/2020     Priority: Medium     Smoker 10/01/2020     Priority: Medium     Left Orbit Floor Fracture -- Dx 9/25/20 09/25/2020     Priority: Medium     Added automatically from request for surgery [id number]       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 09/25/2020     Priority: Medium     Added automatically from request for surgery [id number]       Diplopia 09/25/2020     Priority: Medium     Added automatically from request for surgery [id number]       Hypokalemia 08/08/2020     Priority: Medium     Atherosclerotic heart disease of native coronary artery with other forms of angina pectoris (H) 03/06/2019     Priority: Medium     Status post coronary angiogram 02/26/2019     Priority: Medium     Coronary artery disease  involving native coronary artery, angina presence unspecified, unspecified whether native or transplanted heart 02/21/2019     Priority: Medium     Added automatically from request for surgery [id number]  Replacing diagnoses that were inactivated after the 10/1/2021 regulatory import.       Dyslipidemia 02/21/2019     Priority: Medium     Added automatically from request for surgery [id number]       Mild episode of recurrent major depressive disorder (H) 03/12/2018     Priority: Medium     Advanced directives, counseling/discussion 07/28/2017     Priority: Medium     Advance Care Planning 7/27/2017: ACP Review of Chart / Resources Provided:  Reviewed chart for advance care plan.  Vijaya Manjarrez has been provided information and resources to begin or update their advance care plan.  Added by Vanesa Gtz           Hyperlipidemia LDL goal <100 01/20/2017     Priority: Medium     Fibromyalgia 01/20/2017     Priority: Medium     Benign essential hypertension 01/20/2017     Priority: Medium     Chronic pain syndrome 01/20/2017     Priority: Medium     Patient is followed by Disha Van MD for ongoing prescription of pain medication.  All refills should only be approved by this provider, or covering partner.    Medication(s): Tramadol.   Maximum quantity per month: 90  Clinic visit frequency required: Q 6  months     Controlled substance agreement:  Encounter-Level CSA:     There are no encounter-level csa.        Pain Clinic evaluation in the past: Yes       Date/Location:      DIRE Total Score(s):  No flowsheet data found.    Last [facility] website verification:  none   [email or web address]       DM type 2, Hgb A1C 7.3 on 5/20/20      Priority: Medium     Cervical spine degeneration      Priority: Medium     spinal stenosis,        Facet arthropathy, lumbar      Priority: Medium     Lumbar degenerative disc disease      Priority: Medium     Migraine headache      Priority: Medium     Diabetic neuropathy (H)       Priority: Medium     Anxiety      Priority: Medium     GERD (gastroesophageal reflux disease)      Priority: Medium     HCD (health care directive) 01/12/2017     Priority: Medium     Pt received HCD and will return when complete.    [employer] Regulatory Load OCT 2020       Controlled substance agreement signed- ok 5/12/20 01/12/2017     Priority: Medium     Patient is followed by Genoveva Carroll CNP for ongoing prescription of pain medication.  All refills should only be approved by this provider, or covering partner.    Medication(s): Tramadol.   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) 01/31/2016     Priority: Medium     COPD (chronic obstructive pulmonary disease) (H) 10/18/2012     Priority: Medium     mild       Candidal intertrigo 04/11/2011     Priority: Medium      Past Medical History:   Diagnosis Date     Anxiety      Cervical spine degeneration 2016    spinal stenosis,      COPD (chronic obstructive pulmonary disease) (H) 2012    mild     Coronary artery disease      Depression      Diabetes mellitus, type 2 (H)      Diabetic neuropathy (H)      Facet arthropathy, lumbar      Fibromyalgia      GERD (gastroesophageal reflux disease)      HTN (hypertension)      Hyperlipidemia      Lumbar degenerative disc disease      Migraine headache      Past Surgical History:   Procedure Laterality Date     CHOLECYSTECTOMY, LAPOROSCOPIC      Cholecystectomy, Laparoscopic     COMBINED REPAIR PTOSIS WITH BLEPHAROPLASTY BILATERAL Bilateral 3/11/2021    Procedure: Bilateral upper eyelid blepharoplasty and ptosis repair. Right upper eyelid margin  lesion biopsy;  Surgeon: George Doll MD;  Location: [facility] OR     CV HEART CATHETERIZATION WITH POSSIBLE INTERVENTION Left 2/26/2019    Procedure: Coronary Angiogram;  Surgeon: Cheo Merida MD;  Location:  HEART CARDIAC CATH LAB     CV HEART CATHETERIZATION WITH POSSIBLE INTERVENTION Bilateral 10/2/2020    Procedure: Heart Catheterization with Possible Intervention;  Surgeon: Linda Sauceda MD;  Location:  HEART CARDIAC CATH LAB     CV LEFT HEART CATH N/A 10/2/2020    Procedure: Left Heart Cath;  Surgeon: Linda Sauceda MD;  Location:  HEART CARDIAC CATH LAB     DECOMPRESSION, FUSION CERVICAL ANTERIOR THREE+ LEVELS, COMBINED N/A 12/6/2021    Procedure: 1.  Anterior cervical discectomy and fusion with use of allograft for fusion at C4-5, C5-6 and C6-7 2.  Placement of interbody graft at C4-5, C5-6 and C6-7 filled with allograft 3.  Anterior cervical plating at C4, C5, C6 and C7;  Surgeon: Javi Betancur MD;  Location:  OR     OPEN REDUCTION INTERNAL FIXATION ORBIT BLOWOUT Left 10/8/2020    Procedure: Left Open Reduction Internal Fixation, Fracture, Orbit With Intraoperative Navigation - Left;  Surgeon: George Doll MD;  Location: [facility] OR     PHACOEMULSIFICATION CLEAR CORNEA WITH STANDARD INTRAOCULAR LENS IMPLANT  7/9/2021          PHACOEMULSIFICATION CLEAR CORNEA WITH STANDARD INTRAOCULAR LENS IMPLANT  7/26/2021          PHACOEMULSIFICATION WITH STANDARD INTRAOCULAR LENS IMPLANT Right 7/9/2021    Procedure: PHACOEMULSIFICATION, CATARACT, WITH STANDARD INTRAOCULAR LENS IMPLANT INSERTION RIGHT;  Surgeon: Dai Glaser MD;  Location: [facility] OR     PHACOEMULSIFICATION WITH STANDARD INTRAOCULAR LENS IMPLANT Left 7/26/2021    Procedure: PHACOEMULSIFICATION, CATARACT, WITH STANDARD INTRAOCULAR LENS IMPLANT INSERTION;  Surgeon: Dai Glaser MD;  Location: [facility] OR     Current Outpatient Medications   Medication Sig Dispense Refill     albuterol (PROAIR  HFA/PROVENTIL HFA/VENTOLIN HFA) 108 (90 Base) MCG/ACT inhaler Inhale 2 puffs into the lungs every 6 hours 18 g 11     amLODIPine (NORVASC) 5 MG tablet TAKE ONE TABLET BY MOUTH ONCE DAILY 90 tablet 2     atorvastatin (LIPITOR) 80 MG tablet TAKE 1 TABLET (80 MG) BY MOUTH DAILY (NEED FASTING LABS) 90 tablet 3     blood glucose (NO BRAND SPECIFIED) test strip Use to test blood sugars 1 times daily or as directed, use brand same as previous 100 strip 3     DULoxetine (CYMBALTA) 60 MG capsule TAKE ONE CAPSULE BY MOUTH ONCE DAILY 90 capsule 2     fexofenadine (ALLEGRA) 60 MG tablet Take 1 tablet (60 mg) by mouth 2 times daily 180 tablet 3     fluticasone (FLONASE) 50 MCG/ACT nasal spray SPRAY 2 SPRAYS IN EACH NOSTRIL ONCE DAILY 16 g 5     hydrOXYzine (ATARAX) 10 MG tablet TAKE ONE TABLET BY MOUTH THREE TIMES A DAY AS NEEDED FOR ANXIETY 270 tablet 3     lisinopril (ZESTRIL) 20 MG tablet TAKE ONE TABLET BY MOUTH TWICE A  tablet 2     meloxicam (MOBIC) 7.5 MG tablet Take 1 tablet (7.5 mg) by mouth daily 60 tablet 3     metFORMIN (GLUCOPHAGE) 500 MG tablet TAKE ONE TABLET BY MOUTH TWICE A DAY WITH A MEAL 180 tablet 4     methocarbamol (ROBAXIN) 500 MG tablet Take 1 tablet (500 mg) by mouth 4 times daily as needed for muscle spasms 90 tablet 3     metoprolol tartrate (LOPRESSOR) 25 MG tablet Take 25 mg by mouth 2 times daily 180 tablet 3     mirabegron (MYRBETRIQ) 25 MG 24 hr tablet Take 1 tablet (25 mg) by mouth daily 90 tablet 1     Multiple Vitamins-Minerals (MULTIVITAMIN ADULT PO) Take 5 tablets by mouth daily Pro-caps vitamin        nitroGLYcerin (NITROSTAT) 0.4 MG sublingual tablet FOR CHEST PAIN PLACE 1 TABLET UNDER THE TONGUE EVERY 5 MINUTES FOR 3 DOSES IF NEEDED. IF SYMPTOMS PERSIST 5 MINUTES AFTER FIRST DOSE CALL 911. 25 tablet 0     nystatin (MYCOSTATIN) 785503 UNIT/GM external powder APPLY TO AFFECTED AREA(S) TOPICALLY THREE TIMES A DAY AS NEEDED 30 g 3     omeprazole (PRILOSEC) 20 MG DR capsule TAKE ONE  "CAPSULE BY MOUTH TWICE A  capsule 2     order for DME Equipment being ordered: glucometer 1 each 0     pregabalin (LYRICA) 150 MG capsule TAKE ONE CAPSULE BY MOUTH THREE TIMES A  capsule 3     tiotropium (SPIRIVA HANDIHALER) 18 MCG inhaled capsule INHALE ONE CAPSULE BY MOUTH ONCE DAILY 90 capsule 3     traMADol (ULTRAM) 50 MG tablet Take 1 tablet (50 mg) by mouth 3 times daily as needed for severe pain 90 tablet 0     traZODone (DESYREL) 100 MG tablet TAKE TWO TABLETS BY MOUTH EVERY NIGHT AT BEDTIME 180 tablet 3     acetaminophen (TYLENOL ARTHRITIS PAIN) 650 MG CR tablet Take 650 mg by mouth 2 times daily as needed  (Patient not taking: Reported on 4/1/2022)       methocarbamol (ROBAXIN) 750 MG tablet Take 1 tablet (750 mg) by mouth every 6 hours (Patient not taking: Reported on 4/1/2022) 40 tablet 0       Allergies   Allergen Reactions     Penicillins Hives        Social History     Tobacco Use     Smoking status: Light Tobacco Smoker     Packs/day: 0.25     Years: 40.00     Pack years: 10.00     Types: Cigarettes     Smokeless tobacco: Never Used   Substance Use Topics     Alcohol use: No     Family History   Problem Relation Age of Onset     Cancer Mother      Cancer Father         Lung cancer     Cancer Maternal Grandmother         Breast cancer     Glaucoma Maternal Grandmother      Macular Degeneration No family hx of      History   Drug Use No         Objective     /70   Pulse 82   Temp 98.1  F (36.7  C) (Tympanic)   Resp 14   Ht 1.74 m (5' 8.5\")   Wt 94.8 kg (209 lb)   LMP  (LMP Unknown)   SpO2 96%   Breastfeeding No   BMI 31.32 kg/m      Physical Exam    GENERAL APPEARANCE:  alert and no distress     HENT: ear canals and TM's normal and nose and mouth without ulcers or lesions     RESP: lungs clear to auscultation - no rales, rhonchi or wheezes     CV: regular rates and rhythm, normal S1 S2, no S3 or S4 and no murmur, click or rub     ABDOMEN:  soft, nontender, no HSM or masses " and bowel sounds normal     MS: left middle finger deformity at last joint      SKIN: no suspicious lesions or rashes     NEURO: Normal strength and tone, sensory exam grossly normal, mentation intact and speech normal     PSYCH: mentation appears normal. and affect normal/bright    Recent Labs   Lab Test 01/27/22  1318 12/08/21  0809 12/07/21  0617 12/06/21  0722 12/06/21  0718 11/29/21  1820 11/11/21  1143 09/28/21  1613   HGB 10.9* 11.0*   < > 11.4*  --  10.9* 11.0* 11.1*     --   --  322  --  319 338 302   INR  --   --   --   --  0.96  --   --   --      --   --   --   --  144 141 142   POTASSIUM 3.4  --   --   --   --  3.7 4.2 4.4   CR 0.63  --   --  0.64  --  0.70 0.62 0.75   A1C  --   --   --   --   --   --  7.3* 6.9*    < > = values in this interval not displayed.        Diagnostics:  Labs pending at this time.  Results will be reviewed when available.   EKG: appears normal, NSR, unchanged from previous tracings, 11/11/2021    Revised Cardiac Risk Index (RCRI):  The patient has the following serious cardiovascular risks for perioperative complications:   - No serious cardiac risks = 0 points     RCRI Interpretation: 1 point: Class II (low risk - 0.9% complication rate)           Signed Electronically by: AMANDA Baptiste CNP  Copy of this evaluation report is provided to requesting physician.

## 2022-04-01 NOTE — PATIENT INSTRUCTIONS
Lab in [address]    Morning of surgery DO NOT TAKE   Lisinopril   meloxicam   Metformin    May take   Inhalers - spiriva and albuterol   Amlodipine   cymbalta   metorpolol

## 2022-04-02 LAB
ALBUMIN SERPL-MCNC: 3.6 G/DL (ref 3.4–5)
ALP SERPL-CCNC: 108 U/L (ref 40–150)
ALT SERPL W P-5'-P-CCNC: 24 U/L (ref 0–50)
ANION GAP SERPL CALCULATED.3IONS-SCNC: 7 MMOL/L (ref 3–14)
AST SERPL W P-5'-P-CCNC: 14 U/L (ref 0–45)
BILIRUB SERPL-MCNC: 0.4 MG/DL (ref 0.2–1.3)
BUN SERPL-MCNC: 8 MG/DL (ref 7–30)
CALCIUM SERPL-MCNC: 8.9 MG/DL (ref 8.5–10.1)
CHLORIDE BLD-SCNC: 105 MMOL/L (ref 94–109)
CHOLEST SERPL-MCNC: 157 MG/DL
CO2 SERPL-SCNC: 27 MMOL/L (ref 20–32)
CREAT SERPL-MCNC: 0.8 MG/DL (ref 0.52–1.04)
FASTING STATUS PATIENT QL REPORTED: NO
GFR SERPL CREATININE-BSD FRML MDRD: 79 ML/MIN/1.73M2
GLUCOSE BLD-MCNC: 213 MG/DL (ref 70–99)
HDLC SERPL-MCNC: 48 MG/DL
LDLC SERPL CALC-MCNC: 66 MG/DL
NONHDLC SERPL-MCNC: 109 MG/DL
POTASSIUM BLD-SCNC: 3.6 MMOL/L (ref 3.4–5.3)
PROT SERPL-MCNC: 7.5 G/DL (ref 6.8–8.8)
SODIUM SERPL-SCNC: 139 MMOL/L (ref 133–144)
TRIGL SERPL-MCNC: 213 MG/DL
TSH SERPL DL<=0.005 MIU/L-ACNC: 0.99 MU/L (ref 0.4–4)

## 2022-04-02 ASSESSMENT — ANXIETY QUESTIONNAIRES: GAD7 TOTAL SCORE: 4

## 2022-04-02 ASSESSMENT — PATIENT HEALTH QUESTIONNAIRE - PHQ9: SUM OF ALL RESPONSES TO PHQ QUESTIONS 1-9: 9

## 2022-04-03 LAB
CREAT UR-MCNC: 213 MG/DL
DEPRECATED CALCIDIOL+CALCIFEROL SERPL-MC: 28 UG/L (ref 20–75)
MICROALBUMIN UR-MCNC: 147 MG/L
MICROALBUMIN/CREAT UR: 69.01 MG/G CR (ref 0–25)

## 2022-04-04 DIAGNOSIS — D64.9 ANEMIA, UNSPECIFIED TYPE: ICD-10-CM

## 2022-04-04 DIAGNOSIS — E11.40 TYPE 2 DIABETES MELLITUS WITH DIABETIC NEUROPATHY, WITHOUT LONG-TERM CURRENT USE OF INSULIN (H): ICD-10-CM

## 2022-04-04 DIAGNOSIS — E55.9 VITAMIN D DEFICIENCY: Primary | ICD-10-CM

## 2022-04-04 LAB
FERRITIN SERPL-MCNC: 5 NG/ML (ref 8–252)
IRON SATN MFR SERPL: 7 % (ref 15–46)
IRON SERPL-MCNC: 32 UG/DL (ref 35–180)
TIBC SERPL-MCNC: 458 UG/DL (ref 240–430)

## 2022-04-04 RX ORDER — CHOLECALCIFEROL (VITAMIN D3) 50 MCG
1 TABLET ORAL DAILY
Qty: 90 TABLET | Refills: 3 | Status: SHIPPED | OUTPATIENT
Start: 2022-04-04

## 2022-04-05 DIAGNOSIS — D50.9 IRON DEFICIENCY ANEMIA, UNSPECIFIED IRON DEFICIENCY ANEMIA TYPE: Primary | ICD-10-CM

## 2022-04-05 RX ORDER — FERROUS SULFATE 325(65) MG
325 TABLET, DELAYED RELEASE (ENTERIC COATED) ORAL DAILY
Qty: 180 TABLET | Refills: 3 | Status: SHIPPED | OUTPATIENT
Start: 2022-04-05

## 2022-04-12 DIAGNOSIS — B37.2 YEAST INFECTION OF THE SKIN: ICD-10-CM

## 2022-04-13 ENCOUNTER — TELEPHONE (OUTPATIENT)
Dept: INTERNAL MEDICINE | Facility: CLINIC | Age: 72
End: 2022-04-13
Payer: COMMERCIAL

## 2022-04-13 RX ORDER — NYSTATIN 100000 [USP'U]/G
POWDER TOPICAL
Qty: 30 G | Refills: 6 | Status: SHIPPED | OUTPATIENT
Start: 2022-04-13 | End: 2023-08-16

## 2022-04-13 NOTE — TELEPHONE ENCOUNTER
Nystatin (Mycostatin) 050008 unit/gm external powder  Prescription approved per [facility] Refill Protocol.

## 2022-04-13 NOTE — TELEPHONE ENCOUNTER
Order done.   
Pt calling.  States she thought she was to have some kind of scan for a cyst on her L ovary--having pelvic pain.  No order in chart.    Last OV 3-6-18    Please advise, thanks.  
[facility]

## 2022-04-15 ENCOUNTER — VIRTUAL VISIT (OUTPATIENT)
Dept: URGENT CARE | Facility: CLINIC | Age: 72
End: 2022-04-15

## 2022-04-15 ENCOUNTER — TELEPHONE (OUTPATIENT)
Dept: INTERNAL MEDICINE | Facility: CLINIC | Age: 72
End: 2022-04-15

## 2022-04-15 DIAGNOSIS — I25.118 ATHEROSCLEROTIC HEART DISEASE OF NATIVE CORONARY ARTERY WITH OTHER FORMS OF ANGINA PECTORIS (H): ICD-10-CM

## 2022-04-15 DIAGNOSIS — R05.9 COUGH: ICD-10-CM

## 2022-04-15 DIAGNOSIS — U07.1 INFECTION DUE TO 2019 NOVEL CORONAVIRUS: Primary | ICD-10-CM

## 2022-04-15 DIAGNOSIS — E11.9 TYPE 2 DIABETES MELLITUS WITHOUT COMPLICATION, WITHOUT LONG-TERM CURRENT USE OF INSULIN (H): ICD-10-CM

## 2022-04-15 DIAGNOSIS — J44.9 CHRONIC OBSTRUCTIVE PULMONARY DISEASE, UNSPECIFIED COPD TYPE (H): ICD-10-CM

## 2022-04-15 PROCEDURE — 99214 OFFICE O/P EST MOD 30 MIN: CPT | Mod: 95 | Performed by: PHYSICIAN ASSISTANT

## 2022-04-15 RX ORDER — CODEINE PHOSPHATE AND GUAIFENESIN 10; 100 MG/5ML; MG/5ML
1-2 SOLUTION ORAL EVERY 4 HOURS PRN
Qty: 473 ML | Refills: 0 | Status: SHIPPED | OUTPATIENT
Start: 2022-04-15

## 2022-04-15 NOTE — TELEPHONE ENCOUNTER
Patient called and said she tested positive for covid yesterday. She has a sore throat,extreme fatigue, body aches and a terrible cough and it started two days ago.  She called asking for medication. There are no appointments in clinic or virtually.

## 2022-04-15 NOTE — TELEPHONE ENCOUNTER
Call to patient. Patient informed of Genoveva's below response. Patient verbalized understanding.     Clara MAURICE RN   [facility]

## 2022-04-15 NOTE — PROGRESS NOTES
Assessment:     COVID-19 positive patient.  Encounter for consideration of medication intervention.    Patient does qualify for a prescription.   Full discussion with patient including medication options, risks and benefits.   Potential drug interactions reviewed with patient.      Plan:  Treatment planned -  Paxlovid, Rx sent to [place] pharmacy  [facility] [phone]    [address], [address]  [place] [address]    Hours:  Mon-Fri: 8:00a - 6:00p  Sat-Sun: 8:00a - 4:00p    Drive-thru services available.    Temporary change to home medications:   Hold Atorvastatin  Hold FLonase  Hold Trazodone  Decrease Amlodipine by 1/2 to 2.5mg daily and monitor BP  Decrease dose of Robitussin AC to 1/2-1 tsp       See patient instructions    Meg Simmons PA-C  Virtual Urgent Care      Patient preference to obtain AVS:  Mail a copy      Subjective      Vijaya  is a 72 year old  who has a confirmed new positive COVID-19 diagnosis.    She has been identified as high risk for complications of this infection, and is being evaluated via a billable Phone visit.      Phone call duration: 22 minutes    Concern for COVID-19  Exactly how many days ago did these symptoms start? 3 day ago     Are any of the following symptoms significant for you?  New or worsening difficulty breathing? Yes    Please describe what kind of difficulty you are having breathing:Moderate dyspnea (short of breath with ADLs, shortness of breath that limits the ability to walk up one flight of stairs without needing to rest)    Worsening cough? Yes, it's a dry cough.     Fever or chills? Yes, I felt feverish or had chills.    Headache: YES    Sore throat: YES    Chest pain: YES    Diarrhea: YES    Body aches? YES    What treatments has patient tried? Acetaminophen   Does patient live in a nursing home, group home, or shelter? no  Does patient have a way to get food/medications during quarantined? Yes, I have a friend or family member  "who can help me.         MASSBP Score 4/15/2022   Age Greater than or equal to 65 years 2   BMI greater than or equal to 35 kg/m2 0   Has Diabetes Mellitus 2   Has Chronic Kidney Disease 0   Has Cardiovascular Disease and 55 years or older 2   Has Chronic Respiratory Disease and 55 years or older 3   Has Hypertension and 55 years or older 1   Is Immunocompromised 0   Is Pregnant 0   Member of BIP community (Black/, /, ,  or , or  or [place] Native)  0   MASSBP Score 10   Has the patient had a positive COVID test outside our system?  Yes   What day did symptoms start?  4/12/2022             Constitutional, HEENT, cardiovascular, pulmonary, gi and gu systems are negative, except as otherwise noted.    Objective    Vitals:  No vitals were obtained today due to virtual visit.  Estimated body mass index is 31.32 kg/m  as calculated from the following:    Height as of 4/1/22: 1.74 m (5' 8.5\").    Weight as of 4/1/22: 94.8 kg (209 lb).   General: Alert, no apparent distress  PSYCH: Alert; coherent speech, normal rate and volume, able to articulate logical thoughts, appropriate insight, no tangential thoughts, no hallucination or delusions.  Affect is appropriate  RESP:  cough, no audible wheezing, able to talk in full sentences  Remainder of exam unable to be completed due to telephone visit  GFR Estimate   Date Value Ref Range Status   04/01/2022 79 >60 mL/min/1.73m2 Final     Comment:     Effective December 21, 2021 eGFRcr in adults is calculated using the 2021 CKD-EPI creatinine equation which includes age and gender (Dominique gloria al., NEJM, DOI: 10.1056/QDFAyi4505755)   07/05/2021 71 >60 mL/min/[1.73_m2] Final     Comment:     Non  GFR Calc  Starting 12/18/2018, serum creatinine based estimated GFR (eGFR) will be   calculated using the Chronic Kidney Disease Epidemiology Collaboration   (CKD-EPI) equation.            "

## 2022-04-15 NOTE — TELEPHONE ENCOUNTER
Called patient, she is asking for recommendations for treatment of her symptoms. Advised her we recommend OTC medications like Tylenol or ibuprofen for fever and pain, cough syrup, push fluids and rest. Patient's history shows she could be considered high risk for serious illness, recommended she call [phone] and select option 7 to set up appointment to determine if she would qualify for the antiviral medications.      Patient agrees to call them, but is concerned about the coughing, it has been very frequent and making her throat sore. She has been using cough drops without relief and difficulty sleeping due to the cough. She asks if [facility] would be able to order cough medicine for her to take.     Wendi Adkins RN  [facility]

## 2022-04-16 NOTE — TELEPHONE ENCOUNTER
PA Initiation    Medication: Hydroxyzine 10mg tab  Insurance Company: [employer] - Phone [phone] Fax [phone]  Pharmacy Filling the Rx: [facility] - [place], [place] - [facility] [address]  Filling Pharmacy Phone:    Filling Pharmacy Fax:    Start Date: 4/16/2022    Central Prior Authorization Team   Phone: [phone]

## 2022-04-19 NOTE — TELEPHONE ENCOUNTER
Prior Authorization Approval    Authorization Effective Date: 3/17/2022  Authorization Expiration Date: 4/16/2023  Medication: Hydroxyzine 10mg tab  Approved Dose/Quantity:   Reference #: [id number]   Insurance Company: JANKI - Phone [phone] Fax [phone]  Which Pharmacy is filling the prescription (Not needed for infusion/clinic administered): [facility] - [place], [place] - [address]  Pharmacy Notified: Yes **Instructed pharmacy to notify patient when script is ready to /ship.**

## 2022-04-21 ENCOUNTER — PATIENT OUTREACH (OUTPATIENT)
Dept: GERIATRIC MEDICINE | Facility: CLINIC | Age: 72
End: 2022-04-21
Payer: COMMERCIAL

## 2022-04-22 DIAGNOSIS — Z98.1 HISTORY OF SPINAL FUSION: ICD-10-CM

## 2022-04-22 RX ORDER — TRAMADOL HYDROCHLORIDE 50 MG/1
TABLET ORAL
Qty: 90 TABLET | Refills: 0 | Status: SHIPPED | OUTPATIENT
Start: 2022-04-22

## 2022-04-25 ENCOUNTER — TELEPHONE (OUTPATIENT)
Dept: INTERVENTIONAL RADIOLOGY/VASCULAR | Facility: CLINIC | Age: 72
End: 2022-04-25
Payer: COMMERCIAL

## 2022-04-26 ENCOUNTER — TELEPHONE (OUTPATIENT)
Dept: NEUROSURGERY | Facility: CLINIC | Age: 72
End: 2022-04-26

## 2022-04-29 NOTE — TELEPHONE ENCOUNTER
Central Prior Authorization Team   Phone: [phone]    PA Initiation    Medication: Methocarbamol 500mg  Insurance Company: [employer] - Phone [phone] Fax [phone]  Pharmacy Filling the Rx: [facility] - [place], [place] - [facility] [address]  Filling Pharmacy Phone: [phone]  Filling Pharmacy Fax: [phone]  Start Date: 4/29/2022

## 2022-05-01 PROBLEM — M54.10 BACK PAIN WITH RADICULOPATHY: Status: RESOLVED | Noted: 2022-03-30 | Resolved: 2022-05-01

## 2022-05-06 NOTE — TELEPHONE ENCOUNTER
PRIOR AUTHORIZATION DENIED    Medication: Methocarbamol 500mg - DENIED    Denial Date: 5/2/2022    Denial Rational: PATIENT DID NOT MEET CRITERIA -        Appeal Information:  IF PATIENT IS UNABLE TO TRY/FAIL ALTERNATIVE(S) PLEASE SUPPLY PA TEAM WITH A LETTER OF MEDICAL NECESSITY WITH CLINICAL REASON.

## 2022-05-10 NOTE — PROGRESS NOTES
[facility] Care Coordination Contact    Call from member who shares that she made it to [place] and is doing well so far.    [phone] sent to [facility] to notify them of move.     Email to service providers notifying them of move.     Will wait for direction from health plan to officially disenroll.     Vanesa Gtz RN  [facility]  [phone]

## 2022-06-16 NOTE — PROGRESS NOTES
Spoke with patient on the phone, she is s/p blepharoplasty and ptosis repair in both eyes on 3/11/21. She reports pain in both eyes since the surgery, using ice packs, erythromycin ointment on eyelids, tylenol for pain control. Recommended increase lubrication with artificial tear ointment as first step. Patient in agreement with plan.    No

## 2022-07-14 ENCOUNTER — TELEPHONE (OUTPATIENT)
Dept: NEUROSURGERY | Facility: CLINIC | Age: 72
End: 2022-07-14

## 2022-08-19 ENCOUNTER — PATIENT OUTREACH (OUTPATIENT)
Dept: GERIATRIC MEDICINE | Facility: CLINIC | Age: 72
End: 2022-08-19

## 2022-08-19 NOTE — PROGRESS NOTES
[facility] Care Coordination Contact    CC updated program tasks and targets for [employer] launch.     Vanesa Gtz RN  [facility]  Cell: [phone]

## 2022-08-31 ENCOUNTER — PATIENT OUTREACH (OUTPATIENT)
Dept: GERIATRIC MEDICINE | Facility: CLINIC | Age: 72
End: 2022-08-31

## 2022-08-31 NOTE — PROGRESS NOTES
[facility] Care Coordination Contact    No longer active with [facility] community case management effective 8/31/22. MA termed on 5/31/22 after a move out of state. [employer] term date is 8/31/22.    MMIS entered to close waiver and disenrollment checklist completed.    Vanesa Gtz RN  [facility]  Cell: [phone]

## 2022-10-13 ENCOUNTER — TELEPHONE (OUTPATIENT)
Dept: PHARMACY | Facility: CLINIC | Age: 72
End: 2022-10-13

## 2022-10-13 NOTE — TELEPHONE ENCOUNTER
Called patient to offer MTM visit - eligible for insurance coverage and help with diabetes management.  Left message.

## 2022-10-18 DIAGNOSIS — I10 BENIGN ESSENTIAL HYPERTENSION: ICD-10-CM

## 2022-10-18 DIAGNOSIS — R32 URINARY INCONTINENCE, UNSPECIFIED TYPE: ICD-10-CM

## 2022-10-18 DIAGNOSIS — J30.89 ALLERGIC RHINITIS DUE TO OTHER ALLERGIC TRIGGER, UNSPECIFIED SEASONALITY: ICD-10-CM

## 2022-10-18 DIAGNOSIS — N32.81 OVERACTIVE BLADDER: ICD-10-CM

## 2022-10-21 RX ORDER — METOPROLOL TARTRATE 25 MG/1
TABLET, FILM COATED ORAL
Qty: 180 TABLET | Refills: 1 | Status: SHIPPED | OUTPATIENT
Start: 2022-10-21

## 2022-10-21 RX ORDER — AMLODIPINE BESYLATE 5 MG/1
TABLET ORAL
Qty: 90 TABLET | Refills: 1 | Status: SHIPPED | OUTPATIENT
Start: 2022-10-21

## 2022-10-21 RX ORDER — FEXOFENADINE HCL 60 MG/1
TABLET, FILM COATED ORAL
Qty: 180 TABLET | Refills: 0 | Status: SHIPPED | OUTPATIENT
Start: 2022-10-21

## 2022-10-21 RX ORDER — MIRABEGRON 25 MG/1
TABLET, FILM COATED, EXTENDED RELEASE ORAL
Qty: 90 TABLET | Refills: 1 | Status: SHIPPED | OUTPATIENT
Start: 2022-10-21

## 2022-10-21 RX ORDER — LISINOPRIL 20 MG/1
TABLET ORAL
Qty: 180 TABLET | Refills: 1 | Status: SHIPPED | OUTPATIENT
Start: 2022-10-21

## 2022-10-21 NOTE — TELEPHONE ENCOUNTER
Routing refill request to provider for review/approval because:  Drug interaction warning  Age     Zahida Segovia, RN

## 2022-10-24 ENCOUNTER — TELEPHONE (OUTPATIENT)
Dept: INTERNAL MEDICINE | Facility: CLINIC | Age: 72
End: 2022-10-24

## 2022-10-26 NOTE — TELEPHONE ENCOUNTER
Prior Authorization Approval    Authorization Effective Date: 8/1/2022  Authorization Expiration Date: 12/31/2023  Medication: omeprazole (PRILOSEC) 20 MG DR capsule-APPROVED  Approved Dose/Quantity:   Reference #:     Insurance Company: [employer] Phone [phone] Fax [phone]  Expected CoPay:       CoPay Card Available:      Foundation Assistance Needed:    Which Pharmacy is filling the prescription (Not needed for infusion/clinic administered): [facility] [address] [place] [address]  Pharmacy Notified: Yes  Patient Notified: No    **Instructed pharmacy to notify patient when script is ready to /ship.**

## 2022-10-26 NOTE — TELEPHONE ENCOUNTER
Central Prior Authorization Team   Phone: [phone]      PA Initiation    Medication: omeprazole (PRILOSEC) 20 MG DR capsule  Insurance Company: Sandra - Phone [phone] Fax [phone]  Pharmacy Filling the Rx: [address]  Filling Pharmacy Phone: [phone]  Filling Pharmacy Fax:    Start Date: 10/26/2022

## 2022-12-15 NOTE — PROGRESS NOTES
[facility] Care Coordination Contact    Arranged transportation thru are PAR for the below appt:  Appt Date & Time: 10/08/2020 12:30pm  Clinic Name & Address:  [facility] [address] [place] [address]  Transportation Provider: [employer]   time:  11:45 - member will have a  accompany her during this ride.  Round trip - will call return ride.    Notified member of  time.    Cynthia Bell  Care Management Specialist  [facility]  [phone]          
4 = No assist / stand by assistance

## 2022-12-16 DIAGNOSIS — M54.10 BACK PAIN WITH RADICULOPATHY: ICD-10-CM

## 2022-12-16 DIAGNOSIS — J44.9 CHRONIC OBSTRUCTIVE PULMONARY DISEASE, UNSPECIFIED COPD TYPE (H): ICD-10-CM

## 2022-12-17 RX ORDER — METHOCARBAMOL 500 MG/1
TABLET, FILM COATED ORAL
Qty: 90 TABLET | Refills: 0 | Status: SHIPPED | OUTPATIENT
Start: 2022-12-17

## 2022-12-19 DIAGNOSIS — J30.1 SEASONAL ALLERGIC RHINITIS DUE TO POLLEN: ICD-10-CM

## 2022-12-19 RX ORDER — ALBUTEROL SULFATE 90 UG/1
AEROSOL, METERED RESPIRATORY (INHALATION)
Qty: 18 G | Refills: 3 | Status: SHIPPED | OUTPATIENT
Start: 2022-12-19 | End: 2023-06-22

## 2022-12-19 RX ORDER — IPRATROPIUM BROMIDE 42 UG/1
SPRAY, METERED NASAL
Qty: 15 ML | Refills: 0 | Status: SHIPPED | OUTPATIENT
Start: 2022-12-19

## 2022-12-19 NOTE — TELEPHONE ENCOUNTER
Pending Prescriptions:                       Disp   Refills    ipratropium (ATROVENT) 0.06 % nasal spray*15 mL  0            Sig: USE 2 SPRAY(S) IN EACH NOSTRIL 4 TIMES DAILY    albuterol (PROAIR HFA/PROVENTIL HFA/MAYLIN*18 g   0            Sig: INHALE 2 PUFFS BY MOUTH EVERY 6 HOURS    Albuterol inh prescription approved per [facility] Refill Protocol.    Routing Atrovent refill request to provider for review/approval because:  Drug not active on patient's medication list    Please advise, thanks.

## 2022-12-20 ENCOUNTER — TELEPHONE (OUTPATIENT)
Dept: NEUROLOGY | Facility: CLINIC | Age: 72
End: 2022-12-20

## 2022-12-20 RX ORDER — FLUTICASONE PROPIONATE 50 MCG
SPRAY, SUSPENSION (ML) NASAL
Qty: 16 G | Refills: 5 | Status: SHIPPED | OUTPATIENT
Start: 2022-12-20

## 2022-12-20 NOTE — TELEPHONE ENCOUNTER
Prior Authorization Retail Medication Request    Medication/Dose: methocarbamol (ROBAXIN) 500 MG tablet  ICD code (if different than what is on RX):    Previously Tried and Failed:    Rationale:      CAITIE Stevenson CMA ([facility])

## 2022-12-21 NOTE — TELEPHONE ENCOUNTER
PRIOR AUTHORIZATION DENIED    Medication: methocarbamol (ROBAXIN) 500 MG tablet-PA denied    Denial Date: 12/21/2022    Denial Rational:         Appeal Information:

## 2022-12-21 NOTE — TELEPHONE ENCOUNTER
Central Prior Authorization Team   Phone: [phone]      PA Initiation    Medication: methocarbamol (ROBAXIN) 500 MG tablet-PA initiated  Insurance Company: Sandra - Phone [phone] Fax [phone]  Pharmacy Filling the Rx: [facility] [address] [place] [address] - [address] [place] [address]  Filling Pharmacy Phone: [phone]  Filling Pharmacy Fax:    Start Date: 12/21/2022

## 2023-02-09 DIAGNOSIS — M54.10 BACK PAIN WITH RADICULOPATHY: ICD-10-CM

## 2023-02-09 NOTE — TELEPHONE ENCOUNTER
[place] patient.    Wendi Otto, RN Care Coordinator   Neurology/Neurosurgery/PM&R/ Pain Management

## 2023-02-10 RX ORDER — METHOCARBAMOL 500 MG/1
TABLET, FILM COATED ORAL
Qty: 90 TABLET | Refills: 0 | OUTPATIENT
Start: 2023-02-10

## 2023-02-10 NOTE — CONFIDENTIAL NOTE
Patient has not been seen in clinic since 3/2/22. Robaxin refill refused. Patient will need to contact her PCP for any refills.     Called and spoke to Deena with [facility] in [place]. Informed her that we will be refusing/denying refill of Robaxin as patient has not been seen in almost a year and sounds like she may be out of state currently. Patient to contact her PCP for refill. Deena will call patient to update.

## 2023-04-10 DIAGNOSIS — M54.10 BACK PAIN WITH RADICULOPATHY: ICD-10-CM

## 2023-04-10 DIAGNOSIS — R32 URINARY INCONTINENCE, UNSPECIFIED TYPE: ICD-10-CM

## 2023-04-10 DIAGNOSIS — I10 BENIGN ESSENTIAL HYPERTENSION: ICD-10-CM

## 2023-04-10 DIAGNOSIS — N32.81 OVERACTIVE BLADDER: ICD-10-CM

## 2023-04-10 DIAGNOSIS — E11.40 TYPE 2 DIABETES MELLITUS WITH DIABETIC NEUROPATHY, WITHOUT LONG-TERM CURRENT USE OF INSULIN (H): ICD-10-CM

## 2023-04-10 DIAGNOSIS — G47.00 INSOMNIA, UNSPECIFIED TYPE: ICD-10-CM

## 2023-04-10 RX ORDER — METHOCARBAMOL 500 MG/1
TABLET, FILM COATED ORAL
Qty: 90 TABLET | Refills: 0 | OUTPATIENT
Start: 2023-04-10

## 2023-04-10 NOTE — TELEPHONE ENCOUNTER
Received Robaxin refill. Per chart review previous robaxin refill denied on 2/9/23. RX denied.     Attempted to reach out to patient, no answer. Left voice message for them to call clinic back to further discuss.

## 2023-04-12 NOTE — TELEPHONE ENCOUNTER
Routing refill request to provider for review/approval because:  Drug interaction warning  Labs not current:  All  Patient needs to be seen because it has been more than 1 year since last office visit.      Call placed to patient. Left message asking for a returned call to clinic. Pharmacy in [place].    Please route to covering provider after patient returns call to clinic.

## 2023-04-13 DIAGNOSIS — E78.5 HYPERLIPIDEMIA LDL GOAL <100: ICD-10-CM

## 2023-04-14 RX ORDER — ATORVASTATIN CALCIUM 80 MG/1
TABLET, FILM COATED ORAL
Qty: 90 TABLET | Refills: 0 | Status: SHIPPED | OUTPATIENT
Start: 2023-04-14

## 2023-04-14 NOTE — TELEPHONE ENCOUNTER
Routing refill request to provider for review/approval because:  Patient needs to be seen because it has been more than 1 year since last office visit.  LDL Cholesterol Calculated   Date Value Ref Range Status   04/01/2022 66 <=100 mg/dL Final   03/08/2021 40 <100 mg/dL Final     Comment:     Desirable:       <100 mg/dl

## 2023-04-18 DIAGNOSIS — I10 BENIGN ESSENTIAL HYPERTENSION: ICD-10-CM

## 2023-04-18 RX ORDER — AMLODIPINE BESYLATE 5 MG/1
TABLET ORAL
Qty: 90 TABLET | Refills: 0 | OUTPATIENT
Start: 2023-04-18

## 2023-04-18 RX ORDER — MIRABEGRON 25 MG/1
TABLET, FILM COATED, EXTENDED RELEASE ORAL
Qty: 90 TABLET | Refills: 0 | OUTPATIENT
Start: 2023-04-18

## 2023-04-18 RX ORDER — METOPROLOL TARTRATE 25 MG/1
TABLET, FILM COATED ORAL
Qty: 180 TABLET | Refills: 0 | OUTPATIENT
Start: 2023-04-18

## 2023-04-18 RX ORDER — LISINOPRIL 20 MG/1
TABLET ORAL
Qty: 180 TABLET | Refills: 0 | OUTPATIENT
Start: 2023-04-18

## 2023-04-18 RX ORDER — TRAZODONE HYDROCHLORIDE 100 MG/1
TABLET ORAL
Qty: 180 TABLET | Refills: 0 | OUTPATIENT
Start: 2023-04-18

## 2023-04-18 NOTE — TELEPHONE ENCOUNTER
FYI - patient called primary care provider clinic. Patient no longer lives in [place] and is no longer seeing any providers in [place].

## 2023-04-18 NOTE — TELEPHONE ENCOUNTER
Call back from patient. States she is no longer coming to our clinic and no longer lives in [place]. Prescription denied.

## 2023-04-20 RX ORDER — AMLODIPINE BESYLATE 5 MG/1
5 TABLET ORAL DAILY
Qty: 90 TABLET | Refills: 1 | OUTPATIENT
Start: 2023-04-20

## 2023-04-20 NOTE — TELEPHONE ENCOUNTER
States she is no longer coming to our clinic and no longer lives in [place]. Prescription denied.  Writer called patient's pharmacy, they inform writer they keep sending us Rx's as patient has not seen provider in there area.   They will make a note in her chart.

## 2023-05-02 DIAGNOSIS — G47.00 INSOMNIA, UNSPECIFIED TYPE: ICD-10-CM

## 2023-05-03 RX ORDER — TRAZODONE HYDROCHLORIDE 100 MG/1
TABLET ORAL
Qty: 180 TABLET | Refills: 0 | OUTPATIENT
Start: 2023-05-03

## 2023-05-03 NOTE — TELEPHONE ENCOUNTER
Refused, patient no longer lives in [place] and is no longer seeing any providers in [place].  Writer called patient's pharmacy, they have talked to her about trying to refill RX's from out of state.   The will cancel all refills sent to [place].

## 2023-06-17 DIAGNOSIS — J44.9 CHRONIC OBSTRUCTIVE PULMONARY DISEASE, UNSPECIFIED COPD TYPE (H): ICD-10-CM

## 2023-06-20 NOTE — TELEPHONE ENCOUNTER
Pending Prescriptions:                       Disp   Refills    albuterol (PROAIR HFA/PROVENTIL HFA/VENTOL*18 g   0        Sig: INHALE 2 PUFFS BY MOUTH EVERY 6 HOURS    Routing refill request to provider for review/approval because:  Needs provider review

## 2023-06-22 RX ORDER — ALBUTEROL SULFATE 90 UG/1
AEROSOL, METERED RESPIRATORY (INHALATION)
Qty: 18 G | Refills: 0 | Status: SHIPPED | OUTPATIENT
Start: 2023-06-22 | End: 2023-07-17

## 2023-07-05 DIAGNOSIS — E11.40 TYPE 2 DIABETES MELLITUS WITH DIABETIC NEUROPATHY, WITHOUT LONG-TERM CURRENT USE OF INSULIN (H): ICD-10-CM

## 2023-07-06 DIAGNOSIS — E11.40 TYPE 2 DIABETES MELLITUS WITH DIABETIC NEUROPATHY, WITHOUT LONG-TERM CURRENT USE OF INSULIN (H): ICD-10-CM

## 2023-07-06 NOTE — TELEPHONE ENCOUNTER
Patient does need follow-up diabetic labs collected as it has been over 1 year since she has had any blood work.

## 2023-07-06 NOTE — TELEPHONE ENCOUNTER
Routing refill request to provider for review/approval because:  Biguanide Agents Failed 07/05/2023 05:57 PM   Protocol Details  Patient has documented A1c within the specified period of time.    Patient's CR is NOT>1.4 OR Patient's EGFR is NOT<45 within past 12 mos.    Patient does NOT have a diagnosis of CHF.    Recent (6 mo) or future (30 days) visit within the authorizing provider's specialty   Stanford MAURICE RN, BSN

## 2023-07-07 NOTE — TELEPHONE ENCOUNTER
Patient returning call.  Advised of provider's message below.    She states she is currently living in [place] and has a provider there she sees.  She is planning on moving back to [place] but unsure when - looking for a place to live.    States she will follow up with her provider in [place] regarding diabetes.

## 2023-07-07 NOTE — TELEPHONE ENCOUNTER
Medication refill request denied due to duplicate refill request.    Patrick Henley, Triage RN [facility]  11:41 AM 7/7/2023

## 2023-07-12 DIAGNOSIS — E78.5 HYPERLIPIDEMIA LDL GOAL <100: ICD-10-CM

## 2023-07-12 RX ORDER — ATORVASTATIN CALCIUM 80 MG/1
TABLET, FILM COATED ORAL
Qty: 90 TABLET | Refills: 0 | OUTPATIENT
Start: 2023-07-12

## 2023-07-12 NOTE — TELEPHONE ENCOUNTER
Routing refill request to provider for review/approval because:  Labs not current:  LDL on file in past 12 months        Recent Labs   Lab Test 04/01/22  1304   LDL 66

## 2023-07-14 DIAGNOSIS — J44.9 CHRONIC OBSTRUCTIVE PULMONARY DISEASE, UNSPECIFIED COPD TYPE (H): ICD-10-CM

## 2023-07-17 RX ORDER — ALBUTEROL SULFATE 90 UG/1
AEROSOL, METERED RESPIRATORY (INHALATION)
Qty: 18 G | Refills: 0 | Status: SHIPPED | OUTPATIENT
Start: 2023-07-17 | End: 2023-08-07

## 2023-07-17 NOTE — TELEPHONE ENCOUNTER
Routing refill request to provider for review/approval because:  Recent (12 mo) or future (30 days) visit within the authorizing provider's specialty

## 2023-07-18 NOTE — TELEPHONE ENCOUNTER
Pt is currently out of [place] and does not know when she will return to [place] but will call when she does return. Pt was advised that she will not get any further refills.CRISTIAN BELLA LPN

## 2023-08-07 DIAGNOSIS — J44.9 CHRONIC OBSTRUCTIVE PULMONARY DISEASE, UNSPECIFIED COPD TYPE (H): ICD-10-CM

## 2023-08-07 RX ORDER — ALBUTEROL SULFATE 90 UG/1
AEROSOL, METERED RESPIRATORY (INHALATION)
Qty: 18 G | Refills: 0 | Status: SHIPPED | OUTPATIENT
Start: 2023-08-07

## 2023-08-14 DIAGNOSIS — J44.9 CHRONIC OBSTRUCTIVE PULMONARY DISEASE, UNSPECIFIED COPD TYPE (H): ICD-10-CM

## 2023-08-14 DIAGNOSIS — B37.2 YEAST INFECTION OF THE SKIN: ICD-10-CM

## 2023-08-15 RX ORDER — ALBUTEROL SULFATE 90 UG/1
AEROSOL, METERED RESPIRATORY (INHALATION)
Qty: 18 G | Refills: 0 | OUTPATIENT
Start: 2023-08-15

## 2023-08-16 RX ORDER — NYSTATIN 100000 [USP'U]/G
POWDER TOPICAL
Qty: 30 G | Refills: 0 | Status: SHIPPED | OUTPATIENT
Start: 2023-08-16

## 2023-08-19 DIAGNOSIS — B37.2 YEAST INFECTION OF THE SKIN: ICD-10-CM

## 2023-08-22 RX ORDER — NYSTATIN 100000 [USP'U]/G
POWDER TOPICAL
Qty: 30 G | Refills: 0 | OUTPATIENT
Start: 2023-08-22

## 2023-08-22 NOTE — TELEPHONE ENCOUNTER
Per pharmacy message:    To prescribe a different medication, open the encounter and click Replace.  To modify prescription details, edit the order in the composer.  To direct the pharmacy to dispense the original prescription, refuse this request.

## 2023-08-22 NOTE — TELEPHONE ENCOUNTER
I understand this medication is not available  Are they asking for a substitute or just wanting prescription to be denied? Please clarify what is needed from me

## 2023-08-24 DIAGNOSIS — B37.2 YEAST INFECTION OF THE SKIN: ICD-10-CM

## 2023-08-24 RX ORDER — NYSTATIN 100000 [USP'U]/G
POWDER TOPICAL
Qty: 30 G | Refills: 0 | OUTPATIENT
Start: 2023-08-24

## 2023-08-24 NOTE — TELEPHONE ENCOUNTER
Pending Prescriptions:                       Disp   Refills    nystatin (MYCOSTATIN) 699558 UNIT/GM exte*30 g   0            Sig: APPLY  POWDER TOPICALLY TO AFFECTED AREA THREE           TIMES DAILY AS NEEDED    Duplicate request.  See refill encounter 8/19/23.  (Waiting for patient to call back.)

## 2023-09-01 NOTE — TELEPHONE ENCOUNTER
Attempted to contact, but line was busy. Third attempt to outreach.    Thank you,  Patrick Henley, Triage RN [facility]  2:42 PM 9/1/2023

## 2023-10-31 PROCEDURE — 87493 C DIFF AMPLIFIED PROBE: CPT | Mod: ORL | Performed by: NURSE PRACTITIONER

## 2023-11-02 ENCOUNTER — LAB REQUISITION (OUTPATIENT)
Dept: LAB | Facility: CLINIC | Age: 73
End: 2023-11-02
Payer: COMMERCIAL

## 2023-11-02 DIAGNOSIS — R19.7 DIARRHEA, UNSPECIFIED: ICD-10-CM

## 2023-11-02 LAB — C DIFF TOX B STL QL: NEGATIVE

## 2023-11-15 ENCOUNTER — LAB REQUISITION (OUTPATIENT)
Dept: LAB | Facility: CLINIC | Age: 73
End: 2023-11-15
Payer: COMMERCIAL

## 2023-11-15 DIAGNOSIS — E11.69 TYPE 2 DIABETES MELLITUS WITH OTHER SPECIFIED COMPLICATION (H): ICD-10-CM

## 2023-11-15 DIAGNOSIS — G93.32 MYALGIC ENCEPHALOMYELITIS/CHRONIC FATIGUE SYNDROME: ICD-10-CM

## 2023-11-15 DIAGNOSIS — E55.9 VITAMIN D DEFICIENCY, UNSPECIFIED: ICD-10-CM

## 2023-11-16 LAB
ALBUMIN SERPL BCG-MCNC: 3.9 G/DL (ref 3.5–5.2)
ALP SERPL-CCNC: 91 U/L (ref 40–150)
ALT SERPL W P-5'-P-CCNC: 23 U/L (ref 0–50)
ANION GAP SERPL CALCULATED.3IONS-SCNC: 16 MMOL/L (ref 7–15)
AST SERPL W P-5'-P-CCNC: 26 U/L (ref 0–45)
BILIRUB SERPL-MCNC: 0.3 MG/DL
BUN SERPL-MCNC: 12.1 MG/DL (ref 8–23)
CALCIUM SERPL-MCNC: 9.1 MG/DL (ref 8.8–10.2)
CHLORIDE SERPL-SCNC: 105 MMOL/L (ref 98–107)
CHOLEST SERPL-MCNC: 134 MG/DL
CREAT SERPL-MCNC: 0.75 MG/DL (ref 0.51–0.95)
DEPRECATED HCO3 PLAS-SCNC: 22 MMOL/L (ref 22–29)
EGFRCR SERPLBLD CKD-EPI 2021: 84 ML/MIN/1.73M2
ERYTHROCYTE [DISTWIDTH] IN BLOOD BY AUTOMATED COUNT: 14.7 % (ref 10–15)
GLUCOSE SERPL-MCNC: 99 MG/DL (ref 70–99)
HBA1C MFR BLD: 7 %
HCT VFR BLD AUTO: 41.5 % (ref 35–47)
HDLC SERPL-MCNC: 40 MG/DL
HGB BLD-MCNC: 13.3 G/DL (ref 11.7–15.7)
LDLC SERPL CALC-MCNC: 69 MG/DL
MCH RBC QN AUTO: 29.3 PG (ref 26.5–33)
MCHC RBC AUTO-ENTMCNC: 32 G/DL (ref 31.5–36.5)
MCV RBC AUTO: 91 FL (ref 78–100)
NONHDLC SERPL-MCNC: 94 MG/DL
PLATELET # BLD AUTO: 289 10E3/UL (ref 150–450)
POTASSIUM SERPL-SCNC: 4.6 MMOL/L (ref 3.4–5.3)
PROT SERPL-MCNC: 6.7 G/DL (ref 6.4–8.3)
RBC # BLD AUTO: 4.54 10E6/UL (ref 3.8–5.2)
SODIUM SERPL-SCNC: 143 MMOL/L (ref 135–145)
TRIGL SERPL-MCNC: 123 MG/DL
TSH SERPL DL<=0.005 MIU/L-ACNC: 0.85 UIU/ML (ref 0.3–4.2)
VIT D+METAB SERPL-MCNC: 28 NG/ML (ref 20–50)
WBC # BLD AUTO: 8.6 10E3/UL (ref 4–11)

## 2023-11-16 PROCEDURE — 84443 ASSAY THYROID STIM HORMONE: CPT | Mod: ORL | Performed by: NURSE PRACTITIONER

## 2023-11-16 PROCEDURE — P9604 ONE-WAY ALLOW PRORATED TRIP: HCPCS | Mod: ORL | Performed by: NURSE PRACTITIONER

## 2023-11-16 PROCEDURE — 85027 COMPLETE CBC AUTOMATED: CPT | Mod: ORL | Performed by: NURSE PRACTITIONER

## 2023-11-16 PROCEDURE — 36415 COLL VENOUS BLD VENIPUNCTURE: CPT | Mod: ORL | Performed by: NURSE PRACTITIONER

## 2023-11-16 PROCEDURE — 80053 COMPREHEN METABOLIC PANEL: CPT | Mod: ORL | Performed by: NURSE PRACTITIONER

## 2023-11-16 PROCEDURE — 80061 LIPID PANEL: CPT | Mod: ORL | Performed by: NURSE PRACTITIONER

## 2023-11-16 PROCEDURE — 82306 VITAMIN D 25 HYDROXY: CPT | Mod: ORL | Performed by: NURSE PRACTITIONER

## 2023-11-16 PROCEDURE — 83036 HEMOGLOBIN GLYCOSYLATED A1C: CPT | Mod: ORL | Performed by: NURSE PRACTITIONER

## 2024-01-23 ENCOUNTER — LAB REQUISITION (OUTPATIENT)
Dept: LAB | Facility: CLINIC | Age: 74
End: 2024-01-23
Payer: MEDICARE

## 2024-01-23 DIAGNOSIS — R30.0 DYSURIA: ICD-10-CM

## 2024-01-23 LAB
ALBUMIN UR-MCNC: NEGATIVE MG/DL
APPEARANCE UR: CLEAR
BILIRUB UR QL STRIP: NEGATIVE
COLOR UR AUTO: ABNORMAL
GLUCOSE UR STRIP-MCNC: NEGATIVE MG/DL
HGB UR QL STRIP: NEGATIVE
KETONES UR STRIP-MCNC: NEGATIVE MG/DL
LEUKOCYTE ESTERASE UR QL STRIP: NEGATIVE
MUCOUS THREADS #/AREA URNS LPF: PRESENT /LPF
NITRATE UR QL: NEGATIVE
PH UR STRIP: 6 [PH] (ref 5–7)
RBC URINE: 0 /HPF
SP GR UR STRIP: 1.01 (ref 1–1.03)
SQUAMOUS EPITHELIAL: 3 /HPF
UROBILINOGEN UR STRIP-MCNC: NORMAL MG/DL
WBC URINE: 0 /HPF

## 2024-01-23 PROCEDURE — 81001 URINALYSIS AUTO W/SCOPE: CPT | Mod: ORL | Performed by: NURSE PRACTITIONER

## 2024-01-23 PROCEDURE — 87086 URINE CULTURE/COLONY COUNT: CPT | Mod: ORL | Performed by: NURSE PRACTITIONER

## 2024-01-25 LAB — BACTERIA UR CULT: NORMAL

## 2024-02-28 ENCOUNTER — LAB REQUISITION (OUTPATIENT)
Dept: LAB | Facility: CLINIC | Age: 74
End: 2024-02-28
Payer: MEDICARE

## 2024-02-28 DIAGNOSIS — E11.69 TYPE 2 DIABETES MELLITUS WITH OTHER SPECIFIED COMPLICATION (H): ICD-10-CM

## 2024-02-29 LAB — HBA1C MFR BLD: 7.1 %

## 2024-02-29 PROCEDURE — 83036 HEMOGLOBIN GLYCOSYLATED A1C: CPT | Mod: ORL | Performed by: NURSE PRACTITIONER

## 2024-02-29 PROCEDURE — P9603 ONE-WAY ALLOW PRORATED MILES: HCPCS | Mod: ORL | Performed by: NURSE PRACTITIONER

## 2024-02-29 PROCEDURE — 36415 COLL VENOUS BLD VENIPUNCTURE: CPT | Mod: ORL | Performed by: NURSE PRACTITIONER

## 2024-03-12 NOTE — TELEPHONE ENCOUNTER
Call to pt and advised.     She checked it yesterday and was 140/70. Friend had machine.     She agrees with the plan.    Leila is a 49 year old who is being evaluated via a billable video visit.      How would you like to obtain your AVS? MyChart  If the video visit is dropped, the invitation should be resent by: Text to cell phone: [phone]  Will anyone else be joining your video visit? No    Assessment & Plan     Attention deficit hyperactivity disorder (ADHD), predominantly inattentive type  Suboptimal afternoon concentration at this time; would like to consider a prn dose of IR adderall to try after lunch. Will send 3 month supply of her routine 20mg XR dose and see how often she finds the 10mg IR to be helpful.     - PMDP reviewed, no concerns. Last filled 2/23 so she plans to fill next in a week and a half or so  - CSA was send in Dec and she states bringing this back to our clinic and not yet showing up in our media scanned tab; likely still in the [facility] records waiting to be scanned. She has printed the copy of the CSA again to bring to us if needed.     - amphetamine-dextroamphetamine (ADDERALL XR) 20 MG 24 hr capsule; Take 1 capsule (20 mg) by mouth daily  - amphetamine-dextroamphetamine (ADDERALL) 10 MG tablet; Take 1 tablet (10 mg) by mouth daily as needed (for concentration) In the afternoon  - amphetamine-dextroamphetamine (ADDERALL XR) 20 MG 24 hr capsule; Take 1 capsule (20 mg) by mouth daily for 30 days  - amphetamine-dextroamphetamine (ADDERALL XR) 20 MG 24 hr capsule; Take 1 capsule (20 mg) by mouth daily for 30 days    Left ankle swelling  Chronic pain of left ankle  Ongoing pain  and swelling of left ankle since re-injury (rolled the ankle) in 2019. Has now done PT several times with no substantial improvements. Would like to move forward with MRI and ortho referral. We have previously and again reviewed lymphatic tissue damage after surgery and injuries and suggestion to consider compression stockings to see if this reduces the pain/swelling as well.     - MR Ankle Left w/o Contrast; Future  - Orthopedic  " Referral; Future    Acute sinusitis  Monitor sx for now; 5d into the onset of illness; can reach out for doxycycline (amox allergy) if needed next week if sx still significant/progressing            BMI  Estimated body mass index is 28.78 kg/m  as calculated from the following:    Height as of 9/26/23: 1.596 m (5' 2.84\").    Weight as of 9/26/23: 73.3 kg (161 lb 9.6 oz).             Courtney Dee is a 49 year old, presenting for the following health issues:  Recheck Medication (No concerns) and Sinus Problem (Lots of pressure in cheeks and teeth, has been going on since last Wednesday/Thursday. No fevers)        3/12/2024     9:58 AM   Additional Questions   Roomed by Michelle RODARTE LPN     History of Present Illness       Reason for visit:  ADHD medication follow up    She eats 2-3 servings of fruits and vegetables daily.She consumes 1 sweetened beverage(s) daily.She exercises with enough effort to increase her heart rate 20 to 29 minutes per day.  She exercises with enough effort to increase her heart rate 4 days per week.   She is taking medications regularly.       5 days of sinus type symptoms: cheeks hurt, temples, frontal head area; pain in her teeth and congestion with drainage.   Low grade temp (not a fever)      ADHD follow up: she is on 20mg ER Adderall and wonders if there might be something to help with midday/afternoon concentration lagging    Shares that her daughter recently was dx with adult ADHD and was started on Ritalin.      Hx of left ankle surgery years ago; but since about May 2019 had re-rolled the ankle and chronic recurring swelling in that area. In 2019 the MRI was normal of that ankle, but since then has had an ultrasound during a cortisone injection showed possibly bony changes and inflammation from the surgery area. She hasn't seen ortho back in a long time.     She has ankle swelling from prior surgery location; doing a lot of PT and ortho visits in the past. Has been to both " [facility] and [place] for PT.   She has been denied by insurance for a prior attempt at ankle MRI                    Objective           Vitals:  No vitals were obtained today due to virtual visit.    Physical Exam   GENERAL: alert and no distress. Intermittent sneezing and nasal congestion audible  EYES: Eyes grossly normal to inspection.  No discharge or erythema, or obvious scleral/conjunctival abnormalities.  RESP: No audible wheeze, cough, or visible cyanosis.    SKIN: Visible skin clear. No significant rash, abnormal pigmentation or lesions.  NEURO: Cranial nerves grossly intact.  Mentation and speech appropriate for age.  PSYCH: Appropriate affect, tone, and pace of words          Video-Visit Details    Type of service:  Video Visit     Originating Location (pt. Location): Home  Distant Location (provider location):  On-site  Platform used for Video Visit: Michael  Signed Electronically by: Amanda Lin MD

## 2024-06-05 ENCOUNTER — LAB REQUISITION (OUTPATIENT)
Dept: LAB | Facility: CLINIC | Age: 74
End: 2024-06-05
Payer: MEDICARE

## 2024-06-05 DIAGNOSIS — E55.9 VITAMIN D DEFICIENCY, UNSPECIFIED: ICD-10-CM

## 2024-06-05 DIAGNOSIS — F32.4 MAJOR DEPRESSIVE DISORDER, SINGLE EPISODE, IN PARTIAL REMISSION (H): ICD-10-CM

## 2024-06-05 DIAGNOSIS — E11.69 TYPE 2 DIABETES MELLITUS WITH OTHER SPECIFIED COMPLICATION (H): ICD-10-CM

## 2024-06-06 LAB
BASOPHILS # BLD AUTO: 0.1 10E3/UL (ref 0–0.2)
BASOPHILS NFR BLD AUTO: 1 %
EOSINOPHIL # BLD AUTO: 0.2 10E3/UL (ref 0–0.7)
EOSINOPHIL NFR BLD AUTO: 2 %
ERYTHROCYTE [DISTWIDTH] IN BLOOD BY AUTOMATED COUNT: 13.2 % (ref 10–15)
GLUCOSE SERPL-MCNC: 86 MG/DL (ref 70–99)
HBA1C MFR BLD: 7.3 %
HCT VFR BLD AUTO: 43 % (ref 35–47)
HGB BLD-MCNC: 13.9 G/DL (ref 11.7–15.7)
IMM GRANULOCYTES # BLD: 0 10E3/UL
IMM GRANULOCYTES NFR BLD: 0 %
LYMPHOCYTES # BLD AUTO: 3.9 10E3/UL (ref 0.8–5.3)
LYMPHOCYTES NFR BLD AUTO: 33 %
MCH RBC QN AUTO: 29.8 PG (ref 26.5–33)
MCHC RBC AUTO-ENTMCNC: 32.3 G/DL (ref 31.5–36.5)
MCV RBC AUTO: 92 FL (ref 78–100)
MONOCYTES # BLD AUTO: 0.7 10E3/UL (ref 0–1.3)
MONOCYTES NFR BLD AUTO: 6 %
NEUTROPHILS # BLD AUTO: 7 10E3/UL (ref 1.6–8.3)
NEUTROPHILS NFR BLD AUTO: 58 %
NRBC # BLD AUTO: 0 10E3/UL
NRBC BLD AUTO-RTO: 0 /100
PLATELET # BLD AUTO: 325 10E3/UL (ref 150–450)
RBC # BLD AUTO: 4.67 10E6/UL (ref 3.8–5.2)
WBC # BLD AUTO: 11.9 10E3/UL (ref 4–11)

## 2024-06-06 PROCEDURE — 80061 LIPID PANEL: CPT | Mod: ORL | Performed by: NURSE PRACTITIONER

## 2024-06-06 PROCEDURE — P9603 ONE-WAY ALLOW PRORATED MILES: HCPCS | Mod: ORL | Performed by: NURSE PRACTITIONER

## 2024-06-06 PROCEDURE — 80053 COMPREHEN METABOLIC PANEL: CPT | Mod: ORL | Performed by: NURSE PRACTITIONER

## 2024-06-06 PROCEDURE — 84443 ASSAY THYROID STIM HORMONE: CPT | Mod: ORL | Performed by: NURSE PRACTITIONER

## 2024-06-06 PROCEDURE — 83036 HEMOGLOBIN GLYCOSYLATED A1C: CPT | Mod: ORL | Performed by: NURSE PRACTITIONER

## 2024-06-06 PROCEDURE — 85025 COMPLETE CBC W/AUTO DIFF WBC: CPT | Mod: ORL | Performed by: NURSE PRACTITIONER

## 2024-06-06 PROCEDURE — 82306 VITAMIN D 25 HYDROXY: CPT | Mod: ORL | Performed by: NURSE PRACTITIONER

## 2024-06-06 PROCEDURE — 36415 COLL VENOUS BLD VENIPUNCTURE: CPT | Mod: ORL | Performed by: NURSE PRACTITIONER

## 2024-06-07 LAB
ALBUMIN SERPL BCG-MCNC: 4.4 G/DL (ref 3.5–5.2)
ALP SERPL-CCNC: 112 U/L (ref 40–150)
ALT SERPL W P-5'-P-CCNC: 25 U/L (ref 0–50)
ANION GAP SERPL CALCULATED.3IONS-SCNC: 12 MMOL/L (ref 7–15)
AST SERPL W P-5'-P-CCNC: 21 U/L (ref 0–45)
BILIRUB SERPL-MCNC: 0.3 MG/DL
BUN SERPL-MCNC: 10.7 MG/DL (ref 8–23)
CALCIUM SERPL-MCNC: 9.2 MG/DL (ref 8.8–10.2)
CHLORIDE SERPL-SCNC: 105 MMOL/L (ref 98–107)
CHOLEST SERPL-MCNC: 128 MG/DL
CREAT SERPL-MCNC: 0.9 MG/DL (ref 0.51–0.95)
DEPRECATED HCO3 PLAS-SCNC: 26 MMOL/L (ref 22–29)
EGFRCR SERPLBLD CKD-EPI 2021: 67 ML/MIN/1.73M2
FASTING STATUS PATIENT QL REPORTED: ABNORMAL
HDLC SERPL-MCNC: 40 MG/DL
LDLC SERPL CALC-MCNC: 38 MG/DL
NONHDLC SERPL-MCNC: 88 MG/DL
POTASSIUM SERPL-SCNC: 3.9 MMOL/L (ref 3.4–5.3)
PROT SERPL-MCNC: 7.3 G/DL (ref 6.4–8.3)
SODIUM SERPL-SCNC: 143 MMOL/L (ref 135–145)
TRIGL SERPL-MCNC: 250 MG/DL
TSH SERPL DL<=0.005 MIU/L-ACNC: 1.12 UIU/ML (ref 0.3–4.2)
VIT D+METAB SERPL-MCNC: 31 NG/ML (ref 20–50)

## 2024-06-17 PROBLEM — Z71.89 ADVANCED DIRECTIVES, COUNSELING/DISCUSSION: Status: RESOLVED | Noted: 2017-07-28 | Resolved: 2024-06-17

## 2024-08-29 NOTE — TELEPHONE ENCOUNTER
Pending Prescriptions:                       Disp   Refills    nitroGLYcerin (NITROSTAT) 0.4 MG sublingu*30 tab*3            Sig: For chest pain place 1 tablet under the tongue           every 5 minutes for 3 doses. If symptoms persist           5 minutes after 1st dose call 911.    Last filled 2-21-20    Thank you,  Pat Hurst [facility]  [phone]    
Pending Prescriptions:                       Disp   Refills    nitroGLYcerin (NITROSTAT) 0.4 MG sublingua*30 tab*3        Sig: For chest pain place 1 tablet under the tongue every           5 minutes for 3 doses. If symptoms persist 5           minutes after 1st dose call 911.    Routing refill request to provider for review/approval because:  Previously ordered by cardiology         
5

## 2024-09-13 NOTE — TELEPHONE ENCOUNTER
Laurita Jackson Patient Age: 7 year old  MESSAGE:   Patients mother called. Stated patient went to [facility] on [address] and was diagnosed with STREP pharyngitis 9/11/24. Given an Rx azithromycin (ZITHROMAX) 200 MG/5ML suspension antibiotics. She is postponing U/S do to swelling in the throat. Please advise.     ASSESSMENT/PLAN:     STREP PHARYNGITIS ACUTE - Discussed etiology of illness and OTC medication/treatment options and risks and benefits. May use tylenol as directed as needed for pain or fever. Use antibiotics as prescribed. Increase hydration. Patient considered contagious for at least 24 hours and until symptoms significantly improve. If symptoms worsen despite antibiotics or are not improving after 48-72 hours of antibiotics patient should be reevaluated at the [facility] or by the primary care provider all call for possible change of antibiotic.     Diagnosis and prognosis, treatment and side-effects were explained and all questions were answered satisfactorily and there were no further questions upon discharge.     Electronically signed by: Diallo Albert DO  9/11/2024         WEIGHT AND HEIGHT:   Wt Readings from Last 1 Encounters:   09/11/24 29.5 kg (65 lb) (95%, Z= 1.65)*     * Growth percentiles are based on CDC (Girls, 2-20 Years) data.     Ht Readings from Last 1 Encounters:   No data found for Ht     BMI Readings from Last 1 Encounters:   No data found for BMI       ALLERGIES:  Patient has no known allergies.  Current Outpatient Medications   Medication Sig Dispense Refill    azithromycin (ZITHROMAX) 200 MG/5ML suspension Take 1 3/4 tsp by mouth daily for 5 days 1 each 0    fluticasone (FLONASE) 50 MCG/ACT nasal spray Spray 1 spray in each nostril daily. 32 g 0     No current facility-administered medications for this visit.     PHARMACY to use: [id number] - [place], [place] - [address]  [address]  [facility] [address]  Phone:  Please call Pat back regarding the stress test that she has scheduled on Wednesday. She has a few questions.   She can be reached at [phone].   [phone] Fax: [phone]          Pharmacy preference(s) on file:   [id number] - [place], [place] - [address]  [address]  [facility] [address]  Phone: [phone] Fax: [phone]      CALL BACK INFO: Ok to leave response (including medical information) with family member or on answering machine  ROUTING: Patient's physician/staff        PCP: Pcp [facility], Unknown         INS: [facility] / Plan: [employer] SELECT / Product Type:  EMPLOYEE-SELECT   PATIENT ADDRESS:  [address] [place] [id number]

## 2024-10-27 ASSESSMENT — PATIENT HEALTH QUESTIONNAIRE - PHQ9: SUM OF ALL RESPONSES TO PHQ QUESTIONS 1-9: 17

## 2024-12-04 ENCOUNTER — LAB REQUISITION (OUTPATIENT)
Dept: LAB | Facility: CLINIC | Age: 74
End: 2024-12-04
Payer: MEDICARE

## 2024-12-04 DIAGNOSIS — E11.40 TYPE 2 DIABETES MELLITUS WITH DIABETIC NEUROPATHY, UNSPECIFIED (H): ICD-10-CM

## 2024-12-05 LAB
ANION GAP SERPL CALCULATED.3IONS-SCNC: 11 MMOL/L (ref 7–15)
BASOPHILS # BLD AUTO: 0.1 10E3/UL (ref 0–0.2)
BASOPHILS NFR BLD AUTO: 1 %
BUN SERPL-MCNC: 9.5 MG/DL (ref 8–23)
CALCIUM SERPL-MCNC: 9.4 MG/DL (ref 8.8–10.4)
CHLORIDE SERPL-SCNC: 102 MMOL/L (ref 98–107)
CREAT SERPL-MCNC: 0.73 MG/DL (ref 0.51–0.95)
EGFRCR SERPLBLD CKD-EPI 2021: 86 ML/MIN/1.73M2
EOSINOPHIL # BLD AUTO: 0.2 10E3/UL (ref 0–0.7)
EOSINOPHIL NFR BLD AUTO: 2 %
ERYTHROCYTE [DISTWIDTH] IN BLOOD BY AUTOMATED COUNT: 12.8 % (ref 10–15)
EST. AVERAGE GLUCOSE BLD GHB EST-MCNC: 214 MG/DL
GLUCOSE SERPL-MCNC: 246 MG/DL (ref 70–99)
HBA1C MFR BLD: 9.1 %
HCO3 SERPL-SCNC: 26 MMOL/L (ref 22–29)
HCT VFR BLD AUTO: 44.5 % (ref 35–47)
HGB BLD-MCNC: 14.4 G/DL (ref 11.7–15.7)
IMM GRANULOCYTES # BLD: 0 10E3/UL
IMM GRANULOCYTES NFR BLD: 0 %
LYMPHOCYTES # BLD AUTO: 2.7 10E3/UL (ref 0.8–5.3)
LYMPHOCYTES NFR BLD AUTO: 28 %
MCH RBC QN AUTO: 29.8 PG (ref 26.5–33)
MCHC RBC AUTO-ENTMCNC: 32.4 G/DL (ref 31.5–36.5)
MCV RBC AUTO: 92 FL (ref 78–100)
MONOCYTES # BLD AUTO: 0.4 10E3/UL (ref 0–1.3)
MONOCYTES NFR BLD AUTO: 5 %
NEUTROPHILS # BLD AUTO: 6.3 10E3/UL (ref 1.6–8.3)
NEUTROPHILS NFR BLD AUTO: 65 %
NRBC # BLD AUTO: 0 10E3/UL
NRBC BLD AUTO-RTO: 0 /100
PLATELET # BLD AUTO: 299 10E3/UL (ref 150–450)
POTASSIUM SERPL-SCNC: 4 MMOL/L (ref 3.4–5.3)
RBC # BLD AUTO: 4.83 10E6/UL (ref 3.8–5.2)
SODIUM SERPL-SCNC: 139 MMOL/L (ref 135–145)
WBC # BLD AUTO: 9.7 10E3/UL (ref 4–11)

## 2024-12-05 PROCEDURE — P9603 ONE-WAY ALLOW PRORATED MILES: HCPCS | Mod: ORL | Performed by: NURSE PRACTITIONER

## 2024-12-05 PROCEDURE — 80048 BASIC METABOLIC PNL TOTAL CA: CPT | Mod: ORL | Performed by: NURSE PRACTITIONER

## 2024-12-05 PROCEDURE — 36415 COLL VENOUS BLD VENIPUNCTURE: CPT | Mod: ORL | Performed by: NURSE PRACTITIONER

## 2024-12-05 PROCEDURE — 85025 COMPLETE CBC W/AUTO DIFF WBC: CPT | Mod: ORL | Performed by: NURSE PRACTITIONER

## 2024-12-05 PROCEDURE — 83036 HEMOGLOBIN GLYCOSYLATED A1C: CPT | Mod: ORL | Performed by: NURSE PRACTITIONER

## 2025-05-05 ENCOUNTER — LAB REQUISITION (OUTPATIENT)
Dept: LAB | Facility: CLINIC | Age: 75
End: 2025-05-05
Payer: MEDICARE

## 2025-05-05 DIAGNOSIS — Z59.9 PROBLEM RELATED TO HOUSING AND ECONOMIC CIRCUMSTANCES, UNSPECIFIED: ICD-10-CM

## 2025-05-05 DIAGNOSIS — R07.9 CHEST PAIN, UNSPECIFIED: ICD-10-CM

## 2025-05-05 DIAGNOSIS — M54.30 SCIATICA, UNSPECIFIED SIDE: ICD-10-CM

## 2025-05-05 DIAGNOSIS — E78.2 MIXED HYPERLIPIDEMIA: ICD-10-CM

## 2025-05-05 DIAGNOSIS — R79.0 ABNORMAL LEVEL OF BLOOD MINERAL: ICD-10-CM

## 2025-05-05 DIAGNOSIS — G89.4 CHRONIC PAIN SYNDROME: ICD-10-CM

## 2025-05-05 DIAGNOSIS — E11.69 TYPE 2 DIABETES MELLITUS WITH OTHER SPECIFIED COMPLICATION (H): ICD-10-CM

## 2025-05-05 DIAGNOSIS — E11.40 TYPE 2 DIABETES MELLITUS WITH DIABETIC NEUROPATHY, UNSPECIFIED (H): ICD-10-CM

## 2025-05-05 DIAGNOSIS — E55.9 VITAMIN D DEFICIENCY, UNSPECIFIED: ICD-10-CM

## 2025-05-05 DIAGNOSIS — J44.9 CHRONIC OBSTRUCTIVE PULMONARY DISEASE, UNSPECIFIED (H): ICD-10-CM

## 2025-05-05 DIAGNOSIS — F32.4 MAJOR DEPRESSIVE DISORDER, SINGLE EPISODE, IN PARTIAL REMISSION: ICD-10-CM

## 2025-05-05 SDOH — ECONOMIC STABILITY - INCOME SECURITY: PROBLEM RELATED TO HOUSING AND ECONOMIC CIRCUMSTANCES, UNSPECIFIED: Z59.9

## 2025-05-08 LAB
ALBUMIN SERPL BCG-MCNC: 4.4 G/DL (ref 3.5–5.2)
ALP SERPL-CCNC: 91 U/L (ref 40–150)
ALT SERPL W P-5'-P-CCNC: 21 U/L (ref 0–50)
ANION GAP SERPL CALCULATED.3IONS-SCNC: 13 MMOL/L (ref 7–15)
AST SERPL W P-5'-P-CCNC: 26 U/L (ref 0–45)
BILIRUB SERPL-MCNC: 0.4 MG/DL
BUN SERPL-MCNC: 12.6 MG/DL (ref 8–23)
CALCIUM SERPL-MCNC: 9.7 MG/DL (ref 8.8–10.4)
CHLORIDE SERPL-SCNC: 103 MMOL/L (ref 98–107)
CHOLEST SERPL-MCNC: 147 MG/DL
CREAT SERPL-MCNC: 0.82 MG/DL (ref 0.51–0.95)
EGFRCR SERPLBLD CKD-EPI 2021: 75 ML/MIN/1.73M2
ERYTHROCYTE [DISTWIDTH] IN BLOOD BY AUTOMATED COUNT: 12.5 % (ref 10–15)
EST. AVERAGE GLUCOSE BLD GHB EST-MCNC: 186 MG/DL
FASTING STATUS PATIENT QL REPORTED: ABNORMAL
GLUCOSE SERPL-MCNC: 134 MG/DL (ref 70–99)
HBA1C MFR BLD: 8.1 %
HCO3 SERPL-SCNC: 27 MMOL/L (ref 22–29)
HCT VFR BLD AUTO: 46.5 % (ref 35–47)
HDLC SERPL-MCNC: 35 MG/DL
HGB BLD-MCNC: 14.7 G/DL (ref 11.7–15.7)
LDLC SERPL CALC-MCNC: 67 MG/DL
MAGNESIUM SERPL-MCNC: 2 MG/DL (ref 1.7–2.3)
MCH RBC QN AUTO: 30.2 PG (ref 26.5–33)
MCHC RBC AUTO-ENTMCNC: 31.6 G/DL (ref 31.5–36.5)
MCV RBC AUTO: 96 FL (ref 78–100)
NONHDLC SERPL-MCNC: 112 MG/DL
PLATELET # BLD AUTO: 301 10E3/UL (ref 150–450)
POTASSIUM SERPL-SCNC: 3.7 MMOL/L (ref 3.4–5.3)
PROT SERPL-MCNC: 7.5 G/DL (ref 6.4–8.3)
RBC # BLD AUTO: 4.86 10E6/UL (ref 3.8–5.2)
SODIUM SERPL-SCNC: 143 MMOL/L (ref 135–145)
TRIGL SERPL-MCNC: 223 MG/DL
TSH SERPL DL<=0.005 MIU/L-ACNC: 0.76 UIU/ML (ref 0.3–4.2)
VIT D+METAB SERPL-MCNC: 46 NG/ML (ref 20–50)
WBC # BLD AUTO: 9.4 10E3/UL (ref 4–11)

## 2025-05-08 PROCEDURE — 85027 COMPLETE CBC AUTOMATED: CPT | Mod: ORL | Performed by: NURSE PRACTITIONER

## 2025-05-08 PROCEDURE — 36415 COLL VENOUS BLD VENIPUNCTURE: CPT | Mod: ORL | Performed by: NURSE PRACTITIONER

## 2025-05-08 PROCEDURE — 83735 ASSAY OF MAGNESIUM: CPT | Mod: ORL | Performed by: NURSE PRACTITIONER

## 2025-05-08 PROCEDURE — P9604 ONE-WAY ALLOW PRORATED TRIP: HCPCS | Mod: ORL | Performed by: NURSE PRACTITIONER

## 2025-05-08 PROCEDURE — 84443 ASSAY THYROID STIM HORMONE: CPT | Mod: ORL | Performed by: NURSE PRACTITIONER

## 2025-05-08 PROCEDURE — 82306 VITAMIN D 25 HYDROXY: CPT | Mod: ORL | Performed by: NURSE PRACTITIONER

## 2025-05-08 PROCEDURE — 83036 HEMOGLOBIN GLYCOSYLATED A1C: CPT | Mod: ORL | Performed by: NURSE PRACTITIONER

## 2025-05-08 PROCEDURE — 80053 COMPREHEN METABOLIC PANEL: CPT | Mod: ORL | Performed by: NURSE PRACTITIONER

## 2025-05-08 PROCEDURE — 80061 LIPID PANEL: CPT | Mod: ORL | Performed by: NURSE PRACTITIONER

## 2025-07-14 NOTE — PROGRESS NOTES
Signed         Transition of Care Plan:     RUR: 21 %   Prior Level of Functioning: Independent   Disposition: TBD  JASON: Home vs Inpatient Rehab ETOH  Follow up appointments: PCP  DME needed: NA  Transportation at discharge: Family   IM/IMM Medicare/ letter given: NA  Is patient a  and connected with VA? No  Caregiver Contact: Cousin Jana Cruz   Discharge Caregiver contacted prior to discharge? Yes  Care Conference needed? No  Barriers to discharge:    Medical stability   Admitted for ETOH abuse, monitoring for withdrawal. On a phenobarb taper.   CM met with patient to discuss options for Alcohol treatment programs.  Patient stated she is not interested in going to any Rehab place that accepts Medicaid. She states \" they are like jails.\" Patient's insurance is Medicaid. She reports that she paid out of pocket at [facility] and she does not have the funds to pay again. CM reached out to Dr Argueta, Addiction specialist, and he will be in the hospital this afternoon. CM hopes to round with him with patient.   Patient also states that if she is able to go to a rehab facility, she would need to go home first to make arrangements for her dog. CM voiced concerns regarding admitting directly to a facility. She says she has no source of income and no other prospects regarding housing. Patient lives with brother who is an active drinker and drug user.               [facility] Care Coordination Contact    Call to member to check in on move status. Member shares that she is recovering from COVID. She took an antiviral and feels she had side effects from this as well. She is starting to feel better.     Member shares her son was here a couple weeks ago and helped with packing and cleaning her apartments. Pods will be delivered and packed next week. Her sister is coming up from [place] and then will drive down together. She plans to leave 4/29 or 4/30. Member will call writer once she arrives in [place] to provide an update.     Discussed that writer will notify the county after her move and [facility] will close. Writer will close [facility] and cancel services as well.     Vanesa Gtz RN  [facility]  [phone]  Fax: [phone]

## (undated) DEVICE — DRAIN JACKSON PRATT RESERVOIR 100ML SU130-1305

## (undated) DEVICE — ESU ELEC BLADE 2.75" COATED/INSULATED E1455

## (undated) DEVICE — EYE SHIELD PLASTIC

## (undated) DEVICE — EYE PACK CUSTOM ANTERIOR 30DEG TIP CENTURION PPK6682-04

## (undated) DEVICE — GUIDEWIRE VASC 0.014IN DIA 185

## (undated) DEVICE — SOL WATER IRRIG 500ML BOTTLE 2F7113

## (undated) DEVICE — PACK MINOR EYE CUSTOM ASC

## (undated) DEVICE — ESU NDL COLORADO MICRO 3CM STR N103A

## (undated) DEVICE — GLOVE PROTEXIS MICRO 7.5  2D73PM75

## (undated) DEVICE — MANIFOLD KIT ANGIO AUTOMATED 014613

## (undated) DEVICE — TOOL DISSECT MIDAS MR8 14CM MATCH HEAD 3MM MR8-14MH30

## (undated) DEVICE — LINEN DRAPE 54X72" 5467

## (undated) DEVICE — SPONGE COTTONOID 1/2X1/2" 80-1400

## (undated) DEVICE — GLOVE PROTEXIS MICRO 6.5  2D73PM65

## (undated) DEVICE — DRAIN JACKSON PRATT 10MM FLAT 4/4 PERF SU130-1311

## (undated) DEVICE — CATH ANGIO INFINITI JR4 4FRX100CM 538421

## (undated) DEVICE — SUCTION MANIFOLD NEPTUNE 2 SYS 4 PORT 0702-020-000

## (undated) DEVICE — ESU GROUND PAD ADULT W/CORD E7507

## (undated) DEVICE — EYE TIP IRRIGATION & ASPIRATION POLYMER 35D BENT 8065751511

## (undated) DEVICE — DRILL BIT MEDT 13MM SGL USE 7080513

## (undated) DEVICE — SLEEVE TR BAND RADIAL COMPRESSION DEVICE 24CM TRB24-REG

## (undated) DEVICE — PACK SMALL SPINE RIDGES

## (undated) DEVICE — ESU EYE HIGH TEMP 65410-183

## (undated) DEVICE — PACK CATARACT CUSTOM ASC SEY15CPUMC

## (undated) DEVICE — EYE KNIFE STILETTO VISITEC 1.1MM ANG 45DEG SIDEPORT 376620

## (undated) DEVICE — SYR EAR BULB 3OZ 0035830

## (undated) DEVICE — INTRO GLIDESHEATH SLENDER 6FR 10X45CM 60-1060

## (undated) DEVICE — MASK PT REGISTRATION FOR STRYKER NAV SYSTEM 6001-386-000

## (undated) DEVICE — KIT LG BORE TOUHY ACCESS PLUS MAP152

## (undated) DEVICE — GLOVE PROTEXIS BLUE W/NEU-THERA 6.5  2D73EB65

## (undated) DEVICE — EYE KNIFE SLIT XSTAR VISITEC 2.5MM 45DEG BEVEL UP 373725

## (undated) DEVICE — DRAPE MAYO STAND 23X54 8337

## (undated) DEVICE — LINEN ORTHO ACL PACK 5447

## (undated) DEVICE — EYE PREP BETADINE 5% SOLUTION 30ML 0065-0411-30

## (undated) DEVICE — DRAPE X-RAY TUBE 00-901169-01-OEC

## (undated) DEVICE — TAPE DURAPORE 3" SILK 1538-3

## (undated) DEVICE — WIRE GUIDE 0.035"X260CM SAFE-T-J EXCHANGE G00517

## (undated) DEVICE — SU VICRYL 3-0 SH 8X18" UND J864D

## (undated) DEVICE — TUBING SUCTION 12"X1/4" N612

## (undated) DEVICE — DECANTER VIAL 2006S

## (undated) DEVICE — CATH LAUNCHER 6FR JR 4.0 LA6JR40

## (undated) DEVICE — CATH ANGIO INFINITI JL3.5 4FRX100CM 538418

## (undated) DEVICE — LINEN TOWEL PACK X5 5464

## (undated) DEVICE — TOTE ANGIO CORP PC15AT SAN32CC83O

## (undated) DEVICE — PREP CHLORAPREP 26ML TINTED HI-LITE ORANGE 930815

## (undated) DEVICE — BAG CLEAR TRASH 1.3M 39X33" P4040C

## (undated) DEVICE — SPONGE SURGIFOAM 12 1972

## (undated) DEVICE — KIT HAND CONTROL ANGIOTOUCH ACIST 65CM AT-P65

## (undated) DEVICE — SYR 10ML LL W/O NDL

## (undated) DEVICE — LINEN FULL SHEET 5511

## (undated) DEVICE — CATH IV ANGIO INTRO 12GA 382277

## (undated) DEVICE — LINER SUCTION US ASPIRATOR SONOPET CANISTER LF 5450-850-002

## (undated) DEVICE — ADH SKIN CLOSURE PREMIERPRO EXOFIN MICOR HV 0.5ML 3471

## (undated) DEVICE — PEN MARKING SKIN TYCO DEVON DUAL TIP 31145868

## (undated) DEVICE — BLADE KNIFE SURG 15 371115

## (undated) DEVICE — SOL NACL 0.9% IRRIG 500ML BOTTLE 2F7123

## (undated) DEVICE — GLOVE PROTEXIS BLUE W/NEU-THERA 7.5  2D73EB75

## (undated) DEVICE — RX SURGIFLO HEMOSTATIC MATRIX 8ML 2991

## (undated) DEVICE — SOL NACL 0.9% IRRIG 1000ML BOTTLE 2F7124

## (undated) DEVICE — CATH JACKY 5FR 3.5 CURVE 40-5023

## (undated) DEVICE — SOL NACL 0.9% INJ 1000ML BAG 2B1324X

## (undated) DEVICE — EYE CANN IRR 30GA  ANTERIOR CHAMBER 581273

## (undated) DEVICE — BATTERY STRYKER NAVIGATION SYSTEM 6000-006-000

## (undated) DEVICE — SU MONOCRYL 4-0 PS-2 27" UND Y426H

## (undated) DEVICE — BONE WAX 2.5GM W31G

## (undated) DEVICE — GLOVE PROTEXIS W/NEU-THERA 7.0  2D73TE70

## (undated) DEVICE — CATH LAUNCHER 6FR EBU 30 LA6EBU30

## (undated) DEVICE — GLOVE PROTEXIS POWDER FREE 6.5 ORTHOPEDIC 2D73ET65

## (undated) DEVICE — DEFIB PRO-PADZ LVP LQD GEL ADULT 8900-2105-01

## (undated) DEVICE — LINEN POUCH DBL 5427

## (undated) DEVICE — TIP ASPIRATOR PAYNER ANG SONOPET UNV 25KHZ 360D 5450-800-312

## (undated) DEVICE — EYE KNIFE CRESCENT 55DEG 373807

## (undated) DEVICE — VALVE HEMOSTASIS .096" COPILOT MECH 1003331

## (undated) DEVICE — MEDITRACE MULTIFUNTION ADULT RADIOTRANSPARENT ELECTRODE FOR ZOLL

## (undated) DEVICE — GUIDEWIRE VERRATA PLUS PRESSURE 185CM JTIP 10185JP

## (undated) DEVICE — ESU PENCIL W/COATED BLADE E2450H

## (undated) DEVICE — TUBING SET ASPIRATION W/EXT SONOPET  LF 5450-850-003

## (undated) DEVICE — PIN DISTRACTION ANCHOR FOR SCR 12MM MDS9091212 909-12

## (undated) DEVICE — Device

## (undated) DEVICE — INTRODUCER GUIDEWIRE 22290

## (undated) DEVICE — RAD G/W INQWIRE .035X260CM J-TIP EXCHANGE IQ35F260J1O5RS

## (undated) DEVICE — EYE CANN IRR 25GA CYSTOTOME 581610

## (undated) DEVICE — LINEN HALF SHEET 5512

## (undated) RX ORDER — FENTANYL CITRATE 50 UG/ML
INJECTION, SOLUTION INTRAMUSCULAR; INTRAVENOUS
Status: DISPENSED
Start: 2021-12-06

## (undated) RX ORDER — HEPARIN SODIUM 200 [USP'U]/100ML
INJECTION, SOLUTION INTRAVENOUS
Status: DISPENSED
Start: 2020-10-02

## (undated) RX ORDER — CLINDAMYCIN PHOSPHATE 900 MG/50ML
INJECTION, SOLUTION INTRAVENOUS
Status: DISPENSED
Start: 2021-12-06

## (undated) RX ORDER — HYDROMORPHONE HYDROCHLORIDE 1 MG/ML
INJECTION, SOLUTION INTRAMUSCULAR; INTRAVENOUS; SUBCUTANEOUS
Status: DISPENSED
Start: 2020-10-08

## (undated) RX ORDER — FENTANYL CITRATE-0.9 % NACL/PF 10 MCG/ML
PLASTIC BAG, INJECTION (ML) INTRAVENOUS
Status: DISPENSED
Start: 2021-12-06

## (undated) RX ORDER — NEOSTIGMINE METHYLSULFATE 1 MG/ML
VIAL (ML) INJECTION
Status: DISPENSED
Start: 2021-12-06

## (undated) RX ORDER — DEXMEDETOMIDINE HYDROCHLORIDE 4 UG/ML
INJECTION, SOLUTION INTRAVENOUS
Status: DISPENSED
Start: 2021-12-06

## (undated) RX ORDER — FENTANYL CITRATE 50 UG/ML
INJECTION, SOLUTION INTRAMUSCULAR; INTRAVENOUS
Status: DISPENSED
Start: 2021-07-09

## (undated) RX ORDER — OXYCODONE HYDROCHLORIDE 5 MG/1
TABLET ORAL
Status: DISPENSED
Start: 2020-10-08

## (undated) RX ORDER — GLYCOPYRROLATE 0.2 MG/ML
INJECTION INTRAMUSCULAR; INTRAVENOUS
Status: DISPENSED
Start: 2021-12-06

## (undated) RX ORDER — VERAPAMIL HYDROCHLORIDE 2.5 MG/ML
INJECTION, SOLUTION INTRAVENOUS
Status: DISPENSED
Start: 2020-10-02

## (undated) RX ORDER — PROPOFOL 10 MG/ML
INJECTION, EMULSION INTRAVENOUS
Status: DISPENSED
Start: 2021-12-06

## (undated) RX ORDER — REGADENOSON 0.08 MG/ML
INJECTION, SOLUTION INTRAVENOUS
Status: DISPENSED
Start: 2020-10-01

## (undated) RX ORDER — ACETAMINOPHEN 325 MG/1
TABLET ORAL
Status: DISPENSED
Start: 2020-10-08

## (undated) RX ORDER — HEPARIN SODIUM 1000 [USP'U]/ML
INJECTION, SOLUTION INTRAVENOUS; SUBCUTANEOUS
Status: DISPENSED
Start: 2020-10-02

## (undated) RX ORDER — CLINDAMYCIN PHOSPHATE 900 MG/50ML
INJECTION, SOLUTION INTRAVENOUS
Status: DISPENSED
Start: 2020-10-08

## (undated) RX ORDER — FENTANYL CITRATE 50 UG/ML
INJECTION, SOLUTION INTRAMUSCULAR; INTRAVENOUS
Status: DISPENSED
Start: 2020-10-02

## (undated) RX ORDER — LIDOCAINE HYDROCHLORIDE 20 MG/ML
INJECTION, SOLUTION EPIDURAL; INFILTRATION; INTRACAUDAL; PERINEURAL
Status: DISPENSED
Start: 2020-10-08

## (undated) RX ORDER — FENTANYL CITRATE 50 UG/ML
INJECTION, SOLUTION INTRAMUSCULAR; INTRAVENOUS
Status: DISPENSED
Start: 2021-07-26

## (undated) RX ORDER — DEXAMETHASONE SODIUM PHOSPHATE 4 MG/ML
INJECTION, SOLUTION INTRA-ARTICULAR; INTRALESIONAL; INTRAMUSCULAR; INTRAVENOUS; SOFT TISSUE
Status: DISPENSED
Start: 2021-12-06

## (undated) RX ORDER — LIDOCAINE HYDROCHLORIDE 20 MG/ML
INJECTION, SOLUTION EPIDURAL; INFILTRATION; INTRACAUDAL; PERINEURAL
Status: DISPENSED
Start: 2021-03-11

## (undated) RX ORDER — FENTANYL CITRATE 50 UG/ML
INJECTION, SOLUTION INTRAMUSCULAR; INTRAVENOUS
Status: DISPENSED
Start: 2021-03-11

## (undated) RX ORDER — NITROGLYCERIN 5 MG/ML
VIAL (ML) INTRAVENOUS
Status: DISPENSED
Start: 2020-10-02

## (undated) RX ORDER — ADENOSINE 3 MG/ML
INJECTION, SOLUTION INTRAVENOUS
Status: DISPENSED
Start: 2020-10-02

## (undated) RX ORDER — ACETAMINOPHEN 325 MG/1
TABLET ORAL
Status: DISPENSED
Start: 2021-07-26

## (undated) RX ORDER — ONDANSETRON 2 MG/ML
INJECTION INTRAMUSCULAR; INTRAVENOUS
Status: DISPENSED
Start: 2021-12-06

## (undated) RX ORDER — FENTANYL CITRATE 50 UG/ML
INJECTION, SOLUTION INTRAMUSCULAR; INTRAVENOUS
Status: DISPENSED
Start: 2020-10-08

## (undated) RX ORDER — ONDANSETRON 2 MG/ML
INJECTION INTRAMUSCULAR; INTRAVENOUS
Status: DISPENSED
Start: 2020-10-08

## (undated) RX ORDER — OXYCODONE HYDROCHLORIDE 5 MG/1
TABLET ORAL
Status: DISPENSED
Start: 2021-12-06

## (undated) RX ORDER — ACETAZOLAMIDE 500 MG/1
CAPSULE, EXTENDED RELEASE ORAL
Status: DISPENSED
Start: 2021-07-26

## (undated) RX ORDER — LIDOCAINE HYDROCHLORIDE 10 MG/ML
INJECTION, SOLUTION EPIDURAL; INFILTRATION; INTRACAUDAL; PERINEURAL
Status: DISPENSED
Start: 2021-12-06

## (undated) RX ORDER — ONDANSETRON 2 MG/ML
INJECTION INTRAMUSCULAR; INTRAVENOUS
Status: DISPENSED
Start: 2021-07-26

## (undated) RX ORDER — ACETAMINOPHEN 325 MG/1
TABLET ORAL
Status: DISPENSED
Start: 2021-07-09

## (undated) RX ORDER — LIDOCAINE HYDROCHLORIDE 10 MG/ML
INJECTION, SOLUTION EPIDURAL; INFILTRATION; INTRACAUDAL; PERINEURAL
Status: DISPENSED
Start: 2020-10-02

## (undated) RX ORDER — ACETAMINOPHEN 325 MG/1
TABLET ORAL
Status: DISPENSED
Start: 2021-03-11

## (undated) RX ORDER — LABETALOL HYDROCHLORIDE 5 MG/ML
INJECTION, SOLUTION INTRAVENOUS
Status: DISPENSED
Start: 2021-12-06

## (undated) RX ORDER — PROPOFOL 10 MG/ML
INJECTION, EMULSION INTRAVENOUS
Status: DISPENSED
Start: 2021-03-11

## (undated) RX ORDER — PROPOFOL 10 MG/ML
INJECTION, EMULSION INTRAVENOUS
Status: DISPENSED
Start: 2020-10-08